# Patient Record
Sex: FEMALE | Race: WHITE | Employment: OTHER | URBAN - METROPOLITAN AREA
[De-identification: names, ages, dates, MRNs, and addresses within clinical notes are randomized per-mention and may not be internally consistent; named-entity substitution may affect disease eponyms.]

---

## 2018-05-13 ENCOUNTER — APPOINTMENT (OUTPATIENT)
Dept: GENERAL RADIOLOGY | Age: 73
End: 2018-05-13
Payer: MEDICARE

## 2018-05-13 ENCOUNTER — APPOINTMENT (OUTPATIENT)
Dept: CT IMAGING | Age: 73
End: 2018-05-13
Payer: MEDICARE

## 2018-05-13 ENCOUNTER — HOSPITAL ENCOUNTER (EMERGENCY)
Age: 73
Discharge: HOME | End: 2018-05-13
Attending: EMERGENCY MEDICINE
Payer: MEDICARE

## 2018-05-13 VITALS
SYSTOLIC BLOOD PRESSURE: 166 MMHG | DIASTOLIC BLOOD PRESSURE: 88 MMHG | HEART RATE: 88 BPM | RESPIRATION RATE: 20 BRPM | HEIGHT: 65 IN | BODY MASS INDEX: 32.82 KG/M2 | OXYGEN SATURATION: 95 % | TEMPERATURE: 98.2 F | WEIGHT: 197 LBS

## 2018-05-13 DIAGNOSIS — R06.09 OTHER FORM OF DYSPNEA: Primary | ICD-10-CM

## 2018-05-13 DIAGNOSIS — F41.1 ANXIETY STATE: ICD-10-CM

## 2018-05-13 LAB
ANION GAP SERPL CALCULATED.3IONS-SCNC: 10 MMOL/L (ref 7–16)
BASOPHILS ABSOLUTE: 0.03 E9/L (ref 0–0.2)
BASOPHILS RELATIVE PERCENT: 0.4 % (ref 0–2)
BUN BLDV-MCNC: 11 MG/DL (ref 8–23)
CALCIUM SERPL-MCNC: 10 MG/DL (ref 8.6–10.2)
CHLORIDE BLD-SCNC: 101 MMOL/L (ref 98–107)
CO2: 30 MMOL/L (ref 22–29)
CREAT SERPL-MCNC: 0.7 MG/DL (ref 0.5–1)
EOSINOPHILS ABSOLUTE: 0.29 E9/L (ref 0.05–0.5)
EOSINOPHILS RELATIVE PERCENT: 4.1 % (ref 0–6)
GFR AFRICAN AMERICAN: >60
GFR NON-AFRICAN AMERICAN: >60 ML/MIN/1.73
GLUCOSE BLD-MCNC: 142 MG/DL (ref 74–109)
HCT VFR BLD CALC: 36.9 % (ref 34–48)
HEMOGLOBIN: 11.5 G/DL (ref 11.5–15.5)
IMMATURE GRANULOCYTES #: 0.04 E9/L
IMMATURE GRANULOCYTES %: 0.6 % (ref 0–5)
LYMPHOCYTES ABSOLUTE: 1.58 E9/L (ref 1.5–4)
LYMPHOCYTES RELATIVE PERCENT: 22.3 % (ref 20–42)
MCH RBC QN AUTO: 25.4 PG (ref 26–35)
MCHC RBC AUTO-ENTMCNC: 31.2 % (ref 32–34.5)
MCV RBC AUTO: 81.6 FL (ref 80–99.9)
MONOCYTES ABSOLUTE: 0.71 E9/L (ref 0.1–0.95)
MONOCYTES RELATIVE PERCENT: 10 % (ref 2–12)
NEUTROPHILS ABSOLUTE: 4.42 E9/L (ref 1.8–7.3)
NEUTROPHILS RELATIVE PERCENT: 62.6 % (ref 43–80)
PDW BLD-RTO: 14.9 FL (ref 11.5–15)
PLATELET # BLD: 270 E9/L (ref 130–450)
PMV BLD AUTO: 9.5 FL (ref 7–12)
POTASSIUM REFLEX MAGNESIUM: 4.3 MMOL/L (ref 3.5–5)
PRO-BNP: 1072 PG/ML (ref 0–125)
RBC # BLD: 4.52 E12/L (ref 3.5–5.5)
SODIUM BLD-SCNC: 141 MMOL/L (ref 132–146)
TROPONIN: <0.01 NG/ML (ref 0–0.03)
WBC # BLD: 7.1 E9/L (ref 4.5–11.5)

## 2018-05-13 PROCEDURE — 6360000002 HC RX W HCPCS: Performed by: EMERGENCY MEDICINE

## 2018-05-13 PROCEDURE — 6360000004 HC RX CONTRAST MEDICATION: Performed by: RADIOLOGY

## 2018-05-13 PROCEDURE — 36415 COLL VENOUS BLD VENIPUNCTURE: CPT

## 2018-05-13 PROCEDURE — 96374 THER/PROPH/DIAG INJ IV PUSH: CPT

## 2018-05-13 PROCEDURE — 80048 BASIC METABOLIC PNL TOTAL CA: CPT

## 2018-05-13 PROCEDURE — 71275 CT ANGIOGRAPHY CHEST: CPT

## 2018-05-13 PROCEDURE — 85025 COMPLETE CBC W/AUTO DIFF WBC: CPT

## 2018-05-13 PROCEDURE — 96375 TX/PRO/DX INJ NEW DRUG ADDON: CPT

## 2018-05-13 PROCEDURE — 96376 TX/PRO/DX INJ SAME DRUG ADON: CPT

## 2018-05-13 PROCEDURE — 84484 ASSAY OF TROPONIN QUANT: CPT

## 2018-05-13 PROCEDURE — 83880 ASSAY OF NATRIURETIC PEPTIDE: CPT

## 2018-05-13 PROCEDURE — 99285 EMERGENCY DEPT VISIT HI MDM: CPT

## 2018-05-13 PROCEDURE — 71045 X-RAY EXAM CHEST 1 VIEW: CPT

## 2018-05-13 RX ORDER — FUROSEMIDE 10 MG/ML
40 INJECTION INTRAMUSCULAR; INTRAVENOUS ONCE
Status: COMPLETED | OUTPATIENT
Start: 2018-05-13 | End: 2018-05-13

## 2018-05-13 RX ORDER — LORAZEPAM 2 MG/ML
1 INJECTION INTRAMUSCULAR ONCE
Status: COMPLETED | OUTPATIENT
Start: 2018-05-13 | End: 2018-05-13

## 2018-05-13 RX ADMIN — LORAZEPAM 1 MG: 2 INJECTION, SOLUTION INTRAMUSCULAR; INTRAVENOUS at 07:58

## 2018-05-13 RX ADMIN — LORAZEPAM 1 MG: 2 INJECTION INTRAMUSCULAR; INTRAVENOUS at 12:14

## 2018-05-13 RX ADMIN — IOPAMIDOL 90 ML: 755 INJECTION, SOLUTION INTRAVENOUS at 09:25

## 2018-05-13 RX ADMIN — FUROSEMIDE 40 MG: 10 INJECTION, SOLUTION INTRAMUSCULAR; INTRAVENOUS at 12:16

## 2018-06-01 LAB
EKG ATRIAL RATE: 68 BPM
EKG P AXIS: 43 DEGREES
EKG P-R INTERVAL: 110 MS
EKG Q-T INTERVAL: 408 MS
EKG QRS DURATION: 88 MS
EKG QTC CALCULATION (BAZETT): 433 MS
EKG R AXIS: -35 DEGREES
EKG T AXIS: 29 DEGREES
EKG VENTRICULAR RATE: 68 BPM

## 2018-08-02 ENCOUNTER — HOSPITAL ENCOUNTER (EMERGENCY)
Age: 73
Discharge: HOME | End: 2018-08-02
Payer: MEDICARE

## 2018-08-02 VITALS
HEIGHT: 64 IN | TEMPERATURE: 98.4 F | DIASTOLIC BLOOD PRESSURE: 70 MMHG | SYSTOLIC BLOOD PRESSURE: 134 MMHG | OXYGEN SATURATION: 92 % | RESPIRATION RATE: 18 BRPM | HEART RATE: 82 BPM | WEIGHT: 220 LBS | BODY MASS INDEX: 37.56 KG/M2

## 2018-08-02 DIAGNOSIS — S91.311A LACERATION OF RIGHT FOOT, INITIAL ENCOUNTER: Primary | ICD-10-CM

## 2018-08-02 PROCEDURE — 90715 TDAP VACCINE 7 YRS/> IM: CPT | Performed by: PHYSICIAN ASSISTANT

## 2018-08-02 PROCEDURE — 6360000002 HC RX W HCPCS: Performed by: PHYSICIAN ASSISTANT

## 2018-08-02 PROCEDURE — 99282 EMERGENCY DEPT VISIT SF MDM: CPT

## 2018-08-02 PROCEDURE — 90471 IMMUNIZATION ADMIN: CPT | Performed by: PHYSICIAN ASSISTANT

## 2018-08-02 PROCEDURE — 12001 RPR S/N/AX/GEN/TRNK 2.5CM/<: CPT

## 2018-08-02 RX ORDER — DIAPER,BRIEF,INFANT-TODD,DISP
EACH MISCELLANEOUS ONCE
Status: DISCONTINUED | OUTPATIENT
Start: 2018-08-02 | End: 2018-08-02 | Stop reason: HOSPADM

## 2018-08-02 RX ORDER — CEPHALEXIN 500 MG/1
500 CAPSULE ORAL 2 TIMES DAILY
Qty: 14 CAPSULE | Refills: 0 | Status: SHIPPED | OUTPATIENT
Start: 2018-08-02 | End: 2018-08-09

## 2018-08-02 RX ADMIN — TETANUS TOXOID, REDUCED DIPHTHERIA TOXOID AND ACELLULAR PERTUSSIS VACCINE, ADSORBED 0.5 ML: 5; 2.5; 8; 8; 2.5 SUSPENSION INTRAMUSCULAR at 14:38

## 2018-08-02 NOTE — ED PROVIDER NOTES
injection 0.5 mL (0.5 mLs Intramuscular Given 8/2/18 2378)     Consult(s):   None    Procedure(s):     PROCEDURE NOTE  8/2/18       Time: 80    LACERATION REPAIR  Risks, benefits and alternatives (for applicable procedures below) described. Performed By: ROSALINE Costa. Laceration #: 1. Location: left foot  Length: 1.5 cm. The wound area was soaked in iodine and washed and draped in a sterile fashion. Local Anesthesia:  Lidocaine 1% without epinephrine. The wound was explored with the following results:  no foreign body or tendon injury seen. Debridement: None. Undermining: None. Wound Margins Revised: no.  Flaps Aligned: yes. The wound was closed with 4-0 Prolene using interrupted sutures. Dressing:  bacitracin and a sterile dressing was placed. Total number suture:  2. There were no additional lacerations requiring repair. MDM:   Patient in no acute distress. Vital signs stable. Patient's tetanus is updated while in the emergency department. 2 simple mattress sutures used to approximate wound edges between the 4th and 5th toes. Started on antibiotics. Patient will follow with PCP for reevaluation as well as suture removal in 10-14 days. Educated on the signs and symptoms to return to the ED. Discharged in stable condition. Counseling: The emergency provider has spoken with the patient and discussed todays results, in addition to providing specific details for the plan of care and counseling regarding the diagnosis and prognosis. Questions are answered at this time and they are agreeable with the plan. Assessment      1.  Laceration of right foot, initial encounter      Plan   Discharge to home  Patient condition is good    New Medications     Discharge Medication List as of 8/2/2018  2:29 PM      START taking these medications    Details   cephALEXin (KEFLEX) 500 MG capsule Take 1 capsule by mouth 2 times daily for 7 days, Disp-14 capsule, R-0Print Electronically signed by ROSALINE Quiñones   DD: 8/2/18  **This report was transcribed using voice recognition software. Every effort was made to ensure accuracy; however, inadvertent computerized transcription errors may be present.   END OF ED PROVIDER NOTE       Eddie Quiñones  08/02/18 6643

## 2018-08-18 ENCOUNTER — HOSPITAL ENCOUNTER (EMERGENCY)
Age: 73
Discharge: HOME | End: 2018-08-18
Attending: EMERGENCY MEDICINE
Payer: MEDICARE

## 2018-08-18 VITALS
HEART RATE: 76 BPM | OXYGEN SATURATION: 92 % | WEIGHT: 195 LBS | TEMPERATURE: 98.2 F | DIASTOLIC BLOOD PRESSURE: 62 MMHG | BODY MASS INDEX: 32.49 KG/M2 | RESPIRATION RATE: 18 BRPM | HEIGHT: 65 IN | SYSTOLIC BLOOD PRESSURE: 126 MMHG

## 2018-08-18 DIAGNOSIS — L03.119 CELLULITIS OF FOOT: Primary | ICD-10-CM

## 2018-08-18 LAB
CHP ED QC CHECK: YES
GLUCOSE BLD-MCNC: 162 MG/DL
METER GLUCOSE: 162 MG/DL (ref 70–110)

## 2018-08-18 PROCEDURE — 6370000000 HC RX 637 (ALT 250 FOR IP): Performed by: EMERGENCY MEDICINE

## 2018-08-18 PROCEDURE — 99283 EMERGENCY DEPT VISIT LOW MDM: CPT

## 2018-08-18 PROCEDURE — 82962 GLUCOSE BLOOD TEST: CPT

## 2018-08-18 RX ORDER — AMOXICILLIN AND CLAVULANATE POTASSIUM 875; 125 MG/1; MG/1
1 TABLET, FILM COATED ORAL ONCE
Status: COMPLETED | OUTPATIENT
Start: 2018-08-18 | End: 2018-08-18

## 2018-08-18 RX ORDER — AMOXICILLIN AND CLAVULANATE POTASSIUM 875; 125 MG/1; MG/1
1 TABLET, FILM COATED ORAL 2 TIMES DAILY
Qty: 20 TABLET | Refills: 0 | Status: SHIPPED | OUTPATIENT
Start: 2018-08-18 | End: 2018-08-28

## 2018-08-18 RX ADMIN — AMOXICILLIN AND CLAVULANATE POTASSIUM 1 TABLET: 875; 125 TABLET, FILM COATED ORAL at 21:29

## 2018-08-19 NOTE — ED NOTES
Wound cleaned with wound . Bacitracin ointment applied along with a dry, sterile dressing.        Luis Fernando Harper RN  08/18/18 7077

## 2018-08-19 NOTE — ED PROVIDER NOTES
(N/A, 4/15/15); Breast surgery; Nerve Block (Bilateral, 06/06/16); and Kidney removal.    Social History:  reports that she quit smoking about 19 years ago. She quit after 60.00 years of use. She has never used smokeless tobacco. She reports that she does not drink alcohol or use drugs. Family History: family history is not on file. The patients home medications have been reviewed. Allergies: Benadryl [diphenhydramine]; Other; Sulfa antibiotics; Ciprofloxacin; and Tape [adhesive tape]        ---------------------------------------------------PHYSICAL EXAM--------------------------------------  Constitutional:  Well developed, well nourished, no acute distress, non-toxic appearance , afebrile  Eyes: No scleral icterus, no conjunctival injection  HENT:  Atraumatic, external ears normal, nose normal, oropharynx moist. Neck- normal range of motion, no tenderness, supple   Respiratory:  No respiratory distress,   GI:  No abdominal distention  Musculoskeletal:  No edema, no deformities. Integument:  Right pinky toe open wound planter aspect at base swelling/redness midway up foot without lymphangitis good capillary refill, no feeling to the foot due to neuropathy, good movement with foot. There is no drainage from the wound currently. Neurologic:  Alert, answers all questions appropriately ,no focal motor deficits noted. Psychiatric:  Speech and behavior appropriate               ------------------------- NURSING NOTES AND VITALS REVIEWED ---------------------------   The nursing notes within the ED encounter and vital signs as below have been reviewed by myself. /62   Pulse 76   Temp 98.2 °F (36.8 °C) (Oral)   Resp 18   Ht 5' 5\" (1.651 m)   Wt 195 lb (88.5 kg)   SpO2 92%   BMI 32.45 kg/m²   Oxygen Saturation Interpretation: Low Normal    The patients available past medical records and past encounters were reviewed.         ------------------------------ ED COURSE/MEDICAL DECISION

## 2019-02-16 ENCOUNTER — APPOINTMENT (OUTPATIENT)
Dept: GENERAL RADIOLOGY | Age: 74
End: 2019-02-16
Payer: MEDICARE

## 2019-02-16 ENCOUNTER — APPOINTMENT (OUTPATIENT)
Dept: CT IMAGING | Age: 74
End: 2019-02-16
Payer: MEDICARE

## 2019-02-16 ENCOUNTER — HOSPITAL ENCOUNTER (OUTPATIENT)
Age: 74
Setting detail: OBSERVATION
Discharge: HOME | End: 2019-02-18
Attending: EMERGENCY MEDICINE | Admitting: INTERNAL MEDICINE
Payer: MEDICARE

## 2019-02-16 DIAGNOSIS — N17.9 ACUTE KIDNEY INJURY (HCC): Primary | ICD-10-CM

## 2019-02-16 DIAGNOSIS — E16.0 HYPOGLYCEMIA SECONDARY TO SULFONYLUREA, ACCIDENTAL OR UNINTENTIONAL, INITIAL ENCOUNTER: ICD-10-CM

## 2019-02-16 DIAGNOSIS — T38.3X1A HYPOGLYCEMIA SECONDARY TO SULFONYLUREA, ACCIDENTAL OR UNINTENTIONAL, INITIAL ENCOUNTER: ICD-10-CM

## 2019-02-16 LAB
ALBUMIN SERPL-MCNC: 3.1 G/DL (ref 3.5–5.2)
ALP BLD-CCNC: 73 U/L (ref 35–104)
ALT SERPL-CCNC: 24 U/L (ref 0–32)
ANION GAP SERPL CALCULATED.3IONS-SCNC: 12 MMOL/L (ref 7–16)
AST SERPL-CCNC: 36 U/L (ref 0–31)
BASOPHILS ABSOLUTE: 0.02 E9/L (ref 0–0.2)
BASOPHILS RELATIVE PERCENT: 0.4 % (ref 0–2)
BILIRUB SERPL-MCNC: 0.2 MG/DL (ref 0–1.2)
BUN BLDV-MCNC: 27 MG/DL (ref 8–23)
CALCIUM SERPL-MCNC: 8.3 MG/DL (ref 8.6–10.2)
CHLORIDE BLD-SCNC: 98 MMOL/L (ref 98–107)
CHP ED QC CHECK: YES
CHP ED QC CHECK: YES
CO2: 26 MMOL/L (ref 22–29)
CREAT SERPL-MCNC: 1.4 MG/DL (ref 0.5–1)
EOSINOPHILS ABSOLUTE: 0.07 E9/L (ref 0.05–0.5)
EOSINOPHILS RELATIVE PERCENT: 1.5 % (ref 0–6)
GFR AFRICAN AMERICAN: 45
GFR NON-AFRICAN AMERICAN: 37 ML/MIN/1.73
GLUCOSE BLD-MCNC: 198 MG/DL
GLUCOSE BLD-MCNC: 44 MG/DL
GLUCOSE BLD-MCNC: 89 MG/DL (ref 74–99)
HCT VFR BLD CALC: 38.6 % (ref 34–48)
HEMOGLOBIN: 11.5 G/DL (ref 11.5–15.5)
IMMATURE GRANULOCYTES #: 0 E9/L
IMMATURE GRANULOCYTES %: 0 % (ref 0–5)
LACTIC ACID: 1.6 MMOL/L (ref 0.5–2.2)
LYMPHOCYTES ABSOLUTE: 1.27 E9/L (ref 1.5–4)
LYMPHOCYTES RELATIVE PERCENT: 27 % (ref 20–42)
MCH RBC QN AUTO: 24.2 PG (ref 26–35)
MCHC RBC AUTO-ENTMCNC: 29.8 % (ref 32–34.5)
MCV RBC AUTO: 81.1 FL (ref 80–99.9)
METER GLUCOSE: 108 MG/DL (ref 74–99)
METER GLUCOSE: 198 MG/DL (ref 74–99)
METER GLUCOSE: 44 MG/DL (ref 74–99)
METER GLUCOSE: 51 MG/DL (ref 74–99)
METER GLUCOSE: 51 MG/DL (ref 74–99)
METER GLUCOSE: 64 MG/DL (ref 74–99)
MONOCYTES ABSOLUTE: 0.33 E9/L (ref 0.1–0.95)
MONOCYTES RELATIVE PERCENT: 7 % (ref 2–12)
NEUTROPHILS ABSOLUTE: 3.01 E9/L (ref 1.8–7.3)
NEUTROPHILS RELATIVE PERCENT: 64.1 % (ref 43–80)
PDW BLD-RTO: 18.1 FL (ref 11.5–15)
PLATELET # BLD: 265 E9/L (ref 130–450)
PMV BLD AUTO: 10.9 FL (ref 7–12)
POTASSIUM REFLEX MAGNESIUM: 4.4 MMOL/L (ref 3.5–5)
RBC # BLD: 4.76 E12/L (ref 3.5–5.5)
SEDIMENTATION RATE, ERYTHROCYTE: 8 MM/HR (ref 0–20)
SODIUM BLD-SCNC: 136 MMOL/L (ref 132–146)
TOTAL PROTEIN: 6.5 G/DL (ref 6.4–8.3)
TROPONIN: <0.01 NG/ML (ref 0–0.03)
WBC # BLD: 4.7 E9/L (ref 4.5–11.5)

## 2019-02-16 PROCEDURE — 82962 GLUCOSE BLOOD TEST: CPT

## 2019-02-16 PROCEDURE — 85651 RBC SED RATE NONAUTOMATED: CPT

## 2019-02-16 PROCEDURE — 84484 ASSAY OF TROPONIN QUANT: CPT

## 2019-02-16 PROCEDURE — 2580000003 HC RX 258: Performed by: INTERNAL MEDICINE

## 2019-02-16 PROCEDURE — 99285 EMERGENCY DEPT VISIT HI MDM: CPT

## 2019-02-16 PROCEDURE — 83605 ASSAY OF LACTIC ACID: CPT

## 2019-02-16 PROCEDURE — 2580000003 HC RX 258: Performed by: EMERGENCY MEDICINE

## 2019-02-16 PROCEDURE — 96361 HYDRATE IV INFUSION ADD-ON: CPT

## 2019-02-16 PROCEDURE — 70450 CT HEAD/BRAIN W/O DYE: CPT

## 2019-02-16 PROCEDURE — G0378 HOSPITAL OBSERVATION PER HR: HCPCS

## 2019-02-16 PROCEDURE — 71045 X-RAY EXAM CHEST 1 VIEW: CPT

## 2019-02-16 PROCEDURE — 85025 COMPLETE CBC W/AUTO DIFF WBC: CPT

## 2019-02-16 PROCEDURE — 96374 THER/PROPH/DIAG INJ IV PUSH: CPT

## 2019-02-16 PROCEDURE — 6370000000 HC RX 637 (ALT 250 FOR IP): Performed by: INTERNAL MEDICINE

## 2019-02-16 PROCEDURE — 36415 COLL VENOUS BLD VENIPUNCTURE: CPT

## 2019-02-16 PROCEDURE — 2580000003 HC RX 258

## 2019-02-16 PROCEDURE — 80053 COMPREHEN METABOLIC PANEL: CPT

## 2019-02-16 PROCEDURE — 96376 TX/PRO/DX INJ SAME DRUG ADON: CPT

## 2019-02-16 RX ORDER — LORAZEPAM 1 MG/1
1 TABLET ORAL 2 TIMES DAILY
Status: DISCONTINUED | OUTPATIENT
Start: 2019-02-16 | End: 2019-02-18 | Stop reason: HOSPADM

## 2019-02-16 RX ORDER — TRAZODONE HYDROCHLORIDE 50 MG/1
100 TABLET ORAL NIGHTLY
Status: DISCONTINUED | OUTPATIENT
Start: 2019-02-16 | End: 2019-02-18 | Stop reason: HOSPADM

## 2019-02-16 RX ORDER — FUROSEMIDE 40 MG/1
40 TABLET ORAL
Status: DISCONTINUED | OUTPATIENT
Start: 2019-02-18 | End: 2019-02-18 | Stop reason: HOSPADM

## 2019-02-16 RX ORDER — 0.9 % SODIUM CHLORIDE 0.9 %
500 INTRAVENOUS SOLUTION INTRAVENOUS ONCE
Status: COMPLETED | OUTPATIENT
Start: 2019-02-16 | End: 2019-02-16

## 2019-02-16 RX ORDER — POTASSIUM CHLORIDE 750 MG/1
10 TABLET, EXTENDED RELEASE ORAL DAILY
Status: DISCONTINUED | OUTPATIENT
Start: 2019-02-17 | End: 2019-02-18 | Stop reason: HOSPADM

## 2019-02-16 RX ORDER — GABAPENTIN 800 MG/1
800 TABLET ORAL 3 TIMES DAILY
COMMUNITY

## 2019-02-16 RX ORDER — IBUPROFEN 400 MG/1
800 TABLET ORAL 2 TIMES DAILY PRN
Status: DISCONTINUED | OUTPATIENT
Start: 2019-02-16 | End: 2019-02-18 | Stop reason: HOSPADM

## 2019-02-16 RX ORDER — GLIPIZIDE 5 MG/1
5 TABLET ORAL DAILY
Status: ON HOLD | COMMUNITY
End: 2019-02-18 | Stop reason: HOSPADM

## 2019-02-16 RX ORDER — LEVOTHYROXINE SODIUM 0.05 MG/1
50 TABLET ORAL DAILY
Status: DISCONTINUED | OUTPATIENT
Start: 2019-02-17 | End: 2019-02-18 | Stop reason: HOSPADM

## 2019-02-16 RX ORDER — DEXTROSE MONOHYDRATE 25 G/50ML
12.5 INJECTION, SOLUTION INTRAVENOUS ONCE
Status: COMPLETED | OUTPATIENT
Start: 2019-02-16 | End: 2019-02-16

## 2019-02-16 RX ORDER — HYDRALAZINE HYDROCHLORIDE 50 MG/1
100 TABLET, FILM COATED ORAL 3 TIMES DAILY
Status: DISCONTINUED | OUTPATIENT
Start: 2019-02-16 | End: 2019-02-18 | Stop reason: HOSPADM

## 2019-02-16 RX ORDER — DEXTROSE MONOHYDRATE 25 G/50ML
25 INJECTION, SOLUTION INTRAVENOUS ONCE
Status: COMPLETED | OUTPATIENT
Start: 2019-02-16 | End: 2019-02-16

## 2019-02-16 RX ORDER — GABAPENTIN 400 MG/1
800 CAPSULE ORAL 3 TIMES DAILY
Status: DISCONTINUED | OUTPATIENT
Start: 2019-02-16 | End: 2019-02-18 | Stop reason: HOSPADM

## 2019-02-16 RX ORDER — ERYTHROMYCIN 5 MG/G
OINTMENT OPHTHALMIC NIGHTLY
Status: DISCONTINUED | OUTPATIENT
Start: 2019-02-16 | End: 2019-02-18 | Stop reason: HOSPADM

## 2019-02-16 RX ORDER — DEXTROSE MONOHYDRATE 25 G/50ML
INJECTION, SOLUTION INTRAVENOUS
Status: COMPLETED
Start: 2019-02-16 | End: 2019-02-16

## 2019-02-16 RX ORDER — ERYTHROMYCIN 5 MG/G
OINTMENT OPHTHALMIC NIGHTLY
COMMUNITY
End: 2019-03-09

## 2019-02-16 RX ORDER — PRAMIPEXOLE DIHYDROCHLORIDE 1 MG/1
1 TABLET ORAL 2 TIMES DAILY
COMMUNITY

## 2019-02-16 RX ORDER — ALBUTEROL SULFATE 2.5 MG/3ML
2.5 SOLUTION RESPIRATORY (INHALATION) EVERY 6 HOURS PRN
Status: ON HOLD | COMMUNITY
End: 2021-01-20 | Stop reason: HOSPADM

## 2019-02-16 RX ORDER — IBUPROFEN 800 MG/1
800 TABLET ORAL 2 TIMES DAILY PRN
Status: ON HOLD | COMMUNITY
End: 2021-01-20 | Stop reason: HOSPADM

## 2019-02-16 RX ORDER — SODIUM CHLORIDE 0.9 % (FLUSH) 0.9 %
10 SYRINGE (ML) INJECTION 2 TIMES DAILY
Status: DISCONTINUED | OUTPATIENT
Start: 2019-02-16 | End: 2019-02-18 | Stop reason: HOSPADM

## 2019-02-16 RX ORDER — CLONIDINE HYDROCHLORIDE 0.2 MG/1
0.2 TABLET ORAL 3 TIMES DAILY
Status: DISCONTINUED | OUTPATIENT
Start: 2019-02-16 | End: 2019-02-18 | Stop reason: HOSPADM

## 2019-02-16 RX ORDER — TRAZODONE HYDROCHLORIDE 100 MG/1
100 TABLET ORAL NIGHTLY
COMMUNITY

## 2019-02-16 RX ORDER — PRAMIPEXOLE DIHYDROCHLORIDE 1 MG/1
1 TABLET ORAL 2 TIMES DAILY
Status: DISCONTINUED | OUTPATIENT
Start: 2019-02-16 | End: 2019-02-18 | Stop reason: HOSPADM

## 2019-02-16 RX ORDER — VENLAFAXINE HYDROCHLORIDE 150 MG/1
150 CAPSULE, EXTENDED RELEASE ORAL DAILY
Status: DISCONTINUED | OUTPATIENT
Start: 2019-02-17 | End: 2019-02-18 | Stop reason: HOSPADM

## 2019-02-16 RX ORDER — ALBUTEROL SULFATE 2.5 MG/3ML
2.5 SOLUTION RESPIRATORY (INHALATION) EVERY 6 HOURS PRN
Status: DISCONTINUED | OUTPATIENT
Start: 2019-02-16 | End: 2019-02-18 | Stop reason: HOSPADM

## 2019-02-16 RX ORDER — DEXTROSE MONOHYDRATE 100 MG/ML
INJECTION, SOLUTION INTRAVENOUS CONTINUOUS
Status: ACTIVE | OUTPATIENT
Start: 2019-02-16 | End: 2019-02-17

## 2019-02-16 RX ADMIN — TRAZODONE HYDROCHLORIDE 100 MG: 50 TABLET ORAL at 23:35

## 2019-02-16 RX ADMIN — DEXTROSE MONOHYDRATE 25 G: 25 INJECTION, SOLUTION INTRAVENOUS at 23:22

## 2019-02-16 RX ADMIN — PRAMIPEXOLE DIHYDROCHLORIDE 1 MG: 1 TABLET ORAL at 23:35

## 2019-02-16 RX ADMIN — DEXTROSE MONOHYDRATE 12.5 G: 500 INJECTION PARENTERAL at 17:44

## 2019-02-16 RX ADMIN — DEXTROSE MONOHYDRATE 12.5 G: 25 INJECTION, SOLUTION INTRAVENOUS at 17:46

## 2019-02-16 RX ADMIN — SODIUM CHLORIDE 500 ML: 9 INJECTION, SOLUTION INTRAVENOUS at 16:50

## 2019-02-16 RX ADMIN — DEXTROSE MONOHYDRATE: 100 INJECTION, SOLUTION INTRAVENOUS at 19:34

## 2019-02-16 RX ADMIN — GABAPENTIN 800 MG: 400 CAPSULE ORAL at 23:34

## 2019-02-16 RX ADMIN — DEXTROSE MONOHYDRATE 25 G: 25 INJECTION, SOLUTION INTRAVENOUS at 19:29

## 2019-02-16 RX ADMIN — ERYTHROMYCIN: 5 OINTMENT OPHTHALMIC at 23:36

## 2019-02-16 RX ADMIN — DEXTROSE MONOHYDRATE 12.5 G: 25 INJECTION, SOLUTION INTRAVENOUS at 17:44

## 2019-02-16 RX ADMIN — DEXTROSE MONOHYDRATE 25 G: 500 INJECTION PARENTERAL at 19:29

## 2019-02-16 ASSESSMENT — ENCOUNTER SYMPTOMS
VOMITING: 0
EYE DISCHARGE: 0
ABDOMINAL DISTENTION: 0
SHORTNESS OF BREATH: 0
WHEEZING: 0
NAUSEA: 0
DIARRHEA: 0
COUGH: 0
EYE REDNESS: 0
BACK PAIN: 0
SINUS PRESSURE: 0
SORE THROAT: 0
EYE PAIN: 0

## 2019-02-16 ASSESSMENT — PAIN SCALES - GENERAL
PAINLEVEL_OUTOF10: 0
PAINLEVEL_OUTOF10: 0

## 2019-02-17 LAB
ANION GAP SERPL CALCULATED.3IONS-SCNC: 12 MMOL/L (ref 7–16)
BACTERIA: ABNORMAL /HPF
BILIRUBIN URINE: NEGATIVE
BLOOD, URINE: ABNORMAL
BUN BLDV-MCNC: 15 MG/DL (ref 8–23)
CALCIUM SERPL-MCNC: 8.6 MG/DL (ref 8.6–10.2)
CHLORIDE BLD-SCNC: 102 MMOL/L (ref 98–107)
CLARITY: ABNORMAL
CO2: 26 MMOL/L (ref 22–29)
COLOR: YELLOW
CREAT SERPL-MCNC: 0.9 MG/DL (ref 0.5–1)
GFR AFRICAN AMERICAN: >60
GFR NON-AFRICAN AMERICAN: >60 ML/MIN/1.73
GLUCOSE BLD-MCNC: 74 MG/DL (ref 74–99)
GLUCOSE URINE: NEGATIVE MG/DL
KETONES, URINE: NEGATIVE MG/DL
LEUKOCYTE ESTERASE, URINE: ABNORMAL
METER GLUCOSE: 107 MG/DL (ref 74–99)
METER GLUCOSE: 109 MG/DL (ref 74–99)
METER GLUCOSE: 109 MG/DL (ref 74–99)
METER GLUCOSE: 110 MG/DL (ref 74–99)
METER GLUCOSE: 41 MG/DL (ref 74–99)
METER GLUCOSE: 45 MG/DL (ref 74–99)
METER GLUCOSE: 48 MG/DL (ref 74–99)
METER GLUCOSE: 49 MG/DL (ref 74–99)
METER GLUCOSE: 50 MG/DL (ref 74–99)
METER GLUCOSE: 54 MG/DL (ref 74–99)
METER GLUCOSE: 78 MG/DL (ref 74–99)
METER GLUCOSE: 93 MG/DL (ref 74–99)
NITRITE, URINE: NEGATIVE
PH UA: 6 (ref 5–9)
POTASSIUM SERPL-SCNC: 3.2 MMOL/L (ref 3.5–5)
PROTEIN UA: NEGATIVE MG/DL
RBC UA: ABNORMAL /HPF (ref 0–2)
SODIUM BLD-SCNC: 140 MMOL/L (ref 132–146)
SPECIFIC GRAVITY UA: <=1.005 (ref 1–1.03)
UROBILINOGEN, URINE: 0.2 E.U./DL
WBC UA: >20 /HPF (ref 0–5)

## 2019-02-17 PROCEDURE — 96372 THER/PROPH/DIAG INJ SC/IM: CPT

## 2019-02-17 PROCEDURE — 81001 URINALYSIS AUTO W/SCOPE: CPT

## 2019-02-17 PROCEDURE — G0378 HOSPITAL OBSERVATION PER HR: HCPCS

## 2019-02-17 PROCEDURE — 6370000000 HC RX 637 (ALT 250 FOR IP): Performed by: INTERNAL MEDICINE

## 2019-02-17 PROCEDURE — 6360000002 HC RX W HCPCS: Performed by: INTERNAL MEDICINE

## 2019-02-17 PROCEDURE — 2580000003 HC RX 258: Performed by: EMERGENCY MEDICINE

## 2019-02-17 PROCEDURE — 82962 GLUCOSE BLOOD TEST: CPT

## 2019-02-17 PROCEDURE — 96361 HYDRATE IV INFUSION ADD-ON: CPT

## 2019-02-17 PROCEDURE — 80048 BASIC METABOLIC PNL TOTAL CA: CPT

## 2019-02-17 PROCEDURE — 36415 COLL VENOUS BLD VENIPUNCTURE: CPT

## 2019-02-17 RX ORDER — DEXTROSE MONOHYDRATE 100 MG/ML
INJECTION, SOLUTION INTRAVENOUS CONTINUOUS
Status: ACTIVE | OUTPATIENT
Start: 2019-02-17 | End: 2019-02-17

## 2019-02-17 RX ADMIN — Medication 10 ML: at 20:30

## 2019-02-17 RX ADMIN — CLONIDINE HYDROCHLORIDE 0.2 MG: 0.2 TABLET ORAL at 08:14

## 2019-02-17 RX ADMIN — POTASSIUM CHLORIDE 10 MEQ: 750 TABLET, EXTENDED RELEASE ORAL at 08:14

## 2019-02-17 RX ADMIN — LORAZEPAM 1 MG: 1 TABLET ORAL at 20:30

## 2019-02-17 RX ADMIN — HYDRALAZINE HYDROCHLORIDE 100 MG: 50 TABLET, FILM COATED ORAL at 13:36

## 2019-02-17 RX ADMIN — ENOXAPARIN SODIUM 40 MG: 40 INJECTION SUBCUTANEOUS at 08:16

## 2019-02-17 RX ADMIN — CLONIDINE HYDROCHLORIDE 0.2 MG: 0.2 TABLET ORAL at 13:36

## 2019-02-17 RX ADMIN — VERAPAMIL HYDROCHLORIDE 180 MG: 180 TABLET, FILM COATED, EXTENDED RELEASE ORAL at 08:13

## 2019-02-17 RX ADMIN — HYDRALAZINE HYDROCHLORIDE 100 MG: 50 TABLET, FILM COATED ORAL at 08:13

## 2019-02-17 RX ADMIN — ERYTHROMYCIN: 5 OINTMENT OPHTHALMIC at 20:30

## 2019-02-17 RX ADMIN — PRAMIPEXOLE DIHYDROCHLORIDE 1 MG: 1 TABLET ORAL at 20:30

## 2019-02-17 RX ADMIN — VENLAFAXINE HYDROCHLORIDE 150 MG: 150 CAPSULE, EXTENDED RELEASE ORAL at 08:14

## 2019-02-17 RX ADMIN — LORAZEPAM 1 MG: 1 TABLET ORAL at 08:13

## 2019-02-17 RX ADMIN — GABAPENTIN 800 MG: 400 CAPSULE ORAL at 08:14

## 2019-02-17 RX ADMIN — GABAPENTIN 800 MG: 400 CAPSULE ORAL at 13:35

## 2019-02-17 RX ADMIN — GABAPENTIN 800 MG: 400 CAPSULE ORAL at 22:43

## 2019-02-17 RX ADMIN — LEVOTHYROXINE SODIUM 50 MCG: 50 TABLET ORAL at 06:28

## 2019-02-17 RX ADMIN — PRAMIPEXOLE DIHYDROCHLORIDE 1 MG: 1 TABLET ORAL at 08:14

## 2019-02-17 ASSESSMENT — PAIN SCALES - GENERAL
PAINLEVEL_OUTOF10: 0
PAINLEVEL_OUTOF10: 0

## 2019-02-18 VITALS
OXYGEN SATURATION: 92 % | DIASTOLIC BLOOD PRESSURE: 93 MMHG | RESPIRATION RATE: 16 BRPM | WEIGHT: 197 LBS | BODY MASS INDEX: 32.82 KG/M2 | HEART RATE: 94 BPM | SYSTOLIC BLOOD PRESSURE: 186 MMHG | HEIGHT: 65 IN | TEMPERATURE: 98.5 F

## 2019-02-18 LAB
ANION GAP SERPL CALCULATED.3IONS-SCNC: 10 MMOL/L (ref 7–16)
BUN BLDV-MCNC: 11 MG/DL (ref 8–23)
CALCIUM SERPL-MCNC: 8.7 MG/DL (ref 8.6–10.2)
CHLORIDE BLD-SCNC: 102 MMOL/L (ref 98–107)
CO2: 29 MMOL/L (ref 22–29)
CREAT SERPL-MCNC: 0.8 MG/DL (ref 0.5–1)
GFR AFRICAN AMERICAN: >60
GFR NON-AFRICAN AMERICAN: >60 ML/MIN/1.73
GLUCOSE BLD-MCNC: 108 MG/DL (ref 74–99)
METER GLUCOSE: 109 MG/DL (ref 74–99)
METER GLUCOSE: 120 MG/DL (ref 74–99)
METER GLUCOSE: 141 MG/DL (ref 74–99)
POTASSIUM SERPL-SCNC: 3.2 MMOL/L (ref 3.5–5)
SODIUM BLD-SCNC: 141 MMOL/L (ref 132–146)

## 2019-02-18 PROCEDURE — 96372 THER/PROPH/DIAG INJ SC/IM: CPT

## 2019-02-18 PROCEDURE — 6360000002 HC RX W HCPCS: Performed by: INTERNAL MEDICINE

## 2019-02-18 PROCEDURE — 82962 GLUCOSE BLOOD TEST: CPT

## 2019-02-18 PROCEDURE — 2580000003 HC RX 258: Performed by: EMERGENCY MEDICINE

## 2019-02-18 PROCEDURE — 6370000000 HC RX 637 (ALT 250 FOR IP): Performed by: INTERNAL MEDICINE

## 2019-02-18 PROCEDURE — G0378 HOSPITAL OBSERVATION PER HR: HCPCS

## 2019-02-18 PROCEDURE — 36415 COLL VENOUS BLD VENIPUNCTURE: CPT

## 2019-02-18 PROCEDURE — 80048 BASIC METABOLIC PNL TOTAL CA: CPT

## 2019-02-18 RX ORDER — POTASSIUM CHLORIDE 20 MEQ/1
40 TABLET, EXTENDED RELEASE ORAL ONCE
Status: COMPLETED | OUTPATIENT
Start: 2019-02-18 | End: 2019-02-18

## 2019-02-18 RX ADMIN — VENLAFAXINE HYDROCHLORIDE 150 MG: 150 CAPSULE, EXTENDED RELEASE ORAL at 09:34

## 2019-02-18 RX ADMIN — VERAPAMIL HYDROCHLORIDE 180 MG: 180 TABLET, FILM COATED, EXTENDED RELEASE ORAL at 09:34

## 2019-02-18 RX ADMIN — Medication 10 ML: at 09:32

## 2019-02-18 RX ADMIN — GABAPENTIN 800 MG: 400 CAPSULE ORAL at 09:34

## 2019-02-18 RX ADMIN — LEVOTHYROXINE SODIUM 50 MCG: 50 TABLET ORAL at 06:24

## 2019-02-18 RX ADMIN — LORAZEPAM 1 MG: 1 TABLET ORAL at 09:34

## 2019-02-18 RX ADMIN — CLONIDINE HYDROCHLORIDE 0.2 MG: 0.2 TABLET ORAL at 09:34

## 2019-02-18 RX ADMIN — HYDRALAZINE HYDROCHLORIDE 100 MG: 50 TABLET, FILM COATED ORAL at 09:33

## 2019-02-18 RX ADMIN — ENOXAPARIN SODIUM 40 MG: 40 INJECTION SUBCUTANEOUS at 09:33

## 2019-02-18 RX ADMIN — PRAMIPEXOLE DIHYDROCHLORIDE 1 MG: 1 TABLET ORAL at 09:35

## 2019-02-18 RX ADMIN — FUROSEMIDE 40 MG: 40 TABLET ORAL at 09:34

## 2019-02-18 RX ADMIN — POTASSIUM CHLORIDE 40 MEQ: 20 TABLET, EXTENDED RELEASE ORAL at 09:35

## 2019-02-18 ASSESSMENT — PAIN SCALES - GENERAL: PAINLEVEL_OUTOF10: 0

## 2019-02-21 LAB
EKG ATRIAL RATE: 62 BPM
EKG P AXIS: 47 DEGREES
EKG P-R INTERVAL: 156 MS
EKG Q-T INTERVAL: 486 MS
EKG QRS DURATION: 96 MS
EKG QTC CALCULATION (BAZETT): 493 MS
EKG R AXIS: -42 DEGREES
EKG T AXIS: -18 DEGREES
EKG VENTRICULAR RATE: 62 BPM

## 2019-03-09 ENCOUNTER — APPOINTMENT (OUTPATIENT)
Dept: GENERAL RADIOLOGY | Age: 74
DRG: 641 | End: 2019-03-09
Payer: MEDICARE

## 2019-03-09 ENCOUNTER — HOSPITAL ENCOUNTER (INPATIENT)
Age: 74
LOS: 2 days | Discharge: HOME | DRG: 641 | End: 2019-03-11
Attending: EMERGENCY MEDICINE | Admitting: INTERNAL MEDICINE
Payer: MEDICARE

## 2019-03-09 ENCOUNTER — APPOINTMENT (OUTPATIENT)
Dept: CT IMAGING | Age: 74
DRG: 641 | End: 2019-03-09
Payer: MEDICARE

## 2019-03-09 DIAGNOSIS — J96.02 ACUTE RESPIRATORY FAILURE WITH HYPERCAPNIA (HCC): ICD-10-CM

## 2019-03-09 DIAGNOSIS — E87.6 HYPOKALEMIA: ICD-10-CM

## 2019-03-09 DIAGNOSIS — G93.40 ACUTE ENCEPHALOPATHY: Primary | ICD-10-CM

## 2019-03-09 PROBLEM — J96.00 ACUTE RESPIRATORY FAILURE (HCC): Status: ACTIVE | Noted: 2019-03-09

## 2019-03-09 LAB
ACETAMINOPHEN LEVEL: <5 MCG/ML (ref 10–30)
ALBUMIN SERPL-MCNC: 3.3 G/DL (ref 3.5–5.2)
ALP BLD-CCNC: 68 U/L (ref 35–104)
ALT SERPL-CCNC: 11 U/L (ref 0–32)
AMPHETAMINE SCREEN, URINE: NOT DETECTED
ANION GAP SERPL CALCULATED.3IONS-SCNC: 11 MMOL/L (ref 7–16)
AST SERPL-CCNC: 10 U/L (ref 0–31)
B.E.: 1 MMOL/L (ref -3–3)
BACTERIA: ABNORMAL /HPF
BARBITURATE SCREEN URINE: NOT DETECTED
BASOPHILS ABSOLUTE: 0.02 E9/L (ref 0–0.2)
BASOPHILS RELATIVE PERCENT: 0.3 % (ref 0–2)
BENZODIAZEPINE SCREEN, URINE: NOT DETECTED
BILIRUB SERPL-MCNC: 0.2 MG/DL (ref 0–1.2)
BILIRUBIN URINE: NEGATIVE
BLOOD, URINE: NEGATIVE
BUN BLDV-MCNC: 14 MG/DL (ref 8–23)
CALCIUM SERPL-MCNC: 8.8 MG/DL (ref 8.6–10.2)
CANNABINOID SCREEN URINE: NOT DETECTED
CHLORIDE BLD-SCNC: 103 MMOL/L (ref 98–107)
CLARITY: NORMAL
CO2: 28 MMOL/L (ref 22–29)
COCAINE METABOLITE SCREEN URINE: NOT DETECTED
COHB: 0.5 % (ref 0–1.5)
COLOR: YELLOW
CREAT SERPL-MCNC: 0.9 MG/DL (ref 0.5–1)
CRITICAL: ABNORMAL
DATE ANALYZED: ABNORMAL
DATE OF COLLECTION: ABNORMAL
EOSINOPHILS ABSOLUTE: 0.34 E9/L (ref 0.05–0.5)
EOSINOPHILS RELATIVE PERCENT: 4.8 % (ref 0–6)
ETHANOL: <10 MG/DL (ref 0–0.08)
GFR AFRICAN AMERICAN: >60
GFR NON-AFRICAN AMERICAN: >60 ML/MIN/1.73
GLUCOSE BLD-MCNC: 108 MG/DL
GLUCOSE BLD-MCNC: 111 MG/DL (ref 74–99)
GLUCOSE URINE: NEGATIVE MG/DL
HCO3: 28.7 MMOL/L (ref 22–26)
HCT VFR BLD CALC: 39 % (ref 34–48)
HEMOGLOBIN: 11.6 G/DL (ref 11.5–15.5)
HHB: 2.3 % (ref 0–5)
IMMATURE GRANULOCYTES #: 0.02 E9/L
IMMATURE GRANULOCYTES %: 0.3 % (ref 0–5)
KETONES, URINE: NEGATIVE MG/DL
LAB: ABNORMAL
LACTIC ACID: 0.6 MMOL/L (ref 0.5–2.2)
LEUKOCYTE ESTERASE, URINE: NEGATIVE
LYMPHOCYTES ABSOLUTE: 2.88 E9/L (ref 1.5–4)
LYMPHOCYTES RELATIVE PERCENT: 40.6 % (ref 20–42)
Lab: ABNORMAL
MCH RBC QN AUTO: 24.6 PG (ref 26–35)
MCHC RBC AUTO-ENTMCNC: 29.7 % (ref 32–34.5)
MCV RBC AUTO: 82.6 FL (ref 80–99.9)
METER GLUCOSE: 108 MG/DL (ref 74–99)
METER GLUCOSE: 88 MG/DL (ref 74–99)
METHADONE SCREEN, URINE: NOT DETECTED
METHB: 0 % (ref 0–1.5)
MODE: ABNORMAL
MONOCYTES ABSOLUTE: 0.56 E9/L (ref 0.1–0.95)
MONOCYTES RELATIVE PERCENT: 7.9 % (ref 2–12)
NEUTROPHILS ABSOLUTE: 3.27 E9/L (ref 1.8–7.3)
NEUTROPHILS RELATIVE PERCENT: 46.1 % (ref 43–80)
NITRITE, URINE: NEGATIVE
O2 CONTENT: 16.2 ML/DL
O2 SATURATION: 97.7 % (ref 92–98.5)
O2HB: 97.2 % (ref 94–97)
OPERATOR ID: 101
OPIATE SCREEN URINE: NOT DETECTED
PATIENT TEMP: 37 C
PCO2: 61.3 MMHG (ref 35–45)
PDW BLD-RTO: 18.4 FL (ref 11.5–15)
PH BLOOD GAS: 7.29 (ref 7.35–7.45)
PH UA: 6 (ref 5–9)
PHENCYCLIDINE SCREEN URINE: NOT DETECTED
PLATELET # BLD: 257 E9/L (ref 130–450)
PMV BLD AUTO: 10 FL (ref 7–12)
PO2: 138 MMHG (ref 60–100)
POTASSIUM SERPL-SCNC: 2.9 MMOL/L (ref 3.5–5)
PRO-BNP: 782 PG/ML (ref 0–125)
PROPOXYPHENE SCREEN: NOT DETECTED
PROTEIN UA: NEGATIVE MG/DL
RBC # BLD: 4.72 E12/L (ref 3.5–5.5)
RBC UA: ABNORMAL /HPF (ref 0–2)
SALICYLATE, SERUM: <0.3 MG/DL (ref 0–30)
SODIUM BLD-SCNC: 142 MMOL/L (ref 132–146)
SOURCE, BLOOD GAS: ABNORMAL
SPECIFIC GRAVITY UA: 1.02 (ref 1–1.03)
THB: 11.7 G/DL (ref 11.5–16.5)
TIME ANALYZED: 1424
TOTAL PROTEIN: 6.3 G/DL (ref 6.4–8.3)
TRICYCLIC ANTIDEPRESSANTS SCREEN SERUM: NEGATIVE NG/ML
TROPONIN: <0.01 NG/ML (ref 0–0.03)
TSH SERPL DL<=0.05 MIU/L-ACNC: 2.02 UIU/ML (ref 0.27–4.2)
UROBILINOGEN, URINE: 0.2 E.U./DL
WBC # BLD: 7.1 E9/L (ref 4.5–11.5)
WBC UA: ABNORMAL /HPF (ref 0–5)

## 2019-03-09 PROCEDURE — 70450 CT HEAD/BRAIN W/O DYE: CPT

## 2019-03-09 PROCEDURE — 82805 BLOOD GASES W/O2 SATURATION: CPT

## 2019-03-09 PROCEDURE — 83880 ASSAY OF NATRIURETIC PEPTIDE: CPT

## 2019-03-09 PROCEDURE — 2060000000 HC ICU INTERMEDIATE R&B

## 2019-03-09 PROCEDURE — 2580000003 HC RX 258: Performed by: EMERGENCY MEDICINE

## 2019-03-09 PROCEDURE — 99285 EMERGENCY DEPT VISIT HI MDM: CPT

## 2019-03-09 PROCEDURE — 6370000000 HC RX 637 (ALT 250 FOR IP): Performed by: EMERGENCY MEDICINE

## 2019-03-09 PROCEDURE — 2580000003 HC RX 258: Performed by: INTERNAL MEDICINE

## 2019-03-09 PROCEDURE — 84443 ASSAY THYROID STIM HORMONE: CPT

## 2019-03-09 PROCEDURE — G0480 DRUG TEST DEF 1-7 CLASSES: HCPCS

## 2019-03-09 PROCEDURE — 82962 GLUCOSE BLOOD TEST: CPT

## 2019-03-09 PROCEDURE — 6360000002 HC RX W HCPCS: Performed by: INTERNAL MEDICINE

## 2019-03-09 PROCEDURE — 87040 BLOOD CULTURE FOR BACTERIA: CPT

## 2019-03-09 PROCEDURE — 80307 DRUG TEST PRSMV CHEM ANLYZR: CPT

## 2019-03-09 PROCEDURE — 6370000000 HC RX 637 (ALT 250 FOR IP): Performed by: INTERNAL MEDICINE

## 2019-03-09 PROCEDURE — 71045 X-RAY EXAM CHEST 1 VIEW: CPT

## 2019-03-09 PROCEDURE — 80053 COMPREHEN METABOLIC PANEL: CPT

## 2019-03-09 PROCEDURE — 81001 URINALYSIS AUTO W/SCOPE: CPT

## 2019-03-09 PROCEDURE — 85025 COMPLETE CBC W/AUTO DIFF WBC: CPT

## 2019-03-09 PROCEDURE — 84484 ASSAY OF TROPONIN QUANT: CPT

## 2019-03-09 PROCEDURE — 36415 COLL VENOUS BLD VENIPUNCTURE: CPT

## 2019-03-09 PROCEDURE — 83605 ASSAY OF LACTIC ACID: CPT

## 2019-03-09 RX ORDER — POTASSIUM CHLORIDE 750 MG/1
10 TABLET, EXTENDED RELEASE ORAL DAILY
Status: DISCONTINUED | OUTPATIENT
Start: 2019-03-10 | End: 2019-03-10

## 2019-03-09 RX ORDER — 0.9 % SODIUM CHLORIDE 0.9 %
500 INTRAVENOUS SOLUTION INTRAVENOUS ONCE
Status: COMPLETED | OUTPATIENT
Start: 2019-03-09 | End: 2019-03-09

## 2019-03-09 RX ORDER — LEVOTHYROXINE SODIUM 0.05 MG/1
50 TABLET ORAL DAILY
Status: DISCONTINUED | OUTPATIENT
Start: 2019-03-10 | End: 2019-03-11 | Stop reason: HOSPADM

## 2019-03-09 RX ORDER — IBUPROFEN 400 MG/1
800 TABLET ORAL 2 TIMES DAILY PRN
Status: DISCONTINUED | OUTPATIENT
Start: 2019-03-09 | End: 2019-03-11 | Stop reason: HOSPADM

## 2019-03-09 RX ORDER — VENLAFAXINE HYDROCHLORIDE 150 MG/1
150 CAPSULE, EXTENDED RELEASE ORAL DAILY
Status: DISCONTINUED | OUTPATIENT
Start: 2019-03-10 | End: 2019-03-11 | Stop reason: HOSPADM

## 2019-03-09 RX ORDER — PRAMIPEXOLE DIHYDROCHLORIDE 1 MG/1
1 TABLET ORAL 2 TIMES DAILY
Status: DISCONTINUED | OUTPATIENT
Start: 2019-03-09 | End: 2019-03-11 | Stop reason: HOSPADM

## 2019-03-09 RX ORDER — HYDRALAZINE HYDROCHLORIDE 50 MG/1
100 TABLET, FILM COATED ORAL 3 TIMES DAILY
Status: DISCONTINUED | OUTPATIENT
Start: 2019-03-09 | End: 2019-03-11 | Stop reason: HOSPADM

## 2019-03-09 RX ORDER — GABAPENTIN 400 MG/1
800 CAPSULE ORAL 3 TIMES DAILY
Status: DISCONTINUED | OUTPATIENT
Start: 2019-03-09 | End: 2019-03-11 | Stop reason: HOSPADM

## 2019-03-09 RX ORDER — ALBUTEROL SULFATE 2.5 MG/3ML
2.5 SOLUTION RESPIRATORY (INHALATION) EVERY 6 HOURS PRN
Status: DISCONTINUED | OUTPATIENT
Start: 2019-03-09 | End: 2019-03-11 | Stop reason: HOSPADM

## 2019-03-09 RX ORDER — LORAZEPAM 1 MG/1
1 TABLET ORAL EVERY 8 HOURS PRN
Status: DISCONTINUED | OUTPATIENT
Start: 2019-03-09 | End: 2019-03-11 | Stop reason: HOSPADM

## 2019-03-09 RX ORDER — FUROSEMIDE 40 MG/1
40 TABLET ORAL DAILY
Status: DISCONTINUED | OUTPATIENT
Start: 2019-03-09 | End: 2019-03-11 | Stop reason: HOSPADM

## 2019-03-09 RX ORDER — TRAZODONE HYDROCHLORIDE 50 MG/1
100 TABLET ORAL NIGHTLY
Status: DISCONTINUED | OUTPATIENT
Start: 2019-03-09 | End: 2019-03-11 | Stop reason: HOSPADM

## 2019-03-09 RX ORDER — CLONIDINE HYDROCHLORIDE 0.2 MG/1
0.2 TABLET ORAL 3 TIMES DAILY
Status: DISCONTINUED | OUTPATIENT
Start: 2019-03-09 | End: 2019-03-11 | Stop reason: HOSPADM

## 2019-03-09 RX ADMIN — SODIUM CHLORIDE 500 ML: 9 INJECTION, SOLUTION INTRAVENOUS at 13:43

## 2019-03-09 RX ADMIN — TRAZODONE HYDROCHLORIDE 100 MG: 50 TABLET ORAL at 20:39

## 2019-03-09 RX ADMIN — CEFTRIAXONE SODIUM 1 G: 1 INJECTION, POWDER, FOR SOLUTION INTRAMUSCULAR; INTRAVENOUS at 20:40

## 2019-03-09 RX ADMIN — VERAPAMIL HYDROCHLORIDE 180 MG: 180 TABLET, FILM COATED, EXTENDED RELEASE ORAL at 20:39

## 2019-03-09 RX ADMIN — GABAPENTIN 800 MG: 400 CAPSULE ORAL at 22:14

## 2019-03-09 RX ADMIN — POTASSIUM BICARBONATE 40 MEQ: 782 TABLET, EFFERVESCENT ORAL at 16:52

## 2019-03-09 RX ADMIN — FUROSEMIDE 40 MG: 40 TABLET ORAL at 20:39

## 2019-03-09 RX ADMIN — CLONIDINE HYDROCHLORIDE 0.2 MG: 0.2 TABLET ORAL at 20:39

## 2019-03-09 RX ADMIN — PRAMIPEXOLE DIHYDROCHLORIDE 1 MG: 1 TABLET ORAL at 20:38

## 2019-03-09 RX ADMIN — HYDRALAZINE HYDROCHLORIDE 100 MG: 25 TABLET, FILM COATED ORAL at 20:39

## 2019-03-09 RX ADMIN — LORAZEPAM 1 MG: 1 TABLET ORAL at 20:39

## 2019-03-09 ASSESSMENT — PAIN SCALES - GENERAL
PAINLEVEL_OUTOF10: 0
PAINLEVEL_OUTOF10: 0

## 2019-03-09 ASSESSMENT — PAIN SCALES - WONG BAKER: WONGBAKER_NUMERICALRESPONSE: 8

## 2019-03-09 ASSESSMENT — PAIN DESCRIPTION - DESCRIPTORS: DESCRIPTORS: ACHING

## 2019-03-09 ASSESSMENT — ENCOUNTER SYMPTOMS
COUGH: 0
ABDOMINAL PAIN: 0
SHORTNESS OF BREATH: 1
SORE THROAT: 0
BACK PAIN: 0

## 2019-03-09 ASSESSMENT — PAIN DESCRIPTION - ORIENTATION: ORIENTATION: LEFT

## 2019-03-09 ASSESSMENT — PAIN DESCRIPTION - PROGRESSION: CLINICAL_PROGRESSION: GRADUALLY WORSENING

## 2019-03-09 ASSESSMENT — PAIN DESCRIPTION - FREQUENCY: FREQUENCY: INTERMITTENT

## 2019-03-09 ASSESSMENT — PAIN DESCRIPTION - LOCATION: LOCATION: CHEST

## 2019-03-09 ASSESSMENT — PAIN DESCRIPTION - PAIN TYPE: TYPE: ACUTE PAIN

## 2019-03-10 LAB — METER GLUCOSE: 136 MG/DL (ref 74–99)

## 2019-03-10 PROCEDURE — 6360000002 HC RX W HCPCS: Performed by: INTERNAL MEDICINE

## 2019-03-10 PROCEDURE — 82962 GLUCOSE BLOOD TEST: CPT

## 2019-03-10 PROCEDURE — 2700000000 HC OXYGEN THERAPY PER DAY

## 2019-03-10 PROCEDURE — 2580000003 HC RX 258: Performed by: INTERNAL MEDICINE

## 2019-03-10 PROCEDURE — 6370000000 HC RX 637 (ALT 250 FOR IP): Performed by: INTERNAL MEDICINE

## 2019-03-10 PROCEDURE — 2060000000 HC ICU INTERMEDIATE R&B

## 2019-03-10 RX ORDER — HYDRALAZINE HYDROCHLORIDE 20 MG/ML
10 INJECTION INTRAMUSCULAR; INTRAVENOUS EVERY 4 HOURS PRN
Status: DISCONTINUED | OUTPATIENT
Start: 2019-03-10 | End: 2019-03-11 | Stop reason: HOSPADM

## 2019-03-10 RX ORDER — METOPROLOL TARTRATE 5 MG/5ML
2.5 INJECTION INTRAVENOUS EVERY 6 HOURS PRN
Status: DISCONTINUED | OUTPATIENT
Start: 2019-03-10 | End: 2019-03-11 | Stop reason: HOSPADM

## 2019-03-10 RX ORDER — POTASSIUM CHLORIDE 20 MEQ/1
40 TABLET, EXTENDED RELEASE ORAL DAILY
Status: DISCONTINUED | OUTPATIENT
Start: 2019-03-10 | End: 2019-03-11 | Stop reason: HOSPADM

## 2019-03-10 RX ADMIN — LEVOTHYROXINE SODIUM 50 MCG: 50 TABLET ORAL at 06:16

## 2019-03-10 RX ADMIN — CEFTRIAXONE SODIUM 1 G: 1 INJECTION, POWDER, FOR SOLUTION INTRAMUSCULAR; INTRAVENOUS at 20:12

## 2019-03-10 RX ADMIN — HYDRALAZINE HYDROCHLORIDE 10 MG: 20 INJECTION INTRAMUSCULAR; INTRAVENOUS at 23:29

## 2019-03-10 RX ADMIN — FUROSEMIDE 40 MG: 40 TABLET ORAL at 08:48

## 2019-03-10 RX ADMIN — IBUPROFEN 800 MG: 400 TABLET, FILM COATED ORAL at 23:29

## 2019-03-10 RX ADMIN — VERAPAMIL HYDROCHLORIDE 180 MG: 180 TABLET, FILM COATED, EXTENDED RELEASE ORAL at 08:48

## 2019-03-10 RX ADMIN — CLONIDINE HYDROCHLORIDE 0.2 MG: 0.2 TABLET ORAL at 08:48

## 2019-03-10 RX ADMIN — PRAMIPEXOLE DIHYDROCHLORIDE 1 MG: 1 TABLET ORAL at 08:48

## 2019-03-10 RX ADMIN — VENLAFAXINE HYDROCHLORIDE 150 MG: 150 CAPSULE, EXTENDED RELEASE ORAL at 08:48

## 2019-03-10 RX ADMIN — GABAPENTIN 800 MG: 400 CAPSULE ORAL at 08:48

## 2019-03-10 RX ADMIN — HYDRALAZINE HYDROCHLORIDE 100 MG: 25 TABLET, FILM COATED ORAL at 11:06

## 2019-03-10 RX ADMIN — POTASSIUM CHLORIDE 40 MEQ: 20 TABLET, EXTENDED RELEASE ORAL at 08:48

## 2019-03-10 ASSESSMENT — PAIN DESCRIPTION - ONSET: ONSET: GRADUAL

## 2019-03-10 ASSESSMENT — PAIN SCALES - GENERAL
PAINLEVEL_OUTOF10: 0
PAINLEVEL_OUTOF10: 7
PAINLEVEL_OUTOF10: 5

## 2019-03-10 ASSESSMENT — PAIN DESCRIPTION - PROGRESSION: CLINICAL_PROGRESSION: GRADUALLY WORSENING

## 2019-03-10 ASSESSMENT — PAIN - FUNCTIONAL ASSESSMENT: PAIN_FUNCTIONAL_ASSESSMENT: ACTIVITIES ARE NOT PREVENTED

## 2019-03-10 ASSESSMENT — PAIN DESCRIPTION - LOCATION: LOCATION: HEAD

## 2019-03-10 ASSESSMENT — PAIN DESCRIPTION - PAIN TYPE: TYPE: ACUTE PAIN

## 2019-03-10 ASSESSMENT — PAIN SCALES - WONG BAKER: WONGBAKER_NUMERICALRESPONSE: 8

## 2019-03-10 ASSESSMENT — PAIN DESCRIPTION - DESCRIPTORS: DESCRIPTORS: ACHING;DISCOMFORT

## 2019-03-10 ASSESSMENT — PAIN DESCRIPTION - FREQUENCY: FREQUENCY: INTERMITTENT

## 2019-03-11 VITALS
RESPIRATION RATE: 18 BRPM | WEIGHT: 197 LBS | OXYGEN SATURATION: 93 % | HEART RATE: 85 BPM | SYSTOLIC BLOOD PRESSURE: 165 MMHG | HEIGHT: 65 IN | TEMPERATURE: 98.9 F | BODY MASS INDEX: 32.82 KG/M2 | DIASTOLIC BLOOD PRESSURE: 77 MMHG

## 2019-03-11 LAB
ANION GAP SERPL CALCULATED.3IONS-SCNC: 10 MMOL/L (ref 7–16)
BUN BLDV-MCNC: 11 MG/DL (ref 8–23)
CALCIUM SERPL-MCNC: 9 MG/DL (ref 8.6–10.2)
CHLORIDE BLD-SCNC: 102 MMOL/L (ref 98–107)
CO2: 30 MMOL/L (ref 22–29)
CREAT SERPL-MCNC: 0.7 MG/DL (ref 0.5–1)
GFR AFRICAN AMERICAN: >60
GFR NON-AFRICAN AMERICAN: >60 ML/MIN/1.73
GLUCOSE BLD-MCNC: 187 MG/DL (ref 74–99)
METER GLUCOSE: 142 MG/DL (ref 74–99)
POTASSIUM SERPL-SCNC: 3.4 MMOL/L (ref 3.5–5)
SODIUM BLD-SCNC: 142 MMOL/L (ref 132–146)

## 2019-03-11 PROCEDURE — 6370000000 HC RX 637 (ALT 250 FOR IP): Performed by: INTERNAL MEDICINE

## 2019-03-11 PROCEDURE — 92610 EVALUATE SWALLOWING FUNCTION: CPT

## 2019-03-11 PROCEDURE — 36415 COLL VENOUS BLD VENIPUNCTURE: CPT

## 2019-03-11 PROCEDURE — 6360000002 HC RX W HCPCS: Performed by: INTERNAL MEDICINE

## 2019-03-11 PROCEDURE — 80048 BASIC METABOLIC PNL TOTAL CA: CPT

## 2019-03-11 PROCEDURE — 82962 GLUCOSE BLOOD TEST: CPT

## 2019-03-11 RX ADMIN — IBUPROFEN 800 MG: 400 TABLET, FILM COATED ORAL at 06:14

## 2019-03-11 RX ADMIN — VERAPAMIL HYDROCHLORIDE 180 MG: 180 TABLET, FILM COATED, EXTENDED RELEASE ORAL at 13:26

## 2019-03-11 RX ADMIN — CLONIDINE HYDROCHLORIDE 0.2 MG: 0.2 TABLET ORAL at 13:25

## 2019-03-11 RX ADMIN — PRAMIPEXOLE DIHYDROCHLORIDE 1 MG: 1 TABLET ORAL at 06:11

## 2019-03-11 RX ADMIN — HYDRALAZINE HYDROCHLORIDE 100 MG: 25 TABLET, FILM COATED ORAL at 13:25

## 2019-03-11 RX ADMIN — GABAPENTIN 800 MG: 400 CAPSULE ORAL at 13:25

## 2019-03-11 RX ADMIN — GABAPENTIN 800 MG: 400 CAPSULE ORAL at 06:12

## 2019-03-11 RX ADMIN — LEVOTHYROXINE SODIUM 50 MCG: 50 TABLET ORAL at 06:12

## 2019-03-11 RX ADMIN — LORAZEPAM 1 MG: 1 TABLET ORAL at 02:17

## 2019-03-11 RX ADMIN — HYDRALAZINE HYDROCHLORIDE 10 MG: 20 INJECTION INTRAMUSCULAR; INTRAVENOUS at 05:34

## 2019-03-11 ASSESSMENT — PAIN SCALES - GENERAL
PAINLEVEL_OUTOF10: 9
PAINLEVEL_OUTOF10: 0
PAINLEVEL_OUTOF10: 0

## 2019-03-11 ASSESSMENT — PAIN DESCRIPTION - ONSET: ONSET: AWAKENED FROM SLEEP

## 2019-03-11 ASSESSMENT — PAIN DESCRIPTION - DESCRIPTORS: DESCRIPTORS: ACHING;DISCOMFORT

## 2019-03-11 ASSESSMENT — PAIN DESCRIPTION - PAIN TYPE: TYPE: ACUTE PAIN

## 2019-03-11 ASSESSMENT — PAIN DESCRIPTION - LOCATION: LOCATION: HEAD

## 2019-03-11 ASSESSMENT — PAIN DESCRIPTION - PROGRESSION: CLINICAL_PROGRESSION: GRADUALLY WORSENING

## 2019-03-11 ASSESSMENT — PAIN DESCRIPTION - FREQUENCY: FREQUENCY: INTERMITTENT

## 2019-03-11 ASSESSMENT — PAIN - FUNCTIONAL ASSESSMENT: PAIN_FUNCTIONAL_ASSESSMENT: PREVENTS OR INTERFERES SOME ACTIVE ACTIVITIES AND ADLS

## 2019-03-14 LAB
BLOOD CULTURE, ROUTINE: NORMAL
CULTURE, BLOOD 2: NORMAL

## 2019-03-15 LAB
EKG ATRIAL RATE: 78 BPM
EKG P AXIS: 47 DEGREES
EKG P-R INTERVAL: 172 MS
EKG Q-T INTERVAL: 422 MS
EKG QRS DURATION: 104 MS
EKG QTC CALCULATION (BAZETT): 481 MS
EKG R AXIS: -48 DEGREES
EKG T AXIS: 61 DEGREES
EKG VENTRICULAR RATE: 78 BPM

## 2019-05-09 ENCOUNTER — HOSPITAL ENCOUNTER (EMERGENCY)
Age: 74
Discharge: HOME | End: 2019-05-09
Payer: MEDICARE

## 2019-05-09 VITALS
DIASTOLIC BLOOD PRESSURE: 82 MMHG | RESPIRATION RATE: 16 BRPM | SYSTOLIC BLOOD PRESSURE: 140 MMHG | BODY MASS INDEX: 31.65 KG/M2 | HEIGHT: 65 IN | WEIGHT: 190 LBS | HEART RATE: 76 BPM | OXYGEN SATURATION: 98 % | TEMPERATURE: 98.2 F

## 2019-05-09 DIAGNOSIS — M79.605 PAIN IN BOTH LOWER EXTREMITIES: ICD-10-CM

## 2019-05-09 DIAGNOSIS — M79.89 LEG SWELLING: Primary | ICD-10-CM

## 2019-05-09 DIAGNOSIS — M79.604 PAIN IN BOTH LOWER EXTREMITIES: ICD-10-CM

## 2019-05-09 PROCEDURE — 6370000000 HC RX 637 (ALT 250 FOR IP): Performed by: NURSE PRACTITIONER

## 2019-05-09 PROCEDURE — 99283 EMERGENCY DEPT VISIT LOW MDM: CPT

## 2019-05-09 RX ORDER — OXYCODONE HYDROCHLORIDE AND ACETAMINOPHEN 5; 325 MG/1; MG/1
1 TABLET ORAL EVERY 6 HOURS PRN
Qty: 10 TABLET | Refills: 0 | Status: SHIPPED | OUTPATIENT
Start: 2019-05-09 | End: 2019-05-12

## 2019-05-09 RX ORDER — OXYCODONE HYDROCHLORIDE AND ACETAMINOPHEN 5; 325 MG/1; MG/1
1 TABLET ORAL ONCE
Status: COMPLETED | OUTPATIENT
Start: 2019-05-09 | End: 2019-05-09

## 2019-05-09 RX ORDER — PREDNISONE 10 MG/1
40 TABLET ORAL DAILY
Qty: 20 TABLET | Refills: 0 | Status: SHIPPED | OUTPATIENT
Start: 2019-05-09 | End: 2019-05-14

## 2019-05-09 RX ORDER — PREDNISONE 20 MG/1
60 TABLET ORAL ONCE
Status: COMPLETED | OUTPATIENT
Start: 2019-05-09 | End: 2019-05-09

## 2019-05-09 RX ADMIN — PREDNISONE 60 MG: 20 TABLET ORAL at 21:29

## 2019-05-09 RX ADMIN — OXYCODONE HYDROCHLORIDE AND ACETAMINOPHEN 1 TABLET: 5; 325 TABLET ORAL at 21:29

## 2019-05-09 ASSESSMENT — PAIN DESCRIPTION - LOCATION
LOCATION: LEG
LOCATION: LEG

## 2019-05-09 ASSESSMENT — PAIN DESCRIPTION - ORIENTATION
ORIENTATION: RIGHT;LEFT
ORIENTATION: RIGHT;LEFT

## 2019-05-09 ASSESSMENT — PAIN DESCRIPTION - FREQUENCY
FREQUENCY: CONTINUOUS
FREQUENCY: CONTINUOUS

## 2019-05-09 ASSESSMENT — PAIN SCALES - GENERAL
PAINLEVEL_OUTOF10: 7
PAINLEVEL_OUTOF10: 10
PAINLEVEL_OUTOF10: 10

## 2019-05-09 ASSESSMENT — PAIN DESCRIPTION - DESCRIPTORS
DESCRIPTORS: BURNING
DESCRIPTORS: ACHING

## 2019-05-09 ASSESSMENT — PAIN DESCRIPTION - PAIN TYPE
TYPE: ACUTE PAIN
TYPE: ACUTE PAIN

## 2019-05-09 ASSESSMENT — PAIN DESCRIPTION - PROGRESSION
CLINICAL_PROGRESSION: GRADUALLY WORSENING
CLINICAL_PROGRESSION: GRADUALLY IMPROVING

## 2019-05-10 NOTE — ED PROVIDER NOTES
[facility]         Department of Emergency Medicine   ED  Provider Note  Admit Date/RoomTime: 5/9/2019  8:21 PM  ED Room: 11/11  Chief Complaint   Leg Pain (Bilateral leg pain X 3 days, states her  put capsaicin cream on both her legs and she thinks he put too much on , c/o burning )    History of Present Illness   Source of history provided by:  patient and spouse/SO. History/Exam Limitations: none. Yohana Guzman is a 72 y.o. old female who has a past medical history of:   Past Medical History:   Diagnosis Date    Anemia     S/P chemotherapy.  Anxiety     Arthritis     With DJD of the lumbar spine with L4/L5 and L5/S1 radiculopathy with bilateral lower extremity pain, left more than right.  Asthma     Bipolar 1 disorder ([address]     Cancer ([address]     lung/ kidney    Cancer of breast, female ([address] 1/2006    S/P bilaeral mastectomy with left breast lymph node resection and chemotherapy.  CHF (congestive heart failure) (HCC)     Chronic back pain     Depression     Depression with anxiety     Diabetes mellitus ([address]     Resolved after losing 130 lbs. after gastric bypass surgery.  Dyslipidemia     Fibromyalgia     History of blood transfusion     anemia    Hypertension     Hypothyroidism     Neuropathy     Pericardial effusion 4/2/2010    Subxiphoid pericardial window with drainage of pericardial effusion and pericardial biopsy:  Fluid and biopsy negative for metastases.  Pleural effusion 5/2/2010    Noted on chest x-ray.  TIA (transient ischemic attack)     Tobacco abuse     Patient smoked 3 ppd x 26 years. Quit in 1995.     Type II or unspecified type diabetes mellitus without mention of complication, not stated as uncontrolled     presents to the emergency department by private vehicle, for Bilateral leg pain which occured 2 day(s) prior to arrival.  Cause of complaint: has fibromyalgia and her  used capsicin cream which exacerbated the pain while at home.  Her weight bearing status is normal.  Previous injury: no. The patients tetanus status is up to date. Since onset the symptoms have been moderate in degree. Her pain is aggraveated by nothing and relieved by nothing. ROS    Pertinent positives and negatives are stated within HPI, all other systems reviewed and are negative. Past Surgical History:   Procedure Laterality Date    BREAST SURGERY      CATARACT REMOVAL WITH IMPLANT Bilateral     CHOLECYSTECTOMY      GASTRIC BYPASS SURGERY  9/2004    HERNIA REPAIR      HYSTERECTOMY      KIDNEY REMOVAL Left 2/2006    Cancer.  KIDNEY REMOVAL      left    KNEE SURGERY Right     arthroscopy    LIVER BIOPSY  2006    Fatty tumor.  LUNG REMOVAL, PARTIAL      For lung CA, thought to be metastatic from breast cancer. left upper lobe     MASTECTOMY Bilateral     NERVE BLOCK Bilateral 06/06/16    transforaminal nerve block, lumbar #1    OTHER SURGICAL HISTORY  03/16/15    stage 1 percutaneous trial lumbar spinal cord stimulator    OTHER SURGICAL HISTORY N/A 4/15/15    surgical implantation of medtronic spinal cord stimulator lumbar    THORACENTESIS Left 1/2010 and 3/2010. Reportedly transudative.  TRANSTHORACIC ECHOCARDIOGRAM  4/1/2010    Small-to-moderate size pericardial effusion with early signs of tamponade with normal left ventricular size, wall thickness, wall motion and systolic function with stage I diastolic dysfunction with normal right ventricular size and function with moderately thickened pericardium and with no significant valvular abnormalities noted. Social History:  reports that she quit smoking about 20 years ago. She quit after 60.00 years of use. She has never used smokeless tobacco. She reports that she drinks alcohol. She reports that she does not use drugs. Family History: family history is not on file. Allergies: Benadryl [diphenhydramine];  Other; Sulfa antibiotics; Ciprofloxacin; and Tape [employer] tape]    Physical Exam           ED Triage Vitals [05/09/19 1959]   BP Temp Temp Source Pulse Resp SpO2 Height Weight   (!) 200/110 98.2 °F (36.8 °C) Oral 94 18 97 % 5' 5\" (1.651 m) 190 lb (86.2 kg)      Oxygen Saturation Interpretation: Normal.    · Constitutional:  Alert, development consistent with age. · HEENT:  NC/NT. Airway patent. · Neck:  Normal ROM. Supple. · Extremity(s):  Bilateral: legs. Tenderness:  moderate. Swelling: None. Calf:  No evidence of DVT seen on physical exam. Negative Fazal's sign. No cords or calf tenderness. No significant calf/ankle edema. .              Deformity: No.                 ROM: full range with pain. Skin:  tenderness and no erythema, rash or wounds noted. Neurovascular: Motor deficit: none. Sensory deficit: hypersensitivity. Pulse deficit: none. Capillary refill: normal.  · Gait:  normal.  · Lymphatics: No lymphangitis or adenopathy noted. · Neurological:  Oriented x3. Motor functions intact. Lab / Imaging Results   (All laboratory and radiology results have been personally reviewed by myself)  Labs:  No results found for this visit on 05/09/19. Imaging: All Radiology results interpreted by Radiologist unless otherwise noted. No orders to display     ED Course / Medical Decision Making     Medications   oxyCODONE-acetaminophen (PERCOCET) 5-325 MG per tablet 1 tablet (1 tablet Oral Given 5/9/19 2129)   predniSONE (DELTASONE) tablet 60 mg (60 mg Oral Given 5/9/19 2129)        Consults:   None    Procedure(s):   none    MDM:      Imaging were not obtained based on low  suspicion for bony injury as per history/physical findings. Patient treated for the pain and instructed to wash the legs to get rid of the capsicin cream and to use ice for cooling sensation.  Plan is subsequently for symptom control, limited use and  immobilization with appropriate outpatient follow-up. Counseling: The emergency provider has spoken with the patient and spouse/SO and discussed todays results, in addition to providing specific details for the plan of care and counseling regarding the diagnosis and prognosis. Questions are answered at this time and they are agreeable with the plan. Assessment      1. Leg swelling    2. Pain in both lower extremities      Plan   Discharge to home  Patient condition is stable    New Medications     Discharge Medication List as of 5/9/2019 10:11 PM      START taking these medications    Details   oxyCODONE-acetaminophen (PERCOCET) 5-325 MG per tablet Take 1 tablet by mouth every 6 hours as needed for Pain for up to 3 days. , Disp-10 tablet, R-0Print      predniSONE (DELTASONE) 10 MG tablet Take 4 tablets by mouth daily for 5 days, Disp-20 tablet, R-0Print           Electronically signed by JOSELINE Pierre CNP   DD: 5/9/19  **This report was transcribed using voice recognition software. Every effort was made to ensure accuracy; however, inadvertent computerized transcription errors may be present.   END OF ED PROVIDER NOTE      JOSELINE Pierre CNP  05/11/19 0056

## 2019-06-05 ENCOUNTER — TELEPHONE (OUTPATIENT)
Dept: PHARMACY | Facility: CLINIC | Age: 74
End: 2019-06-05

## 2019-06-05 NOTE — LETTER
Using a Statin for Your Patient with Diabetes    Date: 19    Dear Shin Jones MD,  Fax: [phone]    Concerning patient: Ramya Restrepo    :  1945    Consider adding statin therapy to your patients regimen for the following reason: patients with DM should be prescribed statin therapy unless LDL < 70 or a contraindication exists. The 2013 American College of Cardiology/American Heart Association guidelines recommend that the benefits of using a statin outweigh the risk for diabetes patients age 36 to 76. The American Diabetes Association Standards of Medical Care also suggest a statin for most diabetes patients age 36 to 76. These recommendations are based on primary and secondary prevention benefits of statins on cardiovascular events and mortality in this patient population. A moderate-intensity statin (e.g., atorvastatin 20 mg) is recommended for patients in this age group without additional cardiovascular risk factors. Patients with cardiovascular disease or additional risk factors should receive a high-intensity statin (e.g., atorvastatin 40 mg or 80 mg). If you agree, send a prescription to your patients pharmacy. Please follow up with the patient for any medication changes as appropriate. Please also feel free to contact me at the number below with any questions or concerns, or if you would like me to further discuss with your patient. Thank you for your help and consideration in caring for this patient.   Respectfully,      Kandis Rene, PharmD  [facility] [address]  Phone: [phone], option 7  Fax: [phone]

## 2019-06-05 NOTE — TELEPHONE ENCOUNTER
CLINICAL PHARMACY: STATIN REVIEW    SUBJECTIVE:   Identified as DM care gap for [employer]: statin therapy. OBJECTIVE:  Allergies   Allergen Reactions    Benadryl [Diphenhydramine] Hives     Tongue Swells    Other      benzodine  Tongue Swells    Sulfa Antibiotics Hives     Tongue Swells    Ciprofloxacin Hives    Tape [Adhesive Tape] Itching       Medications per current medication list:  Medication Sig    gabapentin (NEURONTIN) 800 MG tablet Take 800 mg by mouth 3 times daily. Elenore Brazen traZODone (DESYREL) 100 MG tablet Take 100 mg by mouth nightly    pramipexole (MIRAPEX) 1 MG tablet Take 1 mg by mouth 2 times daily    ibuprofen (ADVIL;MOTRIN) 800 MG tablet Take 800 mg by mouth 2 times daily as needed for Pain    albuterol (PROVENTIL) (2.5 MG/3ML) 0.083% nebulizer solution Take 2.5 mg by nebulization every 6 hours as needed for Wheezing    furosemide (LASIX) 40 MG tablet Take 40 mg by mouth daily     verapamil (VERELAN) 180 MG CR capsule Take 180 mg by mouth 2 times daily.  levothyroxine (SYNTHROID) 50 MCG tablet Take 50 mcg by mouth Daily.  cloNIDine (CATAPRES) 0.2 MG tablet Take 0.2 mg by mouth 3 times daily     hydrALAZINE (APRESOLINE) 100 MG tablet Take 100 mg by mouth 3 times daily.  potassium chloride (MICRO-K) 10 MEQ CR capsule Take 10 mEq by mouth daily.  venlafaxine (EFFEXOR-XR) 150 MG XR capsule Take 150 mg by mouth daily.  LORazepam (ATIVAN) 1 MG tablet Take 1 mg by mouth every 8 hours as needed.       Labs:  No results found for: CHOL  No results found for: TRIG  No results found for: HDL  No results found for: LDLCALC, LDLCHOLESTEROL  No results found for: LABVLDL, VLDL  No results found for: [facility]  Lab Results   Component Value Date    ALT 11 03/09/2019        The ASCVD Risk score (Radha Hampton, et al., 2013) failed to calculate for the following reasons:    Cannot find a previous HDL lab    Cannot find a previous total cholesterol lab      ASSESSMENT:  Hyperlipidemia Goal: Patient is not currently prescribed any-intensity statin therapy. 2019 ADA Guidelines Age:   [address] [facility]  >/= 36years old:   o No history of ASCVD or 10-year ASCVD risk < 20% - moderate-intensity statin is recommended.   o History of ASCVD or 10-year ASCVD risk > 20% - high-intensity statin is recommended. PLAN:  Unsure if patient is a candidate for statin therapy. No labs or office visit notes available to review. Will prepare and send fax to PCP to consider therapy.       Thank you,    Kiran Smith, PharmD, [address] [address]  Direct: [phone]  Department, toll free [phone], option 7          For Pharmacy Admin Tracking Only    PHSO: Yes  Total # of Interventions Recommended: 1  - New Order #: 1 New Medication Order Reason(s): Needs Additional Medication Therapy  - Maintenance Safety Lab Monitoring #: 1  Recommended intervention potential cost savings: 0  Total Interventions Accepted: 0  Time Spent (min): 15

## 2019-07-12 ENCOUNTER — APPOINTMENT (OUTPATIENT)
Dept: GENERAL RADIOLOGY | Age: 74
DRG: 311 | End: 2019-07-12
Payer: MEDICARE

## 2019-07-12 ENCOUNTER — HOSPITAL ENCOUNTER (EMERGENCY)
Age: 74
Discharge: HOME | DRG: 311 | End: 2019-07-12
Payer: MEDICARE

## 2019-07-12 VITALS
RESPIRATION RATE: 16 BRPM | SYSTOLIC BLOOD PRESSURE: 130 MMHG | HEART RATE: 80 BPM | OXYGEN SATURATION: 95 % | WEIGHT: 190 LBS | TEMPERATURE: 98 F | BODY MASS INDEX: 31.65 KG/M2 | HEIGHT: 65 IN | DIASTOLIC BLOOD PRESSURE: 76 MMHG

## 2019-07-12 DIAGNOSIS — S40.022A CONTUSION OF MULTIPLE SITES OF LEFT SHOULDER AND UPPER ARM, INITIAL ENCOUNTER: ICD-10-CM

## 2019-07-12 DIAGNOSIS — S80.02XA CONTUSION OF LEFT KNEE, INITIAL ENCOUNTER: ICD-10-CM

## 2019-07-12 DIAGNOSIS — S60.222A CONTUSION OF LEFT HAND, INITIAL ENCOUNTER: ICD-10-CM

## 2019-07-12 DIAGNOSIS — S40.012A CONTUSION OF MULTIPLE SITES OF LEFT SHOULDER AND UPPER ARM, INITIAL ENCOUNTER: ICD-10-CM

## 2019-07-12 DIAGNOSIS — S80.01XA CONTUSION OF RIGHT KNEE, INITIAL ENCOUNTER: ICD-10-CM

## 2019-07-12 DIAGNOSIS — N39.0 LOWER URINARY TRACT INFECTION, ACUTE: Primary | ICD-10-CM

## 2019-07-12 LAB
BACTERIA: ABNORMAL /HPF
BILIRUBIN URINE: NEGATIVE
BLOOD, URINE: NEGATIVE
CLARITY: ABNORMAL
COLOR: YELLOW
GLUCOSE URINE: NEGATIVE MG/DL
KETONES, URINE: NEGATIVE MG/DL
LEUKOCYTE ESTERASE, URINE: ABNORMAL
NITRITE, URINE: POSITIVE
PH UA: 6 (ref 5–9)
PROTEIN UA: NEGATIVE MG/DL
RBC UA: ABNORMAL /HPF (ref 0–2)
SPECIFIC GRAVITY UA: 1.01 (ref 1–1.03)
UROBILINOGEN, URINE: 0.2 E.U./DL
WBC UA: ABNORMAL /HPF (ref 0–5)

## 2019-07-12 PROCEDURE — 73564 X-RAY EXAM KNEE 4 OR MORE: CPT

## 2019-07-12 PROCEDURE — 87186 SC STD MICRODIL/AGAR DIL: CPT

## 2019-07-12 PROCEDURE — 87088 URINE BACTERIA CULTURE: CPT

## 2019-07-12 PROCEDURE — 6370000000 HC RX 637 (ALT 250 FOR IP): Performed by: PHYSICIAN ASSISTANT

## 2019-07-12 PROCEDURE — 73130 X-RAY EXAM OF HAND: CPT

## 2019-07-12 PROCEDURE — 99283 EMERGENCY DEPT VISIT LOW MDM: CPT

## 2019-07-12 PROCEDURE — 81001 URINALYSIS AUTO W/SCOPE: CPT

## 2019-07-12 PROCEDURE — 73030 X-RAY EXAM OF SHOULDER: CPT

## 2019-07-12 RX ORDER — HYDROCODONE BITARTRATE AND ACETAMINOPHEN 5; 325 MG/1; MG/1
2 TABLET ORAL ONCE
Status: COMPLETED | OUTPATIENT
Start: 2019-07-12 | End: 2019-07-12

## 2019-07-12 RX ORDER — HYDROCODONE BITARTRATE AND ACETAMINOPHEN 5; 325 MG/1; MG/1
1 TABLET ORAL EVERY 4 HOURS PRN
Qty: 18 TABLET | Refills: 0 | Status: ON HOLD | OUTPATIENT
Start: 2019-07-12 | End: 2019-07-15 | Stop reason: HOSPADM

## 2019-07-12 RX ORDER — NITROFURANTOIN 25; 75 MG/1; MG/1
100 CAPSULE ORAL 2 TIMES DAILY
Qty: 14 CAPSULE | Refills: 0 | Status: SHIPPED | OUTPATIENT
Start: 2019-07-12 | End: 2019-07-19

## 2019-07-12 RX ADMIN — HYDROCODONE BITARTRATE AND ACETAMINOPHEN 2 TABLET: 5; 325 TABLET ORAL at 13:28

## 2019-07-12 ASSESSMENT — ENCOUNTER SYMPTOMS
ALLERGIC/IMMUNOLOGIC NEGATIVE: 1
NAUSEA: 0
RESPIRATORY NEGATIVE: 1
BOWEL INCONTINENCE: 0
VOMITING: 0
EYES NEGATIVE: 1
VISUAL CHANGE: 0
ABDOMINAL PAIN: 0

## 2019-07-12 ASSESSMENT — PAIN DESCRIPTION - ORIENTATION: ORIENTATION: LEFT;RIGHT

## 2019-07-12 ASSESSMENT — PAIN DESCRIPTION - DESCRIPTORS: DESCRIPTORS: SORE

## 2019-07-12 ASSESSMENT — PAIN DESCRIPTION - PROGRESSION: CLINICAL_PROGRESSION: GRADUALLY IMPROVING

## 2019-07-12 ASSESSMENT — PAIN SCALES - GENERAL
PAINLEVEL_OUTOF10: 8
PAINLEVEL_OUTOF10: 2
PAINLEVEL_OUTOF10: 8

## 2019-07-12 ASSESSMENT — PAIN DESCRIPTION - LOCATION
LOCATION: LEG
LOCATION: LEG;KNEE;SHOULDER

## 2019-07-12 ASSESSMENT — PAIN DESCRIPTION - PAIN TYPE: TYPE: ACUTE PAIN

## 2019-07-12 ASSESSMENT — PAIN DESCRIPTION - FREQUENCY: FREQUENCY: CONTINUOUS

## 2019-07-14 ENCOUNTER — HOSPITAL ENCOUNTER (INPATIENT)
Age: 74
LOS: 1 days | Discharge: HOME | DRG: 311 | End: 2019-07-15
Attending: FAMILY MEDICINE | Admitting: INTERNAL MEDICINE
Payer: MEDICARE

## 2019-07-14 ENCOUNTER — HOSPITAL ENCOUNTER (OUTPATIENT)
Age: 74
Discharge: HOME | End: 2019-07-14
Payer: MEDICARE

## 2019-07-14 ENCOUNTER — APPOINTMENT (OUTPATIENT)
Dept: GENERAL RADIOLOGY | Age: 74
DRG: 311 | End: 2019-07-14
Payer: MEDICARE

## 2019-07-14 DIAGNOSIS — I20.0 UNSTABLE ANGINA PECTORIS (HCC): Primary | ICD-10-CM

## 2019-07-14 LAB
ANION GAP SERPL CALCULATED.3IONS-SCNC: 13 MMOL/L (ref 7–16)
BUN BLDV-MCNC: 8 MG/DL (ref 8–23)
CALCIUM SERPL-MCNC: 8.6 MG/DL (ref 8.6–10.2)
CHLORIDE BLD-SCNC: 97 MMOL/L (ref 98–107)
CO2: 29 MMOL/L (ref 22–29)
CREAT SERPL-MCNC: 0.7 MG/DL (ref 0.5–1)
GFR AFRICAN AMERICAN: >60
GFR NON-AFRICAN AMERICAN: >60 ML/MIN/1.73
GLUCOSE BLD-MCNC: 201 MG/DL (ref 74–99)
HCT VFR BLD CALC: 37.5 % (ref 34–48)
HEMOGLOBIN: 11.1 G/DL (ref 11.5–15.5)
MAGNESIUM: 1.3 MG/DL (ref 1.6–2.6)
MCH RBC QN AUTO: 23.5 PG (ref 26–35)
MCHC RBC AUTO-ENTMCNC: 29.6 % (ref 32–34.5)
MCV RBC AUTO: 79.4 FL (ref 80–99.9)
ORGANISM: ABNORMAL
PDW BLD-RTO: 19.3 FL (ref 11.5–15)
PLATELET # BLD: 234 E9/L (ref 130–450)
PMV BLD AUTO: 9.6 FL (ref 7–12)
POTASSIUM SERPL-SCNC: 3.3 MMOL/L (ref 3.5–5)
PRO-BNP: 761 PG/ML (ref 0–125)
RBC # BLD: 4.72 E12/L (ref 3.5–5.5)
SODIUM BLD-SCNC: 139 MMOL/L (ref 132–146)
TROPONIN: <0.01 NG/ML (ref 0–0.03)
URINE CULTURE, ROUTINE: ABNORMAL
URINE CULTURE, ROUTINE: ABNORMAL
WBC # BLD: 6.4 E9/L (ref 4.5–11.5)

## 2019-07-14 PROCEDURE — 85027 COMPLETE CBC AUTOMATED: CPT

## 2019-07-14 PROCEDURE — 36415 COLL VENOUS BLD VENIPUNCTURE: CPT

## 2019-07-14 PROCEDURE — 84484 ASSAY OF TROPONIN QUANT: CPT

## 2019-07-14 PROCEDURE — A0426 ALS 1: HCPCS

## 2019-07-14 PROCEDURE — A0425 GROUND MILEAGE: HCPCS

## 2019-07-14 PROCEDURE — 99291 CRITICAL CARE FIRST HOUR: CPT

## 2019-07-14 PROCEDURE — 83735 ASSAY OF MAGNESIUM: CPT

## 2019-07-14 PROCEDURE — 2140000000 HC CCU INTERMEDIATE R&B

## 2019-07-14 PROCEDURE — 71045 X-RAY EXAM CHEST 1 VIEW: CPT

## 2019-07-14 PROCEDURE — G0378 HOSPITAL OBSERVATION PER HR: HCPCS

## 2019-07-14 PROCEDURE — 6360000002 HC RX W HCPCS: Performed by: FAMILY MEDICINE

## 2019-07-14 PROCEDURE — 80048 BASIC METABOLIC PNL TOTAL CA: CPT

## 2019-07-14 PROCEDURE — 6370000000 HC RX 637 (ALT 250 FOR IP): Performed by: FAMILY MEDICINE

## 2019-07-14 PROCEDURE — 83880 ASSAY OF NATRIURETIC PEPTIDE: CPT

## 2019-07-14 PROCEDURE — 96365 THER/PROPH/DIAG IV INF INIT: CPT

## 2019-07-14 PROCEDURE — 93005 ELECTROCARDIOGRAM TRACING: CPT | Performed by: FAMILY MEDICINE

## 2019-07-14 RX ORDER — ASPIRIN 81 MG/1
162 TABLET, CHEWABLE ORAL DAILY
Status: ON HOLD | COMMUNITY
End: 2019-07-15 | Stop reason: HOSPADM

## 2019-07-14 RX ORDER — MAGNESIUM SULFATE HEPTAHYDRATE 500 MG/ML
1 INJECTION, SOLUTION INTRAMUSCULAR; INTRAVENOUS ONCE
Status: DISCONTINUED | OUTPATIENT
Start: 2019-07-14 | End: 2019-07-14 | Stop reason: SDUPTHER

## 2019-07-14 RX ORDER — ASPIRIN 81 MG/1
324 TABLET, CHEWABLE ORAL ONCE
Status: COMPLETED | OUTPATIENT
Start: 2019-07-14 | End: 2019-07-14

## 2019-07-14 RX ORDER — POTASSIUM CHLORIDE 7.45 MG/ML
10 INJECTION INTRAVENOUS ONCE
Status: DISCONTINUED | OUTPATIENT
Start: 2019-07-14 | End: 2019-07-15 | Stop reason: HOSPADM

## 2019-07-14 RX ORDER — MAGNESIUM SULFATE 1 G/100ML
1 INJECTION INTRAVENOUS ONCE
Status: COMPLETED | OUTPATIENT
Start: 2019-07-14 | End: 2019-07-14

## 2019-07-14 RX ORDER — POTASSIUM CHLORIDE 20 MEQ/1
40 TABLET, EXTENDED RELEASE ORAL ONCE
Status: COMPLETED | OUTPATIENT
Start: 2019-07-14 | End: 2019-07-14

## 2019-07-14 RX ORDER — NITROGLYCERIN 0.4 MG/1
0.4 TABLET SUBLINGUAL EVERY 5 MIN PRN
Status: COMPLETED | OUTPATIENT
Start: 2019-07-14 | End: 2019-07-14

## 2019-07-14 RX ADMIN — NITROGLYCERIN 0.4 MG: 0.4 TABLET, ORALLY DISINTEGRATING SUBLINGUAL at 10:48

## 2019-07-14 RX ADMIN — NITROGLYCERIN 0.4 MG: 0.4 TABLET, ORALLY DISINTEGRATING SUBLINGUAL at 10:26

## 2019-07-14 RX ADMIN — MAGNESIUM SULFATE HEPTAHYDRATE 1 G: 1 INJECTION, SOLUTION INTRAVENOUS at 11:27

## 2019-07-14 RX ADMIN — ASPIRIN 81 MG 162 MG: 81 TABLET ORAL at 10:25

## 2019-07-14 RX ADMIN — POTASSIUM CHLORIDE 40 MEQ: 20 TABLET, EXTENDED RELEASE ORAL at 13:29

## 2019-07-14 RX ADMIN — NITROGLYCERIN 0.4 MG: 0.4 TABLET, ORALLY DISINTEGRATING SUBLINGUAL at 10:42

## 2019-07-14 ASSESSMENT — PAIN DESCRIPTION - PROGRESSION
CLINICAL_PROGRESSION: GRADUALLY IMPROVING
CLINICAL_PROGRESSION: GRADUALLY IMPROVING

## 2019-07-14 ASSESSMENT — PAIN DESCRIPTION - LOCATION
LOCATION: CHEST
LOCATION: BACK
LOCATION: CHEST
LOCATION: CHEST

## 2019-07-14 ASSESSMENT — PAIN DESCRIPTION - ORIENTATION
ORIENTATION: LEFT
ORIENTATION: LOWER
ORIENTATION: LEFT
ORIENTATION: LEFT

## 2019-07-14 ASSESSMENT — PAIN DESCRIPTION - ONSET: ONSET: ON-GOING

## 2019-07-14 ASSESSMENT — PAIN DESCRIPTION - DESCRIPTORS
DESCRIPTORS: PRESSURE

## 2019-07-14 ASSESSMENT — PAIN DESCRIPTION - FREQUENCY
FREQUENCY: CONTINUOUS
FREQUENCY: CONTINUOUS

## 2019-07-14 ASSESSMENT — PAIN DESCRIPTION - PAIN TYPE
TYPE: ACUTE PAIN
TYPE: ACUTE PAIN
TYPE: CHRONIC PAIN
TYPE: ACUTE PAIN

## 2019-07-14 ASSESSMENT — PAIN SCALES - GENERAL
PAINLEVEL_OUTOF10: 0
PAINLEVEL_OUTOF10: 2
PAINLEVEL_OUTOF10: 0
PAINLEVEL_OUTOF10: 4
PAINLEVEL_OUTOF10: 6

## 2019-07-14 NOTE — ED NOTES
SBAR report given to [employer] RN for room assignment 6307-A at 1251 pm. Report given to Vicente Bunch RN with Mobile Intensive at 1304 pm. Bedside report given to bao johnson RN.      [facility]  07/14/19 9011

## 2019-07-14 NOTE — ED PROVIDER NOTES
symptoms: to the present time. Symptom(s) at onset was 6 /10. Her symptoms are associated with dyspnea, nausea and diphoresis. The symptoms are worsened by nothing and relieved by nothing. There has been No orthopnea, No palpitations and No syncope associated with complaint. Her cardiac risk factors are advanced age (older than 54 for men, 72 for women), diabetes mellitus, dyslipidemia, obesity (BMI >= 30 kg/m2) and sedentary lifestyle. Care prior to arrival consisted of asprin  , with no relief. .  ROS    Pertinent positives and negatives are stated within HPI, all other systems reviewed and are negative. Past Surgical History:   Procedure Laterality Date    BREAST SURGERY      CATARACT REMOVAL WITH IMPLANT Bilateral     CHOLECYSTECTOMY      GASTRIC BYPASS SURGERY  9/2004    HERNIA REPAIR      HYSTERECTOMY      KIDNEY REMOVAL Left 2/2006    Cancer.  KIDNEY REMOVAL      left    KNEE SURGERY Right     arthroscopy    LIVER BIOPSY  2006    Fatty tumor.  LUNG REMOVAL, PARTIAL      For lung CA, thought to be metastatic from breast cancer. left upper lobe     MASTECTOMY Bilateral     NERVE BLOCK Bilateral 06/06/16    transforaminal nerve block, lumbar #1    OTHER SURGICAL HISTORY  03/16/15    stage 1 percutaneous trial lumbar spinal cord stimulator    OTHER SURGICAL HISTORY N/A 4/15/15    surgical implantation of medtronic spinal cord stimulator lumbar    THORACENTESIS Left 1/2010 and 3/2010. Reportedly transudative.  TRANSTHORACIC ECHOCARDIOGRAM  4/1/2010    Small-to-moderate size pericardial effusion with early signs of tamponade with normal left ventricular size, wall thickness, wall motion and systolic function with stage I diastolic dysfunction with normal right ventricular size and function with moderately thickened pericardium and with no significant valvular abnormalities noted. Social History:  reports that she quit smoking about 20 years ago.  She quit after 60.00 years of use. She has never used smokeless tobacco. She reports that she drinks about 1.0 standard drinks of alcohol per week. She reports that she has current or past drug history. Drug: Marijuana. Family History: family history is not on file. Allergies: Benadryl [diphenhydramine]; Other; Sulfa antibiotics; Ciprofloxacin; and Tape [adhesive tape]    Physical Exam           ED Triage Vitals   BP Temp Temp Source Pulse Resp SpO2 Height Weight   07/14/19 1010 07/14/19 1010 07/14/19 1010 07/14/19 1010 07/14/19 1010 07/14/19 1010 07/14/19 0957 07/14/19 0957   (!) 156/62 98.2 °F (36.8 °C) Oral 78 16 95 % 5' 5\" (1.651 m) 190 lb (86.2 kg)      Oxygen Saturation Interpretation: Normal.    General Appearance/Constitutional:  Alert, development consistent with age, NAD. HEENT:  NC/NT. PERRLA. Airway patent. Neck:  Normal ROM. Supple. Respiratory:  Clear to auscultation and breath sounds equal.  CV:  Regular rate and rhythm, normal heart sounds, without pathological murmurs, ectopy, gallops, or rubs. Chest:  Symmetrical without visible rash or tenderness. GI:  Abdomen Soft, nontender, good bowel sounds. No firm or pulsatile mass. Back:  No costovertebral tenderness. Extremities: No tenderness or edema noted. Lymphatics: no lymphadenopathy noted  Integument:  Normal turgor. Warm, dry, without visible rash, unless noted elsewhere. Neurological:  Oriented. Motor functions intact.    Psychiatric:  Affect normal.    Lab / Imaging Results   (All laboratory and radiology results have been personally reviewed by myself)  Labs:  Results for orders placed or performed during the hospital encounter of 79/62/05   Basic metabolic panel   Result Value Ref Range    Sodium 139 132 - 146 mmol/L    Potassium 3.3 (L) 3.5 - 5.0 mmol/L    Chloride 97 (L) 98 - 107 mmol/L    CO2 29 22 - 29 mmol/L    Anion Gap 13 7 - 16 mmol/L    Glucose 201 (H) 74 - 99 mg/dL    BUN 8 8 - 23 mg/dL    CREATININE 0.7 0.5 - 1.0 mg/dL    GFR Non-

## 2019-07-14 NOTE — ED NOTES
Dr Ava Chávez at bedside. Noted order for inpatient internal medicine consult.      Two [place], RN  07/14/19 9028

## 2019-07-14 NOTE — ED NOTES
The pt is resting comfortably upright in high valdovinos's position. Noted rise and fall of chest. Pt awakens easily. She reports CP has resolved but that her SOB remains. NSR monitor rate 70, SpO2 93% RR 18.      Two [facility]  07/14/19 0438

## 2019-07-14 NOTE — ED NOTES
Pt reports decreased CP with 3 doses of Nitro. Vital signs stable - HR fluctuating 56-90 on the monitor. Pt reports continued SOB at rest. Respirations are even and unlabored. Will continue to monitor. Pt's spouse is at the bedside.      Two [place], RN  07/14/19 8606

## 2019-07-15 ENCOUNTER — APPOINTMENT (OUTPATIENT)
Dept: CT IMAGING | Age: 74
DRG: 311 | End: 2019-07-15
Payer: MEDICARE

## 2019-07-15 VITALS
SYSTOLIC BLOOD PRESSURE: 182 MMHG | BODY MASS INDEX: 31.65 KG/M2 | HEART RATE: 77 BPM | HEIGHT: 65 IN | TEMPERATURE: 98.2 F | WEIGHT: 190 LBS | DIASTOLIC BLOOD PRESSURE: 95 MMHG | OXYGEN SATURATION: 92 % | RESPIRATION RATE: 16 BRPM

## 2019-07-15 LAB
ANION GAP SERPL CALCULATED.3IONS-SCNC: 11 MMOL/L (ref 7–16)
BUN BLDV-MCNC: 7 MG/DL (ref 8–23)
CALCIUM SERPL-MCNC: 9.5 MG/DL (ref 8.6–10.2)
CHLORIDE BLD-SCNC: 97 MMOL/L (ref 98–107)
CO2: 32 MMOL/L (ref 22–29)
CREAT SERPL-MCNC: 0.7 MG/DL (ref 0.5–1)
EKG ATRIAL RATE: 78 BPM
EKG P AXIS: 52 DEGREES
EKG P-R INTERVAL: 146 MS
EKG Q-T INTERVAL: 418 MS
EKG QRS DURATION: 106 MS
EKG QTC CALCULATION (BAZETT): 476 MS
EKG R AXIS: -44 DEGREES
EKG T AXIS: 34 DEGREES
EKG VENTRICULAR RATE: 78 BPM
GFR AFRICAN AMERICAN: >60
GFR NON-AFRICAN AMERICAN: >60 ML/MIN/1.73
GLUCOSE BLD-MCNC: 145 MG/DL (ref 74–99)
POTASSIUM SERPL-SCNC: 3.3 MMOL/L (ref 3.5–5)
SODIUM BLD-SCNC: 140 MMOL/L (ref 132–146)
TROPONIN: <0.01 NG/ML (ref 0–0.03)
TROPONIN: <0.01 NG/ML (ref 0–0.03)

## 2019-07-15 PROCEDURE — G0378 HOSPITAL OBSERVATION PER HR: HCPCS

## 2019-07-15 PROCEDURE — 93010 ELECTROCARDIOGRAM REPORT: CPT | Performed by: INTERNAL MEDICINE

## 2019-07-15 PROCEDURE — 99223 1ST HOSP IP/OBS HIGH 75: CPT | Performed by: INTERNAL MEDICINE

## 2019-07-15 PROCEDURE — 6360000004 HC RX CONTRAST MEDICATION: Performed by: RADIOLOGY

## 2019-07-15 PROCEDURE — 84484 ASSAY OF TROPONIN QUANT: CPT

## 2019-07-15 PROCEDURE — 71275 CT ANGIOGRAPHY CHEST: CPT

## 2019-07-15 PROCEDURE — 36415 COLL VENOUS BLD VENIPUNCTURE: CPT

## 2019-07-15 PROCEDURE — 80048 BASIC METABOLIC PNL TOTAL CA: CPT

## 2019-07-15 PROCEDURE — 6370000000 HC RX 637 (ALT 250 FOR IP): Performed by: FAMILY MEDICINE

## 2019-07-15 RX ORDER — ASPIRIN 81 MG/1
162 TABLET, CHEWABLE ORAL DAILY
Status: DISCONTINUED | OUTPATIENT
Start: 2019-07-15 | End: 2019-07-15 | Stop reason: HOSPADM

## 2019-07-15 RX ORDER — POTASSIUM CHLORIDE 750 MG/1
10 TABLET, EXTENDED RELEASE ORAL DAILY
Status: DISCONTINUED | OUTPATIENT
Start: 2019-07-15 | End: 2019-07-15 | Stop reason: HOSPADM

## 2019-07-15 RX ORDER — PRAMIPEXOLE DIHYDROCHLORIDE 1 MG/1
1 TABLET ORAL 2 TIMES DAILY
Status: DISCONTINUED | OUTPATIENT
Start: 2019-07-15 | End: 2019-07-15 | Stop reason: HOSPADM

## 2019-07-15 RX ORDER — LORAZEPAM 1 MG/1
1 TABLET ORAL EVERY 8 HOURS PRN
Status: DISCONTINUED | OUTPATIENT
Start: 2019-07-15 | End: 2019-07-15 | Stop reason: HOSPADM

## 2019-07-15 RX ORDER — NITROFURANTOIN 25; 75 MG/1; MG/1
100 CAPSULE ORAL 2 TIMES DAILY
Status: DISCONTINUED | OUTPATIENT
Start: 2019-07-15 | End: 2019-07-15 | Stop reason: HOSPADM

## 2019-07-15 RX ORDER — CLONIDINE HYDROCHLORIDE 0.2 MG/1
0.2 TABLET ORAL 3 TIMES DAILY
Status: DISCONTINUED | OUTPATIENT
Start: 2019-07-15 | End: 2019-07-15 | Stop reason: HOSPADM

## 2019-07-15 RX ORDER — TRAZODONE HYDROCHLORIDE 50 MG/1
100 TABLET ORAL NIGHTLY
Status: DISCONTINUED | OUTPATIENT
Start: 2019-07-15 | End: 2019-07-15 | Stop reason: HOSPADM

## 2019-07-15 RX ORDER — HYDRALAZINE HYDROCHLORIDE 25 MG/1
100 TABLET, FILM COATED ORAL 3 TIMES DAILY
Status: DISCONTINUED | OUTPATIENT
Start: 2019-07-15 | End: 2019-07-15 | Stop reason: HOSPADM

## 2019-07-15 RX ORDER — HYDROCODONE BITARTRATE AND ACETAMINOPHEN 5; 325 MG/1; MG/1
1 TABLET ORAL EVERY 4 HOURS PRN
Status: DISCONTINUED | OUTPATIENT
Start: 2019-07-15 | End: 2019-07-15 | Stop reason: HOSPADM

## 2019-07-15 RX ORDER — SODIUM CHLORIDE 0.9 % (FLUSH) 0.9 %
10 SYRINGE (ML) INJECTION
Status: DISCONTINUED | OUTPATIENT
Start: 2019-07-15 | End: 2019-07-15 | Stop reason: HOSPADM

## 2019-07-15 RX ORDER — VENLAFAXINE HYDROCHLORIDE 150 MG/1
150 CAPSULE, EXTENDED RELEASE ORAL DAILY
Status: DISCONTINUED | OUTPATIENT
Start: 2019-07-15 | End: 2019-07-15 | Stop reason: HOSPADM

## 2019-07-15 RX ORDER — ALBUTEROL SULFATE 2.5 MG/3ML
2.5 SOLUTION RESPIRATORY (INHALATION) EVERY 6 HOURS PRN
Status: DISCONTINUED | OUTPATIENT
Start: 2019-07-15 | End: 2019-07-15 | Stop reason: HOSPADM

## 2019-07-15 RX ORDER — GABAPENTIN 400 MG/1
800 CAPSULE ORAL 3 TIMES DAILY
Status: DISCONTINUED | OUTPATIENT
Start: 2019-07-15 | End: 2019-07-15 | Stop reason: HOSPADM

## 2019-07-15 RX ORDER — LEVOTHYROXINE SODIUM 0.05 MG/1
50 TABLET ORAL DAILY
Status: DISCONTINUED | OUTPATIENT
Start: 2019-07-15 | End: 2019-07-15 | Stop reason: HOSPADM

## 2019-07-15 RX ORDER — FUROSEMIDE 40 MG/1
40 TABLET ORAL DAILY
Status: DISCONTINUED | OUTPATIENT
Start: 2019-07-15 | End: 2019-07-15 | Stop reason: HOSPADM

## 2019-07-15 RX ADMIN — IOPAMIDOL 90 ML: 755 INJECTION, SOLUTION INTRAVENOUS at 11:35

## 2019-07-15 RX ADMIN — GABAPENTIN 800 MG: 400 CAPSULE ORAL at 08:31

## 2019-07-15 RX ADMIN — HYDRALAZINE HYDROCHLORIDE 100 MG: 25 TABLET, FILM COATED ORAL at 04:30

## 2019-07-15 RX ADMIN — PRAMIPEXOLE DIHYDROCHLORIDE 1 MG: 1 TABLET ORAL at 08:32

## 2019-07-15 RX ADMIN — CLONIDINE HYDROCHLORIDE 0.2 MG: 0.2 TABLET ORAL at 04:30

## 2019-07-15 RX ADMIN — VENLAFAXINE HYDROCHLORIDE 150 MG: 150 CAPSULE, EXTENDED RELEASE ORAL at 08:32

## 2019-07-15 RX ADMIN — LEVOTHYROXINE SODIUM 50 MCG: 50 TABLET ORAL at 06:17

## 2019-07-15 RX ADMIN — NITROFURANTOIN MONOHYDRATE/MACROCRYSTALLINE 100 MG: 25; 75 CAPSULE ORAL at 08:34

## 2019-07-15 RX ADMIN — CLONIDINE HYDROCHLORIDE 0.2 MG: 0.2 TABLET ORAL at 08:31

## 2019-07-15 RX ADMIN — ASPIRIN 81 MG 162 MG: 81 TABLET ORAL at 08:32

## 2019-07-15 RX ADMIN — HYDROCODONE BITARTRATE AND ACETAMINOPHEN 1 TABLET: 5; 325 TABLET ORAL at 08:40

## 2019-07-15 RX ADMIN — LORAZEPAM 1 MG: 1 TABLET ORAL at 04:30

## 2019-07-15 RX ADMIN — VERAPAMIL HYDROCHLORIDE 180 MG: 180 TABLET, FILM COATED, EXTENDED RELEASE ORAL at 08:32

## 2019-07-15 RX ADMIN — HYDRALAZINE HYDROCHLORIDE 100 MG: 25 TABLET, FILM COATED ORAL at 17:47

## 2019-07-15 ASSESSMENT — PAIN SCALES - GENERAL
PAINLEVEL_OUTOF10: 0
PAINLEVEL_OUTOF10: 0
PAINLEVEL_OUTOF10: 7

## 2019-07-15 NOTE — PROGRESS NOTES
Had to repage Dr Chaya Crawford via answering service as no orders have been placed as of yet reported high BP of 200/98

## 2019-07-15 NOTE — PROGRESS NOTES
Again another attempt to get admission orders from Dr Kristy Dominguez previous nurse had attempted left message on his answering service awaiting orders

## 2019-07-16 NOTE — H&P
[address]                  [address] [facility],  [facility]                              HISTORY AND PHYSICAL    PATIENT NAME: Anthony Beal                    :        1945  MED REC NO:   [id number]                            ROOM:       1196  ACCOUNT NO:   [de-identified]                           ADMIT DATE: 2019  PROVIDER:     Mariela Olvera MD    CHIEF COMPLAINT:  Chest heaviness/pain. HISTORY OF PRESENT ILLNESS:  The patient is a 69years of age lady who  claimed that she was at rest when she started having retrosternal chest  pressure/pain, which was associated with shortness of breath and  sweating. The patient claimed that since that feeling persisted for  more than 20 minutes, she was taken to the emergency room. When seen in  the emergency room, the EKG was normal; however, sublingual nitro did  relieve the pain. Troponin level was normal.  EKG was normal at that time. PAST MEDICAL HISTORY:  Cholecystectomy, left bilateral mastectomy, total  hysterectomy, left nephrectomy secondary to cancer, pericardial  effusion, gastric bypass in , abdominal liposuction, abdominal wall  hernia surgery, thrombocytopenia post chemotherapy, migraine headache,  fibromyalgia, anxiety and depression, noninsulin-dependent diabetes  mellitus, chronic low back pain with neurostimulator implant in . MEDICATIONS:  Medical marijuana, albuterol, clonidine, Lasix, Neurontin,  glyburide, ibuprofen, Synthroid, Ativan, meclizine, omeprazole, Mirapex,  Ultram, trazodone, Effexor XR, verapamil. ALLERGIES:  Allergic to BENADRYL, SULFA, CIPRO. FAMILY HISTORY:  Not relevant. SOCIAL HISTORY:  The patient is fully functional.  Lives at home with  her . Does not smoke or drink. She is on medical marijuana for  fibromyalgia. PHYSICAL EXAMINATION:  VITAL SIGNS:  Blood pressure 156/62, temperature 98.2, pulse 78.   GENERAL:  This is a 69years of age lady, in no distress, comfortable at  rest.  HEENT:  Normal.  NECK:  Supple. No carotid bruits. CARDIOVASCULAR SYSTEM:  Regular rate and rhythm. No murmur or bruit  appreciated. RESPIRATORY SYSTEM:  Clear. No wheeze or rhonchi appreciated. CHEST WALL:  No reproducible tenderness. BREASTS:  Bilateral mastectomy present. ABDOMEN:  Soft and nontender. Bowel sounds present. No mass. EXTREMITIES:  No edema. Good peripheral pulses. SKIN:  Intact. NEUROLOGICAL:  Intact. LABORATORY DATA:  Lab values show troponin less than 0.01. Hemoglobin  11.1 with white cell count 6.4. Basic metabolic profile normal except  for potassium 3.3. IMPRESSION:  Chest pain. By history, chronic anxiety, essential  hypertension, noninsulin-dependent diabetes mellitus, hypothyroid, COPD. PLAN:  We will continue all medications with Lovenox for DVT  prophylaxis. We will ask cardiologist to see the patient in  consult/stress test.    The patient is full code.         Dory Crook MD    D: 07/15/2019 11:12:37       T: 07/15/2019 14:08:33     [email or web address]  Job#: [id number]     Doc#: [id number]    CC:

## 2019-10-04 ENCOUNTER — TELEPHONE (OUTPATIENT)
Dept: ADMINISTRATIVE | Age: 74
End: 2019-10-04

## 2021-01-17 ENCOUNTER — HOSPITAL ENCOUNTER (INPATIENT)
Age: 76
LOS: 3 days | Discharge: HOME | DRG: 558 | End: 2021-01-20
Attending: EMERGENCY MEDICINE | Admitting: INTERNAL MEDICINE
Payer: MEDICARE

## 2021-01-17 ENCOUNTER — APPOINTMENT (OUTPATIENT)
Dept: CT IMAGING | Age: 76
DRG: 558 | End: 2021-01-17
Payer: MEDICARE

## 2021-01-17 ENCOUNTER — APPOINTMENT (OUTPATIENT)
Dept: GENERAL RADIOLOGY | Age: 76
DRG: 558 | End: 2021-01-17
Payer: MEDICARE

## 2021-01-17 DIAGNOSIS — M62.82 NON-TRAUMATIC RHABDOMYOLYSIS: Primary | ICD-10-CM

## 2021-01-17 DIAGNOSIS — R53.1 GENERALIZED WEAKNESS: ICD-10-CM

## 2021-01-17 DIAGNOSIS — E87.6 HYPOKALEMIA: ICD-10-CM

## 2021-01-17 DIAGNOSIS — E83.42 HYPOMAGNESEMIA: ICD-10-CM

## 2021-01-17 LAB
ALBUMIN SERPL-MCNC: 3.6 G/DL (ref 3.5–5.2)
ALP BLD-CCNC: 99 U/L (ref 35–104)
ALT SERPL-CCNC: 17 U/L (ref 0–32)
ANION GAP SERPL CALCULATED.3IONS-SCNC: 15 MMOL/L (ref 7–16)
AST SERPL-CCNC: 25 U/L (ref 0–31)
BACTERIA: ABNORMAL /HPF
BETA-HYDROXYBUTYRATE: 0.21 MMOL/L (ref 0.02–0.27)
BILIRUB SERPL-MCNC: 0.4 MG/DL (ref 0–1.2)
BILIRUBIN URINE: NEGATIVE
BLOOD, URINE: ABNORMAL
BUN BLDV-MCNC: 9 MG/DL (ref 8–23)
CALCIUM SERPL-MCNC: 9.1 MG/DL (ref 8.6–10.2)
CHLORIDE BLD-SCNC: 97 MMOL/L (ref 98–107)
CLARITY: ABNORMAL
CO2: 31 MMOL/L (ref 22–29)
COLOR: YELLOW
CREAT SERPL-MCNC: 0.7 MG/DL (ref 0.5–1)
EKG ATRIAL RATE: 95 BPM
EKG P AXIS: 59 DEGREES
EKG P-R INTERVAL: 154 MS
EKG Q-T INTERVAL: 432 MS
EKG QRS DURATION: 102 MS
EKG QTC CALCULATION (BAZETT): 542 MS
EKG R AXIS: -51 DEGREES
EKG T AXIS: 86 DEGREES
EKG VENTRICULAR RATE: 95 BPM
GFR AFRICAN AMERICAN: >60
GFR NON-AFRICAN AMERICAN: >60 ML/MIN/1.73
GLUCOSE BLD-MCNC: 176 MG/DL (ref 74–99)
GLUCOSE URINE: NEGATIVE MG/DL
HCT VFR BLD CALC: 48.5 % (ref 34–48)
HEMOGLOBIN: 16.6 G/DL (ref 11.5–15.5)
KETONES, URINE: NEGATIVE MG/DL
LACTIC ACID, SEPSIS: 2 MMOL/L (ref 0.5–1.9)
LACTIC ACID, SEPSIS: 2.7 MMOL/L (ref 0.5–1.9)
LEUKOCYTE ESTERASE, URINE: ABNORMAL
MAGNESIUM: 1.1 MG/DL (ref 1.6–2.6)
MCH RBC QN AUTO: 29.1 PG (ref 26–35)
MCHC RBC AUTO-ENTMCNC: 34.2 % (ref 32–34.5)
MCV RBC AUTO: 85.1 FL (ref 80–99.9)
NITRITE, URINE: NEGATIVE
PDW BLD-RTO: 14.7 FL (ref 11.5–15)
PH UA: 5.5 (ref 5–9)
PH VENOUS: 7.38 (ref 7.35–7.45)
PLATELET # BLD: 215 E9/L (ref 130–450)
PMV BLD AUTO: 10.7 FL (ref 7–12)
POTASSIUM SERPL-SCNC: 1.8 MMOL/L (ref 3.5–5)
POTASSIUM SERPL-SCNC: 1.9 MMOL/L (ref 3.5–5)
POTASSIUM SERPL-SCNC: 2.2 MMOL/L (ref 3.5–5)
PRO-BNP: 1021 PG/ML (ref 0–125)
PROTEIN UA: NEGATIVE MG/DL
RBC # BLD: 5.7 E12/L (ref 3.5–5.5)
RBC UA: ABNORMAL /HPF (ref 0–2)
SODIUM BLD-SCNC: 143 MMOL/L (ref 132–146)
SPECIFIC GRAVITY UA: 1.01 (ref 1–1.03)
TOTAL CK: 894 U/L (ref 20–180)
TOTAL PROTEIN: 6.6 G/DL (ref 6.4–8.3)
TROPONIN: <0.01 NG/ML (ref 0–0.03)
UROBILINOGEN, URINE: 0.2 E.U./DL
WBC # BLD: 9.7 E9/L (ref 4.5–11.5)
WBC UA: ABNORMAL /HPF (ref 0–5)

## 2021-01-17 PROCEDURE — 87040 BLOOD CULTURE FOR BACTERIA: CPT

## 2021-01-17 PROCEDURE — 6360000002 HC RX W HCPCS: Performed by: EMERGENCY MEDICINE

## 2021-01-17 PROCEDURE — 82010 KETONE BODYS QUAN: CPT

## 2021-01-17 PROCEDURE — 85027 COMPLETE CBC AUTOMATED: CPT

## 2021-01-17 PROCEDURE — 87088 URINE BACTERIA CULTURE: CPT

## 2021-01-17 PROCEDURE — 99285 EMERGENCY DEPT VISIT HI MDM: CPT

## 2021-01-17 PROCEDURE — 84132 ASSAY OF SERUM POTASSIUM: CPT

## 2021-01-17 PROCEDURE — 83605 ASSAY OF LACTIC ACID: CPT

## 2021-01-17 PROCEDURE — 96365 THER/PROPH/DIAG IV INF INIT: CPT

## 2021-01-17 PROCEDURE — 80053 COMPREHEN METABOLIC PANEL: CPT

## 2021-01-17 PROCEDURE — 70450 CT HEAD/BRAIN W/O DYE: CPT

## 2021-01-17 PROCEDURE — 83880 ASSAY OF NATRIURETIC PEPTIDE: CPT

## 2021-01-17 PROCEDURE — 6370000000 HC RX 637 (ALT 250 FOR IP): Performed by: EMERGENCY MEDICINE

## 2021-01-17 PROCEDURE — 82550 ASSAY OF CK (CPK): CPT

## 2021-01-17 PROCEDURE — 93005 ELECTROCARDIOGRAM TRACING: CPT | Performed by: EMERGENCY MEDICINE

## 2021-01-17 PROCEDURE — 6360000002 HC RX W HCPCS

## 2021-01-17 PROCEDURE — 2580000003 HC RX 258: Performed by: EMERGENCY MEDICINE

## 2021-01-17 PROCEDURE — 81001 URINALYSIS AUTO W/SCOPE: CPT

## 2021-01-17 PROCEDURE — 83735 ASSAY OF MAGNESIUM: CPT

## 2021-01-17 PROCEDURE — 71045 X-RAY EXAM CHEST 1 VIEW: CPT

## 2021-01-17 PROCEDURE — 87186 SC STD MICRODIL/AGAR DIL: CPT

## 2021-01-17 PROCEDURE — 2140000000 HC CCU INTERMEDIATE R&B

## 2021-01-17 PROCEDURE — 84484 ASSAY OF TROPONIN QUANT: CPT

## 2021-01-17 PROCEDURE — 96368 THER/DIAG CONCURRENT INF: CPT

## 2021-01-17 PROCEDURE — 36415 COLL VENOUS BLD VENIPUNCTURE: CPT

## 2021-01-17 PROCEDURE — 82800 BLOOD PH: CPT

## 2021-01-17 RX ORDER — POTASSIUM CHLORIDE 7.45 MG/ML
10 INJECTION INTRAVENOUS ONCE
Status: COMPLETED | OUTPATIENT
Start: 2021-01-17 | End: 2021-01-17

## 2021-01-17 RX ORDER — POTASSIUM CHLORIDE 7.45 MG/ML
INJECTION INTRAVENOUS
Status: COMPLETED
Start: 2021-01-17 | End: 2021-01-17

## 2021-01-17 RX ORDER — MAGNESIUM SULFATE IN WATER 40 MG/ML
2 INJECTION, SOLUTION INTRAVENOUS ONCE
Status: COMPLETED | OUTPATIENT
Start: 2021-01-17 | End: 2021-01-17

## 2021-01-17 RX ORDER — CLONIDINE HYDROCHLORIDE 0.1 MG/1
0.2 TABLET ORAL ONCE
Status: COMPLETED | OUTPATIENT
Start: 2021-01-17 | End: 2021-01-17

## 2021-01-17 RX ORDER — POTASSIUM CHLORIDE 7.45 MG/ML
10 INJECTION INTRAVENOUS ONCE
Status: COMPLETED | OUTPATIENT
Start: 2021-01-18 | End: 2021-01-17

## 2021-01-17 RX ORDER — POTASSIUM CHLORIDE 7.45 MG/ML
10 INJECTION INTRAVENOUS ONCE
Status: COMPLETED | OUTPATIENT
Start: 2021-01-17 | End: 2021-01-18

## 2021-01-17 RX ORDER — POTASSIUM CHLORIDE 7.45 MG/ML
INJECTION INTRAVENOUS
Status: DISPENSED
Start: 2021-01-17 | End: 2021-01-18

## 2021-01-17 RX ORDER — POTASSIUM CHLORIDE 7.45 MG/ML
10 INJECTION INTRAVENOUS ONCE
Status: COMPLETED | OUTPATIENT
Start: 2021-01-18 | End: 2021-01-18

## 2021-01-17 RX ORDER — HYDRALAZINE HYDROCHLORIDE 25 MG/1
100 TABLET, FILM COATED ORAL ONCE
Status: COMPLETED | OUTPATIENT
Start: 2021-01-17 | End: 2021-01-17

## 2021-01-17 RX ORDER — POTASSIUM CHLORIDE 7.45 MG/ML
10 INJECTION INTRAVENOUS ONCE
Status: DISCONTINUED | OUTPATIENT
Start: 2021-01-17 | End: 2021-01-17

## 2021-01-17 RX ORDER — 0.9 % SODIUM CHLORIDE 0.9 %
1000 INTRAVENOUS SOLUTION INTRAVENOUS ONCE
Status: COMPLETED | OUTPATIENT
Start: 2021-01-17 | End: 2021-01-17

## 2021-01-17 RX ORDER — POTASSIUM CHLORIDE 20 MEQ/1
40 TABLET, EXTENDED RELEASE ORAL ONCE
Status: COMPLETED | OUTPATIENT
Start: 2021-01-17 | End: 2021-01-17

## 2021-01-17 RX ORDER — PRAMIPEXOLE DIHYDROCHLORIDE 1 MG/1
1 TABLET ORAL 3 TIMES DAILY
Status: DISCONTINUED | OUTPATIENT
Start: 2021-01-17 | End: 2021-01-20 | Stop reason: HOSPADM

## 2021-01-17 RX ADMIN — POTASSIUM CHLORIDE 10 MEQ: 7.45 INJECTION INTRAVENOUS at 20:45

## 2021-01-17 RX ADMIN — PRAMIPEXOLE DIHYDROCHLORIDE 1 MG: 1 TABLET ORAL at 15:57

## 2021-01-17 RX ADMIN — SODIUM CHLORIDE 1000 ML: 9 INJECTION, SOLUTION INTRAVENOUS at 12:25

## 2021-01-17 RX ADMIN — MAGNESIUM SULFATE HEPTAHYDRATE 2 G: 40 INJECTION, SOLUTION INTRAVENOUS at 13:29

## 2021-01-17 RX ADMIN — VERAPAMIL HYDROCHLORIDE 180 MG: 180 TABLET, FILM COATED, EXTENDED RELEASE ORAL at 20:22

## 2021-01-17 RX ADMIN — HYDRALAZINE HYDROCHLORIDE 100 MG: 25 TABLET, FILM COATED ORAL at 19:23

## 2021-01-17 RX ADMIN — POTASSIUM CHLORIDE 10 MEQ: 10 INJECTION, SOLUTION INTRAVENOUS at 22:57

## 2021-01-17 RX ADMIN — POTASSIUM BICARBONATE 40 MEQ: 782 TABLET, EFFERVESCENT ORAL at 13:32

## 2021-01-17 RX ADMIN — POTASSIUM CHLORIDE 10 MEQ: 10 INJECTION, SOLUTION INTRAVENOUS at 22:52

## 2021-01-17 RX ADMIN — POTASSIUM CHLORIDE 40 MEQ: 1500 TABLET, EXTENDED RELEASE ORAL at 22:52

## 2021-01-17 RX ADMIN — POTASSIUM CHLORIDE 10 MEQ: 10 INJECTION, SOLUTION INTRAVENOUS at 13:29

## 2021-01-17 RX ADMIN — PRAMIPEXOLE DIHYDROCHLORIDE 1 MG: 1 TABLET ORAL at 20:57

## 2021-01-17 RX ADMIN — POTASSIUM CHLORIDE 10 MEQ: 10 INJECTION, SOLUTION INTRAVENOUS at 19:38

## 2021-01-17 RX ADMIN — POTASSIUM CHLORIDE 10 MEQ: 10 INJECTION, SOLUTION INTRAVENOUS at 20:45

## 2021-01-17 RX ADMIN — CLONIDINE HYDROCHLORIDE 0.2 MG: 0.1 TABLET ORAL at 19:23

## 2021-01-17 ASSESSMENT — ENCOUNTER SYMPTOMS
CHEST TIGHTNESS: 0
SHORTNESS OF BREATH: 0
ABDOMINAL PAIN: 0

## 2021-01-17 ASSESSMENT — PAIN DESCRIPTION - LOCATION: LOCATION: GENERALIZED

## 2021-01-17 ASSESSMENT — PAIN SCALES - GENERAL: PAINLEVEL_OUTOF10: 6

## 2021-01-17 ASSESSMENT — PAIN DESCRIPTION - DESCRIPTORS: DESCRIPTORS: ACHING

## 2021-01-17 ASSESSMENT — PAIN DESCRIPTION - PAIN TYPE: TYPE: ACUTE PAIN

## 2021-01-17 NOTE — ED PROVIDER NOTES
with early signs of tamponade with normal left ventricular size, wall thickness, wall motion and systolic function with stage I diastolic dysfunction with normal right ventricular size and function with moderately thickened pericardium and with no significant valvular abnormalities noted. Review of Systems   Respiratory: Negative for chest tightness and shortness of breath. Gastrointestinal: Negative for abdominal pain. Genitourinary: Negative for dysuria. Skin: Negative for wound. Neurological: Positive for weakness. Negative for dizziness, speech difficulty and light-headedness. All other systems reviewed and are negative. Physical Exam  Constitutional:       General: She is not in acute distress. Appearance: She is well-developed. HENT:      Head: Normocephalic and atraumatic. Eyes:      Conjunctiva/sclera: Conjunctivae normal.      Pupils: Pupils are equal, round, and reactive to light. Neck:      Musculoskeletal: Normal range of motion. Thyroid: No thyromegaly. Cardiovascular:      Rate and Rhythm: Normal rate and regular rhythm. Pulmonary:      Effort: Pulmonary effort is normal. No respiratory distress. Breath sounds: Normal breath sounds. Abdominal:      General: There is no distension. Palpations: Abdomen is soft. Tenderness: There is no abdominal tenderness. There is no guarding or rebound. Musculoskeletal: Normal range of motion. General: No tenderness. Skin:     General: Skin is warm and dry. Findings: No erythema. Comments: Several healed surgical incisions her abdomen, bilateral mastectomies   Neurological:      Mental Status: She is alert and oriented to person, place, and time. Cranial Nerves: No cranial nerve deficit. Coordination: Coordination normal.      Comments: Generalized weakness, no focal neurologic deficits.           Procedures     MDM     ED Course as of Jan 22 0022   Mon Jan 18, 2021   0717 Patient received IV potassium as well as p.o. potassium. Repeat potassium level was elevated at 7.6. Prior to getting treatment an EKG was performed which showed no acute changes. I was informed that the blood draw was drawn off the same line the IV potassium was coming in therefore before any treatment given the patient underwent a POC here in the emergency department which showed a potassium of 2.2. We will repeat potassium and chemistry at this time. [CB]   8487 She has repeat potassium 2.3. Patient will continue to get IV potassium. [CB]      ED Course User Index  [CB] Micheal Flores DO      ED Course as of Jan 22 0022   Mon Jan 18, 2021   0171 Patient received IV potassium as well as p.o. potassium. Repeat potassium level was elevated at 7.6. Prior to getting treatment an EKG was performed which showed no acute changes. I was informed that the blood draw was drawn off the same line the IV potassium was coming in therefore before any treatment given the patient underwent a POC here in the emergency department which showed a potassium of 2.2. We will repeat potassium and chemistry at this time. [CB]   1530 She has repeat potassium 2.3. Patient will continue to get IV potassium.     [CB]      ED Course User Index  [CB] Micheal Flroes DO       --------------------------------------------- PAST HISTORY ---------------------------------------------  Past Medical History:  has a past medical history of Anemia, Anxiety, Arthritis, Asthma, Bipolar 1 disorder ([facility] [facility] Cancer of breast, female ([facility] CHF (congestive heart failure) ([facility] Chronic back pain, Depression, Depression with anxiety, Diabetes mellitus ([facility] Dyslipidemia, Fibromyalgia, History of blood transfusion, Hypertension, Hypothyroidism, Neuropathy, Pericardial effusion, Pleural effusion, TIA (transient ischemic attack), Tobacco abuse, and Type II or unspecified type diabetes mellitus without mention of complication, not stated as uncontrolled. Past Surgical History:  has a past surgical history that includes Lung removal, partial; Mastectomy (Bilateral); Kidney removal (Left, 2/2006); Hysterectomy; Gastric bypass surgery (9/2004); hernia repair; knee surgery (Right); transthoracic echocardiogram (4/1/2010); Cataract removal with implant (Bilateral); liver biopsy (2006); thoracentesis (Left, 1/2010 and 3/2010. ); Cholecystectomy; other surgical history (03/16/15); other surgical history (N/A, 4/15/15); Breast surgery; Nerve Block (Bilateral, 06/06/16); and Kidney removal.    Social History:  reports that she quit smoking about 22 years ago. She quit after 60.00 years of use. She has never used smokeless tobacco. She reports current alcohol use of about 1.0 standard drinks of alcohol per week. She reports current drug use. Drug: Marijuana. Family History: family history is not on file. The patients home medications have been reviewed.     Allergies: Benadryl [diphenhydramine], Other, Sulfa antibiotics, Ciprofloxacin, and Tape [adhesive tape]    -------------------------------------------------- RESULTS -------------------------------------------------    LABS:  Results for orders placed or performed during the hospital encounter of 01/17/21   Culture, Blood 1    Specimen: Blood   Result Value Ref Range    Blood Culture, Routine 24 Hours no growth    Culture, Blood 2    Specimen: Blood   Result Value Ref Range    Culture, Blood 2 24 Hours no growth    Culture, Urine    Specimen: Urine, clean catch   Result Value Ref Range    Organism Escherichia coli (A)     Urine Culture, Routine >100,000 CFU/ml        Susceptibility    Escherichia coli - BACTERIAL SUSCEPTIBILITY PANEL BY HERBER     ampicillin <=^2 Sensitive mcg/mL     ceFAZolin <=^4 Sensitive mcg/mL     cefepime <=^0.12 Sensitive mcg/mL     cefTRIAXone <=^0.25 Sensitive mcg/mL     Confirmatory Extended Spectrum Beta-Lactamase Neg  mcg/mL     ertapenem <=^0.12 Sensitive mcg/mL     gentamicin <=^1 Sensitive mcg/mL     levofloxacin <=^0.12 Sensitive mcg/mL     nitrofurantoin <=^16 Sensitive mcg/mL     piperacillin-tazobactam <=^4 Sensitive mcg/mL     trimethoprim-sulfamethoxazole <=^20 Sensitive mcg/mL   CBC   Result Value Ref Range    WBC 9.7 4.5 - 11.5 E9/L    RBC 5.70 (H) 3.50 - 5.50 E12/L    Hemoglobin 16.6 (H) 11.5 - 15.5 g/dL    Hematocrit 48.5 (H) 34.0 - 48.0 %    MCV 85.1 80.0 - 99.9 fL    MCH 29.1 26.0 - 35.0 pg    MCHC 34.2 32.0 - 34.5 %    RDW 14.7 11.5 - 15.0 fL    Platelets 670 924 - 542 E9/L    MPV 10.7 7.0 - 12.0 fL   Comprehensive metabolic panel   Result Value Ref Range    Sodium 143 132 - 146 mmol/L    Potassium 1.9 (LL) 3.5 - 5.0 mmol/L    Chloride 97 (L) 98 - 107 mmol/L    CO2 31 (H) 22 - 29 mmol/L    Anion Gap 15 7 - 16 mmol/L    Glucose 176 (H) 74 - 99 mg/dL    BUN 9 8 - 23 mg/dL    CREATININE 0.7 0.5 - 1.0 mg/dL    GFR Non-African American >60 >=60 mL/min/1.73    GFR African American >60     Calcium 9.1 8.6 - 10.2 mg/dL    Total Protein 6.6 6.4 - 8.3 g/dL    Alb 3.6 3.5 - 5.2 g/dL    Total Bilirubin 0.4 0.0 - 1.2 mg/dL    Alkaline Phosphatase 99 35 - 104 U/L    ALT 17 0 - 32 U/L    AST 25 0 - 31 U/L   Troponin   Result Value Ref Range    Troponin <0.01 0.00 - 0.03 ng/mL   CK   Result Value Ref Range    Total  (H) 20 - 180 U/L   Brain Natriuretic Peptide   Result Value Ref Range    Pro-BNP 1,021 (H) 0 - 125 pg/mL   Beta-Hydroxybutyrate   Result Value Ref Range    Beta-Hydroxybutyrate 0.21 0.02 - 0.27 mmol/L   pH, venous   Result Value Ref Range    pH, Ayo 7.38 7.35 - 7.45   Lactate, Sepsis   Result Value Ref Range    Lactic Acid, Sepsis 2.7 (H) 0.5 - 1.9 mmol/L   Lactate, Sepsis   Result Value Ref Range    Lactic Acid, Sepsis 2.0 (H) 0.5 - 1.9 mmol/L   Urinalysis   Result Value Ref Range    Color, UA Yellow Straw/Yellow    Clarity, UA SL CLOUDY Clear    Glucose, Ur Negative Negative mg/dL    Bilirubin Urine Negative Negative    Ketones, Urine Negative Negative mg/dL    Specific Gravity, UA 1.010 1.005 - 1.030    Blood, Urine TRACE-INTACT Negative    pH, UA 5.5 5.0 - 9.0    Protein, UA Negative Negative mg/dL    Urobilinogen, Urine 0.2 <2.0 E.U./dL    Nitrite, Urine Negative Negative    Leukocyte Esterase, Urine TRACE (A) Negative   Magnesium   Result Value Ref Range    Magnesium 1.1 (LL) 1.6 - 2.6 mg/dL   Microscopic Urinalysis   Result Value Ref Range    WBC, UA 5-10 (A) 0 - 5 /HPF    RBC, UA 0-1 0 - 2 /HPF    Bacteria, UA MANY (A) None Seen /HPF   Basic Metabolic Panel   Result Value Ref Range    Sodium 145 132 - 146 mmol/L    Potassium 2.5 (LL) 3.5 - 5.0 mmol/L    Chloride 102 98 - 107 mmol/L    CO2 33 (H) 22 - 29 mmol/L    Anion Gap 10 7 - 16 mmol/L    Glucose 148 (H) 74 - 99 mg/dL    BUN 5 (L) 8 - 23 mg/dL    CREATININE 0.6 0.5 - 1.0 mg/dL    GFR Non-African American >60 >=60 mL/min/1.73    GFR African American >60     Calcium 8.6 8.6 - 10.2 mg/dL   CK ISOENZYMES   Result Value Ref Range    CK- 96 - 100 %    % CKMB 0 0 - 4 %    CK-BB 0 0 - 0 %    Total CK 1,610 (H) 20 - 180 U/L    CK-Macro Type I 0 0 - 0 %    CK-Macro Type II 0 0 - 0 %   Potassium   Result Value Ref Range    Potassium 1.8 (LL) 3.5 - 5.0 mmol/L   Potassium   Result Value Ref Range    Potassium 2.2 (LL) 3.5 - 5.0 mmol/L   Potassium   Result Value Ref Range    Potassium 7.6 (HH) 3.5 - 5.0 mmol/L   Potassium   Result Value Ref Range    Potassium 2.3 (LL) 3.5 - 5.0 mmol/L   Basic Metabolic Panel   Result Value Ref Range    Sodium 144 132 - 146 mmol/L    Potassium 3.2 (L) 3.5 - 5.0 mmol/L    Chloride 102 98 - 107 mmol/L    CO2 32 (H) 22 - 29 mmol/L    Anion Gap 10 7 - 16 mmol/L    Glucose 147 (H) 74 - 99 mg/dL    BUN 11 8 - 23 mg/dL    CREATININE 1.0 0.5 - 1.0 mg/dL    GFR Non-African American 54 >=60 mL/min/1.73    GFR African American >60     Calcium 8.5 (L) 8.6 - 10.2 mg/dL   Basic metabolic panel   Result Value Ref Range    Sodium 144 132 - 146 mmol/L    Potassium 3.8 3.5 - 5.0 mmol/L    Chloride 109 (H) 98 - 107 mmol/L    CO2 30 (H) 22 - 29 mmol/L    Anion Gap 5 (L) 7 - 16 mmol/L    Glucose 169 (H) 74 - 99 mg/dL    BUN 16 8 - 23 mg/dL    CREATININE 0.8 0.5 - 1.0 mg/dL    GFR Non-African American >60 >=60 mL/min/1.73    GFR African American >60     Calcium 8.4 (L) 8.6 - 10.2 mg/dL   Lactic acid, plasma   Result Value Ref Range    Lactic Acid 0.9 0.5 - 2.2 mmol/L   CK   Result Value Ref Range    Total  (H) 20 - 180 U/L   POCT Venous   Result Value Ref Range    POC Sodium 145 132 - 146 mmol/L    POC Potassium 2.2 (LL) 3.5 - 5.0 mmol/L    POC Chloride 101 100 - 108 mmol/L    POC Glucose 155 (H) 74 - 99 mg/dl    POC Creatinine 0.5 0.5 - 1.0 mg/dL    GFR Non-African American >60 >=60 mL/min/1.73    GFR  >60     Performed on SEE BELOW    POCT Glucose   Result Value Ref Range    Meter Glucose 149 (H) 74 - 99 mg/dL   EKG 12 Lead   Result Value Ref Range    Ventricular Rate 95 BPM    Atrial Rate 95 BPM    P-R Interval 154 ms    QRS Duration 102 ms    Q-T Interval 432 ms    QTc Calculation (Bazett) 542 ms    P Axis 59 degrees    R Axis -51 degrees    T Axis 86 degrees   EKG 12 Lead   Result Value Ref Range    Ventricular Rate 79 BPM    Atrial Rate 79 BPM    P-R Interval 156 ms    QRS Duration 106 ms    Q-T Interval 346 ms    QTc Calculation (Bazett) 396 ms    P Axis 78 degrees    R Axis -53 degrees    T Axis 81 degrees       RADIOLOGY:  CT HEAD WO CONTRAST   Final Result   No acute intracranial abnormality. XR CHEST PORTABLE   Final Result   Stable chest.            ------------------------- NURSING NOTES AND VITALS REVIEWED ---------------------------  Date / Time Roomed:  1/17/2021 12:06 PM  ED Bed Assignment:  [id number]    The nursing notes within the ED encounter and vital signs as below have been reviewed. No data found.     Oxygen Saturation Interpretation: Normal    ------------------------------------------ PROGRESS NOTES ------------------------------------------  Re-evaluation(s):  Patients symptoms show no change  Repeat physical examination is improved - she appears more comfortable    CRITICAL CARE:   31 MINUTES. Please note that the withdrawal or failure to initiate urgent interventions for this patient would likely result in a life threatening deterioration or permanent disability. Accordingly this patient received the above mentioned time, excluding separately billable procedures. Counseling:  I have spoken with the patient and discussed todays results, in addition to providing specific details for the plan of care and counseling regarding the diagnosis and prognosis. Their questions are answered at this time and they are agreeable with the plan of admission.    --------------------------------- ADDITIONAL PROVIDER NOTES ---------------------------------  Consultations:  Spoke with Dr. Allen Montero.  Discussed case. They will admit the patient. This patient's ED course included: a personal history and physicial examination, re-evaluation prior to disposition, multiple bedside re-evaluations and IV medications    This patient has remained hemodynamically stable during their ED course. Diagnosis:  1. Non-traumatic rhabdomyolysis    2. Hypomagnesemia    3. Generalized weakness    4. Hypokalemia        Disposition:  Patient's disposition: Admit to telemetry  Patient's condition is stable.                 Jorgito Sevilla, DO  01/17/21 Ab Sales, DO  01/22/21 Tyler Aldana

## 2021-01-17 NOTE — ED NOTES
Bed: 01  Expected date:   Expected time:   Means of arrival:   Comments:  Anson Solano RN  01/17/21 9969

## 2021-01-17 NOTE — PROGRESS NOTES
Progress Note    h and p dictated    SUBJECTIVE:  MRS LOZOYA LIVES AT HOME WITH HER . SHE HAS CHRONIC LEGS WEAKNESS  ANDUSUALLY WALKS WITH A CANE  APPARENTLY SHE FELL TWICE AND HOME AND WAS ABLE TO GET UP. ON THE THIRD FALL SHE STAYED ON THE FLOOR OVERNITE AND DID NOT LOOSE COSCIOUSNOUS. EMS WAS CALLED  IN ER SHE WAS AWKE AND ALERT  HER MAG WAS 1.1/POTASSIUM 1.9/L ACI 2.7 AND   K AND MAG WERE SUPPLEMENTED IN ER AND WAS ADM    OBJECTIVE:      Medications    Infusion Medications   Scheduled Medications    pramipexole  1 mg Oral TID       Physical  Temperature:  Current - Temp: 97.3 °F (36.3 °C); Max - Temp  Av.3 °F (36.3 °C)  Min: 97.3 °F (36.3 °C)  Max: 97.3 °F (36.3 °C)    Respiratory Rate : Resp  Av.5  Min: 18  Max: 25    Pulse Range: Pulse  Av.2  Min: 83  Max: 99    Blood Presuure Range:  Systolic (00RKC), KMY:020 , Min:149 , XG   ; Diastolic (94XCQ), OJH:836, Min:88, Max:135      Pulse ox Range: SpO2  Av.5 %  Min: 94 %  Max: 97 %    24hr I & O:  No intake or output data in the 24 hours ending 21    Constitutional:  awake, alert, cooperative, no apparent distress, and appears stated age  Eyes:  pupils equal, round and reactive to light  ENT:  normocepalic, without obvious abnormality  NECK:  supple, symmetrical, trachea midline  HEMATOLOGIC/LYMPHATICS:  no cervical lymphadenopathy  LUNGS:  clear, no wheeze, no rhonchi  CARDIOVASCULAR:  Normal apical impulse, regular rate and rhythm, normal S1 and S2, no S3 or S4, and no murmur noted  ABDOMEN:  normal bowel sounds, non-distended, non-tender, no masses palpated  MUSCULOSKELETAL:  There is no redness, warmth, or swelling of the joints. Full range of motion noted. Motor strength is 5 out of 5 all extremities bilaterally.   Tone is normal.  SKIN:  normal skin color, texture, turgor    Data      CBC:   Lab Results   Component Value Date    WBC 9.7 2021    RBC 5.70 2021    HGB 16.6 2021    HCT 48.5 01/17/2021    MCV 85.1 01/17/2021    MCH 29.1 01/17/2021    MCHC 34.2 01/17/2021    RDW 14.7 01/17/2021     01/17/2021    MPV 10.7 01/17/2021     BMP:    Lab Results   Component Value Date     01/17/2021    K 1.9 01/17/2021    K 4.4 02/16/2019    CL 97 01/17/2021    CO2 31 01/17/2021    BUN 9 01/17/2021    CREATININE 0.7 01/17/2021    CALCIUM 9.1 01/17/2021    GFRAA >60 01/17/2021    LABGLOM >60 01/17/2021    GLUCOSE 176 01/17/2021    GLUCOSE 134 12/13/2010     PT/INR:  No results found for: PTINR  Last 3 Troponin:  No components found for: TROPININI      ASSESSMENT:      Active Problems:    Hypokalemia  Resolved Problems:    * No resolved hospital problems.  *  L.ACIDOSIS  NON TRAUMATIC RHABDDO   L.ACIDOSIS  AC ON CH GEN WEAKNESS  BY HX NIDDM/CH ANX/FIBROMYALGIA/HYPOTHY            PLAN:  ADMIT TO MONITORED BED  CONT HOME MEDS  SUPPLEMENT K AND MAG  DVT PROPHY LOVENOX  IV FLUID WITH BICARB  WATCH T CK/B SUGAR  FULL CODE        Electronically signed by Tiara Kitchen MD on 1/17/2021 at 6:25 PM

## 2021-01-18 LAB
ANION GAP SERPL CALCULATED.3IONS-SCNC: 10 MMOL/L (ref 7–16)
BUN BLDV-MCNC: 5 MG/DL (ref 8–23)
CALCIUM SERPL-MCNC: 8.6 MG/DL (ref 8.6–10.2)
CHLORIDE BLD-SCNC: 102 MMOL/L (ref 98–107)
CO2: 33 MMOL/L (ref 22–29)
CREAT SERPL-MCNC: 0.6 MG/DL (ref 0.5–1)
EKG ATRIAL RATE: 79 BPM
EKG P AXIS: 78 DEGREES
EKG P-R INTERVAL: 156 MS
EKG Q-T INTERVAL: 346 MS
EKG QRS DURATION: 106 MS
EKG QTC CALCULATION (BAZETT): 396 MS
EKG R AXIS: -53 DEGREES
EKG T AXIS: 81 DEGREES
EKG VENTRICULAR RATE: 79 BPM
GFR AFRICAN AMERICAN: >60
GFR AFRICAN AMERICAN: >60
GFR NON-AFRICAN AMERICAN: >60 ML/MIN/1.73
GFR NON-AFRICAN AMERICAN: >60 ML/MIN/1.73
GLUCOSE BLD-MCNC: 148 MG/DL (ref 74–99)
GLUCOSE BLD-MCNC: 155 MG/DL (ref 74–99)
PERFORMED ON: ABNORMAL
POC CHLORIDE: 101 MMOL/L (ref 100–108)
POC CREATININE: 0.5 MG/DL (ref 0.5–1)
POC POTASSIUM: 2.2 MMOL/L (ref 3.5–5)
POC SODIUM: 145 MMOL/L (ref 132–146)
POTASSIUM SERPL-SCNC: 2.3 MMOL/L (ref 3.5–5)
POTASSIUM SERPL-SCNC: 2.5 MMOL/L (ref 3.5–5)
POTASSIUM SERPL-SCNC: 7.6 MMOL/L (ref 3.5–5)
SODIUM BLD-SCNC: 145 MMOL/L (ref 132–146)

## 2021-01-18 PROCEDURE — 93005 ELECTROCARDIOGRAM TRACING: CPT | Performed by: EMERGENCY MEDICINE

## 2021-01-18 PROCEDURE — 84295 ASSAY OF SERUM SODIUM: CPT

## 2021-01-18 PROCEDURE — 82565 ASSAY OF CREATININE: CPT

## 2021-01-18 PROCEDURE — 82550 ASSAY OF CK (CPK): CPT

## 2021-01-18 PROCEDURE — 2140000000 HC CCU INTERMEDIATE R&B

## 2021-01-18 PROCEDURE — 80048 BASIC METABOLIC PNL TOTAL CA: CPT

## 2021-01-18 PROCEDURE — 84132 ASSAY OF SERUM POTASSIUM: CPT

## 2021-01-18 PROCEDURE — 82552 ASSAY OF CPK IN BLOOD: CPT

## 2021-01-18 PROCEDURE — 93010 ELECTROCARDIOGRAM REPORT: CPT | Performed by: INTERNAL MEDICINE

## 2021-01-18 PROCEDURE — 82435 ASSAY OF BLOOD CHLORIDE: CPT

## 2021-01-18 PROCEDURE — 6370000000 HC RX 637 (ALT 250 FOR IP): Performed by: INTERNAL MEDICINE

## 2021-01-18 PROCEDURE — 2580000003 HC RX 258: Performed by: INTERNAL MEDICINE

## 2021-01-18 PROCEDURE — 6360000002 HC RX W HCPCS: Performed by: EMERGENCY MEDICINE

## 2021-01-18 PROCEDURE — 82947 ASSAY GLUCOSE BLOOD QUANT: CPT

## 2021-01-18 PROCEDURE — 6370000000 HC RX 637 (ALT 250 FOR IP): Performed by: EMERGENCY MEDICINE

## 2021-01-18 PROCEDURE — 36415 COLL VENOUS BLD VENIPUNCTURE: CPT

## 2021-01-18 RX ORDER — FUROSEMIDE 10 MG/ML
40 INJECTION INTRAMUSCULAR; INTRAVENOUS ONCE
Status: DISCONTINUED | OUTPATIENT
Start: 2021-01-18 | End: 2021-01-18

## 2021-01-18 RX ORDER — LORAZEPAM 1 MG/1
1 TABLET ORAL EVERY 8 HOURS PRN
Status: DISCONTINUED | OUTPATIENT
Start: 2021-01-18 | End: 2021-01-20 | Stop reason: HOSPADM

## 2021-01-18 RX ORDER — DEXTROSE MONOHYDRATE 25 G/50ML
25 INJECTION, SOLUTION INTRAVENOUS ONCE
Status: DISCONTINUED | OUTPATIENT
Start: 2021-01-18 | End: 2021-01-18

## 2021-01-18 RX ORDER — CALCIUM GLUCONATE 94 MG/ML
1 INJECTION, SOLUTION INTRAVENOUS ONCE
Status: DISCONTINUED | OUTPATIENT
Start: 2021-01-18 | End: 2021-01-18

## 2021-01-18 RX ORDER — DEXTROSE MONOHYDRATE 25 G/50ML
12.5 INJECTION, SOLUTION INTRAVENOUS PRN
Status: DISCONTINUED | OUTPATIENT
Start: 2021-01-18 | End: 2021-01-18

## 2021-01-18 RX ORDER — DEXTROSE MONOHYDRATE 50 MG/ML
100 INJECTION, SOLUTION INTRAVENOUS PRN
Status: DISCONTINUED | OUTPATIENT
Start: 2021-01-18 | End: 2021-01-18

## 2021-01-18 RX ORDER — LEVOTHYROXINE SODIUM 0.05 MG/1
50 TABLET ORAL DAILY
Status: DISCONTINUED | OUTPATIENT
Start: 2021-01-18 | End: 2021-01-20 | Stop reason: HOSPADM

## 2021-01-18 RX ORDER — CLONIDINE HYDROCHLORIDE 0.2 MG/1
0.2 TABLET ORAL 3 TIMES DAILY
Status: DISCONTINUED | OUTPATIENT
Start: 2021-01-18 | End: 2021-01-20 | Stop reason: HOSPADM

## 2021-01-18 RX ORDER — VENLAFAXINE HYDROCHLORIDE 150 MG/1
150 CAPSULE, EXTENDED RELEASE ORAL DAILY
Status: DISCONTINUED | OUTPATIENT
Start: 2021-01-18 | End: 2021-01-20 | Stop reason: HOSPADM

## 2021-01-18 RX ORDER — HYDRALAZINE HYDROCHLORIDE 50 MG/1
100 TABLET, FILM COATED ORAL 3 TIMES DAILY
Status: DISCONTINUED | OUTPATIENT
Start: 2021-01-18 | End: 2021-01-20 | Stop reason: HOSPADM

## 2021-01-18 RX ORDER — NICOTINE POLACRILEX 4 MG
15 LOZENGE BUCCAL PRN
Status: DISCONTINUED | OUTPATIENT
Start: 2021-01-18 | End: 2021-01-18

## 2021-01-18 RX ORDER — TRAZODONE HYDROCHLORIDE 50 MG/1
100 TABLET ORAL NIGHTLY
Status: DISCONTINUED | OUTPATIENT
Start: 2021-01-18 | End: 2021-01-20 | Stop reason: HOSPADM

## 2021-01-18 RX ORDER — POTASSIUM CHLORIDE 7.45 MG/ML
10 INJECTION INTRAVENOUS ONCE
Status: COMPLETED | OUTPATIENT
Start: 2021-01-18 | End: 2021-01-18

## 2021-01-18 RX ORDER — POTASSIUM CHLORIDE 20 MEQ/1
40 TABLET, EXTENDED RELEASE ORAL 2 TIMES DAILY WITH MEALS
Status: DISCONTINUED | OUTPATIENT
Start: 2021-01-18 | End: 2021-01-20 | Stop reason: HOSPADM

## 2021-01-18 RX ORDER — PRAMIPEXOLE DIHYDROCHLORIDE 1 MG/1
1 TABLET ORAL 2 TIMES DAILY
Status: DISCONTINUED | OUTPATIENT
Start: 2021-01-18 | End: 2021-01-18 | Stop reason: SDUPTHER

## 2021-01-18 RX ORDER — GABAPENTIN 400 MG/1
800 CAPSULE ORAL 3 TIMES DAILY
Status: DISCONTINUED | OUTPATIENT
Start: 2021-01-18 | End: 2021-01-20 | Stop reason: HOSPADM

## 2021-01-18 RX ORDER — SODIUM CHLORIDE 0.9 % (FLUSH) 0.9 %
10 SYRINGE (ML) INJECTION 2 TIMES DAILY
Status: DISCONTINUED | OUTPATIENT
Start: 2021-01-18 | End: 2021-01-20 | Stop reason: HOSPADM

## 2021-01-18 RX ADMIN — LEVOTHYROXINE SODIUM 50 MCG: 0.05 TABLET ORAL at 09:13

## 2021-01-18 RX ADMIN — TRAZODONE HYDROCHLORIDE 100 MG: 50 TABLET ORAL at 21:19

## 2021-01-18 RX ADMIN — HYDRALAZINE HYDROCHLORIDE 100 MG: 50 TABLET, FILM COATED ORAL at 09:13

## 2021-01-18 RX ADMIN — CLONIDINE HYDROCHLORIDE 0.2 MG: 0.2 TABLET ORAL at 21:19

## 2021-01-18 RX ADMIN — HYDRALAZINE HYDROCHLORIDE 100 MG: 50 TABLET, FILM COATED ORAL at 21:19

## 2021-01-18 RX ADMIN — SODIUM CHLORIDE, PRESERVATIVE FREE 10 ML: 5 INJECTION INTRAVENOUS at 21:19

## 2021-01-18 RX ADMIN — SODIUM CHLORIDE, PRESERVATIVE FREE 10 ML: 5 INJECTION INTRAVENOUS at 09:17

## 2021-01-18 RX ADMIN — PRAMIPEXOLE DIHYDROCHLORIDE 1 MG: 1 TABLET ORAL at 10:14

## 2021-01-18 RX ADMIN — GABAPENTIN 800 MG: 400 CAPSULE ORAL at 13:12

## 2021-01-18 RX ADMIN — POTASSIUM CHLORIDE 40 MEQ: 1500 TABLET, EXTENDED RELEASE ORAL at 09:13

## 2021-01-18 RX ADMIN — POTASSIUM CHLORIDE 10 MEQ: 10 INJECTION, SOLUTION INTRAVENOUS at 01:20

## 2021-01-18 RX ADMIN — GABAPENTIN 800 MG: 400 CAPSULE ORAL at 09:13

## 2021-01-18 RX ADMIN — CLONIDINE HYDROCHLORIDE 0.2 MG: 0.2 TABLET ORAL at 13:12

## 2021-01-18 RX ADMIN — HYDRALAZINE HYDROCHLORIDE 100 MG: 50 TABLET, FILM COATED ORAL at 13:12

## 2021-01-18 RX ADMIN — POTASSIUM CHLORIDE 10 MEQ: 10 INJECTION, SOLUTION INTRAVENOUS at 05:24

## 2021-01-18 RX ADMIN — POTASSIUM CHLORIDE 40 MEQ: 1500 TABLET, EXTENDED RELEASE ORAL at 16:22

## 2021-01-18 RX ADMIN — PRAMIPEXOLE DIHYDROCHLORIDE 1 MG: 1 TABLET ORAL at 21:19

## 2021-01-18 RX ADMIN — VERAPAMIL HYDROCHLORIDE 180 MG: 180 TABLET, FILM COATED, EXTENDED RELEASE ORAL at 09:13

## 2021-01-18 RX ADMIN — CLONIDINE HYDROCHLORIDE 0.2 MG: 0.2 TABLET ORAL at 09:13

## 2021-01-18 RX ADMIN — POTASSIUM CHLORIDE 10 MEQ: 10 INJECTION, SOLUTION INTRAVENOUS at 07:12

## 2021-01-18 RX ADMIN — PRAMIPEXOLE DIHYDROCHLORIDE 1 MG: 1 TABLET ORAL at 13:12

## 2021-01-18 RX ADMIN — POTASSIUM CHLORIDE 10 MEQ: 10 INJECTION, SOLUTION INTRAVENOUS at 00:02

## 2021-01-18 RX ADMIN — VENLAFAXINE HYDROCHLORIDE 150 MG: 150 CAPSULE, EXTENDED RELEASE ORAL at 09:13

## 2021-01-18 RX ADMIN — VERAPAMIL HYDROCHLORIDE 180 MG: 180 TABLET, FILM COATED, EXTENDED RELEASE ORAL at 21:19

## 2021-01-18 RX ADMIN — GABAPENTIN 800 MG: 400 CAPSULE ORAL at 21:19

## 2021-01-18 ASSESSMENT — PAIN SCALES - GENERAL
PAINLEVEL_OUTOF10: 0

## 2021-01-18 NOTE — PROGRESS NOTES
Admit Date: 1/17/2021    Subjective:     Awake NAD    Objective:     Patient Vitals for the past 8 hrs:   BP Temp Temp src Pulse Resp SpO2 Height Weight   01/18/21 0634 (!) 188/90 97.4 °F (36.3 °C) Oral 85 20 94 % 5' 5\" (1.651 m) 192 lb 3.2 oz (87.2 kg)   01/18/21 0600 (!) 187/91 98.3 °F (36.8 °C) -- 81 20 96 % -- --   01/18/21 0500 (!) 188/109 -- -- 78 20 95 % -- --   01/18/21 0400 (!) 191/98 -- -- 75 26 94 % -- --   01/18/21 0345 -- -- -- 73 19 95 % -- --   01/18/21 0330 -- -- -- 79 27 93 % -- --   01/18/21 0315 -- -- -- 71 24 95 % -- --   01/18/21 0300 (!) 185/126 -- -- 75 23 95 % -- --   01/18/21 0245 (!) 173/133 -- -- 95 24 -- -- --   01/18/21 0200 (!) 150/60 -- -- 74 22 93 % -- --   01/18/21 0100 (!) 178/82 -- -- 77 25 93 % -- --   01/18/21 0000 (!) 147/74 -- -- 72 23 93 % -- --     No intake/output data recorded. No intake/output data recorded. HEENT: Normal  NECK: Thyroid normal. No carotid bruit. No lymphphadenopathy. CVS: RRR  RS: Clear. No wheeze. No rhonchi. Good airflow bilaterally. ABD: Soft. Non tender. No mass. Normal BS. EXT: No edema. Non tender. Pulses present.    NEURO: moving all extremities       Scheduled Meds:   pramipexole  1 mg Oral TID     Continuous Infusions:    CBC with Differential:    Lab Results   Component Value Date    WBC 9.7 01/17/2021    RBC 5.70 01/17/2021    HGB 16.6 01/17/2021    HCT 48.5 01/17/2021     01/17/2021    MCV 85.1 01/17/2021    MCH 29.1 01/17/2021    MCHC 34.2 01/17/2021    RDW 14.7 01/17/2021    SEGSPCT 80 01/01/2014    LYMPHOPCT 40.6 03/09/2019    MONOPCT 7.9 03/09/2019    BASOPCT 0.3 03/09/2019    MONOSABS 0.56 03/09/2019    LYMPHSABS 2.88 03/09/2019    EOSABS 0.34 03/09/2019    BASOSABS 0.02 03/09/2019     CMP:    Lab Results   Component Value Date     01/18/2021    K 2.5 01/18/2021    K 4.4 02/16/2019     01/18/2021    CO2 33 01/18/2021    BUN 5 01/18/2021    CREATININE 0.6 01/18/2021    GFRAA >60 01/18/2021    LABGLOM >60 01/18/2021

## 2021-01-18 NOTE — H&P
ALLERGIES:  BENADRYL, SULFA, CIPRO, and TAPE. MEDICATIONS:  See list.    REVIEW OF SYSTEMS:  No other positive findings except for generalized  weakness. PHYSICAL EXAMINATION:  VITAL SIGNS:  ____ exam was initially 167/100, pulse 99, temperature  97.3, saturation 95%. HEENT:  Normal.  NECK:  Supple. No carotid bruit. CARDIOVASCULAR:  Regular rate and rhythm. RESPIRATORY SYSTEM:  Clear. ABDOMEN:  Soft and nontender. Bowel sounds present. EXTREMITIES:  No edema. Good peripheral pulses. SKIN:  Intact. NEUROLOGICAL:  Chronic weakness in the extremities, more so in the lower  legs. LABORATORY VALUES:  Showed a white cell count of 9.7, hemoglobin 16.6. Sodium 143, potassium 1.9, BUN 9, creatinine 0.7, with a total CK of 894  with a ProBNP of 1021. Lactic acid 2.7. IMAGING DATA:  EKG:  Normal sinus rhythm. No acute changes. CT of the  head and chest x-ray are normal.    IMPRESSION:  1. Nontraumatic rhabdomyolysis. 2.  Hypokalemia. 3.  Hypomagnesemia. 4.  Generalized weakness. 5.  Lactic acidosis. PLAN:  We will admit her to Telemetry. Continue supplementation of  potassium and magnesium. Continue home meds. Lovenox for DVT  prophylaxis. Watch blood sugar morning and evening. We will request  OT/PT for her chronic leg weakness. The patient is stable at this point  and is a full code.         Vero Wood MD    D: 01/17/2021 18:25:08       T: 01/17/2021 22:00:02     [email or web address]  Job#: [id number]     Doc#: [id number]

## 2021-01-18 NOTE — PLAN OF CARE
Problem: Skin Integrity:  Goal: Will show no infection signs and symptoms  Description: Will show no infection signs and symptoms  1/18/2021 0936 by Cal James RN  Outcome: Met This Shift  1/18/2021 0701 by Florian Ross RN  Outcome: Met This Shift  Goal: Absence of new skin breakdown  Description: Absence of new skin breakdown  1/18/2021 0936 by Cal James RN  Outcome: Met This Shift  1/18/2021 0701 by Florian Ross RN  Outcome: Met This Shift     Problem: Falls - Risk of:  Goal: Will remain free from falls  Description: Will remain free from falls  1/18/2021 0936 by Cal James RN  Outcome: Met This Shift  1/18/2021 0701 by Florian Ross RN  Outcome: Met This Shift  Goal: Absence of physical injury  Description: Absence of physical injury  1/18/2021 0701 by Florian Ross RN  Outcome: Met This Shift

## 2021-01-18 NOTE — ED NOTES
Pts IV leaking, unable to save IV site. New IV placed, K+ was held during IV insertion.       Charisma Parks RN  01/18/21 0815

## 2021-01-18 NOTE — ED NOTES
Night time blood pressure ordered for increase blood pressure. Pt states,\"I did not take my blood pressure medicine today. \"     Serge Hills, VANI  01/17/21 [address], RN  01/17/21 6633

## 2021-01-18 NOTE — PATIENT CARE CONFERENCE
P Quality Flow/Interdisciplinary Rounds Progress Note        Quality Flow Rounds held on January 18, 2021    Disciplines Attending:  Bedside Nurse,  and Nursing Unit Leadership    Marla Mendez was admitted on 1/17/2021 12:06 PM    Anticipated Discharge Date:  Expected Discharge Date: 01/21/21    Disposition:    Norris Score:  Norris Scale Score: 21    Readmission Risk              Risk of Unplanned Readmission:        9           Discussed patient goal for the day, patient clinical progression, and barriers to discharge.   The following Goal(s) of the Day/Commitment(s) have been identified:  supplement low potassium      Ting Carney  January 18, 2021

## 2021-01-19 LAB
ANION GAP SERPL CALCULATED.3IONS-SCNC: 10 MMOL/L (ref 7–16)
BUN BLDV-MCNC: 11 MG/DL (ref 8–23)
CALCIUM SERPL-MCNC: 8.5 MG/DL (ref 8.6–10.2)
CHLORIDE BLD-SCNC: 102 MMOL/L (ref 98–107)
CO2: 32 MMOL/L (ref 22–29)
CREAT SERPL-MCNC: 1 MG/DL (ref 0.5–1)
GFR AFRICAN AMERICAN: >60
GFR NON-AFRICAN AMERICAN: 54 ML/MIN/1.73
GLUCOSE BLD-MCNC: 147 MG/DL (ref 74–99)
METER GLUCOSE: 149 MG/DL (ref 74–99)
ORGANISM: ABNORMAL
POTASSIUM SERPL-SCNC: 3.2 MMOL/L (ref 3.5–5)
SODIUM BLD-SCNC: 144 MMOL/L (ref 132–146)
URINE CULTURE, ROUTINE: ABNORMAL

## 2021-01-19 PROCEDURE — 6360000002 HC RX W HCPCS: Performed by: INTERNAL MEDICINE

## 2021-01-19 PROCEDURE — 6370000000 HC RX 637 (ALT 250 FOR IP): Performed by: INTERNAL MEDICINE

## 2021-01-19 PROCEDURE — 2140000000 HC CCU INTERMEDIATE R&B

## 2021-01-19 PROCEDURE — 36415 COLL VENOUS BLD VENIPUNCTURE: CPT

## 2021-01-19 PROCEDURE — 6370000000 HC RX 637 (ALT 250 FOR IP): Performed by: EMERGENCY MEDICINE

## 2021-01-19 PROCEDURE — 80048 BASIC METABOLIC PNL TOTAL CA: CPT

## 2021-01-19 PROCEDURE — 82962 GLUCOSE BLOOD TEST: CPT

## 2021-01-19 PROCEDURE — 2580000003 HC RX 258: Performed by: INTERNAL MEDICINE

## 2021-01-19 RX ORDER — POTASSIUM CHLORIDE AND SODIUM CHLORIDE 900; 300 MG/100ML; MG/100ML
INJECTION, SOLUTION INTRAVENOUS CONTINUOUS
Status: DISCONTINUED | OUTPATIENT
Start: 2021-01-19 | End: 2021-01-20 | Stop reason: HOSPADM

## 2021-01-19 RX ADMIN — CLONIDINE HYDROCHLORIDE 0.2 MG: 0.2 TABLET ORAL at 14:09

## 2021-01-19 RX ADMIN — HYDRALAZINE HYDROCHLORIDE 100 MG: 50 TABLET, FILM COATED ORAL at 14:09

## 2021-01-19 RX ADMIN — HYDRALAZINE HYDROCHLORIDE 100 MG: 50 TABLET, FILM COATED ORAL at 21:16

## 2021-01-19 RX ADMIN — SODIUM CHLORIDE AND POTASSIUM CHLORIDE: 9; 2.98 INJECTION, SOLUTION INTRAVENOUS at 14:09

## 2021-01-19 RX ADMIN — PRAMIPEXOLE DIHYDROCHLORIDE 1 MG: 1 TABLET ORAL at 09:21

## 2021-01-19 RX ADMIN — GABAPENTIN 800 MG: 400 CAPSULE ORAL at 21:16

## 2021-01-19 RX ADMIN — PRAMIPEXOLE DIHYDROCHLORIDE 1 MG: 1 TABLET ORAL at 14:09

## 2021-01-19 RX ADMIN — VERAPAMIL HYDROCHLORIDE 180 MG: 180 TABLET, FILM COATED, EXTENDED RELEASE ORAL at 09:20

## 2021-01-19 RX ADMIN — CLONIDINE HYDROCHLORIDE 0.2 MG: 0.2 TABLET ORAL at 21:16

## 2021-01-19 RX ADMIN — CLONIDINE HYDROCHLORIDE 0.2 MG: 0.2 TABLET ORAL at 09:20

## 2021-01-19 RX ADMIN — GABAPENTIN 800 MG: 400 CAPSULE ORAL at 14:09

## 2021-01-19 RX ADMIN — GABAPENTIN 800 MG: 400 CAPSULE ORAL at 09:20

## 2021-01-19 RX ADMIN — SODIUM CHLORIDE, PRESERVATIVE FREE 10 ML: 5 INJECTION INTRAVENOUS at 09:21

## 2021-01-19 RX ADMIN — VENLAFAXINE HYDROCHLORIDE 150 MG: 150 CAPSULE, EXTENDED RELEASE ORAL at 09:20

## 2021-01-19 RX ADMIN — TRAZODONE HYDROCHLORIDE 100 MG: 50 TABLET ORAL at 21:16

## 2021-01-19 RX ADMIN — VERAPAMIL HYDROCHLORIDE 180 MG: 180 TABLET, FILM COATED, EXTENDED RELEASE ORAL at 21:16

## 2021-01-19 RX ADMIN — POTASSIUM CHLORIDE 40 MEQ: 1500 TABLET, EXTENDED RELEASE ORAL at 09:20

## 2021-01-19 RX ADMIN — LEVOTHYROXINE SODIUM 50 MCG: 0.05 TABLET ORAL at 06:14

## 2021-01-19 RX ADMIN — PRAMIPEXOLE DIHYDROCHLORIDE 1 MG: 1 TABLET ORAL at 21:16

## 2021-01-19 RX ADMIN — POTASSIUM CHLORIDE 40 MEQ: 1500 TABLET, EXTENDED RELEASE ORAL at 16:42

## 2021-01-19 RX ADMIN — HYDRALAZINE HYDROCHLORIDE 100 MG: 50 TABLET, FILM COATED ORAL at 09:20

## 2021-01-19 ASSESSMENT — PAIN SCALES - GENERAL
PAINLEVEL_OUTOF10: 0

## 2021-01-19 NOTE — CARE COORDINATION
SOCIAL WORK/CASEMANAGEMENT TRANSITION OF CARE [id number] Vikki Anthony, [address], [place], [phone]): met with pt and plan remains home with no needs. Offered [facility] and pt declined. Pt said her insurance changed from [employer] and will have her  send her a picture of the new card front and back and she also has medicaid. She will let me know when spouse sends picture over and I will forward it to admissions. Becky Souza  . 1/19/2021

## 2021-01-19 NOTE — PROGRESS NOTES
Admit Date: 1/17/2021    Subjective:     Feels better less weakness     Objective:     Patient Vitals for the past 8 hrs:   BP Temp Temp src Pulse Resp SpO2   01/19/21 0805 130/75 97.2 °F (36.2 °C) Temporal 58 22 92 %   01/19/21 0440 115/65 97.8 °F (36.6 °C) Temporal 63 18 92 %   01/19/21 0029 111/64 98.7 °F (37.1 °C) Temporal 62 18 94 %     I/O last 3 completed shifts: In: 180 [P.O.:180]  Out: 500 [Urine:500]  No intake/output data recorded. HEENT: Normal  NECK: Thyroid normal. No carotid bruit. No lymphphadenopathy. CVS: RRR  RS: Clear. No wheeze. No rhonchi. Good airflow bilaterally. ABD: Soft. Non tender. No mass. Normal BS. EXT: No edema. Non tender. Pulses present.   NEURO: Intact      Scheduled Meds:   cloNIDine  0.2 mg Oral TID    gabapentin  800 mg Oral TID    hydrALAZINE  100 mg Oral TID    levothyroxine  50 mcg Oral Daily    traZODone  100 mg Oral Nightly    venlafaxine  150 mg Oral Daily    verapamil  180 mg Oral BID    potassium chloride  40 mEq Oral BID WC    sodium chloride flush  10 mL Intravenous BID    pramipexole  1 mg Oral TID     Continuous Infusions:    CBC with Differential:    Lab Results   Component Value Date    WBC 9.7 01/17/2021    RBC 5.70 01/17/2021    HGB 16.6 01/17/2021    HCT 48.5 01/17/2021     01/17/2021    MCV 85.1 01/17/2021    MCH 29.1 01/17/2021    MCHC 34.2 01/17/2021    RDW 14.7 01/17/2021    SEGSPCT 80 01/01/2014    LYMPHOPCT 40.6 03/09/2019    MONOPCT 7.9 03/09/2019    BASOPCT 0.3 03/09/2019    MONOSABS 0.56 03/09/2019    LYMPHSABS 2.88 03/09/2019    EOSABS 0.34 03/09/2019    BASOSABS 0.02 03/09/2019     CMP:    Lab Results   Component Value Date     01/19/2021    K 3.2 01/19/2021    K 4.4 02/16/2019     01/19/2021    CO2 32 01/19/2021    BUN 11 01/19/2021    CREATININE 1.0 01/19/2021    GFRAA >60 01/19/2021    LABGLOM 54 01/19/2021    PROT 6.6 01/17/2021    LABALBU 3.6 01/17/2021    LABALBU 2.5 11/01/2010    CALCIUM 8.5 01/19/2021 BILITOT 0.4 01/17/2021    ALKPHOS 99 01/17/2021    AST 25 01/17/2021    ALT 17 01/17/2021     PT/INR:    Lab Results   Component Value Date    PROTIME 10.4 04/11/2015    PROTIME 10.4 12/13/2010    INR 1.0 04/11/2015       Assessment:     Active Problems:    Hypokalemia improving     Rhabdomyolysis     Lactic acidosis     HTN      Plan:   Continue hydration ,K+ replacement ,home next 24 hr if ok

## 2021-01-20 VITALS
BODY MASS INDEX: 32.02 KG/M2 | RESPIRATION RATE: 16 BRPM | WEIGHT: 192.2 LBS | TEMPERATURE: 97.8 F | DIASTOLIC BLOOD PRESSURE: 64 MMHG | HEART RATE: 64 BPM | OXYGEN SATURATION: 94 % | HEIGHT: 65 IN | SYSTOLIC BLOOD PRESSURE: 122 MMHG

## 2021-01-20 LAB
% CKMB: 0 % (ref 0–4)
ANION GAP SERPL CALCULATED.3IONS-SCNC: 5 MMOL/L (ref 7–16)
BUN BLDV-MCNC: 16 MG/DL (ref 8–23)
CALCIUM SERPL-MCNC: 8.4 MG/DL (ref 8.6–10.2)
CHLORIDE BLD-SCNC: 109 MMOL/L (ref 98–107)
CK-BB: 0 % (ref 0–0)
CK-MACRO TYPE I: 0 % (ref 0–0)
CK-MACRO TYPE II: 0 % (ref 0–0)
CK-MM: 100 % (ref 96–100)
CO2: 30 MMOL/L (ref 22–29)
CREAT SERPL-MCNC: 0.8 MG/DL (ref 0.5–1)
GFR AFRICAN AMERICAN: >60
GFR NON-AFRICAN AMERICAN: >60 ML/MIN/1.73
GLUCOSE BLD-MCNC: 169 MG/DL (ref 74–99)
LACTIC ACID: 0.9 MMOL/L (ref 0.5–2.2)
POTASSIUM SERPL-SCNC: 3.8 MMOL/L (ref 3.5–5)
SODIUM BLD-SCNC: 144 MMOL/L (ref 132–146)
TOTAL CK: 1610 U/L (ref 20–180)
TOTAL CK: 733 U/L (ref 20–180)

## 2021-01-20 PROCEDURE — 36415 COLL VENOUS BLD VENIPUNCTURE: CPT

## 2021-01-20 PROCEDURE — 83605 ASSAY OF LACTIC ACID: CPT

## 2021-01-20 PROCEDURE — 6370000000 HC RX 637 (ALT 250 FOR IP): Performed by: EMERGENCY MEDICINE

## 2021-01-20 PROCEDURE — 82550 ASSAY OF CK (CPK): CPT

## 2021-01-20 PROCEDURE — 6370000000 HC RX 637 (ALT 250 FOR IP): Performed by: INTERNAL MEDICINE

## 2021-01-20 PROCEDURE — 80048 BASIC METABOLIC PNL TOTAL CA: CPT

## 2021-01-20 PROCEDURE — 6360000002 HC RX W HCPCS: Performed by: INTERNAL MEDICINE

## 2021-01-20 RX ORDER — POTASSIUM CHLORIDE 20 MEQ/1
40 TABLET, EXTENDED RELEASE ORAL DAILY
Qty: 60 TABLET | Refills: 3 | Status: SHIPPED | OUTPATIENT
Start: 2021-01-20

## 2021-01-20 RX ORDER — LEVOFLOXACIN 500 MG/1
500 TABLET, FILM COATED ORAL DAILY
Qty: 7 TABLET | Refills: 0 | Status: SHIPPED | OUTPATIENT
Start: 2021-01-20 | End: 2021-01-27

## 2021-01-20 RX ADMIN — HYDRALAZINE HYDROCHLORIDE 100 MG: 50 TABLET, FILM COATED ORAL at 08:40

## 2021-01-20 RX ADMIN — POTASSIUM CHLORIDE 40 MEQ: 1500 TABLET, EXTENDED RELEASE ORAL at 08:40

## 2021-01-20 RX ADMIN — VERAPAMIL HYDROCHLORIDE 180 MG: 180 TABLET, FILM COATED, EXTENDED RELEASE ORAL at 08:40

## 2021-01-20 RX ADMIN — SODIUM CHLORIDE AND POTASSIUM CHLORIDE: 9; 2.98 INJECTION, SOLUTION INTRAVENOUS at 05:07

## 2021-01-20 RX ADMIN — VENLAFAXINE HYDROCHLORIDE 150 MG: 150 CAPSULE, EXTENDED RELEASE ORAL at 08:40

## 2021-01-20 RX ADMIN — CLONIDINE HYDROCHLORIDE 0.2 MG: 0.2 TABLET ORAL at 08:40

## 2021-01-20 RX ADMIN — LEVOTHYROXINE SODIUM 50 MCG: 0.05 TABLET ORAL at 06:23

## 2021-01-20 RX ADMIN — GABAPENTIN 800 MG: 400 CAPSULE ORAL at 08:41

## 2021-01-20 RX ADMIN — PRAMIPEXOLE DIHYDROCHLORIDE 1 MG: 1 TABLET ORAL at 08:40

## 2021-01-20 ASSESSMENT — PAIN SCALES - GENERAL: PAINLEVEL_OUTOF10: 0

## 2021-01-20 NOTE — PROGRESS NOTES
Admit Date: 1/17/2021    Subjective:     Feels good no complaint     Objective:     Patient Vitals for the past 8 hrs:   BP Temp Temp src Pulse Resp SpO2   01/20/21 0403 124/62 97.3 °F (36.3 °C) Temporal 61 14 95 %     I/O last 3 completed shifts: In: 1365.7 [P.O.:660; I.V.:705.7]  Out: 1000 [Urine:1000]  No intake/output data recorded. HEENT: Normal  NECK: Thyroid normal. No carotid bruit. No lymphphadenopathy. CVS: RRR  RS: Clear. No wheeze. No rhonchi. Good airflow bilaterally. ABD: Soft. Non tender. No mass. Normal BS. EXT: No edema. Non tender. Pulses present.    NEURO: no focal deficit       Scheduled Meds:   cloNIDine  0.2 mg Oral TID    gabapentin  800 mg Oral TID    hydrALAZINE  100 mg Oral TID    levothyroxine  50 mcg Oral Daily    traZODone  100 mg Oral Nightly    venlafaxine  150 mg Oral Daily    verapamil  180 mg Oral BID    potassium chloride  40 mEq Oral BID WC    sodium chloride flush  10 mL Intravenous BID    pramipexole  1 mg Oral TID     Continuous Infusions:   0.9% NaCl with KCl 40 mEq 75 mL/hr at 01/20/21 0507       CBC with Differential:    Lab Results   Component Value Date    WBC 9.7 01/17/2021    RBC 5.70 01/17/2021    HGB 16.6 01/17/2021    HCT 48.5 01/17/2021     01/17/2021    MCV 85.1 01/17/2021    MCH 29.1 01/17/2021    MCHC 34.2 01/17/2021    RDW 14.7 01/17/2021    SEGSPCT 80 01/01/2014    LYMPHOPCT 40.6 03/09/2019    MONOPCT 7.9 03/09/2019    BASOPCT 0.3 03/09/2019    MONOSABS 0.56 03/09/2019    LYMPHSABS 2.88 03/09/2019    EOSABS 0.34 03/09/2019    BASOSABS 0.02 03/09/2019     CMP:    Lab Results   Component Value Date     01/20/2021    K 3.8 01/20/2021    K 4.4 02/16/2019     01/20/2021    CO2 30 01/20/2021    BUN 16 01/20/2021    CREATININE 0.8 01/20/2021    GFRAA >60 01/20/2021    LABGLOM >60 01/20/2021    PROT 6.6 01/17/2021    LABALBU 3.6 01/17/2021    LABALBU 2.5 11/01/2010    CALCIUM 8.4 01/20/2021    BILITOT 0.4 01/17/2021    ALKPHOS 99 01/17/2021    AST 25 01/17/2021    ALT 17 01/17/2021     PT/INR:    Lab Results   Component Value Date    PROTIME 10.4 04/11/2015    PROTIME 10.4 12/13/2010    INR 1.0 04/11/2015     Urine E coli     Assessment:     Active Problems:    Hypokalemia resolved     Lactic acidosis resolved     Rhabdomyolysis     UTI    Plan:   Stable will discharge

## 2021-01-20 NOTE — CARE COORDINATION
SOCIAL WORK/CASEMANAGEMENT TRANSITION OF CARE [id number] [place], [address], [place], [phone]): home today. No needs per pt. Provided pt with [place] billing number to call with new insurance information.  Lev Burnette  1/20/2021

## 2021-01-20 NOTE — PATIENT CARE CONFERENCE
P Quality Flow/Interdisciplinary Rounds Progress Note        Quality Flow Rounds held on January 20, 2021    Disciplines Attending:  Bedside Nurse, ,  and Nursing Unit Leadership    Oscar El was admitted on 1/17/2021 12:06 PM    Anticipated Discharge Date:  Expected Discharge Date: 01/21/21    Disposition:    Norris Score:  Norris Scale Score: 21    Readmission Risk              Risk of Unplanned Readmission:        11           Discussed patient goal for the day, patient clinical progression, and barriers to discharge.   The following Goal(s) of the Day/Commitment(s) have been identified: [address]  January 20, 2021

## 2021-01-21 NOTE — DISCHARGE SUMMARY
01/17/2021Value: 14.7        Ref range: 11.5 - 15.0 fL     Status: FinalPlatelets                                     Date: 01/17/2021Value: 215         Ref range: 130 - 450 E9/L     Status: FinalMPV                                           Date: 01/17/2021Value: 10.7        Ref range: 7.0 - 12.0 fL      Status: FinalSodium                                        Date: 01/17/2021Value: 143         Ref range: 132 - 146 mmol/L   Status: FinalPotassium                                     Date: 01/17/2021Value: 1.9*        Ref range: 3.5 - 5.0 mmol/L   Status: FinalChloride                                      Date: 01/17/2021Value: 97*         Ref range: 98 - 107 mmol/L    Status: FinalCO2                                           Date: 01/17/2021Value: 31*         Ref range: 22 - 29 mmol/L     Status: FinalAnion Gap                                     Date: 01/17/2021Value: 15          Ref range: 7 - 16 mmol/L      Status: FinalGlucose                                       Date: 01/17/2021Value: 176*        Ref range: 74 - 99 mg/dL      Status: FinalBUN                                           Date: 01/17/2021Value: 9           Ref range: 8 - 23 mg/dL       Status: FinalCREATININE                                    Date: 01/17/2021Value: 0.7         Ref range: 0.5 - 1.0 mg/dL    Status: FinalGFR Non-                      Date: 01/17/2021Value: >60         Ref range: >=60 mL/min/1.73   Status: Final              Comment: Chronic Kidney Disease: less than 60 ml/min/1.73 sq.m. Kidney Failure: less than 15 ml/min/1.73 sq. m. Results valid for patients 18 years and older. GFR                           Date: 01/17/2021Value: >60           Status: FinalCalcium                                       Date: 01/17/2021Value: 9.1         Ref range: 8.6 - 10.2 mg/dL   Status: FinalTotal Protein                                 Date: 01/17/2021Value: 6.6         Ref range: 6.4 - 8.3 g/dL Status: FinalAlb                                           Date: 01/17/2021Value: 3.6         Ref range: 3.5 - 5.2 g/dL     Status: FinalTotal Bilirubin                               Date: 01/17/2021Value: 0.4         Ref range: 0.0 - 1.2 mg/dL    Status: FinalAlkaline Phosphatase                          Date: 01/17/2021Value: 99          Ref range: 35 - 104 U/L       Status: FinalALT                                           Date: 01/17/2021Value: 17          Ref range: 0 - 32 U/L         Status: FinalAST                                           Date: 01/17/2021Value: 25          Ref range: 0 - 31 U/L         Status: Final              Comment: Specimen is slightly Hemolyzed. Result may be artificially increased. Troponin                                      Date: 01/17/2021Value: <0.01       Ref range: 0.00 - 0.03 ng/mL  Status: Final              Comment: TROPONIN T BLOOD LEVELS:       0.03 ng/mL     Upper Reference Limit0. 04 - 0.09 ng/mL     Possible myocardial injury    >= 0.10 ng/mL     Myocardial injuryTotal CK                                      Date: 01/17/2021Value: 894*        Ref range: 20 - 180 U/L       Status: FinalPro-BNP                                       Date: 01/17/2021Value: 1,021*      Ref range: 0 - 125 pg/mL      Status: FinalVentricular Rate                              Date: 01/17/2021Value: 95          Ref range: BPM                Status: FinalAtrial Rate                                   Date: 01/17/2021Value: 95          Ref range: BPM                Status: FinalP-R Interval                                  Date: 01/17/2021Value: 154         Ref range: ms                 Status: FinalQRS Duration                                  Date: 01/17/2021Value: 102         Ref range: ms                 Status: FinalQ-T Interval                                  Date: 01/17/2021Value: 432         Ref range: ms                 Status: FinalQTc Calculation (Bazett)                      Date: Date: 01/17/2021Value: Negative    Ref range: Negative mg/dL     Status: FinalSpecific Gravity, UA                          Date: 01/17/2021Value: 1.010       Ref range: 1.005 - 1.030      Status: FinalBlood, Urine                                  Date: 01/17/2021Value: TRACE-INTACT                   Ref range: Negative           Status: FinalpH, UA                                        Date: 01/17/2021Value: 5.5         Ref range: 5.0 - 9.0          Status: FinalProtein, UA                                   Date: 01/17/2021Value: Negative    Ref range: Negative mg/dL     Status: FinalUrobilinogen, Urine                           Date: 01/17/2021Value: 0.2         Ref range: <2.0 E.U./dL       Status: FinalNitrite, Urine                                Date: 01/17/2021Value: Negative    Ref range: Negative           Status: FinalLeukocyte Esterase, Urine                     Date: 01/17/2021Value: TRACE*      Ref range: Negative           Status: FinalMagnesium                                     Date: 01/17/2021Value: 1.1*        Ref range: 1.6 - 2.6 mg/dL    Status: [address] [facility], UA                                       Date: 01/17/2021Value: 5-10*       Ref range: 0 - 5 /HPF         Status: FinalRBC, UA                                       Date: 01/17/2021Value: 0-1         Ref range: 0 - 2 /HPF         Status: FinalBacteria, UA                                  Date: 01/17/2021Value: MANY*       Ref range: None Seen /HPF     Status: FinalSodium                                        Date: 01/18/2021Value: 145         Ref range: 132 - 146 mmol/L   Status: FinalPotassium                                     Date: 01/18/2021Value: 2.5*        Ref range: 3.5 - 5.0 mmol/L   Status: FinalChloride                                      Date: 01/18/2021Value: 102         Ref range: 98 - 107 mmol/L    Status: FinalCO2                                           Date: 01/18/2021Value: 33*         Ref range: 22 - 29 mmol/L     Status: FinalAnion Gap                                     Date: 01/18/2021Value: 10          Ref range: 7 - 16 mmol/L      Status: FinalGlucose                                       Date: 01/18/2021Value: 148*        Ref range: 74 - 99 mg/dL      Status: FinalBUN                                           Date: 01/18/2021Value: 5*          Ref range: 8 - 23 mg/dL       Status: FinalCREATININE                                    Date: 01/18/2021Value: 0.6         Ref range: 0.5 - 1.0 mg/dL    Status: FinalGFR Non-                      Date: 01/18/2021Value: >60         Ref range: >=60 mL/min/1.73   Status: Final              Comment: Chronic Kidney Disease: less than 60 ml/min/1.73 sq.m. Kidney Failure: less than 15 ml/min/1.73 sq. m. Results valid for patients 18 years and older. GFR                           Date: 01/18/2021Value: >60           Status: FinalCalcium                                       Date: 01/18/2021Value: 8.6         Ref range: 8.6 - 10.2 mg/dL   Status: FinalCK-MM                                         Date: 01/18/2021Value: 100         Ref range: 96 - 100 %         Status: Final% CKMB                                        Date: 01/18/2021Value: 0           Ref range: 0 - 4 %            Status: FinalCK-BB                                         Date: 01/18/2021Value: 0           Ref range: 0 - 0 %            Status: FinalTotal CK                                      Date: 01/18/2021Value: 1,610*      Ref range: 20 - 180 U/L       Status: Final              Comment: REFERENCE INTERVAL: CK TotalAccess complete set of age- and/or gender-specific reference intervalsforthis test in the [facility] [employer] [facility] Test Directory ([email or web address]). CK-Macro Type I                               Date: 01/18/2021Value: 0           Ref range: 0 - 0 %            Status: FinalCK-Macro Type II                              Date: 01/18/2021Value: 0           Ref range: 0 - 0 %            Status: Final              Comment: Performed by [facility] [id number], [id number] [facility] [phone]. Mitchell Alvarez MD - Lab. DirectorPotassium                                     Date: 01/17/2021Value: 1.8*        Ref range: 3.5 - 5.0 mmol/L   Status: FinalPotassium                                     Date: 01/17/2021Value: 2.2*        Ref range: 3.5 - 5.0 mmol/L   Status: FinalPotassium                                     Date: 01/18/2021Value: 7.6*        Ref range: 3.5 - 5.0 mmol/L   Status: FinalVentricular Rate                              Date: 01/18/2021Value: 79          Ref range: BPM                Status: FinalAtrial Rate                                   Date: 01/18/2021Value: 79          Ref range: BPM                Status: FinalP-R Interval                                  Date: 01/18/2021Value: 156         Ref range: ms                 Status: FinalQRS Duration                                  Date: 01/18/2021Value: 106         Ref range: ms                 Status: FinalQ-T Interval                                  Date: 01/18/2021Value: 346         Ref range: ms                 Status: FinalQTc Calculation (Bazett)                      Date: 01/18/2021Value: 396         Ref range: ms                 Status: FinalP Axis                                        Date: 01/18/2021Value: 78          Ref range: degrees            Status: FinalR Axis                                        Date: 01/18/2021Value: -53         Ref range: degrees            Status: FinalT Axis                                        Date: 01/18/2021Value: 81          Ref range: degrees            Status: FinalPOC Sodium                                    Date: 01/18/2021Value: 145         Ref range: 132 - 146 mmol/L   Status: Final              Comment: ALL POC CRITICAL VALUES MUST BE COMMUNICATED BY TESTINGPERSONNEL PER DEPARTMENT POLICY. POC Potassium                                 Date: 01/18/2021Value: 2.2*        Ref range: 3.5 - 5.0 mmol/L   Status: FinalPOC Chloride                                  Date: 01/18/2021Value: 101         Ref range: 100 - 108 mmol/L   Status: FinalPOC Glucose                                   Date: 01/18/2021Value: 155*        Ref range: 74 - 99 mg/dl      Status: FinalPOC Creatinine                                Date: 01/18/2021Value: 0.5         Ref range: 0.5 - 1.0 mg/dL    Status: FinalGFR Non-                      Date: 01/18/2021Value: >60         Ref range: >=60 mL/min/1.73   Status: Final              Comment: Chronic Kidney Disease: less than 60 ml/min/1.73 sq.m. Kidney Failure: less than 15 ml/min/1.73 sq. m. Results valid for patients 18 years and older. GFR                           Date: 01/18/2021Value: >60           Status: FinalPerformed on                                  Date: 01/18/2021Value: SEE BELOW     Status: Final              Comment: Performed on POCCritical:Potassium                                     Date: 01/18/2021Value: 2.3*        Ref range: 3.5 - 5.0 mmol/L   Status: FinalSodium                                        Date: 01/19/2021Value: 144         Ref range: 132 - 146 mmol/L   Status: FinalPotassium                                     Date: 01/19/2021Value: 3.2*        Ref range: 3.5 - 5.0 mmol/L   Status: FinalChloride                                      Date: 01/19/2021Value: 102         Ref range: 98 - 107 mmol/L    Status: FinalCO2                                           Date: 01/19/2021Value: 32*         Ref range: 22 - 29 mmol/L     Status: FinalAnion Gap                                     Date: 01/19/2021Value: 10          Ref range: 7 - 16 mmol/L      Status: FinalGlucose                                       Date: 01/19/2021Value: 147*        Ref range: 74 - 99 mg/dL      Status: FinalBUN                                           Date: 01/19/2021Value: 11          Ref range: 8 - 23 mg/dL       Status: FinalCREATININE                                    Date: 01/19/2021Value: 1.0         Ref range: 0.5 - 1.0 mg/dL    Status: FinalGFR Non-                      Date: 01/19/2021Value: 54          Ref range: >=60 mL/min/1.73   Status: Final              Comment: Chronic Kidney Disease: less than 60 ml/min/1.73 sq.m. Kidney Failure: less than 15 ml/min/1.73 sq. m. Results valid for patients 18 years and older. GFR                           Date: 01/19/2021Value: >60           Status: FinalCalcium                                       Date: 01/19/2021Value: 8.5*        Ref range: 8.6 - 10.2 mg/dL   Status: FinalSodium                                        Date: 01/20/2021Value: 144         Ref range: 132 - 146 mmol/L   Status: FinalPotassium                                     Date: 01/20/2021Value: 3.8         Ref range: 3.5 - 5.0 mmol/L   Status: FinalChloride                                      Date: 01/20/2021Value: 109*        Ref range: 98 - 107 mmol/L    Status: FinalCO2                                           Date: 01/20/2021Value: 30*         Ref range: 22 - 29 mmol/L     Status: FinalAnion Gap                                     Date: 01/20/2021Value: 5*          Ref range: 7 - 16 mmol/L      Status: FinalGlucose                                       Date: 01/20/2021Value: 169*        Ref range: 74 - 99 mg/dL      Status: FinalBUN                                           Date: 01/20/2021Value: 16          Ref range: 8 - 23 mg/dL       Status: FinalCREATININE                                    Date: 01/20/2021Value: 0.8         Ref range: 0.5 - 1.0 mg/dL    Status: FinalGFR Non-                      Date: 01/20/2021Value: >60         Ref range: >=60 mL/min/1.73   Status: Final              Comment: Chronic Kidney Disease: less than 60 ml/min/1.73 sq.m. Kidney Failure: less than 15 ml/min/1.73 sq. m. Results valid for patients 18 years and older. GFR                           Date: 01/20/2021Value: >60           Status: FinalCalcium                                       Date: 01/20/2021Value: 8.4*        Ref range: 8.6 - 10.2 mg/dL   Status: FinalLactic Acid                                   Date: 01/20/2021Value: 0.9         Ref range: 0.5 - 2.2 mmol/L   Status: FinalTotal CK                                      Date: 01/20/2021Value: 733*        Ref range: 20 - 180 U/L       Status: FinalMeter Glucose                                 Date: 01/19/2021Value: 149*        Ref range: 74 - 99 mg/dL      Status: Final------------    Radiology last 7 days:  Ct Head Wo ContrastResult Date: 1/17/2021No acute intracranial abnormality. Xr Chest PortableResult Date: 1/17/2021Stable chest.     Pending Labs   Order Current Status  Culture, Blood 1 Preliminary result  Culture, Blood 2 Preliminary result      Discharge Medications    Discharge Medication List as of 1/20/2021  9:39 AMSTART taking these medicationspotassium chloride (KLOR-CON M) 20 MEQ extended release tabletTake 2 tablets by mouth daily, Disp-60 tablet, R-3NormallevoFLOXacin (LEVAQUIN) 500 MG tabletTake 1 tablet by mouth daily for 7 days, Disp-7 tablet, R-0Normal    Discharge Medication List as of 1/20/2021  9:39 AM    Discharge Medication List as of 1/20/2021  9:39 AMCONTINUE these medications which have NOT CHANGEDgabapentin (NEURONTIN) 800 MG tabletTake 800 mg by mouth 3 times daily. Vonzella Goodpasture Historical MedtraZODone (DESYREL) 100 MG tabletTake 100 mg by mouth nightlyHistorical Medpramipexole (MIRAPEX) 1 MG tabletTake 1 mg by mouth 2 times dailyHistorical Medfurosemide (LASIX) 40 MG tabletTake 40 mg by mouth daily Historical Medverapamil (VERELAN) 180 MG CR capsuleTake 180 mg by mouth 2 times daily. Historical Medlevothyroxine (SYNTHROID) 50 MCG tabletTake 50 mcg by mouth Daily. Historical MedcloNIDine (CATAPRES) 0.2 MG tabletTake 0.2 mg by mouth 3 times daily Historical MedhydrALAZINE (APRESOLINE) 100 MG tabletTake 100 mg by mouth 3 times daily. Historical Medvenlafaxine (EFFEXOR-XR) 150 MG XR capsuleTake 150 mg by mouth daily. Historical MedLORazepam (ATIVAN) 1 MG tabletTake 1 mg by mouth every 8 hours as needed. Historical Med    Discharge Medication List as of 1/20/2021  9:39 AMSTOP taking these medicationsibuprofen (ADVIL;MOTRIN) 800 MG tabletComments:Reason for Stopping:albuterol (PROVENTIL) (2.5 MG/3ML) 0.083% nebulizer solutionComments:Reason for Stopping:potassium chloride (MICRO-K) 10 MEQ CR capsuleComments:Reason for Stopping:    Time Spent on Discharge:3E] minutes were spent in patient examination, evaluation, counseling as well as medication reconciliation, prescriptions for required medications, discharge plan, and follow up.     Electronically signed by Cheri Kothari MD on 1/21/21 at 11:22 AM EST

## 2021-01-22 LAB
BLOOD CULTURE, ROUTINE: NORMAL
CULTURE, BLOOD 2: NORMAL

## 2021-02-18 ENCOUNTER — HOSPITAL ENCOUNTER (OUTPATIENT)
Age: 76
Discharge: HOME | End: 2021-02-18
Payer: MEDICARE

## 2021-02-18 DIAGNOSIS — M96.1 POSTLAMINECTOMY SYNDROME, CERVICAL REGION: ICD-10-CM

## 2021-02-18 LAB
ANION GAP SERPL CALCULATED.3IONS-SCNC: 8 MMOL/L (ref 7–16)
APTT: 32 SEC (ref 24.5–35.1)
BACTERIA: ABNORMAL /HPF
BILIRUBIN URINE: NEGATIVE
BLOOD, URINE: NEGATIVE
BUN BLDV-MCNC: 20 MG/DL (ref 8–23)
CALCIUM SERPL-MCNC: 9.3 MG/DL (ref 8.6–10.2)
CHLORIDE BLD-SCNC: 104 MMOL/L (ref 98–107)
CLARITY: CLEAR
CO2: 28 MMOL/L (ref 22–29)
COLOR: YELLOW
CREAT SERPL-MCNC: 1.1 MG/DL (ref 0.5–1)
GFR AFRICAN AMERICAN: 59
GFR NON-AFRICAN AMERICAN: 49 ML/MIN/1.73
GLUCOSE BLD-MCNC: 106 MG/DL (ref 74–99)
GLUCOSE URINE: NEGATIVE MG/DL
HCT VFR BLD CALC: 48.2 % (ref 34–48)
HEMOGLOBIN: 15.4 G/DL (ref 11.5–15.5)
INR BLD: 1
KETONES, URINE: NEGATIVE MG/DL
LEUKOCYTE ESTERASE, URINE: ABNORMAL
MCH RBC QN AUTO: 29.6 PG (ref 26–35)
MCHC RBC AUTO-ENTMCNC: 32 % (ref 32–34.5)
MCV RBC AUTO: 92.5 FL (ref 80–99.9)
NITRITE, URINE: NEGATIVE
PDW BLD-RTO: 15.3 FL (ref 11.5–15)
PH UA: 5 (ref 5–9)
PLATELET # BLD: 268 E9/L (ref 130–450)
PMV BLD AUTO: 9.7 FL (ref 7–12)
POTASSIUM SERPL-SCNC: 5 MMOL/L (ref 3.5–5)
PROTEIN UA: NEGATIVE MG/DL
PROTHROMBIN TIME: 11.4 SEC (ref 9.3–12.4)
RBC # BLD: 5.21 E12/L (ref 3.5–5.5)
RBC UA: ABNORMAL /HPF (ref 0–2)
SODIUM BLD-SCNC: 140 MMOL/L (ref 132–146)
SPECIFIC GRAVITY UA: 1.01 (ref 1–1.03)
UROBILINOGEN, URINE: 0.2 E.U./DL
WBC # BLD: 9.3 E9/L (ref 4.5–11.5)
WBC UA: ABNORMAL /HPF (ref 0–5)

## 2021-02-18 PROCEDURE — 36415 COLL VENOUS BLD VENIPUNCTURE: CPT

## 2021-02-18 PROCEDURE — 85027 COMPLETE CBC AUTOMATED: CPT

## 2021-02-18 PROCEDURE — 81001 URINALYSIS AUTO W/SCOPE: CPT

## 2021-02-18 PROCEDURE — 85610 PROTHROMBIN TIME: CPT

## 2021-02-18 PROCEDURE — 85730 THROMBOPLASTIN TIME PARTIAL: CPT

## 2021-02-18 PROCEDURE — 80048 BASIC METABOLIC PNL TOTAL CA: CPT

## 2021-02-18 PROCEDURE — 87088 URINE BACTERIA CULTURE: CPT

## 2021-02-19 ENCOUNTER — HOSPITAL ENCOUNTER (OUTPATIENT)
Age: 76
Discharge: HOME | End: 2021-02-21
Payer: MEDICARE

## 2021-02-19 DIAGNOSIS — M96.1 POSTLAMINECTOMY SYNDROME, CERVICAL REGION: ICD-10-CM

## 2021-02-19 PROCEDURE — U0003 INFECTIOUS AGENT DETECTION BY NUCLEIC ACID (DNA OR RNA); SEVERE ACUTE RESPIRATORY SYNDROME CORONAVIRUS 2 (SARS-COV-2) (CORONAVIRUS DISEASE [COVID-19]), AMPLIFIED PROBE TECHNIQUE, MAKING USE OF HIGH THROUGHPUT TECHNOLOGIES AS DESCRIBED BY CMS-2020-01-R: HCPCS

## 2021-02-20 LAB
SARS-COV-2: NOT DETECTED
SOURCE: NORMAL
URINE CULTURE, ROUTINE: NORMAL

## 2021-02-23 ENCOUNTER — ANESTHESIA EVENT (OUTPATIENT)
Dept: OPERATING ROOM | Age: 76
End: 2021-02-23
Payer: MEDICARE

## 2021-02-23 ASSESSMENT — ENCOUNTER SYMPTOMS: SHORTNESS OF BREATH: 1

## 2021-02-23 ASSESSMENT — LIFESTYLE VARIABLES: SMOKING_STATUS: 0

## 2021-02-23 NOTE — ANESTHESIA PRE PROCEDURE
Department of Anesthesiology  Preprocedure Note       Name:  Sherman Whaley   Age:  70 y.o.  :  1945                                          MRN:  [id number]         Date:  2021      Surgeon: Irene Stone):  Tova Davies DO    Procedure: Procedure(s):  SURGICAL REMOVAL OF LUMBAR MEDTRONIC SPINAL CORD STIMULATOR (STIMULATOR BATTERY LEFT HIP AREA) (CPT 54449)    Medications prior to admission:   Prior to Admission medications    Medication Sig Start Date End Date Taking? Authorizing Provider   potassium chloride (KLOR-CON M) 20 MEQ extended release tablet Take 2 tablets by mouth daily 21   Marisela Payne MD   gabapentin (NEURONTIN) 800 MG tablet Take 800 mg by mouth 3 times daily. Grisel Edward Historical Provider, MD   traZODone (DESYREL) 100 MG tablet Take 100 mg by mouth nightly    Historical Provider, MD   pramipexole (MIRAPEX) 1 MG tablet Take 1 mg by mouth 2 times daily    Historical Provider, MD   furosemide (LASIX) 40 MG tablet Take 40 mg by mouth daily     Historical Provider, MD   verapamil (VERELAN) 180 MG CR capsule Take 180 mg by mouth 2 times daily. Historical Provider, MD   levothyroxine (SYNTHROID) 50 MCG tablet Take 50 mcg by mouth Daily. Historical Provider, MD   cloNIDine (CATAPRES) 0.2 MG tablet Take 0.2 mg by mouth 3 times daily     Historical Provider, MD   hydrALAZINE (APRESOLINE) 100 MG tablet Take 100 mg by mouth 3 times daily. Historical Provider, MD   venlafaxine (EFFEXOR-XR) 150 MG XR capsule Take 150 mg by mouth daily. Historical Provider, MD   LORazepam (ATIVAN) 1 MG tablet Take 1 mg by mouth every 8 hours as needed. Historical Provider, MD       Current medications:    No current facility-administered medications for this encounter.       Current Outpatient Medications   Medication Sig Dispense Refill    potassium chloride (KLOR-CON M) 20 MEQ extended release tablet Take 2 tablets by mouth daily 60 tablet 3    gabapentin (NEURONTIN) 800 MG tablet Take 800 mg by mouth 3 times daily. Franck Octavio traZODone (DESYREL) 100 MG tablet Take 100 mg by mouth nightly      pramipexole (MIRAPEX) 1 MG tablet Take 1 mg by mouth 2 times daily      furosemide (LASIX) 40 MG tablet Take 40 mg by mouth daily       verapamil (VERELAN) 180 MG CR capsule Take 180 mg by mouth 2 times daily.  levothyroxine (SYNTHROID) 50 MCG tablet Take 50 mcg by mouth Daily.  cloNIDine (CATAPRES) 0.2 MG tablet Take 0.2 mg by mouth 3 times daily       hydrALAZINE (APRESOLINE) 100 MG tablet Take 100 mg by mouth 3 times daily.  venlafaxine (EFFEXOR-XR) 150 MG XR capsule Take 150 mg by mouth daily.  LORazepam (ATIVAN) 1 MG tablet Take 1 mg by mouth every 8 hours as needed. Allergies:     Allergies   Allergen Reactions    Benadryl [Diphenhydramine] Hives     Tongue Swells    Other      benzodine  Tongue Swells    Sulfa Antibiotics Hives     Tongue Swells    Ciprofloxacin Hives    Tape Mallory Camps Tape] Itching       Problem List:    Patient Active Problem List   Diagnosis Code    Chronic pain G89.29    Neural foraminal stenosis of lumbar spine M99.73    Fibromyalgia M79.7    Lumbago M54.5    Peripheral neuropathy G62.9    Chest pain R07.9    SOB (shortness of breath) on exertion R06.02    HTN (hypertension) I10    DM (diabetes mellitus) ([facility]) E11.9    Dyslipidemia E78.5    Obesity E66.9    History of breast cancer Z85.3    H/O mastectomy Z90.10    Lung cancer, upper lobe ([facility]) C34.10    S/P lobectomy of lung Z90.2    History of kidney cancer Z85.528    H/O unilateral nephrectomy Z90.5    COPD (chronic obstructive pulmonary disease) ([facility]) J44.9    DDD (degenerative disc disease), lumbosacral M51.37    Chronic low back pain M54.5, G89.29    Anxiety F41.9    Depression F32.9    Fatty liver K76.0    Thoracic ascending aortic aneurysm ([facility]) I71.2    Neuropathic pain syndrome (non-herpetic) M79.2    Protruded lumbar disc M51.26    DDD (degenerative disc disease), lumbar M51.36    Lumbar radiculopathy M54.16    Acute kidney injury ([address] N17.9    Acute respiratory failure with hypercapnia (HCC) J96.02    Acute respiratory failure (HCC) J96.00    Hypokalemia E87.6       Past Medical History:        Diagnosis Date    Anemia     S/P chemotherapy.  Anxiety     Arthritis     With DJD of the lumbar spine with L4/L5 and L5/S1 radiculopathy with bilateral lower extremity pain, left more than right.  Asthma     Bipolar 1 disorder ([address]     Cancer ([facility]     lung/ kidney    Cancer of breast, female ([facility] 1/2006    S/P bilaeral mastectomy with left breast lymph node resection and chemotherapy.  CHF [facility]congestive heart failure) ([facility])     Chronic back pain     Depression     Depression with anxiety     Diabetes mellitus ([facility]     Resolved after losing 130 lbs. after gastric bypass surgery.  Dyslipidemia     Fibromyalgia     History of blood transfusion     anemia    Hypertension     Hypothyroidism     Neuropathy     Pericardial effusion 4/2/2010    Subxiphoid pericardial window with drainage of pericardial effusion and pericardial biopsy:  Fluid and biopsy negative for metastases.  Pleural effusion 5/2/2010    Noted on chest x-ray.  TIA (transient ischemic attack)     Tobacco abuse     Patient smoked 3 ppd x 26 years. Quit in 1995.  Type II or unspecified type diabetes mellitus without mention of complication, not stated as uncontrolled        Past Surgical History:        Procedure Laterality Date    BREAST SURGERY      CATARACT REMOVAL WITH IMPLANT Bilateral     CHOLECYSTECTOMY      GASTRIC BYPASS SURGERY  9/2004    HERNIA REPAIR      HYSTERECTOMY      KIDNEY REMOVAL Left 2/2006    Cancer.  KIDNEY REMOVAL      left    KNEE SURGERY Right     arthroscopy    LIVER BIOPSY  2006    Fatty tumor.  LUNG REMOVAL, PARTIAL      For lung CA, thought to be metastatic from breast cancer.  left upper lobe     MASTECTOMY Bilateral     NERVE BLOCK 140 02/18/2021    K 5.0 02/18/2021    K 4.4 02/16/2019     02/18/2021    CO2 28 02/18/2021    BUN 20 02/18/2021    CREATININE 1.1 02/18/2021    GFRAA 59 02/18/2021    LABGLOM 49 02/18/2021    GLUCOSE 106 02/18/2021    GLUCOSE 134 12/13/2010    PROT 6.6 01/17/2021    CALCIUM 9.3 02/18/2021    BILITOT 0.4 01/17/2021    ALKPHOS 99 01/17/2021    AST 25 01/17/2021    ALT 17 01/17/2021       POC Tests: No results for input(s): POCGLU, POCNA, POCK, POCCL, POCBUN, POCHEMO, POCHCT in the last 72 hours. Coags:   Lab Results   Component Value Date    PROTIME 11.4 02/18/2021    PROTIME 10.4 12/13/2010    INR 1.0 02/18/2021    APTT 32.0 02/18/2021       HCG (If Applicable): No results found for: PREGTESTUR, PREGSERUM, HCG, HCGQUANT     ABGs: No results found for: PHART, PO2ART, GTM2CQW, OCV2RER, BEART, L5LYEKEB     Type & Screen (If Applicable):  No results found for: LABABO, LABRH    Drug/Infectious Status (If Applicable):  No results found for: HIV, HEPCAB    COVID-19 Screening (If Applicable):   Lab Results   Component Value Date    COVID19 Not Detected 02/19/2021         Anesthesia Evaluation  Patient summary reviewed   history of anesthetic complications (PONV with general anesthesia): PONV.   Airway: Mallampati: II        Dental:    (+) upper dentures and lower dentures      Pulmonary: breath sounds clear to auscultation  (+) COPD:  shortness of breath:  asthma:     (-) not a current smoker                          ROS comment: Lung cancer, upper lobe (HCC)  S/P lobectomy of lung       Cardiovascular:  Exercise tolerance: poor (<4 METS),   (+) hypertension:, CHF:, TANG:, hyperlipidemia        Rhythm: regular  Rate: normal           Beta Blocker:  Not on Beta Blocker         Neuro/Psych:   (+) neuromuscular disease (lumbar radiculopathy):, TIA, psychiatric history:depression/anxiety             GI/Hepatic/Renal:   (+) liver disease (fatty liver):,      (-) no morbid obesity      ROS comment: History of kidney cancer  H/O unilateral nephrectomy    S/p gastric bypass. Endo/Other:    (+) Diabetes ( not a problem now with weight loss)Type II DM, , hypothyroidism: arthritis:., malignancy/cancer (hx. lung cancer, hx. kidney cancer). ROS comment: S/P bilaeral mastectomy with left breast lymph node resection and chemotherapy. Abdominal:           Vascular:   + PVD, aortic or cerebral ( Thoracic ascending aortic aneurysm ([address]), . Anesthesia Plan      MAC     ASA 3     (Medical clearance on chart-reviewed)  Induction: intravenous. PAT Chart Review:  Chart reviewed per routine by Cesar Scott MD.  Above represents information available via shared medical record including previous anesthesia history, drug and allergy history. Confirmation of above and final plan per Day of Surgery (DOS) anesthesiologist.        Cesar Scott MD   2/23/2021        --------DOS anesthesiologist addendum----------  Patient seen and evaluated. Plan for MAC anesthetic discussed with patient. All patient's questions were answered to her satisfaction. Patient consents to and agrees to [facility] anesthetic.   [address] [facility] Jacob MORENO  2/24/21

## 2021-02-24 ENCOUNTER — HOSPITAL ENCOUNTER (OUTPATIENT)
Age: 76
Setting detail: OUTPATIENT SURGERY
Discharge: HOME | End: 2021-02-24
Attending: ANESTHESIOLOGY | Admitting: ANESTHESIOLOGY
Payer: MEDICARE

## 2021-02-24 ENCOUNTER — HOSPITAL ENCOUNTER (OUTPATIENT)
Dept: OPERATING ROOM | Age: 76
Setting detail: OUTPATIENT SURGERY
Discharge: HOME | End: 2021-02-24
Attending: ANESTHESIOLOGY
Payer: MEDICARE

## 2021-02-24 ENCOUNTER — ANESTHESIA (OUTPATIENT)
Dept: OPERATING ROOM | Age: 76
End: 2021-02-24
Payer: MEDICARE

## 2021-02-24 VITALS
SYSTOLIC BLOOD PRESSURE: 103 MMHG | RESPIRATION RATE: 23 BRPM | OXYGEN SATURATION: 95 % | DIASTOLIC BLOOD PRESSURE: 58 MMHG

## 2021-02-24 VITALS
WEIGHT: 195 LBS | OXYGEN SATURATION: 96 % | SYSTOLIC BLOOD PRESSURE: 138 MMHG | RESPIRATION RATE: 16 BRPM | HEIGHT: 65 IN | BODY MASS INDEX: 32.49 KG/M2 | HEART RATE: 84 BPM | DIASTOLIC BLOOD PRESSURE: 71 MMHG | TEMPERATURE: 97 F

## 2021-02-24 DIAGNOSIS — M96.1 POSTLAMINECTOMY SYNDROME, CERVICAL REGION: Primary | ICD-10-CM

## 2021-02-24 DIAGNOSIS — G89.18 ACUTE POST-OPERATIVE PAIN: ICD-10-CM

## 2021-02-24 DIAGNOSIS — T85.192D MALFUNCTION OF SPINAL CORD STIMULATOR, SUBSEQUENT ENCOUNTER: ICD-10-CM

## 2021-02-24 PROBLEM — Z96.89 STATUS POST INSERTION OF SPINAL CORD STIMULATOR: Chronic | Status: ACTIVE | Noted: 2021-02-24

## 2021-02-24 PROCEDURE — 6360000002 HC RX W HCPCS: Performed by: ANESTHESIOLOGY

## 2021-02-24 PROCEDURE — 6360000002 HC RX W HCPCS: Performed by: NURSE ANESTHETIST, CERTIFIED REGISTERED

## 2021-02-24 PROCEDURE — 3700000001 HC ADD 15 MINUTES (ANESTHESIA): Performed by: ANESTHESIOLOGY

## 2021-02-24 PROCEDURE — 2500000003 HC RX 250 WO HCPCS: Performed by: NURSE ANESTHETIST, CERTIFIED REGISTERED

## 2021-02-24 PROCEDURE — 7100000011 HC PHASE II RECOVERY - ADDTL 15 MIN: Performed by: ANESTHESIOLOGY

## 2021-02-24 PROCEDURE — 3209999900 FLUORO FOR SURGICAL PROCEDURES

## 2021-02-24 PROCEDURE — 3600000015 HC SURGERY LEVEL 5 ADDTL 15MIN: Performed by: ANESTHESIOLOGY

## 2021-02-24 PROCEDURE — 3700000000 HC ANESTHESIA ATTENDED CARE: Performed by: ANESTHESIOLOGY

## 2021-02-24 PROCEDURE — 7100000010 HC PHASE II RECOVERY - FIRST 15 MIN: Performed by: ANESTHESIOLOGY

## 2021-02-24 PROCEDURE — 2580000003 HC RX 258: Performed by: ANESTHESIOLOGY

## 2021-02-24 PROCEDURE — 2580000003 HC RX 258: Performed by: NURSE ANESTHETIST, CERTIFIED REGISTERED

## 2021-02-24 PROCEDURE — 2500000003 HC RX 250 WO HCPCS: Performed by: ANESTHESIOLOGY

## 2021-02-24 PROCEDURE — 2709999900 HC NON-CHARGEABLE SUPPLY: Performed by: ANESTHESIOLOGY

## 2021-02-24 PROCEDURE — 3600000005 HC SURGERY LEVEL 5 BASE: Performed by: ANESTHESIOLOGY

## 2021-02-24 RX ORDER — PROMETHAZINE HYDROCHLORIDE 25 MG/ML
6.25 INJECTION, SOLUTION INTRAMUSCULAR; INTRAVENOUS
Status: DISCONTINUED | OUTPATIENT
Start: 2021-02-24 | End: 2021-02-24 | Stop reason: HOSPADM

## 2021-02-24 RX ORDER — SODIUM CHLORIDE, SODIUM LACTATE, POTASSIUM CHLORIDE, CALCIUM CHLORIDE 600; 310; 30; 20 MG/100ML; MG/100ML; MG/100ML; MG/100ML
INJECTION, SOLUTION INTRAVENOUS CONTINUOUS
Status: DISCONTINUED | OUTPATIENT
Start: 2021-02-24 | End: 2021-02-24 | Stop reason: HOSPADM

## 2021-02-24 RX ORDER — FENTANYL CITRATE 50 UG/ML
INJECTION, SOLUTION INTRAMUSCULAR; INTRAVENOUS PRN
Status: DISCONTINUED | OUTPATIENT
Start: 2021-02-24 | End: 2021-02-24 | Stop reason: SDUPTHER

## 2021-02-24 RX ORDER — PROPOFOL 10 MG/ML
INJECTION, EMULSION INTRAVENOUS CONTINUOUS PRN
Status: DISCONTINUED | OUTPATIENT
Start: 2021-02-24 | End: 2021-02-24 | Stop reason: SDUPTHER

## 2021-02-24 RX ORDER — MEPERIDINE HYDROCHLORIDE 25 MG/ML
12.5 INJECTION INTRAMUSCULAR; INTRAVENOUS; SUBCUTANEOUS EVERY 5 MIN PRN
Status: DISCONTINUED | OUTPATIENT
Start: 2021-02-24 | End: 2021-02-24 | Stop reason: HOSPADM

## 2021-02-24 RX ORDER — LIDOCAINE HYDROCHLORIDE 10 MG/ML
INJECTION, SOLUTION EPIDURAL; INFILTRATION; INTRACAUDAL; PERINEURAL PRN
Status: DISCONTINUED | OUTPATIENT
Start: 2021-02-24 | End: 2021-02-24 | Stop reason: ALTCHOICE

## 2021-02-24 RX ORDER — LABETALOL HYDROCHLORIDE 5 MG/ML
5 INJECTION, SOLUTION INTRAVENOUS EVERY 10 MIN PRN
Status: DISCONTINUED | OUTPATIENT
Start: 2021-02-24 | End: 2021-02-24 | Stop reason: HOSPADM

## 2021-02-24 RX ORDER — OXYCODONE HYDROCHLORIDE AND ACETAMINOPHEN 5; 325 MG/1; MG/1
1 TABLET ORAL EVERY 4 HOURS PRN
Qty: 18 TABLET | Refills: 0 | Status: SHIPPED | OUTPATIENT
Start: 2021-02-24 | End: 2021-02-27

## 2021-02-24 RX ORDER — OXYCODONE HYDROCHLORIDE AND ACETAMINOPHEN 5; 325 MG/1; MG/1
1 TABLET ORAL
Status: DISCONTINUED | OUTPATIENT
Start: 2021-02-24 | End: 2021-02-24 | Stop reason: HOSPADM

## 2021-02-24 RX ORDER — CEPHALEXIN 500 MG/1
500 CAPSULE ORAL 3 TIMES DAILY
Qty: 30 CAPSULE | Refills: 0 | Status: SHIPPED | OUTPATIENT
Start: 2021-02-24 | End: 2021-03-06

## 2021-02-24 RX ORDER — PHENYLEPHRINE HYDROCHLORIDE 10 MG/ML
INJECTION INTRAVENOUS PRN
Status: DISCONTINUED | OUTPATIENT
Start: 2021-02-24 | End: 2021-02-24 | Stop reason: SDUPTHER

## 2021-02-24 RX ORDER — LIDOCAINE HYDROCHLORIDE 20 MG/ML
INJECTION, SOLUTION EPIDURAL; INFILTRATION; INTRACAUDAL; PERINEURAL PRN
Status: DISCONTINUED | OUTPATIENT
Start: 2021-02-24 | End: 2021-02-24 | Stop reason: SDUPTHER

## 2021-02-24 RX ORDER — MIDAZOLAM HYDROCHLORIDE 1 MG/ML
INJECTION INTRAMUSCULAR; INTRAVENOUS PRN
Status: DISCONTINUED | OUTPATIENT
Start: 2021-02-24 | End: 2021-02-24 | Stop reason: SDUPTHER

## 2021-02-24 RX ORDER — HYDRALAZINE HYDROCHLORIDE 20 MG/ML
5 INJECTION INTRAMUSCULAR; INTRAVENOUS EVERY 10 MIN PRN
Status: DISCONTINUED | OUTPATIENT
Start: 2021-02-24 | End: 2021-02-24 | Stop reason: HOSPADM

## 2021-02-24 RX ORDER — SODIUM CHLORIDE 9 MG/ML
INJECTION INTRAVENOUS PRN
Status: DISCONTINUED | OUTPATIENT
Start: 2021-02-24 | End: 2021-02-24 | Stop reason: SDUPTHER

## 2021-02-24 RX ADMIN — SODIUM CHLORIDE 10 ML: 9 INJECTION, SOLUTION INTRAMUSCULAR; INTRAVENOUS; SUBCUTANEOUS at 08:19

## 2021-02-24 RX ADMIN — PROPOFOL 75 MCG/KG/MIN: 10 INJECTION, EMULSION INTRAVENOUS at 08:03

## 2021-02-24 RX ADMIN — FENTANYL CITRATE 50 MCG: 50 INJECTION, SOLUTION INTRAMUSCULAR; INTRAVENOUS at 08:08

## 2021-02-24 RX ADMIN — VANCOMYCIN HYDROCHLORIDE 1500 MG: 1 INJECTION, POWDER, LYOPHILIZED, FOR SOLUTION INTRAVENOUS at 07:58

## 2021-02-24 RX ADMIN — FENTANYL CITRATE 50 MCG: 50 INJECTION, SOLUTION INTRAMUSCULAR; INTRAVENOUS at 08:03

## 2021-02-24 RX ADMIN — PHENYLEPHRINE HYDROCHLORIDE 100 MCG: 10 INJECTION INTRAVENOUS at 08:19

## 2021-02-24 RX ADMIN — FENTANYL CITRATE 25 MCG: 50 INJECTION, SOLUTION INTRAMUSCULAR; INTRAVENOUS at 08:21

## 2021-02-24 RX ADMIN — SODIUM CHLORIDE, POTASSIUM CHLORIDE, SODIUM LACTATE AND CALCIUM CHLORIDE: 600; 310; 30; 20 INJECTION, SOLUTION INTRAVENOUS at 07:15

## 2021-02-24 RX ADMIN — LIDOCAINE HYDROCHLORIDE 50 MG: 20 INJECTION, SOLUTION EPIDURAL; INFILTRATION; INTRACAUDAL; PERINEURAL at 08:03

## 2021-02-24 RX ADMIN — FENTANYL CITRATE 25 MCG: 50 INJECTION, SOLUTION INTRAMUSCULAR; INTRAVENOUS at 08:28

## 2021-02-24 RX ADMIN — FENTANYL CITRATE 50 MCG: 50 INJECTION, SOLUTION INTRAMUSCULAR; INTRAVENOUS at 08:48

## 2021-02-24 RX ADMIN — MIDAZOLAM 2 MG: 1 INJECTION INTRAMUSCULAR; INTRAVENOUS at 07:59

## 2021-02-24 RX ADMIN — PHENYLEPHRINE HYDROCHLORIDE 100 MCG: 10 INJECTION INTRAVENOUS at 08:32

## 2021-02-24 RX ADMIN — PHENYLEPHRINE HYDROCHLORIDE 100 MCG: 10 INJECTION INTRAVENOUS at 08:57

## 2021-02-24 ASSESSMENT — PAIN SCALES - GENERAL
PAINLEVEL_OUTOF10: 0
PAINLEVEL_OUTOF10: 0

## 2021-02-24 ASSESSMENT — PULMONARY FUNCTION TESTS
PIF_VALUE: 0
PIF_VALUE: 0

## 2021-02-24 ASSESSMENT — PAIN - FUNCTIONAL ASSESSMENT: PAIN_FUNCTIONAL_ASSESSMENT: PREVENTS OR INTERFERES SOME ACTIVE ACTIVITIES AND ADLS

## 2021-02-24 ASSESSMENT — PAIN DESCRIPTION - DESCRIPTORS: DESCRIPTORS: ACHING;BURNING

## 2021-02-24 NOTE — H&P
Update History & Physical    The patient's History and Physical of 02/10/2021 was reviewed with the patient and there were no significant changes. I examined the patient and there were no significant changes from the previous History and Physical.    Plan: The risk, benefits, expected outcome, and alternative to the recommended procedure have been discussed with the patient. Patient understands and wants to proceed with the procedure.       Electronically signed by Susanna Arias DO on 2/24/21 at 7:00 AM EST

## 2021-02-24 NOTE — OP NOTE
[address]                                OPERATIVE REPORT    PATIENT NAME: Niko Cannon                  :        1945  MED REC NO:   [id number]                            ROOM:  ACCOUNT NO:   [de-identified]                           ADMIT DATE: 2021  PROVIDER:     Darion Saucedo DO    DATE OF PROCEDURE:  2021    LOCATION:  [address] [place] [address], [address] [place] [address] Pain Clinic. SURGEON:  Darion Saucedo DO    PREPROCEDURE DIAGNOSES:  Post lumbar laminectomy syndrome, M96.1,  chronic pain syndrome, G89.4, status post lumbar spinal cord stimulator  with dead battery. POSTOPERATIVE DIAGNOSES:  Post lumbar laminectomy syndrome, M96.1,  chronic pain syndrome, G89.4, status post lumbar spinal cord stimulator  with dead battery. PROCEDURES PERFORMED:  1. Surgical removal of spinal cord stimulator generator. 2.  Surgical removal of spinal cord stimulator electrode #1.  3.  Surgical removal of spinal cord stimulator electrode #2.  4.  Analyze and reprogram spinal cord stimulator in surgery. 5.  Direct x-ray guidance of the spine. COMPLICATIONS:  None. ASSISTANTS:  None. BLOOD LOSS:  Less than 20 mL blood loss. ANESTHESIA:  IV anesthesia per Department of Anesthesia, local per Dr. Jasmyne Barreto. INDICATIONS:  The patient is a 68-year-old white female who presents to  the [address] [place] [address] Operating Room #4 via [address] [place] [address]  for the surgical removal of her spinal cord stimulator. The patient has had this in for many years ago and gotten excellent pain  relief with it over the years. Unfortunately, recently she stopped  finding it functional and that she was unable to recharge the battery on  a regular basis.   We did attempt to re-educate her and trying it her on  this process to no avail and then recently the battery stalled and  and it is now non-rechargeable. The patient was given the option of having the battery replaced and/or  revising the stimulator device but she refused and stated that at this  point she did not feel she wants it anymore but just preferred to have  it removed. We did discuss with the patient today as we did in the office the  details of the risks, potential complications and the alternatives to  care and she did request to go forward with the above surgical  procedure. The patient was given IV antibiotics preoperatively. Please see the  medical record. The patient was seen by Department of Anesthesia and IV anesthetic is  required. Please see the anesthesia record. The patient was brought to operating room #4 at the [facility], placed in the prone position in a neutral fashion and monitored  per the Department of Anesthesia. The lumbosacral spine was prepped and draped including full surgical  sterile technique utilized throughout. X-rays were taken of the device including the anchor site and the  battery site which is in the left paraspinal area above the left hip,  they were intact. We then started with localizing over the old anchor site with 10 mL of  1% Xylocaine with a 25-gauge 1.5-inch disposable needle. We then localized over the battery site in the same manner. We then started with her vertical incision over the anchor site just  long enough to be able to move the anchor approximately 2 cm and then  used careful blunt dissection down to the anchor site where it would be  easily to identify the anchors and the anchoring device from [employer]  and then removed all foreign bodies intact one lead at a time, we  removed the first lead and then the second lead and removed all foreign  bodies we could identify within the incision. They were removed intact  in both leads.     Upon completion of the above, we then trimmed the anchors at this location, so we then trimmed the electrodes at this location, so that  the remaining portion of the connecting wire could be removed through  the battery site. The wound was checked for strict hemostasis, irrigated with antibiotic  solution and a 4 x 4 soaked antibiotic solution was placed in the wound. We then looked at the battery site and made our usual incision wide  enough just to remove the battery and used careful blunt dissection down  to the battery site, identified the anchor stitches and removed these  intact along with the battery and the remaining portion of the  electrodes intact without difficulty. Strict hemostasis was assured. The wound was irrigated with antibiotic  solution. We then used a 1-0 Vicryl interrupted stitches to close the old  potential pocket of the battery along with to approximate the skin up to  the skin edges and double layer closure technique up to the skin edges  using a total of seven 1-0 Vicryl interrupted stitches. We then used the continuous 3-0 Polysorb plastic closure technique along  the skin edges followed by Steri-Strips, Aquacel dressing and ABD  pressure bandage. We then turned our attention to the back incision. The 4 x 4 was  removed. The wound was irrigated with antibiotic solution, checked for  strict hemostasis and then closed using six 1-0 Vicryl interrupted  stitches up to skin edges followed by continuous 3-0 Polysorb plastic  closure of the skin edges followed by Steri-Strips, Aquacel dressing,  ABD pressure bandage. The patient was then taken to the recovery room. She found to be in  stable condition with good results without complication. Neurologically  unchanged postprocedure from preprocedure with good results and no  complications. The patient will be followed up in our [address] [place] [address] in one week  or sooner need be.   Please see written going home instructions, a copy of which was given to the patient, copy of which is on the chart. The patient will be discharged from our facility pending her ability to  meet full discharge criteria. I will sign off in the usual manner.         Alan Guzman DO    D: 02/24/2021 8:58:27       T: 02/24/2021 9:04:56     [email or web address]  Job#: [id number]     Doc#: [id number]    CC:

## 2021-02-24 NOTE — ANESTHESIA POSTPROCEDURE EVALUATION
Department of Anesthesiology  Postprocedure Note    Patient: Liz Marcum  MRN: [id number]  YOB: 1945  Date of evaluation: 2/24/2021  Time:  11:47 AM     Procedure Summary     Date: 02/24/21 Room / Location: [address] [place] [address] 04 / [facility]    Anesthesia Start: 1453 Anesthesia Stop: 6059    Procedure: SURGICAL REMOVAL OF LUMBAR MED[employer]ONIC SPINAL CORD STIMULATOR (STIMULATOR BATTERY LEFT HIP AREA) (CPT 67898) (N/A ) Diagnosis: (POSTLAMINECTOMY SYNDROME)    Surgeons: Quan Luu DO Responsible Provider: Garth Chavez MD    Anesthesia Type: MAC ASA Status: 3          Anesthesia Type: MAC    Terrie Phase I: Terrie Score: 10    Terrie Phase II: Terrie Score: 10    Last vitals: Reviewed and per EMR flowsheets.        Anesthesia Post Evaluation    Patient location during evaluation: PACU  Patient participation: complete - patient participated  Level of consciousness: awake and alert  Airway patency: patent  Nausea & Vomiting: no nausea and no vomiting  Complications: no  Cardiovascular status: hemodynamically stable  Respiratory status: acceptable  Hydration status: euvolemic

## 2021-02-24 NOTE — BRIEF OP NOTE
Brief Postoperative Note      Patient: Nereida George  YOB: 1945  MRN: [id number]    Date of Procedure: 2/24/2021    Pre-Op Diagnosis: POSTLAMINECTOMY SYNDROME, CHRONIC PAIN SYNDROME, STATUS POST LUMBAR SPINAL CORD STIMULATOR WITH DEAD BATTERY    Post-Op Diagnosis: SAME       Procedure(s); Surgical removal of lumbar [employer] spinal cord stimulator     Surgeon(s):  Susanna Arias DO    Assistant:  First Assistant: Twan Dodson RN    Anesthesia: Monitor Anesthesia Care    Estimated Blood Loss (mL): less than 50     Complications: None    Specimens:   None    Implants:  None      Drains: None    Findings: See operative dictation    Electronically signed by Susanna Arias DO on 2/24/2021 at 8:56 AM

## 2021-05-21 ENCOUNTER — FOLLOWUP TELEPHONE ENCOUNTER (OUTPATIENT)
Dept: AUDIOLOGY | Age: 76
End: 2021-05-21

## 2021-05-21 NOTE — PROGRESS NOTES
Patient called while driving to [facility] , could not find the building. Looked at schedule, she did not have an appointment in [address] [place] [address] Rd. She wants to go to [facility]. I got all her information and will fax to Arianna to schedule at [place]. However there is no script so calling Dr office for script to be sent. Electronically signed by Adri Reynoso on 5/21/2021 at 2:35 PM    Contacted Dr. [facility] office and they had order.  Asked to fax directly to [facility] at [phone]. /SS  Electronically signed by Adri Reynoso on 5/21/2021 at 2:42 PM

## 2021-06-14 ENCOUNTER — PROCEDURE VISIT (OUTPATIENT)
Dept: AUDIOLOGY | Age: 76
End: 2021-06-14
Payer: MEDICARE

## 2021-06-14 DIAGNOSIS — H90.3 SENSORINEURAL HEARING LOSS (SNHL) OF BOTH EARS: Primary | ICD-10-CM

## 2021-06-14 DIAGNOSIS — H93.13 TINNITUS OF BOTH EARS: ICD-10-CM

## 2021-06-14 PROCEDURE — 92567 TYMPANOMETRY: CPT | Performed by: AUDIOLOGIST

## 2021-06-14 PROCEDURE — 92557 COMPREHENSIVE HEARING TEST: CPT | Performed by: AUDIOLOGIST

## 2021-06-14 NOTE — PROGRESS NOTES
This patient was referred for audiometric/tympanometric testing by Yumiko Ohara MD due to bilateral hearing loss and tinnitus. Patient reported she has noticed both for several years, but the tinnitus has worsened. She also reported she has vertigo, which she has discussed with her PCP. Audiometry revealed a moderate sloping to moderately severe sensorineural hearing loss, bilaterally. Reliability was good. Speech reception thresholds were in good agreement with the pure tone averages, bilaterally. Speech discrimination scores were excellent, bilaterally. Tympanometry revealed normal middle ear peak pressure and compliance, bilaterally. The results were reviewed with the patient. Patient was interested in and is a candidate for hearing aids. Patient reported she has [place] and Medicare, with no supplements, but Medicaid records show she has [facility] Dual. Will follow up with patient to clarify what insurance coverage she has and where she is able to get hearing aids. Recommendations for follow up will be made pending physician consult.        Adri Acevedo CCC-A  [address] [facility] [id number]  Electronically signed by Adri Acevedo on 6/14/2021 at 4:51 PM

## 2021-06-17 ENCOUNTER — TELEPHONE (OUTPATIENT)
Dept: AUDIOLOGY | Age: 76
End: 2021-06-17

## 2021-06-17 NOTE — TELEPHONE ENCOUNTER
Spoke with patient regarding her insurance. She will call her  and clarify which insurance she has and will follow up after.     Electronically signed by Adri Keating on 6/17/2021 at 2:23 PM

## 2021-06-18 ENCOUNTER — TELEPHONE (OUTPATIENT)
Dept: AUDIOLOGY | Age: 76
End: 2021-06-18

## 2021-06-21 ENCOUNTER — TELEPHONE (OUTPATIENT)
Dept: AUDIOLOGY | Age: 76
End: 2021-06-21

## 2021-09-16 ENCOUNTER — TELEPHONE (OUTPATIENT)
Dept: AUDIOLOGY | Age: 76
End: 2021-09-16

## 2021-09-16 NOTE — TELEPHONE ENCOUNTER
Received voicemail from patient asking for copy of hearing test. Called patient back to give her information for medical records. No answer, no voicemail at both phone numbers.     Electronically signed by Adri Christine on 9/16/2021 at 12:00 PM

## 2021-09-21 ENCOUNTER — TELEPHONE (OUTPATIENT)
Dept: AUDIOLOGY | Age: 76
End: 2021-09-21

## 2021-09-21 NOTE — TELEPHONE ENCOUNTER
Patient called back and left another voicemail for us to call her regarding her her results. Called home phone number and left a message with S/O with [facility] main number to call to ask for med rec for test results.      Electronically signed by Adri Burgess on 9/21/2021 at 11:08 AM

## 2021-09-22 ENCOUNTER — FOLLOWUP TELEPHONE ENCOUNTER (OUTPATIENT)
Dept: AUDIOLOGY | Age: 76
End: 2021-09-22

## 2021-09-22 NOTE — TELEPHONE ENCOUNTER
Patient called [facility]. Transferred to [facility] to obtain her hearing test results.    Electronically signed by Adri Alvarez on 9/22/2021 at 2:18 PM

## 2022-07-08 ENCOUNTER — APPOINTMENT (OUTPATIENT)
Dept: CT IMAGING | Age: 77
End: 2022-07-08
Payer: MEDICAID

## 2022-07-08 ENCOUNTER — APPOINTMENT (OUTPATIENT)
Dept: GENERAL RADIOLOGY | Age: 77
End: 2022-07-08
Payer: MEDICAID

## 2022-07-08 ENCOUNTER — HOSPITAL ENCOUNTER (EMERGENCY)
Age: 77
Discharge: HOME | End: 2022-07-08
Attending: STUDENT IN AN ORGANIZED HEALTH CARE EDUCATION/TRAINING PROGRAM
Payer: MEDICAID

## 2022-07-08 VITALS
OXYGEN SATURATION: 94 % | SYSTOLIC BLOOD PRESSURE: 120 MMHG | RESPIRATION RATE: 16 BRPM | DIASTOLIC BLOOD PRESSURE: 85 MMHG | TEMPERATURE: 98 F | HEART RATE: 70 BPM

## 2022-07-08 DIAGNOSIS — N30.00 ACUTE CYSTITIS WITHOUT HEMATURIA: Primary | ICD-10-CM

## 2022-07-08 LAB
ALBUMIN SERPL-MCNC: 3.3 G/DL (ref 3.5–5.2)
ALP BLD-CCNC: 83 U/L (ref 35–104)
ALT SERPL-CCNC: 17 U/L (ref 0–32)
ANION GAP SERPL CALCULATED.3IONS-SCNC: 8 MMOL/L (ref 7–16)
AST SERPL-CCNC: 15 U/L (ref 0–31)
BACTERIA: ABNORMAL /HPF
BASOPHILS ABSOLUTE: 0.03 E9/L (ref 0–0.2)
BASOPHILS RELATIVE PERCENT: 0.4 % (ref 0–2)
BILIRUB SERPL-MCNC: 0.3 MG/DL (ref 0–1.2)
BILIRUBIN URINE: NEGATIVE
BLOOD, URINE: NEGATIVE
BUN BLDV-MCNC: 18 MG/DL (ref 6–23)
CALCIUM SERPL-MCNC: 8.2 MG/DL (ref 8.6–10.2)
CHLORIDE BLD-SCNC: 106 MMOL/L (ref 98–107)
CHP ED QC CHECK: NORMAL
CLARITY: CLEAR
CO2: 27 MMOL/L (ref 22–29)
COLOR: YELLOW
CREAT SERPL-MCNC: 1 MG/DL (ref 0.5–1)
EKG ATRIAL RATE: 72 BPM
EKG P AXIS: 50 DEGREES
EKG P-R INTERVAL: 172 MS
EKG Q-T INTERVAL: 420 MS
EKG QRS DURATION: 104 MS
EKG QTC CALCULATION (BAZETT): 459 MS
EKG R AXIS: -44 DEGREES
EKG T AXIS: 165 DEGREES
EKG VENTRICULAR RATE: 72 BPM
EOSINOPHILS ABSOLUTE: 0.2 E9/L (ref 0.05–0.5)
EOSINOPHILS RELATIVE PERCENT: 2.7 % (ref 0–6)
GFR AFRICAN AMERICAN: >60
GFR NON-AFRICAN AMERICAN: 54 ML/MIN/1.73
GLUCOSE BLD-MCNC: 161 MG/DL
GLUCOSE BLD-MCNC: 161 MG/DL (ref 74–99)
GLUCOSE URINE: NEGATIVE MG/DL
HCT VFR BLD CALC: 42.9 % (ref 34–48)
HEMOGLOBIN: 13.5 G/DL (ref 11.5–15.5)
IMMATURE GRANULOCYTES #: 0.03 E9/L
IMMATURE GRANULOCYTES %: 0.4 % (ref 0–5)
KETONES, URINE: NEGATIVE MG/DL
LEUKOCYTE ESTERASE, URINE: ABNORMAL
LYMPHOCYTES ABSOLUTE: 2.02 E9/L (ref 1.5–4)
LYMPHOCYTES RELATIVE PERCENT: 27.2 % (ref 20–42)
MCH RBC QN AUTO: 29.2 PG (ref 26–35)
MCHC RBC AUTO-ENTMCNC: 31.5 % (ref 32–34.5)
MCV RBC AUTO: 92.7 FL (ref 80–99.9)
METER GLUCOSE: 161 MG/DL (ref 74–99)
MONOCYTES ABSOLUTE: 0.54 E9/L (ref 0.1–0.95)
MONOCYTES RELATIVE PERCENT: 7.3 % (ref 2–12)
NEUTROPHILS ABSOLUTE: 4.62 E9/L (ref 1.8–7.3)
NEUTROPHILS RELATIVE PERCENT: 62 % (ref 43–80)
NITRITE, URINE: NEGATIVE
PDW BLD-RTO: 17.2 FL (ref 11.5–15)
PH UA: 5.5 (ref 5–9)
PLATELET # BLD: 197 E9/L (ref 130–450)
PMV BLD AUTO: 9.7 FL (ref 7–12)
POTASSIUM REFLEX MAGNESIUM: 4.3 MMOL/L (ref 3.5–5)
PROTEIN UA: NEGATIVE MG/DL
RBC # BLD: 4.63 E12/L (ref 3.5–5.5)
RBC UA: ABNORMAL /HPF (ref 0–2)
SODIUM BLD-SCNC: 141 MMOL/L (ref 132–146)
SPECIFIC GRAVITY UA: 1.01 (ref 1–1.03)
TOTAL PROTEIN: 6.1 G/DL (ref 6.4–8.3)
TROPONIN, HIGH SENSITIVITY: 16 NG/L (ref 0–9)
TROPONIN, HIGH SENSITIVITY: 18 NG/L (ref 0–9)
UROBILINOGEN, URINE: 0.2 E.U./DL
WBC # BLD: 7.4 E9/L (ref 4.5–11.5)
WBC UA: ABNORMAL /HPF (ref 0–5)

## 2022-07-08 PROCEDURE — 70450 CT HEAD/BRAIN W/O DYE: CPT

## 2022-07-08 PROCEDURE — 82962 GLUCOSE BLOOD TEST: CPT

## 2022-07-08 PROCEDURE — 36415 COLL VENOUS BLD VENIPUNCTURE: CPT

## 2022-07-08 PROCEDURE — 93005 ELECTROCARDIOGRAM TRACING: CPT | Performed by: STUDENT IN AN ORGANIZED HEALTH CARE EDUCATION/TRAINING PROGRAM

## 2022-07-08 PROCEDURE — 99285 EMERGENCY DEPT VISIT HI MDM: CPT

## 2022-07-08 PROCEDURE — 80053 COMPREHEN METABOLIC PANEL: CPT

## 2022-07-08 PROCEDURE — 84484 ASSAY OF TROPONIN QUANT: CPT

## 2022-07-08 PROCEDURE — 81001 URINALYSIS AUTO W/SCOPE: CPT

## 2022-07-08 PROCEDURE — 87088 URINE BACTERIA CULTURE: CPT

## 2022-07-08 PROCEDURE — 85025 COMPLETE CBC W/AUTO DIFF WBC: CPT

## 2022-07-08 PROCEDURE — 6370000000 HC RX 637 (ALT 250 FOR IP): Performed by: STUDENT IN AN ORGANIZED HEALTH CARE EDUCATION/TRAINING PROGRAM

## 2022-07-08 PROCEDURE — 87077 CULTURE AEROBIC IDENTIFY: CPT

## 2022-07-08 PROCEDURE — 87186 SC STD MICRODIL/AGAR DIL: CPT

## 2022-07-08 PROCEDURE — 71045 X-RAY EXAM CHEST 1 VIEW: CPT

## 2022-07-08 RX ORDER — CEFDINIR 300 MG/1
300 CAPSULE ORAL ONCE
Status: COMPLETED | OUTPATIENT
Start: 2022-07-08 | End: 2022-07-08

## 2022-07-08 RX ORDER — CEFDINIR 300 MG/1
300 CAPSULE ORAL 2 TIMES DAILY
Qty: 14 CAPSULE | Refills: 0 | Status: SHIPPED | OUTPATIENT
Start: 2022-07-08 | End: 2022-07-15

## 2022-07-08 RX ADMIN — CEFDINIR 300 MG: 300 CAPSULE ORAL at 16:01

## 2022-07-08 ASSESSMENT — PAIN - FUNCTIONAL ASSESSMENT: PAIN_FUNCTIONAL_ASSESSMENT: NONE - DENIES PAIN

## 2022-07-08 NOTE — ED PROVIDER NOTES
Department of Emergency Medicine   ED  Provider Note  Admit Date/RoomTime: 7/8/2022 11:51 AM  ED Room: 14A14A14          History of Present Illness:  7/8/22, Time: 12:10 PM EDT  Chief Complaint   Patient presents with    Altered Mental Status     EMS was called to [facility] for high blood sugar. per EMS pt is confused alert to person and place only. pt reports feeling like she is in a \" dream\"        Jermaine Sneed is a 78 y.o. female presenting to the ED for altered mental status, beginning just prior to arrival.  The complaint has been persistent, moderate in severity, and worsened by nothing. The patient is a 81-year-old female who presents the emergency department via EMS from the [facility] for altered mental status. Patient symptoms are sudden onset, persistent, moderate in severity, nothing makes it better or worse. She states that she thought that her blood sugar was high and that is why she developed the confusion. She fell like she was in a dream.  She states that she is a prediabetic and this has happened before when her blood sugar was elevated. She denies any headache, fall, trauma, lightheadedness, dizziness, syncope, chest pain, shortness of breath, abdominal pain, nausea, vomiting, diarrhea, dysuria, hematuria, fever, chills, numbness, tingling, unilateral weakness, or other acute symptoms or concerns. She states that the confusion has improved since she has been here. Blood sugar for EMS was within normal limits. She states that she is feeling a little bit better now.     Review of Systems:   A complete review of systems was performed and pertinent positives and negatives are stated within HPI, all other systems reviewed and are negative.        --------------------------------------------- PAST HISTORY ---------------------------------------------  Past Medical History:  has a past medical history of Anemia, Anxiety, Arthritis, Asthma, [facility] Cancer ([facility] Cancer of breast, female [facility]), CHF (congestive heart failure) ([facility] Chronic back pain, Depression, Depression with anxiety, [facility] Dyslipidemia, Fibromyalgia, History of blood transfusion, Hypertension, Hypothyroidism, Neuropathy, Pericardial effusion, Pleural effusion, TIA (transient ischemic attack), Tobacco abuse, and Type II or unspecified type diabetes mellitus without mention of complication, not stated as uncontrolled. Past Surgical History:  has a past surgical history that includes Lung removal, partial; Mastectomy (Bilateral); Kidney removal (Left, 2/2006); Hysterectomy; Gastric bypass surgery (9/2004); hernia repair; knee surgery (Right); transthoracic echocardiogram (4/1/2010); Cataract removal with implant (Bilateral); liver biopsy (2006); thoracentesis (Left, 1/2010 and 3/2010. ); Cholecystectomy; other surgical history (03/16/15); other surgical history (N/A, 4/15/15); Breast surgery; Nerve Block (Bilateral, 06/06/16); Kidney removal; other surgical history (N/A, 02/24/2021); and Pain management procedure (N/A, 2/24/2021). Social History:  reports that she quit smoking about 23 years ago. She quit after 60.00 years of use. She has never used smokeless tobacco. She reports current alcohol use of about 1.0 standard drink of alcohol per week. She reports current drug use. Drug: Marijuana Meehan Kugel). Family History: family history is not on file. . Unless otherwise noted, family history is non contributory    The patients home medications have been reviewed.     Allergies: Benadryl [diphenhydramine], Other, Sulfa antibiotics, Ciprofloxacin, and Tape [adhesive tape]    I have reviewed the past medical history, past surgical history, social history, and family history    ---------------------------------------------------PHYSICAL EXAM--------------------------------------    Constitutional/General: Alert and oriented x3  Head: Normocephalic and atraumatic  Eyes:  EOMI, sclera non icteric  ENT: Oropharynx clear, handling secretions, no trismus, no asymmetry of the posterior oropharynx or uvular edema  Neck: Supple, full ROM, no stridor, no meningeal signs  Respiratory: Lungs clear to auscultation bilaterally, no wheezes, rales, or rhonchi. Not in respiratory distress  Cardiovascular:  Regular rate. Regular rhythm. No murmurs, no gallops, no rubs. 2+ distal pulses. Equal extremity pulses. Gastrointestinal:  Abdomen Soft, Non tender, Non distended. No rebound, guarding, or rigidity. No pulsatile masses. Musculoskeletal: Moves all extremities x 4. Warm and well perfused, no clubbing, no cyanosis, no edema. Capillary refill <3 seconds  Skin: skin warm and dry. No rashes. Neurologic: GCS 15, no focal deficits, symmetric strength 5/5 in the upper and lower extremities bilaterally  Psychiatric: Normal Affect    -------------------------------------------------- RESULTS -------------------------------------------------  I have personally reviewed all laboratory and imaging results for this patient. Results are listed below.      LABS: (Lab results interpreted by me)  Results for orders placed or performed during the hospital encounter of 07/08/22   CBC with Auto Differential   Result Value Ref Range    WBC 7.4 4.5 - 11.5 E9/L    RBC 4.63 3.50 - 5.50 E12/L    Hemoglobin 13.5 11.5 - 15.5 g/dL    Hematocrit 42.9 34.0 - 48.0 %    MCV 92.7 80.0 - 99.9 fL    MCH 29.2 26.0 - 35.0 pg    MCHC 31.5 (L) 32.0 - 34.5 %    RDW 17.2 (H) 11.5 - 15.0 fL    Platelets 485 070 - 129 E9/L    MPV 9.7 7.0 - 12.0 fL    Neutrophils % 62.0 43.0 - 80.0 %    Immature Granulocytes % 0.4 0.0 - 5.0 %    Lymphocytes % 27.2 20.0 - 42.0 %    Monocytes % 7.3 2.0 - 12.0 %    Eosinophils % 2.7 0.0 - 6.0 %    Basophils % 0.4 0.0 - 2.0 %    Neutrophils Absolute 4.62 1.80 - 7.30 E9/L    Immature Granulocytes # 0.03 E9/L    Lymphocytes Absolute 2.02 1.50 - 4.00 E9/L    Monocytes Absolute 0.54 0.10 - 0.95 E9/L    Eosinophils Absolute 0. 20 0.05 - 0.50 E9/L    Basophils Absolute 0.03 0.00 - 0.20 E9/L   Comprehensive Metabolic Panel w/ Reflex to MG   Result Value Ref Range    Sodium 141 132 - 146 mmol/L    Potassium reflex Magnesium 4.3 3.5 - 5.0 mmol/L    Chloride 106 98 - 107 mmol/L    CO2 27 22 - 29 mmol/L    Anion Gap 8 7 - 16 mmol/L    Glucose 161 (H) 74 - 99 mg/dL    BUN 18 6 - 23 mg/dL    CREATININE 1.0 0.5 - 1.0 mg/dL    GFR Non-African American 54 >=60 mL/min/1.73    GFR African American >60     Calcium 8.2 (L) 8.6 - 10.2 mg/dL    Total Protein 6.1 (L) 6.4 - 8.3 g/dL    Albumin 3.3 (L) 3.5 - 5.2 g/dL    Total Bilirubin 0.3 0.0 - 1.2 mg/dL    Alkaline Phosphatase 83 35 - 104 U/L    ALT 17 0 - 32 U/L    AST 15 0 - 31 U/L   Urinalysis with Microscopic   Result Value Ref Range    Color, UA Yellow Straw/Yellow    Clarity, UA Clear Clear    Glucose, Ur Negative Negative mg/dL    Bilirubin Urine Negative Negative    Ketones, Urine Negative Negative mg/dL    Specific Gravity, UA 1.010 1.005 - 1.030    Blood, Urine Negative Negative    pH, UA 5.5 5.0 - 9.0    Protein, UA Negative Negative mg/dL    Urobilinogen, Urine 0.2 <2.0 E.U./dL    Nitrite, Urine Negative Negative    Leukocyte Esterase, Urine LARGE (A) Negative    WBC, UA 10-20 (A) 0 - 5 /HPF    RBC, UA NONE 0 - 2 /HPF    Bacteria, UA MODERATE (A) None Seen /HPF   Troponin   Result Value Ref Range    Troponin, High Sensitivity 16 (H) 0 - 9 ng/L   Troponin   Result Value Ref Range    Troponin, High Sensitivity 18 (H) 0 - 9 ng/L   POCT glucose   Result Value Ref Range    Glucose 161 mg/dL    QC OK? ok    POCT Glucose   Result Value Ref Range    Meter Glucose 161 (H) 74 - 99 mg/dL   EKG 12 Lead   Result Value Ref Range    Ventricular Rate 72 BPM    Atrial Rate 72 BPM    P-R Interval 172 ms    QRS Duration 104 ms    Q-T Interval 420 ms    QTc Calculation (Bazett) 459 ms    P Axis 50 degrees    R Axis -44 degrees    T Axis 165 degrees   ,       RADIOLOGY:  Interpreted by Radiologist unless otherwise specified  XR CHEST PORTABLE   Final Result   Stable postoperative changes on the left with associated pleural thickening. No new abnormal findings. CT HEAD WO CONTRAST   Final Result   No acute intracranial abnormality. RECOMMENDATIONS:   Unavailable               EKG Interpretation  Interpreted by emergency department physician, Dr. Joceline Starr    EKG: This EKG is signed and interpreted by me. Rate: 72  Rhythm: Sinus  Interpretation: Normal sinus rhythm, left axis deviation, and some nonspecific ST changes in the inferior and high lateral leads along with the anterior leads which appears somewhat similar compared to prior. QTC is 459  Comparison: stable as compared to patient's most recent EKG       ------------------------- NURSING NOTES AND VITALS REVIEWED ---------------------------   The nursing notes within the ED encounter and vital signs as below have been reviewed by myself  /85   Pulse 70   Temp 98 °F (36.7 °C)   Resp 16   SpO2 94%     Oxygen Saturation Interpretation: Normal    The patients available past medical records and past encounters were reviewed. ------------------------------ ED COURSE/MEDICAL DECISION MAKING----------------------  Medications   cefdinir (OMNICEF) capsule 300 mg (300 mg Oral Given 7/8/22 1601)           The cardiac monitor revealed NSR with a heart rate in the 70s as interpreted by me. The cardiac monitor was ordered secondary to the patient's confusion and to monitor the patient for dysrhythmia. CPT [id number]       I, Dr. Joceline Starr, am the primary provider of record    Medical Decision Making:   The patient is a 79-year-old female presents the emergency department complaining of altered mental status. Patient is alert and oriented x3 there are no focal neurological deficits. NIH is 0. She is found to have a urinary tract infection. She is not septic. She does not have any abdominal pain. There is no blood in her urine.   Renal function is within normal limits. Labs are otherwise reassuring. CT head was negative for acute abnormalities. Blood sugar was 160 and is probably unrelated to the incident that occurred prior to arrival.  Discussed with her that her symptoms are followed from the urinary tract infection. She was treated with a dose of antibiotics. She states that she has had UTIs in the past.  Did review prior cultures which were sensitive to cephalosporins. She was treated with a dose of antibiotics here in the emergency department. She states that she wanted her  to come and pick her up and she wanted to try outpatient management at this time. Patient was discharged home in stable condition with strict return precautions. Oxygen Saturation Interpretation: 94 % on room air. Re-Evaluations:  ED Course as of 07/09/22 0603   Fri Jul 08, 2022   1536 Patient is alert and oriented x3. She is in no distress. She does not have any acute complaints at this time. Discussed that she has urinary tract infection. She states that she has had these in the past.  She states that she would rather go home with outpatient antibiotics. She will be given her first dose of antibiotics here and discharged home with a prescription for her urinary tract infection. She does not have any abdominal pain. No blood in her urine. Patient was discharged home in stable condition with shared decision making. [KG]      ED Course User Index  [KG] Tyrone Yang DO           This patient's ED course included: a personal history and physicial examination, re-evaluation prior to disposition, multiple bedside re-evaluations, IV medications, cardiac monitoring, continuous pulse oximetry and complex medical decision making and emergency management    This patient has remained hemodynamically stable during their ED course. Counseling:    The emergency provider has spoken with the patient and discussed todays results, in addition to providing specific details for the plan of care and counseling regarding the diagnosis and prognosis. Questions are answered at this time and they are agreeable with the plan.       --------------------------------- IMPRESSION AND DISPOSITION ---------------------------------    IMPRESSION  1. Acute cystitis without hematuria        DISPOSITION  Disposition: Discharge to home  Patient condition is stable        NOTE: This report was transcribed using voice recognition software.  Every effort was made to ensure accuracy; however, inadvertent computerized transcription errors may be present       Kris Conde DO  07/09/22 [id number]

## 2022-07-10 LAB
ORGANISM: ABNORMAL
URINE CULTURE, ROUTINE: ABNORMAL
URINE CULTURE, ROUTINE: ABNORMAL

## 2022-09-09 NOTE — PLAN OF CARE
No falls or injury this shift. Patient will be free of falls or injuries during stay on IMC/ CIU  Call light in reach instructed to call with needs call light No falls or injury this shift.  answered promptly dim light in room at night to aid in vision when OOB  Non skid foot wear on when OOB  Falls band on Home

## 2022-11-14 ENCOUNTER — HOSPITAL ENCOUNTER (EMERGENCY)
Age: 77
Discharge: HOME | End: 2022-11-14
Attending: EMERGENCY MEDICINE
Payer: MEDICAID

## 2022-11-14 ENCOUNTER — APPOINTMENT (OUTPATIENT)
Dept: GENERAL RADIOLOGY | Age: 77
End: 2022-11-14
Payer: MEDICAID

## 2022-11-14 ENCOUNTER — APPOINTMENT (OUTPATIENT)
Dept: CT IMAGING | Age: 77
End: 2022-11-14
Payer: MEDICAID

## 2022-11-14 VITALS
WEIGHT: 210 LBS | OXYGEN SATURATION: 95 % | HEIGHT: 65 IN | TEMPERATURE: 98 F | BODY MASS INDEX: 34.99 KG/M2 | DIASTOLIC BLOOD PRESSURE: 80 MMHG | SYSTOLIC BLOOD PRESSURE: 160 MMHG | RESPIRATION RATE: 16 BRPM | HEART RATE: 72 BPM

## 2022-11-14 DIAGNOSIS — R07.9 CHEST PAIN, UNSPECIFIED TYPE: Primary | ICD-10-CM

## 2022-11-14 DIAGNOSIS — J21.9 ACUTE BRONCHIOLITIS DUE TO UNSPECIFIED ORGANISM: ICD-10-CM

## 2022-11-14 LAB
ALBUMIN SERPL-MCNC: 3.5 G/DL (ref 3.5–5.2)
ALP BLD-CCNC: 94 U/L (ref 35–104)
ALT SERPL-CCNC: 11 U/L (ref 0–32)
ANION GAP SERPL CALCULATED.3IONS-SCNC: 10 MMOL/L (ref 7–16)
APTT: 27.7 SEC (ref 24.5–35.1)
AST SERPL-CCNC: 8 U/L (ref 0–31)
BASOPHILS ABSOLUTE: 0.03 E9/L (ref 0–0.2)
BASOPHILS RELATIVE PERCENT: 0.5 % (ref 0–2)
BILIRUB SERPL-MCNC: 0.3 MG/DL (ref 0–1.2)
BUN BLDV-MCNC: 17 MG/DL (ref 6–23)
CALCIUM SERPL-MCNC: 10.1 MG/DL (ref 8.6–10.2)
CHLORIDE BLD-SCNC: 94 MMOL/L (ref 98–107)
CO2: 31 MMOL/L (ref 22–29)
CREAT SERPL-MCNC: 0.8 MG/DL (ref 0.5–1)
D DIMER: 541 NG/ML DDU
EKG ATRIAL RATE: 89 BPM
EKG P AXIS: 53 DEGREES
EKG P-R INTERVAL: 140 MS
EKG Q-T INTERVAL: 372 MS
EKG QRS DURATION: 100 MS
EKG QTC CALCULATION (BAZETT): 452 MS
EKG R AXIS: -53 DEGREES
EKG T AXIS: 63 DEGREES
EKG VENTRICULAR RATE: 89 BPM
EOSINOPHILS ABSOLUTE: 0.19 E9/L (ref 0.05–0.5)
EOSINOPHILS RELATIVE PERCENT: 3 % (ref 0–6)
GFR SERPL CREATININE-BSD FRML MDRD: >60 ML/MIN/1.73
GLUCOSE BLD-MCNC: 221 MG/DL (ref 74–99)
HCT VFR BLD CALC: 47.7 % (ref 34–48)
HEMOGLOBIN: 15.4 G/DL (ref 11.5–15.5)
IMMATURE GRANULOCYTES #: 0.04 E9/L
IMMATURE GRANULOCYTES %: 0.6 % (ref 0–5)
INR BLD: 1.1
LYMPHOCYTES ABSOLUTE: 1.23 E9/L (ref 1.5–4)
LYMPHOCYTES RELATIVE PERCENT: 19.2 % (ref 20–42)
MAGNESIUM: 1.5 MG/DL (ref 1.6–2.6)
MCH RBC QN AUTO: 29.1 PG (ref 26–35)
MCHC RBC AUTO-ENTMCNC: 32.3 % (ref 32–34.5)
MCV RBC AUTO: 90.2 FL (ref 80–99.9)
MONOCYTES ABSOLUTE: 0.52 E9/L (ref 0.1–0.95)
MONOCYTES RELATIVE PERCENT: 8.1 % (ref 2–12)
NEUTROPHILS ABSOLUTE: 4.4 E9/L (ref 1.8–7.3)
NEUTROPHILS RELATIVE PERCENT: 68.6 % (ref 43–80)
PDW BLD-RTO: 14.3 FL (ref 11.5–15)
PLATELET # BLD: 112 E9/L (ref 130–450)
PMV BLD AUTO: 11.6 FL (ref 7–12)
POTASSIUM SERPL-SCNC: 4 MMOL/L (ref 3.5–5)
PRO-BNP: 722 PG/ML (ref 0–450)
PROTHROMBIN TIME: 12.6 SEC (ref 9.3–12.4)
RBC # BLD: 5.29 E12/L (ref 3.5–5.5)
SODIUM BLD-SCNC: 135 MMOL/L (ref 132–146)
TOTAL PROTEIN: 7.4 G/DL (ref 6.4–8.3)
TROPONIN, HIGH SENSITIVITY: 13 NG/L (ref 0–9)
TROPONIN, HIGH SENSITIVITY: 18 NG/L (ref 0–9)
TROPONIN, HIGH SENSITIVITY: 21 NG/L (ref 0–9)
WBC # BLD: 6.4 E9/L (ref 4.5–11.5)

## 2022-11-14 PROCEDURE — 80053 COMPREHEN METABOLIC PANEL: CPT

## 2022-11-14 PROCEDURE — 85610 PROTHROMBIN TIME: CPT

## 2022-11-14 PROCEDURE — 85025 COMPLETE CBC W/AUTO DIFF WBC: CPT

## 2022-11-14 PROCEDURE — 93005 ELECTROCARDIOGRAM TRACING: CPT | Performed by: EMERGENCY MEDICINE

## 2022-11-14 PROCEDURE — 85378 FIBRIN DEGRADE SEMIQUANT: CPT

## 2022-11-14 PROCEDURE — 36415 COLL VENOUS BLD VENIPUNCTURE: CPT

## 2022-11-14 PROCEDURE — 71045 X-RAY EXAM CHEST 1 VIEW: CPT

## 2022-11-14 PROCEDURE — 84484 ASSAY OF TROPONIN QUANT: CPT

## 2022-11-14 PROCEDURE — 85730 THROMBOPLASTIN TIME PARTIAL: CPT

## 2022-11-14 PROCEDURE — 83735 ASSAY OF MAGNESIUM: CPT

## 2022-11-14 PROCEDURE — 83880 ASSAY OF NATRIURETIC PEPTIDE: CPT

## 2022-11-14 PROCEDURE — 71275 CT ANGIOGRAPHY CHEST: CPT

## 2022-11-14 PROCEDURE — 6370000000 HC RX 637 (ALT 250 FOR IP): Performed by: EMERGENCY MEDICINE

## 2022-11-14 PROCEDURE — 6360000004 HC RX CONTRAST MEDICATION: Performed by: RADIOLOGY

## 2022-11-14 PROCEDURE — 99285 EMERGENCY DEPT VISIT HI MDM: CPT

## 2022-11-14 RX ORDER — NITROGLYCERIN 0.4 MG/1
0.4 TABLET SUBLINGUAL EVERY 5 MIN PRN
Status: DISCONTINUED | OUTPATIENT
Start: 2022-11-14 | End: 2022-11-14 | Stop reason: HOSPADM

## 2022-11-14 RX ORDER — ASPIRIN 81 MG/1
324 TABLET, CHEWABLE ORAL ONCE
Status: COMPLETED | OUTPATIENT
Start: 2022-11-14 | End: 2022-11-14

## 2022-11-14 RX ADMIN — ASPIRIN 81 MG CHEWABLE TABLET 324 MG: 81 TABLET CHEWABLE at 11:07

## 2022-11-14 RX ADMIN — IOPAMIDOL 75 ML: 755 INJECTION, SOLUTION INTRAVENOUS at 16:47

## 2022-11-14 ASSESSMENT — PAIN DESCRIPTION - LOCATION: LOCATION: CHEST

## 2022-11-14 ASSESSMENT — PAIN - FUNCTIONAL ASSESSMENT
PAIN_FUNCTIONAL_ASSESSMENT: NONE - DENIES PAIN
PAIN_FUNCTIONAL_ASSESSMENT: 0-10
PAIN_FUNCTIONAL_ASSESSMENT: NONE - DENIES PAIN

## 2022-11-14 ASSESSMENT — PAIN SCALES - GENERAL
PAINLEVEL_OUTOF10: 0
PAINLEVEL_OUTOF10: 10
PAINLEVEL_OUTOF10: 0
PAINLEVEL_OUTOF10: 0

## 2022-11-14 ASSESSMENT — PAIN DESCRIPTION - FREQUENCY: FREQUENCY: CONTINUOUS

## 2022-11-14 ASSESSMENT — PAIN DESCRIPTION - PAIN TYPE: TYPE: ACUTE PAIN

## 2022-11-14 ASSESSMENT — PAIN DESCRIPTION - ORIENTATION: ORIENTATION: MID

## 2022-11-14 ASSESSMENT — PAIN DESCRIPTION - DESCRIPTORS: DESCRIPTORS: PRESSURE

## 2022-11-14 ASSESSMENT — PAIN DESCRIPTION - ONSET: ONSET: SUDDEN

## 2022-11-14 NOTE — ED NOTES
Received report from Alfonso Medley RN and assumed pt care.      Two [place], RN  11/14/22 7908

## 2022-11-14 NOTE — ED PROVIDER NOTES
HPI:  11/14/22, Time: 10:31 AM PAT Guajardo is a 78 y.o. female presenting to the ED for substernal chest pains radiating to her upper back and left arm with a \"cold\" feeling in her left arm, beginning around 7am this morning. Associated with shortness of breath and diaphoresis. She states these episodes last several seconds before self resolving but are occurring very frequently since 7 AM and are 10 out of 10 in severity. She states it feels like she is having a heart attack. She reports she has a history of a \"pericardial window\". She advises she had a stress test and an echo at [facility] 2 weeks ago which was normal.  The complaint has been persistent, moderate in severity, and worsened by nothing. Patient denies fever/chills, sore throat, cough, congestion, edema, headache, visual disturbances, focal paresthesias, focal weakness, abdominal pain, nausea, vomiting, diarrhea, constipation, dysuria, hematuria, trauma, neck or back pain, rash or other complaints. ROS:   A complete review of systems was performed and all pertinent positives and negatives are stated within HPI, all other systems reviewed and are negative.      --------------------------------------------- PAST HISTORY ---------------------------------------------  Past Medical History:  has a past medical history of Anemia, Anxiety, Arthritis, Asthma, Bipolar 1 disorder ([address] Cancer ([facility] [address] Cancer of breast, female ([facility] [address] CHF (congestive heart failure) ([facility] [address] Chronic back pain, Depression, Depression with anxiety, Diabetes mellitus ([facility] [address] Dyslipidemia, Fibromyalgia, History of blood transfusion, Hypertension, Hypothyroidism, Neuropathy, Pericardial effusion, Pleural effusion, TIA (transient ischemic attack), Tobacco abuse, and Type II or unspecified type diabetes mellitus without mention of complication, not stated as uncontrolled.     Past Surgical History:  has a past surgical history that includes Lung removal, partial; Mastectomy (Bilateral); Kidney removal (Left, 2/2006); Hysterectomy; Gastric bypass surgery (9/2004); hernia repair; knee surgery (Right); transthoracic echocardiogram (4/1/2010); Cataract removal with implant (Bilateral); liver biopsy (2006); thoracentesis (Left, 1/2010 and 3/2010. ); Cholecystectomy; other surgical history (03/16/15); other surgical history (N/A, 4/15/15); Breast surgery; Nerve Block (Bilateral, 06/06/16); Kidney removal; other surgical history (N/A, 02/24/2021); and Pain management procedure (N/A, 2/24/2021). Social History:  reports that she quit smoking about 23 years ago. She has never used smokeless tobacco. She reports current alcohol use of about 1.0 standard drink per week. She reports current drug use. Drug: Marijuana Shon Nicasio). Family History: family history is not on file. The patients home medications have been reviewed. Allergies: Benadryl [diphenhydramine], Other, Sulfa antibiotics, Ciprofloxacin, and Tape [adhesive tape]        ----------------------------------------PHYSICAL EXAM--------------------------------------  Constitutional:  Well developed, well nourished, no acute distress, non-toxic appearance   Eyes:  PERRL, conjunctiva normal, EOMI  HENT:  Atraumatic, external ears normal, nose normal, oropharynx moist. Neck- normal range of motion, no nuchal rigidity   Respiratory:  No respiratory distress, normal breath sounds, no rales, no wheezing   Cardiovascular:  Normal rate, normal rhythm, no murmurs, no gallops, no rubs. Radial and DP pulses 2+ bilaterally. Compartments are soft. She is warm and well perfused. Cap refill less than 3 seconds. GI:  Soft, nondistended, normal bowel sounds, nontender, no organomegaly, no mass, no rebound, no guarding   :  No costovertebral angle tenderness   Musculoskeletal:  No edema, no tenderness, no deformities. Back- no tenderness  Integument:  Well hydrated, no rash. Adequate perfusion.    Lymphatic:  No cervical lymphadenopathy noted   Neurologic:  Alert & oriented x 3, CN 2-12 normal,  no focal deficits noted. Normal gait. Psychiatric:  Speech and behavior appropriate       -------------------------------------------------- RESULTS -------------------------------------------------  I have personally reviewed all laboratory and imaging results for this patient. Results are listed below.      LABS:  Results for orders placed or performed during the hospital encounter of 11/14/22   CBC with Auto Differential   Result Value Ref Range    WBC 6.4 4.5 - 11.5 E9/L    RBC 5.29 3.50 - 5.50 E12/L    Hemoglobin 15.4 11.5 - 15.5 g/dL    Hematocrit 47.7 34.0 - 48.0 %    MCV 90.2 80.0 - 99.9 fL    MCH 29.1 26.0 - 35.0 pg    MCHC 32.3 32.0 - 34.5 %    RDW 14.3 11.5 - 15.0 fL    Platelets 716 (L) 091 - 450 E9/L    MPV 11.6 7.0 - 12.0 fL    Neutrophils % 68.6 43.0 - 80.0 %    Immature Granulocytes % 0.6 0.0 - 5.0 %    Lymphocytes % 19.2 (L) 20.0 - 42.0 %    Monocytes % 8.1 2.0 - 12.0 %    Eosinophils % 3.0 0.0 - 6.0 %    Basophils % 0.5 0.0 - 2.0 %    Neutrophils Absolute 4.40 1.80 - 7.30 E9/L    Immature Granulocytes # 0.04 E9/L    Lymphocytes Absolute 1.23 (L) 1.50 - 4.00 E9/L    Monocytes Absolute 0.52 0.10 - 0.95 E9/L    Eosinophils Absolute 0.19 0.05 - 0.50 E9/L    Basophils Absolute 0.03 0.00 - 0.20 E9/L   Comprehensive Metabolic Panel   Result Value Ref Range    Sodium 135 132 - 146 mmol/L    Potassium 4.0 3.5 - 5.0 mmol/L    Chloride 94 (L) 98 - 107 mmol/L    CO2 31 (H) 22 - 29 mmol/L    Anion Gap 10 7 - 16 mmol/L    Glucose 221 (H) 74 - 99 mg/dL    BUN 17 6 - 23 mg/dL    Creatinine 0.8 0.5 - 1.0 mg/dL    Est, Glom Filt Rate >60 >=60 mL/min/1.73    Calcium 10.1 8.6 - 10.2 mg/dL    Total Protein 7.4 6.4 - 8.3 g/dL    Albumin 3.5 3.5 - 5.2 g/dL    Total Bilirubin 0.3 0.0 - 1.2 mg/dL    Alkaline Phosphatase 94 35 - 104 U/L    ALT 11 0 - 32 U/L    AST 8 0 - 31 U/L   Magnesium   Result Value Ref Range    Magnesium 1.5 (L) 1.6 - 2.6 mg/dL   Troponin   Result Value Ref Range    Troponin, High Sensitivity 21 (H) 0 - 9 ng/L   APTT   Result Value Ref Range    aPTT 27.7 24.5 - 35.1 sec   Protime-INR   Result Value Ref Range    Protime 12.6 (H) 9.3 - 12.4 sec    INR 1.1    Brain Natriuretic Peptide   Result Value Ref Range    Pro- (H) 0 - 450 pg/mL   D-Dimer, Quantitative   Result Value Ref Range    D-Dimer, Quant 541 ng/mL DDU   Troponin   Result Value Ref Range    Troponin, High Sensitivity 18 (H) 0 - 9 ng/L   Troponin   Result Value Ref Range    Troponin, High Sensitivity 13 (H) 0 - 9 ng/L   EKG 12 Lead   Result Value Ref Range    Ventricular Rate 89 BPM    Atrial Rate 89 BPM    P-R Interval 140 ms    QRS Duration 100 ms    Q-T Interval 372 ms    QTc Calculation (Bazett) 452 ms    P Axis 53 degrees    R Axis -53 degrees    T Axis 63 degrees       RADIOLOGY:  Interpreted by Radiologist.  CTA PULMONARY W CONTRAST   Final Result   No evidence of pulmonary embolism. Dilated main pulmonary artery suggestive   of pulmonary arterial hypertension. Right middle lobe and lateral right lower/upper lobe peripheral tree-in-bud   nodularity with patchy subpleural consolidation is noted suggestive of   infectious bronchiolitis. XR CHEST PORTABLE   Final Result   1. Patchy opacities in the right mid to lower lung may represent atelectasis   or infection. 2. Postoperative changes in the left lung with blunting of the left   costophrenic angle that may represent pleural scarring or small left pleural   effusion.                EKG #1 Interpretation  Time: 09:51 AM EDT  Rhythm: normal sinus   Rate: 89  Axis: left  Conduction: right bundle branch block (incomplete)  ST Segments: no acute change  T Waves: no acute change  Clinical Impression: non-specific EKG and left ventricular hypertrophy  Comparison to prior EKG: changes compared to previous EKG      ------------------------- NURSING NOTES AND VITALS REVIEWED ---------------------------  The nursing notes within the ED encounter and vital signs as below have been reviewed by myself. BP (!) 171/92   Pulse 70   Temp 98 °F (36.7 °C) (Oral)   Resp 16   Ht 5' 5\" (1.651 m)   Wt 210 lb (95.3 kg)   SpO2 94%   BMI 34.95 kg/m²   Oxygen Saturation Interpretation: Normal      The patients available past medical records and past encounters were reviewed. ------------------------------ ED COURSE/MEDICAL DECISION MAKING----------------------  Medications   nitroGLYCERIN (NITROSTAT) SL tablet 0.4 mg (0.4 mg SubLINGual Not Given 22 1119)   aspirin chewable tablet 324 mg (324 mg Oral Given 22 1107)   iopamidol (ISOVUE-370) 76 % injection 75 mL (75 mLs IntraVENous Given 22 1647)           Procedures:   none      Medical Decision Making:      HEART Score For Major Cardiac Events  (Max Score 10 Points)  HISTORY       [x]   Slightly Suspicious  0       []   Moderately Suspicious  +1       []   Highly Suspicious  +2    EK point: No ST deviation but LBBB, LVH repolarization changes (ex:digoxin);  2 points: ST deviation not due to LBBB, LVH or digoxin         [x]   Normal  0       []   Nonspecific Repolarization Disturbance  +1       []   Significant ST Depression  +2    AGE       []   <45  0       []   45-64  +1       [x]    >65  +2    RISK FACTORS:  1. HTN    2. Hypercholesterolemia    3. DM     4. Cigarette smoking (current or cessation < 3 mos)    5. Positive family history  (parent or sibling with CVD before age 72).    6. Obesity (BMI >30kg/m2)         []   No Risk factors Known  0       [x]   1-2 Risk Factors  +1       []   >3 Risk Factors or History of Atherosclerotic Disease  +2    INITIAL TROPONIN       [x]   < Normal Limit   0       []   1-3 x Normal Limit   +1       []   >3 x Normal Limit   +2     -----------------------------------------------------------------------------------------------------------------  SCORE TOTAL:  3 POINTS     Low Score:         (0-3 Points), risk of MACE of 0.9-1.7% (discuss d/c home with f/u)  Mod Score:         (4-6 Points), risk of MACE of 12-16.6% (discuss admission for further testing)  High Score:        (7-10 Points), risk of MACE of 50-65% (Admit ALL as they are candidates for early invasive measures)    Bronchiolitis noted on scan, patient states she has been coughing for a while. This is likely viral in nature and there is no evidence of consolidation. Troponins flat and no evidence of pulmonary embolism. She denies chest pain while in ER and did not require any nitroglycerin. Her heart score is low and just had a stress test that was negative and an echo that was -2 weeks ago in her cardiologist office. She is stable for continued outpatient management at this time. She is agreement that she would like to go home. Patient was explicitly instructed on specific signs and symptoms on which to return to the emergency room for. Patient was instructed to return to the ER for any new or worsening symptoms. Additional discharge instructions were given verbally. All questions were answered. Patient is comfortable and agreeable with discharge plan. Patient in no acute distress and non-toxic in appearance. This patient's ED course included: re-evaluation prior to disposition, cardiac monitoring, continuous pulse oximetry, and a personal history and physicial eaxmination    This patient has remained hemodynamically stable, improved, and been closely monitored during their ED course. Re-Evaluations:  Time: 5:57 PM EST   Re-evaluation. Patients symptoms are improving  Repeat physical examination is not changed      Consultations:   none    Critical Care: none    Lottie PARKER, DO, am the Primary Provider of Record    Counseling:   The emergency provider has spoken with the patient and discussed todays results, in addition to providing specific details for the plan of care and counseling regarding the diagnosis and prognosis. Questions are answered at this time and they are agreeable with the plan.    --------------------------- IMPRESSION AND DISPOSITION ---------------------------------    IMPRESSION  1. Chest pain, unspecified type    2.  Acute bronchiolitis due to unspecified organism        DISPOSITION  Disposition: Discharge to home  Patient condition is stable             Margaret Cummings DO  11/14/22 [id number]

## 2022-11-14 NOTE — ED NOTES
Patient reports that she has restless leg syndrome and is asking for something to take since she does not have her medications here with her. Dr Alanis notified.      [place], RN  11/14/22 [id number]

## 2024-01-03 ENCOUNTER — APPOINTMENT (OUTPATIENT)
Dept: GENERAL RADIOLOGY | Age: 79
End: 2024-01-03
Payer: MEDICARE

## 2024-01-03 ENCOUNTER — HOSPITAL ENCOUNTER (INPATIENT)
Age: 79
LOS: 3 days | Discharge: HOME | End: 2024-01-06
Attending: EMERGENCY MEDICINE | Admitting: INTERNAL MEDICINE
Payer: MEDICARE
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
## 2024-01-03 DIAGNOSIS — S72.001A CLOSED DISPLACED FRACTURE OF RIGHT FEMORAL NECK (HCC): Primary | ICD-10-CM

## 2024-01-03 PROBLEM — S72.064A: Status: ACTIVE | Noted: 2024-01-03

## 2024-01-03 LAB
ALBUMIN SERPL-MCNC: 3.5 G/DL (ref 3.5–5.2)
ALP SERPL-CCNC: 102 U/L (ref 35–104)
ALT SERPL-CCNC: 18 U/L (ref 0–32)
ANION GAP SERPL CALCULATED.3IONS-SCNC: 9 MMOL/L (ref 7–16)
AST SERPL-CCNC: 16 U/L (ref 0–31)
BASOPHILS # BLD: 0.04 K/UL (ref 0–0.2)
BASOPHILS NFR BLD: 0 % (ref 0–2)
BILIRUB SERPL-MCNC: 0.3 MG/DL (ref 0–1.2)
BUN SERPL-MCNC: 17 MG/DL (ref 6–23)
CALCIUM SERPL-MCNC: 9.1 MG/DL (ref 8.6–10.2)
CHLORIDE SERPL-SCNC: 105 MMOL/L (ref 98–107)
CO2 SERPL-SCNC: 27 MMOL/L (ref 22–29)
CREAT SERPL-MCNC: 0.8 MG/DL (ref 0.5–1)
EOSINOPHIL # BLD: 0.25 K/UL (ref 0.05–0.5)
EOSINOPHILS RELATIVE PERCENT: 2 % (ref 0–6)
ERYTHROCYTE [DISTWIDTH] IN BLOOD BY AUTOMATED COUNT: 15 % (ref 11.5–15)
GFR SERPL CREATININE-BSD FRML MDRD: >60 ML/MIN/1.73M2
GLUCOSE BLD-MCNC: 101 MG/DL (ref 74–99)
GLUCOSE BLD-MCNC: 88 MG/DL (ref 74–99)
GLUCOSE SERPL-MCNC: 89 MG/DL (ref 74–99)
HBA1C MFR BLD: 6.3 % (ref 4–5.6)
HCT VFR BLD AUTO: 46.7 % (ref 34–48)
HGB BLD-MCNC: 14.7 G/DL (ref 11.5–15.5)
IMM GRANULOCYTES # BLD AUTO: 0.06 K/UL (ref 0–0.58)
IMM GRANULOCYTES NFR BLD: 1 % (ref 0–5)
INR PPP: 1.2
LYMPHOCYTES NFR BLD: 2.64 K/UL (ref 1.5–4)
LYMPHOCYTES RELATIVE PERCENT: 23 % (ref 20–42)
MCH RBC QN AUTO: 28.2 PG (ref 26–35)
MCHC RBC AUTO-ENTMCNC: 31.5 G/DL (ref 32–34.5)
MCV RBC AUTO: 89.6 FL (ref 80–99.9)
MONOCYTES NFR BLD: 0.68 K/UL (ref 0.1–0.95)
MONOCYTES NFR BLD: 6 % (ref 2–12)
NEUTROPHILS NFR BLD: 68 % (ref 43–80)
NEUTS SEG NFR BLD: 7.89 K/UL (ref 1.8–7.3)
PARTIAL THROMBOPLASTIN TIME: 29.1 SEC (ref 24.5–35.1)
PLATELET # BLD AUTO: 169 K/UL (ref 130–450)
PMV BLD AUTO: 11 FL (ref 7–12)
POTASSIUM SERPL-SCNC: 3.9 MMOL/L (ref 3.5–5)
PROT SERPL-MCNC: 6.4 G/DL (ref 6.4–8.3)
PROTHROMBIN TIME: 12.7 SEC (ref 9.3–12.4)
RBC # BLD AUTO: 5.21 M/UL (ref 3.5–5.5)
SODIUM SERPL-SCNC: 141 MMOL/L (ref 132–146)
WBC OTHER # BLD: 11.6 K/UL (ref 4.5–11.5)

## 2024-01-03 PROCEDURE — 71045 X-RAY EXAM CHEST 1 VIEW: CPT

## 2024-01-03 PROCEDURE — 6370000000 HC RX 637 (ALT 250 FOR IP)

## 2024-01-03 PROCEDURE — 73600 X-RAY EXAM OF ANKLE: CPT

## 2024-01-03 PROCEDURE — 99222 1ST HOSP IP/OBS MODERATE 55: CPT | Performed by: ORTHOPAEDIC SURGERY

## 2024-01-03 PROCEDURE — 73590 X-RAY EXAM OF LOWER LEG: CPT

## 2024-01-03 PROCEDURE — 2580000003 HC RX 258: Performed by: NURSE PRACTITIONER

## 2024-01-03 PROCEDURE — 6360000002 HC RX W HCPCS

## 2024-01-03 PROCEDURE — 73552 X-RAY EXAM OF FEMUR 2/>: CPT

## 2024-01-03 PROCEDURE — 6370000000 HC RX 637 (ALT 250 FOR IP): Performed by: NURSE PRACTITIONER

## 2024-01-03 PROCEDURE — 83036 HEMOGLOBIN GLYCOSYLATED A1C: CPT

## 2024-01-03 PROCEDURE — 2060000000 HC ICU INTERMEDIATE R&B

## 2024-01-03 PROCEDURE — 80053 COMPREHEN METABOLIC PANEL: CPT

## 2024-01-03 PROCEDURE — 99222 1ST HOSP IP/OBS MODERATE 55: CPT | Performed by: NURSE PRACTITIONER

## 2024-01-03 PROCEDURE — 6360000002 HC RX W HCPCS: Performed by: EMERGENCY MEDICINE

## 2024-01-03 PROCEDURE — 85730 THROMBOPLASTIN TIME PARTIAL: CPT

## 2024-01-03 PROCEDURE — 85025 COMPLETE CBC W/AUTO DIFF WBC: CPT

## 2024-01-03 PROCEDURE — 82962 GLUCOSE BLOOD TEST: CPT

## 2024-01-03 PROCEDURE — 99285 EMERGENCY DEPT VISIT HI MDM: CPT

## 2024-01-03 PROCEDURE — 96374 THER/PROPH/DIAG INJ IV PUSH: CPT

## 2024-01-03 PROCEDURE — 85610 PROTHROMBIN TIME: CPT

## 2024-01-03 PROCEDURE — 73502 X-RAY EXAM HIP UNI 2-3 VIEWS: CPT

## 2024-01-03 RX ORDER — INSULIN LISPRO 100 [IU]/ML
0-4 INJECTION, SOLUTION INTRAVENOUS; SUBCUTANEOUS NIGHTLY
Status: DISCONTINUED | OUTPATIENT
Start: 2024-01-03 | End: 2024-01-06 | Stop reason: HOSPADM

## 2024-01-03 RX ORDER — SODIUM CHLORIDE 9 MG/ML
INJECTION, SOLUTION INTRAVENOUS PRN
Status: DISCONTINUED | OUTPATIENT
Start: 2024-01-03 | End: 2024-01-05 | Stop reason: SDUPTHER

## 2024-01-03 RX ORDER — IPRATROPIUM BROMIDE AND ALBUTEROL SULFATE 2.5; .5 MG/3ML; MG/3ML
1 SOLUTION RESPIRATORY (INHALATION) EVERY 4 HOURS PRN
Status: DISCONTINUED | OUTPATIENT
Start: 2024-01-03 | End: 2024-01-06 | Stop reason: HOSPADM

## 2024-01-03 RX ORDER — MORPHINE SULFATE 2 MG/ML
2 INJECTION, SOLUTION INTRAMUSCULAR; INTRAVENOUS EVERY 4 HOURS PRN
Status: DISCONTINUED | OUTPATIENT
Start: 2024-01-03 | End: 2024-01-05

## 2024-01-03 RX ORDER — MORPHINE SULFATE 4 MG/ML
4 INJECTION, SOLUTION INTRAMUSCULAR; INTRAVENOUS ONCE
Status: COMPLETED | OUTPATIENT
Start: 2024-01-03 | End: 2024-01-03

## 2024-01-03 RX ORDER — SODIUM CHLORIDE 0.9 % (FLUSH) 0.9 %
5-40 SYRINGE (ML) INJECTION EVERY 12 HOURS SCHEDULED
Status: DISCONTINUED | OUTPATIENT
Start: 2024-01-03 | End: 2024-01-06 | Stop reason: HOSPADM

## 2024-01-03 RX ORDER — HYDRALAZINE HYDROCHLORIDE 50 MG/1
100 TABLET, FILM COATED ORAL 3 TIMES DAILY
Status: DISCONTINUED | OUTPATIENT
Start: 2024-01-03 | End: 2024-01-06 | Stop reason: HOSPADM

## 2024-01-03 RX ORDER — INSULIN HUMAN 100 [IU]/ML
15 INJECTION, SUSPENSION SUBCUTANEOUS 2 TIMES DAILY
COMMUNITY

## 2024-01-03 RX ORDER — ONDANSETRON 2 MG/ML
4 INJECTION INTRAMUSCULAR; INTRAVENOUS EVERY 6 HOURS PRN
Status: DISCONTINUED | OUTPATIENT
Start: 2024-01-03 | End: 2024-01-05

## 2024-01-03 RX ORDER — BUMETANIDE 2 MG/1
2 TABLET ORAL 2 TIMES DAILY
Status: ON HOLD | COMMUNITY
End: 2024-01-06 | Stop reason: HOSPADM

## 2024-01-03 RX ORDER — DEXTROSE MONOHYDRATE 100 MG/ML
INJECTION, SOLUTION INTRAVENOUS CONTINUOUS PRN
Status: DISCONTINUED | OUTPATIENT
Start: 2024-01-03 | End: 2024-01-06 | Stop reason: HOSPADM

## 2024-01-03 RX ORDER — POTASSIUM CHLORIDE 7.45 MG/ML
10 INJECTION INTRAVENOUS PRN
Status: DISCONTINUED | OUTPATIENT
Start: 2024-01-03 | End: 2024-01-06 | Stop reason: HOSPADM

## 2024-01-03 RX ORDER — VENLAFAXINE HYDROCHLORIDE 150 MG/1
150 CAPSULE, EXTENDED RELEASE ORAL DAILY
Status: DISCONTINUED | OUTPATIENT
Start: 2024-01-03 | End: 2024-01-06 | Stop reason: HOSPADM

## 2024-01-03 RX ORDER — ACETAMINOPHEN 325 MG/1
650 TABLET ORAL EVERY 6 HOURS PRN
Status: DISCONTINUED | OUTPATIENT
Start: 2024-01-03 | End: 2024-01-03

## 2024-01-03 RX ORDER — ONDANSETRON 4 MG/1
4 TABLET, ORALLY DISINTEGRATING ORAL EVERY 8 HOURS PRN
Status: DISCONTINUED | OUTPATIENT
Start: 2024-01-03 | End: 2024-01-05

## 2024-01-03 RX ORDER — MORPHINE SULFATE 4 MG/ML
4 INJECTION, SOLUTION INTRAMUSCULAR; INTRAVENOUS ONCE
Status: DISCONTINUED | OUTPATIENT
Start: 2024-01-03 | End: 2024-01-03

## 2024-01-03 RX ORDER — OXYCODONE HYDROCHLORIDE 5 MG/1
5 TABLET ORAL EVERY 4 HOURS PRN
Status: DISCONTINUED | OUTPATIENT
Start: 2024-01-03 | End: 2024-01-03

## 2024-01-03 RX ORDER — MORPHINE SULFATE 2 MG/ML
2 INJECTION, SOLUTION INTRAMUSCULAR; INTRAVENOUS EVERY 4 HOURS PRN
Status: DISCONTINUED | OUTPATIENT
Start: 2024-01-03 | End: 2024-01-03

## 2024-01-03 RX ORDER — OXYCODONE HYDROCHLORIDE AND ACETAMINOPHEN 5; 325 MG/1; MG/1
1 TABLET ORAL EVERY 4 HOURS PRN
Status: DISCONTINUED | OUTPATIENT
Start: 2024-01-03 | End: 2024-01-05

## 2024-01-03 RX ORDER — POTASSIUM CHLORIDE 750 MG/1
10 TABLET, EXTENDED RELEASE ORAL DAILY
COMMUNITY

## 2024-01-03 RX ORDER — GABAPENTIN 400 MG/1
800 CAPSULE ORAL 3 TIMES DAILY
Status: DISCONTINUED | OUTPATIENT
Start: 2024-01-03 | End: 2024-01-06 | Stop reason: HOSPADM

## 2024-01-03 RX ORDER — POLYETHYLENE GLYCOL 3350 17 G/17G
17 POWDER, FOR SOLUTION ORAL DAILY PRN
Status: DISCONTINUED | OUTPATIENT
Start: 2024-01-03 | End: 2024-01-05

## 2024-01-03 RX ORDER — BUPROPION HYDROCHLORIDE 100 MG/1
100 TABLET, EXTENDED RELEASE ORAL DAILY
COMMUNITY

## 2024-01-03 RX ORDER — SODIUM CHLORIDE 0.9 % (FLUSH) 0.9 %
5-40 SYRINGE (ML) INJECTION PRN
Status: DISCONTINUED | OUTPATIENT
Start: 2024-01-03 | End: 2024-01-06 | Stop reason: HOSPADM

## 2024-01-03 RX ORDER — ACETAMINOPHEN 325 MG/1
650 TABLET ORAL EVERY 6 HOURS SCHEDULED
Status: DISCONTINUED | OUTPATIENT
Start: 2024-01-03 | End: 2024-01-05

## 2024-01-03 RX ORDER — LOSARTAN POTASSIUM 50 MG/1
50 TABLET ORAL DAILY
COMMUNITY

## 2024-01-03 RX ORDER — POTASSIUM CHLORIDE 20 MEQ/1
40 TABLET, EXTENDED RELEASE ORAL PRN
Status: DISCONTINUED | OUTPATIENT
Start: 2024-01-03 | End: 2024-01-06 | Stop reason: HOSPADM

## 2024-01-03 RX ORDER — MAGNESIUM SULFATE IN WATER 40 MG/ML
2000 INJECTION, SOLUTION INTRAVENOUS PRN
Status: DISCONTINUED | OUTPATIENT
Start: 2024-01-03 | End: 2024-01-06 | Stop reason: HOSPADM

## 2024-01-03 RX ORDER — LEVOTHYROXINE SODIUM 0.03 MG/1
50 TABLET ORAL DAILY
Status: DISCONTINUED | OUTPATIENT
Start: 2024-01-04 | End: 2024-01-06 | Stop reason: HOSPADM

## 2024-01-03 RX ORDER — ACETAMINOPHEN 650 MG/1
650 SUPPOSITORY RECTAL EVERY 6 HOURS SCHEDULED
Status: DISCONTINUED | OUTPATIENT
Start: 2024-01-03 | End: 2024-01-05

## 2024-01-03 RX ORDER — FUROSEMIDE 20 MG/1
40 TABLET ORAL DAILY
Status: DISCONTINUED | OUTPATIENT
Start: 2024-01-03 | End: 2024-01-03 | Stop reason: ALTCHOICE

## 2024-01-03 RX ORDER — LORAZEPAM 1 MG/1
1 TABLET ORAL EVERY 8 HOURS PRN
Status: DISCONTINUED | OUTPATIENT
Start: 2024-01-03 | End: 2024-01-06 | Stop reason: HOSPADM

## 2024-01-03 RX ORDER — ACETAMINOPHEN 650 MG/1
650 SUPPOSITORY RECTAL EVERY 6 HOURS PRN
Status: DISCONTINUED | OUTPATIENT
Start: 2024-01-03 | End: 2024-01-03

## 2024-01-03 RX ORDER — SUCRALFATE 1 G/1
1 TABLET ORAL DAILY
COMMUNITY

## 2024-01-03 RX ORDER — INSULIN LISPRO 100 [IU]/ML
0-4 INJECTION, SOLUTION INTRAVENOUS; SUBCUTANEOUS
Status: DISCONTINUED | OUTPATIENT
Start: 2024-01-03 | End: 2024-01-06 | Stop reason: HOSPADM

## 2024-01-03 RX ORDER — OMEPRAZOLE 20 MG/1
20 CAPSULE, DELAYED RELEASE ORAL DAILY
COMMUNITY

## 2024-01-03 RX ORDER — PRAMIPEXOLE DIHYDROCHLORIDE 1 MG/1
1 TABLET ORAL 2 TIMES DAILY
Status: DISCONTINUED | OUTPATIENT
Start: 2024-01-03 | End: 2024-01-06 | Stop reason: HOSPADM

## 2024-01-03 RX ADMIN — ACETAMINOPHEN 650 MG: 325 TABLET ORAL at 22:27

## 2024-01-03 RX ADMIN — MORPHINE SULFATE 2 MG: 2 INJECTION, SOLUTION INTRAMUSCULAR; INTRAVENOUS at 22:44

## 2024-01-03 RX ADMIN — OXYCODONE AND ACETAMINOPHEN 1 TABLET: 5; 325 TABLET ORAL at 17:01

## 2024-01-03 RX ADMIN — SODIUM CHLORIDE, PRESERVATIVE FREE 10 ML: 5 INJECTION INTRAVENOUS at 22:44

## 2024-01-03 RX ADMIN — MORPHINE SULFATE 4 MG: 4 INJECTION, SOLUTION INTRAMUSCULAR; INTRAVENOUS at 15:34

## 2024-01-03 RX ADMIN — GABAPENTIN 800 MG: 400 CAPSULE ORAL at 22:27

## 2024-01-03 ASSESSMENT — PAIN DESCRIPTION - LOCATION
LOCATION: HIP
LOCATION: HIP

## 2024-01-03 ASSESSMENT — PAIN SCALES - GENERAL
PAINLEVEL_OUTOF10: 10
PAINLEVEL_OUTOF10: 8
PAINLEVEL_OUTOF10: 8

## 2024-01-03 ASSESSMENT — PAIN DESCRIPTION - ORIENTATION
ORIENTATION: RIGHT
ORIENTATION: RIGHT

## 2024-01-03 NOTE — ED PROVIDER NOTES
cord stimulator malfunction    Social determinants: family at bedside, social support available    Acute or chronic illness limiting or affecting care: Right femoral neck fracture, inability to ambulate, risk of fall    ------------------------- PAST HISTORY -------------------------    I personally reviewed the following history:    Past Medical History:  has a past medical history of Anemia, Anxiety, Arthritis, Asthma, Bipolar 1 disorder (HCC), Cancer (HCC), Cancer of breast, female (HCC), CHF (congestive heart failure) (HCC), Chronic back pain, Depression, Depression with anxiety, Diabetes mellitus (HCC), Dyslipidemia, Fibromyalgia, History of blood transfusion, Hypertension, Hypothyroidism, Neuropathy, Pericardial effusion, Pleural effusion, TIA (transient ischemic attack), Tobacco abuse, and Type II or unspecified type diabetes mellitus without mention of complication, not stated as uncontrolled.    Past Surgical History:  has a past surgical history that includes Lung removal, partial; Mastectomy (Bilateral); Kidney removal (Left, 2/2006); Hysterectomy; Gastric bypass surgery (9/2004); hernia repair; knee surgery (Right); transthoracic echocardiogram (4/1/2010); Cataract removal with implant (Bilateral); liver biopsy (2006); thoracentesis (Left, 1/2010 and 3/2010. ); Cholecystectomy; other surgical history (03/16/15); other surgical history (N/A, 4/15/15); Breast surgery; Nerve Block (Bilateral, 06/06/16); Kidney removal; other surgical history (N/A, 02/24/2021); and Pain management procedure (N/A, 2/24/2021).    Social History:  reports that she quit smoking about 25 years ago. She started smoking about 85 years ago. She has never used smokeless tobacco. She reports current alcohol use of about 1.0 standard drink of alcohol per week. She reports current drug use. Drug: Marijuana (Weed).    Family History: family history is not on file.     The patient’s home medications have been reviewed.    Allergies:  fracture of the right femoral neck.               MDM and ED course  History is obtained from patient and EMS for patient's acute onset of moderate right femoral neck fracture    Differential diagnoses: Right femoral neck fracture, hip dislocation, femur fracture, Achilles tendon rupture, ankle fracture     Mechanical fall at home  Right femoral neck fracture  Morphine 4 mg IV for pain  Orthopedic consulted, recommend surgery for internal fixation  Discussed with admitting team, will admit    Clinical Impression  1. Closed displaced fracture of right femoral neck (HCC)    2. Fall, initial encounter         Disposition  Patient's disposition: Admit to med/surg floor    Ángel Corley MD  Resident PGY-2

## 2024-01-03 NOTE — CONSULTS
(HCC) 1/2006    S/P bilaeral mastectomy with left breast lymph node resection and chemotherapy.    CHF (congestive heart failure) (HCC)     Chronic back pain     Depression     Depression with anxiety     Diabetes mellitus (HCC)     Resolved after losing 130 lbs. after gastric bypass surgery.    Dyslipidemia     Fibromyalgia     History of blood transfusion     anemia    Hypertension     Hypothyroidism     Neuropathy     Pericardial effusion 4/2/2010    Subxiphoid pericardial window with drainage of pericardial effusion and pericardial biopsy:  Fluid and biopsy negative for metastases.     Pleural effusion 5/2/2010    Noted on chest x-ray.    TIA (transient ischemic attack)     Tobacco abuse     Patient smoked 3 ppd x 26 years.  Quit in 1995.    Type II or unspecified type diabetes mellitus without mention of complication, not stated as uncontrolled      Past Surgical History:   Procedure Laterality Date    BREAST SURGERY      CATARACT REMOVAL WITH IMPLANT Bilateral     CHOLECYSTECTOMY      GASTRIC BYPASS SURGERY  9/2004    HERNIA REPAIR      HYSTERECTOMY (CERVIX STATUS UNKNOWN)      KIDNEY REMOVAL Left 2/2006    Cancer.    KIDNEY REMOVAL      left    KNEE SURGERY Right     arthroscopy    LIVER BIOPSY  2006    Fatty tumor.    LUNG REMOVAL, PARTIAL      For lung CA, thought to be metastatic from breast cancer. left upper lobe     MASTECTOMY Bilateral     NERVE BLOCK Bilateral 06/06/16    transforaminal nerve block, lumbar #1    OTHER SURGICAL HISTORY  03/16/15    stage 1 percutaneous trial lumbar spinal cord stimulator    OTHER SURGICAL HISTORY N/A 4/15/15    surgical implantation of medtronic spinal cord stimulator lumbar    OTHER SURGICAL HISTORY N/A 02/24/2021    surgical removal lumbar SCS    PAIN MANAGEMENT PROCEDURE N/A 2/24/2021    SURGICAL REMOVAL OF LUMBAR MEDTRONIC SPINAL CORD STIMULATOR (STIMULATOR BATTERY LEFT HIP AREA) (CPT 91651) performed by Lizzy Herbert DO at [facility] OR    THORACENTESIS  normal, sclera normal, neck supple, no nasal discharge.   Extremities:   peripheral pulses normal, no edema, redness or tenderness in the calves   Skin: normal coloration, no rashes or open wounds, no suspicious skin lesions noted  Psych: Affect euthymic   Musculoskeletal:   Extremity:  Right hip   Skin intact over hip   Compartments soft and compressible   Leg externally rotated   Calves soft and NT   2/4 DP and PT pulses   Sensation intact   Wiggles toes   Fires L4 L5 and S1   Pain with log roll and heel strike      Plan: Right hip hemiarthroplasty for displaced femoral neck fracture        ELECTRONICALLY signed by:    Nemesio Handy MD  1/4/24   This is been dictated utilizing voice recognition software.  All efforts have been made to make the note accurate although inadvertent errors may be present.

## 2024-01-03 NOTE — H&P
[facility] History and Physical      CHIEF COMPLAINT: Right hip pain     History of Present Illness: This is a 78-year-old female with a past medical history of fibromyalgia, HTN, BPD, neuropathy, DM, HLD, tobacco abuse, anemia, anxiety, asthma, and depression who presents to the ED today with complaints of right hip pain.  Patient reports she fell at home approximately an hour before arrival to the ED. patient states [facility] when her left leg gave out she states she fell down on her right side/hip and states she tried to get up into a chair with her left leg when she put weight on her right leg she collapsed again.  Patient reports pain to her right hip that goes down the entirety of her leg.  Denies numbness or paresthesia right leg cool to the touch with palpable pulses.  Patient reports she is supposed to ambulate with a walker or a cane but was not using any assistive devices at that time.  Patient states she is prone to falls at home because her weak and especially when not using her assistive devices.    ED course is as follows: X-ray of the right tib/fib, ankle, femur, and pelvis pending-fracture noted of the right femoral head-will await for official read.  Lab work pending.    Informant(s) for H&P: Patient    REVIEW OF SYSTEMS:  A comprehensive review of systems was negative except for: what is in the HPI      PMH:  Past Medical History:   Diagnosis Date    Anemia     S/P chemotherapy.    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    Asthma     Bipolar 1 disorder (HCC)     Cancer ([facility])     lung/ kidney    Cancer of breast, female ([facility]) 1/2006    S/P bilaeral mastectomy with left breast lymph node resection and chemotherapy.    CHF (congestive heart failure) ([facility])     Chronic back pain     Depression     Depression with anxiety     Diabetes mellitus ([facility])      thickness, wall motion and systolic function with stage I diastolic dysfunction with normal right ventricular size and function with moderately thickened pericardium and with no significant valvular abnormalities noted.        Medications Prior to Admission:    Prior to Admission medications    Medication Sig Start Date End Date Taking? Authorizing Provider   potassium chloride (KLOR-CON M) 20 MEQ extended release tablet Take 2 tablets by mouth daily 1/20/21   Luisa Nugent MD   gabapentin (NEURONTIN) 800 MG tablet Take 800 mg by mouth 3 times daily..    Malou Brunson MD   pramipexole (MIRAPEX) 1 MG tablet Take 1 mg by mouth 2 times daily    Malou Brunson MD   furosemide (LASIX) 40 MG tablet Take 40 mg by mouth daily     Malou Brunson MD   verapamil (VERELAN) 180 MG extended release capsule Take 180 mg by mouth 2 times daily.    Malou Brunson MD   levothyroxine (SYNTHROID) 50 MCG tablet Take 50 mcg by mouth Daily.    Malou Brunson MD   hydrALAZINE (APRESOLINE) 100 MG tablet Take 100 mg by mouth 3 times daily.    Malou Brunson MD   venlafaxine (EFFEXOR-XR) 150 MG XR capsule Take 150 mg by mouth daily.    Malou Brunson MD   LORazepam (ATIVAN) 1 MG tablet Take 1 mg by mouth every 8 hours as needed.     Malou Brunson MD       Allergies:    Benadryl [diphenhydramine], Other, Sulfa antibiotics, Ciprofloxacin, and Tape [adhesive tape]    Social History:    reports that she quit smoking about 25 years ago. She started smoking about 85 years ago. She has never used smokeless tobacco. She reports current alcohol use of about 1.0 standard drink of alcohol per week. She reports current drug use. Drug: Marijuana (Weed).    Family History:   family history is not on file.       PHYSICAL EXAM:  Vitals:  /79   Pulse 86   Temp 97.8 °F (36.6 °C)   Resp 18   SpO2 91%     General Appearance: alert and oriented to person, place and time and in no acute

## 2024-01-04 ENCOUNTER — APPOINTMENT (OUTPATIENT)
Dept: GENERAL RADIOLOGY | Age: 79
End: 2024-01-04
Payer: MEDICARE

## 2024-01-04 ENCOUNTER — ANESTHESIA EVENT (OUTPATIENT)
Dept: OPERATING ROOM | Age: 79
End: 2024-01-04
Payer: MEDICARE

## 2024-01-04 ENCOUNTER — ANESTHESIA (OUTPATIENT)
Dept: OPERATING ROOM | Age: 79
End: 2024-01-04
Payer: MEDICARE

## 2024-01-04 PROBLEM — S72.001A CLOSED DISPLACED FRACTURE OF RIGHT FEMORAL NECK (HCC): Status: ACTIVE | Noted: 2024-01-04

## 2024-01-04 LAB
ANION GAP SERPL CALCULATED.3IONS-SCNC: 6 MMOL/L (ref 7–16)
BASOPHILS # BLD: 0.04 K/UL (ref 0–0.2)
BASOPHILS NFR BLD: 1 % (ref 0–2)
BUN SERPL-MCNC: 15 MG/DL (ref 6–23)
CALCIUM SERPL-MCNC: 8.7 MG/DL (ref 8.6–10.2)
CHLORIDE SERPL-SCNC: 106 MMOL/L (ref 98–107)
CO2 SERPL-SCNC: 28 MMOL/L (ref 22–29)
CREAT SERPL-MCNC: 0.8 MG/DL (ref 0.5–1)
EOSINOPHIL # BLD: 0.32 K/UL (ref 0.05–0.5)
EOSINOPHILS RELATIVE PERCENT: 4 % (ref 0–6)
ERYTHROCYTE [DISTWIDTH] IN BLOOD BY AUTOMATED COUNT: 15.3 % (ref 11.5–15)
GFR SERPL CREATININE-BSD FRML MDRD: >60 ML/MIN/1.73M2
GLUCOSE BLD-MCNC: 107 MG/DL (ref 74–99)
GLUCOSE BLD-MCNC: 142 MG/DL (ref 74–99)
GLUCOSE BLD-MCNC: 96 MG/DL (ref 74–99)
GLUCOSE SERPL-MCNC: 95 MG/DL (ref 74–99)
HCT VFR BLD AUTO: 44.3 % (ref 34–48)
HGB BLD-MCNC: 14.5 G/DL (ref 11.5–15.5)
IMM GRANULOCYTES # BLD AUTO: 0.04 K/UL (ref 0–0.58)
IMM GRANULOCYTES NFR BLD: 1 % (ref 0–5)
LYMPHOCYTES NFR BLD: 2.58 K/UL (ref 1.5–4)
LYMPHOCYTES RELATIVE PERCENT: 29 % (ref 20–42)
MCH RBC QN AUTO: 28.7 PG (ref 26–35)
MCHC RBC AUTO-ENTMCNC: 32.7 G/DL (ref 32–34.5)
MCV RBC AUTO: 87.7 FL (ref 80–99.9)
MONOCYTES NFR BLD: 0.83 K/UL (ref 0.1–0.95)
MONOCYTES NFR BLD: 9 % (ref 2–12)
NEUTROPHILS NFR BLD: 57 % (ref 43–80)
NEUTS SEG NFR BLD: 5.03 K/UL (ref 1.8–7.3)
PLATELET # BLD AUTO: 210 K/UL (ref 130–450)
PMV BLD AUTO: 10.4 FL (ref 7–12)
POTASSIUM SERPL-SCNC: 4.3 MMOL/L (ref 3.5–5)
RBC # BLD AUTO: 5.05 M/UL (ref 3.5–5.5)
SODIUM SERPL-SCNC: 140 MMOL/L (ref 132–146)
WBC OTHER # BLD: 8.8 K/UL (ref 4.5–11.5)

## 2024-01-04 PROCEDURE — 6370000000 HC RX 637 (ALT 250 FOR IP): Performed by: NURSE PRACTITIONER

## 2024-01-04 PROCEDURE — 2500000003 HC RX 250 WO HCPCS: Performed by: ORTHOPAEDIC SURGERY

## 2024-01-04 PROCEDURE — 6360000002 HC RX W HCPCS

## 2024-01-04 PROCEDURE — 85025 COMPLETE CBC W/AUTO DIFF WBC: CPT

## 2024-01-04 PROCEDURE — 2580000003 HC RX 258: Performed by: ORTHOPAEDIC SURGERY

## 2024-01-04 PROCEDURE — 3600000017 HC SURGERY HYBRID ADDL 15MIN: Performed by: ORTHOPAEDIC SURGERY

## 2024-01-04 PROCEDURE — 6370000000 HC RX 637 (ALT 250 FOR IP): Performed by: ORTHOPAEDIC SURGERY

## 2024-01-04 PROCEDURE — 80048 BASIC METABOLIC PNL TOTAL CA: CPT

## 2024-01-04 PROCEDURE — 7100000010 HC PHASE II RECOVERY - FIRST 15 MIN: Performed by: ORTHOPAEDIC SURGERY

## 2024-01-04 PROCEDURE — 3600000007 HC SURGERY HYBRID BASE: Performed by: ORTHOPAEDIC SURGERY

## 2024-01-04 PROCEDURE — 2580000003 HC RX 258

## 2024-01-04 PROCEDURE — 3700000000 HC ANESTHESIA ATTENDED CARE: Performed by: ORTHOPAEDIC SURGERY

## 2024-01-04 PROCEDURE — 6370000000 HC RX 637 (ALT 250 FOR IP)

## 2024-01-04 PROCEDURE — 2060000000 HC ICU INTERMEDIATE R&B

## 2024-01-04 PROCEDURE — C1776 JOINT DEVICE (IMPLANTABLE): HCPCS | Performed by: ORTHOPAEDIC SURGERY

## 2024-01-04 PROCEDURE — A4217 STERILE WATER/SALINE, 500 ML: HCPCS | Performed by: ORTHOPAEDIC SURGERY

## 2024-01-04 PROCEDURE — 2709999900 HC NON-CHARGEABLE SUPPLY: Performed by: ORTHOPAEDIC SURGERY

## 2024-01-04 PROCEDURE — 82962 GLUCOSE BLOOD TEST: CPT

## 2024-01-04 PROCEDURE — 7100000001 HC PACU RECOVERY - ADDTL 15 MIN: Performed by: ORTHOPAEDIC SURGERY

## 2024-01-04 PROCEDURE — 3700000001 HC ADD 15 MINUTES (ANESTHESIA): Performed by: ORTHOPAEDIC SURGERY

## 2024-01-04 PROCEDURE — 2580000003 HC RX 258: Performed by: NURSE PRACTITIONER

## 2024-01-04 PROCEDURE — 27236 TREAT THIGH FRACTURE: CPT | Performed by: ORTHOPAEDIC SURGERY

## 2024-01-04 PROCEDURE — 2500000003 HC RX 250 WO HCPCS

## 2024-01-04 PROCEDURE — 6360000002 HC RX W HCPCS: Performed by: ORTHOPAEDIC SURGERY

## 2024-01-04 PROCEDURE — 7100000000 HC PACU RECOVERY - FIRST 15 MIN: Performed by: ORTHOPAEDIC SURGERY

## 2024-01-04 PROCEDURE — 0SRR0JZ REPLACEMENT OF RIGHT HIP JOINT, FEMORAL SURFACE WITH SYNTHETIC SUBSTITUTE, OPEN APPROACH: ICD-10-PCS | Performed by: ORTHOPAEDIC SURGERY

## 2024-01-04 PROCEDURE — 99233 SBSQ HOSP IP/OBS HIGH 50: CPT | Performed by: INTERNAL MEDICINE

## 2024-01-04 PROCEDURE — 7100000011 HC PHASE II RECOVERY - ADDTL 15 MIN: Performed by: ORTHOPAEDIC SURGERY

## 2024-01-04 PROCEDURE — 73502 X-RAY EXAM HIP UNI 2-3 VIEWS: CPT

## 2024-01-04 DEVICE — BIPOLAR COMPONENT
Type: IMPLANTABLE DEVICE | Site: HIP | Status: FUNCTIONAL
Brand: UHR

## 2024-01-04 DEVICE — 127 DEGREE NECK ANGLE HIP STEM
Type: IMPLANTABLE DEVICE | Site: HIP | Status: FUNCTIONAL
Brand: ACCOLADE

## 2024-01-04 DEVICE — LFIT V40 FEMORAL HEAD
Type: IMPLANTABLE DEVICE | Site: HIP | Status: FUNCTIONAL
Brand: V40 HEAD

## 2024-01-04 RX ORDER — FENTANYL CITRATE 50 UG/ML
INJECTION, SOLUTION INTRAMUSCULAR; INTRAVENOUS PRN
Status: DISCONTINUED | OUTPATIENT
Start: 2024-01-04 | End: 2024-01-04 | Stop reason: SDUPTHER

## 2024-01-04 RX ORDER — MIDAZOLAM HYDROCHLORIDE 1 MG/ML
INJECTION INTRAMUSCULAR; INTRAVENOUS PRN
Status: DISCONTINUED | OUTPATIENT
Start: 2024-01-04 | End: 2024-01-04 | Stop reason: SDUPTHER

## 2024-01-04 RX ORDER — OXYCODONE HYDROCHLORIDE 5 MG/1
5 TABLET ORAL EVERY 6 HOURS PRN
Qty: 28 TABLET | Refills: 0 | Status: SHIPPED | OUTPATIENT
Start: 2024-01-04 | End: 2024-01-11

## 2024-01-04 RX ORDER — ROCURONIUM BROMIDE 10 MG/ML
INJECTION, SOLUTION INTRAVENOUS PRN
Status: DISCONTINUED | OUTPATIENT
Start: 2024-01-04 | End: 2024-01-04 | Stop reason: SDUPTHER

## 2024-01-04 RX ORDER — SODIUM CHLORIDE 0.9 % (FLUSH) 0.9 %
5-40 SYRINGE (ML) INJECTION EVERY 12 HOURS SCHEDULED
Status: DISCONTINUED | OUTPATIENT
Start: 2024-01-04 | End: 2024-01-04 | Stop reason: HOSPADM

## 2024-01-04 RX ORDER — SODIUM CHLORIDE 0.9 % (FLUSH) 0.9 %
5-40 SYRINGE (ML) INJECTION PRN
Status: DISCONTINUED | OUTPATIENT
Start: 2024-01-04 | End: 2024-01-04 | Stop reason: HOSPADM

## 2024-01-04 RX ORDER — ONDANSETRON 2 MG/ML
INJECTION INTRAMUSCULAR; INTRAVENOUS PRN
Status: DISCONTINUED | OUTPATIENT
Start: 2024-01-04 | End: 2024-01-04 | Stop reason: SDUPTHER

## 2024-01-04 RX ORDER — LABETALOL HYDROCHLORIDE 5 MG/ML
INJECTION, SOLUTION INTRAVENOUS PRN
Status: DISCONTINUED | OUTPATIENT
Start: 2024-01-04 | End: 2024-01-04 | Stop reason: SDUPTHER

## 2024-01-04 RX ORDER — SODIUM CHLORIDE 9 MG/ML
INJECTION, SOLUTION INTRAVENOUS PRN
Status: DISCONTINUED | OUTPATIENT
Start: 2024-01-04 | End: 2024-01-04 | Stop reason: HOSPADM

## 2024-01-04 RX ORDER — SUCCINYLCHOLINE/SOD CL,ISO/PF 200MG/10ML
SYRINGE (ML) INTRAVENOUS PRN
Status: DISCONTINUED | OUTPATIENT
Start: 2024-01-04 | End: 2024-01-04 | Stop reason: SDUPTHER

## 2024-01-04 RX ORDER — HYDROMORPHONE HYDROCHLORIDE 1 MG/ML
0.5 INJECTION, SOLUTION INTRAMUSCULAR; INTRAVENOUS; SUBCUTANEOUS EVERY 5 MIN PRN
Status: DISCONTINUED | OUTPATIENT
Start: 2024-01-04 | End: 2024-01-04 | Stop reason: HOSPADM

## 2024-01-04 RX ORDER — ASPIRIN 325 MG
325 TABLET, DELAYED RELEASE (ENTERIC COATED) ORAL 2 TIMES DAILY
Qty: 56 TABLET | Refills: 0 | Status: SHIPPED | OUTPATIENT
Start: 2024-01-04 | End: 2024-02-01

## 2024-01-04 RX ORDER — LIDOCAINE HYDROCHLORIDE 20 MG/ML
INJECTION, SOLUTION INTRAVENOUS PRN
Status: DISCONTINUED | OUTPATIENT
Start: 2024-01-04 | End: 2024-01-04 | Stop reason: SDUPTHER

## 2024-01-04 RX ORDER — PHENYLEPHRINE HCL IN 0.9% NACL 1 MG/10 ML
SYRINGE (ML) INTRAVENOUS PRN
Status: DISCONTINUED | OUTPATIENT
Start: 2024-01-04 | End: 2024-01-04 | Stop reason: SDUPTHER

## 2024-01-04 RX ORDER — MORPHINE SULFATE 2 MG/ML
1 INJECTION, SOLUTION INTRAMUSCULAR; INTRAVENOUS EVERY 5 MIN PRN
Status: DISCONTINUED | OUTPATIENT
Start: 2024-01-04 | End: 2024-01-04 | Stop reason: HOSPADM

## 2024-01-04 RX ORDER — DEXAMETHASONE SODIUM PHOSPHATE 10 MG/ML
INJECTION INTRAMUSCULAR; INTRAVENOUS PRN
Status: DISCONTINUED | OUTPATIENT
Start: 2024-01-04 | End: 2024-01-04 | Stop reason: SDUPTHER

## 2024-01-04 RX ORDER — PROPOFOL 10 MG/ML
INJECTION, EMULSION INTRAVENOUS PRN
Status: DISCONTINUED | OUTPATIENT
Start: 2024-01-04 | End: 2024-01-04 | Stop reason: SDUPTHER

## 2024-01-04 RX ORDER — TOBRAMYCIN 1.2 G/30ML
INJECTION, POWDER, LYOPHILIZED, FOR SOLUTION INTRAVENOUS PRN
Status: DISCONTINUED | OUTPATIENT
Start: 2024-01-04 | End: 2024-01-04 | Stop reason: ALTCHOICE

## 2024-01-04 RX ORDER — SODIUM CHLORIDE, SODIUM LACTATE, POTASSIUM CHLORIDE, CALCIUM CHLORIDE 600; 310; 30; 20 MG/100ML; MG/100ML; MG/100ML; MG/100ML
INJECTION, SOLUTION INTRAVENOUS CONTINUOUS PRN
Status: DISCONTINUED | OUTPATIENT
Start: 2024-01-04 | End: 2024-01-04 | Stop reason: SDUPTHER

## 2024-01-04 RX ORDER — ONDANSETRON 2 MG/ML
4 INJECTION INTRAMUSCULAR; INTRAVENOUS
Status: DISCONTINUED | OUTPATIENT
Start: 2024-01-04 | End: 2024-01-04 | Stop reason: HOSPADM

## 2024-01-04 RX ADMIN — GABAPENTIN 800 MG: 400 CAPSULE ORAL at 21:02

## 2024-01-04 RX ADMIN — LIDOCAINE HYDROCHLORIDE 40 MG: 20 INJECTION, SOLUTION INTRAVENOUS at 14:51

## 2024-01-04 RX ADMIN — FENTANYL CITRATE 50 MCG: 0.05 INJECTION, SOLUTION INTRAMUSCULAR; INTRAVENOUS at 15:19

## 2024-01-04 RX ADMIN — TRANEXAMIC ACID 1000 MG: 100 INJECTION, SOLUTION INTRAVENOUS at 18:28

## 2024-01-04 RX ADMIN — ROCURONIUM BROMIDE 10 MG: 10 INJECTION, SOLUTION INTRAVENOUS at 15:26

## 2024-01-04 RX ADMIN — TRANEXAMIC ACID 1000 MG: 1 INJECTION, SOLUTION INTRAVENOUS at 15:06

## 2024-01-04 RX ADMIN — FENTANYL CITRATE 50 MCG: 0.05 INJECTION, SOLUTION INTRAMUSCULAR; INTRAVENOUS at 15:25

## 2024-01-04 RX ADMIN — CEFAZOLIN 2000 MG: 2 INJECTION, POWDER, FOR SOLUTION INTRAMUSCULAR; INTRAVENOUS at 15:00

## 2024-01-04 RX ADMIN — PROPOFOL 170 MG: 10 INJECTION, EMULSION INTRAVENOUS at 14:51

## 2024-01-04 RX ADMIN — OXYCODONE AND ACETAMINOPHEN 1 TABLET: 5; 325 TABLET ORAL at 02:44

## 2024-01-04 RX ADMIN — LABETALOL HYDROCHLORIDE 5 MG: 5 INJECTION INTRAVENOUS at 16:41

## 2024-01-04 RX ADMIN — PRAMIPEXOLE DIHYDROCHLORIDE 1 MG: 1 TABLET ORAL at 22:51

## 2024-01-04 RX ADMIN — SUGAMMADEX 200 MG: 100 INJECTION, SOLUTION INTRAVENOUS at 16:29

## 2024-01-04 RX ADMIN — MORPHINE SULFATE 2 MG: 2 INJECTION, SOLUTION INTRAMUSCULAR; INTRAVENOUS at 10:06

## 2024-01-04 RX ADMIN — SODIUM CHLORIDE, POTASSIUM CHLORIDE, SODIUM LACTATE AND CALCIUM CHLORIDE: 600; 310; 30; 20 INJECTION, SOLUTION INTRAVENOUS at 14:44

## 2024-01-04 RX ADMIN — DEXAMETHASONE SODIUM PHOSPHATE 10 MG: 10 INJECTION INTRAMUSCULAR; INTRAVENOUS at 15:05

## 2024-01-04 RX ADMIN — ROCURONIUM BROMIDE 40 MG: 10 INJECTION, SOLUTION INTRAVENOUS at 15:00

## 2024-01-04 RX ADMIN — FENTANYL CITRATE 50 MCG: 0.05 INJECTION, SOLUTION INTRAMUSCULAR; INTRAVENOUS at 14:51

## 2024-01-04 RX ADMIN — MIDAZOLAM 1 MG: 1 INJECTION INTRAMUSCULAR; INTRAVENOUS at 14:43

## 2024-01-04 RX ADMIN — OXYCODONE AND ACETAMINOPHEN 1 TABLET: 5; 325 TABLET ORAL at 12:55

## 2024-01-04 RX ADMIN — Medication 120 MG: at 14:51

## 2024-01-04 RX ADMIN — MORPHINE SULFATE 2 MG: 2 INJECTION, SOLUTION INTRAMUSCULAR; INTRAVENOUS at 05:29

## 2024-01-04 RX ADMIN — VERAPAMIL HYDROCHLORIDE 180 MG: 180 TABLET, FILM COATED, EXTENDED RELEASE ORAL at 22:50

## 2024-01-04 RX ADMIN — Medication 100 MCG: at 15:37

## 2024-01-04 RX ADMIN — SODIUM CHLORIDE, PRESERVATIVE FREE 10 ML: 5 INJECTION INTRAVENOUS at 21:03

## 2024-01-04 RX ADMIN — ONDANSETRON 4 MG: 2 INJECTION INTRAMUSCULAR; INTRAVENOUS at 16:24

## 2024-01-04 RX ADMIN — ACETAMINOPHEN 650 MG: 325 TABLET ORAL at 05:29

## 2024-01-04 RX ADMIN — ACETAMINOPHEN 650 MG: 325 TABLET ORAL at 22:50

## 2024-01-04 ASSESSMENT — PAIN SCALES - GENERAL
PAINLEVEL_OUTOF10: 8
PAINLEVEL_OUTOF10: 7
PAINLEVEL_OUTOF10: 8

## 2024-01-04 ASSESSMENT — PAIN DESCRIPTION - LOCATION: LOCATION: LEG

## 2024-01-04 ASSESSMENT — PAIN DESCRIPTION - DESCRIPTORS: DESCRIPTORS: ACHING

## 2024-01-04 ASSESSMENT — PAIN DESCRIPTION - ORIENTATION: ORIENTATION: RIGHT

## 2024-01-04 NOTE — ANESTHESIA PRE PROCEDURE
Department of Anesthesiology  Preprocedure Note       Name:  Rebeka Renee   Age:  78 y.o.  :  1945                                          MRN:  [id number]         Date:  2024      Surgeon: Surgeon(s):  Nemesio Handy MD    Procedure: Procedure(s):  RIGHT HIP HEMIARTHROPLASTY    Medications prior to admission:   Prior to Admission medications    Medication Sig Start Date End Date Taking? Authorizing Provider   potassium chloride (KLOR-CON M) 10 MEQ extended release tablet Take 1 tablet by mouth daily   Yes ProviderMalou MD   Insulin NPH Isophane & Regular (HUMULIN 70/30 KWIKPEN) (70-30) 100 UNIT per ML injection pen Inject 15 Units into the skin 2 times daily   Yes Malou Brunson MD   buPROPion (WELLBUTRIN SR) 100 MG extended release tablet Take 1 tablet by mouth daily   Yes Malou Brunson MD   Semaglutide, 2 MG/DOSE, 8 MG/3ML SOPN Inject 8 mg into the skin once a week Given Monday   Yes Malou Brunson MD   bumetanide (BUMEX) 2 MG tablet Take 1 tablet by mouth 2 times daily   Yes ProviderMalou MD   omeprazole (PRILOSEC) 20 MG delayed release capsule Take 1 capsule by mouth daily   Yes ProviderMalou MD   sucralfate (CARAFATE) 1 GM tablet Take 1 tablet by mouth daily   Yes ProviderMalou MD   losartan (COZAAR) 50 MG tablet Take 1 tablet by mouth daily   Yes ProviderMalou MD   gabapentin (NEURONTIN) 800 MG tablet Take 1 tablet by mouth 3 times daily.    Malou Brunson MD   pramipexole (MIRAPEX) 0.75 MG tablet Take 1 tablet by mouth 2 times daily    Malou Brunson MD   verapamil (VERELAN) 180 MG extended release capsule Take 1 capsule by mouth 2 times daily    Malou Brunson MD   levothyroxine (SYNTHROID) 50 MCG tablet Take 1 tablet by mouth Daily    Malou Brunson MD   venlafaxine (EFFEXOR-XR) 150 MG XR capsule Take 1 capsule by mouth daily    ProviderMalou MD       Current medications:    Current

## 2024-01-04 NOTE — DISCHARGE INSTRUCTIONS
[facility] Department of Orthopedic Surgery  [address] [facility] [facility] [address]    Dr. Wood George, DO         MD Dr. Wood Barrientos MD Frank Ansevin, PA-C Sara Zatchok, PA-C Tyler Tsangaris PA-C      Orthopaedics Discharge Instructions   Weight bearing Status - Weight bearing as tolerated - on right lower Extremity  Pain medication Per Prescriptions  Contact Office for Medication Refill- [phone]  Office can refill pain med every 7 days  If patient discharging to facility then pain control will be continued per facility physician  Ice to operative/injured site for 15-30 minutes of each hour for next 5 days    Recommend that you continue to ice the area 2-3 times per day after this   Elevate operative/injured limb on 2 pillows at home  Goal is to have limb above the heart if able  Continue DVT Prophylaxis (blood clot prevention) as Prescribed: aspirin   Wound care - Please keep dressing clean and dry    Follow Up in Office in 2 weeks. Your first post op appointment is often with one of our PAs.     Call the office at [phone] or directions or with any questions.  Watch for these significant complications.  Call physician if they or any other problems occur:  Fever over 101°, redness, swelling or warmth at the operative site  Unrelieved nausea    Foul smelling or cloudy drainage at the operative site   Unrelieved pain    Blood soaked dressing. (Some oozing may be normal)     Numb, pale, blue, cold or tingling extremity    No future appointments.      It is the Department of Orthopaedic Trauma's standard of practice that providers will de-escalate(wean) all patients from narcotic(opioid) medications during the post-operative period.   We provide multimodal pain control but opioid medications are tapered in all of our patients.  If patient requires referral to pain management for prolonged taper off of opioid pain medication we will facilitate this process.

## 2024-01-04 NOTE — ED NOTES
Patient placed in appropriate hospital attire.  Denture cup at bedside.  Surgery permit signed by patient & placed on paper chart,

## 2024-01-04 NOTE — OP NOTE
Operative Note      Patient: Rebeka Renee  YOB: 1945  MRN: [id number]    Date of Procedure: 1/4/2024    Pre-Op Diagnosis Codes:     * Closed displaced fracture of right femoral neck (HCC) [S72.001A]    Post-Op Diagnosis: Same       Procedure(s):  RIGHT HIP HEMIARTHROPLASTY for femoral neck fracture    Surgeon(s):  Nemesio Handy MD    Assistant:   Resident: Nikolas Mcintosh DO; Laurent Mccarthy DO; Eugenio Tapia DO    Anesthesia: General    Estimated Blood Loss (mL): less than 100     Complications: None    Specimens:   * No specimens in log *    Implants:  Implant Name Type Inv. Item Serial No.  Lot No. LRB No. Used Action   HEAD FEM [id number] +4MM OFFSET HIP CO CHROM V40 TAPR LO FRIC - [id number]  HEAD FEM [id number] +4MM OFFSET HIP CO CHROM V40 TAPR LO FRIC  [employer] [facility] [id number] Right 1 Implanted   HEAD FEM OD46MM ID26MM HIP CO CHROM POLYETH BPLR CEMENTLESS - [id number]  HEAD FEM OD46MM ID26MM HIP CO CHROM POLYETH BPLR CEMENTLESS  [employer] [id number] Right 1 Implanted   STEM FEM SZ 7 L114MM NK L37MM 50MM OFFSET 127DEG HIP TI - [id number]  STEM FEM SZ 7 L114MM NK L37MM 50MM OFFSET 127DEG HIP TI  [employer] [id number] Right 1 Implanted         Drains: * No LDAs found *    Findings: [place]anna Accolade 2 size #7 stem, 127 degree angle, +4 mm neck with a 46 mm bipolar head        Detailed Description of Procedure:     Brief Hospital Course:  Patient was admitted through the ER c/o Right hip pain. Xrays revealed a Right femoral neck fx. After examination of the patient, review of the radiologic studies, and appropriate pre-operative risk assessment, I recommended Right hip arthroplasty, which the patient was agreeable towards.    Operative Course:  Outside the operating suite, the patient was seen and identified. The operative site was marked and identified as appropriate by the patient, staff, surgeon, and Anesthesia. The patient was taken

## 2024-01-04 NOTE — PROGRESS NOTES
[facility] Hospitalist Progress Note      Synopsis: Patient with a history of hypertension, BPD, diabetes, hyperlipidemia, tobacco abuse, depression admitted on 1/3/2024 after falling at home and sustaining a right hip fracture; plan is to go to surgery with orthopedics today    Subjective    Clinically improving. Feeling better.    Stable overnight. No other overnight issues reported.     No CP, SOB, palpitations, blurred vision, HA, lightheadedness, LOC or focal neurological deficits    Exam:  BP (!) 192/90 Comment: notified anesthesia  Pulse 88   Temp 98.2 °F (36.8 °C)   Resp 13   SpO2 99%   General Appearance: alert and oriented to person, place and time and in no acute distress  Skin: warm and dry  Head: normocephalic and atraumatic  Eyes: pupils equal, round, and reactive to light, extraocular eye movements intact, conjunctivae normal  Pulmonary/Chest: clear to auscultation bilaterally- no wheezes, rales or rhonchi, normal air movement, no respiratory distress  Cardiovascular: normal rate, normal S1 and S2 and no carotid bruits  Abdomen: soft, non-tender, non-distended, normal bowel sounds  Extremities: no cyanosis, no clubbing and no edema  Neurologic:  speech normal    Medications:  Reviewed    Infusion Medications    sodium chloride      dextrose       Scheduled Medications    sodium chloride flush  5-40 mL IntraVENous 2 times per day    levothyroxine  50 mcg Oral Daily    hydrALAZINE  100 mg Oral TID    gabapentin  800 mg Oral TID    pramipexole  1 mg Oral BID    venlafaxine  150 mg Oral Daily    verapamil  180 mg Oral BID    insulin lispro  0-4 Units SubCUTAneous TID WC    insulin lispro  0-4 Units SubCUTAneous Nightly    acetaminophen  650 mg Oral 4 times per day    Or    acetaminophen  650 mg Rectal 4 times per day    ceFAZolin  2,000 mg IntraVENous See Admin Instructions     PRN Meds: sodium chloride flush, sodium chloride, potassium chloride **OR** potassium alternative oral replacement **OR**

## 2024-01-05 LAB
GLUCOSE BLD-MCNC: 116 MG/DL (ref 74–99)
GLUCOSE BLD-MCNC: 121 MG/DL (ref 74–99)
GLUCOSE BLD-MCNC: 126 MG/DL (ref 74–99)
GLUCOSE BLD-MCNC: 130 MG/DL (ref 74–99)

## 2024-01-05 PROCEDURE — 6370000000 HC RX 637 (ALT 250 FOR IP): Performed by: ORTHOPAEDIC SURGERY

## 2024-01-05 PROCEDURE — 2060000000 HC ICU INTERMEDIATE R&B

## 2024-01-05 PROCEDURE — 6370000000 HC RX 637 (ALT 250 FOR IP)

## 2024-01-05 PROCEDURE — 6360000002 HC RX W HCPCS: Performed by: ORTHOPAEDIC SURGERY

## 2024-01-05 PROCEDURE — 2700000000 HC OXYGEN THERAPY PER DAY

## 2024-01-05 PROCEDURE — 82962 GLUCOSE BLOOD TEST: CPT

## 2024-01-05 PROCEDURE — 99233 SBSQ HOSP IP/OBS HIGH 50: CPT | Performed by: INTERNAL MEDICINE

## 2024-01-05 PROCEDURE — 97166 OT EVAL MOD COMPLEX 45 MIN: CPT

## 2024-01-05 PROCEDURE — 2580000003 HC RX 258: Performed by: ORTHOPAEDIC SURGERY

## 2024-01-05 PROCEDURE — 97530 THERAPEUTIC ACTIVITIES: CPT

## 2024-01-05 PROCEDURE — 2580000003 HC RX 258

## 2024-01-05 PROCEDURE — 97535 SELF CARE MNGMENT TRAINING: CPT

## 2024-01-05 PROCEDURE — 97161 PT EVAL LOW COMPLEX 20 MIN: CPT

## 2024-01-05 RX ORDER — ASPIRIN 325 MG
325 TABLET, DELAYED RELEASE (ENTERIC COATED) ORAL 2 TIMES DAILY
Status: DISCONTINUED | OUTPATIENT
Start: 2024-01-05 | End: 2024-01-06 | Stop reason: HOSPADM

## 2024-01-05 RX ORDER — ACETAMINOPHEN 325 MG/1
650 TABLET ORAL EVERY 6 HOURS PRN
Status: DISCONTINUED | OUTPATIENT
Start: 2024-01-05 | End: 2024-01-06 | Stop reason: HOSPADM

## 2024-01-05 RX ORDER — SODIUM CHLORIDE 0.9 % (FLUSH) 0.9 %
5-40 SYRINGE (ML) INJECTION EVERY 12 HOURS SCHEDULED
Status: DISCONTINUED | OUTPATIENT
Start: 2024-01-05 | End: 2024-01-06 | Stop reason: HOSPADM

## 2024-01-05 RX ORDER — ONDANSETRON 2 MG/ML
4 INJECTION INTRAMUSCULAR; INTRAVENOUS EVERY 6 HOURS PRN
Status: DISCONTINUED | OUTPATIENT
Start: 2024-01-05 | End: 2024-01-06 | Stop reason: HOSPADM

## 2024-01-05 RX ORDER — SODIUM CHLORIDE 0.9 % (FLUSH) 0.9 %
5-40 SYRINGE (ML) INJECTION PRN
Status: DISCONTINUED | OUTPATIENT
Start: 2024-01-05 | End: 2024-01-06 | Stop reason: HOSPADM

## 2024-01-05 RX ORDER — MORPHINE SULFATE 2 MG/ML
2 INJECTION, SOLUTION INTRAMUSCULAR; INTRAVENOUS EVERY 4 HOURS PRN
Status: DISCONTINUED | OUTPATIENT
Start: 2024-01-05 | End: 2024-01-06 | Stop reason: HOSPADM

## 2024-01-05 RX ORDER — SODIUM CHLORIDE 9 MG/ML
INJECTION, SOLUTION INTRAVENOUS PRN
Status: DISCONTINUED | OUTPATIENT
Start: 2024-01-05 | End: 2024-01-06 | Stop reason: HOSPADM

## 2024-01-05 RX ORDER — ONDANSETRON 4 MG/1
4 TABLET, ORALLY DISINTEGRATING ORAL EVERY 8 HOURS PRN
Status: DISCONTINUED | OUTPATIENT
Start: 2024-01-05 | End: 2024-01-06 | Stop reason: HOSPADM

## 2024-01-05 RX ORDER — OXYCODONE HYDROCHLORIDE 5 MG/1
5 TABLET ORAL EVERY 6 HOURS PRN
Status: DISCONTINUED | OUTPATIENT
Start: 2024-01-05 | End: 2024-01-06 | Stop reason: HOSPADM

## 2024-01-05 RX ORDER — POLYETHYLENE GLYCOL 3350 17 G/17G
17 POWDER, FOR SOLUTION ORAL DAILY PRN
Status: DISCONTINUED | OUTPATIENT
Start: 2024-01-05 | End: 2024-01-06 | Stop reason: HOSPADM

## 2024-01-05 RX ORDER — SODIUM CHLORIDE 9 MG/ML
INJECTION, SOLUTION INTRAVENOUS CONTINUOUS
Status: DISCONTINUED | OUTPATIENT
Start: 2024-01-05 | End: 2024-01-05

## 2024-01-05 RX ORDER — MORPHINE SULFATE 4 MG/ML
4 INJECTION, SOLUTION INTRAMUSCULAR; INTRAVENOUS EVERY 4 HOURS PRN
Status: DISCONTINUED | OUTPATIENT
Start: 2024-01-05 | End: 2024-01-06 | Stop reason: HOSPADM

## 2024-01-05 RX ADMIN — PRAMIPEXOLE DIHYDROCHLORIDE 1 MG: 1 TABLET ORAL at 21:01

## 2024-01-05 RX ADMIN — ASPIRIN 325 MG: 325 TABLET, COATED ORAL at 21:01

## 2024-01-05 RX ADMIN — HYDRALAZINE HYDROCHLORIDE 100 MG: 50 TABLET ORAL at 09:20

## 2024-01-05 RX ADMIN — SODIUM CHLORIDE, PRESERVATIVE FREE 10 ML: 5 INJECTION INTRAVENOUS at 09:22

## 2024-01-05 RX ADMIN — GABAPENTIN 800 MG: 400 CAPSULE ORAL at 09:19

## 2024-01-05 RX ADMIN — CEFAZOLIN 2000 MG: 2 INJECTION, POWDER, FOR SOLUTION INTRAMUSCULAR; INTRAVENOUS at 03:44

## 2024-01-05 RX ADMIN — PRAMIPEXOLE DIHYDROCHLORIDE 1 MG: 1 TABLET ORAL at 09:19

## 2024-01-05 RX ADMIN — CEFAZOLIN 2000 MG: 2 INJECTION, POWDER, FOR SOLUTION INTRAMUSCULAR; INTRAVENOUS at 13:06

## 2024-01-05 RX ADMIN — GABAPENTIN 800 MG: 400 CAPSULE ORAL at 14:46

## 2024-01-05 RX ADMIN — VERAPAMIL HYDROCHLORIDE 180 MG: 180 TABLET, FILM COATED, EXTENDED RELEASE ORAL at 09:19

## 2024-01-05 RX ADMIN — GABAPENTIN 800 MG: 400 CAPSULE ORAL at 20:58

## 2024-01-05 RX ADMIN — VERAPAMIL HYDROCHLORIDE 180 MG: 180 TABLET, FILM COATED, EXTENDED RELEASE ORAL at 21:00

## 2024-01-05 RX ADMIN — VENLAFAXINE HYDROCHLORIDE 150 MG: 150 CAPSULE, EXTENDED RELEASE ORAL at 09:20

## 2024-01-05 RX ADMIN — ASPIRIN 325 MG: 325 TABLET, COATED ORAL at 09:20

## 2024-01-05 RX ADMIN — LEVOTHYROXINE SODIUM 50 MCG: 0.03 TABLET ORAL at 06:17

## 2024-01-05 NOTE — PROGRESS NOTES
OCCUPATIONAL THERAPY INITIAL EVALUATION    [facility] [address] [place], [place]       Date:2024                                                  Patient Name: Rebeka Renee  MRN: [id number]  : 1945  Room: [address] [place] [address]    Evaluating OT: Osmar Khan OTR/L [id number]    Referring Provider: Nikolas Mcintosh DO      Specific Provider Orders/Date: OT evaluation and treatment 24 0330    Diagnosis:  Fall, initial encounter [W19.XXXA]  Closed displaced fracture of right femoral neck (HCC) [S72.001A]  Nondisplaced articular fracture of head of right femur, init (HCC) [S72.064A]      Pertinent Medical History:  has a past medical history of Anemia, Anxiety, Arthritis, Asthma, Bipolar 1 disorder (HCC), Cancer (HCC), Cancer of breast, female (HCC), CHF (congestive heart failure) (HCC), Chronic back pain, Depression, Depression with anxiety, Diabetes mellitus (HCC), Dyslipidemia, Fibromyalgia, History of blood transfusion, Hypertension, Hypothyroidism, Neuropathy, Pericardial effusion, Pleural effusion, TIA (transient ischemic attack), Tobacco abuse, and Type II or unspecified type diabetes mellitus without mention of complication, not stated as uncontrolled.   Pt admitted to the hospital s/p fall. Pt underwent R hip hemiarthroplasty for femoral neck fracture on .     Orders received, chart reviewed, eval complete.     Precautions:  Fall Risk, WBAT RLE  PT perfect served ortho and clarified no additional hip precautions.     Assessment of current deficits   [x] Functional mobility   [x]ADLs  [x] Strength               []Cognition   [x] Functional transfers   [x] IADLs         [x] Safety Awareness   [x]Endurance   [] Fine Coordination              [x] Balance      [] Vision/perception   []Sensation    []Gross Motor Coordination  [] ROM  [] Delirium                   [] Motor Control     OT PLAN OF CARE   OT POC based on physician orders,      Treatment: OT treatment provided this date includes:  Pt edu on role of OT in the acute care setting. Pt verbalized understanding.    ADL-  Instruction/training on safety and adapted techniques for completion of ADL as noted above. OT edu on use of reacher for increased independence during dressing tasks. Pt nicole'vijay good teach back with extended time and reports she has a reacher at home.    Mobility-  Instruction/training on safety and improved independence with bed mobility, functional transfers, and functional mobility. Therapist facilitated and provided cues for body alignment and hand/feet placement.   On 4L O2 via nc throughout session; unable to get reading d/t artifical nails- RN notified. Pt denies SOB with activity. /60 bedside chair at the end of session     Rehab Potential:  Good for established goals     Patient / Family Goal: to go home      Patient and/or family were instructed on functional diagnosis, prognosis/goals and OT plan of care. Pt/family demonstrated understanding.      Eval Complexity:      Description  Performance deficits  Clinical decision making  Co-morbidities affecting occupational performance  Modification or assistance to complete eval    Low Complexity   1 to 3 []  Low []  None []  None []   Moderate Complexity   3 to 5 [x]  Mod [x]  Maybe []  Min to Mod [x]   High Complexity   5 or more []  High []  Yes [x]  Max []     The above evaluation is classified as moderate complexity based off the noted performance deficits, personal factors, co-morbidities, assistance required, and other factors as noted in the clinical evaluation and functional testing.     Time In: 1115  Time Out: 1200  Total Treatment Time: 25 minutes    Min Units   OT Eval Low 69113       OT Eval Medium 97166  x 1   OT Eval High 14649       OT Re-Eval 44625       Therapeutic Ex 45683       Therapeutic Activities 82872      ADL/Self Care 36331  25 2   Orthotic Management 64674       Neuro Re-Ed 53599

## 2024-01-05 NOTE — PROGRESS NOTES
[facility] Hospitalist Progress Note      Synopsis: Patient with a history of hypertension, BPD, diabetes, hyperlipidemia, tobacco abuse, depression admitted on 1/3/2024 after falling at home and sustaining a right hip fracture; underwent right hip hemiarthroplasty for femoral neck fracture with orthopedics on 1/4/2024    Subjective  Did well with therapy today, minimal pain  Does have oxygen requirement and intermittent hypoxia  Exam:  /70   Pulse 75   Temp 97.7 °F (36.5 °C) (Temporal)   Resp 20   SpO2 96%   General Appearance: alert and oriented to person, place and time and in no acute distress  Skin: warm and dry  Head: normocephalic and atraumatic  Eyes: pupils equal, round, and reactive to light, extraocular eye movements intact, conjunctivae normal  Pulmonary/Chest: clear to auscultation bilaterally- no wheezes, rales or rhonchi, normal air movement, no respiratory distress  Cardiovascular: normal rate, normal S1 and S2 and no carotid bruits  Abdomen: soft, non-tender, non-distended, normal bowel sounds  Extremities: no cyanosis, no clubbing and no edema, surgical dressing clean dry and intact  Neurologic:  speech normal    Medications:  Reviewed    Infusion Medications    sodium chloride      dextrose       Scheduled Medications    sodium chloride flush  5-40 mL IntraVENous 2 times per day    ceFAZolin (ANCEF) IVPB  2,000 mg IntraVENous Q8H    aspirin  325 mg Oral BID    sodium chloride flush  5-40 mL IntraVENous 2 times per day    levothyroxine  50 mcg Oral Daily    hydrALAZINE  100 mg Oral TID    gabapentin  800 mg Oral TID    pramipexole  1 mg Oral BID    venlafaxine  150 mg Oral Daily    verapamil  180 mg Oral BID    insulin lispro  0-4 Units SubCUTAneous TID     insulin lispro  0-4 Units SubCUTAneous Nightly     PRN Meds: sodium chloride flush, sodium chloride, morphine **OR** morphine, acetaminophen, oxyCODONE, polyethylene glycol, magnesium hydroxide, ondansetron **OR** ondansetron, sodium

## 2024-01-05 NOTE — ANESTHESIA POSTPROCEDURE EVALUATION
Department of Anesthesiology  Postprocedure Note    Patient: Rebeka Renee  MRN: [id number]  YOB: 1945  Date of evaluation: 1/4/2024    Procedure Summary       Date: 01/04/24 Room / Location: [address]    Anesthesia Start: 1440 Anesthesia Stop: 1644    Procedure: RIGHT HIP HEMIARTHROPLASTY (Right: Hip) Diagnosis:       Closed displaced fracture of right femoral neck (HCC)      (Closed displaced fracture of right femoral neck (HCC) [S72.001A])    Surgeons: Nemesio Handy MD Responsible Provider: Aarti Dexter MD    Anesthesia Type: general ASA Status: 3 - Emergent            Anesthesia Type: No value filed.    Terrie Phase I: Terrie Score: 10    Terrie Phase II: Terrie Score: 10    Anesthesia Post Evaluation    Patient location during evaluation: PACU  Patient participation: complete - patient participated  Level of consciousness: awake and alert  Airway patency: patent  Nausea & Vomiting: no nausea and no vomiting  Cardiovascular status: blood pressure returned to baseline and hemodynamically stable  Respiratory status: acceptable and spontaneous ventilation  Hydration status: euvolemic  Multimodal analgesia pain management approach  Pain management: adequate    No notable events documented.

## 2024-01-05 NOTE — FLOWSHEET NOTE
Inpatient Wound Care (Initial consult) 8515A    Admit Date: 1/3/2024  1:43 PM    Reason for consult:  Left breast    Significant history:  Per H&P    CHIEF COMPLAINT: Right hip pain     Findings:     01/05/24 1415   Skin Integumentary    Skin Integrity   (dry flaky)   Location bilateral feet   Wound 01/04/24 Breast Left   Date First Assessed/Time First Assessed: 01/04/24 2000   Present on Original Admission: Yes  Primary Wound Type: Other (comment)  Location: Breast  Wound Location Orientation: Left   Wound Image    Dressing Status Reinforced dressing   Wound Cleansed Cleansed with saline   Dressing/Treatment Foam   Wound Length (cm) 0.2 cm   Wound Width (cm) 0.2 cm   Wound Depth (cm) 0.1 cm   Wound Surface Area (cm^2) 0.04 cm^2   Change in Wound Size % (l*w) 0   Wound Volume (cm^3) 0.004 cm^3   Wound Healing % -900   Wound Assessment Pink/red   Drainage Amount None (dry)   Odor None   Shelby-wound Assessment Intact   Wound Thickness Description not for Pressure Injury Partial thickness   Incision 01/04/24 Hip Right   Date First Assessed/Time First Assessed: 01/04/24 1528   Present on Original Admission: No  Location: Hip  Incision Location Orientation: Right   Dressing Status Intact       **Informed Consent**    The patient has given verbal consent to have photos taken of wound and inserted into their chart as part of their permanent medical record for purposes of documentation, treatment management and/or medical review.   All Images taken on 1/5/24 of patient name: Rebeka Renee were transmitted and stored on secured Epic  Site located within Media Folder Tab by a registered Epic-[employer] Mobile Application Device.        Impression:  Left breast:partial thickness    Plan: Foam  TAPS  Heel protectors  Patient nurse to make patients primary aware of left breast wound.  Patient will need continued preventative care.      Mery Puentes RN 1/5/2024 2:19 PM

## 2024-01-05 NOTE — CARE COORDINATION
Chart reviewed and case reviewed in IDR.  Met with the patient at the bedside to discuss transition of care planning.  Patient to work with therapy today.  Patient is currently on 4L O2 per NC and does not wear O2 at home.  Discussed possible need for home O2 and patient is agreeable.  Will need ambulatory POX and home O2 orders.  Patient with CHF and lung CA  Patient would like [facility] for home oxygen after review of list of providers.  Patient would also prefer to transition to home with HHC.  Discussed HHC providers and need for nursing, not just therapy with new oxygen.  Patient expressed understanding.  Patient has no preference for HC provider as long as they are in network.  Call placed to Fanny, liaison with [facility] and referral made for HC at transition of care.  She will review and follow.  Home care orders recieved.  Call placed to Dione, liaison with [facility] and referral made for possible Home O2.  She will follow.  LORIN list for [facility] also provided to the patient to review if needed.  Await further input from PT and OT.  Will continue to follow.     Tamica Benitez RN.  P:  [phone]      Spoke with Therapy and patient will need a FWW for transition of care planning.  Orders received.  Call placed to Dione with [facility] and updated need for FWW.      Tamica Benitez RN.    The Plan for Transition of Care is related to the following treatment goals: Home care co. to improve functional mobility and DME co. To provide new home O2.      The Patient and/or patient representative was provided with a choice of provider and agrees with the discharge plan. [x] Yes [] No    Freedom of choice list was provided with basic dialogue that supports the patient's individualized plan of care/goals, treatment preferences and shares the quality data associated with the providers. [x] Yes [] No

## 2024-01-05 NOTE — PLAN OF CARE
Problem: Safety - Adult  Goal: Free from fall injury  Outcome: Progressing     Problem: Discharge Planning  Goal: Discharge to home or other facility with appropriate resources  Outcome: Progressing     Problem: Cognitive:  Goal: Knowledge of wound care  Description: Knowledge of wound care  1/5/2024 0131 by Marie Hunt RN  Outcome: Progressing  Goal: Understands risk factors for wounds  Description: Understands risk factors for wounds  1/5/2024 0131 by Marie Hunt RN  Outcome: Progressing     Problem: Wound:  Goal: Will show signs of wound healing; wound closure and no evidence of infection  Description: Will show signs of wound healing; wound closure and no evidence of infection  1/5/2024 0131 by Marie Hunt RN  Outcome: Progressing     Problem: Pressure Ulcer:  Goal: Signs of wound healing will improve  Description: Signs of wound healing will improve  1/5/2024 0131 by Marie Hunt RN  Outcome: Progressing  Goal: Absence of new pressure ulcer  Description: Absence of new pressure ulcer  1/5/2024 0131 by Marie Hunt RN  Outcome: Progressing

## 2024-01-05 NOTE — PLAN OF CARE
Problem: Cognitive:  Goal: Knowledge of wound care  Description: Knowledge of wound care  Outcome: Progressing  Goal: Understands risk factors for wounds  Description: Understands risk factors for wounds  Outcome: Progressing     Problem: Wound:  Goal: Will show signs of wound healing; wound closure and no evidence of infection  Description: Will show signs of wound healing; wound closure and no evidence of infection  Outcome: Progressing     Problem: Pressure Ulcer:  Goal: Signs of wound healing will improve  Description: Signs of wound healing will improve  Outcome: Progressing  Goal: Absence of new pressure ulcer  Description: Absence of new pressure ulcer  Outcome: Progressing

## 2024-01-05 NOTE — PROGRESS NOTES
Physical Therapy  Physical Therapy Initial Assessment       Name: Rebeka Renee  : 1945  MRN: [id number]      Date of Service: 2024    Evaluating PT:  Amparo Mauriciovika PT, DPT [id number]    Room #:  8515/8515-A  Diagnosis:  Fall, initial encounter [W19.XXXA]  Closed displaced fracture of right femoral neck ([facility]) [S72.001A]  Nondisplaced articular fracture of head of right femur, init ([facility]) [S72.064A]  PMHx/PSHx:   has a past medical history of Anemia, Anxiety, Arthritis, Asthma, Bipolar 1 disorder ([facility]), Cancer ([facility]), Cancer of breast, female ([facility]), CHF (congestive heart failure) ([facility]), Chronic back pain, Depression, Depression with anxiety, Diabetes mellitus ([facility]), Dyslipidemia, Fibromyalgia, History of blood transfusion, Hypertension, Hypothyroidism, Neuropathy, Pericardial effusion, Pleural effusion, TIA (transient ischemic attack), Tobacco abuse, and Type II or unspecified type diabetes mellitus without mention of complication, not stated as uncontrolled.    Procedure/Surgery:  RIGHT HIP HEMIARTHROPLASTY for femoral neck fracture (24)  Precautions: Falls,  R HHA, WBAT R LE, O2, L limb alert      SUBJECTIVE:    Pt lives with  in a cape code style home with 2 stairs to enter and 2 rail; pt also has front entrance with threshold step inside with no rail.  Bed is on 1st floor and bath is on 1st floor.  Pt ambulated with rollator in the community and SPC in the home PTA. Pt reports multiple falls over the last year. Pt does not wear O2 at home but states she does have a hx of breathing problems.    Equipment Owned: SPC, rollator   Equipment Needs:  WW    OBJECTIVE:   Initial Evaluation  Date: 24 Treatment Short Term/ Long Term   Goals   AM-PAC 6 Clicks      Was pt agreeable to Eval/treatment? Yes      Does pt have pain? Minimal pain R LE, not rated      Bed Mobility  Rolling: Jessica  Supine to sit: Jessica  Sit to supine: Jessica  Scooting: Jessica  Rolling: Independent  Supine to sit:  heavy UE use with ambulation. Pt denies need for rehab at [place], states he  is able to assist. Pt was left in bedside chair with all needs met at conclusion of session. RN aware. Patient would benefit from continued skilled PT to maximize functional mobility independence.       Treatment:  Patient practiced and was instructed in the following treatment:    Bed Mobility: Verbal cues for proper positioning and sequencing to perform bed mobility to facilitate maximum independence.   Transfers: Verbal cues for proper positioning and sequencing to perform transfers safely with maximum independence.   Gait Training: Verbal cues for proper positioning and sequencing using assistive device to maximize functional mobility independence.   Vitals and symptoms monitored during session.     Pt's/ family goals   1. Get better    Prognosis is good for reaching above PT goals.    Patient and or family understand(s) diagnosis, prognosis, and plan of care.  Yes     PHYSICAL THERAPY PLAN OF CARE:    PT POC is established based on physician order and patient diagnosis     Referring provider/PT Order:    01/05/24 0330  PT eval and treat  Start:  01/05/24 0330,   End:  01/05/24 0330,   ONE TIME,   Standing Count:  1 Occurrences,   R         Nikolas Mcintosh N, DO     Diagnosis:  Fall, initial encounter [W19.XXXA]  Closed displaced fracture of right femoral neck ([facility]) [S72.001A]  Nondisplaced articular fracture of head of right femur, init ([facility]) [S72.064A]  Specific instructions for next treatment:  progress functional mobility     Current Treatment Recommendations:     [x] Strengthening to improve independence with functional mobility   [] ROM to improve independence with functional mobility   [x] Balance Training to improve static/dynamic balance and to reduce fall risk  [x] Endurance Training to improve activity tolerance during functional mobility   [x] Transfer Training to improve safety and independence with all functional

## 2024-01-05 NOTE — PROGRESS NOTES
4 Eyes Skin Assessment     NAME:  Rebeka Renee  YOB: 1945  MEDICAL RECORD NUMBER:  [id number]    The patient is being assessed for  Admission    I agree that at least one RN has performed a thorough Head to Toe Skin Assessment on the patient. ALL assessment sites listed below have been assessed.      Areas assessed by both nurses:    Head, Face, Ears, Shoulders, Back, Chest, Arms, Elbows, Hands, Sacrum. Buttock, Coccyx, Ischium, and Legs. Feet and Heels        Does the Patient have a Wound? Yes wound(s) were present on assessment. LDA wound assessment was Initiated and completed by RN       Norris Prevention initiated by RN: Yes  Wound Care Orders initiated by RN: No    Pressure Injury (Stage 3,4, Unstageable, DTI, NWPT, and Complex wounds) if present, place Wound referral order by RN under : No    New Ostomies, if present place, Ostomy referral order under : No     Nurse 1 eSignature: Electronically signed by Marie Hunt RN on 1/5/24 at 4:20 AM EST    **SHARE this note so that the co-signing nurse can place an eSignature**    Nurse 2 eSignature: {Esignature:[id number]}

## 2024-01-05 NOTE — CARE COORDINATION
Social Work Discharge Planning;  SW met with patient explained role and discussed transition of care. Patient lives with spouse in a [facility] home, resides on the first floor  there are two steps to enter. Patient used a rollator and cane prior to admission.  Patient has a hx with [facility] prefers again if HHC is needed.  Patient is a diabetic and has all supplies at home. Patient's spouse will transport home. SW will continue to follow and assist.  Electronically signed by ZA Morales on 1/4/2024 at 8:35 PM

## 2024-01-05 NOTE — PROGRESS NOTES
Department of Orthopedic Surgery  Resident Progress Note      SUBJECTIVE  Patient seen and examined. Pain very well controlled. No new complaints. Denies acute fever, chills, nausea, vomiting , chest pain and shortness of breath.      OBJECTIVE    Physical    VITALS:  /62   Pulse 80   Temp 97.1 °F (36.2 °C) (Tympanic)   Resp 19   SpO2 96%     MUSCULOSKELETAL:   right lower extremity:  Dressing C/D/I  Compartments soft and compressible, calf non-tender  Palpable dorsalis pedis and posterior tibialis pulse, brisk cap refill to toes, foot warm and perfused  Sensation intact to light touch in sural/deep peroneal/superficial peroneal/saphenous/posterior tibial nerve distributions to foot/ankle.  Demonstrates active ankle plantar/dorsiflexion/great toe extension    Data    CBC:   Lab Results   Component Value Date/Time    WBC 8.8 01/04/2024 05:42 AM    RBC 5.05 01/04/2024 05:42 AM    HGB 14.5 01/04/2024 05:42 AM    HCT 44.3 01/04/2024 05:42 AM    MCV 87.7 01/04/2024 05:42 AM    MCH 28.7 01/04/2024 05:42 AM    MCHC 32.7 01/04/2024 05:42 AM    RDW 15.3 01/04/2024 05:42 AM     01/04/2024 05:42 AM    MPV 10.4 01/04/2024 05:42 AM     PT/INR:    Lab Results   Component Value Date/Time    PROTIME 12.7 01/03/2024 04:32 PM    PROTIME 10.4 12/13/2010 09:00 AM    INR 1.2 01/03/2024 04:32 PM       Labs  No results for input(s): \"BC\", \"BLOODCULT2\" in the last 72 hours.  No results for input(s): \"CXSURG\" in the last 72 hours.        ASSESSMENT  POD 1 s/p R HHA    PLAN    WBAT RLE  24 hour abx coverage  Deep venous thrombosis prophylaxis - ASA 325mg, early mobilization, and pneumatic compression device  PT/OT  Pain Control: IV and PO  Monitor H&H  D/C Plan: Patient can be discharged when medically stable and have made the appropriate physical therapy gains. Patient will follow up with Dr. Nemesio Handy MD in office in 2 weeks for repeat Xrs and evaluation.

## 2024-01-05 NOTE — PROGRESS NOTES
Upon admission Pt told this nurse her wishes were to not be a full code and have nothing done for resuscitation. This RN relayed to Pt that the doctor would have to change the order, reached out to Genesis Mcneil NP. Her response:   \"This is a non-urgent matter and should be relayed to the attending provider after 7 am so they can discuss with her. I am here solely to cover pages regarding urgent overnight issues for the [facility] group patients.\"

## 2024-01-06 VITALS
HEIGHT: 65 IN | RESPIRATION RATE: 16 BRPM | HEART RATE: 78 BPM | TEMPERATURE: 98 F | DIASTOLIC BLOOD PRESSURE: 61 MMHG | SYSTOLIC BLOOD PRESSURE: 103 MMHG | BODY MASS INDEX: 34.95 KG/M2 | OXYGEN SATURATION: 98 %

## 2024-01-06 LAB
ANION GAP SERPL CALCULATED.3IONS-SCNC: 6 MMOL/L (ref 7–16)
BASOPHILS # BLD: 0.04 K/UL (ref 0–0.2)
BASOPHILS NFR BLD: 1 % (ref 0–2)
BUN SERPL-MCNC: 23 MG/DL (ref 6–23)
CALCIUM SERPL-MCNC: 9 MG/DL (ref 8.6–10.2)
CHLORIDE SERPL-SCNC: 104 MMOL/L (ref 98–107)
CO2 SERPL-SCNC: 28 MMOL/L (ref 22–29)
CREAT SERPL-MCNC: 0.9 MG/DL (ref 0.5–1)
EOSINOPHIL # BLD: 0.22 K/UL (ref 0.05–0.5)
EOSINOPHILS RELATIVE PERCENT: 3 % (ref 0–6)
ERYTHROCYTE [DISTWIDTH] IN BLOOD BY AUTOMATED COUNT: 15.8 % (ref 11.5–15)
GFR SERPL CREATININE-BSD FRML MDRD: >60 ML/MIN/1.73M2
GLUCOSE BLD-MCNC: 77 MG/DL (ref 74–99)
GLUCOSE BLD-MCNC: 90 MG/DL (ref 74–99)
GLUCOSE SERPL-MCNC: 93 MG/DL (ref 74–99)
HCT VFR BLD AUTO: 41.3 % (ref 34–48)
HGB BLD-MCNC: 12.5 G/DL (ref 11.5–15.5)
IMM GRANULOCYTES # BLD AUTO: 0.05 K/UL (ref 0–0.58)
IMM GRANULOCYTES NFR BLD: 1 % (ref 0–5)
LYMPHOCYTES NFR BLD: 2.02 K/UL (ref 1.5–4)
LYMPHOCYTES RELATIVE PERCENT: 23 % (ref 20–42)
MCH RBC QN AUTO: 28.9 PG (ref 26–35)
MCHC RBC AUTO-ENTMCNC: 30.3 G/DL (ref 32–34.5)
MCV RBC AUTO: 95.4 FL (ref 80–99.9)
MONOCYTES NFR BLD: 0.72 K/UL (ref 0.1–0.95)
MONOCYTES NFR BLD: 8 % (ref 2–12)
NEUTROPHILS NFR BLD: 66 % (ref 43–80)
NEUTS SEG NFR BLD: 5.81 K/UL (ref 1.8–7.3)
PLATELET # BLD AUTO: 206 K/UL (ref 130–450)
PMV BLD AUTO: 10.7 FL (ref 7–12)
POTASSIUM SERPL-SCNC: 4.3 MMOL/L (ref 3.5–5)
RBC # BLD AUTO: 4.33 M/UL (ref 3.5–5.5)
SODIUM SERPL-SCNC: 138 MMOL/L (ref 132–146)
WBC OTHER # BLD: 8.9 K/UL (ref 4.5–11.5)

## 2024-01-06 PROCEDURE — 36415 COLL VENOUS BLD VENIPUNCTURE: CPT

## 2024-01-06 PROCEDURE — 2700000000 HC OXYGEN THERAPY PER DAY

## 2024-01-06 PROCEDURE — 2580000003 HC RX 258: Performed by: ORTHOPAEDIC SURGERY

## 2024-01-06 PROCEDURE — 85025 COMPLETE CBC W/AUTO DIFF WBC: CPT

## 2024-01-06 PROCEDURE — 6370000000 HC RX 637 (ALT 250 FOR IP)

## 2024-01-06 PROCEDURE — 80048 BASIC METABOLIC PNL TOTAL CA: CPT

## 2024-01-06 PROCEDURE — 6370000000 HC RX 637 (ALT 250 FOR IP): Performed by: ORTHOPAEDIC SURGERY

## 2024-01-06 PROCEDURE — 82962 GLUCOSE BLOOD TEST: CPT

## 2024-01-06 RX ADMIN — VENLAFAXINE HYDROCHLORIDE 150 MG: 150 CAPSULE, EXTENDED RELEASE ORAL at 10:24

## 2024-01-06 RX ADMIN — GABAPENTIN 800 MG: 400 CAPSULE ORAL at 10:25

## 2024-01-06 RX ADMIN — LEVOTHYROXINE SODIUM 50 MCG: 0.03 TABLET ORAL at 06:12

## 2024-01-06 RX ADMIN — ASPIRIN 325 MG: 325 TABLET, COATED ORAL at 10:25

## 2024-01-06 RX ADMIN — SODIUM CHLORIDE, PRESERVATIVE FREE 10 ML: 5 INJECTION INTRAVENOUS at 10:30

## 2024-01-06 RX ADMIN — PRAMIPEXOLE DIHYDROCHLORIDE 1 MG: 1 TABLET ORAL at 10:24

## 2024-01-06 RX ADMIN — VERAPAMIL HYDROCHLORIDE 180 MG: 180 TABLET, FILM COATED, EXTENDED RELEASE ORAL at 10:25

## 2024-01-06 RX ADMIN — OXYCODONE 5 MG: 5 TABLET ORAL at 10:28

## 2024-01-06 RX ADMIN — GABAPENTIN 800 MG: 400 CAPSULE ORAL at 14:13

## 2024-01-06 ASSESSMENT — PAIN DESCRIPTION - ORIENTATION: ORIENTATION: RIGHT

## 2024-01-06 ASSESSMENT — PAIN SCALES - GENERAL
PAINLEVEL_OUTOF10: 8
PAINLEVEL_OUTOF10: 0
PAINLEVEL_OUTOF10: 0

## 2024-01-06 ASSESSMENT — PAIN DESCRIPTION - LOCATION: LOCATION: LEG

## 2024-01-06 ASSESSMENT — PAIN DESCRIPTION - DESCRIPTORS: DESCRIPTORS: ACHING;SHARP

## 2024-01-06 NOTE — PROGRESS NOTES
Pulse ox on Room Air Sitting 90%   Pulse ox on room Air Ambulating 85%   Pulse on  2 Liters Sitting recovery 94%   Pulse on 2 Liters Ambulating Recovery 93%

## 2024-01-06 NOTE — DISCHARGE INSTR - COC
Continuity of Care Form    Patient Name: Rebeka Renee   :  1945  MRN:  [id number]    Admit date:  1/3/2024  Discharge date:  ***    Code Status Order: Full Code   Advance Directives:     Admitting Physician:  Sarah Wharton MD  PCP: Navneet Latif MD    Discharging Nurse: ***  Discharging Hospital Unit/Room#: 8515/8515-A  Discharging Unit Phone Number: ***    Emergency Contact:   Extended Emergency Contact Information  Primary Emergency Contact: Nate Renee  Address: [address] [place] [address]  Home Phone: [phone]  Mobile Phone: [phone]  Relation: Spouse  Secondary Emergency Contact: Ariel Vyas   [facility]  Home Phone: [phone]  Mobile Phone: [phone]  Relation: Brother/Sister    Past Surgical History:  Past Surgical History:   Procedure Laterality Date    BREAST SURGERY      CATARACT REMOVAL WITH IMPLANT Bilateral     CHOLECYSTECTOMY      GASTRIC BYPASS SURGERY  2004    HERNIA REPAIR      HIP SURGERY Right 2024    RIGHT HIP HEMIARTHROPLASTY performed by Nemesio Handy MD at [facility] [place]    HYSTERECTOMY (CERVIX STATUS UNKNOWN)      KIDNEY REMOVAL Left 2006    Cancer.    KIDNEY REMOVAL      left    KNEE SURGERY Right     arthroscopy    LIVER BIOPSY      Fatty tumor.    LUNG REMOVAL, PARTIAL      For lung CA, thought to be metastatic from breast cancer. left upper lobe     MASTECTOMY Bilateral     NERVE BLOCK Bilateral 16    transforaminal nerve block, lumbar #1    OTHER SURGICAL HISTORY  03/16/15    stage 1 percutaneous trial lumbar spinal cord stimulator    OTHER SURGICAL HISTORY N/A 4/15/15    surgical implantation of medtronic spinal cord stimulator lumbar    OTHER SURGICAL HISTORY N/A 2021    surgical removal lumbar SCS    PAIN MANAGEMENT PROCEDURE N/A 2021    SURGICAL REMOVAL OF LUMBAR MEDTRONIC SPINAL CORD STIMULATOR (STIMULATOR BATTERY LEFT HIP AREA) (CPT 25309) performed by Lizzy BAIN

## 2024-01-06 NOTE — PROGRESS NOTES
CLINICAL PHARMACY NOTE: MEDS TO BEDS    Total # of Prescriptions Filled: 2   The following medications were delivered to the patient:  Aspirin 325mg  Oxycodone 5mg    Additional Documentation:  Delivered to Erum LUDWIG to keep in med room until discharge 1-6-24

## 2024-01-06 NOTE — PLAN OF CARE
Problem: Safety - Adult  Goal: Free from fall injury  Outcome: Completed     Problem: Discharge Planning  Goal: Discharge to home or other facility with appropriate resources  Outcome: Completed     Problem: Chronic Conditions and Co-morbidities  Goal: Patient's chronic conditions and co-morbidity symptoms are monitored and maintained or improved  Outcome: Completed     Problem: Cognitive:  Goal: Knowledge of wound care  Description: Knowledge of wound care  Outcome: Completed  Goal: Understands risk factors for wounds  Description: Understands risk factors for wounds  Outcome: Completed     Problem: Wound:  Goal: Will show signs of wound healing; wound closure and no evidence of infection  Description: Will show signs of wound healing; wound closure and no evidence of infection  Outcome: Completed     Problem: Pressure Ulcer:  Goal: Signs of wound healing will improve  Description: Signs of wound healing will improve  Outcome: Completed  Goal: Absence of new pressure ulcer  Description: Absence of new pressure ulcer  Outcome: Completed  Goal: Will show no infection signs and symptoms  Description: Will show no infection signs and symptoms  Outcome: Completed     Problem: Arterial:  Goal: Optimize blood flow for wound healing  Description: Optimize blood flow for wound healing  Outcome: Completed     Problem: Venous:  Goal: Signs of wound healing will improve  Description: Signs of wound healing will improve  Outcome: Completed     Problem: Smoking cessation:  Goal: Ability to formulate a plan to maintain a tobacco-free life will be supported  Description: Ability to formulate a plan to maintain a tobacco-free life will be supported  Outcome: Completed     Problem: Compression therapy:  Goal: Will be free from complications associated with compression therapy  Description: Will be free from complications associated with compression therapy  Outcome: Completed     Problem: Weight control:  Goal: Ability to maintain  an optimal weight for height and age will be supported  Description: Ability to maintain an optimal weight for height and age will be supported  Outcome: Completed     Problem: Falls - Risk of:  Goal: Will remain free from falls  Description: Will remain free from falls  Outcome: Completed     Problem: Blood Glucose:  Goal: Ability to maintain appropriate glucose levels will improve  Description: Ability to maintain appropriate glucose levels will improve  Outcome: Completed     Problem: Skin/Tissue Integrity  Goal: Absence of new skin breakdown  Description: 1.  Monitor for areas of redness and/or skin breakdown  2.  Assess vascular access sites hourly  3.  Every 4-6 hours minimum:  Change oxygen saturation probe site  4.  Every 4-6 hours:  If on nasal continuous positive airway pressure, respiratory therapy assess nares and determine need for appliance change or resting period.  Outcome: Completed     Problem: ABCDS Injury Assessment  Goal: Absence of physical injury  Outcome: Completed

## 2024-01-06 NOTE — CARE COORDINATION
1/6/24.  Discharge order noted.  [facility] to contact pt re: walker.  Will be delivered to pt room or pt home per pt preference.    Moon BENSON RN-BC  [phone] Addendum: Pt requiring home, [facility] from [facility] notified and will set up.

## 2024-01-16 ENCOUNTER — TELEPHONE (OUTPATIENT)
Dept: ORTHOPEDIC SURGERY | Age: 79
End: 2024-01-16

## 2024-01-16 DIAGNOSIS — S72.001A CLOSED DISPLACED FRACTURE OF RIGHT FEMORAL NECK (HCC): Primary | ICD-10-CM

## 2024-01-16 NOTE — TELEPHONE ENCOUNTER
VANI Salazar called to inform the office that patient had a fall from chair at home yesterday. Patient had to use her Life Alert call to have someone come to home and assist her off the floor. Patient is post op Right Hip Desean DOS: 01/04/2024. VANI Salazar stated that she saw patient today and patient has no increased redness, drainage, or ambulation issues at this time. Patient has no complaints of pain. Routed to providers for review of possible XR orders if needed to ensure no hardware displacement took place from fall.     Future Appointments   Date Time Provider Department Center   1/24/2024  1:45 PM Nemesio Handy MD  [facility]th[facility]

## 2024-01-17 ENCOUNTER — HOSPITAL ENCOUNTER (OUTPATIENT)
Dept: GENERAL RADIOLOGY | Age: 79
Discharge: HOME | End: 2024-01-19
Payer: MEDICARE

## 2024-01-17 ENCOUNTER — HOSPITAL ENCOUNTER (OUTPATIENT)
Age: 79
Discharge: HOME | End: 2024-01-19
Payer: MEDICARE

## 2024-01-17 DIAGNOSIS — S72.001A CLOSED DISPLACED FRACTURE OF RIGHT FEMORAL NECK (HCC): ICD-10-CM

## 2024-01-17 PROCEDURE — 73552 X-RAY EXAM OF FEMUR 2/>: CPT

## 2024-01-23 DIAGNOSIS — S72.001A CLOSED DISPLACED FRACTURE OF RIGHT FEMORAL NECK (HCC): Primary | ICD-10-CM

## 2024-01-24 ENCOUNTER — OFFICE VISIT (OUTPATIENT)
Dept: ORTHOPEDIC SURGERY | Age: 79
End: 2024-01-24
Payer: MEDICARE

## 2024-01-24 ENCOUNTER — HOSPITAL ENCOUNTER (OUTPATIENT)
Dept: GENERAL RADIOLOGY | Age: 79
Discharge: HOME | End: 2024-01-26
Payer: MEDICARE

## 2024-01-24 DIAGNOSIS — S72.001A CLOSED DISPLACED FRACTURE OF RIGHT FEMORAL NECK (HCC): Primary | ICD-10-CM

## 2024-01-24 DIAGNOSIS — Z96.641 HISTORY OF HEMIARTHROPLASTY OF RIGHT HIP: ICD-10-CM

## 2024-01-24 DIAGNOSIS — S72.001A CLOSED DISPLACED FRACTURE OF RIGHT FEMORAL NECK (HCC): ICD-10-CM

## 2024-01-24 PROCEDURE — 73502 X-RAY EXAM HIP UNI 2-3 VIEWS: CPT

## 2024-01-24 PROCEDURE — 99024 POSTOP FOLLOW-UP VISIT: CPT | Performed by: PHYSICIAN ASSISTANT

## 2024-01-24 PROCEDURE — 73552 X-RAY EXAM OF FEMUR 2/>: CPT

## 2024-01-24 PROCEDURE — 99213 OFFICE O/P EST LOW 20 MIN: CPT | Performed by: PHYSICIAN ASSISTANT

## 2024-01-24 RX ORDER — LORAZEPAM 0.5 MG/1
0.5 TABLET ORAL EVERY 12 HOURS PRN
COMMUNITY

## 2024-01-24 NOTE — PATIENT INSTRUCTIONS
Weightbearing as tolerated right lower extremity.    If Steri-Strips do not fall off right hip incision in 7 days on their own, okay to remove.    Continue aspirin for DVT prophylaxis for a total of 28 days postop.    Follow-up in 4 weeks for reevaluation and x-rays.    Call if any questions or concerns.

## 2024-01-24 NOTE — PROGRESS NOTES
Chief Complaint   Patient presents with    Post-Op Check      2 week post op check. R hip maggy DOS 1/4/24. Ambulating with rollator. Denies pain. Staples intact. Well approximated. No drainage        OP:SURGEON: Dr. Nemesio Handy MD  DATE OF PROCEDURE: 1-4-24  PROCEDURE: RIGHT HIP HEMIARTHROPLASTY for femoral neck fracture     POD: 3 weeks    Subjective:  Rebeka Renee is following up from the above surgery. She is WBAT on right lower extremity. She ambulates with assistive device, walker.  Pain to extremity is mild and is not taking prescribed pain medication. They denies numbness or tingling to the right lower extremity. Denies calf pain, chest pain, or shortness of breath.  Patient continues DVT prophylaxis,  mg BID. Patient is participating in therapy, home health care .  Patient is doing well after surgery.  Denies any pain or problems in the right hip/groin area.  Feels that her right leg is getting stronger.  Denies any incisional issues.     Review of Systems -  All pertinent positives/negative in HPI     Objective:    General: Alert and oriented X 3, normocephalic atraumatic, external ears and eye normal, sclera clear, no acute distress, respirations easy and unlabored with no audible wheezes, skin warm and dry, speech and dress appropriate for noted age, affect euthymic.    Extremity:  Right Lower Extremity  Skin clean dry and intact, without signs of infection   Incision well-healed  no edema noted  Compartments supple throughout thigh and leg  Calf supple and not tender  negative Homans  Demonstrates active motion with HF, knee flexion/extension and ankle DF/PF  States sensation intact to touch in sural, deep peroneal, superficial peroneal, saphenous, posterior tibial  nerve distributions to foot/ankle.  Palpable dorsalis pedis and posterior tibialis pulses, cap refill brisk in toes, foot warm/perfused.    There were no vitals taken for this visit.    XR:   Pelvis, right hip and right femur

## 2024-02-11 ENCOUNTER — APPOINTMENT (OUTPATIENT)
Dept: GENERAL RADIOLOGY | Age: 79
DRG: 480 | End: 2024-02-11
Payer: MEDICARE

## 2024-02-11 ENCOUNTER — APPOINTMENT (OUTPATIENT)
Dept: CT IMAGING | Age: 79
DRG: 480 | End: 2024-02-11
Payer: MEDICARE

## 2024-02-11 ENCOUNTER — HOSPITAL ENCOUNTER (INPATIENT)
Age: 79
LOS: 3 days | Discharge: HOME HEALTH | DRG: 480 | End: 2024-02-14
Attending: EMERGENCY MEDICINE | Admitting: STUDENT IN AN ORGANIZED HEALTH CARE EDUCATION/TRAINING PROGRAM
Payer: MEDICARE

## 2024-02-11 ENCOUNTER — APPOINTMENT (OUTPATIENT)
Dept: ULTRASOUND IMAGING | Age: 79
DRG: 480 | End: 2024-02-11
Payer: MEDICARE

## 2024-02-11 DIAGNOSIS — E87.6 HYPOKALEMIA: ICD-10-CM

## 2024-02-11 DIAGNOSIS — Z98.890 S/P CLOSED REDUCTION OF DISLOCATED TOTAL HIP PROSTHESIS: ICD-10-CM

## 2024-02-11 DIAGNOSIS — R09.89 SUSPECTED DVT (DEEP VEIN THROMBOSIS): ICD-10-CM

## 2024-02-11 DIAGNOSIS — E83.42 HYPOMAGNESEMIA: ICD-10-CM

## 2024-02-11 DIAGNOSIS — R47.01 APHASIA: Primary | ICD-10-CM

## 2024-02-11 DIAGNOSIS — I63.9 ISCHEMIC STROKE (HCC): ICD-10-CM

## 2024-02-11 DIAGNOSIS — G89.18 ACUTE POST-OPERATIVE PAIN: ICD-10-CM

## 2024-02-11 DIAGNOSIS — N39.0 URINARY TRACT INFECTION WITHOUT HEMATURIA, SITE UNSPECIFIED: ICD-10-CM

## 2024-02-11 LAB
ALBUMIN SERPL-MCNC: 4.1 G/DL (ref 3.5–5.2)
ALP SERPL-CCNC: 143 U/L (ref 35–104)
ALT SERPL-CCNC: 16 U/L (ref 0–32)
ANION GAP SERPL CALCULATED.3IONS-SCNC: 14 MMOL/L (ref 7–16)
ANION GAP SERPL CALCULATED.3IONS-SCNC: 16 MMOL/L (ref 7–16)
AST SERPL-CCNC: 29 U/L (ref 0–31)
BACTERIA URNS QL MICRO: ABNORMAL
BASOPHILS # BLD: 0.01 K/UL (ref 0–0.2)
BASOPHILS NFR BLD: 0 % (ref 0–2)
BILIRUB SERPL-MCNC: 0.7 MG/DL (ref 0–1.2)
BILIRUB UR QL STRIP: NEGATIVE
BUN SERPL-MCNC: 10 MG/DL (ref 6–23)
BUN SERPL-MCNC: 8 MG/DL (ref 6–23)
CALCIUM SERPL-MCNC: 8.4 MG/DL (ref 8.6–10.2)
CALCIUM SERPL-MCNC: 9.8 MG/DL (ref 8.6–10.2)
CHLORIDE SERPL-SCNC: 95 MMOL/L (ref 98–107)
CHLORIDE SERPL-SCNC: 98 MMOL/L (ref 98–107)
CK SERPL-CCNC: 694 U/L (ref 20–180)
CLARITY UR: ABNORMAL
CO2 SERPL-SCNC: 23 MMOL/L (ref 22–29)
CO2 SERPL-SCNC: 30 MMOL/L (ref 22–29)
COLOR UR: YELLOW
CREAT SERPL-MCNC: 0.6 MG/DL (ref 0.5–1)
CREAT SERPL-MCNC: 0.8 MG/DL (ref 0.5–1)
EOSINOPHIL # BLD: 0 K/UL (ref 0.05–0.5)
EOSINOPHILS RELATIVE PERCENT: 0 % (ref 0–6)
EPI CELLS #/AREA URNS HPF: ABNORMAL /HPF
ERYTHROCYTE [DISTWIDTH] IN BLOOD BY AUTOMATED COUNT: 15.6 % (ref 11.5–15)
GFR SERPL CREATININE-BSD FRML MDRD: >60 ML/MIN/1.73M2
GFR SERPL CREATININE-BSD FRML MDRD: >60 ML/MIN/1.73M2
GLUCOSE BLD-MCNC: 164 MG/DL (ref 74–99)
GLUCOSE BLD-MCNC: 201 MG/DL (ref 74–99)
GLUCOSE SERPL-MCNC: 185 MG/DL (ref 74–99)
GLUCOSE SERPL-MCNC: 221 MG/DL (ref 74–99)
GLUCOSE UR STRIP-MCNC: NEGATIVE MG/DL
HCT VFR BLD AUTO: 50.9 % (ref 34–48)
HGB BLD-MCNC: 16.7 G/DL (ref 11.5–15.5)
HGB UR QL STRIP.AUTO: ABNORMAL
IMM GRANULOCYTES # BLD AUTO: 0.03 K/UL (ref 0–0.58)
IMM GRANULOCYTES NFR BLD: 0 % (ref 0–5)
INR PPP: 1.1
KETONES UR STRIP-MCNC: 15 MG/DL
LEUKOCYTE ESTERASE UR QL STRIP: ABNORMAL
LYMPHOCYTES NFR BLD: 1.18 K/UL (ref 1.5–4)
LYMPHOCYTES RELATIVE PERCENT: 10 % (ref 20–42)
MAGNESIUM SERPL-MCNC: 1.5 MG/DL (ref 1.6–2.6)
MCH RBC QN AUTO: 28.7 PG (ref 26–35)
MCHC RBC AUTO-ENTMCNC: 32.8 G/DL (ref 32–34.5)
MCV RBC AUTO: 87.6 FL (ref 80–99.9)
MONOCYTES NFR BLD: 0.66 K/UL (ref 0.1–0.95)
MONOCYTES NFR BLD: 6 % (ref 2–12)
NEUTROPHILS NFR BLD: 84 % (ref 43–80)
NEUTS SEG NFR BLD: 9.69 K/UL (ref 1.8–7.3)
NITRITE UR QL STRIP: NEGATIVE
PH UR STRIP: 6.5 [PH] (ref 5–9)
PHOSPHATE SERPL-MCNC: 3.1 MG/DL (ref 2.5–4.5)
PLATELET # BLD AUTO: 209 K/UL (ref 130–450)
PMV BLD AUTO: 11.6 FL (ref 7–12)
POTASSIUM SERPL-SCNC: 3 MMOL/L (ref 3.5–5)
POTASSIUM SERPL-SCNC: 3.2 MMOL/L (ref 3.5–5)
PROT SERPL-MCNC: 8.4 G/DL (ref 6.4–8.3)
PROT UR STRIP-MCNC: 30 MG/DL
PROTHROMBIN TIME: 12.5 SEC (ref 9.3–12.4)
RBC # BLD AUTO: 5.81 M/UL (ref 3.5–5.5)
RBC #/AREA URNS HPF: ABNORMAL /HPF
SODIUM SERPL-SCNC: 137 MMOL/L (ref 132–146)
SODIUM SERPL-SCNC: 139 MMOL/L (ref 132–146)
SP GR UR STRIP: 1.02 (ref 1–1.03)
TROPONIN I SERPL HS-MCNC: 25 NG/L (ref 0–9)
TROPONIN I SERPL HS-MCNC: 25 NG/L (ref 0–9)
UROBILINOGEN UR STRIP-ACNC: 0.2 EU/DL (ref 0–1)
WBC #/AREA URNS HPF: ABNORMAL /HPF
WBC OTHER # BLD: 11.6 K/UL (ref 4.5–11.5)

## 2024-02-11 PROCEDURE — 70496 CT ANGIOGRAPHY HEAD: CPT

## 2024-02-11 PROCEDURE — 0042T CT BRAIN PERFUSION: CPT

## 2024-02-11 PROCEDURE — 73502 X-RAY EXAM HIP UNI 2-3 VIEWS: CPT

## 2024-02-11 PROCEDURE — 93970 EXTREMITY STUDY: CPT

## 2024-02-11 PROCEDURE — 99448 NTRPROF PH1/NTRNET/EHR 21-30: CPT | Performed by: STUDENT IN AN ORGANIZED HEALTH CARE EDUCATION/TRAINING PROGRAM

## 2024-02-11 PROCEDURE — 83735 ASSAY OF MAGNESIUM: CPT

## 2024-02-11 PROCEDURE — 6360000002 HC RX W HCPCS

## 2024-02-11 PROCEDURE — 6360000002 HC RX W HCPCS: Performed by: STUDENT IN AN ORGANIZED HEALTH CARE EDUCATION/TRAINING PROGRAM

## 2024-02-11 PROCEDURE — 87077 CULTURE AEROBIC IDENTIFY: CPT

## 2024-02-11 PROCEDURE — 70498 CT ANGIOGRAPHY NECK: CPT

## 2024-02-11 PROCEDURE — 82962 GLUCOSE BLOOD TEST: CPT

## 2024-02-11 PROCEDURE — 6360000002 HC RX W HCPCS: Performed by: EMERGENCY MEDICINE

## 2024-02-11 PROCEDURE — 84100 ASSAY OF PHOSPHORUS: CPT

## 2024-02-11 PROCEDURE — 96374 THER/PROPH/DIAG INJ IV PUSH: CPT

## 2024-02-11 PROCEDURE — 2060000000 HC ICU INTERMEDIATE R&B

## 2024-02-11 PROCEDURE — 6370000000 HC RX 637 (ALT 250 FOR IP): Performed by: STUDENT IN AN ORGANIZED HEALTH CARE EDUCATION/TRAINING PROGRAM

## 2024-02-11 PROCEDURE — 85025 COMPLETE CBC W/AUTO DIFF WBC: CPT

## 2024-02-11 PROCEDURE — 85610 PROTHROMBIN TIME: CPT

## 2024-02-11 PROCEDURE — 70450 CT HEAD/BRAIN W/O DYE: CPT

## 2024-02-11 PROCEDURE — 2580000003 HC RX 258

## 2024-02-11 PROCEDURE — 2580000003 HC RX 258: Performed by: STUDENT IN AN ORGANIZED HEALTH CARE EDUCATION/TRAINING PROGRAM

## 2024-02-11 PROCEDURE — 84484 ASSAY OF TROPONIN QUANT: CPT

## 2024-02-11 PROCEDURE — 80048 BASIC METABOLIC PNL TOTAL CA: CPT

## 2024-02-11 PROCEDURE — 71045 X-RAY EXAM CHEST 1 VIEW: CPT

## 2024-02-11 PROCEDURE — 80053 COMPREHEN METABOLIC PANEL: CPT

## 2024-02-11 PROCEDURE — 96375 TX/PRO/DX INJ NEW DRUG ADDON: CPT

## 2024-02-11 PROCEDURE — 6360000004 HC RX CONTRAST MEDICATION: Performed by: RADIOLOGY

## 2024-02-11 PROCEDURE — 94640 AIRWAY INHALATION TREATMENT: CPT

## 2024-02-11 PROCEDURE — 99152 MOD SED SAME PHYS/QHP 5/>YRS: CPT

## 2024-02-11 PROCEDURE — 99223 1ST HOSP IP/OBS HIGH 75: CPT | Performed by: STUDENT IN AN ORGANIZED HEALTH CARE EDUCATION/TRAINING PROGRAM

## 2024-02-11 PROCEDURE — 93005 ELECTROCARDIOGRAM TRACING: CPT

## 2024-02-11 PROCEDURE — 87086 URINE CULTURE/COLONY COUNT: CPT

## 2024-02-11 PROCEDURE — 73501 X-RAY EXAM HIP UNI 1 VIEW: CPT

## 2024-02-11 PROCEDURE — 81001 URINALYSIS AUTO W/SCOPE: CPT

## 2024-02-11 PROCEDURE — 82550 ASSAY OF CK (CPK): CPT

## 2024-02-11 PROCEDURE — 87088 URINE BACTERIA CULTURE: CPT

## 2024-02-11 PROCEDURE — 99285 EMERGENCY DEPT VISIT HI MDM: CPT

## 2024-02-11 PROCEDURE — 36415 COLL VENOUS BLD VENIPUNCTURE: CPT

## 2024-02-11 RX ORDER — MAGNESIUM SULFATE IN WATER 40 MG/ML
2000 INJECTION, SOLUTION INTRAVENOUS ONCE
Status: COMPLETED | OUTPATIENT
Start: 2024-02-11 | End: 2024-02-11

## 2024-02-11 RX ORDER — MAGNESIUM SULFATE IN WATER 40 MG/ML
2000 INJECTION, SOLUTION INTRAVENOUS PRN
Status: DISCONTINUED | OUTPATIENT
Start: 2024-02-11 | End: 2024-02-14 | Stop reason: HOSPADM

## 2024-02-11 RX ORDER — LEVOTHYROXINE SODIUM 0.03 MG/1
50 TABLET ORAL DAILY
Status: DISCONTINUED | OUTPATIENT
Start: 2024-02-12 | End: 2024-02-14 | Stop reason: HOSPADM

## 2024-02-11 RX ORDER — ARFORMOTEROL TARTRATE 15 UG/2ML
15 SOLUTION RESPIRATORY (INHALATION)
Status: DISCONTINUED | OUTPATIENT
Start: 2024-02-11 | End: 2024-02-14 | Stop reason: HOSPADM

## 2024-02-11 RX ORDER — ASPIRIN 325 MG
325 TABLET, DELAYED RELEASE (ENTERIC COATED) ORAL ONCE
Status: DISCONTINUED | OUTPATIENT
Start: 2024-02-11 | End: 2024-02-11

## 2024-02-11 RX ORDER — ASPIRIN 300 MG/1
300 SUPPOSITORY RECTAL ONCE
Status: COMPLETED | OUTPATIENT
Start: 2024-02-11 | End: 2024-02-11

## 2024-02-11 RX ORDER — DEXTROSE MONOHYDRATE 100 MG/ML
INJECTION, SOLUTION INTRAVENOUS CONTINUOUS PRN
Status: DISCONTINUED | OUTPATIENT
Start: 2024-02-11 | End: 2024-02-14 | Stop reason: HOSPADM

## 2024-02-11 RX ORDER — GLUCAGON 1 MG/ML
1 KIT INJECTION PRN
Status: DISCONTINUED | OUTPATIENT
Start: 2024-02-11 | End: 2024-02-14 | Stop reason: HOSPADM

## 2024-02-11 RX ORDER — POTASSIUM CHLORIDE 20 MEQ/1
40 TABLET, EXTENDED RELEASE ORAL PRN
Status: DISCONTINUED | OUTPATIENT
Start: 2024-02-11 | End: 2024-02-14 | Stop reason: HOSPADM

## 2024-02-11 RX ORDER — BUDESONIDE 0.25 MG/2ML
250 INHALANT ORAL 2 TIMES DAILY
Status: DISCONTINUED | OUTPATIENT
Start: 2024-02-11 | End: 2024-02-14 | Stop reason: HOSPADM

## 2024-02-11 RX ORDER — SODIUM CHLORIDE 9 MG/ML
INJECTION, SOLUTION INTRAVENOUS PRN
Status: DISCONTINUED | OUTPATIENT
Start: 2024-02-11 | End: 2024-02-14 | Stop reason: HOSPADM

## 2024-02-11 RX ORDER — LABETALOL HYDROCHLORIDE 5 MG/ML
10 INJECTION, SOLUTION INTRAVENOUS EVERY 10 MIN PRN
Status: DISCONTINUED | OUTPATIENT
Start: 2024-02-11 | End: 2024-02-14 | Stop reason: HOSPADM

## 2024-02-11 RX ORDER — ENOXAPARIN SODIUM 100 MG/ML
40 INJECTION SUBCUTANEOUS DAILY
Status: DISCONTINUED | OUTPATIENT
Start: 2024-02-11 | End: 2024-02-14 | Stop reason: HOSPADM

## 2024-02-11 RX ORDER — ONDANSETRON 2 MG/ML
4 INJECTION INTRAMUSCULAR; INTRAVENOUS EVERY 6 HOURS PRN
Status: DISCONTINUED | OUTPATIENT
Start: 2024-02-11 | End: 2024-02-14 | Stop reason: HOSPADM

## 2024-02-11 RX ORDER — SODIUM CHLORIDE 450 MG/100ML
INJECTION, SOLUTION INTRAVENOUS CONTINUOUS
Status: DISCONTINUED | OUTPATIENT
Start: 2024-02-11 | End: 2024-02-13

## 2024-02-11 RX ORDER — PANTOPRAZOLE SODIUM 40 MG/1
40 TABLET, DELAYED RELEASE ORAL
Status: DISCONTINUED | OUTPATIENT
Start: 2024-02-12 | End: 2024-02-14 | Stop reason: HOSPADM

## 2024-02-11 RX ORDER — FENTANYL CITRATE 50 UG/ML
50 INJECTION, SOLUTION INTRAMUSCULAR; INTRAVENOUS ONCE
Status: COMPLETED | OUTPATIENT
Start: 2024-02-11 | End: 2024-02-11

## 2024-02-11 RX ORDER — POTASSIUM CHLORIDE 7.45 MG/ML
10 INJECTION INTRAVENOUS
Status: DISPENSED | OUTPATIENT
Start: 2024-02-11 | End: 2024-02-11

## 2024-02-11 RX ORDER — PRAMIPEXOLE DIHYDROCHLORIDE 0.25 MG/1
0.75 TABLET ORAL 2 TIMES DAILY
Status: DISCONTINUED | OUTPATIENT
Start: 2024-02-11 | End: 2024-02-14 | Stop reason: HOSPADM

## 2024-02-11 RX ORDER — POLYETHYLENE GLYCOL 3350 17 G/17G
17 POWDER, FOR SOLUTION ORAL DAILY PRN
Status: DISCONTINUED | OUTPATIENT
Start: 2024-02-11 | End: 2024-02-14 | Stop reason: HOSPADM

## 2024-02-11 RX ORDER — SODIUM CHLORIDE 0.9 % (FLUSH) 0.9 %
5-40 SYRINGE (ML) INJECTION PRN
Status: DISCONTINUED | OUTPATIENT
Start: 2024-02-11 | End: 2024-02-14 | Stop reason: HOSPADM

## 2024-02-11 RX ORDER — SODIUM CHLORIDE 0.9 % (FLUSH) 0.9 %
5-40 SYRINGE (ML) INJECTION EVERY 12 HOURS SCHEDULED
Status: DISCONTINUED | OUTPATIENT
Start: 2024-02-11 | End: 2024-02-14 | Stop reason: HOSPADM

## 2024-02-11 RX ORDER — PROPOFOL 10 MG/ML
80 INJECTION, EMULSION INTRAVENOUS ONCE
Status: COMPLETED | OUTPATIENT
Start: 2024-02-11 | End: 2024-02-11

## 2024-02-11 RX ORDER — ACETAMINOPHEN 650 MG/1
650 SUPPOSITORY RECTAL EVERY 6 HOURS PRN
Status: DISCONTINUED | OUTPATIENT
Start: 2024-02-11 | End: 2024-02-14 | Stop reason: HOSPADM

## 2024-02-11 RX ORDER — OXYCODONE HYDROCHLORIDE AND ACETAMINOPHEN 5; 325 MG/1; MG/1
1 TABLET ORAL EVERY 8 HOURS PRN
Status: DISCONTINUED | OUTPATIENT
Start: 2024-02-11 | End: 2024-02-11

## 2024-02-11 RX ORDER — VENLAFAXINE HYDROCHLORIDE 150 MG/1
150 CAPSULE, EXTENDED RELEASE ORAL DAILY
Status: DISCONTINUED | OUTPATIENT
Start: 2024-02-12 | End: 2024-02-14 | Stop reason: HOSPADM

## 2024-02-11 RX ORDER — LOSARTAN POTASSIUM 50 MG/1
50 TABLET ORAL DAILY
Status: DISCONTINUED | OUTPATIENT
Start: 2024-02-12 | End: 2024-02-14 | Stop reason: HOSPADM

## 2024-02-11 RX ORDER — MAGNESIUM SULFATE IN WATER 40 MG/ML
2000 INJECTION, SOLUTION INTRAVENOUS ONCE
Status: COMPLETED | OUTPATIENT
Start: 2024-02-11 | End: 2024-02-12

## 2024-02-11 RX ORDER — ROSUVASTATIN CALCIUM 20 MG/1
40 TABLET, COATED ORAL NIGHTLY
Status: DISCONTINUED | OUTPATIENT
Start: 2024-02-11 | End: 2024-02-14 | Stop reason: HOSPADM

## 2024-02-11 RX ORDER — ONDANSETRON 4 MG/1
4 TABLET, ORALLY DISINTEGRATING ORAL EVERY 8 HOURS PRN
Status: DISCONTINUED | OUTPATIENT
Start: 2024-02-11 | End: 2024-02-14 | Stop reason: HOSPADM

## 2024-02-11 RX ORDER — ACETAMINOPHEN 325 MG/1
650 TABLET ORAL EVERY 6 HOURS PRN
Status: DISCONTINUED | OUTPATIENT
Start: 2024-02-11 | End: 2024-02-14 | Stop reason: HOSPADM

## 2024-02-11 RX ORDER — POTASSIUM CHLORIDE 7.45 MG/ML
10 INJECTION INTRAVENOUS PRN
Status: DISCONTINUED | OUTPATIENT
Start: 2024-02-11 | End: 2024-02-14 | Stop reason: HOSPADM

## 2024-02-11 RX ORDER — SUCRALFATE 1 G/1
1 TABLET ORAL DAILY
Status: DISCONTINUED | OUTPATIENT
Start: 2024-02-12 | End: 2024-02-14 | Stop reason: HOSPADM

## 2024-02-11 RX ORDER — 0.9 % SODIUM CHLORIDE 0.9 %
1000 INTRAVENOUS SOLUTION INTRAVENOUS ONCE
Status: COMPLETED | OUTPATIENT
Start: 2024-02-11 | End: 2024-02-11

## 2024-02-11 RX ADMIN — HYDROMORPHONE HYDROCHLORIDE 0.5 MG: 1 INJECTION, SOLUTION INTRAMUSCULAR; INTRAVENOUS; SUBCUTANEOUS at 23:44

## 2024-02-11 RX ADMIN — IOPAMIDOL 50 ML: 755 INJECTION, SOLUTION INTRAVENOUS at 10:28

## 2024-02-11 RX ADMIN — POTASSIUM CHLORIDE 10 MEQ: 7.46 INJECTION, SOLUTION INTRAVENOUS at 23:53

## 2024-02-11 RX ADMIN — WATER 1000 MG: 1 INJECTION INTRAMUSCULAR; INTRAVENOUS; SUBCUTANEOUS at 16:49

## 2024-02-11 RX ADMIN — FENTANYL CITRATE 50 MCG: 50 INJECTION INTRAMUSCULAR; INTRAVENOUS at 11:13

## 2024-02-11 RX ADMIN — POTASSIUM CHLORIDE 10 MEQ: 7.46 INJECTION, SOLUTION INTRAVENOUS at 19:56

## 2024-02-11 RX ADMIN — PROPOFOL 80 MG: 10 INJECTION, EMULSION INTRAVENOUS at 12:16

## 2024-02-11 RX ADMIN — POTASSIUM CHLORIDE 10 MEQ: 7.46 INJECTION, SOLUTION INTRAVENOUS at 17:21

## 2024-02-11 RX ADMIN — POTASSIUM CHLORIDE 10 MEQ: 7.46 INJECTION, SOLUTION INTRAVENOUS at 11:16

## 2024-02-11 RX ADMIN — ARFORMOTEROL TARTRATE 15 MCG: 15 SOLUTION RESPIRATORY (INHALATION) at 20:04

## 2024-02-11 RX ADMIN — SODIUM CHLORIDE: 4.5 INJECTION, SOLUTION INTRAVENOUS at 16:49

## 2024-02-11 RX ADMIN — SODIUM CHLORIDE 1000 ML: 9 INJECTION, SOLUTION INTRAVENOUS at 11:15

## 2024-02-11 RX ADMIN — MAGNESIUM SULFATE HEPTAHYDRATE 2000 MG: 40 INJECTION, SOLUTION INTRAVENOUS at 11:18

## 2024-02-11 RX ADMIN — POTASSIUM CHLORIDE 10 MEQ: 7.46 INJECTION, SOLUTION INTRAVENOUS at 12:30

## 2024-02-11 RX ADMIN — POTASSIUM CHLORIDE 10 MEQ: 7.46 INJECTION, SOLUTION INTRAVENOUS at 15:04

## 2024-02-11 RX ADMIN — POTASSIUM CHLORIDE 10 MEQ: 7.46 INJECTION, SOLUTION INTRAVENOUS at 13:29

## 2024-02-11 RX ADMIN — SODIUM CHLORIDE, PRESERVATIVE FREE 10 ML: 5 INJECTION INTRAVENOUS at 19:57

## 2024-02-11 RX ADMIN — ASPIRIN 300 MG: 300 SUPPOSITORY RECTAL at 18:20

## 2024-02-11 RX ADMIN — IOPAMIDOL 70 ML: 755 INJECTION, SOLUTION INTRAVENOUS at 09:56

## 2024-02-11 RX ADMIN — BUDESONIDE 250 MCG: 0.25 SUSPENSION RESPIRATORY (INHALATION) at 20:04

## 2024-02-11 ASSESSMENT — LIFESTYLE VARIABLES
HOW MANY STANDARD DRINKS CONTAINING ALCOHOL DO YOU HAVE ON A TYPICAL DAY: PATIENT UNABLE TO ANSWER
HOW OFTEN DO YOU HAVE A DRINK CONTAINING ALCOHOL: PATIENT UNABLE TO ANSWER

## 2024-02-11 ASSESSMENT — PAIN DESCRIPTION - ORIENTATION
ORIENTATION: RIGHT
ORIENTATION: RIGHT

## 2024-02-11 ASSESSMENT — PAIN DESCRIPTION - LOCATION
LOCATION: HIP
LOCATION: HIP

## 2024-02-11 ASSESSMENT — PAIN SCALES - GENERAL: PAINLEVEL_OUTOF10: 8

## 2024-02-11 NOTE — PROGRESS NOTES
4 Eyes Skin Assessment     NAME:  Rebeka Renee  YOB: 1945  MEDICAL RECORD NUMBER:  [id number]    The patient is being assessed for  Admission    I agree that at least one RN has performed a thorough Head to Toe Skin Assessment on the patient. ALL assessment sites listed below have been assessed.      Areas assessed by both nurses:    Head, Face, Ears, Shoulders, Back, Chest, Arms, Elbows, Hands, Sacrum. Buttock, Coccyx, Ischium, Legs. Feet and Heels, and Under Medical Devices         Does the Patient have a Wound? No noted wound(s)       Norris Prevention initiated by RN: yES  Wound Care Orders initiated by RN: No    Pressure Injury (Stage 3,4, Unstageable, DTI, NWPT, and Complex wounds) if present, place Wound referral order by RN under : No    New Ostomies, if present place, Ostomy referral order under : No     Nurse 1 eSignature: Electronically signed by Patt Miles RN on 2/11/24 at 2:39 PM EST    **SHARE this note so that the co-signing nurse can place an eSignature**    Nurse 2 eSignature: Electronically signed by Haylie Jarquin RN on 2/11/24 at 3:30 PM EST

## 2024-02-11 NOTE — CONSULTS
I was contacted by the emergency department @1100 on 2/11/2024 to review/discuss about this ZACK alert case.  The last known well time was reported as last night.  The patient recently underwent right hip surgery around a month ago.  She is not on any blood thinners at home.  She presented with confusion and difficulty with speech.  I was informed of the relevant medical history and the presenting complaints and I myself did pertinent medical chart review.  I have personally reviewed the neuroimaging wherein [employer] software was utilized to assist with triage.  No face-to-face interaction with the patient was done.    Personal review of neuroimaging shows no acute intracranial abnormality on head CT.  There is no evidence of any diffusion-perfusion mismatch on perfusion imaging.  Vascular imaging of the head and neck is overall unremarkable with no evidence of any significant intracranial and/or extracranial atherosclerotic disease.    Later I was also informed by the emergency department team that probably the prosthetic right hip joint seems to be dislocated.    Assessment/Plan:    Confusion, difficulty with speech and this 78-year-old woman.  Suspect metabolic/toxic encephalopathy.  She possibly could have had a small stroke but certainly not a candidate for IV TNK or any mechanical intervention.    No endovascular workup is needed at this time.  She should get MRI brain without contrast.  Additionally aspirin 325 mg load 1 time and then 81 mg daily after consultation with orthopedic service.  Please consult general neurology service for further management of this patient.  Please call me with any additional questions/concerns.    Total time spent reviewing the pertinent clinical presentation, review of neuroimaging, time spent in medical decision making and discussion of case with the physicians involved in the acute care was 28 mins.      Dante Bo M.D.  Vascular Neurology & Neuroendovascular  Surgery

## 2024-02-11 NOTE — ED PROVIDER NOTES
[facility] 8SE MED SURG/PEDS  EMERGENCY DEPARTMENT ENCOUNTER        Pt Name: Rebeka Renee  MRN: [id number]  Birthdate 1945  Date of evaluation: 2/11/2024  Provider: Nasra Doran MD  PCP: Navneet Latif MD  Note Started: 9:40 AM EST 2/11/24    CHIEF COMPLAINT       Chief Complaint   Patient presents with    Altered Mental Status     Found down this AM next to couch LKW 2100 last PM- - January hip surg- leg rotated currently       HISTORY OF PRESENT ILLNESS: 1 or more Elements   History From: patient    Limitations to history : None    Rebeka Renee is a 78 y.o. female who presents from home for altered mental status.  Patient was found by her  this morning on the floor.  He is unsure how long she had been there.  Last known well was last night before bed.  Per EMS her baseline is usually talkative.  She is having much difficulty speaking at this time.     Nursing Notes were all reviewed and agreed with or any disagreements were addressed in the HPI.        REVIEW OF EXTERNAL NOTE :       Patient was last seen and admitted on 1/3/2024 for closed displaced fracture of right femoral neck and fall    She had right hip hemiarthroplasty on 1/4/2024 by Dr. Handy     REVIEW OF SYSTEMS :           Positives and Pertinent negatives as per HPI.     SURGICAL HISTORY     Past Surgical History:   Procedure Laterality Date    BREAST SURGERY      CATARACT REMOVAL WITH IMPLANT Bilateral     CHOLECYSTECTOMY      GASTRIC BYPASS SURGERY  9/2004    HERNIA REPAIR      HIP SURGERY Right 1/4/2024    RIGHT HIP HEMIARTHROPLASTY performed by Nemesio Handy MD at [facility] OR    HYSTERECTOMY (CERVIX STATUS UNKNOWN)      KIDNEY REMOVAL Left 2/2006    Cancer.    KIDNEY REMOVAL      left    KNEE SURGERY Right     arthroscopy    LIVER BIOPSY  2006    Fatty tumor.    LUNG REMOVAL, PARTIAL      For lung CA, thought to be metastatic from breast cancer. left upper lobe     MASTECTOMY Bilateral     NERVE

## 2024-02-11 NOTE — ED NOTES
Patient's mother, Karen, contacted requesting refill of the following medication to be filled at the [facility]:    dexmethylphenidate (FOCALIN XR) 10 MG 24 hr capsule    Take 1 capsule by mouth daily     Karen also stated that she would be dropping off a document for Dr. Eaton to sign stating that the school may administer the medication.  Karen may be reached at [phone]   The following labs were labeled with appropriate pt sticker and tubed to lab:     [] Blue     [] Lavender   [] on ice  [x] Green/yellow  [] Green/black [] on ice  [] Grey  [] on ice  [] Yellow  [] Red  [] Type/ Screen  [] ABG  [] VBG    [] COVID-19 swab    [] Rapid  [] PCR  [] Flu swab  [] Peds Viral Panel     [x] Urine Sample  [] Fecal Sample  [] Pelvic Cultures  [] Blood Cultures  [] X 2  [] STREP Cultures

## 2024-02-11 NOTE — CONSULTS
Department of Orthopedic Surgery  Resident Consult Note          Reason for Consult: Right Hip Pain    HISTORY OF PRESENT ILLNESS:       Patient is a 78 y.o. female who presents with hip pain after presumed fall.  Patient is companied by  at bedside who provides history.   states that he found his wife on the ground slightly unresponsive this morning.  He states that she may have had a stroke, and currently unable to talk. Patient is status post right hemiarthroplasty performed by Dr. Handy on 1/4/2024 for a sustained hip fracture.  Patient ambulates with wheeled walker post surgery.  Patient has a past medical history of TIA, type II DM, HTN, DL, hypothyroidism, neuropathy, bipolar 1 disorder, depression, breast cancer s/p bilateral mastectomy.    Past Medical History:        Diagnosis Date    Anemia     S/P chemotherapy.    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    Asthma     Bipolar 1 disorder (HCC)     Cancer (HCC)     lung/ kidney    Cancer of breast, female (HCC) 1/2006    S/P bilaeral mastectomy with left breast lymph node resection and chemotherapy.    CHF (congestive heart failure) (HCC)     Chronic back pain     Depression     Depression with anxiety     Diabetes mellitus (HCC)     Resolved after losing 130 lbs. after gastric bypass surgery.    Dyslipidemia     Fibromyalgia     History of blood transfusion     anemia    Hypertension     Hypothyroidism     Neuropathy     Pericardial effusion 4/2/2010    Subxiphoid pericardial window with drainage of pericardial effusion and pericardial biopsy:  Fluid and biopsy negative for metastases.     Pleural effusion 5/2/2010    Noted on chest x-ray.    TIA (transient ischemic attack)     Tobacco abuse     Patient smoked 3 ppd x 26 years.  Quit in 1995.    Type II or unspecified type diabetes mellitus without mention of complication, not

## 2024-02-11 NOTE — PROGRESS NOTES
Patient's jewelry removed and placed in an envelope. Envelope given to patient. Patient left CT department with jewelry. Patient had 4 gold hoop earrings.

## 2024-02-11 NOTE — PROGRESS NOTES
Messaged ortho MELITON regarding pain medication and if patient is able to have aspirin.    Okay for aspirin, hold at midnight

## 2024-02-12 ENCOUNTER — APPOINTMENT (OUTPATIENT)
Age: 79
DRG: 480 | End: 2024-02-12
Attending: STUDENT IN AN ORGANIZED HEALTH CARE EDUCATION/TRAINING PROGRAM
Payer: MEDICARE

## 2024-02-12 ENCOUNTER — ANESTHESIA EVENT (OUTPATIENT)
Dept: OPERATING ROOM | Age: 79
DRG: 480 | End: 2024-02-12
Payer: MEDICARE

## 2024-02-12 ENCOUNTER — APPOINTMENT (OUTPATIENT)
Dept: GENERAL RADIOLOGY | Age: 79
DRG: 480 | End: 2024-02-12
Payer: MEDICARE

## 2024-02-12 ENCOUNTER — ANESTHESIA (OUTPATIENT)
Dept: OPERATING ROOM | Age: 79
DRG: 480 | End: 2024-02-12
Payer: MEDICARE

## 2024-02-12 LAB
ANION GAP SERPL CALCULATED.3IONS-SCNC: 14 MMOL/L (ref 7–16)
BUN SERPL-MCNC: 16 MG/DL (ref 6–23)
CALCIUM SERPL-MCNC: 8.6 MG/DL (ref 8.6–10.2)
CHLORIDE SERPL-SCNC: 100 MMOL/L (ref 98–107)
CHOLEST SERPL-MCNC: 121 MG/DL
CK SERPL-CCNC: 553 U/L (ref 20–180)
CO2 SERPL-SCNC: 25 MMOL/L (ref 22–29)
CREAT SERPL-MCNC: 1.2 MG/DL (ref 0.5–1)
ECHO AO ASC DIAM: 3.7 CM
ECHO AO ASCENDING AORTA INDEX: 2.02 CM/M2
ECHO AV AREA PEAK VELOCITY: 3.3 CM2
ECHO AV AREA VTI: 3 CM2
ECHO AV AREA/BSA PEAK VELOCITY: 1.8 CM2/M2
ECHO AV AREA/BSA VTI: 1.6 CM2/M2
ECHO AV CUSP MM: 1.6 CM
ECHO AV MEAN GRADIENT: 3 MMHG
ECHO AV MEAN VELOCITY: 0.8 M/S
ECHO AV PEAK GRADIENT: 4 MMHG
ECHO AV PEAK VELOCITY: 1 M/S
ECHO AV VELOCITY RATIO: 1
ECHO AV VTI: 20.3 CM
ECHO BSA: 1.91 M2
ECHO LA DIAMETER INDEX: 1.91 CM/M2
ECHO LA DIAMETER: 3.5 CM
ECHO LV EDV A2C: 11 ML
ECHO LV EDV A4C: 32 ML
ECHO LV EDV BP: 22 ML (ref 56–104)
ECHO LV EDV INDEX A4C: 17 ML/M2
ECHO LV EDV INDEX BP: 12 ML/M2
ECHO LV EDV NDEX A2C: 6 ML/M2
ECHO LV EJECTION FRACTION A2C: 50 %
ECHO LV EJECTION FRACTION A4C: 52 %
ECHO LV EJECTION FRACTION BIPLANE: 54 % (ref 55–100)
ECHO LV ESV A2C: 6 ML
ECHO LV ESV A4C: 15 ML
ECHO LV ESV BP: 10 ML (ref 19–49)
ECHO LV ESV INDEX A2C: 3 ML/M2
ECHO LV ESV INDEX A4C: 8 ML/M2
ECHO LV ESV INDEX BP: 5 ML/M2
ECHO LV FRACTIONAL SHORTENING: 34 % (ref 28–44)
ECHO LV INTERNAL DIMENSION DIASTOLE INDEX: 1.91 CM/M2
ECHO LV INTERNAL DIMENSION DIASTOLIC: 3.5 CM (ref 3.9–5.3)
ECHO LV INTERNAL DIMENSION SYSTOLIC INDEX: 1.26 CM/M2
ECHO LV INTERNAL DIMENSION SYSTOLIC: 2.3 CM
ECHO LV ISOVOLUMETRIC RELAXATION TIME (IVRT): 85.4 MS
ECHO LV IVSD: 1.2 CM (ref 0.6–0.9)
ECHO LV IVSS: 1.3 CM
ECHO LV MASS 2D: 163.2 G (ref 67–162)
ECHO LV MASS INDEX 2D: 89.2 G/M2 (ref 43–95)
ECHO LV POSTERIOR WALL DIASTOLIC: 1.5 CM (ref 0.6–0.9)
ECHO LV POSTERIOR WALL SYSTOLIC: 1.2 CM
ECHO LV RELATIVE WALL THICKNESS RATIO: 0.86
ECHO LVOT AREA: 3.1 CM2
ECHO LVOT AV VTI INDEX: 0.94
ECHO LVOT DIAM: 2 CM
ECHO LVOT MEAN GRADIENT: 2 MMHG
ECHO LVOT PEAK GRADIENT: 4 MMHG
ECHO LVOT PEAK VELOCITY: 1 M/S
ECHO LVOT STROKE VOLUME INDEX: 32.8 ML/M2
ECHO LVOT SV: 60 ML
ECHO LVOT VTI: 19.1 CM
ECHO MV A VELOCITY: 0.79 M/S
ECHO MV AREA PHT: 2.2 CM2
ECHO MV AREA VTI: 3 CM2
ECHO MV E DECELERATION TIME (DT): 242.7 MS
ECHO MV E VELOCITY: 0.49 M/S
ECHO MV E/A RATIO: 0.62
ECHO MV LVOT VTI INDEX: 1.03
ECHO MV MAX VELOCITY: 0.6 M/S
ECHO MV MEAN GRADIENT: 1 MMHG
ECHO MV MEAN VELOCITY: 0.4 M/S
ECHO MV PEAK GRADIENT: 2 MMHG
ECHO MV PRESSURE HALF TIME (PHT): 100.4 MS
ECHO MV VTI: 19.7 CM
EKG ATRIAL RATE: 96 BPM
EKG P AXIS: 92 DEGREES
EKG P-R INTERVAL: 202 MS
EKG Q-T INTERVAL: 322 MS
EKG QRS DURATION: 102 MS
EKG QTC CALCULATION (BAZETT): 406 MS
EKG R AXIS: -41 DEGREES
EKG T AXIS: 57 DEGREES
EKG VENTRICULAR RATE: 96 BPM
ERYTHROCYTE [DISTWIDTH] IN BLOOD BY AUTOMATED COUNT: 15.9 % (ref 11.5–15)
GFR SERPL CREATININE-BSD FRML MDRD: 46 ML/MIN/1.73M2
GLUCOSE BLD-MCNC: 166 MG/DL (ref 74–99)
GLUCOSE BLD-MCNC: 190 MG/DL (ref 74–99)
GLUCOSE BLD-MCNC: 197 MG/DL (ref 74–99)
GLUCOSE SERPL-MCNC: 165 MG/DL (ref 74–99)
HBA1C MFR BLD: 5.5 % (ref 4–5.6)
HCT VFR BLD AUTO: 39.5 % (ref 34–48)
HDLC SERPL-MCNC: 39 MG/DL
HGB BLD-MCNC: 12.5 G/DL (ref 11.5–15.5)
LDLC SERPL CALC-MCNC: 59 MG/DL
MAGNESIUM SERPL-MCNC: 3 MG/DL (ref 1.6–2.6)
MCH RBC QN AUTO: 28.3 PG (ref 26–35)
MCHC RBC AUTO-ENTMCNC: 31.6 G/DL (ref 32–34.5)
MCV RBC AUTO: 89.6 FL (ref 80–99.9)
PLATELET # BLD AUTO: 236 K/UL (ref 130–450)
PMV BLD AUTO: 11.7 FL (ref 7–12)
POTASSIUM SERPL-SCNC: 3.8 MMOL/L (ref 3.5–5)
RBC # BLD AUTO: 4.41 M/UL (ref 3.5–5.5)
SODIUM SERPL-SCNC: 139 MMOL/L (ref 132–146)
TRIGL SERPL-MCNC: 115 MG/DL
TSH SERPL DL<=0.05 MIU/L-ACNC: 4.23 UIU/ML (ref 0.27–4.2)
VLDLC SERPL CALC-MCNC: 23 MG/DL
WBC OTHER # BLD: 19 K/UL (ref 4.5–11.5)

## 2024-02-12 PROCEDURE — 2580000003 HC RX 258: Performed by: STUDENT IN AN ORGANIZED HEALTH CARE EDUCATION/TRAINING PROGRAM

## 2024-02-12 PROCEDURE — 6360000002 HC RX W HCPCS: Performed by: ORTHOPAEDIC SURGERY

## 2024-02-12 PROCEDURE — 73502 X-RAY EXAM HIP UNI 2-3 VIEWS: CPT

## 2024-02-12 PROCEDURE — 6360000004 HC RX CONTRAST MEDICATION: Performed by: INTERNAL MEDICINE

## 2024-02-12 PROCEDURE — 82962 GLUCOSE BLOOD TEST: CPT

## 2024-02-12 PROCEDURE — 87075 CULTR BACTERIA EXCEPT BLOOD: CPT

## 2024-02-12 PROCEDURE — 6370000000 HC RX 637 (ALT 250 FOR IP): Performed by: ORTHOPAEDIC SURGERY

## 2024-02-12 PROCEDURE — 3600000015 HC SURGERY LEVEL 5 ADDTL 15MIN: Performed by: ORTHOPAEDIC SURGERY

## 2024-02-12 PROCEDURE — 99222 1ST HOSP IP/OBS MODERATE 55: CPT | Performed by: ORTHOPAEDIC SURGERY

## 2024-02-12 PROCEDURE — 3700000001 HC ADD 15 MINUTES (ANESTHESIA): Performed by: ORTHOPAEDIC SURGERY

## 2024-02-12 PROCEDURE — 2709999900 HC NON-CHARGEABLE SUPPLY: Performed by: ORTHOPAEDIC SURGERY

## 2024-02-12 PROCEDURE — 80061 LIPID PANEL: CPT

## 2024-02-12 PROCEDURE — 0QS604Z REPOSITION RIGHT UPPER FEMUR WITH INTERNAL FIXATION DEVICE, OPEN APPROACH: ICD-10-PCS | Performed by: ORTHOPAEDIC SURGERY

## 2024-02-12 PROCEDURE — 51798 US URINE CAPACITY MEASURE: CPT

## 2024-02-12 PROCEDURE — 94640 AIRWAY INHALATION TREATMENT: CPT

## 2024-02-12 PROCEDURE — 27248 TREAT THIGH FRACTURE: CPT | Performed by: ORTHOPAEDIC SURGERY

## 2024-02-12 PROCEDURE — 82550 ASSAY OF CK (CPK): CPT

## 2024-02-12 PROCEDURE — 6360000002 HC RX W HCPCS

## 2024-02-12 PROCEDURE — 84443 ASSAY THYROID STIM HORMONE: CPT

## 2024-02-12 PROCEDURE — 87116 MYCOBACTERIA CULTURE: CPT

## 2024-02-12 PROCEDURE — 6360000002 HC RX W HCPCS: Performed by: STUDENT IN AN ORGANIZED HEALTH CARE EDUCATION/TRAINING PROGRAM

## 2024-02-12 PROCEDURE — 93306 TTE W/DOPPLER COMPLETE: CPT | Performed by: INTERNAL MEDICINE

## 2024-02-12 PROCEDURE — 7100000001 HC PACU RECOVERY - ADDTL 15 MIN: Performed by: ORTHOPAEDIC SURGERY

## 2024-02-12 PROCEDURE — 87102 FUNGUS ISOLATION CULTURE: CPT

## 2024-02-12 PROCEDURE — 83735 ASSAY OF MAGNESIUM: CPT

## 2024-02-12 PROCEDURE — 99232 SBSQ HOSP IP/OBS MODERATE 35: CPT | Performed by: INTERNAL MEDICINE

## 2024-02-12 PROCEDURE — 2500000003 HC RX 250 WO HCPCS

## 2024-02-12 PROCEDURE — 87176 TISSUE HOMOGENIZATION CULTR: CPT

## 2024-02-12 PROCEDURE — 2060000000 HC ICU INTERMEDIATE R&B

## 2024-02-12 PROCEDURE — 87070 CULTURE OTHR SPECIMN AEROBIC: CPT

## 2024-02-12 PROCEDURE — 3600000005 HC SURGERY LEVEL 5 BASE: Performed by: ORTHOPAEDIC SURGERY

## 2024-02-12 PROCEDURE — 7100000000 HC PACU RECOVERY - FIRST 15 MIN: Performed by: ORTHOPAEDIC SURGERY

## 2024-02-12 PROCEDURE — 2580000003 HC RX 258

## 2024-02-12 PROCEDURE — 0SC90ZZ EXTIRPATION OF MATTER FROM RIGHT HIP JOINT, OPEN APPROACH: ICD-10-PCS | Performed by: ORTHOPAEDIC SURGERY

## 2024-02-12 PROCEDURE — 3700000000 HC ANESTHESIA ATTENDED CARE: Performed by: ORTHOPAEDIC SURGERY

## 2024-02-12 PROCEDURE — C8929 TTE W OR WO FOL WCON,DOPPLER: HCPCS

## 2024-02-12 PROCEDURE — 27253 TREAT HIP DISLOCATION: CPT | Performed by: ORTHOPAEDIC SURGERY

## 2024-02-12 PROCEDURE — 85027 COMPLETE CBC AUTOMATED: CPT

## 2024-02-12 PROCEDURE — 26990 DRAINAGE OF PELVIS LESION: CPT | Performed by: ORTHOPAEDIC SURGERY

## 2024-02-12 PROCEDURE — 36415 COLL VENOUS BLD VENIPUNCTURE: CPT

## 2024-02-12 PROCEDURE — 87206 SMEAR FLUORESCENT/ACID STAI: CPT

## 2024-02-12 PROCEDURE — 0QS704Z REPOSITION LEFT UPPER FEMUR WITH INTERNAL FIXATION DEVICE, OPEN APPROACH: ICD-10-PCS | Performed by: ORTHOPAEDIC SURGERY

## 2024-02-12 PROCEDURE — 83036 HEMOGLOBIN GLYCOSYLATED A1C: CPT

## 2024-02-12 PROCEDURE — 80048 BASIC METABOLIC PNL TOTAL CA: CPT

## 2024-02-12 PROCEDURE — 87205 SMEAR GRAM STAIN: CPT

## 2024-02-12 RX ORDER — HYDROMORPHONE HYDROCHLORIDE 1 MG/ML
0.5 INJECTION, SOLUTION INTRAMUSCULAR; INTRAVENOUS; SUBCUTANEOUS EVERY 5 MIN PRN
Status: DISCONTINUED | OUTPATIENT
Start: 2024-02-12 | End: 2024-02-12 | Stop reason: HOSPADM

## 2024-02-12 RX ORDER — OXYCODONE HYDROCHLORIDE AND ACETAMINOPHEN 5; 325 MG/1; MG/1
1 TABLET ORAL EVERY 6 HOURS PRN
Qty: 28 TABLET | Refills: 0 | Status: ON HOLD | OUTPATIENT
Start: 2024-02-12 | End: 2024-02-20

## 2024-02-12 RX ORDER — FENTANYL CITRATE 50 UG/ML
INJECTION, SOLUTION INTRAMUSCULAR; INTRAVENOUS PRN
Status: DISCONTINUED | OUTPATIENT
Start: 2024-02-12 | End: 2024-02-12 | Stop reason: SDUPTHER

## 2024-02-12 RX ORDER — DEXAMETHASONE SODIUM PHOSPHATE 4 MG/ML
INJECTION, SOLUTION INTRA-ARTICULAR; INTRALESIONAL; INTRAMUSCULAR; INTRAVENOUS; SOFT TISSUE PRN
Status: DISCONTINUED | OUTPATIENT
Start: 2024-02-12 | End: 2024-02-12 | Stop reason: SDUPTHER

## 2024-02-12 RX ORDER — TOBRAMYCIN 1.2 G/30ML
INJECTION, POWDER, LYOPHILIZED, FOR SOLUTION INTRAVENOUS PRN
Status: DISCONTINUED | OUTPATIENT
Start: 2024-02-12 | End: 2024-02-12 | Stop reason: ALTCHOICE

## 2024-02-12 RX ORDER — SODIUM CHLORIDE 0.9 % (FLUSH) 0.9 %
5-40 SYRINGE (ML) INJECTION PRN
Status: DISCONTINUED | OUTPATIENT
Start: 2024-02-12 | End: 2024-02-12 | Stop reason: HOSPADM

## 2024-02-12 RX ORDER — CEFAZOLIN SODIUM 1 G/3ML
INJECTION, POWDER, FOR SOLUTION INTRAMUSCULAR; INTRAVENOUS PRN
Status: DISCONTINUED | OUTPATIENT
Start: 2024-02-12 | End: 2024-02-12 | Stop reason: SDUPTHER

## 2024-02-12 RX ORDER — PROPOFOL 10 MG/ML
INJECTION, EMULSION INTRAVENOUS PRN
Status: DISCONTINUED | OUTPATIENT
Start: 2024-02-12 | End: 2024-02-12 | Stop reason: SDUPTHER

## 2024-02-12 RX ORDER — INSULIN LISPRO 100 [IU]/ML
0-4 INJECTION, SOLUTION INTRAVENOUS; SUBCUTANEOUS EVERY 6 HOURS
Status: DISCONTINUED | OUTPATIENT
Start: 2024-02-12 | End: 2024-02-13

## 2024-02-12 RX ORDER — MEPERIDINE HYDROCHLORIDE 25 MG/ML
12.5 INJECTION INTRAMUSCULAR; INTRAVENOUS; SUBCUTANEOUS EVERY 5 MIN PRN
Status: DISCONTINUED | OUTPATIENT
Start: 2024-02-12 | End: 2024-02-12 | Stop reason: HOSPADM

## 2024-02-12 RX ORDER — SODIUM CHLORIDE 9 MG/ML
INJECTION, SOLUTION INTRAVENOUS PRN
Status: DISCONTINUED | OUTPATIENT
Start: 2024-02-12 | End: 2024-02-12 | Stop reason: HOSPADM

## 2024-02-12 RX ORDER — ROCURONIUM BROMIDE 10 MG/ML
INJECTION, SOLUTION INTRAVENOUS PRN
Status: DISCONTINUED | OUTPATIENT
Start: 2024-02-12 | End: 2024-02-12 | Stop reason: SDUPTHER

## 2024-02-12 RX ORDER — PHENYLEPHRINE HCL IN 0.9% NACL 1 MG/10 ML
SYRINGE (ML) INTRAVENOUS PRN
Status: DISCONTINUED | OUTPATIENT
Start: 2024-02-12 | End: 2024-02-12 | Stop reason: SDUPTHER

## 2024-02-12 RX ORDER — SODIUM CHLORIDE 0.9 % (FLUSH) 0.9 %
5-40 SYRINGE (ML) INJECTION EVERY 12 HOURS SCHEDULED
Status: DISCONTINUED | OUTPATIENT
Start: 2024-02-12 | End: 2024-02-12 | Stop reason: HOSPADM

## 2024-02-12 RX ORDER — LIDOCAINE HYDROCHLORIDE 20 MG/ML
INJECTION, SOLUTION INFILTRATION; PERINEURAL PRN
Status: DISCONTINUED | OUTPATIENT
Start: 2024-02-12 | End: 2024-02-12 | Stop reason: SDUPTHER

## 2024-02-12 RX ADMIN — ROCURONIUM BROMIDE 20 MG: 10 INJECTION, SOLUTION INTRAVENOUS at 12:15

## 2024-02-12 RX ADMIN — ROCURONIUM BROMIDE 30 MG: 10 INJECTION, SOLUTION INTRAVENOUS at 11:59

## 2024-02-12 RX ADMIN — ROCURONIUM BROMIDE 20 MG: 10 INJECTION, SOLUTION INTRAVENOUS at 12:59

## 2024-02-12 RX ADMIN — SODIUM CHLORIDE: 9 INJECTION, SOLUTION INTRAVENOUS at 11:49

## 2024-02-12 RX ADMIN — HYDROMORPHONE HYDROCHLORIDE 0.5 MG: 1 INJECTION, SOLUTION INTRAMUSCULAR; INTRAVENOUS; SUBCUTANEOUS at 05:20

## 2024-02-12 RX ADMIN — MAGNESIUM SULFATE HEPTAHYDRATE 2000 MG: 40 INJECTION, SOLUTION INTRAVENOUS at 02:13

## 2024-02-12 RX ADMIN — Medication 100 MCG: at 12:44

## 2024-02-12 RX ADMIN — Medication 100 MCG: at 12:46

## 2024-02-12 RX ADMIN — ARFORMOTEROL TARTRATE 15 MCG: 15 SOLUTION RESPIRATORY (INHALATION) at 20:42

## 2024-02-12 RX ADMIN — BUDESONIDE 250 MCG: 0.25 SUSPENSION RESPIRATORY (INHALATION) at 08:32

## 2024-02-12 RX ADMIN — Medication 100 MCG: at 12:07

## 2024-02-12 RX ADMIN — SODIUM CHLORIDE: 4.5 INJECTION, SOLUTION INTRAVENOUS at 08:56

## 2024-02-12 RX ADMIN — PROPOFOL 100 MG: 10 INJECTION, EMULSION INTRAVENOUS at 11:59

## 2024-02-12 RX ADMIN — CEFAZOLIN 2 G: 1 INJECTION, POWDER, FOR SOLUTION INTRAMUSCULAR; INTRAVENOUS at 12:39

## 2024-02-12 RX ADMIN — Medication 100 MCG: at 12:30

## 2024-02-12 RX ADMIN — Medication 100 MCG: at 12:13

## 2024-02-12 RX ADMIN — FENTANYL CITRATE 50 MCG: 50 INJECTION, SOLUTION INTRAMUSCULAR; INTRAVENOUS at 11:59

## 2024-02-12 RX ADMIN — LIDOCAINE HYDROCHLORIDE 80 MG: 20 INJECTION, SOLUTION INFILTRATION; PERINEURAL at 11:59

## 2024-02-12 RX ADMIN — PERFLUTREN 1.5 ML: 6.52 INJECTION, SUSPENSION INTRAVENOUS at 10:36

## 2024-02-12 RX ADMIN — ONDANSETRON HYDROCHLORIDE 4 MG: 2 SOLUTION INTRAMUSCULAR; INTRAVENOUS at 12:14

## 2024-02-12 RX ADMIN — FENTANYL CITRATE 50 MCG: 50 INJECTION, SOLUTION INTRAMUSCULAR; INTRAVENOUS at 12:16

## 2024-02-12 RX ADMIN — ARFORMOTEROL TARTRATE 15 MCG: 15 SOLUTION RESPIRATORY (INHALATION) at 08:33

## 2024-02-12 RX ADMIN — Medication 100 MCG: at 12:32

## 2024-02-12 RX ADMIN — SUGAMMADEX 400 MG: 100 INJECTION, SOLUTION INTRAVENOUS at 13:29

## 2024-02-12 RX ADMIN — DEXAMETHASONE SODIUM PHOSPHATE 10 MG: 4 INJECTION, SOLUTION INTRAMUSCULAR; INTRAVENOUS at 12:14

## 2024-02-12 RX ADMIN — BUDESONIDE 250 MCG: 0.25 SUSPENSION RESPIRATORY (INHALATION) at 20:43

## 2024-02-12 RX ADMIN — POTASSIUM CHLORIDE 10 MEQ: 7.46 INJECTION, SOLUTION INTRAVENOUS at 01:10

## 2024-02-12 RX ADMIN — Medication 100 MCG: at 12:04

## 2024-02-12 RX ADMIN — Medication 100 MCG: at 12:57

## 2024-02-12 RX ADMIN — WATER 1000 MG: 1 INJECTION INTRAMUSCULAR; INTRAVENOUS; SUBCUTANEOUS at 16:49

## 2024-02-12 RX ADMIN — LABETALOL HYDROCHLORIDE 10 MG: 5 INJECTION INTRAVENOUS at 08:32

## 2024-02-12 ASSESSMENT — PAIN SCALES - GENERAL
PAINLEVEL_OUTOF10: 0
PAINLEVEL_OUTOF10: 0

## 2024-02-12 NOTE — PLAN OF CARE
Problem: Pain  Goal: Verbalizes/displays adequate comfort level or baseline comfort level  2/12/2024 0258 by Kelly Maynard RN  Outcome: Progressing     Problem: Safety - Adult  Goal: Free from fall injury  2/12/2024 0258 by Kelly Maynard RN  Outcome: Progressing     Problem: Skin/Tissue Integrity  Goal: Absence of new skin breakdown  Description: 1.  Monitor for areas of redness and/or skin breakdown  2.  Assess vascular access sites hourly  3.  Every 4-6 hours minimum:  Change oxygen saturation probe site  4.  Every 4-6 hours:  If on nasal continuous positive airway pressure, respiratory therapy assess nares and determine need for appliance change or resting period.  2/12/2024 0258 by Kelly Maynard RN  Outcome: Progressing     Problem: ABCDS Injury Assessment  Goal: Absence of physical injury  Outcome: Progressing

## 2024-02-12 NOTE — PLAN OF CARE
Problem: Chronic Conditions and Co-morbidities  Goal: Patient's chronic conditions and co-morbidity symptoms are monitored and maintained or improved  Outcome: Progressing     Problem: Discharge Planning  Goal: Discharge to home or other facility with appropriate resources  Outcome: Progressing     Problem: Pain  Goal: Verbalizes/displays adequate comfort level or baseline comfort level  2/12/2024 0936 by Carli Jimenez RN  Outcome: Progressing  2/12/2024 0258 by Kelly Maynard RN  Outcome: Progressing     Problem: Safety - Adult  Goal: Free from fall injury  2/12/2024 0936 by Carli Jimenez RN  Outcome: Progressing  2/12/2024 0258 by Kelly Maynard RN  Outcome: Progressing     Problem: Skin/Tissue Integrity  Goal: Absence of new skin breakdown  Description: 1.  Monitor for areas of redness and/or skin breakdown  2.  Assess vascular access sites hourly  3.  Every 4-6 hours minimum:  Change oxygen saturation probe site  4.  Every 4-6 hours:  If on nasal continuous positive airway pressure, respiratory therapy assess nares and determine need for appliance change or resting period.  2/12/2024 0936 by Carli Jimenez RN  Outcome: Progressing  2/12/2024 0258 by Kelly Maynard RN  Outcome: Progressing     Problem: ABCDS Injury Assessment  Goal: Absence of physical injury  2/12/2024 0936 by Carli Jimenez RN  Outcome: Progressing  2/12/2024 0258 by Kelly Maynard RN  Outcome: Progressing

## 2024-02-12 NOTE — PROGRESS NOTES
Physical Therapy    PT evaluation orders received and chart review completed. Pt to OR with ortho this date.     Will follow and re-attempt as appropriate. Thank you.    Jeanine Crowell PT, DPT  [id number]

## 2024-02-12 NOTE — ANESTHESIA POSTPROCEDURE EVALUATION
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    Terrie Phase I: Terrie Score: 8    Terrie Phase II:      Anesthesia Post Evaluation    Patient location during evaluation: PACU  Patient participation: complete - patient participated  Level of consciousness: awake  Pain score: 3  Airway patency: patent  Nausea & Vomiting: no nausea and no vomiting  Cardiovascular status: blood pressure returned to baseline  Respiratory status: acceptable  Hydration status: euvolemic      No notable events documented.

## 2024-02-12 NOTE — PROGRESS NOTES
Speech Language Pathology  NAME:  Rebeka Renee  :  1945  DATE: 2024  ROOM:  OR POOL /NONE    Pt unavailable this AM for Speech/ Language/ Cognitive Evaluation and Clinical Swallow Evaluation services due to:    Patient NPO for procedure not able to address dysphagia goals due to this. -- going to OR this AM.    Will re-attempt as appropriate. Thank you.

## 2024-02-12 NOTE — PROGRESS NOTES
Notified MD of patient's BP of 162/88. Patient is currently NPO for failed swallow test.   Advised to give PRN labetalol that is ordered

## 2024-02-12 NOTE — PROGRESS NOTES
OT SESSION ATTEMPT     Date:2024  Patient Name: Rebeka Renee  MRN: [id number]  : 1945  Room: [address] [place] [address]     Occupational therapy orders received/chart review completed and OT session attempted this date:   Pt with Superior dislocation of the proximal R femur in relation to the acetabulum. Plan for close reduction of right hip in operating room today.       Will reattempt OT at a later time/date.    Osmar Khan OTR/L [id number]

## 2024-02-12 NOTE — PROGRESS NOTES
[facility] Hospitalist Progress Note    Admitting Date and Time: 2/11/2024  9:35 AM  Admit Dx: Aphasia [R47.01]  Suspected DVT (deep vein thrombosis) [R09.89]  Ischemic stroke (HCC) [I63.9]    Synopsis: Patient is a 78-year-old female with past medical history of hypertension, BPV, diabetes, hyperlipidemia, tobacco abuse, anemia, asthma and depression, recent right hip arthroplasty who came in for altered mental status.  Patient was found down at home with difficulty speaking CT head and CT brain perfusion was within normal CTA head and neck with contrast with no convincing flow-limiting stenosis throughout.  Patient was admitted for further workup.  Orthopedics was also consulted she underwent bedside right hip reduction under sedation with fentanyl and propofol but was unable to be reduced.  She will undergo closed reduction of right hip in the OR 2/12    Subjective:  Patient is being followed for Aphasia [R47.01]  Suspected DVT (deep vein thrombosis) [R09.89]  Ischemic stroke (HCC) [I63.9]   Pt feels sleepy, confused.  Has some notable dysarthria, complains of pain in her hip.    ROS: denies fever, chills, cp, sob, n/v, HA unless stated above.      sodium chloride flush  5-40 mL IntraVENous 2 times per day    [Held by provider] enoxaparin  40 mg SubCUTAneous Daily    rosuvastatin  40 mg Oral Nightly    cefTRIAXone (ROCEPHIN) IV  1,000 mg IntraVENous Q24H    levothyroxine  50 mcg Oral Daily    losartan  50 mg Oral Daily    pantoprazole  40 mg Oral QAM AC    pramipexole  0.75 mg Oral BID    sucralfate  1 g Oral Daily    venlafaxine  150 mg Oral Daily    verapamil  180 mg Oral BID    arformoterol tartrate  15 mcg Nebulization BID RT    budesonide  250 mcg Nebulization BID     sodium chloride flush, 5-40 mL, PRN  sodium chloride, , PRN  potassium chloride, 40 mEq, PRN   Or  potassium alternative oral replacement, 40 mEq, PRN   Or  potassium chloride, 10 mEq, PRN  magnesium sulfate, 2,000 mg,  speech therapy  Uncontrolled hypertension -blood pressure on presentation to 210/116, continue on losartan and verapamil once able to tolerate oral medications.  As needed IV labetalol in place  Type 2 diabetes -hemoglobin A1c 2/12/20/2024 5.5%, patient is on insulin NPH 15 units twice daily, semaglutide.  Patient is currently n.p.o. will place on sliding scale for now 4x daily with POCT glucose 4x daily, hypoglycemia protocol  Multiple electrolyte abnormalities, hypokalemia, hypomagnesemia -continue to follow BMP mag and replace as needed  Rhabdomyolysis mild - CK6 94, repeat continue IV fluid  UTI growing E. coli in urine cultures continue Rocephin  Leukocytosis 2/2 to above, reactive   Posterolateral dislocation of the right hip status post arthroplasty status post reduction in ER -orthopedic surgery following will undergo reduction in the OR today 2/12, continue on aspirin 325 mg twice daily for DVT prophylaxis if okay with neurology   Hyperlipidemia -continue statin  Chronically elevated troponin -25> 25  Hypothyroidism -continue Synthroid  Asthma/COPD -continue on breathing treatment  Concern for possible DVT in light of recent surgery and decreased mobility -ultrasound of the lower extremities pending      NOTE: This report was transcribed using voice recognition software. Every effort was made to ensure accuracy; however, inadvertent computerized transcription errors may be present.  Electronically signed by Claudia Barrientos MD on 2/12/2024 at 11:23 AM

## 2024-02-12 NOTE — CONSULTS
criteria. Automated exposure control, iterative reconstruction, and/or weight based adjustment of the mA/kV was utilized to reduce the radiation dose to as low as reasonably achievable. This scan was analyzed using Viz.ai contact LVO. Identification of suspected findings is not for diagnostic use beyond notification. Viz LVO is limited to analysis of imaging data and should not be used in-lieu of full patient evaluation or relied upon to make or confirm diagnosis. COMPARISON: None. HISTORY: ORDERING SYSTEM PROVIDED HISTORY: aphasia TECHNOLOGIST PROVIDED HISTORY: Reason for exam:->aphasia Has a \"code stroke\" or \"stroke alert\" been called?->Yes What reading provider will be dictating this exam?->CRC; ORDERING SYSTEM PROVIDED HISTORY: aphasia TECHNOLOGIST PROVIDED HISTORY: Reason for exam:->aphasia Has a \"code stroke\" or \"stroke alert\" been called?-> What reading provider will be dictating this exam?->CRC Initial evaluation. FINDINGS: CTA NECK: AORTIC ARCH/ARCH VESSELS: No dissection or arterial injury.  No significant stenosis of the brachiocephalic or subclavian arteries. CAROTID ARTERIES: No dissection, arterial injury, or hemodynamically significant stenosis by NASCET criteria. VERTEBRAL ARTERIES: No dissection, arterial injury, or significant stenosis. SOFT TISSUES: No acute abnormality seen within the visualized portion the lungs or mediastinum. BONES: The osseous structures are osteopenic.  Mild chronic compression deformities are seen of the T2 and T3 vertebral bodies.  No convincing acute osseous abnormality seen. CTA HEAD: ANTERIOR CIRCULATION: No significant stenosis of the intracranial internal carotid, anterior cerebral, or middle cerebral arteries. No aneurysm. POSTERIOR CIRCULATION: No significant stenosis of the vertebral, basilar, or posterior cerebral arteries. No aneurysm. BRAIN: No mass effect or midline shift.     1. No convincing flow limiting stenosis of the cervical carotid/vertebral arteries.

## 2024-02-12 NOTE — DISCHARGE INSTRUCTIONS
[facility] Department of Orthopedic Surgery  [address] [facility] [facility] [address]    Dr. Wood George, DO         MD Dr. Wood Barrientos MD Frank Ansevin, PA-C Sara Zatchok PA-C Tyler Tsangaris PA-C      Orthopaedics Discharge Instructions   WBAT on right lower extremity  Pain medication Per Prescriptions  Contact Office for Medication Refill- [phone]  Office can refill pain med every 7 days  If patient discharging to facility then pain control will be continued per facility physician  Ice to operative/injured site for 15-30 minutes of each hour for next 5 days    Recommend that you continue to ice the area 2-3 times per day after this   Elevate operative/injured limb on 2 pillows at home  Goal is to have limb above the heart if able  Wound care -dressings remain clean dry intact.  On postoperative day 7 can be removed and surgical site can be clean with soapy water.       Follow Up in Office in 2 weeks. Your first post op appointment is often with one of our PAs.     Call the office at [phone] or directions or with any questions.  Watch for these significant complications.  Call physician if they or any other problems occur:  Fever over 101°, redness, swelling or warmth at the operative site  Unrelieved nausea    Foul smelling or cloudy drainage at the operative site   Unrelieved pain    Blood soaked dressing. (Some oozing may be normal)     Numb, pale, blue, cold or tingling extremity

## 2024-02-12 NOTE — OP NOTE
Operative Note      Patient: Rebeka Renee  YOB: 1945  MRN: [id number]    Date of Procedure: 2/12/2024    Pre-Op Diagnosis Codes:  Left periprosthetic hip hemiarthroplasty dislocation  Left femur greater trochanter fracture  Left hip hematoma    Post-Op Diagnosis: Same       Procedure(s):  Left hip prosthetic dislocation open reduction  Left femur greater trochanter fracture open reduction with internal fixation with suture fixation  Left hip hematoma excisional irrigation and debridement    Surgeon(s):  Wood George DO    Assistant:   Resident: Anshu Goetz DO; Laurent Mccarthy DO    Anesthesia: General    Estimated Blood Loss (mL): 200     Complications: None    Specimens:   ID Type Source Tests Collected by Time Destination   1 : RIGHT HIP HEMATOMA 1 Tissue Tissue CULTURE, ANAEROBIC, CULTURE, FUNGUS, GRAM STAIN, CULTURE, SURGICAL, CULTURE WITH SMEAR, ACID FAST BACILLIUS Wood George DO 2/12/2024 1256    2 : RIGHT HIP HEMATOMA 2 Tissue Tissue CULTURE, ANAEROBIC, CULTURE, FUNGUS, GRAM STAIN, CULTURE, SURGICAL, CULTURE WITH SMEAR, ACID FAST BACILLIUS Wood George DO 2/12/2024 1258        Implants:  * No implants in log *      Drains:   Urinary Catheter 02/12/24 Smith (Active)   $ Urethral catheter insertion Inserted for procedure 02/12/24 0615   Catheter Indications Perioperative use for selected surgical procedures 02/12/24 0800   Site Assessment No urethral drainage 02/12/24 0800   Urine Color Yellow 02/12/24 0800   Urine Appearance Clear 02/12/24 0800   Urine Odor Malodorous 02/12/24 0615   Collection Container Standard 02/12/24 0800   Securement Method Securing device (Describe) 02/12/24 0800   Catheter Best Practices  Drainage tube clipped to bed;Catheter secured to thigh;Tamper seal intact;Bag below bladder;Bag not on floor;Lack of dependent loop in tubing;Drainage bag less than half full 02/12/24 0800   Status Draining;Patent 02/12/24 0800   Output (mL)  trochanteric insertion. Copious irrigation was performed once more. The tensor fascia nitish was then closed with an Ethibond. Copious irrigation was performed once more. #0 and 2-0 Monocryl were used to close the subcutaneous layer. Staples were used for the skin. A sterile Aquacel dressing was placed over the wound. The patient was awakened from anesthesia, transferred to a hospital bed, and taken to the PACU in stable condition.     Disposition: The patient was taken to PACU in stable condition. Once stable, he will be transferred to the floor. Orders have been provided to begin physical therapy, weight bear as tolerated Left lower extremity. Patient received a dose of Ancef preoperatively. We will continue this for 24 hours postoperatively for infection prophylaxis. The patient will also be started on DVT prophylaxis.  She will maintain her knee immobilizer to prevent another dislocation.  We have consulted  and case management for discharge planning and consulted the PCP for medical management.  It should be noted that Dr. George was present for the entirety of the procedure.    Electronically signed by Anshu Goetz DO on 2/12/2024     Orthopaedic Attending    I was present and scrubbed throughout the procedure.    Electronically signed by   Wood George DO  2/12/2024     NOTE: This report was transcribed using voice recognition software. Every effort was made to ensure accuracy; however, inadvertent computerized transcription errors may be present

## 2024-02-12 NOTE — ANESTHESIA PRE PROCEDURE
PRN Milanes Marino, Maria, MD   10 mg at 02/12/24 0832   • cefTRIAXone (ROCEPHIN) 1,000 mg in sterile water 10 mL IV syringe  1,000 mg IntraVENous Q24H Milanes Marino, Maria, MD   1,000 mg at 02/11/24 1649   • levothyroxine (SYNTHROID) tablet 50 mcg  50 mcg Oral Daily Milanes Marino, Maria, MD       • losartan (COZAAR) tablet 50 mg  50 mg Oral Daily Milanes Marino, Maria, MD       • pantoprazole (PROTONIX) tablet 40 mg  40 mg Oral QAM AC Milanes Marino, Maria, MD       • pramipexole (MIRAPEX) tablet 0.75 mg  0.75 mg Oral BID Milanes Marino, Maria, MD       • sucralfate (CARAFATE) tablet 1 g  1 g Oral Daily Milanes Marino, Maria, MD       • venlafaxine (EFFEXOR XR) extended release capsule 150 mg  150 mg Oral Daily Milanes Marino, Maria, MD       • verapamil (CALAN SR) extended release tablet 180 mg  180 mg Oral BID Milanes Marino, Maria, MD       • 0.45 % sodium chloride infusion   IntraVENous Continuous Milanes Marino, Maria, MD 75 mL/hr at 02/12/24 1024 NoRateChange at 02/12/24 1024   • glucose chewable tablet 16 g  4 tablet Oral PRN Milanes Marino, Maria, MD       • dextrose bolus 10% 125 mL  125 mL IntraVENous PRN Milanes Marino, Maria, MD        Or   • dextrose bolus 10% 250 mL  250 mL IntraVENous PRN Milanes Marino, Maria, MD       • glucagon injection 1 mg  1 mg SubCUTAneous PRN Milanes Marino, Maria, MD       • dextrose 10 % infusion   IntraVENous Continuous PRN Milanes Marino, Maria, MD       • arformoterol tartrate (BROVANA) nebulizer solution 15 mcg  15 mcg Nebulization BID RT Milanes Marino, Maria, MD   15 mcg at 02/12/24 0833   • budesonide (PULMICORT) nebulizer suspension 250 mcg  250 mcg Nebulization BID Milanes Marino, Maria, MD   250 mcg at 02/12/24 0832   • HYDROmorphone (DILAUDID) injection 0.5 mg  0.5 mg IntraVENous Q4H PRN Sahil Vanessa, JOSELINE - CNP   0.5 mg at 02/12/24 0520       Allergies:    Allergies   Allergen Reactions   • Benadryl [Diphenhydramine] Hives     Tongue Swells   • Other

## 2024-02-12 NOTE — CARE COORDINATION
SW/CM transitional care progress note: unable to see pt to complete the assessment today. Pt has been off the floor in OR. Will attempt later or in the a.m. ZA Kerr  2/12/2024

## 2024-02-13 ENCOUNTER — APPOINTMENT (OUTPATIENT)
Dept: MRI IMAGING | Age: 79
DRG: 480 | End: 2024-02-13
Payer: MEDICARE

## 2024-02-13 LAB
ANION GAP SERPL CALCULATED.3IONS-SCNC: 8 MMOL/L (ref 7–16)
BASOPHILS # BLD: 0.02 K/UL (ref 0–0.2)
BASOPHILS NFR BLD: 0 % (ref 0–2)
BUN SERPL-MCNC: 18 MG/DL (ref 6–23)
CALCIUM SERPL-MCNC: 8.2 MG/DL (ref 8.6–10.2)
CHLORIDE SERPL-SCNC: 103 MMOL/L (ref 98–107)
CO2 SERPL-SCNC: 27 MMOL/L (ref 22–29)
CREAT SERPL-MCNC: 0.8 MG/DL (ref 0.5–1)
EOSINOPHIL # BLD: 0.03 K/UL (ref 0.05–0.5)
EOSINOPHILS RELATIVE PERCENT: 0 % (ref 0–6)
ERYTHROCYTE [DISTWIDTH] IN BLOOD BY AUTOMATED COUNT: 15.9 % (ref 11.5–15)
GFR SERPL CREATININE-BSD FRML MDRD: >60 ML/MIN/1.73M2
GLUCOSE BLD-MCNC: 106 MG/DL (ref 74–99)
GLUCOSE BLD-MCNC: 123 MG/DL (ref 74–99)
GLUCOSE BLD-MCNC: 133 MG/DL (ref 74–99)
GLUCOSE BLD-MCNC: 205 MG/DL (ref 74–99)
GLUCOSE SERPL-MCNC: 110 MG/DL (ref 74–99)
HCT VFR BLD AUTO: 26.4 % (ref 34–48)
HCT VFR BLD AUTO: 27.6 % (ref 34–48)
HGB BLD-MCNC: 8.2 G/DL (ref 11.5–15.5)
HGB BLD-MCNC: 8.7 G/DL (ref 11.5–15.5)
IMM GRANULOCYTES # BLD AUTO: 0.05 K/UL (ref 0–0.58)
IMM GRANULOCYTES NFR BLD: 0 % (ref 0–5)
LACTATE BLDV-SCNC: 1.1 MMOL/L (ref 0.5–1.9)
LYMPHOCYTES NFR BLD: 2.35 K/UL (ref 1.5–4)
LYMPHOCYTES RELATIVE PERCENT: 17 % (ref 20–42)
MCH RBC QN AUTO: 28.4 PG (ref 26–35)
MCHC RBC AUTO-ENTMCNC: 31.5 G/DL (ref 32–34.5)
MCV RBC AUTO: 90.2 FL (ref 80–99.9)
MONOCYTES NFR BLD: 1.1 K/UL (ref 0.1–0.95)
MONOCYTES NFR BLD: 8 % (ref 2–12)
NEUTROPHILS NFR BLD: 75 % (ref 43–80)
NEUTS SEG NFR BLD: 10.37 K/UL (ref 1.8–7.3)
PLATELET # BLD AUTO: 207 K/UL (ref 130–450)
PMV BLD AUTO: 10.8 FL (ref 7–12)
POTASSIUM SERPL-SCNC: 3.6 MMOL/L (ref 3.5–5)
RBC # BLD AUTO: 3.06 M/UL (ref 3.5–5.5)
SODIUM SERPL-SCNC: 138 MMOL/L (ref 132–146)
WBC OTHER # BLD: 13.9 K/UL (ref 4.5–11.5)

## 2024-02-13 PROCEDURE — 6360000002 HC RX W HCPCS: Performed by: INTERNAL MEDICINE

## 2024-02-13 PROCEDURE — 92523 SPEECH SOUND LANG COMPREHEN: CPT | Performed by: SPEECH-LANGUAGE PATHOLOGIST

## 2024-02-13 PROCEDURE — 83605 ASSAY OF LACTIC ACID: CPT

## 2024-02-13 PROCEDURE — 6370000000 HC RX 637 (ALT 250 FOR IP): Performed by: INTERNAL MEDICINE

## 2024-02-13 PROCEDURE — 6360000002 HC RX W HCPCS

## 2024-02-13 PROCEDURE — 97535 SELF CARE MNGMENT TRAINING: CPT

## 2024-02-13 PROCEDURE — 6370000000 HC RX 637 (ALT 250 FOR IP)

## 2024-02-13 PROCEDURE — 80048 BASIC METABOLIC PNL TOTAL CA: CPT

## 2024-02-13 PROCEDURE — 92526 ORAL FUNCTION THERAPY: CPT | Performed by: SPEECH-LANGUAGE PATHOLOGIST

## 2024-02-13 PROCEDURE — 85025 COMPLETE CBC W/AUTO DIFF WBC: CPT

## 2024-02-13 PROCEDURE — 97530 THERAPEUTIC ACTIVITIES: CPT

## 2024-02-13 PROCEDURE — 82962 GLUCOSE BLOOD TEST: CPT

## 2024-02-13 PROCEDURE — 97161 PT EVAL LOW COMPLEX 20 MIN: CPT

## 2024-02-13 PROCEDURE — 85018 HEMOGLOBIN: CPT

## 2024-02-13 PROCEDURE — 85014 HEMATOCRIT: CPT

## 2024-02-13 PROCEDURE — 99232 SBSQ HOSP IP/OBS MODERATE 35: CPT | Performed by: NURSE PRACTITIONER

## 2024-02-13 PROCEDURE — 2580000003 HC RX 258

## 2024-02-13 PROCEDURE — 92610 EVALUATE SWALLOWING FUNCTION: CPT | Performed by: SPEECH-LANGUAGE PATHOLOGIST

## 2024-02-13 PROCEDURE — 99232 SBSQ HOSP IP/OBS MODERATE 35: CPT | Performed by: INTERNAL MEDICINE

## 2024-02-13 PROCEDURE — 97129 THER IVNTJ 1ST 15 MIN: CPT | Performed by: SPEECH-LANGUAGE PATHOLOGIST

## 2024-02-13 PROCEDURE — 97165 OT EVAL LOW COMPLEX 30 MIN: CPT

## 2024-02-13 PROCEDURE — 2060000000 HC ICU INTERMEDIATE R&B

## 2024-02-13 PROCEDURE — 94640 AIRWAY INHALATION TREATMENT: CPT

## 2024-02-13 PROCEDURE — 70551 MRI BRAIN STEM W/O DYE: CPT

## 2024-02-13 PROCEDURE — 87040 BLOOD CULTURE FOR BACTERIA: CPT

## 2024-02-13 PROCEDURE — 36415 COLL VENOUS BLD VENIPUNCTURE: CPT

## 2024-02-13 RX ORDER — INSULIN LISPRO 100 [IU]/ML
0-4 INJECTION, SOLUTION INTRAVENOUS; SUBCUTANEOUS NIGHTLY
Status: DISCONTINUED | OUTPATIENT
Start: 2024-02-13 | End: 2024-02-14 | Stop reason: HOSPADM

## 2024-02-13 RX ORDER — LORAZEPAM 2 MG/ML
1 INJECTION INTRAMUSCULAR
Status: DISCONTINUED | OUTPATIENT
Start: 2024-02-14 | End: 2024-02-13

## 2024-02-13 RX ORDER — LORAZEPAM 2 MG/ML
1 INJECTION INTRAMUSCULAR
Status: COMPLETED | OUTPATIENT
Start: 2024-02-13 | End: 2024-02-13

## 2024-02-13 RX ORDER — INSULIN LISPRO 100 [IU]/ML
0-4 INJECTION, SOLUTION INTRAVENOUS; SUBCUTANEOUS
Status: DISCONTINUED | OUTPATIENT
Start: 2024-02-13 | End: 2024-02-14 | Stop reason: HOSPADM

## 2024-02-13 RX ADMIN — SODIUM CHLORIDE, PRESERVATIVE FREE 10 ML: 5 INJECTION INTRAVENOUS at 22:23

## 2024-02-13 RX ADMIN — INSULIN LISPRO 1 UNITS: 100 INJECTION, SOLUTION INTRAVENOUS; SUBCUTANEOUS at 12:34

## 2024-02-13 RX ADMIN — VERAPAMIL HYDROCHLORIDE 180 MG: 180 TABLET, FILM COATED, EXTENDED RELEASE ORAL at 22:23

## 2024-02-13 RX ADMIN — WATER 1000 MG: 1 INJECTION INTRAMUSCULAR; INTRAVENOUS; SUBCUTANEOUS at 12:20

## 2024-02-13 RX ADMIN — ARFORMOTEROL TARTRATE 15 MCG: 15 SOLUTION RESPIRATORY (INHALATION) at 09:31

## 2024-02-13 RX ADMIN — BUDESONIDE 250 MCG: 0.25 SUSPENSION RESPIRATORY (INHALATION) at 09:31

## 2024-02-13 RX ADMIN — SODIUM CHLORIDE: 4.5 INJECTION, SOLUTION INTRAVENOUS at 03:23

## 2024-02-13 RX ADMIN — ARFORMOTEROL TARTRATE 15 MCG: 15 SOLUTION RESPIRATORY (INHALATION) at 20:51

## 2024-02-13 RX ADMIN — VERAPAMIL HYDROCHLORIDE 180 MG: 180 TABLET, FILM COATED, EXTENDED RELEASE ORAL at 12:22

## 2024-02-13 RX ADMIN — HYDROMORPHONE HYDROCHLORIDE 0.5 MG: 1 INJECTION, SOLUTION INTRAMUSCULAR; INTRAVENOUS; SUBCUTANEOUS at 03:31

## 2024-02-13 RX ADMIN — VENLAFAXINE HYDROCHLORIDE 150 MG: 150 CAPSULE, EXTENDED RELEASE ORAL at 12:22

## 2024-02-13 RX ADMIN — SUCRALFATE 1 G: 1 TABLET ORAL at 12:23

## 2024-02-13 RX ADMIN — SODIUM CHLORIDE, PRESERVATIVE FREE 10 ML: 5 INJECTION INTRAVENOUS at 11:13

## 2024-02-13 RX ADMIN — ROSUVASTATIN CALCIUM 40 MG: 20 TABLET, COATED ORAL at 22:23

## 2024-02-13 RX ADMIN — ENOXAPARIN SODIUM 40 MG: 100 INJECTION SUBCUTANEOUS at 11:14

## 2024-02-13 RX ADMIN — BUDESONIDE 250 MCG: 0.25 SUSPENSION RESPIRATORY (INHALATION) at 20:51

## 2024-02-13 RX ADMIN — PRAMIPEXOLE DIHYDROCHLORIDE 0.75 MG: 0.25 TABLET ORAL at 12:23

## 2024-02-13 RX ADMIN — HYDROMORPHONE HYDROCHLORIDE 0.5 MG: 1 INJECTION, SOLUTION INTRAMUSCULAR; INTRAVENOUS; SUBCUTANEOUS at 23:53

## 2024-02-13 RX ADMIN — PRAMIPEXOLE DIHYDROCHLORIDE 0.75 MG: 0.25 TABLET ORAL at 22:22

## 2024-02-13 RX ADMIN — LOSARTAN POTASSIUM 50 MG: 50 TABLET, FILM COATED ORAL at 12:22

## 2024-02-13 RX ADMIN — LORAZEPAM 1 MG: 2 INJECTION INTRAMUSCULAR; INTRAVENOUS at 21:06

## 2024-02-13 ASSESSMENT — PAIN DESCRIPTION - ONSET: ONSET: GRADUAL

## 2024-02-13 ASSESSMENT — PAIN DESCRIPTION - LOCATION
LOCATION: HIP
LOCATION: HIP

## 2024-02-13 ASSESSMENT — PAIN DESCRIPTION - FREQUENCY: FREQUENCY: INTERMITTENT

## 2024-02-13 ASSESSMENT — PAIN DESCRIPTION - DESCRIPTORS: DESCRIPTORS: DISCOMFORT;ACHING;TENDER

## 2024-02-13 ASSESSMENT — PAIN SCALES - GENERAL
PAINLEVEL_OUTOF10: 8
PAINLEVEL_OUTOF10: 10

## 2024-02-13 ASSESSMENT — PAIN DESCRIPTION - ORIENTATION
ORIENTATION: RIGHT
ORIENTATION: RIGHT

## 2024-02-13 ASSESSMENT — PAIN - FUNCTIONAL ASSESSMENT: PAIN_FUNCTIONAL_ASSESSMENT: PREVENTS OR INTERFERES WITH MANY ACTIVE NOT PASSIVE ACTIVITIES

## 2024-02-13 NOTE — PROGRESS NOTES
SPEECH/LANGUAGE PATHOLOGY  CLINICAL ASSESSMENT OF SWALLOWING FUNCTION   and PLAN OF CARE    PATIENT NAME:  Rebeka Renee  (female)     MRN:  [id number]    :  1945  (78 y.o.)  STATUS:  Inpatient: Room 8501/8501-B    TODAY'S DATE:  24 0845    SLP swallowing-dysphagia evaluation and treatment  Start:  24,   End:  24,   ONE TIME,   Standing Count:  1 Occurrences,   R       Last continued at transfer on   3:44 PM  Eugenio Tapia DO  REASON FOR REFERRAL: dysphagia   EVALUATING THERAPIST: ALONDRA Irizarry                 RESULTS:    DYSPHAGIA DIAGNOSIS:   Clinical indicators of minimal-mild oral phase dysphagia       DIET RECOMMENDATIONS:  Soft and bite size consistency solids (IDDSI level 6) with  thin liquids (IDDSI level 0), MEDICATION ADMINISTRATION, and Per patient choice/nursing judgement     FEEDING RECOMMENDATIONS:     Assistance level:  Supervision is needed during all oral intake, Encourage self-feeding      Compensatory strategies recommended: Thorough oral care to prevent colonization of oral bacteria , Upright in bed/ chair as tolerated, Fully alert for all PO, and Small bites/sips. NO HYPEREXTENDING  NECK (throwing head back)      Discussed recommendations with nursing and/or faxed report to referring provider: Yes    SPEECH THERAPY  PLAN OF CARE   The dysphagia POC is established based on physician order, dysphagia diagnosis and results of clinical assessment     Meal time assessment for 1-2 sessions to provide diet modification and compensatory strategy implementation to safely advance diet as functional ability improves    Conditions Requiring Skilled Therapeutic Intervention for dysphagia:    Patient is performing below functional baseline d/t  current acute condition, respiratory compromise, multiple medications, and/or increased dependency upon caregivers.  impaired mastication   Coughing during PO intake  x1    Specific dysphagia      Method of Intake:   cup, straw, spoon  Self fed, Fed by clinician      Position:   Sitting upright in a chair, Sitting in bed with head elevated above 75 degrees    CLINICAL ASSESSMENT:  Oral Stage:       Decreased mastication due to:  disorganized chewing pattern and large bites taken       Pharyngeal Stage:    Immediate wet cough was noted after presentation of thin liquid x1 occurrence only that was proceeded by Pt hyperextending neck   No other signs of aspiration were noted during this evaluation however, silent aspiration cannot be ruled out at bedside.  If silent aspiration is suspected, a Videofluoroscopic Study of Swallowing (MBS) is recommended and requires a physician order.    Cognition:   Confusion noted and Aphasic    Oral Peripheral Examination   Right labiobuccal weakness    Current Respiratory Status    room air     Parameters of Speech Production  Respiration:  Adequate for speech production  Quality:   Within functional limits  Intensity: Within functional limits    Volitional Swallow: Present     Volitional Cough:  Present     Pain: No pain reported.    EDUCATION:   The Speech Language Pathologist (SLP) completed education regarding results of evaluation and that intervention is warranted at this time.  Learner: Patient  Education: Reviewed results and recommendations of this evaluation, Reviewed diet and strategies, Reviewed signs, symptoms and risks of aspiration, Reviewed recommendations for follow-up, and Education Related to Potential Risks and Complications Due to Impairment/Illness/Injury  Evaluation of Education:  Verbalizes understanding    This plan may be re-evaluated and revised as warranted.      Evaluation Time includes thorough review of current medical information, gathering information on past medical history/social history and prior level of function, completion of standardized testing/informal observation of tasks, assessment of data and education on plan of care and

## 2024-02-13 NOTE — PLAN OF CARE
Problem: Chronic Conditions and Co-morbidities  Goal: Patient's chronic conditions and co-morbidity symptoms are monitored and maintained or improved  2/13/2024 0438 by Della Ireland RN  Outcome: Progressing  2/12/2024 2133 by Lucia Nugent RN  Outcome: Progressing     Problem: Discharge Planning  Goal: Discharge to home or other facility with appropriate resources  2/13/2024 0438 by Della Ireland RN  Outcome: Progressing  2/12/2024 2133 by Lucia Nugent RN  Outcome: Progressing     Problem: Pain  Goal: Verbalizes/displays adequate comfort level or baseline comfort level  2/13/2024 0438 by Della Ireland RN  Outcome: Progressing  2/12/2024 2133 by Lucia Nugent RN  Outcome: Progressing     Problem: Safety - Adult  Goal: Free from fall injury  2/13/2024 0438 by Della Ireland RN  Outcome: Progressing  2/12/2024 2133 by Lucia Nugent RN  Outcome: Progressing

## 2024-02-13 NOTE — PROGRESS NOTES
Rebeka Renee is a 78 y.o. right handed female     Neurology following for AMS    PMH of TIA, HTN, DM type II, CHF, HLD, osteoarthritis with spinal arthropathy, neuropathy, pleural effusion, bipolar 1 disorder, lung/kidney cancer, breast cancer, and fibromyalgia     Admitted to [facility] on 2/11/2024 with presentation of altered mental status.  Patient was found down on floor by her  on morning of presentation.   was unsure how long she had been lying on the floor.  She is normally talkative and interactive but had difficulty speaking at initial evaluation.  She was last admitted to the hospital 1/3/2024 with closed displaced fracture of the right femoral neck after a fall.  Patient had undergone right hemiarthroplasty at the time.  She was brought to the emergency room for evaluation where NIHSS was 13.     CT head did not show any acute intracranial abnormalities.    CTP: was negative for any perfusion diffusion mismatch.  CTA head/neck was negative for LVO or hemodynamically significant stenosis.  Telestroke consult with Dr. Gerardo notable for no indication for TNK or endovascular therapy.  Patient with suspected metabolic etiology for symptoms and was recommended for admission to general neurology consult for further evaluation and management.    Patient sitting up in chair at bedside,  at bedside.  Advised pending MRI brain to rule out stroke.  Addressed questions and concerns.      Objective:     BP (!) 116/54   Pulse (!) 106   Temp 98 °F (36.7 °C) (Temporal)   Resp 18   Ht 1.524 m (5')   Wt 86.2 kg (190 lb)   SpO2 98%   BMI 37.11 kg/m²     General appearance: alert, appears stated age, cooperative and in distress  Head: normocephalic, without obvious abnormality, atraumatic  Eyes: conjunctivae/corneas clear  Neck: supple, symmetrical, trachea midline   Lungs: respirations non labored   Extremities:  RLE up on chair with brace   Skin: color, texture, turgor

## 2024-02-13 NOTE — CONSULTS
Patient 78-year-old female presenting with altered mental status after being found on the floor. According to the patient she apparently fell and appears to have fractured her right trochanter. She stated she was unable to get up and was until her  arrived and she was able to get up. She was significantly altered initial evaluation by MS and remains somewhat altered at hospitalization. However, on my examination she was able to answer questions and follow commands fairly consistently. She knew where she was as well as date and time along with the reason for hospitalization. She was extremely somnolent for unclear reasons. However, once awakened sufficiently she did respond appropriately. There were no clear focal findings on exam otherwise. Cranial nerves were intact with preserved motor function. Unable to assess the right legs approximately due to fixation device on the right leg. Right ankle strength was normal. Sensation appears to be preserved throughout with no abnormal movements or tremors. Cerebellar function was grossly normal. Did not assess reflexes on the right leg with normal reflexes bilateral upper extremity and right patella. Absent reflex and bilateral Achilles.    Impression:  1. Altered mental status with somnolence on examination.  2. Right hip fracture from fall  3. History of TIA  4. Stroke risk factors: HTN, DM, HLD, and age.    Recommendations:  1. Continue current therapy  2. MRI brain to rule out acute CVA given stroke risk factors.  3. PT for weight proximal leg fracture  4. Avoid sedative medications  5. Further pending MRI results.

## 2024-02-13 NOTE — PROGRESS NOTES
OCCUPATIONAL THERAPY INITIAL EVALUATION      [facility] [address] [place], [place]       Date:2024                                                  Patient Name: Rebeka Renee  MRN: [id number]  : 1945  Room: [address] [place] [address]    Evaluating OT: Cherylhilda Baker, OTR/L #[id number]    Referring Provider::    Eugenio Tapia DO         Specific Provider Orders/Date: OT evaluation and treatment 24    Diagnosis:  Aphasia [R47.01]  Suspected DVT (deep vein thrombosis) [R09.89]  Ischemic stroke (HCC) [I63.9]      Pertinent Medical History:  has a past medical history of Anemia, Anxiety, Arthritis, Ascending aortic aneurysm (HCC), Asthma, Bipolar 1 disorder (HCC), Cancer (HCC), Cancer of breast, female (HCC), CHF (congestive heart failure) (HCC), Chronic back pain, Depression, Depression with anxiety, Diabetes mellitus (HCC), Dyslipidemia, Fibromyalgia, History of blood transfusion, Hypertension, Hypothyroidism, Neuropathy, Pericardial effusion, Pleural effusion, TIA (transient ischemic attack), Tobacco abuse, and Type II or unspecified type diabetes mellitus without mention of complication, not stated as uncontrolled.     Procedure/Surgery:  Left hip prosthetic dislocation open reduction  Left femur greater trochanter fracture open reduction with internal fixation with suture fixation  Left hip hematoma excisional irrigation and debridement     Precautions:  Fall Risk, WBAT RLE with Knee immobilizer, alarm, cognition    Assessment of current deficits   [x] Functional mobility   [x]ADLs  [x] Strength               [x]Cognition   [x] Functional transfers   [] IADLs         [x] Safety Awareness   [x]Endurance   [] Fine Coordination              [x] Balance      [] Vision/perception   []Sensation    []Gross Motor Coordination  [] ROM  [] Delirium                   [] Motor Control     OT PLAN OF CARE   OT POC based on physician orders, patient diagnosis

## 2024-02-13 NOTE — CARE COORDINATION
Pt lives with spouse and they are both retired. The couple  has no children. Pt is not a . Pt lives in a [facility] home with 1st floor bed and bath and walk in shower.  There are not steps to enter from outside but then there are 3 up in to the kitchen. Pt had hip surgery pta and was active with [facility], left vm for rep. Pt was needing some form of assistance at home with bathing and dressing, spouse cooks and provides her medications. Pt is diabetic pta on insulin with a free style button. Pt has a fww, cane, nebulizer, shower chair, 3:1 commode and home o2 at 2l from [email or web address] [facility]. POD 1 with orthopedics a right periprosthetic hip dislocation open reduction. Cx's are pending. Pt is on iv fluids and rocephin with de la fuente catheter. PT and OT to eval. Speech to see.and pt is npo.  Mri of the brain is pending.will discuss discharge plan with pt and spouse after PT and OT recommendations. ZA Kerr  2/13/2024  PT and OT rec: stone. I met with pt and spouse and they are leaning towards it and will talk it over. The therapist said both pt and spouse seem confused. I provided a stone list and will check back with pt in the a.m. ZA Kerr  2/13/2024    Per leodan at [facility] pt is not longer active. ZA Kerr..2/13/2024    Case Management Assessment  Initial Evaluation    Date/Time of Evaluation: 2/13/2024 10:25 AM  Assessment Completed by: ZA Kerr    If patient is discharged prior to next notation, then this note serves as note for discharge by case management.    Patient Name: Rebeka Renee                   YOB: 1945  Diagnosis: Aphasia [R47.01]  Suspected DVT (deep vein thrombosis) [R09.89]  Ischemic stroke (HCC) [I63.9]                   Date / Time: 2/11/2024  9:35 AM    Patient Admission Status: Inpatient   Readmission Risk (Low < 19, Mod (19-27), High > 27): Readmission Risk Score: 18.7    Current PCP: Navneet Latif MD  PCP verified by CM? Yes    Chart

## 2024-02-13 NOTE — PROGRESS NOTES
Department of Orthopedic Surgery  Resident Progress Note    Patient seen and examined at bedside.  Patient resting comfortably upon entering the room.  Patient easily awakened.  Patient slightly confused upon awakening but easily oriented back to normal x 3.  Patient states pain to the right lower extremity is well under control.  Patient denies any new numbness tingling treadmill right lower extremity.  Patient had multiple questions regarding treatment timeline all questions answered to her satisfaction.  Patient has no new complaints today..   VITALS:  BP (!) 144/72   Pulse 94   Temp 98.5 °F (36.9 °C) (Temporal)   Resp 16   Ht 1.524 m (5')   Wt 86.2 kg (190 lb)   SpO2 99%   BMI 37.11 kg/m²     General: Awake alert oriented x 3    MUSCULOSKELETAL:   right lower extremity:  Dressing C/D/I  Compartments soft and compressible  +PF/DF/EHL  +2/4 DP & PT pulses, Brisk Cap refill, Toes warm and perfused  Distal sensation grossly intact to Peroneals, Sural, Saphenous, and tibial nrs    CBC:   Lab Results   Component Value Date/Time    WBC 19.0 02/12/2024 04:20 AM    HGB 12.5 02/12/2024 04:20 AM    HCT 39.5 02/12/2024 04:20 AM     02/12/2024 04:20 AM     PT/INR:    Lab Results   Component Value Date/Time    PROTIME 12.5 02/11/2024 09:44 AM    PROTIME 10.4 12/13/2010 09:00 AM    INR 1.1 02/11/2024 09:44 AM       ASSESSMENT  S/P right periprosthetic hip dislocation open reduction on 2/12    PLAN    Plan discussed with patient all questions answered to their satisfaction  Patient is to maintain knee immobilizer weightbearing as tolerated to her right lower extremity.  DVT prophylaxis per admitting service  Pain control per admitting service  Continue 24 hours postop antibiotics  Monitor H&H 12.5 on 2/12  PT/OT as able  Medical management appreciated  Plan: Pending  D/W attending  Electronically signed by Laurent Mccarthy DO on 2/13/2024 at 6:11 AM     Orthopaedic Attending    I have seen and evaluated the patient

## 2024-02-13 NOTE — PROGRESS NOTES
Physical Therapy  Physical Therapy Initial Assessment     Name: Rebeka Renee  : 1945  MRN: [id number]      Date of Service: 2024    Evaluating PT:  Jeanine Crowell PT, DPT [id number]    Room #:  8501/8501-B  Diagnosis:  Aphasia [R47.01]  Suspected DVT (deep vein thrombosis) [R09.89]  Ischemic stroke (HCC) [I63.9]  PMHx/PSHx:    Past Medical History:   Diagnosis Date    Anemia     S/P chemotherapy.    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    Ascending aortic aneurysm (HCC)     seen on CTA chest w/contrast on     Asthma     Bipolar 1 disorder (HCC)     Cancer (HCC)     lung/ kidney    Cancer of breast, female (HCC) 2006    S/P bilaeral mastectomy with left breast lymph node resection and chemotherapy.    CHF (congestive heart failure) (HCC)     Chronic back pain     Depression     Depression with anxiety     Diabetes mellitus (HCC)     Resolved after losing 130 lbs. after gastric bypass surgery.    Dyslipidemia     Fibromyalgia     History of blood transfusion     anemia    Hypertension     Hypothyroidism     Neuropathy     Pericardial effusion 2010    Subxiphoid pericardial window with drainage of pericardial effusion and pericardial biopsy:  Fluid and biopsy negative for metastases.     Pleural effusion 2010    Noted on chest x-ray.    TIA (transient ischemic attack)     Tobacco abuse     Patient smoked 3 ppd x 26 years.  Quit in .    Type II or unspecified type diabetes mellitus without mention of complication, not stated as uncontrolled       Procedure/Surgery:  Left hip prosthetic dislocation open reduction  Left femur greater trochanter fracture open reduction with internal fixation with suture fixation  Left hip hematoma excisional irrigation and debridement     Precautions:  Falls, WBAT RLE in KI, cognition  Equipment Needs:  TBD    SUBJECTIVE:    Pt lives  improve activity tolerance during functional mobility   [x] Transfer Training to improve safety and independence with all functional transfers   [x] Gait Training to improve gait mechanics, endurance and assess need for appropriate assistive device  [x] Stair Training in preparation for safe discharge home and/or into the community   [] Positioning to prevent skin breakdown and contractures  [x] Safety and Education Training   [x] Patient/Caregiver Education   [] HEP  [x] Other     PT long term treatment goals are located in above grid    Frequency of treatments: 2-5x/week x 1-2 weeks.    Time in  1025  Time out  1100    Total Treatment Time  23 minutes     Evaluation Time includes thorough review of current medical information, gathering information on past medical history/social history and prior level of function, completion of standardized testing/informal observation of tasks, assessment of data and education on plan of care and goals.    CPT codes:  [x] Low Complexity PT evaluation 18886  [] Moderate Complexity PT evaluation 97923  [] High Complexity PT evaluation 92867  [] PT Re-evaluation 21169  [] Gait training 21326 - minutes  [] Manual therapy 92081 - minutes  [x] Therapeutic activities 73246 23 minutes  [] Therapeutic exercises 37760 - minutes  [] Neuromuscular reeducation 55010 - minutes     Jeanine Crowell PT, DPT  [id number]

## 2024-02-13 NOTE — ACP (ADVANCE CARE PLANNING)
Advance Care Planning   Healthcare Decision Maker:    Primary Decision Maker: Nate Renee - [facility] - [phone]    Click here to complete Healthcare Decision Makers including selection of the Healthcare Decision Maker Relationship (ie \"Primary\").          no advance directives. ZA Kerr  2/13/2024

## 2024-02-13 NOTE — FLOWSHEET NOTE
Inpatient Wound Care(initial evaluation) 8501b    Admit Date: 2/11/2024  9:35 AM    Reason for consult:  right medial foot    Wound history:  bucks traction right foot resulting in stage 2 pressure injury    Findings:     02/13/24 1000   Wound 02/13/24 Foot Right;Medial   Date First Assessed/Time First Assessed: 02/13/24 1000   Present on Original Admission: No  Location: Foot  Wound Location Orientation: Right;Medial   Wound Image    Wound Etiology Pressure Stage 2   Dressing/Treatment ABD;Dry dressing  (nurse to apply)   Wound Length (cm) 3.6 cm   Wound Width (cm) 3.6 cm   Wound Depth (cm)   (unable to determine- fluid filled blister)   Wound Surface Area (cm^2) 12.96 cm^2   Wound Assessment   (fluid filled blister)   Drainage Amount None (dry)   Shelby-wound Assessment Dry/flaky       **Informed Consent**    The patient has given verbal consent to have photos taken of wound and inserted into their chart as part of their permanent medical record for purposes of documentation, treatment management and/or medical review.   All Images taken on 2/13/24 of patient name: Rebeka Renee were transmitted and stored on secured Epic  Site located within Media Folder Tab by a registered Epic-[employer] Mobile Application Device.      Impression:  stage 2 from traction set up    Plan:  Pad to protect and cushion  Will need to monitor and change plan of care if blister opens    Selam Medrano RN 2/13/2024 10:31 AM

## 2024-02-13 NOTE — PROGRESS NOTES
[facility] Hospitalist Progress Note    Admitting Date and Time: 2/11/2024  9:35 AM  Admit Dx: Aphasia [R47.01]  Suspected DVT (deep vein thrombosis) [R09.89]  Ischemic stroke (HCC) [I63.9]    Synopsis: Patient is a 78-year-old female with past medical history of hypertension, BPV, diabetes, hyperlipidemia, tobacco abuse, anemia, asthma and depression, recent right hip arthroplasty who came in for altered mental status.  Patient was found down at home with difficulty speaking CT head and CT brain perfusion was within normal CTA head and neck with contrast with no convincing flow-limiting stenosis throughout.  Patient was admitted for further workup.  Orthopedics was also consulted she underwent bedside right hip reduction under sedation with fentanyl and propofol but was unable to be reduced.  She will undergo closed reduction of right hip in the OR 2/12    Subjective:  Patient is being followed for Aphasia [R47.01]  Suspected DVT (deep vein thrombosis) [R09.89]  Ischemic stroke (HCC) [I63.9]     Patient is more awake this morning, says she is unsure what happened to her leg. She is feeling anxious regarding her MRI.     ROS: denies fever, chills, cp, sob, n/v, HA unless stated above.      insulin lispro  0-4 Units SubCUTAneous Q6H    sodium chloride flush  5-40 mL IntraVENous 2 times per day    enoxaparin  40 mg SubCUTAneous Daily    rosuvastatin  40 mg Oral Nightly    cefTRIAXone (ROCEPHIN) IV  1,000 mg IntraVENous Q24H    levothyroxine  50 mcg Oral Daily    losartan  50 mg Oral Daily    pantoprazole  40 mg Oral QAM AC    pramipexole  0.75 mg Oral BID    sucralfate  1 g Oral Daily    venlafaxine  150 mg Oral Daily    verapamil  180 mg Oral BID    arformoterol tartrate  15 mcg Nebulization BID RT    budesonide  250 mcg Nebulization BID     sodium chloride flush, 5-40 mL, PRN  sodium chloride, , PRN  potassium chloride, 40 mEq, PRN   Or  potassium alternative oral replacement, 40 mEq, PRN   Or  potassium

## 2024-02-13 NOTE — PROGRESS NOTES
SPEECH/LANGUAGE PATHOLOGY  SPEECH/LANGUAGE/COGNITIVE EVALUATION   and PLAN OF CARE      PATIENT NAME:  Rebeka Renee  (female)     MRN:  [id number]    :  1945  (78 y.o.)  STATUS:  Inpatient: Room 8501/8501-B    TODAY'S DATE:  24 1130    Speech Language Pathology (SLP) eval and treat  Start:  24 1130,   End:  24 1130,   ONE TIME,   Standing Count:  1 Occurrences,   R       Last continued at transfer on   3:44 PM  Eugenio Tapia DO  REASON FOR REFERRAL:  aphasia  EVALUATING THERAPIST: ALONDRA Irizarry    ADMITTING DIAGNOSIS: Aphasia [R47.01]  Suspected DVT (deep vein thrombosis) [R09.89]  Ischemic stroke (HCC) [I63.9]    VISIT DIAGNOSIS:   Visit Diagnoses         Codes    Suspected DVT (deep vein thrombosis)     R09.89    Hypomagnesemia     E83.42    Urinary tract infection without hematuria, site unspecified     N39.0    S/P closed reduction of dislocated total hip prosthesis     Z98.890    Acute post-operative pain     G89.18               SPEECH THERAPY  PLAN OF CARE   The speech therapy  POC is established based on physician order, speech pathology diagnosis and results of clinical assessment     SPEECH PATHOLOGY DIAGNOSIS:    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, Conversational Anomia     Speech Pathology intervention is recommended up to 6 times per week for LOS or when goals are met with emphasis on the following:   Anticipate Patient will benefit from ongoing intensive speech therapy at discharge due to degree of deficits     Conditions Requiring Skilled Therapeutic Intervention for speech, language and/or cognition    Anomia  Cognitive linguistic impairment    Specific Speech Therapy Interventions to Include:   Expressive language training   Therapeutic tasks for Cognition    Specific instructions for next treatment:     To initiate POC    SHORT/LONG TERM GOALS  Pt will improve orientation to spatial and temporal surroundings with use of external

## 2024-02-13 NOTE — PLAN OF CARE
Problem: Chronic Conditions and Co-morbidities  Goal: Patient's chronic conditions and co-morbidity symptoms are monitored and maintained or improved  2/12/2024 2133 by Lucia Nugent RN  Outcome: Progressing  2/12/2024 0936 by Carli Jimenez RN  Outcome: Progressing     Problem: Discharge Planning  Goal: Discharge to home or other facility with appropriate resources  2/12/2024 2133 by Lucia Nugent RN  Outcome: Progressing  2/12/2024 0936 by Carli Jimenez RN  Outcome: Progressing     Problem: Pain  Goal: Verbalizes/displays adequate comfort level or baseline comfort level  2/12/2024 2133 by Lucia Nugent RN  Outcome: Progressing  2/12/2024 0936 by Carli Jimenez RN  Outcome: Progressing     Problem: Safety - Adult  Goal: Free from fall injury  2/12/2024 2133 by Lucia Nugent RN  Outcome: Progressing  2/12/2024 0936 by Carli Jimenez RN  Outcome: Progressing     Problem: Skin/Tissue Integrity  Goal: Absence of new skin breakdown  Description: 1.  Monitor for areas of redness and/or skin breakdown  2.  Assess vascular access sites hourly  3.  Every 4-6 hours minimum:  Change oxygen saturation probe site  4.  Every 4-6 hours:  If on nasal continuous positive airway pressure, respiratory therapy assess nares and determine need for appliance change or resting period.  2/12/2024 2133 by Lucia Nugent RN  Outcome: Progressing  2/12/2024 0936 by Carli Jimenez RN  Outcome: Progressing     Problem: ABCDS Injury Assessment  Goal: Absence of physical injury  2/12/2024 2133 by Lucia Nugent RN  Outcome: Progressing  2/12/2024 0936 by Carli Jimenez RN  Outcome: Progressing

## 2024-02-13 NOTE — PROGRESS NOTES
Perfect serve Dr. Alfonso Sultana for cbc order.    Per Dr. Alfonso Sultana,\" Will put in an h&h for tonight\".

## 2024-02-14 VITALS
HEART RATE: 76 BPM | HEIGHT: 60 IN | DIASTOLIC BLOOD PRESSURE: 47 MMHG | TEMPERATURE: 98.1 F | WEIGHT: 190 LBS | RESPIRATION RATE: 18 BRPM | SYSTOLIC BLOOD PRESSURE: 126 MMHG | OXYGEN SATURATION: 96 % | BODY MASS INDEX: 37.3 KG/M2

## 2024-02-14 PROBLEM — N39.0 URINARY TRACT INFECTION WITHOUT HEMATURIA: Status: ACTIVE | Noted: 2024-02-14

## 2024-02-14 LAB
ANION GAP SERPL CALCULATED.3IONS-SCNC: 11 MMOL/L (ref 7–16)
BASOPHILS # BLD: 0.02 K/UL (ref 0–0.2)
BASOPHILS NFR BLD: 0 % (ref 0–2)
BUN SERPL-MCNC: 14 MG/DL (ref 6–23)
CALCIUM SERPL-MCNC: 8.6 MG/DL (ref 8.6–10.2)
CHLORIDE SERPL-SCNC: 101 MMOL/L (ref 98–107)
CO2 SERPL-SCNC: 26 MMOL/L (ref 22–29)
CREAT SERPL-MCNC: 0.8 MG/DL (ref 0.5–1)
EOSINOPHIL # BLD: 0.21 K/UL (ref 0.05–0.5)
EOSINOPHILS RELATIVE PERCENT: 3 % (ref 0–6)
ERYTHROCYTE [DISTWIDTH] IN BLOOD BY AUTOMATED COUNT: 15.9 % (ref 11.5–15)
GFR SERPL CREATININE-BSD FRML MDRD: >60 ML/MIN/1.73M2
GLUCOSE BLD-MCNC: 100 MG/DL (ref 74–99)
GLUCOSE BLD-MCNC: 120 MG/DL (ref 74–99)
GLUCOSE BLD-MCNC: 150 MG/DL (ref 74–99)
GLUCOSE SERPL-MCNC: 100 MG/DL (ref 74–99)
HCT VFR BLD AUTO: 26.4 % (ref 34–48)
HGB BLD-MCNC: 8.1 G/DL (ref 11.5–15.5)
IMM GRANULOCYTES # BLD AUTO: 0.05 K/UL (ref 0–0.58)
IMM GRANULOCYTES NFR BLD: 1 % (ref 0–5)
LYMPHOCYTES NFR BLD: 1.69 K/UL (ref 1.5–4)
LYMPHOCYTES RELATIVE PERCENT: 21 % (ref 20–42)
MCH RBC QN AUTO: 27.8 PG (ref 26–35)
MCHC RBC AUTO-ENTMCNC: 30.7 G/DL (ref 32–34.5)
MCV RBC AUTO: 90.7 FL (ref 80–99.9)
MICROORGANISM SPEC CULT: ABNORMAL
MICROORGANISM SPEC CULT: ABNORMAL
MONOCYTES NFR BLD: 0.76 K/UL (ref 0.1–0.95)
MONOCYTES NFR BLD: 9 % (ref 2–12)
NEUTROPHILS NFR BLD: 67 % (ref 43–80)
NEUTS SEG NFR BLD: 5.43 K/UL (ref 1.8–7.3)
PLATELET # BLD AUTO: 212 K/UL (ref 130–450)
PMV BLD AUTO: 10 FL (ref 7–12)
POTASSIUM SERPL-SCNC: 3 MMOL/L (ref 3.5–5)
RBC # BLD AUTO: 2.91 M/UL (ref 3.5–5.5)
SODIUM SERPL-SCNC: 138 MMOL/L (ref 132–146)
SPECIMEN DESCRIPTION: ABNORMAL
WBC OTHER # BLD: 8.2 K/UL (ref 4.5–11.5)

## 2024-02-14 PROCEDURE — 97535 SELF CARE MNGMENT TRAINING: CPT

## 2024-02-14 PROCEDURE — 99231 SBSQ HOSP IP/OBS SF/LOW 25: CPT | Performed by: NURSE PRACTITIONER

## 2024-02-14 PROCEDURE — 6360000002 HC RX W HCPCS

## 2024-02-14 PROCEDURE — 99239 HOSP IP/OBS DSCHRG MGMT >30: CPT | Performed by: INTERNAL MEDICINE

## 2024-02-14 PROCEDURE — 6370000000 HC RX 637 (ALT 250 FOR IP): Performed by: INTERNAL MEDICINE

## 2024-02-14 PROCEDURE — 97530 THERAPEUTIC ACTIVITIES: CPT

## 2024-02-14 PROCEDURE — 82962 GLUCOSE BLOOD TEST: CPT

## 2024-02-14 PROCEDURE — 94640 AIRWAY INHALATION TREATMENT: CPT

## 2024-02-14 PROCEDURE — 6370000000 HC RX 637 (ALT 250 FOR IP)

## 2024-02-14 PROCEDURE — 85025 COMPLETE CBC W/AUTO DIFF WBC: CPT

## 2024-02-14 PROCEDURE — 36415 COLL VENOUS BLD VENIPUNCTURE: CPT

## 2024-02-14 PROCEDURE — 2580000003 HC RX 258

## 2024-02-14 PROCEDURE — 80048 BASIC METABOLIC PNL TOTAL CA: CPT

## 2024-02-14 RX ORDER — CEFDINIR 300 MG/1
300 CAPSULE ORAL EVERY 12 HOURS SCHEDULED
Status: DISCONTINUED | OUTPATIENT
Start: 2024-02-14 | End: 2024-02-14 | Stop reason: HOSPADM

## 2024-02-14 RX ORDER — CEFDINIR 300 MG/1
300 CAPSULE ORAL EVERY 12 HOURS SCHEDULED
Qty: 7 CAPSULE | Refills: 0 | Status: ON HOLD | DISCHARGE
Start: 2024-02-14 | End: 2024-02-20 | Stop reason: HOSPADM

## 2024-02-14 RX ORDER — ASPIRIN 325 MG
325 TABLET ORAL 2 TIMES DAILY
Status: DISCONTINUED | OUTPATIENT
Start: 2024-02-14 | End: 2024-02-14 | Stop reason: HOSPADM

## 2024-02-14 RX ORDER — ROSUVASTATIN CALCIUM 40 MG/1
40 TABLET, COATED ORAL NIGHTLY
Qty: 30 TABLET | Refills: 3 | DISCHARGE
Start: 2024-02-14

## 2024-02-14 RX ADMIN — SODIUM CHLORIDE, PRESERVATIVE FREE 10 ML: 5 INJECTION INTRAVENOUS at 09:00

## 2024-02-14 RX ADMIN — VERAPAMIL HYDROCHLORIDE 180 MG: 180 TABLET, FILM COATED, EXTENDED RELEASE ORAL at 09:01

## 2024-02-14 RX ADMIN — SUCRALFATE 1 G: 1 TABLET ORAL at 09:02

## 2024-02-14 RX ADMIN — VENLAFAXINE HYDROCHLORIDE 150 MG: 150 CAPSULE, EXTENDED RELEASE ORAL at 09:01

## 2024-02-14 RX ADMIN — ARFORMOTEROL TARTRATE 15 MCG: 15 SOLUTION RESPIRATORY (INHALATION) at 19:02

## 2024-02-14 RX ADMIN — BUDESONIDE 250 MCG: 0.25 SUSPENSION RESPIRATORY (INHALATION) at 19:02

## 2024-02-14 RX ADMIN — ARFORMOTEROL TARTRATE 15 MCG: 15 SOLUTION RESPIRATORY (INHALATION) at 08:45

## 2024-02-14 RX ADMIN — PRAMIPEXOLE DIHYDROCHLORIDE 0.75 MG: 0.25 TABLET ORAL at 09:01

## 2024-02-14 RX ADMIN — CEFDINIR 300 MG: 300 CAPSULE ORAL at 09:01

## 2024-02-14 RX ADMIN — BUDESONIDE 250 MCG: 0.25 SUSPENSION RESPIRATORY (INHALATION) at 08:46

## 2024-02-14 RX ADMIN — LEVOTHYROXINE SODIUM 50 MCG: 0.03 TABLET ORAL at 05:57

## 2024-02-14 RX ADMIN — ASPIRIN 325 MG: 325 TABLET ORAL at 13:20

## 2024-02-14 RX ADMIN — PANTOPRAZOLE SODIUM 40 MG: 40 TABLET, DELAYED RELEASE ORAL at 05:57

## 2024-02-14 RX ADMIN — LOSARTAN POTASSIUM 50 MG: 50 TABLET, FILM COATED ORAL at 09:02

## 2024-02-14 NOTE — PLAN OF CARE
Problem: Pain  Goal: Verbalizes/displays adequate comfort level or baseline comfort level  Outcome: Progressing     Problem: Safety - Adult  Goal: Free from fall injury  Outcome: Progressing     Problem: Skin/Tissue Integrity  Goal: Absence of new skin breakdown  Description: 1.  Monitor for areas of redness and/or skin breakdown  2.  Assess vascular access sites hourly  3.  Every 4-6 hours minimum:  Change oxygen saturation probe site  4.  Every 4-6 hours:  If on nasal continuous positive airway pressure, respiratory therapy assess nares and determine need for appliance change or resting period.  Outcome: Progressing      [facility] has sent referral for TPN per choice form to [phone].

## 2024-02-14 NOTE — DISCHARGE INSTR - COC
BATTERY LEFT HIP AREA) (CPT 57918) performed by Lizzy Herbert DO at [facility] OR    REVISION TOTAL HIP ARTHROPLASTY Right 2/12/2024    RIGHT HIP OPEN REDUCTION AND HEMATOMA EVACUATION performed by Wood George DO at [facility] [place] OR    THORACENTESIS Left 1/2010 and 3/2010.     Reportedly transudative.    TRANSTHORACIC ECHOCARDIOGRAM  4/1/2010    Small-to-moderate size pericardial effusion with early signs of tamponade with normal left ventricular size, wall thickness, wall motion and systolic function with stage I diastolic dysfunction with normal right ventricular size and function with moderately thickened pericardium and with no significant valvular abnormalities noted.        Immunization History:   Immunization History   Administered Date(s) Administered    TDaP, ADACEL (age 10y-64y), BOOSTRIX (age 10y+), IM, 0.5mL 08/02/2018       Active Problems:  Patient Active Problem List   Diagnosis Code    Chronic pain G89.29    Neural foraminal stenosis of lumbar spine M48.061    Fibromyalgia M79.7    Lumbago M54.50    Peripheral neuropathy G62.9    Chest pain R07.9    SOB (shortness of breath) on exertion R06.02    HTN (hypertension) I10    DM (diabetes mellitus) ([facility]) E11.9    Dyslipidemia E78.5    Obesity E66.9    History of breast cancer Z85.3    H/O mastectomy Z90.10    Lung cancer, upper lobe ([facility]) C34.10    S/P lobectomy of lung Z90.2    History of kidney cancer Z85.528    H/O unilateral nephrectomy Z90.5    COPD (chronic obstructive pulmonary disease) ([facility]) J44.9    DDD (degenerative disc disease), lumbosacral M51.37    Chronic low back pain M54.50, G89.29    Anxiety F41.9    Depression F32.A    Fatty liver K76.0    Ascending aortic aneurysm ([facility]) I71.21    Neuropathic pain syndrome (non-herpetic) M79.2    Protruded lumbar disc M51.26    DDD (degenerative disc disease), lumbar M51.36    Lumbar radiculopathy M54.16    Acute kidney injury ([facility]) N17.9    Acute respiratory failure with hypercapnia ([facility])  02/12/24 0800   Number of days: 40        Elimination:  Continence:   Bowel: No  Bladder: No  Urinary Catheter: None   Colostomy/Ileostomy/Ileal Conduit: No       Date of Last BM: ***    Intake/Output Summary (Last 24 hours) at 2/14/2024 0931  Last data filed at 2/13/2024 1439  Gross per 24 hour   Intake 240 ml   Output 400 ml   Net -160 ml     I/O last 3 completed shifts:  In: 2084.3 [P.O.:240; I.V.:1844.3]  Out: 1225 [Urine:1225]    Safety Concerns:     History of Falls (last 30 days)    Impairments/Disabilities:      Speech    Nutrition Therapy:  Current Nutrition Therapy:   - Oral Diet:  Carb Control 4 carbs/meal (1800kcals/day), soft and bite-sized    Routes of Feeding: Oral  Liquids: Thin Liquids  Daily Fluid Restriction: no  Last Modified Barium Swallow with Video (Video Swallowing Test): not done    Treatments at the Time of Hospital Discharge:   Respiratory Treatments: see MAR  Oxygen Therapy:  is not on home oxygen therapy.  Ventilator:    - No ventilator support    Rehab Therapies: Physical Therapy, Occupational Therapy, and Speech/Language Therapy  Weight Bearing Status/Restrictions: No weight bearing restrictions  Other Medical Equipment (for information only, NOT a DME order):  {EQUIPMENT:[id number]}  Other Treatments: right medial foot- clean with saline- 4 x 4, ABD's, and wrap with kerlex daily and prn; notify if blister opens  Right hip - Aquacel dressing    Patient's personal belongings (please select all that are sent with patient):  Dentures upper and lower    RN SIGNATURE:  Electronically signed by Malinda Damon RN on 2/14/24 at 5:07 PM EST    CASE MANAGEMENT/SOCIAL WORK SECTION    Inpatient Status Date: ***    Readmission Risk Assessment Score:  Readmission Risk              Risk of Unplanned Readmission:  28           Discharging to Facility/ Agency   Name:   Address:[address]   Phone:  Fax:    Dialysis Facility (if applicable)   Name:  Address:  Dialysis

## 2024-02-14 NOTE — PROGRESS NOTES
Department of Orthopedic Surgery  Resident Progress Note    Patient seen and examined at bedside.  Patient resting comfortably upon entering the room lying on her left side. Pain controlled at the moment. Denies acute fever, chills, nausea, vomiting , chest pain and shortness of breath.  No complaints at this time.       VITALS:  BP (!) 133/58   Pulse 72   Temp 97.8 °F (36.6 °C) (Temporal)   Resp 24   Ht 1.524 m (5')   Wt 86.2 kg (190 lb)   SpO2 93%   BMI 37.11 kg/m²     General: Awake alert oriented x 3    MUSCULOSKELETAL:   right lower extremity:  Aquacel completely saturated with seepage.   Compartments soft and compressible  +PF/DF/EHL  +2/4 DP & PT pulses, Brisk Cap refill, Toes warm and perfused  Distal sensation grossly intact to Peroneals, Sural, Saphenous, and tibial nrs    CBC:   Lab Results   Component Value Date/Time    WBC 13.9 02/13/2024 04:51 AM    HGB 8.2 02/13/2024 05:31 PM    HCT 26.4 02/13/2024 05:31 PM     02/13/2024 04:51 AM     PT/INR:    Lab Results   Component Value Date/Time    PROTIME 12.5 02/11/2024 09:44 AM    PROTIME 10.4 12/13/2010 09:00 AM    INR 1.1 02/11/2024 09:44 AM       ASSESSMENT  S/P right periprosthetic hip dislocation open reduction on 2/12    PLAN    Plan discussed with patient all questions answered to their satisfaction  Aquacel dressing removed and compressive dressing with Kerlix and ABDs and secured with paper tape  Patient is to maintain knee immobilizer weightbearing as tolerated to her right lower extremity.  DVT prophylaxis per admitting service  Pain control per admitting service  Continue 24 hours postop antibiotics  Monitor H&H 8.7 on 2/13  PT/OT: anterior hip precautions.  Minimize active hip abduction and extension due to recent greater trochanter fracture repair.  Medical management appreciated  Plan: Patient can be discharged when medically stable and have made the appropriate physical therapy gains. Patient will follow up with Dr. Wood George,

## 2024-02-14 NOTE — PROGRESS NOTES
Neurology NP notified of no antiplatelets ordered for patient. Kristen Molina RN, Stroke Navigator

## 2024-02-14 NOTE — PROGRESS NOTES
[facility] Neurology follow up      Rebeka Renee is a 78 y.o. right handed female     Neurology following for AMS    PMH of TIA, HTN, DM type II, CHF, HLD, osteoarthritis with spinal arthropathy, neuropathy, pleural effusion, bipolar 1 disorder, lung/kidney cancer, breast cancer, and fibromyalgia     Admitted to [facility] on 2/11/2024 with presentation of altered mental status.  Patient was found down on floor by her  on morning of presentation.   was unsure how long she had been lying on the floor.  She is normally talkative and interactive but had difficulty speaking at initial evaluation.  She was last admitted to the hospital 1/3/2024 with closed displaced fracture of the right femoral neck after a fall.  Patient had undergone right hemiarthroplasty at the time.  She was brought to the emergency room for evaluation where NIHSS was 13.     CT head did not show any acute intracranial abnormalities.      CTP: was negative for any perfusion diffusion mismatch.    CTA head/neck was negative for LVO or hemodynamically significant stenosis.    Telestroke consult with Dr. Gerardo notable for no indication for TNK or endovascular therapy.  Patient with suspected metabolic etiology for symptoms and was recommended for admission to general neurology consult for further evaluation and management.    Patient lying in bed.  Says her right hip hurts.  No family at bedside.  MRI brain was negative for acute stroke, pt was relieved to hear.  Addressed questions and concerns.      Objective:     BP (!) 141/59   Pulse 76   Temp 98.2 °F (36.8 °C) (Oral)   Resp 20   Ht 1.524 m (5')   Wt 86.2 kg (190 lb)   SpO2 95%   BMI 37.11 kg/m²     General appearance: alert, appears stated age, cooperative and in distress  Head: normocephalic, without obvious abnormality, atraumatic  Eyes: conjunctivae/corneas clear  Neck: supple, symmetrical, trachea midline   Lungs: respirations non labored

## 2024-02-14 NOTE — PROGRESS NOTES
Physical Therapy  Physical Therapy Treatment    Name: Rebeka Renee  : 1945  MRN: [id number]      Date of Service: 2024    Evaluating PT:  Jeanine Crowell PT, DPT [id number]    Room #:  8501/8501-B  Diagnosis:  Aphasia [R47.01]  Suspected DVT (deep vein thrombosis) [R09.89]  Ischemic stroke (HCC) [I63.9]  PMHx/PSHx:   has a past medical history of Anemia, Anxiety, Arthritis, Ascending aortic aneurysm (HCC), Asthma, Bipolar 1 disorder (HCC), Cancer (HCC), Cancer of breast, female (HCC), CHF (congestive heart failure) (HCC), Chronic back pain, Depression, Depression with anxiety, Diabetes mellitus (HCC), Dyslipidemia, Fibromyalgia, History of blood transfusion, Hypertension, Hypothyroidism, Neuropathy, Pericardial effusion, Pleural effusion, TIA (transient ischemic attack), Tobacco abuse, and Type II or unspecified type diabetes mellitus without mention of complication, not stated as uncontrolled.   has a past surgical history that includes Lung removal, partial; Mastectomy (Bilateral); Kidney removal (Left, 2006); Hysterectomy; Gastric bypass surgery (2004); hernia repair; knee surgery (Right); transthoracic echocardiogram (2010); Cataract removal with implant (Bilateral); liver biopsy (); thoracentesis (Left, 2010 and 3/2010. ); Cholecystectomy; other surgical history (03/16/15); other surgical history (N/A, 4/15/15); Breast surgery; Nerve Block (Bilateral, 16); Kidney removal; other surgical history (N/A, 2021); Pain management procedure (N/A, 2021); hip surgery (Right, 2024); and Revision total hip arthroplasty (Right, 2024).   Procedure/Surgery:  Right hip prosthetic dislocation open reduction  Right femur greater trochanter fracture open reduction with internal fixation with suture fixation  Right hip hematoma excisional irrigation and debridement     Precautions:  Falls, cognition, WBAT RLE in KI, R anterior hip precautions, minimize active R hip  Ambulated to bedside chair with decreased speed. Following seated rest break, completed sit <> stand transfer from chair. Refused to ambulate further distance. Activity limited by fatigue. Patient left sitting in chair with alarm activated, RLE elevated, call light in reach. Instructed not to get up on own and to use call light for assistance; expressed understanding. Nurse notified.    Treatment:  Patient practiced and was instructed in the following treatment:    Bed mobility - physical assistance provided as needed during activity  Static sitting - performed to promote activity tolerance and balance maintenance  Functional transfers - education and verbal cues to facilitate proper positioning and sequencing, particularly related to hand/foot placement; physical assistance provided as needed during activity  Ambulation - education and verbal cues to facilitate proper positioning and sequencing; physical assistance provided as needed during activity  Skilled monitoring of vitals  Skilled positioning - patient positioned optimally to promote comfort    PLAN:    Patient is making fair progress towards established goals.  Will continue with current POC.      Time in  1037  Time out  1102    Total Treatment Time  25 minutes     CPT codes:  [] Gait training 64340 0 minutes  [] Manual therapy 04369 0 minutes  [x] Therapeutic activities 50111 25 minutes  [] Therapeutic exercises 50358 0 minutes  [] Neuromuscular reeducation 35142 0 minutes    Porfirio Díaz, PT, DPT  [id number]

## 2024-02-14 NOTE — CARE COORDINATION
SOCIAL WORK/CASEMANAGEMENT TRANSITION OF CARE PLANNING( LEIGHANN NGUYỄN, [facility] [phone]): I called the spouse for stone choices and he said pt is to make her own decisions. I met with pt this a.m. and she reluctantly agreed to stone for a few weeks. She is interested sars in this order: [facility] a bed and has accepted pt , to start precert for discharge today per the pcp, raudel salter and yuridia [facility]. All discharge paper work with EXEMPT  started due to bipolar and anxiety dx. Will finish once discharge order is in epic.  ZA Kerr  2/14/2024  Precert obtained for [facility]  for 6:30 p.m. called spouse and told him. Sent perfect serve to pcp here. .ZA Kerr  .2/14/2024    EXEMPT COMPLETED. ZA Kerr  2/14/2024          The Plan for Transition of Care is related to the following treatment goals: stone   The Patient and/or patient representative  was provided with a choice of provider and agrees   with the discharge plan. [x] Yes [] No  Freedom of choice list was provided with basic dialogue that supports the patient's individualized plan of care/goals, treatment preferences and shares the quality data associated with the providers. [x] Yes [] No

## 2024-02-14 NOTE — PROGRESS NOTES
[facility] Hospitalist Progress Note    Admitting Date and Time: 2/11/2024  9:35 AM  Admit Dx: Aphasia [R47.01]  Suspected DVT (deep vein thrombosis) [R09.89]  Ischemic stroke (HCC) [I63.9]    Synopsis: Patient is a 78-year-old female with past medical history of hypertension, BPV, diabetes, hyperlipidemia, tobacco abuse, anemia, asthma and depression, recent right hip arthroplasty who came in for altered mental status.  Patient was found down at home with difficulty speaking CT head and CT brain perfusion was within normal CTA head and neck with contrast with no convincing flow-limiting stenosis throughout.  Patient was admitted for further workup.  Orthopedics was also consulted she underwent bedside right hip reduction under sedation with fentanyl and propofol but was unable to be reduced.  She will undergo closed reduction of right hip in the OR 2/12. She was treated for UTI with rocephin, grew E. Coli pansensitive and transitioned to Cefdinir.    Subjective:  Patient is being followed for Aphasia [R47.01]  Suspected DVT (deep vein thrombosis) [R09.89]  Ischemic stroke (HCC) [I63.9]     Patient is more awake this morning, relayed news of negative MRI. Discussed that she has UTI. She is reluctant to go to [facility] but will discuss with Case management.    ROS: denies fever, chills, cp, sob, n/v, HA unless stated above.      cefdinir  300 mg Oral 2 times per day    insulin lispro  0-4 Units SubCUTAneous TID WC    insulin lispro  0-4 Units SubCUTAneous Nightly    sodium chloride flush  5-40 mL IntraVENous 2 times per day    [Held by provider] enoxaparin  40 mg SubCUTAneous Daily    rosuvastatin  40 mg Oral Nightly    levothyroxine  50 mcg Oral Daily    losartan  50 mg Oral Daily    pantoprazole  40 mg Oral QAM AC    pramipexole  0.75 mg Oral BID    sucralfate  1 g Oral Daily    venlafaxine  150 mg Oral Daily    verapamil  180 mg Oral BID    arformoterol tartrate  15 mcg Nebulization BID RT    budesonide  250

## 2024-02-14 NOTE — PROGRESS NOTES
Occupational Therapy  OT BEDSIDE TREATMENT NOTE   [facility]  [address], [place]       Date:2024  Patient Name: Rebeka Renee  MRN: [id number]  : 1945  Room: [facility]8501-     Per OT Eval:    Evaluating OT: Latoya Baker, OTR/L #[id number]     Referring Provider::    Eugenio Tapia DO                               Specific Provider Orders/Date: OT evaluation and treatment 24     Diagnosis:  Aphasia [R47.01]  Suspected DVT (deep vein thrombosis) [R09.89]  Ischemic stroke (HCC) [I63.9]       Pertinent Medical History:  has a past medical history of Anemia, Anxiety, Arthritis, Ascending aortic aneurysm (HCC), Asthma, Bipolar 1 disorder (HCC), Cancer (HCC), Cancer of breast, female (HCC), CHF (congestive heart failure) (HCC), Chronic back pain, Depression, Depression with anxiety, Diabetes mellitus (HCC), Dyslipidemia, Fibromyalgia, History of blood transfusion, Hypertension, Hypothyroidism, Neuropathy, Pericardial effusion, Pleural effusion, TIA (transient ischemic attack), Tobacco abuse, and Type II or unspecified type diabetes mellitus without mention of complication, not stated as uncontrolled.      Procedure/Surgery:  Left hip prosthetic dislocation open reduction  Left femur greater trochanter fracture open reduction with internal fixation with suture fixation  Left hip hematoma excisional irrigation and debridement      Precautions:  Fall Risk, WBAT RLE with Knee immobilizer, alarm, cognition     Assessment of current deficits   [x] Functional mobility             [x]ADLs           [x] Strength                  [x]Cognition   [x] Functional transfers           [] IADLs         [x] Safety Awareness   [x]Endurance   [] Fine Coordination              [x] Balance      [] Vision/perception   []Sensation     []Gross Motor Coordination  [] ROM           [] Delirium                   [] Motor Control      OT PLAN OF CARE   OT POC  only  EOB > chair  Pt declined mobility this date even after max education Stand by Assist  with ww      Balance Sitting: Stand by Assist      Standing: Moderate Assist   Sitting: Min/CGA  Standing: Mod A with walker Sitting: Independent       Standing: Stand by Assist    Activity Tolerance Fair-  Fair-  Cues to keep eyes open and stay on task   Fatigued this date  good   Visual/  Perceptual Glasses: yes  Perception: NT             BUE  ROM/Strength/  Fine motor Coordination Hand dominance: R     RUE: ROM WFL     Strength: grossly 4-/5      Strength:  WFL     Coordination:   WFL     LUE: ROM  WFL     Strength: grossly 4-/5      Strength:  WFL     Coordination: WFL          Education:  Pt was educated through out treatment regarding proper technique & safety with bed mobility, functional transfers, maintaining precautions, importance of OOB activity & ADL compensatory strategies to ease tasks, improve safety & prevent falls to return home safely.  Fair- understanding due to limited cognition/confusion.     Comments: Upon arrival pt was in bed & agreeable for therapy, nsg approved. At end of session pt was seated in chair, stool in place, chair alarm set, all lines and tubes intact, call light within reach.  present and nsg informed pt was up in chair.     Pt has made Fair progress towards set goals.   Continue with current plan of care    Treatment Time In: 10:37            Treatment Time Out: 11:02           Treatment Charges: Mins Units   Ther Ex  04183     Manual Therapy 14243     Thera Activities 66411 15 1   ADL/Home Mgt 21267 10 1   Neuro Re-ed 57131     Group Therapy      Orthotic manage/training  52849     Non-Billable Time     Total Timed Treatment 25 2       Casi Soto, ELENA/ODELL [id number]

## 2024-02-14 NOTE — DISCHARGE SUMMARY
[facility] Hospitalist Physician Discharge Summary       Nemesio Handy MD  [address] [facility] [address]  [phone]    Follow up in 2 week(s)  Repeat XRs and evaluation, For suture removal    [place] [facility]  [address] [place] [address]  [phone]          Activity level: As tolerated     Dispo: LORIN    Condition on discharge: Stable     Patient ID:  Rebeka Renee  [id number]  78 y.o.  1945    Admit date: 2/11/2024    Discharge date and time:  2/14/2024  1:40 PM    Admission Diagnoses: Principal Problem:    Ischemic stroke (HCC)  Active Problems:    Ascending aortic aneurysm (HCC)    Aphasia    Urinary tract infection without hematuria  Resolved Problems:    * No resolved hospital problems. *      Discharge Diagnoses: Principal Problem:    Ischemic stroke (HCC)  Active Problems:    Ascending aortic aneurysm (HCC)    Aphasia    Urinary tract infection without hematuria  Resolved Problems:    * No resolved hospital problems. *      Consults:  IP CONSULT TO ORTHOPEDIC SURGERY  IP CONSULT TO INTERNAL MEDICINE  IP CONSULT TO CASE MANAGEMENT  IP CONSULT TO SOCIAL WORK  IP CONSULT TO DIETITIAN    Hospital Course:   Patient Rebeka Renee is a 78 y.o. presented with Aphasia [R47.01]  Suspected DVT (deep vein thrombosis) [R09.89]  Ischemic stroke (HCC) [I63.9]    Patient is a 78-year-old female with past medical history of hypertension, BPV, diabetes, hyperlipidemia, tobacco abuse, anemia, asthma and depression, recent right hip arthroplasty who came in for altered mental status.  Patient was found down at home with difficulty speaking CT head and CT brain perfusion was within normal CTA head and neck with contrast with no convincing flow-limiting stenosis throughout.Patient was admitted for further workup. Neurology was consulted. MRI was also negative for acute infarct. Orthopedics was also consulted she underwent bedside right hip reduction under sedation  Date: 2/12/2024  EXAMINATION: SPOT FLUOROSCOPIC IMAGES 2/12/2024 1:49 pm TECHNIQUE: Fluoroscopy was provided by the radiology department for procedure. Radiologist was not present during examination. FLUOROSCOPY DOSE AND TYPE: Radiation Exposure Index: Kerma mGy, 15.48 mGy.  Total fluoroscopic time: 63.3 seconds.  Total number of images: 6 COMPARISON: None HISTORY: ORDERING SYSTEM PROVIDED HISTORY: ORIF right dislocated hip TECHNOLOGIST PROVIDED HISTORY: Reason for exam:->ORIF right dislocated hip What reading provider will be dictating this exam?->CRC Intraprocedural imaging. FINDINGS: Spot intraoperative images are obtained demonstrating reduction right hip arthroplasty, ossific fragment is noted adjacent to the medial proximal femur.     Intraprocedural fluoroscopic spot images as above.  See separate procedure report for more information.     XR HIP 2-3 VW W PELVIS RIGHT    Result Date: 2/12/2024  EXAMINATION: ONE XRAY VIEW OF THE PELVIS AND TWO XRAY VIEWS RIGHT HIP 2/12/2024 2:40 pm COMPARISON: 02/11/2024 HISTORY: ORDERING SYSTEM PROVIDED HISTORY: post op in pacu TECHNOLOGIST PROVIDED HISTORY: Reason for exam:->post op in pacu What reading provider will be dictating this exam?->CRC FINDINGS: There has been reduction of the right hip dislocation.  A bipolar hemiarthroplasty is in good position.  There appears to be a small avulsion fracture arising from the greater trochanter.  No fracture of the proximal femoral shaft.  Right hemipelvis appears intact.     1. Reduction of right hip dislocation. 2. Small avulsion fracture arising from the greater trochanter. 3. Bipolar hemiarthroplasty in good position.     Echo (TTE) complete (PRN contrast/bubble/strain/3D)    Result Date: 2/12/2024    Left Ventricle: Normal left ventricular systolic function with a visually estimated EF of 55 - 60%. Left ventricle size is normal. Normal wall thickness. Normal wall motion. Indeterminate diastolic function.   Right Ventricle:

## 2024-02-15 ENCOUNTER — APPOINTMENT (OUTPATIENT)
Dept: CT IMAGING | Age: 79
DRG: 177 | End: 2024-02-15
Payer: MEDICARE

## 2024-02-15 ENCOUNTER — HOSPITAL ENCOUNTER (EMERGENCY)
Age: 79
Discharge: NURSING, REHAB OR LONG-TERM CARE | DRG: 177 | End: 2024-02-16
Attending: STUDENT IN AN ORGANIZED HEALTH CARE EDUCATION/TRAINING PROGRAM
Payer: MEDICARE

## 2024-02-15 DIAGNOSIS — M96.830 POSTOPERATIVE HEMORRHAGE OF MUSCULOSKELETAL STRUCTURE FOLLOWING MUSCULOSKELETAL PROCEDURE: Primary | ICD-10-CM

## 2024-02-15 LAB
ABO + RH BLD: NORMAL
ALBUMIN SERPL-MCNC: 2.6 G/DL (ref 3.5–5.2)
ALP SERPL-CCNC: 63 U/L (ref 35–104)
ALT SERPL-CCNC: 17 U/L (ref 0–32)
ANION GAP SERPL CALCULATED.3IONS-SCNC: 5 MMOL/L (ref 7–16)
ARM BAND NUMBER: NORMAL
AST SERPL-CCNC: 28 U/L (ref 0–31)
BASOPHILS # BLD: 0.02 K/UL (ref 0–0.2)
BASOPHILS NFR BLD: 0 % (ref 0–2)
BILIRUB SERPL-MCNC: 0.5 MG/DL (ref 0–1.2)
BLOOD BANK SAMPLE EXPIRATION: NORMAL
BLOOD GROUP ANTIBODIES SERPL: NEGATIVE
BUN SERPL-MCNC: 12 MG/DL (ref 6–23)
CALCIUM SERPL-MCNC: 8.7 MG/DL (ref 8.6–10.2)
CHLORIDE SERPL-SCNC: 104 MMOL/L (ref 98–107)
CO2 SERPL-SCNC: 28 MMOL/L (ref 22–29)
CREAT SERPL-MCNC: 1 MG/DL (ref 0.5–1)
EOSINOPHIL # BLD: 0.48 K/UL (ref 0.05–0.5)
EOSINOPHILS RELATIVE PERCENT: 7 % (ref 0–6)
ERYTHROCYTE [DISTWIDTH] IN BLOOD BY AUTOMATED COUNT: 15.9 % (ref 11.5–15)
GFR SERPL CREATININE-BSD FRML MDRD: 60 ML/MIN/1.73M2
GLUCOSE SERPL-MCNC: 94 MG/DL (ref 74–99)
HCT VFR BLD AUTO: 25.2 % (ref 34–48)
HGB BLD-MCNC: 7.6 G/DL (ref 11.5–15.5)
IMM GRANULOCYTES # BLD AUTO: 0.03 K/UL (ref 0–0.58)
IMM GRANULOCYTES NFR BLD: 0 % (ref 0–5)
LACTATE BLDV-SCNC: 1.4 MMOL/L (ref 0.5–2.2)
LIPASE SERPL-CCNC: 46 U/L (ref 13–60)
LYMPHOCYTES NFR BLD: 1.99 K/UL (ref 1.5–4)
LYMPHOCYTES RELATIVE PERCENT: 29 % (ref 20–42)
MCH RBC QN AUTO: 28.1 PG (ref 26–35)
MCHC RBC AUTO-ENTMCNC: 30.2 G/DL (ref 32–34.5)
MCV RBC AUTO: 93.3 FL (ref 80–99.9)
MONOCYTES NFR BLD: 0.61 K/UL (ref 0.1–0.95)
MONOCYTES NFR BLD: 9 % (ref 2–12)
NEUTROPHILS NFR BLD: 55 % (ref 43–80)
NEUTS SEG NFR BLD: 3.76 K/UL (ref 1.8–7.3)
PLATELET # BLD AUTO: 277 K/UL (ref 130–450)
PMV BLD AUTO: 10.1 FL (ref 7–12)
POTASSIUM SERPL-SCNC: 2.8 MMOL/L (ref 3.5–5)
PROT SERPL-MCNC: 5.2 G/DL (ref 6.4–8.3)
RBC # BLD AUTO: 2.7 M/UL (ref 3.5–5.5)
SODIUM SERPL-SCNC: 137 MMOL/L (ref 132–146)
WBC OTHER # BLD: 6.9 K/UL (ref 4.5–11.5)

## 2024-02-15 PROCEDURE — 6360000002 HC RX W HCPCS: Performed by: STUDENT IN AN ORGANIZED HEALTH CARE EDUCATION/TRAINING PROGRAM

## 2024-02-15 PROCEDURE — 86850 RBC ANTIBODY SCREEN: CPT

## 2024-02-15 PROCEDURE — 83605 ASSAY OF LACTIC ACID: CPT

## 2024-02-15 PROCEDURE — 85014 HEMATOCRIT: CPT

## 2024-02-15 PROCEDURE — 73700 CT LOWER EXTREMITY W/O DYE: CPT

## 2024-02-15 PROCEDURE — 83690 ASSAY OF LIPASE: CPT

## 2024-02-15 PROCEDURE — 6360000002 HC RX W HCPCS: Performed by: EMERGENCY MEDICINE

## 2024-02-15 PROCEDURE — 85025 COMPLETE CBC W/AUTO DIFF WBC: CPT

## 2024-02-15 PROCEDURE — 86901 BLOOD TYPING SEROLOGIC RH(D): CPT

## 2024-02-15 PROCEDURE — 80053 COMPREHEN METABOLIC PANEL: CPT

## 2024-02-15 PROCEDURE — 85018 HEMOGLOBIN: CPT

## 2024-02-15 PROCEDURE — 86900 BLOOD TYPING SEROLOGIC ABO: CPT

## 2024-02-15 RX ORDER — MORPHINE SULFATE 4 MG/ML
4 INJECTION, SOLUTION INTRAMUSCULAR; INTRAVENOUS ONCE
Status: COMPLETED | OUTPATIENT
Start: 2024-02-15 | End: 2024-02-15

## 2024-02-15 RX ORDER — MIDAZOLAM HYDROCHLORIDE 2 MG/2ML
1 INJECTION, SOLUTION INTRAMUSCULAR; INTRAVENOUS ONCE
Status: COMPLETED | OUTPATIENT
Start: 2024-02-15 | End: 2024-02-15

## 2024-02-15 RX ADMIN — MIDAZOLAM HYDROCHLORIDE 1 MG: 1 INJECTION, SOLUTION INTRAMUSCULAR; INTRAVENOUS at 20:48

## 2024-02-15 RX ADMIN — MORPHINE SULFATE 4 MG: 4 INJECTION, SOLUTION INTRAMUSCULAR; INTRAVENOUS at 23:54

## 2024-02-15 ASSESSMENT — PAIN SCALES - GENERAL: PAINLEVEL_OUTOF10: 10

## 2024-02-15 ASSESSMENT — PAIN DESCRIPTION - DESCRIPTORS: DESCRIPTORS: THROBBING

## 2024-02-15 ASSESSMENT — LIFESTYLE VARIABLES
HOW MANY STANDARD DRINKS CONTAINING ALCOHOL DO YOU HAVE ON A TYPICAL DAY: 1 OR 2
HOW OFTEN DO YOU HAVE A DRINK CONTAINING ALCOHOL: MONTHLY OR LESS

## 2024-02-15 ASSESSMENT — PAIN DESCRIPTION - LOCATION: LOCATION: LEG

## 2024-02-15 ASSESSMENT — PAIN - FUNCTIONAL ASSESSMENT: PAIN_FUNCTIONAL_ASSESSMENT: NONE - DENIES PAIN

## 2024-02-15 ASSESSMENT — PAIN DESCRIPTION - ORIENTATION: ORIENTATION: RIGHT

## 2024-02-15 NOTE — PROGRESS NOTES
Physician Progress Note      PATIENT:               JEVON LOZOYA  [facility] #:                  [id number]  :                       1945  ADMIT DATE:       2024 9:35 AM  DISCH DATE:        2024 8:12 PM  RESPONDING  PROVIDER #:        Claudia Barrientos MD        QUERY TEXT:    Type of Anemia: Please provide further specificity, if known.    Clinical indicators include: 4 units, rbc, hgb, hct, hemoglobin, acute anemia,   transfuse  Options provided:  -- Anemia due to acute blood loss  -- Anemia due to chronic blood loss  -- Anemia due to iron deficiency  -- Anemia due to postoperative blood loss  -- Anemia due to chronic disease  -- Other - I will add my own diagnosis  -- Disagree - Not applicable / Not valid  -- Disagree - Clinically Unable to determine / Unknown        PROVIDER RESPONSE TEXT:    The patient has anemia due to acute blood loss.      Electronically signed by:  Claudia Barrientos MD 2/15/2024 12:15 PM

## 2024-02-16 VITALS
HEIGHT: 65 IN | HEART RATE: 78 BPM | OXYGEN SATURATION: 91 % | RESPIRATION RATE: 18 BRPM | SYSTOLIC BLOOD PRESSURE: 118 MMHG | BODY MASS INDEX: 29.99 KG/M2 | WEIGHT: 180 LBS | TEMPERATURE: 97.8 F | DIASTOLIC BLOOD PRESSURE: 63 MMHG

## 2024-02-16 LAB
HCT VFR BLD AUTO: 26.2 % (ref 34–48)
HGB BLD-MCNC: 7.9 G/DL (ref 11.5–15.5)
MICROORGANISM SPEC CULT: NORMAL
MICROORGANISM/AGENT SPEC: NORMAL
SPECIMEN DESCRIPTION: NORMAL

## 2024-02-16 NOTE — ED PROVIDER NOTES
Department of Emergency Medicine   ED  Provider Note  Admit Date/RoomTime: 2/15/2024  6:35 PM  ED Room: 08/08              \[facility]\""     Rebeka Renee is a 78 y.o. female with a PMHx significant for {bbpmh:69212} who presents for evaluation of ***, beginning ***.  The complaint has been {Desc; intermittent/persistent/constant:80772}, {DESC; MILD/MOD/SEVERE:30507} in severity, and worsened by {Modify factor:46809}.   The patient states that she is coming from [facility] after she fell and broke her hip, about one week prior. Notes that they have been monitoring her blood work there and they told her that her blood count has been dropping.      Review of Systems     Physical Exam     Procedures    MDM             Differential diagnosis: ***  Review of ED/ Outpatient Records: ***  Historians that case was discussed with: ***  My EKG interpretation: See ED course  Imaging Non-plain film images such as CT, Ultrasound and MRI are read by the radiologist. Plain radiographic images are visualized and preliminarily interpreted by the ED provider:***  Discussed with other providers: ***  Tests Considered but not ordered: ***  Decision making tools/risks stratification / MDM / Independent Interpretation of labs: Rebeka Renee presents to the ED for ***.  History from ***, with *** limitations. Workup in the ED revealed  ***.   Social Determinants of health impacting treatment or disposition: ***  CODE status and Discussions: ***        Patient continues to be non-toxic on re-evaluation. Findings were discussed with the patient and reasons to immediately return to the ED were articulated to them. They will follow-up with their PCP,  *** and {Specialties; internal medicine subspecialties:96571}.   I am the Primary Clinician of Record.      Patient requires continued workup and management of their symptoms and will be admitted to the hospital for further evaluation and treatment.  I am the Primary Clinician of

## 2024-02-16 NOTE — ED PROVIDER NOTES
Patient was signed out to me awaiting CT imaging with likely orthopedic consult.  On 2/12 patient had a right hip open reduction and hematoma evacuation.  She continues to have bloody drainage from the incision, discharge hemoglobin was 8.1, 7.6 today.  CT imaging shows no hematoma, postsurgical changes.  Reassessment shows slight bloody drainage from the incision, wound was redressed.  Repeat hemoglobin after 5 and half hours shows hemoglobin 7.9.  This is within patient's range team at discharge.  With no ongoing bleeding and no suggestion of hematoma to the surgical site, patient is comfortable with discharge back to nursing facility with orthopedic follow-up as scheduled.  Instruction to return for any changes in condition or new symptoms.     Amor Neri DO  02/16/24 0014

## 2024-02-17 ENCOUNTER — APPOINTMENT (OUTPATIENT)
Dept: CT IMAGING | Age: 79
DRG: 177 | End: 2024-02-17
Payer: MEDICARE

## 2024-02-17 ENCOUNTER — HOSPITAL ENCOUNTER (INPATIENT)
Age: 79
LOS: 3 days | Discharge: HOME | DRG: 177 | End: 2024-02-20
Attending: EMERGENCY MEDICINE | Admitting: STUDENT IN AN ORGANIZED HEALTH CARE EDUCATION/TRAINING PROGRAM
Payer: MEDICARE

## 2024-02-17 ENCOUNTER — APPOINTMENT (OUTPATIENT)
Dept: GENERAL RADIOLOGY | Age: 79
DRG: 177 | End: 2024-02-17
Payer: MEDICARE

## 2024-02-17 DIAGNOSIS — U07.1 COVID-19: ICD-10-CM

## 2024-02-17 DIAGNOSIS — G89.18 ACUTE POST-OPERATIVE PAIN: ICD-10-CM

## 2024-02-17 DIAGNOSIS — J96.01 ACUTE HYPOXIC RESPIRATORY FAILURE (HCC): Primary | ICD-10-CM

## 2024-02-17 LAB
ABO + RH BLD: NORMAL
ALBUMIN SERPL-MCNC: 3.1 G/DL (ref 3.5–5.2)
ALP SERPL-CCNC: 76 U/L (ref 35–104)
ALT SERPL-CCNC: 17 U/L (ref 0–32)
ANION GAP SERPL CALCULATED.3IONS-SCNC: 7 MMOL/L (ref 7–16)
ARM BAND NUMBER: NORMAL
AST SERPL-CCNC: 17 U/L (ref 0–31)
BASOPHILS # BLD: 0.03 K/UL (ref 0–0.2)
BASOPHILS NFR BLD: 0 % (ref 0–2)
BILIRUB SERPL-MCNC: 0.5 MG/DL (ref 0–1.2)
BILIRUB UR QL STRIP: NEGATIVE
BLOOD BANK SAMPLE EXPIRATION: NORMAL
BLOOD GROUP ANTIBODIES SERPL: NEGATIVE
BNP SERPL-MCNC: 614 PG/ML (ref 0–450)
BUN SERPL-MCNC: 12 MG/DL (ref 6–23)
CALCIUM SERPL-MCNC: 9.3 MG/DL (ref 8.6–10.2)
CASTS #/AREA URNS LPF: ABNORMAL /LPF
CHLORIDE SERPL-SCNC: 105 MMOL/L (ref 98–107)
CLARITY UR: CLEAR
CO2 SERPL-SCNC: 28 MMOL/L (ref 22–29)
COLOR UR: YELLOW
CREAT SERPL-MCNC: 1.1 MG/DL (ref 0.5–1)
D DIMER: 605 NG/ML DDU (ref 0–232)
EOSINOPHIL # BLD: 0.62 K/UL (ref 0.05–0.5)
EOSINOPHILS RELATIVE PERCENT: 5 % (ref 0–6)
ERYTHROCYTE [DISTWIDTH] IN BLOOD BY AUTOMATED COUNT: 16 % (ref 11.5–15)
GFR SERPL CREATININE-BSD FRML MDRD: 52 ML/MIN/1.73M2
GLUCOSE BLD-MCNC: 163 MG/DL (ref 74–99)
GLUCOSE SERPL-MCNC: 67 MG/DL (ref 74–99)
GLUCOSE UR STRIP-MCNC: NEGATIVE MG/DL
HCT VFR BLD AUTO: 30 % (ref 34–48)
HGB BLD-MCNC: 8.8 G/DL (ref 11.5–15.5)
HGB UR QL STRIP.AUTO: NEGATIVE
IMM GRANULOCYTES # BLD AUTO: 0.07 K/UL (ref 0–0.58)
IMM GRANULOCYTES NFR BLD: 1 % (ref 0–5)
KETONES UR STRIP-MCNC: NEGATIVE MG/DL
LACTATE BLDV-SCNC: 1.3 MMOL/L (ref 0.5–1.9)
LEUKOCYTE ESTERASE UR QL STRIP: ABNORMAL
LIPASE SERPL-CCNC: 27 U/L (ref 13–60)
LYMPHOCYTES NFR BLD: 2.46 K/UL (ref 1.5–4)
LYMPHOCYTES RELATIVE PERCENT: 19 % (ref 20–42)
MCH RBC QN AUTO: 27.6 PG (ref 26–35)
MCHC RBC AUTO-ENTMCNC: 29.3 G/DL (ref 32–34.5)
MCV RBC AUTO: 94 FL (ref 80–99.9)
MICROORGANISM SPEC CULT: NO GROWTH
MICROORGANISM SPEC CULT: NORMAL
MICROORGANISM SPEC CULT: NORMAL
MICROORGANISM/AGENT SPEC: NORMAL
MONOCYTES NFR BLD: 0.84 K/UL (ref 0.1–0.95)
MONOCYTES NFR BLD: 7 % (ref 2–12)
NEUTROPHILS NFR BLD: 69 % (ref 43–80)
NEUTS SEG NFR BLD: 8.71 K/UL (ref 1.8–7.3)
NITRITE UR QL STRIP: NEGATIVE
PH UR STRIP: 6 [PH] (ref 5–9)
PLATELET # BLD AUTO: 306 K/UL (ref 130–450)
PMV BLD AUTO: 11 FL (ref 7–12)
POTASSIUM SERPL-SCNC: 3.1 MMOL/L (ref 3.5–5)
PROT SERPL-MCNC: 6.2 G/DL (ref 6.4–8.3)
PROT UR STRIP-MCNC: NEGATIVE MG/DL
RBC # BLD AUTO: 3.19 M/UL (ref 3.5–5.5)
RBC #/AREA URNS HPF: ABNORMAL /HPF
SARS-COV-2 RDRP RESP QL NAA+PROBE: DETECTED
SODIUM SERPL-SCNC: 140 MMOL/L (ref 132–146)
SP GR UR STRIP: 1.02 (ref 1–1.03)
SPECIMEN DESCRIPTION: ABNORMAL
SPECIMEN DESCRIPTION: NORMAL
TROPONIN I SERPL HS-MCNC: 24 NG/L (ref 0–9)
TROPONIN I SERPL HS-MCNC: 26 NG/L (ref 0–9)
UROBILINOGEN UR STRIP-ACNC: 0.2 EU/DL (ref 0–1)
WBC #/AREA URNS HPF: ABNORMAL /HPF
WBC OTHER # BLD: 12.7 K/UL (ref 4.5–11.5)

## 2024-02-17 PROCEDURE — 71275 CT ANGIOGRAPHY CHEST: CPT

## 2024-02-17 PROCEDURE — 87040 BLOOD CULTURE FOR BACTERIA: CPT

## 2024-02-17 PROCEDURE — 86850 RBC ANTIBODY SCREEN: CPT

## 2024-02-17 PROCEDURE — 80053 COMPREHEN METABOLIC PANEL: CPT

## 2024-02-17 PROCEDURE — 3E0333Z INTRODUCTION OF ANTI-INFLAMMATORY INTO PERIPHERAL VEIN, PERCUTANEOUS APPROACH: ICD-10-PCS | Performed by: STUDENT IN AN ORGANIZED HEALTH CARE EDUCATION/TRAINING PROGRAM

## 2024-02-17 PROCEDURE — 82962 GLUCOSE BLOOD TEST: CPT

## 2024-02-17 PROCEDURE — 86140 C-REACTIVE PROTEIN: CPT

## 2024-02-17 PROCEDURE — 2580000003 HC RX 258: Performed by: STUDENT IN AN ORGANIZED HEALTH CARE EDUCATION/TRAINING PROGRAM

## 2024-02-17 PROCEDURE — 85025 COMPLETE CBC W/AUTO DIFF WBC: CPT

## 2024-02-17 PROCEDURE — 6360000002 HC RX W HCPCS: Performed by: STUDENT IN AN ORGANIZED HEALTH CARE EDUCATION/TRAINING PROGRAM

## 2024-02-17 PROCEDURE — 99285 EMERGENCY DEPT VISIT HI MDM: CPT

## 2024-02-17 PROCEDURE — 83880 ASSAY OF NATRIURETIC PEPTIDE: CPT

## 2024-02-17 PROCEDURE — 85379 FIBRIN DEGRADATION QUANT: CPT

## 2024-02-17 PROCEDURE — 81001 URINALYSIS AUTO W/SCOPE: CPT

## 2024-02-17 PROCEDURE — 84145 PROCALCITONIN (PCT): CPT

## 2024-02-17 PROCEDURE — 71045 X-RAY EXAM CHEST 1 VIEW: CPT

## 2024-02-17 PROCEDURE — 86900 BLOOD TYPING SEROLOGIC ABO: CPT

## 2024-02-17 PROCEDURE — 93005 ELECTROCARDIOGRAM TRACING: CPT

## 2024-02-17 PROCEDURE — 1200000000 HC SEMI PRIVATE

## 2024-02-17 PROCEDURE — 6360000002 HC RX W HCPCS: Performed by: EMERGENCY MEDICINE

## 2024-02-17 PROCEDURE — 83605 ASSAY OF LACTIC ACID: CPT

## 2024-02-17 PROCEDURE — 99222 1ST HOSP IP/OBS MODERATE 55: CPT | Performed by: STUDENT IN AN ORGANIZED HEALTH CARE EDUCATION/TRAINING PROGRAM

## 2024-02-17 PROCEDURE — 96374 THER/PROPH/DIAG INJ IV PUSH: CPT

## 2024-02-17 PROCEDURE — 2580000003 HC RX 258

## 2024-02-17 PROCEDURE — 6370000000 HC RX 637 (ALT 250 FOR IP)

## 2024-02-17 PROCEDURE — 6360000004 HC RX CONTRAST MEDICATION: Performed by: RADIOLOGY

## 2024-02-17 PROCEDURE — 74177 CT ABD & PELVIS W/CONTRAST: CPT

## 2024-02-17 PROCEDURE — 6370000000 HC RX 637 (ALT 250 FOR IP): Performed by: STUDENT IN AN ORGANIZED HEALTH CARE EDUCATION/TRAINING PROGRAM

## 2024-02-17 PROCEDURE — 83690 ASSAY OF LIPASE: CPT

## 2024-02-17 PROCEDURE — 84484 ASSAY OF TROPONIN QUANT: CPT

## 2024-02-17 PROCEDURE — 87635 SARS-COV-2 COVID-19 AMP PRB: CPT

## 2024-02-17 PROCEDURE — 86901 BLOOD TYPING SEROLOGIC RH(D): CPT

## 2024-02-17 RX ORDER — SODIUM CHLORIDE 0.9 % (FLUSH) 0.9 %
5-40 SYRINGE (ML) INJECTION EVERY 12 HOURS SCHEDULED
Status: DISCONTINUED | OUTPATIENT
Start: 2024-02-17 | End: 2024-02-20 | Stop reason: HOSPADM

## 2024-02-17 RX ORDER — ONDANSETRON 4 MG/1
4 TABLET, ORALLY DISINTEGRATING ORAL EVERY 8 HOURS PRN
Status: DISCONTINUED | OUTPATIENT
Start: 2024-02-17 | End: 2024-02-20 | Stop reason: HOSPADM

## 2024-02-17 RX ORDER — 0.9 % SODIUM CHLORIDE 0.9 %
1000 INTRAVENOUS SOLUTION INTRAVENOUS ONCE
Status: COMPLETED | OUTPATIENT
Start: 2024-02-17 | End: 2024-02-17

## 2024-02-17 RX ORDER — DEXAMETHASONE SODIUM PHOSPHATE 10 MG/ML
6 INJECTION INTRAMUSCULAR; INTRAVENOUS ONCE
Status: COMPLETED | OUTPATIENT
Start: 2024-02-17 | End: 2024-02-17

## 2024-02-17 RX ORDER — ACETAMINOPHEN 325 MG/1
650 TABLET ORAL EVERY 6 HOURS PRN
Status: DISCONTINUED | OUTPATIENT
Start: 2024-02-17 | End: 2024-02-20 | Stop reason: HOSPADM

## 2024-02-17 RX ORDER — ONDANSETRON 2 MG/ML
4 INJECTION INTRAMUSCULAR; INTRAVENOUS EVERY 6 HOURS PRN
Status: DISCONTINUED | OUTPATIENT
Start: 2024-02-17 | End: 2024-02-20 | Stop reason: HOSPADM

## 2024-02-17 RX ORDER — SODIUM CHLORIDE 9 MG/ML
INJECTION, SOLUTION INTRAVENOUS PRN
Status: DISCONTINUED | OUTPATIENT
Start: 2024-02-17 | End: 2024-02-20 | Stop reason: HOSPADM

## 2024-02-17 RX ORDER — SODIUM CHLORIDE 9 MG/ML
INJECTION, SOLUTION INTRAVENOUS CONTINUOUS
Status: ACTIVE | OUTPATIENT
Start: 2024-02-17 | End: 2024-02-19

## 2024-02-17 RX ORDER — POTASSIUM CHLORIDE 20 MEQ/1
40 TABLET, EXTENDED RELEASE ORAL ONCE
Status: COMPLETED | OUTPATIENT
Start: 2024-02-17 | End: 2024-02-17

## 2024-02-17 RX ORDER — ACETAMINOPHEN 650 MG/1
650 SUPPOSITORY RECTAL EVERY 6 HOURS PRN
Status: DISCONTINUED | OUTPATIENT
Start: 2024-02-17 | End: 2024-02-20 | Stop reason: HOSPADM

## 2024-02-17 RX ORDER — SODIUM CHLORIDE 0.9 % (FLUSH) 0.9 %
5-40 SYRINGE (ML) INJECTION PRN
Status: DISCONTINUED | OUTPATIENT
Start: 2024-02-17 | End: 2024-02-20 | Stop reason: HOSPADM

## 2024-02-17 RX ORDER — ENOXAPARIN SODIUM 100 MG/ML
40 INJECTION SUBCUTANEOUS DAILY
Status: DISCONTINUED | OUTPATIENT
Start: 2024-02-17 | End: 2024-02-20 | Stop reason: HOSPADM

## 2024-02-17 RX ORDER — POLYETHYLENE GLYCOL 3350 17 G/17G
17 POWDER, FOR SOLUTION ORAL DAILY PRN
Status: DISCONTINUED | OUTPATIENT
Start: 2024-02-17 | End: 2024-02-20 | Stop reason: HOSPADM

## 2024-02-17 RX ORDER — GUAIFENESIN 400 MG/1
400 TABLET ORAL 3 TIMES DAILY
Status: DISCONTINUED | OUTPATIENT
Start: 2024-02-17 | End: 2024-02-20 | Stop reason: HOSPADM

## 2024-02-17 RX ADMIN — SODIUM CHLORIDE: 9 INJECTION, SOLUTION INTRAVENOUS at 20:40

## 2024-02-17 RX ADMIN — SODIUM CHLORIDE, PRESERVATIVE FREE 10 ML: 5 INJECTION INTRAVENOUS at 23:54

## 2024-02-17 RX ADMIN — GUAIFENESIN 400 MG: 400 TABLET ORAL at 23:51

## 2024-02-17 RX ADMIN — DEXAMETHASONE SODIUM PHOSPHATE 6 MG: 10 INJECTION INTRAMUSCULAR; INTRAVENOUS at 16:39

## 2024-02-17 RX ADMIN — IOPAMIDOL 75 ML: 755 INJECTION, SOLUTION INTRAVENOUS at 15:35

## 2024-02-17 RX ADMIN — ENOXAPARIN SODIUM 40 MG: 100 INJECTION SUBCUTANEOUS at 20:40

## 2024-02-17 RX ADMIN — SODIUM CHLORIDE 1000 ML: 9 INJECTION, SOLUTION INTRAVENOUS at 14:23

## 2024-02-17 RX ADMIN — POTASSIUM CHLORIDE 40 MEQ: 1500 TABLET, EXTENDED RELEASE ORAL at 16:39

## 2024-02-17 ASSESSMENT — LIFESTYLE VARIABLES
HOW OFTEN DO YOU HAVE A DRINK CONTAINING ALCOHOL: NEVER
HOW MANY STANDARD DRINKS CONTAINING ALCOHOL DO YOU HAVE ON A TYPICAL DAY: PATIENT DOES NOT DRINK

## 2024-02-17 NOTE — H&P
Supportive care.  Follow inflammatory markers.  Encourage pronating/I-S  Leukocytosis-WBC 12.7 Received 1L NSS bolus in ED course. Continue to follow  Hypokalemia- K+3.4. Received Klor-Con 40mEq in ED course. Replete follow/   Acute Respiratory Failure with Hypoxia- Does wear 2L NC intermittently. Currently requiring continuously. Continue to wean as tolerated. Maintain SPO2>92%.    HTN-losartan/verapamil  HLD-rosuvastatin  Hypothyroid-levothyroxine  Recent UTI-initially treated with Rocephin.  Transition to Omnicef completed.  Bipolar 1 disorder/anxiety/depression-Wellbutrin/Effexor-XR  Controlled DM with hyperglycemia-2/2024 A1c 5.5.  Glucose 67.  NPH 15 units 2 times daily at home.  Hypoglycemic protocol carb controlled diet follow adjust as needed      Code Status: Full  DVT prophylaxis: Lovenox    NOTE: This report was transcribed using voice recognition software. Every effort was made to ensure accuracy; however, inadvertent computerized transcription errors may be present.     Electronically signed by JOSELINE Pratt CNP on 2/17/2024 at 5:22 PM

## 2024-02-17 NOTE — ED PROVIDER NOTES
[facility] MED SURG/TELE  EMERGENCY DEPARTMENT ENCOUNTER        Pt Name: Rebeka Renee  MRN: [id number]  Birthdate 1945  Date of evaluation: 2/17/2024  Provider: Kyle Delacruz MD  PCP: Navneet Latif MD  Note Started: 1:37 PM EST 2/17/24    CHIEF COMPLAINT       Chief Complaint   Patient presents with    Shortness of Breath     SOB onset this morning, per pt only has one lung- one removed from cancer    Dizziness     Onset today, denies falls       HISTORY OF PRESENT ILLNESS: 1 or more Elements   History From: Patient    Limitations to history : None  Social Determinants : None    Rebeka Renee is a 78 y.o. female with a history of CVA, fatty liver, depression, neuroforaminal stenosis, diabetes mellitus, hypertension, lung cancer status post lobectomy, renal cancer s/p nephrectomy, COPD, hypothyroidism who presents from a nursing home with increased shortness of breath.  She was brought in by EMS and was hypoxic on arrival.  Also complains of lightheadedness which accompanied the.  Of shortness of breath and resolved after she got oxygen supplementation.  She does mention that she did use on and off oxygen while she was at home but currently sees an rehabilitation facility and did not have oxygen support at baseline.    Recent admission on 2/11 and discharged on 2/14 for concerns of bleeding from a right hip arthroplasty site.  She did have dislocation of the right hip and which was reduced by orthopedics in the OR on 2/12.  Additionally she had urinary tract infection and was treated with cefdinir.    Denies any fever, chills, nausea, vomiting, headache, vision changes, neck tenderness or stiffness, weakness, chest pain, palpitations, leg swelling/tenderness, abdominal pain, dysuria, hematuria, diarrhea, constipation, bloody stools.    Nursing Notes were all reviewed and agreed with or any disagreements were addressed in the HPI.    ROS:   Pertinent positives and negatives are stated within HPI, all

## 2024-02-18 LAB
25(OH)D3 SERPL-MCNC: 8.1 NG/ML (ref 30–100)
ALBUMIN SERPL-MCNC: 2.6 G/DL (ref 3.5–5.2)
ALP SERPL-CCNC: 69 U/L (ref 35–104)
ALT SERPL-CCNC: 15 U/L (ref 0–32)
ANION GAP SERPL CALCULATED.3IONS-SCNC: 8 MMOL/L (ref 7–16)
AST SERPL-CCNC: 15 U/L (ref 0–31)
BASOPHILS # BLD: 0.01 K/UL (ref 0–0.2)
BASOPHILS NFR BLD: 0 % (ref 0–2)
BILIRUB SERPL-MCNC: 0.3 MG/DL (ref 0–1.2)
BUN SERPL-MCNC: 11 MG/DL (ref 6–23)
CALCIUM SERPL-MCNC: 7.8 MG/DL (ref 8.6–10.2)
CHLORIDE SERPL-SCNC: 111 MMOL/L (ref 98–107)
CO2 SERPL-SCNC: 21 MMOL/L (ref 22–29)
CREAT SERPL-MCNC: 0.8 MG/DL (ref 0.5–1)
CRP SERPL HS-MCNC: 15 MG/L (ref 0–5)
EOSINOPHIL # BLD: 0.01 K/UL (ref 0.05–0.5)
EOSINOPHILS RELATIVE PERCENT: 0 % (ref 0–6)
ERYTHROCYTE [DISTWIDTH] IN BLOOD BY AUTOMATED COUNT: 15.8 % (ref 11.5–15)
GFR SERPL CREATININE-BSD FRML MDRD: >60 ML/MIN/1.73M2
GLUCOSE BLD-MCNC: 149 MG/DL (ref 74–99)
GLUCOSE BLD-MCNC: 169 MG/DL (ref 74–99)
GLUCOSE BLD-MCNC: 225 MG/DL (ref 74–99)
GLUCOSE BLD-MCNC: 270 MG/DL (ref 74–99)
GLUCOSE SERPL-MCNC: 131 MG/DL (ref 74–99)
HCT VFR BLD AUTO: 24.6 % (ref 34–48)
HGB BLD-MCNC: 7.3 G/DL (ref 11.5–15.5)
IMM GRANULOCYTES # BLD AUTO: 0.06 K/UL (ref 0–0.58)
IMM GRANULOCYTES NFR BLD: 1 % (ref 0–5)
LYMPHOCYTES NFR BLD: 0.98 K/UL (ref 1.5–4)
LYMPHOCYTES RELATIVE PERCENT: 13 % (ref 20–42)
MCH RBC QN AUTO: 28.2 PG (ref 26–35)
MCHC RBC AUTO-ENTMCNC: 29.7 G/DL (ref 32–34.5)
MCV RBC AUTO: 95 FL (ref 80–99.9)
MICROORGANISM SPEC CULT: NO GROWTH
MICROORGANISM SPEC CULT: NORMAL
MICROORGANISM SPEC CULT: NORMAL
MICROORGANISM/AGENT SPEC: NORMAL
MONOCYTES NFR BLD: 0.48 K/UL (ref 0.1–0.95)
MONOCYTES NFR BLD: 6 % (ref 2–12)
NEUTROPHILS NFR BLD: 80 % (ref 43–80)
NEUTS SEG NFR BLD: 6.21 K/UL (ref 1.8–7.3)
PLATELET CONFIRMATION: NORMAL
PLATELET, FLUORESCENCE: 227 K/UL (ref 130–450)
PMV BLD AUTO: 10.3 FL (ref 7–12)
POTASSIUM SERPL-SCNC: 3.7 MMOL/L (ref 3.5–5)
PROCALCITONIN SERPL-MCNC: 0.03 NG/ML (ref 0–0.08)
PROT SERPL-MCNC: 5.2 G/DL (ref 6.4–8.3)
RBC # BLD AUTO: 2.59 M/UL (ref 3.5–5.5)
SERVICE CMNT-IMP: NORMAL
SERVICE CMNT-IMP: NORMAL
SODIUM SERPL-SCNC: 140 MMOL/L (ref 132–146)
SPECIMEN DESCRIPTION: NORMAL
WBC OTHER # BLD: 7.8 K/UL (ref 4.5–11.5)

## 2024-02-18 PROCEDURE — 82962 GLUCOSE BLOOD TEST: CPT

## 2024-02-18 PROCEDURE — 80053 COMPREHEN METABOLIC PANEL: CPT

## 2024-02-18 PROCEDURE — 1200000000 HC SEMI PRIVATE

## 2024-02-18 PROCEDURE — 82306 VITAMIN D 25 HYDROXY: CPT

## 2024-02-18 PROCEDURE — 6370000000 HC RX 637 (ALT 250 FOR IP): Performed by: NURSE PRACTITIONER

## 2024-02-18 PROCEDURE — 6360000002 HC RX W HCPCS: Performed by: STUDENT IN AN ORGANIZED HEALTH CARE EDUCATION/TRAINING PROGRAM

## 2024-02-18 PROCEDURE — 85025 COMPLETE CBC W/AUTO DIFF WBC: CPT

## 2024-02-18 PROCEDURE — 99232 SBSQ HOSP IP/OBS MODERATE 35: CPT | Performed by: STUDENT IN AN ORGANIZED HEALTH CARE EDUCATION/TRAINING PROGRAM

## 2024-02-18 PROCEDURE — 6370000000 HC RX 637 (ALT 250 FOR IP): Performed by: STUDENT IN AN ORGANIZED HEALTH CARE EDUCATION/TRAINING PROGRAM

## 2024-02-18 PROCEDURE — 2580000003 HC RX 258: Performed by: STUDENT IN AN ORGANIZED HEALTH CARE EDUCATION/TRAINING PROGRAM

## 2024-02-18 RX ORDER — LOSARTAN POTASSIUM 50 MG/1
50 TABLET ORAL DAILY
Status: DISCONTINUED | OUTPATIENT
Start: 2024-02-18 | End: 2024-02-20 | Stop reason: HOSPADM

## 2024-02-18 RX ORDER — ROSUVASTATIN CALCIUM 20 MG/1
40 TABLET, COATED ORAL NIGHTLY
Status: DISCONTINUED | OUTPATIENT
Start: 2024-02-18 | End: 2024-02-20 | Stop reason: HOSPADM

## 2024-02-18 RX ORDER — VENLAFAXINE HYDROCHLORIDE 150 MG/1
150 CAPSULE, EXTENDED RELEASE ORAL DAILY
Status: DISCONTINUED | OUTPATIENT
Start: 2024-02-18 | End: 2024-02-20 | Stop reason: HOSPADM

## 2024-02-18 RX ORDER — OXYCODONE HYDROCHLORIDE AND ACETAMINOPHEN 5; 325 MG/1; MG/1
1 TABLET ORAL EVERY 6 HOURS PRN
Status: DISCONTINUED | OUTPATIENT
Start: 2024-02-18 | End: 2024-02-20 | Stop reason: HOSPADM

## 2024-02-18 RX ORDER — INSULIN LISPRO 100 [IU]/ML
0-4 INJECTION, SOLUTION INTRAVENOUS; SUBCUTANEOUS NIGHTLY
Status: DISCONTINUED | OUTPATIENT
Start: 2024-02-18 | End: 2024-02-20 | Stop reason: HOSPADM

## 2024-02-18 RX ORDER — BUPROPION HYDROCHLORIDE 100 MG/1
100 TABLET, EXTENDED RELEASE ORAL DAILY
Status: DISCONTINUED | OUTPATIENT
Start: 2024-02-18 | End: 2024-02-20 | Stop reason: HOSPADM

## 2024-02-18 RX ORDER — PANTOPRAZOLE SODIUM 40 MG/1
40 TABLET, DELAYED RELEASE ORAL
Status: DISCONTINUED | OUTPATIENT
Start: 2024-02-18 | End: 2024-02-20 | Stop reason: HOSPADM

## 2024-02-18 RX ORDER — INSULIN GLARGINE 100 [IU]/ML
16 INJECTION, SOLUTION SUBCUTANEOUS DAILY
Status: DISCONTINUED | OUTPATIENT
Start: 2024-02-18 | End: 2024-02-20 | Stop reason: HOSPADM

## 2024-02-18 RX ORDER — SUCRALFATE 1 G/1
1 TABLET ORAL DAILY
Status: DISCONTINUED | OUTPATIENT
Start: 2024-02-18 | End: 2024-02-20 | Stop reason: HOSPADM

## 2024-02-18 RX ORDER — INSULIN LISPRO 100 [IU]/ML
0-8 INJECTION, SOLUTION INTRAVENOUS; SUBCUTANEOUS
Status: DISCONTINUED | OUTPATIENT
Start: 2024-02-18 | End: 2024-02-20 | Stop reason: HOSPADM

## 2024-02-18 RX ORDER — INSULIN LISPRO 100 [IU]/ML
3 INJECTION, SOLUTION INTRAVENOUS; SUBCUTANEOUS
Status: DISCONTINUED | OUTPATIENT
Start: 2024-02-18 | End: 2024-02-20 | Stop reason: HOSPADM

## 2024-02-18 RX ORDER — LEVOTHYROXINE SODIUM 0.05 MG/1
50 TABLET ORAL DAILY
Status: DISCONTINUED | OUTPATIENT
Start: 2024-02-18 | End: 2024-02-20 | Stop reason: HOSPADM

## 2024-02-18 RX ORDER — PRAMIPEXOLE DIHYDROCHLORIDE 0.25 MG/1
0.75 TABLET ORAL 2 TIMES DAILY
Status: DISCONTINUED | OUTPATIENT
Start: 2024-02-18 | End: 2024-02-20 | Stop reason: HOSPADM

## 2024-02-18 RX ORDER — POTASSIUM CHLORIDE 750 MG/1
10 TABLET, EXTENDED RELEASE ORAL DAILY
Status: DISCONTINUED | OUTPATIENT
Start: 2024-02-18 | End: 2024-02-20 | Stop reason: HOSPADM

## 2024-02-18 RX ORDER — DEXTROSE MONOHYDRATE 100 MG/ML
INJECTION, SOLUTION INTRAVENOUS CONTINUOUS PRN
Status: DISCONTINUED | OUTPATIENT
Start: 2024-02-18 | End: 2024-02-20 | Stop reason: HOSPADM

## 2024-02-18 RX ORDER — CEFDINIR 300 MG/1
300 CAPSULE ORAL EVERY 12 HOURS SCHEDULED
Status: DISCONTINUED | OUTPATIENT
Start: 2024-02-18 | End: 2024-02-20 | Stop reason: HOSPADM

## 2024-02-18 RX ORDER — GABAPENTIN 400 MG/1
800 CAPSULE ORAL 3 TIMES DAILY
Status: DISCONTINUED | OUTPATIENT
Start: 2024-02-18 | End: 2024-02-20 | Stop reason: HOSPADM

## 2024-02-18 RX ADMIN — ROSUVASTATIN CALCIUM 40 MG: 20 TABLET, FILM COATED ORAL at 22:32

## 2024-02-18 RX ADMIN — OXYCODONE AND ACETAMINOPHEN 1 TABLET: 5; 325 TABLET ORAL at 06:04

## 2024-02-18 RX ADMIN — CEFDINIR 300 MG: 300 CAPSULE ORAL at 09:58

## 2024-02-18 RX ADMIN — OXYCODONE AND ACETAMINOPHEN 1 TABLET: 5; 325 TABLET ORAL at 22:32

## 2024-02-18 RX ADMIN — VENLAFAXINE HYDROCHLORIDE 150 MG: 150 CAPSULE, EXTENDED RELEASE ORAL at 09:59

## 2024-02-18 RX ADMIN — BUPROPION HYDROCHLORIDE 100 MG: 100 TABLET, EXTENDED RELEASE ORAL at 09:59

## 2024-02-18 RX ADMIN — ENOXAPARIN SODIUM 40 MG: 100 INJECTION SUBCUTANEOUS at 10:00

## 2024-02-18 RX ADMIN — INSULIN GLARGINE 16 UNITS: 100 INJECTION, SOLUTION SUBCUTANEOUS at 12:57

## 2024-02-18 RX ADMIN — LEVOTHYROXINE SODIUM 50 MCG: 0.05 TABLET ORAL at 06:05

## 2024-02-18 RX ADMIN — SUCRALFATE 1 G: 1 TABLET ORAL at 10:00

## 2024-02-18 RX ADMIN — PANTOPRAZOLE SODIUM 40 MG: 40 TABLET, DELAYED RELEASE ORAL at 06:05

## 2024-02-18 RX ADMIN — INSULIN LISPRO 2 UNITS: 100 INJECTION, SOLUTION INTRAVENOUS; SUBCUTANEOUS at 18:46

## 2024-02-18 RX ADMIN — GUAIFENESIN 400 MG: 400 TABLET ORAL at 15:51

## 2024-02-18 RX ADMIN — SODIUM CHLORIDE, PRESERVATIVE FREE 10 ML: 5 INJECTION INTRAVENOUS at 22:33

## 2024-02-18 RX ADMIN — INSULIN LISPRO 3 UNITS: 100 INJECTION, SOLUTION INTRAVENOUS; SUBCUTANEOUS at 18:47

## 2024-02-18 RX ADMIN — GABAPENTIN 800 MG: 400 CAPSULE ORAL at 09:58

## 2024-02-18 RX ADMIN — GUAIFENESIN 400 MG: 400 TABLET ORAL at 09:59

## 2024-02-18 RX ADMIN — PRAMIPEXOLE DIHYDROCHLORIDE 0.75 MG: 0.25 TABLET ORAL at 22:32

## 2024-02-18 RX ADMIN — GABAPENTIN 800 MG: 400 CAPSULE ORAL at 15:51

## 2024-02-18 RX ADMIN — CEFDINIR 300 MG: 300 CAPSULE ORAL at 22:32

## 2024-02-18 RX ADMIN — DEXAMETHASONE 6 MG: 4 TABLET ORAL at 09:59

## 2024-02-18 RX ADMIN — SODIUM CHLORIDE: 9 INJECTION, SOLUTION INTRAVENOUS at 12:06

## 2024-02-18 RX ADMIN — GUAIFENESIN 400 MG: 400 TABLET ORAL at 22:32

## 2024-02-18 RX ADMIN — GABAPENTIN 800 MG: 400 CAPSULE ORAL at 22:32

## 2024-02-18 RX ADMIN — POTASSIUM CHLORIDE 10 MEQ: 750 TABLET, EXTENDED RELEASE ORAL at 10:00

## 2024-02-18 RX ADMIN — PRAMIPEXOLE DIHYDROCHLORIDE 0.75 MG: 0.25 TABLET ORAL at 09:58

## 2024-02-18 ASSESSMENT — PAIN DESCRIPTION - ORIENTATION
ORIENTATION: RIGHT

## 2024-02-18 ASSESSMENT — PAIN DESCRIPTION - PAIN TYPE
TYPE: SURGICAL PAIN
TYPE: SURGICAL PAIN

## 2024-02-18 ASSESSMENT — PAIN DESCRIPTION - LOCATION
LOCATION: HIP

## 2024-02-18 ASSESSMENT — PAIN DESCRIPTION - FREQUENCY
FREQUENCY: INTERMITTENT
FREQUENCY: INTERMITTENT

## 2024-02-18 ASSESSMENT — PAIN DESCRIPTION - DESCRIPTORS
DESCRIPTORS: SORE;TENDER;DISCOMFORT
DESCRIPTORS: DISCOMFORT;SORE
DESCRIPTORS: DISCOMFORT

## 2024-02-18 ASSESSMENT — PAIN - FUNCTIONAL ASSESSMENT
PAIN_FUNCTIONAL_ASSESSMENT: PREVENTS OR INTERFERES WITH MANY ACTIVE NOT PASSIVE ACTIVITIES
PAIN_FUNCTIONAL_ASSESSMENT: PREVENTS OR INTERFERES WITH MANY ACTIVE NOT PASSIVE ACTIVITIES

## 2024-02-18 ASSESSMENT — PAIN SCALES - GENERAL
PAINLEVEL_OUTOF10: 7
PAINLEVEL_OUTOF10: 2
PAINLEVEL_OUTOF10: 7

## 2024-02-18 NOTE — ED NOTES
ED to Inpatient Handoff Report    Notified Patt that electronic handoff available and patient ready for transport to room 532.    Safety Risks: None identified    Patient in Restraints: no    Constant Observer or Patient : no    Telemetry Monitoring Ordered :Yes           Order to transfer to unit without monitor:YES    Last MEWS: 1 Time completed: 2046    Deterioration Index Score:   Predictive Model Details          21 (Normal)  Factor Value    Calculated 2/17/2024 20:45 67% Age 78 years old    Deterioration Index Model 15% WBC count abnormal (12.7 k/uL)     7% Pulse oximetry 100 %     7% Potassium abnormal (3.1 mmol/L)     3% Hematocrit abnormal (30.0 %)     1% Sodium 140 mmol/L     0% Pulse 78     0% Temperature 98 °F (36.7 °C)     0% Systolic 113     0% Respiratory rate 16        Vitals:    02/17/24 1420 02/17/24 1429 02/17/24 1640 02/17/24 2041   BP:  (!) 94/50 104/65 (!) 113/54   Pulse:   65 78   Resp: 16  16 16   Temp:   98.1 °F (36.7 °C) 98 °F (36.7 °C)   TempSrc:       SpO2: 100%  97% 100%         Opportunity for questions and clarification was provided.

## 2024-02-19 LAB
ALBUMIN SERPL-MCNC: 3.3 G/DL (ref 3.5–5.2)
ALP SERPL-CCNC: 78 U/L (ref 35–104)
ALT SERPL-CCNC: 17 U/L (ref 0–32)
ANION GAP SERPL CALCULATED.3IONS-SCNC: 9 MMOL/L (ref 7–16)
AST SERPL-CCNC: 14 U/L (ref 0–31)
BASOPHILS # BLD: 0.02 K/UL (ref 0–0.2)
BASOPHILS NFR BLD: 0 % (ref 0–2)
BILIRUB SERPL-MCNC: 0.3 MG/DL (ref 0–1.2)
BUN SERPL-MCNC: 16 MG/DL (ref 6–23)
CALCIUM SERPL-MCNC: 9 MG/DL (ref 8.6–10.2)
CHLORIDE SERPL-SCNC: 105 MMOL/L (ref 98–107)
CO2 SERPL-SCNC: 25 MMOL/L (ref 22–29)
CREAT SERPL-MCNC: 0.8 MG/DL (ref 0.5–1)
EKG ATRIAL RATE: 67 BPM
EKG P AXIS: 6 DEGREES
EKG P-R INTERVAL: 168 MS
EKG Q-T INTERVAL: 416 MS
EKG QRS DURATION: 102 MS
EKG QTC CALCULATION (BAZETT): 439 MS
EKG R AXIS: -32 DEGREES
EKG T AXIS: 78 DEGREES
EKG VENTRICULAR RATE: 67 BPM
EOSINOPHIL # BLD: 0.05 K/UL (ref 0.05–0.5)
EOSINOPHILS RELATIVE PERCENT: 0 % (ref 0–6)
ERYTHROCYTE [DISTWIDTH] IN BLOOD BY AUTOMATED COUNT: 16.3 % (ref 11.5–15)
GFR SERPL CREATININE-BSD FRML MDRD: >60 ML/MIN/1.73M2
GLUCOSE BLD-MCNC: 142 MG/DL (ref 74–99)
GLUCOSE BLD-MCNC: 143 MG/DL (ref 74–99)
GLUCOSE BLD-MCNC: 149 MG/DL (ref 74–99)
GLUCOSE BLD-MCNC: 203 MG/DL (ref 74–99)
GLUCOSE SERPL-MCNC: 110 MG/DL (ref 74–99)
HCT VFR BLD AUTO: 32.3 % (ref 34–48)
HGB BLD-MCNC: 9.3 G/DL (ref 11.5–15.5)
IMM GRANULOCYTES # BLD AUTO: 0.12 K/UL (ref 0–0.58)
IMM GRANULOCYTES NFR BLD: 1 % (ref 0–5)
LYMPHOCYTES NFR BLD: 1.72 K/UL (ref 1.5–4)
LYMPHOCYTES RELATIVE PERCENT: 15 % (ref 20–42)
MCH RBC QN AUTO: 28 PG (ref 26–35)
MCHC RBC AUTO-ENTMCNC: 28.8 G/DL (ref 32–34.5)
MCV RBC AUTO: 97.3 FL (ref 80–99.9)
MONOCYTES NFR BLD: 0.93 K/UL (ref 0.1–0.95)
MONOCYTES NFR BLD: 8 % (ref 2–12)
NEUTROPHILS NFR BLD: 75 % (ref 43–80)
NEUTS SEG NFR BLD: 8.34 K/UL (ref 1.8–7.3)
PLATELET # BLD AUTO: 344 K/UL (ref 130–450)
PMV BLD AUTO: 10.5 FL (ref 7–12)
POTASSIUM SERPL-SCNC: 3.6 MMOL/L (ref 3.5–5)
PROT SERPL-MCNC: 6.4 G/DL (ref 6.4–8.3)
RBC # BLD AUTO: 3.32 M/UL (ref 3.5–5.5)
RBC # BLD: ABNORMAL 10*6/UL
SODIUM SERPL-SCNC: 139 MMOL/L (ref 132–146)
WBC OTHER # BLD: 11.2 K/UL (ref 4.5–11.5)

## 2024-02-19 PROCEDURE — 85025 COMPLETE CBC W/AUTO DIFF WBC: CPT

## 2024-02-19 PROCEDURE — 6370000000 HC RX 637 (ALT 250 FOR IP)

## 2024-02-19 PROCEDURE — 97161 PT EVAL LOW COMPLEX 20 MIN: CPT

## 2024-02-19 PROCEDURE — 6370000000 HC RX 637 (ALT 250 FOR IP): Performed by: NURSE PRACTITIONER

## 2024-02-19 PROCEDURE — 36415 COLL VENOUS BLD VENIPUNCTURE: CPT

## 2024-02-19 PROCEDURE — 80053 COMPREHEN METABOLIC PANEL: CPT

## 2024-02-19 PROCEDURE — 6370000000 HC RX 637 (ALT 250 FOR IP): Performed by: STUDENT IN AN ORGANIZED HEALTH CARE EDUCATION/TRAINING PROGRAM

## 2024-02-19 PROCEDURE — 6360000002 HC RX W HCPCS: Performed by: STUDENT IN AN ORGANIZED HEALTH CARE EDUCATION/TRAINING PROGRAM

## 2024-02-19 PROCEDURE — 82962 GLUCOSE BLOOD TEST: CPT

## 2024-02-19 PROCEDURE — 2580000003 HC RX 258: Performed by: STUDENT IN AN ORGANIZED HEALTH CARE EDUCATION/TRAINING PROGRAM

## 2024-02-19 PROCEDURE — 97530 THERAPEUTIC ACTIVITIES: CPT

## 2024-02-19 PROCEDURE — 93010 ELECTROCARDIOGRAM REPORT: CPT | Performed by: INTERNAL MEDICINE

## 2024-02-19 PROCEDURE — 1200000000 HC SEMI PRIVATE

## 2024-02-19 PROCEDURE — 99232 SBSQ HOSP IP/OBS MODERATE 35: CPT | Performed by: STUDENT IN AN ORGANIZED HEALTH CARE EDUCATION/TRAINING PROGRAM

## 2024-02-19 RX ORDER — VITAMIN B COMPLEX
1000 TABLET ORAL DAILY
Status: DISCONTINUED | OUTPATIENT
Start: 2024-02-19 | End: 2024-02-20 | Stop reason: HOSPADM

## 2024-02-19 RX ORDER — SIMETHICONE 80 MG
80 TABLET,CHEWABLE ORAL EVERY 6 HOURS PRN
Status: DISCONTINUED | OUTPATIENT
Start: 2024-02-19 | End: 2024-02-20 | Stop reason: HOSPADM

## 2024-02-19 RX ADMIN — GUAIFENESIN 400 MG: 400 TABLET ORAL at 21:53

## 2024-02-19 RX ADMIN — SIMETHICONE 80 MG: 80 TABLET, CHEWABLE ORAL at 21:53

## 2024-02-19 RX ADMIN — INSULIN GLARGINE 16 UNITS: 100 INJECTION, SOLUTION SUBCUTANEOUS at 09:58

## 2024-02-19 RX ADMIN — VENLAFAXINE HYDROCHLORIDE 150 MG: 150 CAPSULE, EXTENDED RELEASE ORAL at 09:58

## 2024-02-19 RX ADMIN — GABAPENTIN 800 MG: 400 CAPSULE ORAL at 21:53

## 2024-02-19 RX ADMIN — PRAMIPEXOLE DIHYDROCHLORIDE 0.75 MG: 0.25 TABLET ORAL at 21:53

## 2024-02-19 RX ADMIN — PANTOPRAZOLE SODIUM 40 MG: 40 TABLET, DELAYED RELEASE ORAL at 05:21

## 2024-02-19 RX ADMIN — CEFDINIR 300 MG: 300 CAPSULE ORAL at 21:53

## 2024-02-19 RX ADMIN — SODIUM CHLORIDE: 9 INJECTION, SOLUTION INTRAVENOUS at 13:45

## 2024-02-19 RX ADMIN — Medication 1000 UNITS: at 09:58

## 2024-02-19 RX ADMIN — GABAPENTIN 800 MG: 400 CAPSULE ORAL at 14:59

## 2024-02-19 RX ADMIN — INSULIN LISPRO 3 UNITS: 100 INJECTION, SOLUTION INTRAVENOUS; SUBCUTANEOUS at 06:31

## 2024-02-19 RX ADMIN — SUCRALFATE 1 G: 1 TABLET ORAL at 09:58

## 2024-02-19 RX ADMIN — ROSUVASTATIN CALCIUM 40 MG: 20 TABLET, FILM COATED ORAL at 21:53

## 2024-02-19 RX ADMIN — GABAPENTIN 800 MG: 400 CAPSULE ORAL at 09:57

## 2024-02-19 RX ADMIN — GUAIFENESIN 400 MG: 400 TABLET ORAL at 14:59

## 2024-02-19 RX ADMIN — GUAIFENESIN 400 MG: 400 TABLET ORAL at 09:57

## 2024-02-19 RX ADMIN — CEFDINIR 300 MG: 300 CAPSULE ORAL at 09:57

## 2024-02-19 RX ADMIN — DEXAMETHASONE 6 MG: 4 TABLET ORAL at 09:57

## 2024-02-19 RX ADMIN — BUPROPION HYDROCHLORIDE 100 MG: 100 TABLET, EXTENDED RELEASE ORAL at 09:56

## 2024-02-19 RX ADMIN — ENOXAPARIN SODIUM 40 MG: 100 INJECTION SUBCUTANEOUS at 09:57

## 2024-02-19 RX ADMIN — SODIUM CHLORIDE, PRESERVATIVE FREE 10 ML: 5 INJECTION INTRAVENOUS at 21:53

## 2024-02-19 RX ADMIN — INSULIN LISPRO 3 UNITS: 100 INJECTION, SOLUTION INTRAVENOUS; SUBCUTANEOUS at 11:52

## 2024-02-19 RX ADMIN — PRAMIPEXOLE DIHYDROCHLORIDE 0.75 MG: 0.25 TABLET ORAL at 09:57

## 2024-02-19 RX ADMIN — OXYCODONE AND ACETAMINOPHEN 1 TABLET: 5; 325 TABLET ORAL at 09:59

## 2024-02-19 RX ADMIN — POLYETHYLENE GLYCOL 3350 17 G: 17 POWDER, FOR SOLUTION ORAL at 21:53

## 2024-02-19 RX ADMIN — INSULIN LISPRO 2 UNITS: 100 INJECTION, SOLUTION INTRAVENOUS; SUBCUTANEOUS at 06:32

## 2024-02-19 RX ADMIN — INSULIN LISPRO 3 UNITS: 100 INJECTION, SOLUTION INTRAVENOUS; SUBCUTANEOUS at 16:57

## 2024-02-19 RX ADMIN — POTASSIUM CHLORIDE 10 MEQ: 750 TABLET, EXTENDED RELEASE ORAL at 09:59

## 2024-02-19 RX ADMIN — LEVOTHYROXINE SODIUM 50 MCG: 0.05 TABLET ORAL at 05:21

## 2024-02-19 ASSESSMENT — PAIN DESCRIPTION - DESCRIPTORS
DESCRIPTORS: DISCOMFORT;SORE;TENDER
DESCRIPTORS: ACHING;THROBBING

## 2024-02-19 ASSESSMENT — PAIN DESCRIPTION - LOCATION
LOCATION: HIP
LOCATION: HIP

## 2024-02-19 ASSESSMENT — PAIN SCALES - GENERAL
PAINLEVEL_OUTOF10: 7
PAINLEVEL_OUTOF10: 7
PAINLEVEL_OUTOF10: 0

## 2024-02-19 ASSESSMENT — PAIN DESCRIPTION - ORIENTATION
ORIENTATION: RIGHT
ORIENTATION: RIGHT

## 2024-02-19 ASSESSMENT — PAIN DESCRIPTION - FREQUENCY: FREQUENCY: INTERMITTENT

## 2024-02-19 ASSESSMENT — PAIN DESCRIPTION - PAIN TYPE: TYPE: SURGICAL PAIN

## 2024-02-19 NOTE — ACP (ADVANCE CARE PLANNING)
Advance Care Planning     Advance Care Planning Activator (Inpatient)  Conversation Note      Date of ACP Conversation: 2/19/2024     Conversation Conducted with: patient    ACP Activator: Evelyn Vazquez RN        Health Care Decision Maker:     Current Designated Health Care Decision Maker:     Primary Decision Maker: Nate Renee - [facility] - [phone]        Care Preferences    Ventilation:  \"If you were in your present state of health and suddenly became very ill and were unable to breathe on your own, what would your preference be about the use of a ventilator (breathing machine) if it were available to you?\"      Would the patient desire the use of ventilator (breathing machine)?: yes    \"If your health worsens and it becomes clear that your chance of recovery is unlikely, what would your preference be about the use of a ventilator (breathing machine) if it were available to you?\"     Would the patient desire the use of ventilator (breathing machine)?: Yes      Resuscitation  \"CPR works best to restart the heart when there is a sudden event, like a heart attack, in someone who is otherwise healthy. Unfortunately, CPR does not typically restart the heart for people who have serious health conditions or who are very sick.\"    \"In the event your heart stopped as a result of an underlying serious health condition, would you want attempts to be made to restart your heart (answer \"yes\" for attempt to resuscitate) or would you prefer a natural death (answer \"no\" for do not attempt to resuscitate)?\" yes       [] Yes   [] No   Educated Patient / Decision Maker regarding differences between Advance Directives and portable DNR orders.    Length of ACP Conversation in minutes: 5     Conversation Outcomes:  ACP discussion completed    Follow-up plan:    [] Schedule follow-up conversation to continue planning  [x] Referred individual to Provider for additional questions/concerns   [] Advised patient/agent/surrogate to

## 2024-02-19 NOTE — CARE COORDINATION
Pt admitted for hypoxia/+ COVID. Recent s/p rt hip repair d/t fall. Newly discovered covid. From [facility]; cannot accept back.referral made to bib at [facility]; will review. Pt agreeable to this choice. Accepted; advised to start precert. ; transport forms on chart.[facility] 11/14.will follow. Chey Vazquez RN,CM.

## 2024-02-19 NOTE — ACP (ADVANCE CARE PLANNING)
Advance Care Planning   Healthcare Decision Maker:    Primary Decision Maker: Nate Rneee - [facility] - [phone]    Chey Vazquez RN,CM.

## 2024-02-20 VITALS
RESPIRATION RATE: 16 BRPM | SYSTOLIC BLOOD PRESSURE: 134 MMHG | DIASTOLIC BLOOD PRESSURE: 74 MMHG | HEART RATE: 82 BPM | HEIGHT: 65 IN | BODY MASS INDEX: 34.75 KG/M2 | TEMPERATURE: 97.9 F | OXYGEN SATURATION: 95 % | WEIGHT: 208.6 LBS

## 2024-02-20 LAB
ALBUMIN SERPL-MCNC: 3.1 G/DL (ref 3.5–5.2)
ALP SERPL-CCNC: 79 U/L (ref 35–104)
ALT SERPL-CCNC: 30 U/L (ref 0–32)
ANION GAP SERPL CALCULATED.3IONS-SCNC: 8 MMOL/L (ref 7–16)
AST SERPL-CCNC: 61 U/L (ref 0–31)
BILIRUB SERPL-MCNC: 0.4 MG/DL (ref 0–1.2)
BUN SERPL-MCNC: 17 MG/DL (ref 6–23)
CALCIUM SERPL-MCNC: 8.8 MG/DL (ref 8.6–10.2)
CHLORIDE SERPL-SCNC: 106 MMOL/L (ref 98–107)
CO2 SERPL-SCNC: 26 MMOL/L (ref 22–29)
CREAT SERPL-MCNC: 0.9 MG/DL (ref 0.5–1)
GFR SERPL CREATININE-BSD FRML MDRD: >60 ML/MIN/1.73M2
GLUCOSE BLD-MCNC: 103 MG/DL (ref 74–99)
GLUCOSE BLD-MCNC: 129 MG/DL (ref 74–99)
GLUCOSE BLD-MCNC: 157 MG/DL (ref 74–99)
GLUCOSE SERPL-MCNC: 90 MG/DL (ref 74–99)
POTASSIUM SERPL-SCNC: 4.1 MMOL/L (ref 3.5–5)
PROT SERPL-MCNC: 5.8 G/DL (ref 6.4–8.3)
SODIUM SERPL-SCNC: 140 MMOL/L (ref 132–146)

## 2024-02-20 PROCEDURE — 6360000002 HC RX W HCPCS: Performed by: STUDENT IN AN ORGANIZED HEALTH CARE EDUCATION/TRAINING PROGRAM

## 2024-02-20 PROCEDURE — 36415 COLL VENOUS BLD VENIPUNCTURE: CPT

## 2024-02-20 PROCEDURE — 80053 COMPREHEN METABOLIC PANEL: CPT

## 2024-02-20 PROCEDURE — 99239 HOSP IP/OBS DSCHRG MGMT >30: CPT | Performed by: STUDENT IN AN ORGANIZED HEALTH CARE EDUCATION/TRAINING PROGRAM

## 2024-02-20 PROCEDURE — 2580000003 HC RX 258: Performed by: STUDENT IN AN ORGANIZED HEALTH CARE EDUCATION/TRAINING PROGRAM

## 2024-02-20 PROCEDURE — 97165 OT EVAL LOW COMPLEX 30 MIN: CPT

## 2024-02-20 PROCEDURE — 97535 SELF CARE MNGMENT TRAINING: CPT

## 2024-02-20 PROCEDURE — 6370000000 HC RX 637 (ALT 250 FOR IP): Performed by: STUDENT IN AN ORGANIZED HEALTH CARE EDUCATION/TRAINING PROGRAM

## 2024-02-20 PROCEDURE — 6370000000 HC RX 637 (ALT 250 FOR IP): Performed by: NURSE PRACTITIONER

## 2024-02-20 PROCEDURE — 82962 GLUCOSE BLOOD TEST: CPT

## 2024-02-20 RX ORDER — OXYCODONE HYDROCHLORIDE AND ACETAMINOPHEN 5; 325 MG/1; MG/1
1 TABLET ORAL EVERY 6 HOURS PRN
Qty: 28 TABLET | Refills: 0 | Status: SHIPPED | DISCHARGE
Start: 2024-02-20 | End: 2024-02-27

## 2024-02-20 RX ORDER — VERAPAMIL HYDROCHLORIDE 120 MG/1
120 CAPSULE, EXTENDED RELEASE ORAL DAILY
Qty: 30 CAPSULE | Refills: 0 | DISCHARGE
Start: 2024-02-20

## 2024-02-20 RX ORDER — POLYETHYLENE GLYCOL 3350 17 G/17G
17 POWDER, FOR SOLUTION ORAL DAILY PRN
Qty: 7 EACH | Refills: 0 | DISCHARGE
Start: 2024-02-20 | End: 2024-02-27

## 2024-02-20 RX ORDER — CHOLECALCIFEROL (VITAMIN D3) 25 MCG
1000 TABLET ORAL DAILY
Qty: 60 TABLET | DISCHARGE
Start: 2024-02-21

## 2024-02-20 RX ADMIN — INSULIN GLARGINE 16 UNITS: 100 INJECTION, SOLUTION SUBCUTANEOUS at 08:47

## 2024-02-20 RX ADMIN — SUCRALFATE 1 G: 1 TABLET ORAL at 08:37

## 2024-02-20 RX ADMIN — GABAPENTIN 800 MG: 400 CAPSULE ORAL at 16:24

## 2024-02-20 RX ADMIN — ACETAMINOPHEN 650 MG: 325 TABLET ORAL at 08:40

## 2024-02-20 RX ADMIN — DEXAMETHASONE 6 MG: 4 TABLET ORAL at 08:38

## 2024-02-20 RX ADMIN — BUPROPION HYDROCHLORIDE 100 MG: 100 TABLET, EXTENDED RELEASE ORAL at 08:38

## 2024-02-20 RX ADMIN — PRAMIPEXOLE DIHYDROCHLORIDE 0.75 MG: 0.25 TABLET ORAL at 08:39

## 2024-02-20 RX ADMIN — GABAPENTIN 800 MG: 400 CAPSULE ORAL at 08:38

## 2024-02-20 RX ADMIN — SODIUM CHLORIDE, PRESERVATIVE FREE 10 ML: 5 INJECTION INTRAVENOUS at 08:39

## 2024-02-20 RX ADMIN — GUAIFENESIN 400 MG: 400 TABLET ORAL at 08:39

## 2024-02-20 RX ADMIN — Medication 1000 UNITS: at 08:39

## 2024-02-20 RX ADMIN — PANTOPRAZOLE SODIUM 40 MG: 40 TABLET, DELAYED RELEASE ORAL at 06:27

## 2024-02-20 RX ADMIN — ENOXAPARIN SODIUM 40 MG: 100 INJECTION SUBCUTANEOUS at 08:38

## 2024-02-20 RX ADMIN — LOSARTAN POTASSIUM 50 MG: 50 TABLET, FILM COATED ORAL at 11:00

## 2024-02-20 RX ADMIN — LEVOTHYROXINE SODIUM 50 MCG: 0.05 TABLET ORAL at 06:27

## 2024-02-20 RX ADMIN — INSULIN LISPRO 3 UNITS: 100 INJECTION, SOLUTION INTRAVENOUS; SUBCUTANEOUS at 11:29

## 2024-02-20 RX ADMIN — CEFDINIR 300 MG: 300 CAPSULE ORAL at 08:38

## 2024-02-20 RX ADMIN — GUAIFENESIN 400 MG: 400 TABLET ORAL at 16:24

## 2024-02-20 RX ADMIN — INSULIN LISPRO 3 UNITS: 100 INJECTION, SOLUTION INTRAVENOUS; SUBCUTANEOUS at 16:26

## 2024-02-20 RX ADMIN — VENLAFAXINE HYDROCHLORIDE 150 MG: 150 CAPSULE, EXTENDED RELEASE ORAL at 08:39

## 2024-02-20 RX ADMIN — POTASSIUM CHLORIDE 10 MEQ: 750 TABLET, EXTENDED RELEASE ORAL at 08:39

## 2024-02-20 ASSESSMENT — PAIN DESCRIPTION - ORIENTATION: ORIENTATION: UPPER

## 2024-02-20 ASSESSMENT — PAIN DESCRIPTION - DESCRIPTORS: DESCRIPTORS: DISCOMFORT;ACHING

## 2024-02-20 ASSESSMENT — PAIN SCALES - GENERAL: PAINLEVEL_OUTOF10: 4

## 2024-02-20 ASSESSMENT — PAIN DESCRIPTION - LOCATION: LOCATION: ABDOMEN

## 2024-02-20 NOTE — CARE COORDINATION
2/20/2024  Social Work Discharge Planning:Auth received for Pt to go to [facility]. set up ambulette transport for Pt to go at 4:30pm today. Nurse here, LORIN liaison and spouse (vm) were notified. Electronically signed by ZA Monreal on 2/20/2024 at 10:49 AM

## 2024-02-20 NOTE — PROGRESS NOTES
[facility] Hospitalist Progress Note    Admitting Date and Time: 2/17/2024  1:36 PM  Admit Dx: COVID-19 virus infection [U07.1]    Subjective:  Patient is being followed for COVID-19 virus infection [U07.1]   Pt was seen and examined today. Denies any new issues. States her breathing is better today, back to baseline    ROS: denies fever, chills, cp, sob, n/v, HA unless stated above.     Vitamin D  1,000 Units Oral Daily    buPROPion  100 mg Oral Daily    gabapentin  800 mg Oral TID    levothyroxine  50 mcg Oral Daily    [Held by provider] losartan  50 mg Oral Daily    pantoprazole  40 mg Oral QAM AC    potassium chloride  10 mEq Oral Daily    pramipexole  0.75 mg Oral BID    rosuvastatin  40 mg Oral Nightly    sucralfate  1 g Oral Daily    venlafaxine  150 mg Oral Daily    [Held by provider] verapamil  180 mg Oral BID    cefdinir  300 mg Oral 2 times per day    insulin glargine  16 Units SubCUTAneous Daily    insulin lispro  3 Units SubCUTAneous TID WC    insulin lispro  0-8 Units SubCUTAneous TID WC    insulin lispro  0-4 Units SubCUTAneous Nightly    sodium chloride flush  5-40 mL IntraVENous 2 times per day    enoxaparin  40 mg SubCUTAneous Daily    guaiFENesin  400 mg Oral TID    dexAMETHasone  6 mg Oral Daily     oxyCODONE-acetaminophen, 1 tablet, Q6H PRN  dextrose bolus, 125 mL, PRN   Or  dextrose bolus, 250 mL, PRN  glucagon (rDNA), 1 mg, PRN  dextrose, , Continuous PRN  Glucose, 15 g, PRN  sodium chloride flush, 5-40 mL, PRN  sodium chloride, , PRN  ondansetron, 4 mg, Q8H PRN   Or  ondansetron, 4 mg, Q6H PRN  polyethylene glycol, 17 g, Daily PRN  acetaminophen, 650 mg, Q6H PRN   Or  acetaminophen, 650 mg, Q6H PRN         Objective:    /61   Pulse 77   Temp 98.4 °F (36.9 °C) (Axillary)   Resp 18   Ht 1.651 m (5' 5\")   Wt 83.4 kg (183 lb 14.4 oz)   SpO2 99%   BMI 30.60 kg/m²     General Appearance: alert and oriented to person, place and time and in no acute distress  Skin: warm and 
4 Eyes Skin Assessment     NAME:  Rebeka Renee  YOB: 1945  MEDICAL RECORD NUMBER:  [id number]    The patient is being assessed for  Admission    I agree that at least one RN has performed a thorough Head to Toe Skin Assessment on the patient. ALL assessment sites listed below have been assessed.      Areas assessed by both nurses:    Head, Face, Ears, Shoulders, Back, Chest, Arms, Elbows, Hands, Sacrum. Buttock, Coccyx, Ischium, Legs. Feet and Heels, and Under Medical Devices         Does the Patient have a Wound? Yes wound(s) were present on assessment. LDA wound assessment was Initiated and completed by RN       Norris Prevention initiated by RN: Yes  Wound Care Orders initiated by RN: Yes    Pressure Injury (Stage 3,4, Unstageable, DTI, NWPT, and Complex wounds) if present, place Wound referral order by RN under : Yes    New Ostomies, if present place, Ostomy referral order under : No     Nurse 1 eSignature: Electronically signed by Patt Nance RN on 2/17/24 at 11:46 PM EST    **SHARE this note so that the co-signing nurse can place an eSignature**    Nurse 2 eSignature: Electronically signed by Jocelyne Swann RN on 2/18/24 at 12:35 AM EST   
Call placed PAS regarding updated transport time. Crew is approximately 11min away.   
Notified [place] APRN-CNP of patient complaints of gas pain, see new orders.   
Nurse to nurse called to facility. WILMA complete.   
OCCUPATIONAL THERAPY INITIAL EVALUATION    [facility]   [address]        Date:2024                                                  Patient Name: Rebeka Renee    MRN: [id number]    : 1945    Room: [address] [place] [address]     Evaluating OT: Kelly Parker OTR/L #[id number]    Referring Provider:  Javier Meyer MD     Specific Provider Orders/Date:  OT Eval and Treat , 2024     Diagnosis:   1. Acute hypoxic respiratory failure (HCC)    2. COVID-19        Pertinent Medical History: Anxiety, arthritis, Bipolar 1 disorder, lung and kidney CA, DM, fibromyalgia, R hip hemiarthroplasty 24, R hip open reduction and hematoma evacuation 24     Precautions:  Fall Risk, falls and alarm, droplet plus isolation, COVID+, weight bearing as tolerated with knee immobilizer R LE, R hip anterior precautions, minimize active R hip ABD and EXT     Assessment of current deficits    [x] Functional mobility  [x]ADLs  [x] Strength               []Cognition    [x] Functional transfers   [x] IADLs         [x] Safety Awareness   [x]Endurance    [] Fine Coordination              [x] Balance      [] Vision/perception   []Sensation     []Gross Motor Coordination  [] ROM  [] Delirium                   [] Motor Control     OT PLAN OF CARE   OT POC based on physician orders, patient diagnosis and results of clinical assessment    Frequency/Duration 2-5 days/wk for 2 weeks PRN     Specific OT Treatment Interventions to include:   * Instruction/training on adapted ADL techniques and AE recommendations to increase functional independence within precautions       * Training on energy conservation strategies, correct breathing pattern and techniques to improve independence/tolerance for self-care routine  * Functional transfer/mobility training/DME recommendations for increased independence, safety, and fall prevention  * Patient/Family education to 
Physical Therapy  Facility/Department: [address] MED SURG/TELE  Physical Therapy Initial Assessment    Name: Rebeka Renee  : 1945  MRN: [id number]  Date of Service: 2024    Attending Provider:  Javier Meyer MD    Evaluating PT:  Bebeto Hughes Jr., P.T.    [facility]  Diagnosis:  COVID-19 virus infection [U07.1]  Pertinent PMHx/PSHx:  24 R hip HHA after hip fx, 24 R GE dislocation open reduction and R greater trochanter ORIF, R hip hematoma excision with I and D.  Bipolar 1, anxiety, depression  Precautions:  droplet +, falls, WBAT  with knee immobilizer when up, R hip anterior precautions, and minimize active R hip ABD and EXT     SUBJECTIVE:    Pt lives with her  in a 1.5 story home with 2 stairs and 2 rail to enter.   Her bed and bath are on the first floor.  Pt ambulated with a ww PTA.  Pt came in from [facility].     OBJECTIVE:   Initial Evaluation  Date: 24 Treatment Short Term/ Long Term   Goals   Was pt agreeable to Eval/treatment? yes     Does pt have pain? No c/o pain at this time.      Bed Mobility  Rolling: SBA  Supine to sit: SBA  Sit to supine: NA  Scooting: SBA  Independent    Transfers Sit to stand: MIN A  Stand to sit: MIN A  Stand pivot: MIN A with ww  Independent    Ambulation   3 feet with ww MIN A  150 feet with ww supervision   Stair negotiation: ascended and descended NA, pt fatigued with amb and is in droplet + isolation  2 steps with 1-2 rails SBA   AM-PAC 6 Clicks        BLE ROM is WFL and R hip is WFL allowed by precautions.   LLE strength is grossly 4/5 to 4+/5 and RLE is grossly 2/5 to 4-/5.   Balance: sitting is supervision and standing with ww is MIN A  Endurance: fair-    ASSESSMENT:    Conditions Requiring Skilled Therapeutic Intervention:    [x]Decreased strength     []Decreased ROM  [x]Decreased functional mobility  [x]Decreased balance   [x]Decreased endurance   []Decreased posture  []Decreased sensation  []Decreased 
a small hemangioma.         CT ABDOMEN PELVIS W IV CONTRAST Additional Contrast? None   Final Result      1.  No evidence of acute pulmonary emboli.      2.  Small left pleural effusion which appears somewhat loculated with pleural   thickening at the posterior left lung base.  This is unchanged from the prior   study.      3.  Trace right pleural effusion which may be somewhat loculated.  There is   some chronic scarring at the right lung base but there may be now some   superimposed atelectasis.  Minimal pneumonitis is not completely excluded.      4.  Surgical absence of the left kidney and probably also the left adrenal   gland.      5.  A normal appearing appendix is noted.      6.  Very small umbilical ventral hernia.      7.  Some gaseous distension of portions of the colon but overall a   nonspecific bowel gas pattern.      8.  Vague ill-defined hypodensity in the subcapsular lateral right lobe of   the liver indeterminate significance but may represent a small hemangioma.         XR CHEST PORTABLE   Final Result   Limited study due to patient body habitus. No definite acute cardiopulmonary   process.             Assessment:    Principal Problem:    COVID-19 virus infection  Resolved Problems:    * No resolved hospital problems. *      Plan:  COVID-19 infection -SpO2 84% on arrival, required oxygen 4 L nasal cannula. Continue Decadron, plan for 10 days.  Follow inflammatory markers.  Supportive care  Acute hypoxic respiratory failure - SpO2 84% on arrival, required oxygen 4 L nasal cannula.  She does wear 2 L nasal cannula intermittently at home.  Wean oxygen as tolerated while maintaining SpO2>92%  Leukocytosis - likely 2/2 #1  Hypokalemia - improved, continue to replace K as needed  HTN - on losartan and verapamil at home, hold for now, will resume in the morning depending on patient's blood pressure  HLD - Continue Crestor  IDDM - Continue Lantus 16 units daily, lispro 3 units 3 times daily, MDSS, POCT BG

## 2024-02-20 NOTE — DISCHARGE INSTR - COC
Odor None 02/20/24 0900   Shelby-wound Assessment Dry/flaky 02/20/24 0900   Number of days: 7       Incision 01/04/24 Hip Right (Active)   Dressing Status New dressing applied 02/20/24 0900   Dressing Change Due 02/20/24 02/20/24 0900   Incision Cleansed Cleansed with saline 02/20/24 0900   Dressing/Treatment ABD pad;Tape change 02/20/24 0900   Incision Length (cm) 22 02/18/24 0145   Incision Width (cm) 1 cm 02/18/24 0145   Closure Staples 02/20/24 0900   Margins Approximated 02/20/24 0900   Incision Assessment Bleeding 02/20/24 0900   Drainage Amount Moderate (25-50%) 02/20/24 0900   Drainage Description Serosanguinous 02/20/24 0900   Odor None 02/20/24 0900   Shelby-incision Assessment Edematous;Intact 02/20/24 0900   Number of days: 46        Elimination:  Continence:   Bowel: Yes  Bladder: Yes  Urinary Catheter: None   Colostomy/Ileostomy/Ileal Conduit: No       Date of Last BM: 02/20/2024    Intake/Output Summary (Last 24 hours) at 2/20/2024 1229  Last data filed at 2/20/2024 0800  Gross per 24 hour   Intake 360 ml   Output 1400 ml   Net -1040 ml     I/O last 3 completed shifts:  In: 1806.6 [P.O.:540; I.V.:1266.6]  Out: 1900 [Urine:1900]    Safety Concerns:     At Risk for Falls    Impairments/Disabilities:      None    Nutrition Therapy:  Current Nutrition Therapy:   - Oral Diet:  General    Routes of Feeding: Oral  Liquids: Thin Liquids  Daily Fluid Restriction: no  Last Modified Barium Swallow with Video (Video Swallowing Test): not done    Treatments at the Time of Hospital Discharge:   Respiratory Treatments: N/A  Oxygen Therapy:  is not on home oxygen therapy.  Ventilator:    - No ventilator support    Rehab Therapies: Physical Therapy and Occupational Therapy  Weight Bearing Status/Restrictions: No weight bearing restrictions  Other Medical Equipment (for information only, NOT a DME order):  walker  Other Treatments:       Patient's personal belongings (please select all that are sent with

## 2024-02-20 NOTE — PLAN OF CARE
Problem: Discharge Planning  Goal: Discharge to home or other facility with appropriate resources  2/20/2024 1221 by Shavon Marmolejo RN  Outcome: Progressing  2/20/2024 0328 by Isabella Norwood RN  Outcome: Progressing     Problem: Skin/Tissue Integrity  Goal: Absence of new skin breakdown  Description: 1.  Monitor for areas of redness and/or skin breakdown  2.  Assess vascular access sites hourly  3.  Every 4-6 hours minimum:  Change oxygen saturation probe site  4.  Every 4-6 hours:  If on nasal continuous positive airway pressure, respiratory therapy assess nares and determine need for appliance change or resting period.  2/20/2024 1221 by Shavon Marmolejo RN  Outcome: Progressing  2/20/2024 0328 by Isabella Norwood RN  Outcome: Progressing     Problem: Safety - Adult  Goal: Free from fall injury  2/20/2024 1221 by Shavon Marmolejo RN  Outcome: Progressing  2/20/2024 0328 by Isabella Norwood RN  Outcome: Progressing     Problem: ABCDS Injury Assessment  Goal: Absence of physical injury  2/20/2024 1221 by Shavon Marmolejo RN  Outcome: Progressing  2/20/2024 0328 by Isabella Norwood RN  Outcome: Progressing     Problem: Pain  Goal: Verbalizes/displays adequate comfort level or baseline comfort level  2/20/2024 1221 by Shavon Marmolejo RN  Outcome: Progressing  2/20/2024 0328 by Isabella Norwood RN  Outcome: Progressing     Problem: Chronic Conditions and Co-morbidities  Goal: Patient's chronic conditions and co-morbidity symptoms are monitored and maintained or improved  2/20/2024 1221 by Shavon Marmolejo RN  Outcome: Progressing  2/20/2024 0328 by Isabella Norwood RN  Outcome: Progressing

## 2024-02-20 NOTE — DISCHARGE SUMMARY
these medications is not yet available    Ask your nurse or doctor about these medications  polyethylene glycol 17 g packet  verapamil 120 MG extended release capsule  Vitamin D3 25 MCG Tabs           Note that 35 minutes were spent in preparing discharge papers, discussing discharge with patient, medication review, etc.    Signed:  Electronically signed by Javier Meyer MD on 2/20/2024 at 1:13 PM

## 2024-02-20 NOTE — CARE COORDINATION
+ covid (new) s/p hip repair.was briefly at [facility]; hypoxia; + covid. Cannot take back. Accepted to [facility]. Thalia notified to fax [phone] to [place]. Transport forms on chart. Precert initiated 2/19. Chey Vazquez,RN,CM.

## 2024-02-21 LAB
MICROORGANISM SPEC CULT: NORMAL
MICROORGANISM SPEC CULT: NORMAL
MICROORGANISM/AGENT SPEC: NORMAL
MICROORGANISM/AGENT SPEC: NORMAL
SPECIMEN DESCRIPTION: NORMAL
SPECIMEN DESCRIPTION: NORMAL

## 2024-02-22 LAB
MICROORGANISM SPEC CULT: NORMAL
MICROORGANISM SPEC CULT: NORMAL
SERVICE CMNT-IMP: NORMAL
SERVICE CMNT-IMP: NORMAL
SPECIMEN DESCRIPTION: NORMAL
SPECIMEN DESCRIPTION: NORMAL

## 2024-02-23 DIAGNOSIS — Z96.641 HISTORY OF HEMIARTHROPLASTY OF RIGHT HIP: Primary | ICD-10-CM

## 2024-02-24 ASSESSMENT — ENCOUNTER SYMPTOMS
EYE PAIN: 0
COUGH: 0
NAUSEA: 0
BACK PAIN: 0
SHORTNESS OF BREATH: 0
EYE REDNESS: 0
WHEEZING: 0
SINUS PRESSURE: 0
EYE DISCHARGE: 0
VOMITING: 0
SORE THROAT: 0
DIARRHEA: 0
ABDOMINAL DISTENTION: 0

## 2024-03-05 ENCOUNTER — OFFICE VISIT (OUTPATIENT)
Dept: ORTHOPEDIC SURGERY | Age: 79
End: 2024-03-05

## 2024-03-05 VITALS — HEIGHT: 65 IN | WEIGHT: 208 LBS | BODY MASS INDEX: 34.66 KG/M2

## 2024-03-05 DIAGNOSIS — Z96.641 HISTORY OF HEMIARTHROPLASTY OF RIGHT HIP: Primary | ICD-10-CM

## 2024-03-05 PROCEDURE — 99024 POSTOP FOLLOW-UP VISIT: CPT | Performed by: ORTHOPAEDIC SURGERY

## 2024-03-05 RX ORDER — CEFADROXIL 500 MG/1
500 CAPSULE ORAL 2 TIMES DAILY
Qty: 28 CAPSULE | Refills: 0 | Status: SHIPPED | OUTPATIENT
Start: 2024-03-05 | End: 2024-03-19

## 2024-03-05 NOTE — PATIENT INSTRUCTIONS
Continue hip precautions, may remove immobilizer, antibiotics for 14 days for redness and mild drainage of right hip incision.  Please call if redness worsens, fevers or chills for evaluation    Follow-up in 2 weeks for x-rays and evaluation    Call with any questions or concerns

## 2024-03-05 NOTE — PROGRESS NOTES
Chief Complaint   Patient presents with    Post-Op Check      Post-op Rt hip maggy 1/4/2024    Procedure(s):  Left hip prosthetic dislocation open reduction  Left femur greater trochanter fracture open reduction with internal fixation with suture fixation  Left hip hematoma excisional irrigation and debridement  Date: 2/12/2024          SUBJECTIVE: Patient is 2 weeks status post open reduction of right hip hemiarthroplasty dislocation.  Patient comes in the office today has been compliant with her knee immobilizer.  States that she thinks she dislocated because she took Ativan.  She denies any further falls.  She is companied by her  in the office today.  She does have some erythema over her incision but states she is feeling well and getting around well at home.  Denies any further falls or injuries.        Past Medical History:   Diagnosis Date    Anemia     S/P chemotherapy.    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    Ascending aortic aneurysm (HCC)     seen on CTA chest w/contrast on 2019    Asthma     Bipolar 1 disorder (HCC)     Cancer (HCC)     lung/ kidney    Cancer of breast, female (HCC) 01/2006    S/P bilaeral mastectomy with left breast lymph node resection and chemotherapy.    CHF (congestive heart failure) (HCC)     Chronic back pain     Depression     Depression with anxiety     Diabetes mellitus (HCC)     Resolved after losing 130 lbs. after gastric bypass surgery.    Dyslipidemia     Fibromyalgia     History of blood transfusion     anemia    Hypertension     Hypothyroidism     Neuropathy     Pericardial effusion 04/02/2010    Subxiphoid pericardial window with drainage of pericardial effusion and pericardial biopsy:  Fluid and biopsy negative for metastases.     Pleural effusion 05/02/2010    Noted on chest x-ray.    TIA (transient ischemic attack)     Tobacco abuse     Patient

## 2024-03-05 NOTE — PROGRESS NOTES
Chief Complaint   Patient presents with    Post-Op Check      Post-op Rt hip maggy 2024    Procedure(s):  Left hip prosthetic dislocation open reduction  Left femur greater trochanter fracture open reduction with internal fixation with suture fixation  Left hip hematoma excisional irrigation and debridement  Date: 2024           OP:SURGEON: {Sagrario:32877}  DATE OF PROCEDURE: ***  PROCEDURE:***    POD: {TIME; 1 WEEK TO 3 MONTHS:2411597417}    Subjective:  Rebeka Renee is following up from the above surgery. {He/she (caps):88221} is {Weight Bearin} on {Anatomy; location extremity:19120} extremity ***. {He/she (caps):50912} ambulates {With/no:86377} assistive device, ***.  Pain to extremity is {description:803743} and {IS/IS NOT:} taking prescribed pain medication, {postoppainmed:67671}. They {denies/complains:28990} {Numbness:04331} to the {Anatomy; location extremity:82227} extremity. Denies calf pain, chest pain, or shortness of breath.  Patient {Continues/Finished:69733} DVT prophylaxis, {PostOAC:14258}. Patient {IS/IS NOT:} participating in therapy, {postop therapy\:73132}. ***     Review of Systems -  All pertinent positives/negative in HPI     Objective:    General: Alert and oriented X 3, normocephalic atraumatic, external ears and eye normal, sclera clear, no acute distress, respirations easy and unlabored with no audible wheezes, skin warm and dry, speech and dress appropriate for noted age, affect euthymic.    Extremity:  {RIGHT LEFT BILATERAL CAPS BEGIN:} Lower Extremity  Skin clean dry and intact, {WITH/WITHOUT:} signs of infection ***  Incision {Exam; incision:96919} ***  {MILD:59422} edema noted  Compartments supple throughout thigh and leg  Calf supple and {TENDER NOT TNEDER:62807}  {Desc; negative/positive:47595} Homans  Demonstrates active ***  ***  States sensation intact to touch in {LE Nerve:48910} nerve distributions to foot/ankle.  Palpable

## 2024-03-13 LAB
MICROORGANISM SPEC CULT: NORMAL
MICROORGANISM/AGENT SPEC: NORMAL
SPECIMEN DESCRIPTION: NORMAL

## 2024-03-19 ENCOUNTER — TELEPHONE (OUTPATIENT)
Dept: ORTHOPEDIC SURGERY | Age: 79
End: 2024-03-19

## 2024-03-19 ENCOUNTER — OFFICE VISIT (OUTPATIENT)
Dept: ORTHOPEDIC SURGERY | Age: 79
End: 2024-03-19

## 2024-03-19 ENCOUNTER — PREP FOR PROCEDURE (OUTPATIENT)
Dept: ORTHOPEDIC SURGERY | Age: 79
End: 2024-03-19

## 2024-03-19 DIAGNOSIS — Z96.641 HISTORY OF HEMIARTHROPLASTY OF RIGHT HIP: ICD-10-CM

## 2024-03-19 DIAGNOSIS — S72.064A: Primary | ICD-10-CM

## 2024-03-19 DIAGNOSIS — T14.8XXA DRAINAGE FROM WOUND: ICD-10-CM

## 2024-03-19 DIAGNOSIS — S72.001A CLOSED DISPLACED FRACTURE OF RIGHT FEMORAL NECK (HCC): ICD-10-CM

## 2024-03-19 DIAGNOSIS — Z91.89 OTHER SPECIFIED PERSONAL RISK FACTORS, NOT ELSEWHERE CLASSIFIED: ICD-10-CM

## 2024-03-19 PROBLEM — L24.A9 DRAINAGE FROM WOUND: Status: ACTIVE | Noted: 2024-03-19

## 2024-03-19 PROCEDURE — 99024 POSTOP FOLLOW-UP VISIT: CPT | Performed by: ORTHOPAEDIC SURGERY

## 2024-03-19 NOTE — TELEPHONE ENCOUNTER
Please place pre-op orders/labs.  Due to CMS Billing Guidelines, this surgery case will require Inpatient Stay if Femoral Component Exchange is done.

## 2024-03-19 NOTE — TELEPHONE ENCOUNTER
Prior Authorization Form:    DEMOGRAPHICS:                     Patient Name:  Rebeka Lozoya  Patient :  1945            Insurance:  Payor: [facility] MEDICARE / Plan: plista DUAL COMPLETE / Product Type: *No Product type* /   Insurance ID Number:    Payer/Plan Subscr  Sex Relation Sub. Ins. ID Effective Group Num   1. [employer] MEDICARE * REBEKA LOZOYA 1945 Female Self [id number]                                   [address]   2. [facility]* REBEKA LOZOYA 1945 Female Self [id number]                                   [address]       [] Prior authorization obtained  [] No Prior authorization required   [x] Prior authorization pending - Prior Authorization started today via [facility] Portal.   Pending Auth # [id number] effective 3-  Request was submitted as URGENT.  Clinicals uploaded to [facility] Portal today.       DIAGNOSIS & PROCEDURE:                   Procedure/Operation: Irrigation and Debridement of Right Hip Draining Wound, Possible Revision of Femoral Component (Plan on Intra-Operative Cultures).         CPT Code: 00284, 39540    Diagnosis:  Right Hip Hemiarthroplasty Draining Wound, History of Right Hip Hemiarthroplasty, Nondisplaced Articular Fracture of Head of Right Femur, Closed Displaced Fracture of Right Femoral Neck, Other Risk Factors Not Elsewhere Classified     ICD10 Code: S71.001A, Z96.641, S72.064A, S72.001A, Z91.89    Location:  [facility]     Surgeon:  Dr. Handy     SCHEDULING INFORMATION:                          Date: 3-    Time: 9:00 am               Anesthesia:  General with Possible Regional                                                        Status: Inpatient Stay is needed if Femoral Component Revision occurs. If patient has I&D of Right Hip, surgery will be Outpatient.     Special Comments:         Vendor: [employer]   [x]   Notified     Length of Surgery (with 30 min clean up time): 2.5 hours    Medical clearance: No

## 2024-03-19 NOTE — PROGRESS NOTES
MEDTRONIC SPINAL CORD STIMULATOR (STIMULATOR BATTERY LEFT HIP AREA) (CPT 10049) performed by Lizzy Herbert DO at [facility] OR    REVISION TOTAL HIP ARTHROPLASTY Right 2024    RIGHT HIP OPEN REDUCTION AND HEMATOMA EVACUATION performed by Wood George DO at [facility] [place]    THORACENTESIS Left 2010 and 3/2010.     Reportedly transudative.    TRANSTHORACIC ECHOCARDIOGRAM  2010    Small-to-moderate size pericardial effusion with early signs of tamponade with normal left ventricular size, wall thickness, wall motion and systolic function with stage I diastolic dysfunction with normal right ventricular size and function with moderately thickened pericardium and with no significant valvular abnormalities noted.        History reviewed. No pertinent family history.    Social History     Tobacco Use    Smoking status: Former     Current packs/day: 0.00     Types: Cigarettes     Quit date: 1999     Years since quittin.2    Smokeless tobacco: Never    Tobacco comments:     Quit in .   Vaping Use    Vaping Use: Never used   Substance Use Topics    Alcohol use: Yes     Alcohol/week: 1.0 standard drink of alcohol     Types: 1 Glasses of wine per week     Comment: occasional    Drug use: Yes     Types: Marijuana (Weed)     Comment: \"I am on medical marijuana\"       Allergies   Allergen Reactions    Benadryl [Diphenhydramine] Hives     Tongue Swells    Other      benzodine  Tongue Swells    Sulfa Antibiotics Hives     Tongue Swells    Ciprofloxacin Hives    Tape [Adhesive Tape] Itching         Review of Systems -   General ROS: negative for - chills, fatigue, fever or night sweats  Respiratory ROS: no cough, shortness of breath, or wheezing  Cardiovascular ROS: no chest pain or dyspnea on exertion  Gastrointestinal ROS: no abdominal pain, change in bowel habits, or black or bloody stools  Genitourinary: no hematuria, dysuria, or incontinence   Musculoskeletal ROS:see above  Neurological ROS: no

## 2024-03-19 NOTE — PATIENT INSTRUCTIONS
fracture. Iron helps your body make collagen to rebuild bone. It also plays a part in getting oxygen into your bones to help them heal.    Good sources: Red meat, dark-meat chicken or turkey, oily fish, eggs, dried fruits, leafy green veggies, whole-grain breads, and fortified cereals.    Potassium  Get enough of this mineral in your diet, and you won't lose as much calcium when you pee. There are lots of fresh fruits rich in potassium.    Good sources: Bananas, orange juice, potatoes, nuts, seeds, fish, meat, and milk.    What Not to Eat  It's a good idea to cut back on or skip these:    Alcohol: While you don't have to cut out alcoholic drinks, these beverages slow down bone healing. You won't build new bone as fast to fix the fracture. A bit too much alcohol can also make you unsteady on your feet, which can make you more likely to fall and risk an injury to the same bone.    Salt: Too much of this in your diet can make you lose more calcium in your urine. Salt can be in some foods or drinks that don't taste salty, so check labels and aim for about 1 teaspoon, or 6 grams, a day.    Coffee: Lots of caffeine -- more than four cups of strong coffee a day -- can slow down bone healing a little. It might make you pee more, and that could mean you lose more calcium through your urine. A moderate amount of coffee or tea should be fine.           Weight bearing is an important component to your healing fracture or injury. If you put weight on your extremity too soon, the fixation (plates/screws) could loosen and cause your fracture to shift or move. It is crucial you listen to your surgeon regarding weight bearing recommendations.    After surgery your weight bearing status is determined by your surgeon. For a significant portion of lower extremity and upper extremity fractures, this will be a non-weightbearing or touch-down weight bearing status. Usually this is continued for 6-10 weeks before you are allowed to start

## 2024-03-19 NOTE — H&P (VIEW-ONLY)
MEDTRONIC SPINAL CORD STIMULATOR (STIMULATOR BATTERY LEFT HIP AREA) (CPT 98339) performed by Lizzy Herbert DO at [facility] OR    REVISION TOTAL HIP ARTHROPLASTY Right 2024    RIGHT HIP OPEN REDUCTION AND HEMATOMA EVACUATION performed by Wood George DO at [facility] OR    THORACENTESIS Left 2010 and 3/2010.     Reportedly transudative.    TRANSTHORACIC ECHOCARDIOGRAM  2010    Small-to-moderate size pericardial effusion with early signs of tamponade with normal left ventricular size, wall thickness, wall motion and systolic function with stage I diastolic dysfunction with normal right ventricular size and function with moderately thickened pericardium and with no significant valvular abnormalities noted.        History reviewed. No pertinent family history.    Social History     Tobacco Use    Smoking status: Former     Current packs/day: 0.00     Types: Cigarettes     Quit date: 1999     Years since quittin.2    Smokeless tobacco: Never    Tobacco comments:     Quit in .   Vaping Use    Vaping Use: Never used   Substance Use Topics    Alcohol use: Yes     Alcohol/week: 1.0 standard drink of alcohol     Types: 1 Glasses of wine per week     Comment: occasional    Drug use: Yes     Types: Marijuana (Weed)     Comment: \"I am on medical marijuana\"       Allergies   Allergen Reactions    Benadryl [Diphenhydramine] Hives     Tongue Swells    Other      benzodine  Tongue Swells    Sulfa Antibiotics Hives     Tongue Swells    Ciprofloxacin Hives    Tape [Adhesive Tape] Itching         Review of Systems -   General ROS: negative for - chills, fatigue, fever or night sweats  Respiratory ROS: no cough, shortness of breath, or wheezing  Cardiovascular ROS: no chest pain or dyspnea on exertion  Gastrointestinal ROS: no abdominal pain, change in bowel habits, or black or bloody stools  Genitourinary: no hematuria, dysuria, or incontinence   Musculoskeletal ROS:see above  Neurological ROS: no

## 2024-03-20 RX ORDER — SODIUM CHLORIDE 9 MG/ML
INJECTION, SOLUTION INTRAVENOUS PRN
Status: CANCELLED | OUTPATIENT
Start: 2024-03-20

## 2024-03-20 RX ORDER — SODIUM CHLORIDE 0.9 % (FLUSH) 0.9 %
5-40 SYRINGE (ML) INJECTION PRN
Status: CANCELLED | OUTPATIENT
Start: 2024-03-20

## 2024-03-20 RX ORDER — SODIUM CHLORIDE 0.9 % (FLUSH) 0.9 %
5-40 SYRINGE (ML) INJECTION EVERY 12 HOURS SCHEDULED
Status: CANCELLED | OUTPATIENT
Start: 2024-03-20

## 2024-03-20 NOTE — TELEPHONE ENCOUNTER
Preop orders done   __m__ Safety:       (Environmental)   [place] to environment   Ensure ID band is correct and in place/ allergy band as needed   Assess for fall risk   Initiate fall precautions as applicable (fall band, side rails, etc.)   Call light within reach   Bed in low position/ wheels locked

## 2024-03-21 ENCOUNTER — ANESTHESIA EVENT (OUTPATIENT)
Dept: OPERATING ROOM | Age: 79
End: 2024-03-21
Payer: MEDICARE

## 2024-03-21 ENCOUNTER — HOSPITAL ENCOUNTER (INPATIENT)
Age: 79
LOS: 4 days | Discharge: HOME HEALTH | DRG: 467 | End: 2024-03-25
Attending: ORTHOPAEDIC SURGERY | Admitting: ORTHOPAEDIC SURGERY
Payer: MEDICARE

## 2024-03-21 ENCOUNTER — ANESTHESIA (OUTPATIENT)
Dept: OPERATING ROOM | Age: 79
End: 2024-03-21
Payer: MEDICARE

## 2024-03-21 ENCOUNTER — APPOINTMENT (OUTPATIENT)
Dept: GENERAL RADIOLOGY | Age: 79
DRG: 467 | End: 2024-03-21
Attending: ORTHOPAEDIC SURGERY
Payer: MEDICARE

## 2024-03-21 DIAGNOSIS — L24.A9 DRAINAGE FROM WOUND: ICD-10-CM

## 2024-03-21 DIAGNOSIS — Z91.89 OTHER SPECIFIED PERSONAL RISK FACTORS, NOT ELSEWHERE CLASSIFIED: ICD-10-CM

## 2024-03-21 DIAGNOSIS — Z01.812 PRE-OPERATIVE LABORATORY EXAMINATION: Primary | ICD-10-CM

## 2024-03-21 DIAGNOSIS — Z96.641 HISTORY OF HEMIARTHROPLASTY OF RIGHT HIP: ICD-10-CM

## 2024-03-21 DIAGNOSIS — S72.001A CLOSED FRACTURE OF NECK OF RIGHT FEMUR (HCC): ICD-10-CM

## 2024-03-21 DIAGNOSIS — S72.064A: ICD-10-CM

## 2024-03-21 PROBLEM — Z96.649 PROSTHETIC HIP INFECTION, INITIAL ENCOUNTER (HCC): Status: ACTIVE | Noted: 2024-03-21

## 2024-03-21 PROBLEM — T84.59XA PROSTHETIC HIP INFECTION, INITIAL ENCOUNTER (HCC): Status: ACTIVE | Noted: 2024-03-21

## 2024-03-21 LAB
ANION GAP SERPL CALCULATED.3IONS-SCNC: 6 MMOL/L (ref 7–16)
BUN SERPL-MCNC: 9 MG/DL (ref 6–23)
CALCIUM SERPL-MCNC: 8.8 MG/DL (ref 8.6–10.2)
CHLORIDE SERPL-SCNC: 102 MMOL/L (ref 98–107)
CO2 SERPL-SCNC: 34 MMOL/L (ref 22–29)
CREAT SERPL-MCNC: 0.6 MG/DL (ref 0.5–1)
CRP SERPL HS-MCNC: 4 MG/L (ref 0–5)
ERYTHROCYTE [DISTWIDTH] IN BLOOD BY AUTOMATED COUNT: 17.2 % (ref 11.5–15)
GFR SERPL CREATININE-BSD FRML MDRD: >60 ML/MIN/1.73M2
GLUCOSE BLD-MCNC: 149 MG/DL (ref 74–99)
GLUCOSE BLD-MCNC: 167 MG/DL (ref 74–99)
GLUCOSE BLD-MCNC: 69 MG/DL (ref 74–99)
GLUCOSE BLD-MCNC: 84 MG/DL (ref 74–99)
GLUCOSE SERPL-MCNC: 68 MG/DL (ref 74–99)
HCT VFR BLD AUTO: 39.4 % (ref 34–48)
HGB BLD-MCNC: 11.9 G/DL (ref 11.5–15.5)
MCH RBC QN AUTO: 27.1 PG (ref 26–35)
MCHC RBC AUTO-ENTMCNC: 30.2 G/DL (ref 32–34.5)
MCV RBC AUTO: 89.7 FL (ref 80–99.9)
PLATELET # BLD AUTO: 174 K/UL (ref 130–450)
PMV BLD AUTO: 11.5 FL (ref 7–12)
POTASSIUM SERPL-SCNC: 4.9 MMOL/L (ref 3.5–5)
RBC # BLD AUTO: 4.39 M/UL (ref 3.5–5.5)
SODIUM SERPL-SCNC: 142 MMOL/L (ref 132–146)
WBC OTHER # BLD: 6.5 K/UL (ref 4.5–11.5)

## 2024-03-21 PROCEDURE — 97535 SELF CARE MNGMENT TRAINING: CPT

## 2024-03-21 PROCEDURE — 86140 C-REACTIVE PROTEIN: CPT

## 2024-03-21 PROCEDURE — 0SRR0JZ REPLACEMENT OF RIGHT HIP JOINT, FEMORAL SURFACE WITH SYNTHETIC SUBSTITUTE, OPEN APPROACH: ICD-10-PCS | Performed by: ORTHOPAEDIC SURGERY

## 2024-03-21 PROCEDURE — 85652 RBC SED RATE AUTOMATED: CPT

## 2024-03-21 PROCEDURE — 3700000001 HC ADD 15 MINUTES (ANESTHESIA): Performed by: ORTHOPAEDIC SURGERY

## 2024-03-21 PROCEDURE — 97165 OT EVAL LOW COMPLEX 30 MIN: CPT

## 2024-03-21 PROCEDURE — 2709999900 HC NON-CHARGEABLE SUPPLY: Performed by: ORTHOPAEDIC SURGERY

## 2024-03-21 PROCEDURE — C1776 JOINT DEVICE (IMPLANTABLE): HCPCS | Performed by: ORTHOPAEDIC SURGERY

## 2024-03-21 PROCEDURE — 97161 PT EVAL LOW COMPLEX 20 MIN: CPT

## 2024-03-21 PROCEDURE — 87102 FUNGUS ISOLATION CULTURE: CPT

## 2024-03-21 PROCEDURE — 3600000015 HC SURGERY LEVEL 5 ADDTL 15MIN: Performed by: ORTHOPAEDIC SURGERY

## 2024-03-21 PROCEDURE — 7100000000 HC PACU RECOVERY - FIRST 15 MIN: Performed by: ORTHOPAEDIC SURGERY

## 2024-03-21 PROCEDURE — 2580000003 HC RX 258: Performed by: PHYSICIAN ASSISTANT

## 2024-03-21 PROCEDURE — 80048 BASIC METABOLIC PNL TOTAL CA: CPT

## 2024-03-21 PROCEDURE — 27138 REVISE HIP JOINT REPLACEMENT: CPT | Performed by: ORTHOPAEDIC SURGERY

## 2024-03-21 PROCEDURE — 99222 1ST HOSP IP/OBS MODERATE 55: CPT | Performed by: INTERNAL MEDICINE

## 2024-03-21 PROCEDURE — 87070 CULTURE OTHR SPECIMN AEROBIC: CPT

## 2024-03-21 PROCEDURE — 6360000002 HC RX W HCPCS: Performed by: INTERNAL MEDICINE

## 2024-03-21 PROCEDURE — 36415 COLL VENOUS BLD VENIPUNCTURE: CPT

## 2024-03-21 PROCEDURE — 87205 SMEAR GRAM STAIN: CPT

## 2024-03-21 PROCEDURE — 0SBB0ZZ EXCISION OF LEFT HIP JOINT, OPEN APPROACH: ICD-10-PCS | Performed by: ORTHOPAEDIC SURGERY

## 2024-03-21 PROCEDURE — 1200000000 HC SEMI PRIVATE

## 2024-03-21 PROCEDURE — 6370000000 HC RX 637 (ALT 250 FOR IP): Performed by: INTERNAL MEDICINE

## 2024-03-21 PROCEDURE — 85027 COMPLETE CBC AUTOMATED: CPT

## 2024-03-21 PROCEDURE — 6370000000 HC RX 637 (ALT 250 FOR IP)

## 2024-03-21 PROCEDURE — 2580000003 HC RX 258

## 2024-03-21 PROCEDURE — 87075 CULTR BACTERIA EXCEPT BLOOD: CPT

## 2024-03-21 PROCEDURE — 6360000002 HC RX W HCPCS: Performed by: ANESTHESIOLOGIST ASSISTANT

## 2024-03-21 PROCEDURE — 97530 THERAPEUTIC ACTIVITIES: CPT

## 2024-03-21 PROCEDURE — 82962 GLUCOSE BLOOD TEST: CPT

## 2024-03-21 PROCEDURE — 2580000003 HC RX 258: Performed by: INTERNAL MEDICINE

## 2024-03-21 PROCEDURE — 2500000003 HC RX 250 WO HCPCS: Performed by: ANESTHESIOLOGY

## 2024-03-21 PROCEDURE — 6360000002 HC RX W HCPCS: Performed by: ORTHOPAEDIC SURGERY

## 2024-03-21 PROCEDURE — 0SP90JZ REMOVAL OF SYNTHETIC SUBSTITUTE FROM RIGHT HIP JOINT, OPEN APPROACH: ICD-10-PCS | Performed by: ORTHOPAEDIC SURGERY

## 2024-03-21 PROCEDURE — 7100000001 HC PACU RECOVERY - ADDTL 15 MIN: Performed by: ORTHOPAEDIC SURGERY

## 2024-03-21 PROCEDURE — 73502 X-RAY EXAM HIP UNI 2-3 VIEWS: CPT

## 2024-03-21 PROCEDURE — 05HY33Z INSERTION OF INFUSION DEVICE INTO UPPER VEIN, PERCUTANEOUS APPROACH: ICD-10-PCS | Performed by: ORTHOPAEDIC SURGERY

## 2024-03-21 PROCEDURE — 3700000000 HC ANESTHESIA ATTENDED CARE: Performed by: ORTHOPAEDIC SURGERY

## 2024-03-21 PROCEDURE — 3600000005 HC SURGERY LEVEL 5 BASE: Performed by: ORTHOPAEDIC SURGERY

## 2024-03-21 PROCEDURE — 2500000003 HC RX 250 WO HCPCS: Performed by: ANESTHESIOLOGIST ASSISTANT

## 2024-03-21 DEVICE — BIPOLAR COMPONENT
Type: IMPLANTABLE DEVICE | Site: HIP | Status: FUNCTIONAL
Brand: UHR

## 2024-03-21 DEVICE — LFIT V40 FEMORAL HEAD
Type: IMPLANTABLE DEVICE | Site: HIP | Status: FUNCTIONAL
Brand: V40 HEAD

## 2024-03-21 RX ORDER — PRAMIPEXOLE DIHYDROCHLORIDE 0.25 MG/1
0.75 TABLET ORAL 2 TIMES DAILY
Status: DISCONTINUED | OUTPATIENT
Start: 2024-03-21 | End: 2024-03-25 | Stop reason: HOSPADM

## 2024-03-21 RX ORDER — ACETAMINOPHEN 325 MG/1
650 TABLET ORAL EVERY 6 HOURS
Status: DISCONTINUED | OUTPATIENT
Start: 2024-03-21 | End: 2024-03-25 | Stop reason: HOSPADM

## 2024-03-21 RX ORDER — PROPOFOL 10 MG/ML
INJECTION, EMULSION INTRAVENOUS PRN
Status: DISCONTINUED | OUTPATIENT
Start: 2024-03-21 | End: 2024-03-21 | Stop reason: SDUPTHER

## 2024-03-21 RX ORDER — LIDOCAINE HYDROCHLORIDE 20 MG/ML
INJECTION, SOLUTION INTRAVENOUS PRN
Status: DISCONTINUED | OUTPATIENT
Start: 2024-03-21 | End: 2024-03-21 | Stop reason: SDUPTHER

## 2024-03-21 RX ORDER — BUPROPION HYDROCHLORIDE 100 MG/1
100 TABLET, EXTENDED RELEASE ORAL DAILY
Status: DISCONTINUED | OUTPATIENT
Start: 2024-03-21 | End: 2024-03-25 | Stop reason: HOSPADM

## 2024-03-21 RX ORDER — HYDROMORPHONE HYDROCHLORIDE 1 MG/ML
0.5 INJECTION, SOLUTION INTRAMUSCULAR; INTRAVENOUS; SUBCUTANEOUS EVERY 5 MIN PRN
Status: DISCONTINUED | OUTPATIENT
Start: 2024-03-21 | End: 2024-03-21 | Stop reason: HOSPADM

## 2024-03-21 RX ORDER — INSULIN GLARGINE 100 [IU]/ML
10 INJECTION, SOLUTION SUBCUTANEOUS NIGHTLY
Status: DISCONTINUED | OUTPATIENT
Start: 2024-03-21 | End: 2024-03-25 | Stop reason: HOSPADM

## 2024-03-21 RX ORDER — SODIUM CHLORIDE 0.9 % (FLUSH) 0.9 %
5-40 SYRINGE (ML) INJECTION PRN
Status: DISCONTINUED | OUTPATIENT
Start: 2024-03-21 | End: 2024-03-21 | Stop reason: HOSPADM

## 2024-03-21 RX ORDER — OXYCODONE HYDROCHLORIDE 10 MG/1
10 TABLET ORAL EVERY 4 HOURS PRN
Status: DISCONTINUED | OUTPATIENT
Start: 2024-03-21 | End: 2024-03-25 | Stop reason: HOSPADM

## 2024-03-21 RX ORDER — NALOXONE HYDROCHLORIDE 0.4 MG/ML
INJECTION, SOLUTION INTRAMUSCULAR; INTRAVENOUS; SUBCUTANEOUS PRN
Status: DISCONTINUED | OUTPATIENT
Start: 2024-03-21 | End: 2024-03-21 | Stop reason: HOSPADM

## 2024-03-21 RX ORDER — SUCRALFATE 1 G/1
1 TABLET ORAL DAILY
Status: DISCONTINUED | OUTPATIENT
Start: 2024-03-21 | End: 2024-03-25 | Stop reason: HOSPADM

## 2024-03-21 RX ORDER — LOSARTAN POTASSIUM 50 MG/1
50 TABLET ORAL DAILY
Status: DISCONTINUED | OUTPATIENT
Start: 2024-03-21 | End: 2024-03-25 | Stop reason: HOSPADM

## 2024-03-21 RX ORDER — SODIUM CHLORIDE 9 MG/ML
INJECTION, SOLUTION INTRAVENOUS PRN
Status: DISCONTINUED | OUTPATIENT
Start: 2024-03-21 | End: 2024-03-22 | Stop reason: SDUPTHER

## 2024-03-21 RX ORDER — FENTANYL CITRATE 50 UG/ML
INJECTION, SOLUTION INTRAMUSCULAR; INTRAVENOUS PRN
Status: DISCONTINUED | OUTPATIENT
Start: 2024-03-21 | End: 2024-03-21 | Stop reason: SDUPTHER

## 2024-03-21 RX ORDER — INSULIN LISPRO 100 [IU]/ML
0-4 INJECTION, SOLUTION INTRAVENOUS; SUBCUTANEOUS NIGHTLY
Status: DISCONTINUED | OUTPATIENT
Start: 2024-03-21 | End: 2024-03-25 | Stop reason: HOSPADM

## 2024-03-21 RX ORDER — SODIUM CHLORIDE 9 MG/ML
INJECTION, SOLUTION INTRAVENOUS PRN
Status: DISCONTINUED | OUTPATIENT
Start: 2024-03-21 | End: 2024-03-21 | Stop reason: HOSPADM

## 2024-03-21 RX ORDER — SODIUM CHLORIDE 0.9 % (FLUSH) 0.9 %
5-40 SYRINGE (ML) INJECTION EVERY 12 HOURS SCHEDULED
Status: DISCONTINUED | OUTPATIENT
Start: 2024-03-21 | End: 2024-03-21 | Stop reason: HOSPADM

## 2024-03-21 RX ORDER — DEXTROSE MONOHYDRATE 100 MG/ML
INJECTION, SOLUTION INTRAVENOUS CONTINUOUS PRN
Status: DISCONTINUED | OUTPATIENT
Start: 2024-03-21 | End: 2024-03-25 | Stop reason: HOSPADM

## 2024-03-21 RX ORDER — PANTOPRAZOLE SODIUM 40 MG/1
40 TABLET, DELAYED RELEASE ORAL
Status: DISCONTINUED | OUTPATIENT
Start: 2024-03-22 | End: 2024-03-25 | Stop reason: HOSPADM

## 2024-03-21 RX ORDER — SODIUM CHLORIDE 0.9 % (FLUSH) 0.9 %
5-40 SYRINGE (ML) INJECTION EVERY 12 HOURS SCHEDULED
Status: DISCONTINUED | OUTPATIENT
Start: 2024-03-21 | End: 2024-03-25 | Stop reason: HOSPADM

## 2024-03-21 RX ORDER — OXYCODONE HYDROCHLORIDE AND ACETAMINOPHEN 5; 325 MG/1; MG/1
1 TABLET ORAL EVERY 6 HOURS PRN
Qty: 28 TABLET | Refills: 0 | Status: SHIPPED | OUTPATIENT
Start: 2024-03-21 | End: 2024-03-28

## 2024-03-21 RX ORDER — VANCOMYCIN HYDROCHLORIDE 1 G/20ML
INJECTION, POWDER, LYOPHILIZED, FOR SOLUTION INTRAVENOUS PRN
Status: DISCONTINUED | OUTPATIENT
Start: 2024-03-21 | End: 2024-03-21 | Stop reason: ALTCHOICE

## 2024-03-21 RX ORDER — MEPERIDINE HYDROCHLORIDE 25 MG/ML
12.5 INJECTION INTRAMUSCULAR; INTRAVENOUS; SUBCUTANEOUS EVERY 5 MIN PRN
Status: DISCONTINUED | OUTPATIENT
Start: 2024-03-21 | End: 2024-03-21 | Stop reason: HOSPADM

## 2024-03-21 RX ORDER — ASPIRIN 325 MG
325 TABLET ORAL 2 TIMES DAILY
Status: DISCONTINUED | OUTPATIENT
Start: 2024-03-21 | End: 2024-03-25 | Stop reason: HOSPADM

## 2024-03-21 RX ORDER — ASPIRIN 325 MG
325 TABLET, DELAYED RELEASE (ENTERIC COATED) ORAL 2 TIMES DAILY
Qty: 56 TABLET | Refills: 0 | Status: SHIPPED | OUTPATIENT
Start: 2024-03-21 | End: 2024-04-18

## 2024-03-21 RX ORDER — MORPHINE SULFATE 2 MG/ML
2 INJECTION, SOLUTION INTRAMUSCULAR; INTRAVENOUS
Status: DISCONTINUED | OUTPATIENT
Start: 2024-03-21 | End: 2024-03-25 | Stop reason: HOSPADM

## 2024-03-21 RX ORDER — DEXAMETHASONE SODIUM PHOSPHATE 10 MG/ML
INJECTION INTRAMUSCULAR; INTRAVENOUS PRN
Status: DISCONTINUED | OUTPATIENT
Start: 2024-03-21 | End: 2024-03-21 | Stop reason: SDUPTHER

## 2024-03-21 RX ORDER — MORPHINE SULFATE 4 MG/ML
4 INJECTION, SOLUTION INTRAMUSCULAR; INTRAVENOUS
Status: DISCONTINUED | OUTPATIENT
Start: 2024-03-21 | End: 2024-03-25 | Stop reason: HOSPADM

## 2024-03-21 RX ORDER — ROCURONIUM BROMIDE 10 MG/ML
INJECTION, SOLUTION INTRAVENOUS PRN
Status: DISCONTINUED | OUTPATIENT
Start: 2024-03-21 | End: 2024-03-21 | Stop reason: SDUPTHER

## 2024-03-21 RX ORDER — CEFAZOLIN SODIUM 1 G/3ML
INJECTION, POWDER, FOR SOLUTION INTRAMUSCULAR; INTRAVENOUS PRN
Status: DISCONTINUED | OUTPATIENT
Start: 2024-03-21 | End: 2024-03-21 | Stop reason: SDUPTHER

## 2024-03-21 RX ORDER — SODIUM CHLORIDE 0.9 % (FLUSH) 0.9 %
5-40 SYRINGE (ML) INJECTION PRN
Status: DISCONTINUED | OUTPATIENT
Start: 2024-03-21 | End: 2024-03-25 | Stop reason: HOSPADM

## 2024-03-21 RX ORDER — OXYCODONE HYDROCHLORIDE 5 MG/1
5 TABLET ORAL EVERY 4 HOURS PRN
Status: DISCONTINUED | OUTPATIENT
Start: 2024-03-21 | End: 2024-03-25 | Stop reason: HOSPADM

## 2024-03-21 RX ORDER — INSULIN LISPRO 100 [IU]/ML
0-4 INJECTION, SOLUTION INTRAVENOUS; SUBCUTANEOUS
Status: DISCONTINUED | OUTPATIENT
Start: 2024-03-21 | End: 2024-03-25 | Stop reason: HOSPADM

## 2024-03-21 RX ORDER — VITAMIN B COMPLEX
1000 TABLET ORAL DAILY
Status: DISCONTINUED | OUTPATIENT
Start: 2024-03-21 | End: 2024-03-25 | Stop reason: HOSPADM

## 2024-03-21 RX ORDER — ROSUVASTATIN CALCIUM 20 MG/1
40 TABLET, COATED ORAL NIGHTLY
Status: DISCONTINUED | OUTPATIENT
Start: 2024-03-21 | End: 2024-03-25 | Stop reason: HOSPADM

## 2024-03-21 RX ORDER — ONDANSETRON 2 MG/ML
4 INJECTION INTRAMUSCULAR; INTRAVENOUS EVERY 6 HOURS PRN
Status: DISCONTINUED | OUTPATIENT
Start: 2024-03-21 | End: 2024-03-25 | Stop reason: HOSPADM

## 2024-03-21 RX ORDER — PHENYLEPHRINE HCL IN 0.9% NACL 1 MG/10 ML
SYRINGE (ML) INTRAVENOUS PRN
Status: DISCONTINUED | OUTPATIENT
Start: 2024-03-21 | End: 2024-03-21 | Stop reason: SDUPTHER

## 2024-03-21 RX ORDER — TOBRAMYCIN 1.2 G/30ML
INJECTION, POWDER, LYOPHILIZED, FOR SOLUTION INTRAVENOUS PRN
Status: DISCONTINUED | OUTPATIENT
Start: 2024-03-21 | End: 2024-03-21 | Stop reason: ALTCHOICE

## 2024-03-21 RX ORDER — LEVOTHYROXINE SODIUM 0.05 MG/1
50 TABLET ORAL DAILY
Status: DISCONTINUED | OUTPATIENT
Start: 2024-03-22 | End: 2024-03-25 | Stop reason: HOSPADM

## 2024-03-21 RX ORDER — ONDANSETRON 4 MG/1
4 TABLET, ORALLY DISINTEGRATING ORAL EVERY 8 HOURS PRN
Status: DISCONTINUED | OUTPATIENT
Start: 2024-03-21 | End: 2024-03-25 | Stop reason: HOSPADM

## 2024-03-21 RX ORDER — GABAPENTIN 400 MG/1
800 CAPSULE ORAL 3 TIMES DAILY
Status: DISCONTINUED | OUTPATIENT
Start: 2024-03-21 | End: 2024-03-25 | Stop reason: HOSPADM

## 2024-03-21 RX ORDER — MIDAZOLAM HYDROCHLORIDE 1 MG/ML
INJECTION INTRAMUSCULAR; INTRAVENOUS PRN
Status: DISCONTINUED | OUTPATIENT
Start: 2024-03-21 | End: 2024-03-21 | Stop reason: SDUPTHER

## 2024-03-21 RX ORDER — GLUCAGON 1 MG/ML
1 KIT INJECTION PRN
Status: DISCONTINUED | OUTPATIENT
Start: 2024-03-21 | End: 2024-03-25 | Stop reason: HOSPADM

## 2024-03-21 RX ORDER — VENLAFAXINE HYDROCHLORIDE 150 MG/1
150 CAPSULE, EXTENDED RELEASE ORAL DAILY
Status: DISCONTINUED | OUTPATIENT
Start: 2024-03-21 | End: 2024-03-25 | Stop reason: HOSPADM

## 2024-03-21 RX ADMIN — WATER 2000 MG: 1 INJECTION INTRAMUSCULAR; INTRAVENOUS; SUBCUTANEOUS at 18:43

## 2024-03-21 RX ADMIN — Medication 100 MCG: at 08:32

## 2024-03-21 RX ADMIN — ACETAMINOPHEN 650 MG: 325 TABLET ORAL at 12:49

## 2024-03-21 RX ADMIN — VENLAFAXINE HYDROCHLORIDE 150 MG: 150 CAPSULE, EXTENDED RELEASE ORAL at 17:45

## 2024-03-21 RX ADMIN — Medication 100 MCG: at 07:45

## 2024-03-21 RX ADMIN — Medication 100 MCG: at 08:12

## 2024-03-21 RX ADMIN — Medication 100 MCG: at 08:49

## 2024-03-21 RX ADMIN — FENTANYL CITRATE 50 MCG: 50 INJECTION, SOLUTION INTRAMUSCULAR; INTRAVENOUS at 07:27

## 2024-03-21 RX ADMIN — ACETAMINOPHEN 650 MG: 325 TABLET ORAL at 17:45

## 2024-03-21 RX ADMIN — Medication 1000 UNITS: at 17:46

## 2024-03-21 RX ADMIN — Medication 100 MCG: at 08:07

## 2024-03-21 RX ADMIN — ROCURONIUM BROMIDE 40 MG: 10 INJECTION, SOLUTION INTRAVENOUS at 07:28

## 2024-03-21 RX ADMIN — CEFAZOLIN 2 G: 1 INJECTION, POWDER, FOR SOLUTION INTRAMUSCULAR; INTRAVENOUS at 07:32

## 2024-03-21 RX ADMIN — HYDROMORPHONE HYDROCHLORIDE 0.5 MG: 1 INJECTION, SOLUTION INTRAMUSCULAR; INTRAVENOUS; SUBCUTANEOUS at 09:39

## 2024-03-21 RX ADMIN — LOSARTAN POTASSIUM 50 MG: 50 TABLET, FILM COATED ORAL at 17:46

## 2024-03-21 RX ADMIN — BUPROPION HYDROCHLORIDE 100 MG: 100 TABLET, EXTENDED RELEASE ORAL at 17:46

## 2024-03-21 RX ADMIN — SUGAMMADEX 162 MG: 100 INJECTION, SOLUTION INTRAVENOUS at 08:39

## 2024-03-21 RX ADMIN — LIDOCAINE HYDROCHLORIDE 80 MG: 20 INJECTION, SOLUTION INTRAVENOUS at 07:27

## 2024-03-21 RX ADMIN — ASPIRIN 325 MG: 325 TABLET ORAL at 12:50

## 2024-03-21 RX ADMIN — SODIUM CHLORIDE 5 ML/HR: 9 INJECTION, SOLUTION INTRAVENOUS at 06:12

## 2024-03-21 RX ADMIN — VERAPAMIL HYDROCHLORIDE 120 MG: 120 TABLET, FILM COATED, EXTENDED RELEASE ORAL at 17:46

## 2024-03-21 RX ADMIN — Medication 16 G: at 06:22

## 2024-03-21 RX ADMIN — GABAPENTIN 800 MG: 400 CAPSULE ORAL at 17:46

## 2024-03-21 RX ADMIN — Medication 100 MCG: at 08:24

## 2024-03-21 RX ADMIN — ROSUVASTATIN 40 MG: 20 TABLET, FILM COATED ORAL at 21:24

## 2024-03-21 RX ADMIN — OXYCODONE HYDROCHLORIDE 10 MG: 10 TABLET ORAL at 12:49

## 2024-03-21 RX ADMIN — OXYCODONE HYDROCHLORIDE 10 MG: 10 TABLET ORAL at 21:24

## 2024-03-21 RX ADMIN — GABAPENTIN 800 MG: 400 CAPSULE ORAL at 21:23

## 2024-03-21 RX ADMIN — SUCRALFATE 1 G: 1 TABLET ORAL at 17:46

## 2024-03-21 RX ADMIN — FENTANYL CITRATE 50 MCG: 50 INJECTION, SOLUTION INTRAMUSCULAR; INTRAVENOUS at 08:28

## 2024-03-21 RX ADMIN — Medication 100 MCG: at 08:38

## 2024-03-21 RX ADMIN — PRAMIPEXOLE DIHYDROCHLORIDE 0.75 MG: 0.25 TABLET ORAL at 23:17

## 2024-03-21 RX ADMIN — Medication 200 MCG: at 07:35

## 2024-03-21 RX ADMIN — DEXAMETHASONE SODIUM PHOSPHATE 4 MG: 10 INJECTION INTRAMUSCULAR; INTRAVENOUS at 07:32

## 2024-03-21 RX ADMIN — ACETAMINOPHEN 650 MG: 325 TABLET ORAL at 23:17

## 2024-03-21 RX ADMIN — SODIUM CHLORIDE, PRESERVATIVE FREE 10 ML: 5 INJECTION INTRAVENOUS at 21:24

## 2024-03-21 RX ADMIN — Medication 100 MCG: at 07:52

## 2024-03-21 RX ADMIN — MIDAZOLAM 1 MG: 1 INJECTION INTRAMUSCULAR; INTRAVENOUS at 07:24

## 2024-03-21 RX ADMIN — SODIUM CHLORIDE, PRESERVATIVE FREE 10 ML: 5 INJECTION INTRAVENOUS at 18:43

## 2024-03-21 RX ADMIN — PROPOFOL 100 MG: 10 INJECTION, EMULSION INTRAVENOUS at 07:27

## 2024-03-21 ASSESSMENT — PAIN DESCRIPTION - ORIENTATION
ORIENTATION: RIGHT

## 2024-03-21 ASSESSMENT — PAIN DESCRIPTION - FREQUENCY: FREQUENCY: INTERMITTENT

## 2024-03-21 ASSESSMENT — PAIN DESCRIPTION - PAIN TYPE
TYPE: SURGICAL PAIN
TYPE: SURGICAL PAIN

## 2024-03-21 ASSESSMENT — PAIN SCALES - GENERAL
PAINLEVEL_OUTOF10: 8
PAINLEVEL_OUTOF10: 5
PAINLEVEL_OUTOF10: 0
PAINLEVEL_OUTOF10: 0
PAINLEVEL_OUTOF10: 8

## 2024-03-21 ASSESSMENT — ENCOUNTER SYMPTOMS: SHORTNESS OF BREATH: 1

## 2024-03-21 ASSESSMENT — PAIN DESCRIPTION - ONSET: ONSET: SUDDEN

## 2024-03-21 ASSESSMENT — PAIN DESCRIPTION - DESCRIPTORS
DESCRIPTORS: SHARP;SHOOTING
DESCRIPTORS: ACHING;DISCOMFORT;SORE

## 2024-03-21 ASSESSMENT — PAIN SCALES - WONG BAKER: WONGBAKER_NUMERICALRESPONSE: NO HURT

## 2024-03-21 ASSESSMENT — PAIN DESCRIPTION - LOCATION
LOCATION: HIP
LOCATION: HIP
LOCATION: INCISION

## 2024-03-21 ASSESSMENT — PAIN - FUNCTIONAL ASSESSMENT
PAIN_FUNCTIONAL_ASSESSMENT: 0-10
PAIN_FUNCTIONAL_ASSESSMENT: ADULT NONVERBAL PAIN SCALE (NPVS)
PAIN_FUNCTIONAL_ASSESSMENT: PREVENTS OR INTERFERES WITH MANY ACTIVE NOT PASSIVE ACTIVITIES

## 2024-03-21 ASSESSMENT — COPD QUESTIONNAIRES: CAT_SEVERITY: MODERATE

## 2024-03-21 NOTE — ANESTHESIA POSTPROCEDURE EVALUATION
Department of Anesthesiology  Postprocedure Note    Patient: Rebeka Renee  MRN: [id number]  YOB: 1945  Date of evaluation: 3/21/2024    Procedure Summary       Date: 03/21/24 Room / Location: [address]    Anesthesia Start: 0717 Anesthesia Stop:     Procedure: RIGHT HIP IRRIGATION AND DEBRIDEMENT OF RIGHT HIP DRAINING WOUND WITH POSSIBLE EXCHANGE OF FEMORAL COMPONENT (Right: Hip) Diagnosis:       Drainage from wound      Closed nondisplaced articular fracture of head of right femur, initial encounter ([facility])      Closed fracture of neck of right femur ([facility])      History of hemiarthroplasty of right hip      Other specified personal risk factors, not elsewhere classified      (Drainage from wound [L24.A9])      (Closed nondisplaced articular fracture of head of right femur, initial encounter ([facility]) [S72.064A])      (Closed fracture of neck of right femur ([facility]) [S72.001A])      (History of hemiarthroplasty of right hip [Z96.641])      (Other specified personal risk factors, not elsewhere classified [Z91.89])    Surgeons: Nemesio Handy MD Responsible Provider: Ant Loera MD    Anesthesia Type: general ASA Status: 4            Anesthesia Type: No value filed.    Terrie Phase I: Terrie Score: 8    Terrie Phase II:      Anesthesia Post Evaluation    Patient location during evaluation: PACU  Patient participation: complete - patient participated  Level of consciousness: awake  Pain score: 3  Airway patency: patent  Nausea & Vomiting: no nausea and no vomiting  Cardiovascular status: blood pressure returned to baseline  Respiratory status: acceptable  Hydration status: euvolemic      No notable events documented.

## 2024-03-21 NOTE — ANESTHESIA PRE PROCEDURE
Lan Carrasquillo discharge to home/self care.       comment: 2 L min O2 NC at home  SAMMY lobe removed h/o lung CA     Cardiovascular:  Exercise tolerance: no interval change  (+) hypertension:, CHF:, TANG:      ECG reviewed  Rhythm: regular  Rate: normal  Echocardiogram reviewed                  Neuro/Psych:   (+) CVA:, neuromuscular disease:, TIA, psychiatric history:depression/anxiety             GI/Hepatic/Renal:   (+) liver disease:, renal disease (h/o kidney CA, L removed): CRI         ROS comment: S/p bariatric.   Endo/Other:    (+) Diabetesusing insulin, hypothyroidism::., malignancy/cancer (h/o lung CA).                 Abdominal:       Abdomen: soft.      Vascular:           ROS comment: AAA. Other Findings:             Anesthesia Plan      general     ASA 4       Induction: intravenous.      Anesthetic plan and risks discussed with patient (.).    Use of blood products discussed with patient whom consented to blood products.    Plan discussed with attending.                Yelena Morrison   3/21/2024

## 2024-03-21 NOTE — INTERVAL H&P NOTE
Update History & Physical    The patient's History and Physical of March 19, 2024 was reviewed with the patient and I examined the patient. There was no change. The surgical site was confirmed by the patient and me.     Plan: The risks, benefits, expected outcome, and alternative to the recommended procedure have been discussed with the patient. Patient understands and wants to proceed with the procedure.     Electronically signed by Nemesio Handy MD on 3/21/2024 at 6:42 AM

## 2024-03-21 NOTE — CONSULTS
[facility] CONSULT NOTE    Reason for Consult: Left hip prosthetic joint infection  Requested by: Florencio Collazo DO      Chief complaint: Right hip wound drainage    History Obtained From: Patient and EMR    HISTORY OFPRESENT ILLNESS              The patient is a 78 y.o. female with history of ascending aortic aneurysm, DM, hypertension, hyperlipidemia, fibromyalgia, right femoral neck fracture sustained from a fall s/p right hip hemiarthroplasty on 01/04/2024 complicated by right hip dislocation and right femur greater trochanter fracture sustained from a fall s/p right hip prosthetic dislocation open reduction, right hip femur greater trochanter fracture open reduction with internal fixation, right hip hematoma excisional irrigation and debridement on 02/13 (tissue cultures showing no growth), further complicated by wound drainage since after discharge from rehab facility and has been on cefadroxil for 2 weeks per Orthopedic Surgery, admitted on 03/19 for irrigation and debridement.  On admission, she was afebrile and hemodynamically stable with no leukocytosis.  She underwent irrigation and debridement of right hip wound with revision of femoral head component of hemiarthroplasty on 03/21 during which dehiscence deep to the joint with chronic greater trochanteric fracture was noted.  Cefazolin was given perioperatively.  ID service was subsequently consulted for further recommendations.    Past Medical History  Past Medical History:   Diagnosis Date    Anemia     S/P chemotherapy.    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    Ascending aortic aneurysm (HCC)     seen on CTA chest w/contrast on 2019    Asthma     Bipolar 1 disorder (HCC)     Cancer (HCC)     lung/ kidney    Cancer of breast, female (HCC) 01/2006    S/P bilaeral mastectomy with left breast lymph node resection and chemotherapy.    CHF 
    Fibromyalgia     History of blood transfusion     anemia    Hypertension     Hypothyroidism     Neuropathy     Pericardial effusion 04/02/2010    Subxiphoid pericardial window with drainage of pericardial effusion and pericardial biopsy:  Fluid and biopsy negative for metastases.     Pleural effusion 05/02/2010    Noted on chest x-ray.    TIA (transient ischemic attack)     Tobacco abuse     Patient smoked 3 ppd x 26 years.  Quit in 1995.    Type II or unspecified type diabetes mellitus without mention of complication, not stated as uncontrolled        Surgical History:  Past Surgical History:   Procedure Laterality Date    BREAST SURGERY      CATARACT REMOVAL WITH IMPLANT Bilateral     CHOLECYSTECTOMY      GASTRIC BYPASS SURGERY  9/2004    HERNIA REPAIR      HIP SURGERY Right 1/4/2024    RIGHT HIP HEMIARTHROPLASTY performed by Nemesio Handy MD at [facility] OR    HYSTERECTOMY (CERVIX STATUS UNKNOWN)      KIDNEY REMOVAL Left 2/2006    Cancer.    KIDNEY REMOVAL      left    KNEE SURGERY Right     arthroscopy    LIVER BIOPSY  2006    Fatty tumor.    LUNG REMOVAL, PARTIAL      For lung CA, thought to be metastatic from breast cancer. left upper lobe     MASTECTOMY Bilateral     NERVE BLOCK Bilateral 06/06/16    transforaminal nerve block, lumbar #1    OTHER SURGICAL HISTORY  03/16/15    stage 1 percutaneous trial lumbar spinal cord stimulator    OTHER SURGICAL HISTORY N/A 4/15/15    surgical implantation of medtronic spinal cord stimulator lumbar    OTHER SURGICAL HISTORY N/A 02/24/2021    surgical removal lumbar SCS    PAIN MANAGEMENT PROCEDURE N/A 2/24/2021    SURGICAL REMOVAL OF LUMBAR MEDTRONIC SPINAL CORD STIMULATOR (STIMULATOR BATTERY LEFT HIP AREA) (CPT 22003) performed by Lizzy Herbert DO at [facility] OR    REVISION TOTAL HIP ARTHROPLASTY Right 2/12/2024    RIGHT HIP OPEN REDUCTION AND HEMATOMA EVACUATION performed by Wood George DO at [facility] OR    THORACENTESIS Left 1/2010 and 3/2010.

## 2024-03-21 NOTE — OP NOTE
Operative Note      Patient: Rebeka Renee  YOB: 1945  MRN: [id number]    Date of Procedure: 3/21/2024    Pre-Op Diagnosis Codes:     * Drainage from wound [L24.A9]     * Closed nondisplaced articular fracture of head of right femur, initial encounter ([facility]) [S72.064A]     * Closed fracture of neck of right femur ([facility]) [S72.001A]     * History of hemiarthroplasty of right hip [Z96.641]     * Other specified personal risk factors, not elsewhere classified [Z91.89]    Post-Op Diagnosis: Same       Procedure:  Irrigation debridement right hip wound with revision of femoral head component of hemiarthroplasty    Surgeon(s):  Nemesio Handy MD    Assistant:    Assistant: Natalie Mclain RN  Resident: Femi Tolliver DO    Anesthesia: General    Estimated Blood Loss (mL): less than 50     Complications: None    Specimens:   ID Type Source Tests Collected by Time Destination   1 : Right Hip Tissue #1 - Anaerobic, Aerobic, Fungal, Gram Stain Tissue Tissue CULTURE, ANAEROBIC, CULTURE, FUNGUS, GRAM STAIN, CULTURE, SURGICAL Nemesio Handy MD 3/21/2024 0707    2 : Right Hip Tissue / Fluid #2 - Anaerobic, Aerobic, Fungal, Gram Stain Tissue Tissue CULTURE, ANAEROBIC, CULTURE, FUNGUS, GRAM STAIN, CULTURE, SURGICAL Nemesio Handy MD 3/21/2024 0708    3 : Right Hip Deep Tissue #3 - Anaerobic, Aerobic, Fungal, Gram Stain Tissue Tissue CULTURE, ANAEROBIC, CULTURE, FUNGUS, GRAM STAIN, CULTURE, SURGICAL Nemesio Handy MD 3/21/2024 0709    4 : Right Hip Deep Tissue #4 - Anaerobic, Aerobic, Fungal, Gram Stain Tissue Tissue CULTURE, ANAEROBIC, CULTURE, FUNGUS, GRAM STAIN, CULTURE, SURGICAL Nemesio Handy MD 3/21/2024 0809        Implants:  Implant Name Type Inv. Item Serial No.  Lot No. LRB No. Used Action   HEAD FEM [id number] +4MM OFFSET HIP CO CHROM V40 TAPR [facility] - [id number]  HEAD FEM [id number] +4MM OFFSET HIP CO CHROM V40 TAPR [facility]

## 2024-03-22 LAB
ERYTHROCYTE [SEDIMENTATION RATE] IN BLOOD BY WESTERGREN METHOD: 9 MM/HR (ref 0–20)
GLUCOSE BLD-MCNC: 124 MG/DL (ref 74–99)
GLUCOSE BLD-MCNC: 68 MG/DL (ref 74–99)
GLUCOSE BLD-MCNC: 80 MG/DL (ref 74–99)
GLUCOSE BLD-MCNC: 86 MG/DL (ref 74–99)
GLUCOSE BLD-MCNC: 93 MG/DL (ref 74–99)
GLUCOSE BLD-MCNC: 97 MG/DL (ref 74–99)

## 2024-03-22 PROCEDURE — 82962 GLUCOSE BLOOD TEST: CPT

## 2024-03-22 PROCEDURE — 1200000000 HC SEMI PRIVATE

## 2024-03-22 PROCEDURE — 36569 INSJ PICC 5 YR+ W/O IMAGING: CPT

## 2024-03-22 PROCEDURE — 2580000003 HC RX 258

## 2024-03-22 PROCEDURE — 6370000000 HC RX 637 (ALT 250 FOR IP): Performed by: INTERNAL MEDICINE

## 2024-03-22 PROCEDURE — 97530 THERAPEUTIC ACTIVITIES: CPT

## 2024-03-22 PROCEDURE — C1751 CATH, INF, PER/CENT/MIDLINE: HCPCS

## 2024-03-22 PROCEDURE — 97535 SELF CARE MNGMENT TRAINING: CPT

## 2024-03-22 PROCEDURE — 6360000002 HC RX W HCPCS: Performed by: INTERNAL MEDICINE

## 2024-03-22 PROCEDURE — 2580000003 HC RX 258: Performed by: INTERNAL MEDICINE

## 2024-03-22 PROCEDURE — 76937 US GUIDE VASCULAR ACCESS: CPT

## 2024-03-22 PROCEDURE — 6370000000 HC RX 637 (ALT 250 FOR IP)

## 2024-03-22 PROCEDURE — 99232 SBSQ HOSP IP/OBS MODERATE 35: CPT | Performed by: INTERNAL MEDICINE

## 2024-03-22 PROCEDURE — 6360000002 HC RX W HCPCS

## 2024-03-22 RX ORDER — HEPARIN 100 UNIT/ML
3 SYRINGE INTRAVENOUS PRN
Status: DISCONTINUED | OUTPATIENT
Start: 2024-03-22 | End: 2024-03-25 | Stop reason: HOSPADM

## 2024-03-22 RX ORDER — LIDOCAINE HYDROCHLORIDE 10 MG/ML
5 INJECTION, SOLUTION EPIDURAL; INFILTRATION; INTRACAUDAL; PERINEURAL ONCE
Status: DISCONTINUED | OUTPATIENT
Start: 2024-03-22 | End: 2024-03-25 | Stop reason: HOSPADM

## 2024-03-22 RX ORDER — 0.9 % SODIUM CHLORIDE 0.9 %
500 INTRAVENOUS SOLUTION INTRAVENOUS ONCE
Status: COMPLETED | OUTPATIENT
Start: 2024-03-22 | End: 2024-03-22

## 2024-03-22 RX ORDER — SODIUM CHLORIDE 0.9 % (FLUSH) 0.9 %
5-40 SYRINGE (ML) INJECTION EVERY 12 HOURS SCHEDULED
Status: DISCONTINUED | OUTPATIENT
Start: 2024-03-22 | End: 2024-03-25 | Stop reason: HOSPADM

## 2024-03-22 RX ORDER — SODIUM CHLORIDE 0.9 % (FLUSH) 0.9 %
5-40 SYRINGE (ML) INJECTION PRN
Status: DISCONTINUED | OUTPATIENT
Start: 2024-03-22 | End: 2024-03-25 | Stop reason: HOSPADM

## 2024-03-22 RX ORDER — SODIUM CHLORIDE 9 MG/ML
INJECTION, SOLUTION INTRAVENOUS PRN
Status: DISCONTINUED | OUTPATIENT
Start: 2024-03-22 | End: 2024-03-25 | Stop reason: HOSPADM

## 2024-03-22 RX ORDER — HEPARIN 100 UNIT/ML
3 SYRINGE INTRAVENOUS EVERY 12 HOURS SCHEDULED
Status: DISCONTINUED | OUTPATIENT
Start: 2024-03-22 | End: 2024-03-25 | Stop reason: HOSPADM

## 2024-03-22 RX ADMIN — Medication 1000 UNITS: at 08:32

## 2024-03-22 RX ADMIN — HEPARIN 300 UNITS: 100 SYRINGE at 18:05

## 2024-03-22 RX ADMIN — SUCRALFATE 1 G: 1 TABLET ORAL at 08:32

## 2024-03-22 RX ADMIN — ONDANSETRON 4 MG: 2 INJECTION INTRAMUSCULAR; INTRAVENOUS at 18:04

## 2024-03-22 RX ADMIN — GABAPENTIN 800 MG: 400 CAPSULE ORAL at 20:42

## 2024-03-22 RX ADMIN — SODIUM CHLORIDE, PRESERVATIVE FREE 10 ML: 5 INJECTION INTRAVENOUS at 12:59

## 2024-03-22 RX ADMIN — HEPARIN 300 UNITS: 100 SYRINGE at 20:42

## 2024-03-22 RX ADMIN — VENLAFAXINE HYDROCHLORIDE 150 MG: 150 CAPSULE, EXTENDED RELEASE ORAL at 08:31

## 2024-03-22 RX ADMIN — HEPARIN 300 UNITS: 100 SYRINGE at 13:00

## 2024-03-22 RX ADMIN — ACETAMINOPHEN 650 MG: 325 TABLET ORAL at 13:00

## 2024-03-22 RX ADMIN — ACETAMINOPHEN 650 MG: 325 TABLET ORAL at 06:06

## 2024-03-22 RX ADMIN — ASPIRIN 325 MG: 325 TABLET ORAL at 08:31

## 2024-03-22 RX ADMIN — PANTOPRAZOLE SODIUM 40 MG: 40 TABLET, DELAYED RELEASE ORAL at 06:06

## 2024-03-22 RX ADMIN — ROSUVASTATIN 40 MG: 20 TABLET, FILM COATED ORAL at 20:41

## 2024-03-22 RX ADMIN — PRAMIPEXOLE DIHYDROCHLORIDE 0.75 MG: 0.25 TABLET ORAL at 08:32

## 2024-03-22 RX ADMIN — WATER 2000 MG: 1 INJECTION INTRAMUSCULAR; INTRAVENOUS; SUBCUTANEOUS at 09:44

## 2024-03-22 RX ADMIN — SODIUM CHLORIDE, PRESERVATIVE FREE 10 ML: 5 INJECTION INTRAVENOUS at 08:31

## 2024-03-22 RX ADMIN — WATER 2000 MG: 1 INJECTION INTRAMUSCULAR; INTRAVENOUS; SUBCUTANEOUS at 02:00

## 2024-03-22 RX ADMIN — PRAMIPEXOLE DIHYDROCHLORIDE 0.75 MG: 0.25 TABLET ORAL at 20:42

## 2024-03-22 RX ADMIN — GABAPENTIN 800 MG: 400 CAPSULE ORAL at 08:32

## 2024-03-22 RX ADMIN — SODIUM CHLORIDE, PRESERVATIVE FREE 10 ML: 5 INJECTION INTRAVENOUS at 20:43

## 2024-03-22 RX ADMIN — SODIUM CHLORIDE 500 ML: 9 INJECTION, SOLUTION INTRAVENOUS at 09:43

## 2024-03-22 RX ADMIN — GABAPENTIN 800 MG: 400 CAPSULE ORAL at 15:13

## 2024-03-22 RX ADMIN — ASPIRIN 325 MG: 325 TABLET ORAL at 20:42

## 2024-03-22 RX ADMIN — BUPROPION HYDROCHLORIDE 100 MG: 100 TABLET, EXTENDED RELEASE ORAL at 08:32

## 2024-03-22 RX ADMIN — OXYCODONE HYDROCHLORIDE 10 MG: 10 TABLET ORAL at 06:06

## 2024-03-22 RX ADMIN — SODIUM CHLORIDE, PRESERVATIVE FREE 30 ML: 5 INJECTION INTRAVENOUS at 18:04

## 2024-03-22 RX ADMIN — SODIUM CHLORIDE, PRESERVATIVE FREE 10 ML: 5 INJECTION INTRAVENOUS at 09:38

## 2024-03-22 RX ADMIN — WATER 2000 MG: 1 INJECTION INTRAMUSCULAR; INTRAVENOUS; SUBCUTANEOUS at 18:04

## 2024-03-22 ASSESSMENT — PAIN SCALES - WONG BAKER: WONGBAKER_NUMERICALRESPONSE: NO HURT

## 2024-03-22 ASSESSMENT — PAIN DESCRIPTION - ORIENTATION
ORIENTATION: RIGHT
ORIENTATION: RIGHT

## 2024-03-22 ASSESSMENT — PAIN DESCRIPTION - LOCATION
LOCATION: INCISION
LOCATION: INCISION

## 2024-03-22 ASSESSMENT — PAIN DESCRIPTION - ONSET: ONSET: ON-GOING

## 2024-03-22 ASSESSMENT — PAIN DESCRIPTION - PAIN TYPE: TYPE: SURGICAL PAIN

## 2024-03-22 ASSESSMENT — PAIN SCALES - GENERAL
PAINLEVEL_OUTOF10: 4
PAINLEVEL_OUTOF10: 0
PAINLEVEL_OUTOF10: 1

## 2024-03-22 ASSESSMENT — PAIN DESCRIPTION - DESCRIPTORS
DESCRIPTORS: ACHING
DESCRIPTORS: ACHING

## 2024-03-22 ASSESSMENT — PAIN - FUNCTIONAL ASSESSMENT: PAIN_FUNCTIONAL_ASSESSMENT: PREVENTS OR INTERFERES SOME ACTIVE ACTIVITIES AND ADLS

## 2024-03-22 ASSESSMENT — PAIN DESCRIPTION - FREQUENCY: FREQUENCY: INTERMITTENT

## 2024-03-22 NOTE — CARE COORDINATION
03/22/24 Transition of Care: Patient is admitted POD 1 of I/D of hip abscess with femoral head exchange. She is having a piccline placed. She will have long term antibiotic therapy. Spoke with patient at the bedside. She is alert and oriented. She states she resides in a ranch home with her . She has 2 steps to enter. She has a wheeled walker, rollator and raised toilet seat. She states she is not interested in a snf. She is asking to have a referral to [facility] for iv therapy at home. She understands there may be a cost but is not concerned. She states her  will transport and help with care. She has worked with therapies and has done well. Will await id for plan of care. Electronically signed by Amanda Wright RN CM on 3/22/2024 at 10:46 AM    Addendum: plan is for the disposable wound vac for discharge. Ortho resident to apply the hospital wound vac today and it will remain until discharge. Electronically signed by Amanda Wright RN CM on 3/22/2024 at 11:09 AM        Case Management Assessment  Initial Evaluation    Date/Time of Evaluation: 3/22/2024 10:46 AM  Assessment Completed by: Amanda Wright RN    If patient is discharged prior to next notation, then this note serves as note for discharge by case management.    Patient Name: Rebeka Renee                   YOB: 1945  Diagnosis: Drainage from wound [L24.A9]  Closed nondisplaced articular fracture of head of right femur, initial encounter ([facility]) [S72.064A]  Closed fracture of neck of right femur ([facility]) [S72.001A]  History of hemiarthroplasty of right hip [Z96.641]  Other specified personal risk factors, not elsewhere classified [Z91.89]  Prosthetic hip infection, initial encounter ([facility]) [T84.59XA, Z96.649]                   Date / Time: 3/21/2024  5:54 AM    Patient Admission Status: Inpatient   Readmission Risk (Low < 19, Mod (19-27), High > 27): Readmission Risk Score: 22    Current PCP: Navneet Latif

## 2024-03-22 NOTE — CARE COORDINATION
03/22/24 Update CM Note: [facility] unable to take due to staffing. Spoke with patient and she has no preference. Referral called to Marleen at [facility]. She will run the insurance and await homecare orders. Homecare orders are placed. Awaiting ID for final cultures to determine medication/time frame of need for antibiotics. She will have a piccline placed today. Electronically signed by Amanda Wright RN CM on 3/22/2024 at 11:16 AM      Case Management Assessment  Initial Evaluation    Date/Time of Evaluation: 3/22/2024 11:16 AM  Assessment Completed by: Amanda Wright RN    If patient is discharged prior to next notation, then this note serves as note for discharge by case management.    Patient Name: Rebeka Renee                   YOB: 1945  Diagnosis: Drainage from wound [L24.A9]  Closed nondisplaced articular fracture of head of right femur, initial encounter ([facility]) [S72.064A]  Closed fracture of neck of right femur ([facility]) [S72.001A]  History of hemiarthroplasty of right hip [Z96.641]  Other specified personal risk factors, not elsewhere classified [Z91.89]  Prosthetic hip infection, initial encounter ([facility]) [T84.59XA, Z96.649]                   Date / Time: 3/21/2024  5:54 AM    Patient Admission Status: Inpatient   Readmission Risk (Low < 19, Mod (19-27), High > 27): Readmission Risk Score: 22    Current PCP: Navneet Latif MD  PCP verified by ? Yes    Chart Reviewed: Yes      History Provided by: Patient  Patient Orientation: Alert and Oriented    Patient Cognition: Alert    Hospitalization in the last 30 days (Readmission):     If yes, Readmission Assessment in  Navigator will be completed.    Advance Directives:      Code Status: Full Code   Patient's Primary Decision Maker is: Legal Next of Kin    Primary Decision Maker: Nate Renee - Spouse - [phone]    Discharge Planning:    Patient lives with: Spouse/Significant Other Type of Home: House  Primary Care

## 2024-03-23 LAB
GLUCOSE BLD-MCNC: 106 MG/DL (ref 74–99)
GLUCOSE BLD-MCNC: 113 MG/DL (ref 74–99)
GLUCOSE BLD-MCNC: 163 MG/DL (ref 74–99)
GLUCOSE BLD-MCNC: 83 MG/DL (ref 74–99)
GLUCOSE BLD-MCNC: 93 MG/DL (ref 74–99)

## 2024-03-23 PROCEDURE — 97530 THERAPEUTIC ACTIVITIES: CPT

## 2024-03-23 PROCEDURE — 6370000000 HC RX 637 (ALT 250 FOR IP): Performed by: INTERNAL MEDICINE

## 2024-03-23 PROCEDURE — 2580000003 HC RX 258: Performed by: INTERNAL MEDICINE

## 2024-03-23 PROCEDURE — 2580000003 HC RX 258

## 2024-03-23 PROCEDURE — 6360000002 HC RX W HCPCS

## 2024-03-23 PROCEDURE — 2700000000 HC OXYGEN THERAPY PER DAY

## 2024-03-23 PROCEDURE — 6370000000 HC RX 637 (ALT 250 FOR IP)

## 2024-03-23 PROCEDURE — 82962 GLUCOSE BLOOD TEST: CPT

## 2024-03-23 PROCEDURE — 1200000000 HC SEMI PRIVATE

## 2024-03-23 PROCEDURE — 99232 SBSQ HOSP IP/OBS MODERATE 35: CPT | Performed by: INTERNAL MEDICINE

## 2024-03-23 PROCEDURE — 6360000002 HC RX W HCPCS: Performed by: INTERNAL MEDICINE

## 2024-03-23 RX ORDER — SODIUM CHLORIDE 9 MG/ML
INJECTION, SOLUTION INTRAVENOUS CONTINUOUS
Status: ACTIVE | OUTPATIENT
Start: 2024-03-23 | End: 2024-03-23

## 2024-03-23 RX ADMIN — GABAPENTIN 800 MG: 400 CAPSULE ORAL at 21:31

## 2024-03-23 RX ADMIN — VENLAFAXINE HYDROCHLORIDE 150 MG: 150 CAPSULE, EXTENDED RELEASE ORAL at 09:25

## 2024-03-23 RX ADMIN — Medication 1000 UNITS: at 09:26

## 2024-03-23 RX ADMIN — PANTOPRAZOLE SODIUM 40 MG: 40 TABLET, DELAYED RELEASE ORAL at 06:07

## 2024-03-23 RX ADMIN — SUCRALFATE 1 G: 1 TABLET ORAL at 09:25

## 2024-03-23 RX ADMIN — GABAPENTIN 800 MG: 400 CAPSULE ORAL at 14:39

## 2024-03-23 RX ADMIN — SODIUM CHLORIDE: 9 INJECTION, SOLUTION INTRAVENOUS at 11:00

## 2024-03-23 RX ADMIN — SODIUM CHLORIDE, PRESERVATIVE FREE 10 ML: 5 INJECTION INTRAVENOUS at 21:32

## 2024-03-23 RX ADMIN — ASPIRIN 325 MG: 325 TABLET ORAL at 21:31

## 2024-03-23 RX ADMIN — PRAMIPEXOLE DIHYDROCHLORIDE 0.75 MG: 0.25 TABLET ORAL at 21:39

## 2024-03-23 RX ADMIN — BUPROPION HYDROCHLORIDE 100 MG: 100 TABLET, EXTENDED RELEASE ORAL at 09:25

## 2024-03-23 RX ADMIN — HEPARIN 300 UNITS: 100 SYRINGE at 21:32

## 2024-03-23 RX ADMIN — SODIUM CHLORIDE, PRESERVATIVE FREE 10 ML: 5 INJECTION INTRAVENOUS at 17:39

## 2024-03-23 RX ADMIN — ASPIRIN 325 MG: 325 TABLET ORAL at 09:25

## 2024-03-23 RX ADMIN — ACETAMINOPHEN 650 MG: 325 TABLET ORAL at 21:31

## 2024-03-23 RX ADMIN — ONDANSETRON 4 MG: 2 INJECTION INTRAMUSCULAR; INTRAVENOUS at 01:00

## 2024-03-23 RX ADMIN — PRAMIPEXOLE DIHYDROCHLORIDE 0.75 MG: 0.25 TABLET ORAL at 09:25

## 2024-03-23 RX ADMIN — OXYCODONE HYDROCHLORIDE 10 MG: 10 TABLET ORAL at 18:15

## 2024-03-23 RX ADMIN — WATER 2000 MG: 1 INJECTION INTRAMUSCULAR; INTRAVENOUS; SUBCUTANEOUS at 02:41

## 2024-03-23 RX ADMIN — WATER 2000 MG: 1 INJECTION INTRAMUSCULAR; INTRAVENOUS; SUBCUTANEOUS at 17:39

## 2024-03-23 RX ADMIN — WATER 2000 MG: 1 INJECTION INTRAMUSCULAR; INTRAVENOUS; SUBCUTANEOUS at 10:49

## 2024-03-23 RX ADMIN — ACETAMINOPHEN 650 MG: 325 TABLET ORAL at 02:47

## 2024-03-23 RX ADMIN — ACETAMINOPHEN 650 MG: 325 TABLET ORAL at 09:24

## 2024-03-23 RX ADMIN — LEVOTHYROXINE SODIUM 50 MCG: 0.05 TABLET ORAL at 06:07

## 2024-03-23 RX ADMIN — ACETAMINOPHEN 650 MG: 325 TABLET ORAL at 14:39

## 2024-03-23 RX ADMIN — HEPARIN 300 UNITS: 100 SYRINGE at 09:27

## 2024-03-23 RX ADMIN — ROSUVASTATIN 40 MG: 20 TABLET, FILM COATED ORAL at 21:32

## 2024-03-23 RX ADMIN — SODIUM CHLORIDE, PRESERVATIVE FREE 10 ML: 5 INJECTION INTRAVENOUS at 09:27

## 2024-03-23 RX ADMIN — GABAPENTIN 800 MG: 400 CAPSULE ORAL at 09:25

## 2024-03-23 ASSESSMENT — PAIN SCALES - GENERAL
PAINLEVEL_OUTOF10: 5
PAINLEVEL_OUTOF10: 8
PAINLEVEL_OUTOF10: 3
PAINLEVEL_OUTOF10: 6
PAINLEVEL_OUTOF10: 5
PAINLEVEL_OUTOF10: 4
PAINLEVEL_OUTOF10: 4
PAINLEVEL_OUTOF10: 7
PAINLEVEL_OUTOF10: 2
PAINLEVEL_OUTOF10: 7

## 2024-03-23 ASSESSMENT — PAIN DESCRIPTION - LOCATION
LOCATION: HIP

## 2024-03-23 ASSESSMENT — PAIN - FUNCTIONAL ASSESSMENT
PAIN_FUNCTIONAL_ASSESSMENT: ACTIVITIES ARE NOT PREVENTED
PAIN_FUNCTIONAL_ASSESSMENT: ACTIVITIES ARE NOT PREVENTED

## 2024-03-23 ASSESSMENT — PAIN DESCRIPTION - ONSET
ONSET: ON-GOING
ONSET: ON-GOING

## 2024-03-23 ASSESSMENT — PAIN DESCRIPTION - FREQUENCY
FREQUENCY: INTERMITTENT

## 2024-03-23 ASSESSMENT — PAIN DESCRIPTION - DESCRIPTORS
DESCRIPTORS: THROBBING
DESCRIPTORS: ACHING;DISCOMFORT;SORE
DESCRIPTORS: THROBBING
DESCRIPTORS: THROBBING
DESCRIPTORS: ACHING
DESCRIPTORS: ACHING;DISCOMFORT;SORE
DESCRIPTORS: THROBBING

## 2024-03-23 ASSESSMENT — PAIN DESCRIPTION - ORIENTATION
ORIENTATION: RIGHT

## 2024-03-23 ASSESSMENT — PAIN DESCRIPTION - PAIN TYPE
TYPE: SURGICAL PAIN

## 2024-03-23 ASSESSMENT — PAIN SCALES - WONG BAKER: WONGBAKER_NUMERICALRESPONSE: HURTS A LITTLE BIT

## 2024-03-24 LAB
ALBUMIN SERPL-MCNC: 2.2 G/DL (ref 3.5–5.2)
ALP SERPL-CCNC: 109 U/L (ref 35–104)
ALT SERPL-CCNC: <5 U/L (ref 0–32)
ANION GAP SERPL CALCULATED.3IONS-SCNC: 8 MMOL/L (ref 7–16)
AST SERPL-CCNC: 22 U/L (ref 0–31)
BASOPHILS # BLD: 0.02 K/UL (ref 0–0.2)
BASOPHILS NFR BLD: 0 % (ref 0–2)
BILIRUB SERPL-MCNC: <0.2 MG/DL (ref 0–1.2)
BUN SERPL-MCNC: 12 MG/DL (ref 6–23)
CALCIUM SERPL-MCNC: 8.1 MG/DL (ref 8.6–10.2)
CHLORIDE SERPL-SCNC: 103 MMOL/L (ref 98–107)
CO2 SERPL-SCNC: 31 MMOL/L (ref 22–29)
CREAT SERPL-MCNC: 0.7 MG/DL (ref 0.5–1)
EOSINOPHIL # BLD: 0.26 K/UL (ref 0.05–0.5)
EOSINOPHILS RELATIVE PERCENT: 6 % (ref 0–6)
ERYTHROCYTE [DISTWIDTH] IN BLOOD BY AUTOMATED COUNT: 16.9 % (ref 11.5–15)
GFR SERPL CREATININE-BSD FRML MDRD: >60 ML/MIN/1.73M2
GLUCOSE BLD-MCNC: 100 MG/DL (ref 74–99)
GLUCOSE BLD-MCNC: 113 MG/DL (ref 74–99)
GLUCOSE BLD-MCNC: 137 MG/DL (ref 74–99)
GLUCOSE BLD-MCNC: 76 MG/DL (ref 74–99)
GLUCOSE BLD-MCNC: 83 MG/DL (ref 74–99)
GLUCOSE SERPL-MCNC: 72 MG/DL (ref 74–99)
HCT VFR BLD AUTO: 28.4 % (ref 34–48)
HGB BLD-MCNC: 8.1 G/DL (ref 11.5–15.5)
IMM GRANULOCYTES # BLD AUTO: <0.03 K/UL (ref 0–0.58)
IMM GRANULOCYTES NFR BLD: 0 % (ref 0–5)
LYMPHOCYTES NFR BLD: 1.68 K/UL (ref 1.5–4)
LYMPHOCYTES RELATIVE PERCENT: 37 % (ref 20–42)
MCH RBC QN AUTO: 26 PG (ref 26–35)
MCHC RBC AUTO-ENTMCNC: 28.5 G/DL (ref 32–34.5)
MCV RBC AUTO: 91.3 FL (ref 80–99.9)
MONOCYTES NFR BLD: 0.36 K/UL (ref 0.1–0.95)
MONOCYTES NFR BLD: 8 % (ref 2–12)
NEUTROPHILS NFR BLD: 49 % (ref 43–80)
NEUTS SEG NFR BLD: 2.22 K/UL (ref 1.8–7.3)
PLATELET # BLD AUTO: 131 K/UL (ref 130–450)
PMV BLD AUTO: 10.8 FL (ref 7–12)
POTASSIUM SERPL-SCNC: 3.7 MMOL/L (ref 3.5–5)
PROT SERPL-MCNC: 4.9 G/DL (ref 6.4–8.3)
RBC # BLD AUTO: 3.11 M/UL (ref 3.5–5.5)
RBC # BLD: ABNORMAL 10*6/UL
SODIUM SERPL-SCNC: 142 MMOL/L (ref 132–146)
WBC OTHER # BLD: 4.6 K/UL (ref 4.5–11.5)

## 2024-03-24 PROCEDURE — 6370000000 HC RX 637 (ALT 250 FOR IP)

## 2024-03-24 PROCEDURE — 85025 COMPLETE CBC W/AUTO DIFF WBC: CPT

## 2024-03-24 PROCEDURE — 6360000002 HC RX W HCPCS: Performed by: INTERNAL MEDICINE

## 2024-03-24 PROCEDURE — 82962 GLUCOSE BLOOD TEST: CPT

## 2024-03-24 PROCEDURE — 6370000000 HC RX 637 (ALT 250 FOR IP): Performed by: INTERNAL MEDICINE

## 2024-03-24 PROCEDURE — 2580000003 HC RX 258: Performed by: INTERNAL MEDICINE

## 2024-03-24 PROCEDURE — 80053 COMPREHEN METABOLIC PANEL: CPT

## 2024-03-24 PROCEDURE — 99232 SBSQ HOSP IP/OBS MODERATE 35: CPT | Performed by: INTERNAL MEDICINE

## 2024-03-24 PROCEDURE — 1200000000 HC SEMI PRIVATE

## 2024-03-24 PROCEDURE — 97530 THERAPEUTIC ACTIVITIES: CPT

## 2024-03-24 PROCEDURE — 2700000000 HC OXYGEN THERAPY PER DAY

## 2024-03-24 RX ADMIN — GABAPENTIN 800 MG: 400 CAPSULE ORAL at 14:24

## 2024-03-24 RX ADMIN — HEPARIN 300 UNITS: 100 SYRINGE at 08:25

## 2024-03-24 RX ADMIN — WATER 2000 MG: 1 INJECTION INTRAMUSCULAR; INTRAVENOUS; SUBCUTANEOUS at 18:26

## 2024-03-24 RX ADMIN — LEVOTHYROXINE SODIUM 50 MCG: 0.05 TABLET ORAL at 06:41

## 2024-03-24 RX ADMIN — ROSUVASTATIN 40 MG: 20 TABLET, FILM COATED ORAL at 21:05

## 2024-03-24 RX ADMIN — BUPROPION HYDROCHLORIDE 100 MG: 100 TABLET, EXTENDED RELEASE ORAL at 10:03

## 2024-03-24 RX ADMIN — VENLAFAXINE HYDROCHLORIDE 150 MG: 150 CAPSULE, EXTENDED RELEASE ORAL at 10:02

## 2024-03-24 RX ADMIN — ACETAMINOPHEN 650 MG: 325 TABLET ORAL at 21:05

## 2024-03-24 RX ADMIN — GABAPENTIN 800 MG: 400 CAPSULE ORAL at 21:05

## 2024-03-24 RX ADMIN — HEPARIN 300 UNITS: 100 SYRINGE at 21:06

## 2024-03-24 RX ADMIN — SODIUM CHLORIDE, PRESERVATIVE FREE 10 ML: 5 INJECTION INTRAVENOUS at 21:06

## 2024-03-24 RX ADMIN — WATER 2000 MG: 1 INJECTION INTRAMUSCULAR; INTRAVENOUS; SUBCUTANEOUS at 10:02

## 2024-03-24 RX ADMIN — ACETAMINOPHEN 650 MG: 325 TABLET ORAL at 08:24

## 2024-03-24 RX ADMIN — WATER 2000 MG: 1 INJECTION INTRAMUSCULAR; INTRAVENOUS; SUBCUTANEOUS at 02:42

## 2024-03-24 RX ADMIN — SUCRALFATE 1 G: 1 TABLET ORAL at 08:24

## 2024-03-24 RX ADMIN — ACETAMINOPHEN 650 MG: 325 TABLET ORAL at 02:43

## 2024-03-24 RX ADMIN — ACETAMINOPHEN 650 MG: 325 TABLET ORAL at 14:24

## 2024-03-24 RX ADMIN — SODIUM CHLORIDE, PRESERVATIVE FREE 10 ML: 5 INJECTION INTRAVENOUS at 08:26

## 2024-03-24 RX ADMIN — ASPIRIN 325 MG: 325 TABLET ORAL at 08:25

## 2024-03-24 RX ADMIN — PRAMIPEXOLE DIHYDROCHLORIDE 0.75 MG: 0.25 TABLET ORAL at 23:28

## 2024-03-24 RX ADMIN — OXYCODONE HYDROCHLORIDE 10 MG: 10 TABLET ORAL at 15:23

## 2024-03-24 RX ADMIN — PRAMIPEXOLE DIHYDROCHLORIDE 0.75 MG: 0.25 TABLET ORAL at 08:25

## 2024-03-24 RX ADMIN — GABAPENTIN 800 MG: 400 CAPSULE ORAL at 08:24

## 2024-03-24 RX ADMIN — ASPIRIN 325 MG: 325 TABLET ORAL at 21:05

## 2024-03-24 RX ADMIN — PANTOPRAZOLE SODIUM 40 MG: 40 TABLET, DELAYED RELEASE ORAL at 06:41

## 2024-03-24 RX ADMIN — Medication 1000 UNITS: at 08:25

## 2024-03-24 ASSESSMENT — PAIN DESCRIPTION - DIRECTION: RADIATING_TOWARDS: KNEE

## 2024-03-24 ASSESSMENT — PAIN SCALES - GENERAL
PAINLEVEL_OUTOF10: 5
PAINLEVEL_OUTOF10: 9
PAINLEVEL_OUTOF10: 7
PAINLEVEL_OUTOF10: 4
PAINLEVEL_OUTOF10: 9
PAINLEVEL_OUTOF10: 2
PAINLEVEL_OUTOF10: 3
PAINLEVEL_OUTOF10: 10
PAINLEVEL_OUTOF10: 0
PAINLEVEL_OUTOF10: 10
PAINLEVEL_OUTOF10: 0
PAINLEVEL_OUTOF10: 6
PAINLEVEL_OUTOF10: 7

## 2024-03-24 ASSESSMENT — PAIN SCALES - WONG BAKER
WONGBAKER_NUMERICALRESPONSE: HURTS A LITTLE BIT
WONGBAKER_NUMERICALRESPONSE: HURTS A LITTLE BIT
WONGBAKER_NUMERICALRESPONSE: HURTS WHOLE LOT
WONGBAKER_NUMERICALRESPONSE: HURTS A LITTLE BIT
WONGBAKER_NUMERICALRESPONSE: HURTS A LITTLE BIT

## 2024-03-24 ASSESSMENT — PAIN DESCRIPTION - ORIENTATION
ORIENTATION: RIGHT
ORIENTATION: RIGHT

## 2024-03-24 ASSESSMENT — PAIN DESCRIPTION - PAIN TYPE: TYPE: SURGICAL PAIN

## 2024-03-24 ASSESSMENT — PAIN DESCRIPTION - FREQUENCY: FREQUENCY: INTERMITTENT

## 2024-03-24 ASSESSMENT — PAIN DESCRIPTION - DESCRIPTORS
DESCRIPTORS: SHOOTING;DISCOMFORT;SORE
DESCRIPTORS: ACHING;DISCOMFORT;SORE

## 2024-03-24 ASSESSMENT — PAIN DESCRIPTION - LOCATION
LOCATION: LEG
LOCATION: HIP

## 2024-03-24 ASSESSMENT — PAIN - FUNCTIONAL ASSESSMENT: PAIN_FUNCTIONAL_ASSESSMENT: ACTIVITIES ARE NOT PREVENTED

## 2024-03-24 NOTE — CARE COORDINATION
CM Update: Discharge order noted. Pt to return home with [facility] for IV antibiotics. Spoke to Anai, med is covered at 100%. They do not have an opening today, on schedule for tomorrow evening for start of care. Pt will need to stay overnight and discharge stan after afternoon dose. Orders on chart. CM/SW to follow up in AM. Nurse updated and she will update the pt (TF)      BENITA OgN,RN  Case Management  [phone]

## 2024-03-25 VITALS
BODY MASS INDEX: 29.99 KG/M2 | WEIGHT: 180 LBS | SYSTOLIC BLOOD PRESSURE: 128 MMHG | DIASTOLIC BLOOD PRESSURE: 73 MMHG | HEIGHT: 65 IN | OXYGEN SATURATION: 92 % | HEART RATE: 77 BPM | TEMPERATURE: 97.8 F | RESPIRATION RATE: 16 BRPM

## 2024-03-25 LAB
GLUCOSE BLD-MCNC: 77 MG/DL (ref 74–99)
GLUCOSE BLD-MCNC: 82 MG/DL (ref 74–99)

## 2024-03-25 PROCEDURE — 97530 THERAPEUTIC ACTIVITIES: CPT

## 2024-03-25 PROCEDURE — 6370000000 HC RX 637 (ALT 250 FOR IP): Performed by: INTERNAL MEDICINE

## 2024-03-25 PROCEDURE — 2700000000 HC OXYGEN THERAPY PER DAY

## 2024-03-25 PROCEDURE — 6360000002 HC RX W HCPCS: Performed by: INTERNAL MEDICINE

## 2024-03-25 PROCEDURE — 6370000000 HC RX 637 (ALT 250 FOR IP)

## 2024-03-25 PROCEDURE — 2580000003 HC RX 258: Performed by: INTERNAL MEDICINE

## 2024-03-25 PROCEDURE — 82962 GLUCOSE BLOOD TEST: CPT

## 2024-03-25 RX ADMIN — LEVOTHYROXINE SODIUM 50 MCG: 0.05 TABLET ORAL at 06:05

## 2024-03-25 RX ADMIN — SUCRALFATE 1 G: 1 TABLET ORAL at 08:05

## 2024-03-25 RX ADMIN — GABAPENTIN 800 MG: 400 CAPSULE ORAL at 07:57

## 2024-03-25 RX ADMIN — BUPROPION HYDROCHLORIDE 100 MG: 100 TABLET, EXTENDED RELEASE ORAL at 08:00

## 2024-03-25 RX ADMIN — Medication 1000 UNITS: at 09:39

## 2024-03-25 RX ADMIN — SODIUM CHLORIDE, PRESERVATIVE FREE 10 ML: 5 INJECTION INTRAVENOUS at 08:00

## 2024-03-25 RX ADMIN — ACETAMINOPHEN 650 MG: 325 TABLET ORAL at 02:29

## 2024-03-25 RX ADMIN — WATER 2000 MG: 1 INJECTION INTRAMUSCULAR; INTRAVENOUS; SUBCUTANEOUS at 09:39

## 2024-03-25 RX ADMIN — WATER 2000 MG: 1 INJECTION INTRAMUSCULAR; INTRAVENOUS; SUBCUTANEOUS at 02:29

## 2024-03-25 RX ADMIN — HEPARIN 300 UNITS: 100 SYRINGE at 08:00

## 2024-03-25 RX ADMIN — VENLAFAXINE HYDROCHLORIDE 150 MG: 150 CAPSULE, EXTENDED RELEASE ORAL at 08:00

## 2024-03-25 RX ADMIN — OXYCODONE HYDROCHLORIDE 10 MG: 10 TABLET ORAL at 12:04

## 2024-03-25 RX ADMIN — ACETAMINOPHEN 650 MG: 325 TABLET ORAL at 07:58

## 2024-03-25 RX ADMIN — OXYCODONE HYDROCHLORIDE 10 MG: 10 TABLET ORAL at 08:05

## 2024-03-25 RX ADMIN — PRAMIPEXOLE DIHYDROCHLORIDE 0.75 MG: 0.25 TABLET ORAL at 07:59

## 2024-03-25 RX ADMIN — ASPIRIN 325 MG: 325 TABLET ORAL at 07:58

## 2024-03-25 RX ADMIN — PANTOPRAZOLE SODIUM 40 MG: 40 TABLET, DELAYED RELEASE ORAL at 06:05

## 2024-03-25 ASSESSMENT — PAIN DESCRIPTION - ORIENTATION
ORIENTATION: RIGHT
ORIENTATION: RIGHT

## 2024-03-25 ASSESSMENT — PAIN DESCRIPTION - LOCATION
LOCATION: ANKLE
LOCATION: HIP

## 2024-03-25 ASSESSMENT — PAIN SCALES - GENERAL
PAINLEVEL_OUTOF10: 7
PAINLEVEL_OUTOF10: 8

## 2024-03-25 NOTE — PLAN OF CARE
Problem: Discharge Planning  Goal: Discharge to home or other facility with appropriate resources  3/25/2024 1021 by Kelly Givens RN  Outcome: Completed     Problem: Chronic Conditions and Co-morbidities  Goal: Patient's chronic conditions and co-morbidity symptoms are monitored and maintained or improved  3/25/2024 1021 by Kelly Givens RN  Outcome: Completed     Problem: Safety - Adult  Goal: Free from fall injury  3/25/2024 1021 by Kelly Givens RN  Outcome: Completed     Problem: Pain  Goal: Verbalizes/displays adequate comfort level or baseline comfort level  3/25/2024 1021 by Kelly Givens RN  Outcome: Completed     Problem: ABCDS Injury Assessment  Goal: Absence of physical injury  3/25/2024 1021 by Kelly Givens RN  Outcome: Completed     Problem: Skin/Tissue Integrity  Goal: Absence of new skin breakdown  Description: 1.  Monitor for areas of redness and/or skin breakdown  2.  Assess vascular access sites hourly  3.  Every 4-6 hours minimum:  Change oxygen saturation probe site  4.  Every 4-6 hours:  If on nasal continuous positive airway pressure, respiratory therapy assess nares and determine need for appliance change or resting period.  3/25/2024 1021 by Kelly Givens, RN  Outcome: Completed     
  Problem: Discharge Planning  Goal: Discharge to home or other facility with appropriate resources  Outcome: Progressing     Problem: Chronic Conditions and Co-morbidities  Goal: Patient's chronic conditions and co-morbidity symptoms are monitored and maintained or improved  Outcome: Progressing     Problem: Safety - Adult  Goal: Free from fall injury  Outcome: Progressing     Problem: Pain  Goal: Verbalizes/displays adequate comfort level or baseline comfort level  Outcome: Progressing     Problem: ABCDS Injury Assessment  Goal: Absence of physical injury  Outcome: Progressing     
  Problem: Discharge Planning  Goal: Discharge to home or other facility with appropriate resources  Outcome: Progressing     Problem: Chronic Conditions and Co-morbidities  Goal: Patient's chronic conditions and co-morbidity symptoms are monitored and maintained or improved  Outcome: Progressing     Problem: Safety - Adult  Goal: Free from fall injury  Outcome: Progressing     Problem: Pain  Goal: Verbalizes/displays adequate comfort level or baseline comfort level  Outcome: Progressing     Problem: ABCDS Injury Assessment  Goal: Absence of physical injury  Outcome: Progressing     Problem: Skin/Tissue Integrity  Goal: Absence of new skin breakdown  Description: 1.  Monitor for areas of redness and/or skin breakdown  2.  Assess vascular access sites hourly  3.  Every 4-6 hours minimum:  Change oxygen saturation probe site  4.  Every 4-6 hours:  If on nasal continuous positive airway pressure, respiratory therapy assess nares and determine need for appliance change or resting period.  Outcome: Progressing     
Gilligan, Melissa, RN  Outcome: Progressing  3/24/2024 0858 by Wendy Hilario, RN  Outcome: Progressing     
Kelly SRINIVASAN RN  Outcome: Completed  3/24/2024 2242 by Gilligan, Melissa, RN  Outcome: Progressing

## 2024-03-25 NOTE — PROGRESS NOTES
[facility] Hospitalist Consult Progress Note    Admitting Date and Time: 3/21/2024  5:54 AM  Admit Dx: Drainage from wound [L24.A9]  Closed nondisplaced articular fracture of head of right femur, initial encounter ([facility]) [S72.064A]  Closed fracture of neck of right femur ([facility]) [S72.001A]  History of hemiarthroplasty of right hip [Z96.641]  Other specified personal risk factors, not elsewhere classified [Z91.89]  Prosthetic hip infection, initial encounter ([facility]) [T84.59XA, Z96.649]    Synopsis:    78 y.o. female with a history of T2DM insulin-dependent, HTN, neuropathy, hypothyroidism, neuropathy, arthritis, BPD, depression presents with 4 right hip hemiarthroplasty draining.  Had right hip hemiarthroplasty done on 130/2024 by orthopedic surgery.  She then had a dislocation which was reduced on 2/7/2024.  She was treated with antibiotics outpatient but then had recurrence of serous fluid drainage from wound and erythema.  She was then admitted and underwent irrigation debridement of right hip wound with revision of femoral head component hemiarthroplasty per orthopedic surgery on 3/21.  Internal medicine consulted for medical management.     Upon exam today patient denied any complaints.  States that she is on a lot of medications at home and has discussed this with her PCP in the past.  The plan is to have a follow-up as an outpatient after her surgery to discuss reducing her home medications.    Subjective:  Patient is being followed for Drainage from wound [L24.A9]  Closed nondisplaced articular fracture of head of right femur, initial encounter ([facility]) [S72.064A]  Closed fracture of neck of right femur ([facility]) [S72.001A]  History of hemiarthroplasty of right hip [Z96.641]  Other specified personal risk factors, not elsewhere classified [Z91.89]  Prosthetic hip infection, initial encounter ([facility]) [T84.59XA, Z96.649]     Patient seen and examined at bedside this morning.  Denies any complaints including 
     [facility]   PRE-ADMISSION TESTING GENERAL INSTRUCTIONS  PAT Phone Number: [phone]      GENERAL INSTRUCTIONS:    [x] Antibacterial Soap Shower Night before and/or AM of surgery.  [] CHG Wipes instruction sheet and wipes given.  [x] Do not wear makeup, lotions, powders, deodorant the morning of surgery.  [x] Nothing to eat or drink after midnight. This includes no gum, candy, mints or water.  [x] You may brush your teeth, gargle, but do not swallow water.   [x] No tobacco products, illegal drugs, or alcohol within 24 hours of your surgery.  [x] Jewelry or valuables should not be brought to the hospital. All body and/or tongue piercing's must be removed prior to arriving to hospital. No contact lens or hair pins.   [x] Arrange transportation with a responsible adult  to and from the hospital. Arrange for someone to be with you for the remainder of the day and for 24 hours after your procedure due to having had anesthesia.          -Who will be your  for transportation? Nate        -Who will be staying with you for 24 hrs after your procedure? Nate  [x] Bring insurance card and photo ID.  [] Bring copy of living will or healthcare power of  papers to be placed in your electronic record.  [] Urine Pregnancy test will be preformed the day of surgery. A specimen sample may be brought from home.  [] Transfusion (Green) Bracelet: Please bring with you to hospital, day of surgery.     PARKING INSTRUCTIONS:     [x] ARRIVAL DATE & TIME: 3/21 @ 0700  [x] Times are subject to change. We will contact you the business day before surgery if that were to occur.  [x] Enter into the [facility] Entrance. Two people may accompany you. Masks are not required.  [x] Parking Lot \"I\" is where you will park. It is located on the corner of [address] and [address]. The entrance is on [address].   Only one vehicle - per patient, is permitted in parking lot.   Upon 
 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 female with history of ascending aortic aneurysm, DM, hypertension, hyperlipidemia, fibromyalgia, right femoral neck fracture sustained from a fall s/p right hip hemiarthroplasty on 01/04/2024 complicated by right hip dislocation and right femur greater trochanter fracture sustained from a fall s/p right hip prosthetic dislocation open reduction, right hip femur greater trochanter fracture open reduction with internal fixation, right hip hematoma excisional irrigation and debridement on 02/13 (tissue cultures showing no growth), further complicated by wound drainage since after discharge from rehab facility and has been on cefadroxil for 2 weeks per Orthopedic Surgery, admitted on 03/19 for irrigation and debridement.  On admission, she was afebrile and hemodynamically stable with no leukocytosis. Inflammatory markers were not elevated with ESR of 9 mm/hr and CRP of 4 mg/L. She underwent irrigation and debridement of right hip wound with revision of femoral head component of hemiarthroplasty on 03/21 during which dehiscence deep to the joint with chronic greater trochanteric fracture was noted. Tissue Gram stain and culture showed few polymorphonuclear leukocytes, rare epithelial cells, no organisms, no growth. Cefazolin was given perioperatively.     Subjective:  3/23/2024  Up in chair- on 2 liters nasal canula.   Patient was seen and examined. No chills, no abdominal pain, no diarrhea, no rash, no itching. Afebrile.    Objective:  Vitals:    03/23/24 0722   BP: 124/82   Pulse: 87   Resp: 18   Temp: 98 °F (36.7 °C)   SpO2: 98%     Constitutional: Alert, not in distress. Up in chair.   Respiratory: Clear breath sounds, no crackles, no wheezes  Cardiovascular: Regular rate and rhythm, no murmurs  Gastrointestinal: Bowel sounds present, soft, nontender  Skin: Warm and dry, no 
 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 female with history of ascending aortic aneurysm, DM, hypertension, hyperlipidemia, fibromyalgia, right femoral neck fracture sustained from a fall s/p right hip hemiarthroplasty on 01/04/2024 complicated by right hip dislocation and right femur greater trochanter fracture sustained from a fall s/p right hip prosthetic dislocation open reduction, right hip femur greater trochanter fracture open reduction with internal fixation, right hip hematoma excisional irrigation and debridement on 02/13 (tissue cultures showing no growth), further complicated by wound drainage since after discharge from rehab facility and has been on cefadroxil for 2 weeks per Orthopedic Surgery, admitted on 03/19 for irrigation and debridement.  On admission, she was afebrile and hemodynamically stable with no leukocytosis. Inflammatory markers were not elevated with ESR of 9 mm/hr and CRP of 4 mg/L. She underwent irrigation and debridement of right hip wound with revision of femoral head component of hemiarthroplasty on 03/21 during which dehiscence deep to the joint with chronic greater trochanteric fracture was noted. Tissue Gram stain and culture showed few polymorphonuclear leukocytes, rare epithelial cells, no organisms, no growth. Cefazolin was given perioperatively.     Subjective:  3/24/2024  Up in chair- on 2 liters nasal canula.   Denies any issues with antibiotics   Patient was seen and examined. No chills, no abdominal pain, no diarrhea, no rash, no itching. Afebrile.    Objective:  Vitals:    03/24/24 0736   BP: 122/61   Pulse: 91   Resp: 18   Temp: 97.8 °F (36.6 °C)   SpO2: 96%     Constitutional: Alert, not in distress. Up in chair.   Respiratory: Clear breath sounds, no crackles, no wheezes  Cardiovascular: Regular rate and rhythm, no murmurs  Gastrointestinal: Bowel sounds present, 
4 Eyes Skin Assessment     NAME:  Rebeka Renee  YOB: 1945  MEDICAL RECORD NUMBER:  [id number]    The patient is being assessed for  Admission    I agree that at least one RN has performed a thorough Head to Toe Skin Assessment on the patient. ALL assessment sites listed below have been assessed.      Areas assessed by both nurses:    Head, Face, Ears, Shoulders, Back, Chest, Arms, Elbows, Hands, Sacrum. Buttock, Coccyx, Ischium, Legs. Feet and Heels, and Other right hip incision        Does the Patient have a Wound? No noted wound(s)       Norris Prevention initiated by RN: No  Wound Care Orders initiated by RN: No    Pressure Injury (Stage 3,4, Unstageable, DTI, NWPT, and Complex wounds) if present, place Wound referral order by RN under : No    New Ostomies, if present place, Ostomy referral order under : No     Nurse 1 eSignature: Electronically signed by Mattie Mckeon RN on 3/21/24 at 6:44 PM EDT    **SHARE this note so that the co-signing nurse can place an eSignature**    Nurse 2 eSignature: Electronically signed by Caty Ashraf RN on 3/21/24 at 6:57 PM EDT  
5200 called and report given to VANI Byrne. Pt in for transport.  
CLINICAL PHARMACY NOTE: MEDS TO BEDS    Total # of Prescriptions Filled: 2   The following medications were delivered to the patient:  Percocet 5-325 mg  Aspirin 325 mg    Additional Documentation:   Delivered meds to patient at bedside  
Called ID consult to [facility] Infectious Disease office. Spoke to Lauren and will call back with accepting physician.     1254: Dr Gilliam to follow.   
Chg double lumen picc Placement 3/22/2024    Product number: [id number]   Lot Number: [id number]      Ultrasound: yes   Right Basilic vein:                Upper Arm Circumference: (CM) 31cm    Size:(FR)/GUAGE 5.5fr/36cm    Exposed Length: (CM) 1cm    Internal Length: (CM) 35cm   Cut: (CM) 19cm   Vein Measurement: 0.58cm              Lidocaine Given: yes  Vanesa Sosa RN  3/22/2024  11:40 AM      qing                    
Department of Orthopedic Surgery  Resident Progress Note    POD 1.  S/p right hip I&D with femoral head exchange.  Patient is doing well overall and is ambulating with physical therapy.  She is using a walker for ambulation.  She does note some drainage at the right hip.  Her pain is well-controlled on current regimen.  Any and all questions regarding her surgery were answered today.  No new complaints.  Denies chest pain, shortness of breath, dizziness/lightheadedness.    VITALS:  BP (!) 107/56   Pulse 71   Temp 97.7 °F (36.5 °C) (Temporal)   Resp 18   Ht 1.651 m (5' 5\")   Wt 81.6 kg (180 lb)   SpO2 98%   BMI 29.95 kg/m²     General: Alert and in no acute distress    MUSCULOSKELETAL:   right lower extremity:  Dressing mildly saturated with seal broken and drainage extruding from the Aquacel  Compartments soft and compressible  +PF/DF/EHL  +2/4 DP & PT pulses, Brisk Cap refill, Toes warm and perfused  Distal sensation grossly intact to Peroneals, Sural, Saphenous, and tibial nrs    CBC:   Lab Results   Component Value Date/Time    WBC 6.5 03/21/2024 06:00 AM    HGB 11.9 03/21/2024 06:00 AM    HCT 39.4 03/21/2024 06:00 AM     03/21/2024 06:00 AM     PT/INR:    Lab Results   Component Value Date/Time    PROTIME 12.5 02/11/2024 09:44 AM    PROTIME 10.4 12/13/2010 09:00 AM    INR 1.1 02/11/2024 09:44 AM       ASSESSMENT  S/P right hip I&D with femoral head exchange 3/21/24    PLAN    Continue physical therapy and protocol: Weight-bear as tolerated right lower extremity: Anterolateral hip precautions  Will return later today to remove Aquacel dressing in place Prevena dressing  Postoperative antibiotic protocol  Deep venous thrombosis prophylaxis: Aspirin 325 mg twice daily okay to resume postop day 1, early mobilization, SCD  PT/OT as able  Pain Control: IV and PO, wean as able  Monitor H&H: Not yet resulted today  D/C Plan:  pending sw/cm and therapy recommendations.     
Department of Orthopedic Surgery  Resident Progress Note    POD 2.  S/p right hip I&D with femoral head exchange.  Patient seen and examined at bedside today.  Prevena dressing in place and intact with mild output.  She does note she worked physical therapy yesterday and performed well without significant difficulties.  PICC line in place.  She is eager to be discharged to home following infectious disease recommendations.  Her pain is well-controlled on current regimen.  Any and all questions regarding her surgery were answered today.  No new complaints.  Denies chest pain, shortness of breath, dizziness/lightheadedness.    VITALS:  /82   Pulse 87   Temp 98 °F (36.7 °C) (Temporal)   Resp 18   Ht 1.651 m (5' 5\")   Wt 81.6 kg (180 lb)   SpO2 98%   BMI 29.95 kg/m²     General: Alert and in no acute distress    MUSCULOSKELETAL:   right lower extremity:  Prevena dressing holding and in place with mild output  Compartments soft and compressible  +PF/DF/EHL  +2/4 DP & PT pulses, Brisk Cap refill, Toes warm and perfused  Distal sensation grossly intact to Peroneals, Sural, Saphenous, and tibial nrs    CBC:   Lab Results   Component Value Date/Time    WBC 6.5 03/21/2024 06:00 AM    HGB 11.9 03/21/2024 06:00 AM    HCT 39.4 03/21/2024 06:00 AM     03/21/2024 06:00 AM     PT/INR:    Lab Results   Component Value Date/Time    PROTIME 12.5 02/11/2024 09:44 AM    PROTIME 10.4 12/13/2010 09:00 AM    INR 1.1 02/11/2024 09:44 AM       ASSESSMENT  S/P right hip I&D with femoral head exchange 3/21/24    PLAN    Continue physical therapy and protocol: Weight-bear as tolerated right lower extremity: Anterolateral hip precautions  Appreciate infectious disease recommendations following culture results  Postoperative antibiotic protocol  Surgical cultures not yet resulted  Appreciate infectious disease recommendations on antibiotic regimen  Deep venous thrombosis prophylaxis: Aspirin 325 mg twice daily okay to resume 
Department of Orthopedic Surgery  Resident Progress Note    POD 4.  S/p right hip I&D with femoral head exchange.  Patient seen and examined at bedside today.  Prevena dressing in place with mild output.  Output is slowed since being changed yesterday.  She is doing well with physical therapy and has little to no hip pain at this point.  She does note some mild groin pain.   No new complaints.  Denies chest pain, shortness of breath, dizziness/lightheadedness.    VITALS:  BP (!) 155/77   Pulse 87   Temp 97.1 °F (36.2 °C) (Temporal)   Resp 16   Ht 1.651 m (5' 5\")   Wt 81.6 kg (180 lb)   SpO2 100%   BMI 29.95 kg/m²     General: Alert and in no acute distress    MUSCULOSKELETAL:   right lower extremity:  Prevena dressing holding and in place with mild output  Compartments soft and compressible  +PF/DF/EHL  +2/4 DP & PT pulses, Brisk Cap refill, Toes warm and perfused  Distal sensation grossly intact to Peroneals, Sural, Saphenous, and tibial nrs    CBC:   Lab Results   Component Value Date/Time    WBC 4.6 03/24/2024 05:15 AM    HGB 8.1 03/24/2024 05:15 AM    HCT 28.4 03/24/2024 05:15 AM     03/24/2024 05:15 AM     PT/INR:    Lab Results   Component Value Date/Time    PROTIME 12.5 02/11/2024 09:44 AM    PROTIME 10.4 12/13/2010 09:00 AM    INR 1.1 02/11/2024 09:44 AM       ASSESSMENT  S/P right hip I&D with femoral head exchange 3/21/24    PLAN    Continue physical therapy and protocol: Weight-bear as tolerated right lower extremity: Anterolateral hip precautions  Prevena dressing in place with mild output maintained in place.  Infectious disease recommends Ancef every 8 hours for 6 weeks.  Cultures have not resulted likely due to antibiotic use prior to surgical intervention and culture  Appreciate infectious disease recommendations on antibiotic regimen  Deep venous thrombosis prophylaxis: Aspirin 325 mg twice daily okay to resume postop day 1, early mobilization, SCD  PT/OT as able  Pain Control: IV and 
Dexacom showed blood sugar 89  
Dr Freire notified patient reports she had covid 2-3 weeks ago and is asymptomatic.  Ok to proceed with surgery.  
Dr. Handy called to bedside for an increase in breakthrough drainage at surgical site. This Rn to reinforce dressing with ABDs and tape. Floor RN notified of update.  
IV team notified via secure message of new order for PICC line for home antibiotics.  
Messaged Dr Villeda for medical management consult post-op.    Dr Villeda does not come to hospital anymore.     1315: Messaged Dr Zak Carcamo for consult.   
Ortho in to see patient at this time - made aware of low BP and dressing change/reinforced to right hip - no new orders  
Patient dexcom reading is 128.  
Patient experienced dizziness while ambulating with therapy.    Orthos done.    Sittin/63  Standin/49    Patient dizzy. Dr. Barrientos notified.      New order received for IVF bolus.  
Patient notified of time change for procedure. New arrival time is now 0615. Patient verbalized understanding.   
Patient states she had covid at the end of February. Denies shortness of breath, or breathing difficulties, but states she still has a cough. Left message for Aysha at Dr. Handy's office regarding above.  
Patient's wound vac alarming indicating collection cannister needing changed.  Call made to [employer].  Informed no cannister available to fit this Prevena Plus 125 wound vac.      Call placed to nursing supervisor.  Supervisor also unable to locate Prevena cannister.  Supervisor able to empty current cannister to allow wound vac to function.      Perfect serve sent to Wound Nurse to inform her of needed supplies.  
Per case management patient is staying until tomorrow due to home care not being able to come until tomorrow.   
Physical Therapy  Physical Therapy Initial Assessment       Name: Rebeka Renee  : 1945  MRN: [id number]      Date of Service: 3/21/2024    Evaluating PT:  Amparo Rooney PT, DPT [id number]    Room #:  5216/5216-A  Diagnosis:  Drainage from wound [L24.A9]  Closed nondisplaced articular fracture of head of right femur, initial encounter ([facility]) [S72.064A]  Closed fracture of neck of right femur ([facility]) [S72.001A]  History of hemiarthroplasty of right hip [Z96.641]  Other specified personal risk factors, not elsewhere classified [Z91.89]  Prosthetic hip infection, initial encounter ([facility]) [T84.59XA, Z96.649]  PMHx/PSHx:   has a past medical history of Anemia, Anxiety, Arthritis, Ascending aortic aneurysm ([facility]), Asthma, Bipolar 1 disorder ([facility]), Cancer ([facility]), Cancer of breast, female ([facility]), CHF (congestive heart failure) ([facility]), Chronic back pain, Depression, Depression with anxiety, Diabetes mellitus ([facility]), Dyslipidemia, Fibromyalgia, History of blood transfusion, Hypertension, Hypothyroidism, Neuropathy, Pericardial effusion, Pleural effusion, TIA (transient ischemic attack), Tobacco abuse, and Type II or unspecified type diabetes mellitus without mention of complication, not stated as uncontrolled.    Procedure/Surgery:  Irrigation debridement right hip wound with revision of femoral head component of hemiarthroplasty 3/21/24  Precautions: Falls,  global R hip precautions until further clarified, WBAT R LE, O2     SUBJECTIVE:    Pt lives with  in a 1.5 story home with 2 stairs to enter and 2 rail(s).  Bed is on 1st floor and bath is on 1st floor.  Pt ambulated with rollator vs WW PTA.    Equipment Owned: rollator, WW  Equipment Needs:  none     OBJECTIVE:   Initial Evaluation  Date: 3/21/24 Treatment  Date:  Short Term/ Long Term   Goals   AM-PAC 6 Clicks      Was pt agreeable to Eval/treatment? Yes      Does pt have pain? Denies pain      Bed Mobility  Rolling: NT  Supine to sit: Jessica  Sit to supine: 
Physical Therapy  Treatment Note      Name: Rebeka Renee  : 1945  MRN: [id number]      Date of Service: 3/23/2024    Evaluating PT:  Amparo Rooney PT, DPT [id number]    Room #:  5216/5216-A  Diagnosis:  Drainage from wound [L24.A9]  Closed nondisplaced articular fracture of head of right femur, initial encounter ([facility]) [S72.064A]  Closed fracture of neck of right femur ([facility]) [S72.001A]  History of hemiarthroplasty of right hip [Z96.641]  Other specified personal risk factors, not elsewhere classified [Z91.89]  Prosthetic hip infection, initial encounter ([facility]) [T84.59XA, Z96.649]  PMHx/PSHx:   has a past medical history of Anemia, Anxiety, Arthritis, Ascending aortic aneurysm ([facility]), Asthma, Bipolar 1 disorder ([facility]), Cancer ([facility]), Cancer of breast, female ([facility]), CHF (congestive heart failure) ([facility]), Chronic back pain, Depression, Depression with anxiety, Diabetes mellitus ([facility]), Dyslipidemia, Fibromyalgia, History of blood transfusion, Hypertension, Hypothyroidism, Neuropathy, Pericardial effusion, Pleural effusion, TIA (transient ischemic attack), Tobacco abuse, and Type II or unspecified type diabetes mellitus without mention of complication, not stated as uncontrolled.    Procedure/Surgery:  Irrigation debridement right hip wound with revision of femoral head component of hemiarthroplasty 3/21/24  Precautions: Falls,  , R anterolateral hip precautions per Dr. Collazo 3/22, WBAT R LE, O2, monitor BP , Wound vac    SUBJECTIVE:    Pt lives with  in a 1.5 story home with 2 stairs to enter and 2 rail(s).  Bed is on 1st floor and bath is on 1st floor.  Pt ambulated with rollator vs WW PTA.    Equipment Owned: rollator, WW  Equipment Needs:  none     OBJECTIVE:   Initial Evaluation  Date: 3/21/24 Treatment  Date: 3/23/24 Short Term/ Long Term   Goals   AM-PAC 6 Clicks     Was pt agreeable to Eval/treatment? Yes  Yes     Does pt have pain? Denies pain  No current complaints of pain     Bed Mobility  
Physical Therapy  Treatment Note      Name: Rebeka Renee  : 1945  MRN: [id number]      Date of Service: 3/25/2024    Evaluating PT:  Amparo Rooney PT, DPT [id number]    Room #:  5216/5216-A  Diagnosis:  Drainage from wound [L24.A9]  Closed nondisplaced articular fracture of head of right femur, initial encounter ([facility]) [S72.064A]  Closed fracture of neck of right femur ([facility]) [S72.001A]  History of hemiarthroplasty of right hip [Z96.641]  Other specified personal risk factors, not elsewhere classified [Z91.89]  Prosthetic hip infection, initial encounter ([facility]) [T84.59XA, Z96.649]  PMHx/PSHx:   has a past medical history of Anemia, Anxiety, Arthritis, Ascending aortic aneurysm ([facility]), Asthma, Bipolar 1 disorder ([facility]), Cancer ([facility]), Cancer of breast, female ([facility]), CHF (congestive heart failure) ([facility]), Chronic back pain, Depression, Depression with anxiety, Diabetes mellitus ([facility]), Dyslipidemia, Fibromyalgia, History of blood transfusion, Hypertension, Hypothyroidism, Neuropathy, Pericardial effusion, Pleural effusion, TIA (transient ischemic attack), Tobacco abuse, and Type II or unspecified type diabetes mellitus without mention of complication, not stated as uncontrolled.    Procedure/Surgery:  Irrigation debridement right hip wound with revision of femoral head component of hemiarthroplasty 3/21/24  Precautions: Falls,  , R anterolateral hip precautions per Dr. Collazo 3/22, WBAT R LE, O2, monitor BP , Wound vac    SUBJECTIVE:    Pt lives with  in a 1.5 story home with 2 stairs to enter and 2 rail(s).  Bed is on 1st floor and bath is on 1st floor.  Pt ambulated with rollator vs WW PTA.    Equipment Owned: rollator, WW  Equipment Needs:  none     OBJECTIVE:   Initial Evaluation  Date: 3/21/24 Treatment  Date: 3/25/24 Short Term/ Long Term   Goals   AM-PAC 6 Clicks     Was pt agreeable to Eval/treatment? Yes  Yes     Does pt have pain? Denies pain  No complaints     Bed Mobility  Rolling: 
Physical Therapy  Treatment Note      Name: Rebeka Renee  : 1945  MRN: [id number]      Date of Service: 3/24/2024    Evaluating PT:  Amparo Rooney PT, DPT [id number]    Room #:  5216/5216-A  Diagnosis:  Drainage from wound [L24.A9]  Closed nondisplaced articular fracture of head of right femur, initial encounter ([facility]) [S72.064A]  Closed fracture of neck of right femur ([facility]) [S72.001A]  History of hemiarthroplasty of right hip [Z96.641]  Other specified personal risk factors, not elsewhere classified [Z91.89]  Prosthetic hip infection, initial encounter ([facility]) [T84.59XA, Z96.649]  PMHx/PSHx:   has a past medical history of Anemia, Anxiety, Arthritis, Ascending aortic aneurysm ([facility]), Asthma, Bipolar 1 disorder ([facility]), Cancer ([facility]), Cancer of breast, female ([facility]), CHF (congestive heart failure) ([facility]), Chronic back pain, Depression, Depression with anxiety, Diabetes mellitus ([facility]), Dyslipidemia, Fibromyalgia, History of blood transfusion, Hypertension, Hypothyroidism, Neuropathy, Pericardial effusion, Pleural effusion, TIA (transient ischemic attack), Tobacco abuse, and Type II or unspecified type diabetes mellitus without mention of complication, not stated as uncontrolled.    Procedure/Surgery:  Irrigation debridement right hip wound with revision of femoral head component of hemiarthroplasty 3/21/24  Precautions: Falls,  , R anterolateral hip precautions per Dr. Collazo 3/22, WBAT R LE, O2, monitor BP , Wound vac    SUBJECTIVE:    Pt lives with  in a 1.5 story home with 2 stairs to enter and 2 rail(s).  Bed is on 1st floor and bath is on 1st floor.  Pt ambulated with rollator vs WW PTA.    Equipment Owned: rollator, WW  Equipment Needs:  none     OBJECTIVE:   Initial Evaluation  Date: 3/21/24 Treatment  Date: 3/24/24 Short Term/ Long Term   Goals   AM-PAC 6 Clicks     Was pt agreeable to Eval/treatment? Yes  Yes     Does pt have pain? Denies pain  No current complaints of pain     Bed Mobility  
Post-op xray completed at bedside.  
Took patient discharge scripts (Aspirin and Percocet) to [facility] to have filled.   
    Labs, imaging, and medical records/notes were personally reviewed.    Assessment:  Surgical site infection/right hip prosthetic joint infections, s/p irrigation and debridement of right hip wound with revision of femoral head component of hemiarthroplasty on 03/21  History of right hip hemiarthroplasty in 01/04/2024 complicated by right hip dislocation and right femur greater trochanter fracture sustained from a fall s/p right hip prosthetic dislocation open reduction, right hip femur greater trochanter fracture open reduction with internal fixation, right hip hematoma excisional irrigation and debridement on 02/13/2024    Recommendations:  Continue cefazolin 2 g every 8 hours for 6 weeks from 03/21-05/02. Unable to transition to specific oral antibiotic suppression thereafter if cultures showing no bacterial growth at all (most likely associated with antibiotic intake prior to appropriate cultures obtained).  Follow up tissue cultures.  Insert PICC.  Continue supportive care.  Continue local wound care.     Thank you for involving me in the care of Rebeka Renee. ID will continue to follow. Please do not hesitate to call for any questions or concerns.    Electronically signed by Polly Gilliam MD on 3/22/2024 at 9:52 AM    
PO, wean as able  Monitor H&H: Not yet resulted today  D/C Plan:  pending sw/cm and therapy recommendations.  Okay to discharge today once appropriate discharge planning is in place    
up.  Found to be orthostatic positive    ROS: denies fever, chills, cp, sob, n/v, HA unless stated above.      sodium chloride  500 mL IntraVENous Once    lidocaine PF  5 mL IntraDERmal Once    sodium chloride flush  5-40 mL IntraVENous 2 times per day    heparin flush  3 mL IntraVENous 2 times per day    sodium chloride flush  5-40 mL IntraVENous 2 times per day    acetaminophen  650 mg Oral Q6H    aspirin  325 mg Oral BID    buPROPion  100 mg Oral Daily    Vitamin D  1,000 Units Oral Daily    gabapentin  800 mg Oral TID    levothyroxine  50 mcg Oral Daily    [Held by provider] losartan  50 mg Oral Daily    pantoprazole  40 mg Oral QAM AC    pramipexole  0.75 mg Oral BID    rosuvastatin  40 mg Oral Nightly    sucralfate  1 g Oral Daily    venlafaxine  150 mg Oral Daily    [Held by provider] verapamil  120 mg Oral Daily    [Held by provider] insulin glargine  10 Units SubCUTAneous Nightly    insulin lispro  0-4 Units SubCUTAneous TID WC    insulin lispro  0-4 Units SubCUTAneous Nightly    ceFAZolin (ANCEF) IVPB  2,000 mg IntraVENous Q8H     sodium chloride flush, 5-40 mL, PRN  sodium chloride, , PRN  heparin flush, 3 mL, PRN  sodium chloride flush, 5-40 mL, PRN  ondansetron, 4 mg, Q8H PRN   Or  ondansetron, 4 mg, Q6H PRN  oxyCODONE, 5 mg, Q4H PRN   Or  oxyCODONE, 10 mg, Q4H PRN  morphine, 2 mg, Q2H PRN   Or  morphine, 4 mg, Q2H PRN  glucose, 4 tablet, PRN  dextrose bolus, 125 mL, PRN   Or  dextrose bolus, 250 mL, PRN  glucagon (rDNA), 1 mg, PRN  dextrose, , Continuous PRN         Objective:    BP (!) 97/50   Pulse 72   Temp 97.8 °F (36.6 °C) (Temporal)   Resp 18   Ht 1.651 m (5' 5\")   Wt 81.6 kg (180 lb)   SpO2 99%   BMI 29.95 kg/m²     General Appearance: alert and oriented to person, place and time and in no acute distress  Skin: warm and dry  Head: normocephalic and atraumatic  Eyes: pupils equal, round, and reactive to light, extraocular eye movements intact, conjunctivae normal  Neck: neck supple and non 
and independence with ADLs  Therapeutic activities to facilitate gross/fine motor skills for increased independence with ADLs  Positioning to improve skin integrity, interaction with environment and functional independence     Recommended Adaptive Equipment:  TBD, A.E. issued     Home Living: Pt lives with  in a 1 1/2 story with 2 steps to enter with B HR.  B&B on main level.  Bathroom setup: walk in shower, toilet with riser   Equipment owned: rollator, ww, shower chair     Prior Level of Function: Ind. with ADLs , Ind. with IADLs; ambulated rollator  Driving: active but not as of late  Occupation: retired     Pain Level: none at rest; minimal with ax.  Cognition: A&O: 4/4; Follows 2 step directions              Memory:  G              Sequencing:  G              Problem solving:  G              Judgement/safety:  G                Functional Assessment:  AM-PAC Daily Activity Raw Score: 16/24    Initial Eval Status  Date: 3-21-24 Treatment Status  Date: STGs = LTGs  Time frame: 10-14 days   Feeding Set up   Mod I/ Ind   Grooming SBA   Modified [place]    UB Dressing SBA to change gown EOB   Modified [place]    LB Dressing Mod A B socks, global hip prec. Reviewed. A.E. issued & instruction provided  Modified [place]    Bathing Mod A sim. task  A.E. issued & instruction provided  Modified [place]    Toileting NT   Modified [place]    Bed Mobility  Supine to sit: Min A  Sit to supine: Min A   Supine to sit: Modified [place]   Sit to supine: Modified [place]    Functional Transfers Min A sit <> stand, SPT with ww   Modified [place]    Functional Mobility Min A with ww few steps with ww from EOB > bedside chair   Modified [place]    Balance Sitting:     Static:  Sup    Dynamic:SBA  Standing: Min A       Activity Tolerance F   G   Visual/  Perceptual Glasses: yes          Vitals spO2 on 2L via NC remained > 93%   WFL          AROM (PROM) Strength Additional Info:    CAMRYN 
auscultation bilaterally- no wheezes, rales or rhonchi, normal air movement, no respiratory distress  Cardiovascular: normal rate, normal S1 and S2 and no carotid bruits  Abdomen: soft, non-tender, non-distended, normal bowel sounds, no masses or organomegaly  Extremities: no cyanosis, no clubbing and no edema  Neurologic: no cranial nerve deficit and speech normal        Recent Labs     03/24/24  0515      K 3.7      CO2 31*   BUN 12   CREATININE 0.7   GLUCOSE 72*   CALCIUM 8.1*       Recent Labs     03/24/24  0515   WBC 4.6   RBC 3.11*   HGB 8.1*   HCT 28.4*   MCV 91.3   MCH 26.0   MCHC 28.5*   RDW 16.9*      MPV 10.8         Assessment:    S/p irrigation and debridement right hip wound and revision of right femoral head hemiarthroplasty  Orthostatic hypotension, resolved  T2DM, on insulin at home  HTN  BPD  Depression/anxiety  Arthritis  Neuropathy      Plan:  Primary care per orthopedic surgery  ID consulted by primary team: Recommending Ancef  Continue low-dose sliding scale coverage while inpatient  HbA1c last month was 5.5.  Will likely not need to resume insulin on discharge.  Continue holding home BP meds while admitted as BP stable off home medications.  Orthostatic vitals negative today.  Home insulin, losartan, verapamil discontinued on med rec.  Resume other home meds including gabapentin, Wellbutrin, Synthroid, Protonix, pramipexole, Crestor, Effexor, vitamin D  IM to sign off, please call if needed      DC planning: Per primary    NOTE: This report was transcribed using voice recognition software. Every effort was made to ensure accuracy; however, inadvertent computerized transcription errors may be present.  Electronically signed by Zak Carcamo MD on 3/24/2024 at 11:20 AM  
dizziness with initial bout of ambulation, which further limited session. Pt with symptomatic hypotension with multiple BP checks, significant drop in BP noted sitting VS standing. RN notified. Unable to trial stairs this date d/t symptoms. Pt educated on appropriate sequencing with stairs. Pt skillfully positioned in supine at end of session with all needs in place and lines managed. Patient would benefit from continued skilled PT to maximize functional mobility independence.       Treatment:  Patient practiced and was instructed in the following treatment:    Bed Mobility: Verbal cues for proper positioning and sequencing to perform bed mobility to facilitate maximum independence.   Transfers: Verbal cues for proper positioning and sequencing to perform transfers safely with maximum independence.   Gait Training: Verbal cues for proper positioning and sequencing using assistive device to maximize functional mobility independence.   Skillful positioning in bed to protect skin and joint integrity. Abductor pillow in place.   Pt education for anterolateral hip precautions.   Vitals and symptoms monitored during session. Noted above.     PLAN:    Patient is making good progress towards established goals.  Will continue with current POC.      Time in  0839  Time out  0905    Total Treatment Time  26 minutes     CPT codes:  [] Gait training 18819 0 minutes  [] Manual therapy 45914 0 minutes  [x] Therapeutic activities 30400 26 minutes  [] Therapeutic exercises 12524 0 minutes  [] Neuromuscular reeducation 02474 0 minutes    Amparo Rooney PT, DPT  [id number]  
  Functional Transfers Min A sit <> stand, SPT with ww  CGA/SBA  Sit to Stand  Stand to Sit    Cueing for hand placement Modified [place]    Functional Mobility Min A with ww few steps with ww from EOB > bedside chair  SBA  Pt ambulated short household distance in room EOB<>Bathroom with w.w. Modified [place]    Balance Sitting:     Static:  Sup    Dynamic:SBA  Standing: Min A Sitting EOB:  Supervision    Standing:  SBA/CGA      Activity Tolerance F  Fair G   Visual/  Perceptual Glasses: yes          Vitals spO2 on 2L via NC remained > 93%   WFL           AROM (PROM) Strength Additional Info:    RUE  WFL 4/5 good  and wfl FMC/dexterity noted during ADL tasks      LUE WFL 4/5 good  and wfl FMC/dexterity noted during ADL tasks         Hearing: WFL   Sensation:  No c/o numbness or tingling B UE  Tone: WFL   Edema: none noted BUE    Education:  Pt was educated on role of OT, goals to be reached, importance of OOB activity, precautions to follow, safety and hand placement with transfers, safety/balance and walker management with functional mobility and use of AE to assist with LB dressing tasks    Comments: Upon arrival pt seated upright in bed, agreeable to therapy, speaking with nursing okaying pt to be seen this session. Rest breaks provided throughout session as needed, monitoring of /54 AZ 79 seated upright in chair at end of session, nursing present and aware. At end of session, pt seated upright in chair, all lines and tubes intact, call light within reach.     Pt has made fair progress towards set goals.   Continue with current plan of care      Treatment Time In: 8:15am           Treatment Time Out: 8:40am                Treatment Charges: Mins Units   Ther Ex  72755     Manual Therapy 64499     Thera Activities 81421 10 1   ADL/Home Mgt 31735 15 1   Neuro Re-ed 57650     Group Therapy      Orthotic manage/training  19464     Non-Billable Time     Total Timed Treatment 25 2        Sarah

## 2024-03-25 NOTE — CARE COORDINATION
03/25/24 Update CM Note: Plan is for discharge to home today once 10:30am iv antibiotic is completed. Spoke with Marleen at [facility] and she states patient is on list to be seen tonight for 6:30pm dose. Will follow for any further needs.Electronically signed by Amanda Wright RN CM on 3/25/2024 at 9:45 AM    Addendum: Received consent for treatment from Marleen at [facility] to have patient sign. All paperwork explained to patient and she signed. Forms sent back to Marleen via [facility] fax. Electronically signed by Amanda Wright RN CM on 3/25/2024 at 10:25 AM

## 2024-03-26 LAB
MICROORGANISM SPEC CULT: NO GROWTH
MICROORGANISM SPEC CULT: NORMAL
MICROORGANISM/AGENT SPEC: NORMAL
SERVICE CMNT-IMP: NORMAL
SPECIMEN DESCRIPTION: NORMAL

## 2024-03-29 NOTE — PROGRESS NOTES
Physician Progress Note      PATIENT:               JEVON LOZOYA  CSN #:                  [id number]  :                       1945  ADMIT DATE:       2024 9:35 AM  DISCH DATE:        2024 8:12 PM  RESPONDING  PROVIDER #:        Claudia Barrientos MD          QUERY TEXT:    Pt. admitted for Posterolateral dislocation of right hip. Per Op note dated   24 documentation of Left hip dislocation open reduction. To accurately   reflect procedure performed please specify laterality.    The medical record reflects the following:  Risk Factors: Right hip dislocation.  Clinical Indicators:  24: Right hip/pelvis X-ray: posterolateral dislocation right hip   arthroplasty  24: Right hip X-ray: Superior dislocation proximal femur in relation to   the acetabulum. Osteopenia  24: Post reduction right pelvis/hip X-ray: Reduction right hip   dislocation.  Small avulsion fracture arising from the greater trochanter.   Bipolar hemiarthroplasty in good position.  24: Operative Note: Procedure(s): Left hip prosthetic dislocation open   reduction.  Left femur greater trochanter fracture open reduction with   internal fixation with suture fixation.  Left hip hematoma excisional   irrigation and debridement.  Specimens: Right hip hematoma  Treatment: Orthopedic surgery consult; Operative procedure    Thank You,  Lor JOYN, R.N.  Clinical Documentation Integrity  [phone]  Options provided:  -- Addendum to 24 procedure note: Right hip prosthetic dislocation open   reduction; Right femurs trochanter fracture open reduction with internal   fixation with suture fixation; right hip hematoma excisional irrigation and   debridement  -- Other - I will add my own diagnosis  -- Disagree - Not applicable / Not valid  -- Disagree - Clinically unable to determine / Unknown  -- Refer to Clinical Documentation Reviewer    PROVIDER RESPONSE TEXT:    Addendum to 24 procedure note:

## 2024-03-31 LAB
MICROORGANISM SPEC CULT: NORMAL
MICROORGANISM/AGENT SPEC: NORMAL
SERVICE CMNT-IMP: NORMAL
SPECIMEN DESCRIPTION: NORMAL

## 2024-04-01 ENCOUNTER — HOSPITAL ENCOUNTER (INPATIENT)
Age: 79
LOS: 3 days | Discharge: OTHER INSTITUTION | DRG: 560 | End: 2024-04-05
Attending: STUDENT IN AN ORGANIZED HEALTH CARE EDUCATION/TRAINING PROGRAM | Admitting: STUDENT IN AN ORGANIZED HEALTH CARE EDUCATION/TRAINING PROGRAM
Payer: MEDICARE

## 2024-04-01 ENCOUNTER — APPOINTMENT (OUTPATIENT)
Dept: GENERAL RADIOLOGY | Age: 79
DRG: 560 | End: 2024-04-01
Payer: MEDICARE

## 2024-04-01 ENCOUNTER — APPOINTMENT (OUTPATIENT)
Dept: ULTRASOUND IMAGING | Age: 79
DRG: 560 | End: 2024-04-01
Payer: MEDICARE

## 2024-04-01 DIAGNOSIS — S73.004A DISLOCATION OF RIGHT HIP, INITIAL ENCOUNTER (HCC): Primary | ICD-10-CM

## 2024-04-01 DIAGNOSIS — E87.6 HYPOKALEMIA: ICD-10-CM

## 2024-04-01 DIAGNOSIS — Z98.890 S/P CLOSED REDUCTION OF DISLOCATED TOTAL HIP PROSTHESIS: ICD-10-CM

## 2024-04-01 LAB
ALBUMIN SERPL-MCNC: 2.4 G/DL (ref 3.5–5.2)
ALP SERPL-CCNC: 149 U/L (ref 35–104)
ALT SERPL-CCNC: <5 U/L (ref 0–32)
ANION GAP SERPL CALCULATED.3IONS-SCNC: 6 MMOL/L (ref 7–16)
ANION GAP SERPL CALCULATED.3IONS-SCNC: 7 MMOL/L (ref 7–16)
AST SERPL-CCNC: 29 U/L (ref 0–31)
BASOPHILS # BLD: 0.03 K/UL (ref 0–0.2)
BASOPHILS NFR BLD: 0 % (ref 0–2)
BILIRUB SERPL-MCNC: 0.2 MG/DL (ref 0–1.2)
BUN SERPL-MCNC: 10 MG/DL (ref 6–23)
BUN SERPL-MCNC: 11 MG/DL (ref 6–23)
CALCIUM SERPL-MCNC: 8 MG/DL (ref 8.6–10.2)
CALCIUM SERPL-MCNC: 8.2 MG/DL (ref 8.6–10.2)
CHLORIDE SERPL-SCNC: 90 MMOL/L (ref 98–107)
CHLORIDE SERPL-SCNC: 91 MMOL/L (ref 98–107)
CO2 SERPL-SCNC: 38 MMOL/L (ref 22–29)
CO2 SERPL-SCNC: 40 MMOL/L (ref 22–29)
CREAT SERPL-MCNC: 0.7 MG/DL (ref 0.5–1)
CREAT SERPL-MCNC: 0.8 MG/DL (ref 0.5–1)
EOSINOPHIL # BLD: 0.18 K/UL (ref 0.05–0.5)
EOSINOPHILS RELATIVE PERCENT: 2 % (ref 0–6)
ERYTHROCYTE [DISTWIDTH] IN BLOOD BY AUTOMATED COUNT: 16.6 % (ref 11.5–15)
GFR SERPL CREATININE-BSD FRML MDRD: 77 ML/MIN/1.73M2
GFR SERPL CREATININE-BSD FRML MDRD: 90 ML/MIN/1.73M2
GLUCOSE SERPL-MCNC: 150 MG/DL (ref 74–99)
GLUCOSE SERPL-MCNC: 75 MG/DL (ref 74–99)
HCT VFR BLD AUTO: 32.7 % (ref 34–48)
HGB BLD-MCNC: 10.1 G/DL (ref 11.5–15.5)
IMM GRANULOCYTES # BLD AUTO: 0.07 K/UL (ref 0–0.58)
IMM GRANULOCYTES NFR BLD: 1 % (ref 0–5)
INR PPP: 1.6
LACTATE BLDV-SCNC: 3.2 MMOL/L (ref 0.5–2.2)
LYMPHOCYTES NFR BLD: 2.02 K/UL (ref 1.5–4)
LYMPHOCYTES RELATIVE PERCENT: 18 % (ref 20–42)
MAGNESIUM SERPL-MCNC: 1.6 MG/DL (ref 1.6–2.6)
MCH RBC QN AUTO: 26.4 PG (ref 26–35)
MCHC RBC AUTO-ENTMCNC: 30.9 G/DL (ref 32–34.5)
MCV RBC AUTO: 85.4 FL (ref 80–99.9)
MONOCYTES NFR BLD: 0.99 K/UL (ref 0.1–0.95)
MONOCYTES NFR BLD: 9 % (ref 2–12)
NEUTROPHILS NFR BLD: 71 % (ref 43–80)
NEUTS SEG NFR BLD: 7.88 K/UL (ref 1.8–7.3)
PARTIAL THROMBOPLASTIN TIME: 30.1 SEC (ref 24.5–35.1)
PLATELET # BLD AUTO: 268 K/UL (ref 130–450)
PMV BLD AUTO: 10.4 FL (ref 7–12)
POTASSIUM SERPL-SCNC: 1.9 MMOL/L (ref 3.5–5)
POTASSIUM SERPL-SCNC: 2 MMOL/L (ref 3.5–5)
PROT SERPL-MCNC: 5.4 G/DL (ref 6.4–8.3)
PROTHROMBIN TIME: 17.2 SEC (ref 9.3–12.4)
RBC # BLD AUTO: 3.83 M/UL (ref 3.5–5.5)
SODIUM SERPL-SCNC: 135 MMOL/L (ref 132–146)
SODIUM SERPL-SCNC: 137 MMOL/L (ref 132–146)
WBC OTHER # BLD: 11.2 K/UL (ref 4.5–11.5)

## 2024-04-01 PROCEDURE — 80048 BASIC METABOLIC PNL TOTAL CA: CPT

## 2024-04-01 PROCEDURE — 99222 1ST HOSP IP/OBS MODERATE 55: CPT | Performed by: INTERNAL MEDICINE

## 2024-04-01 PROCEDURE — 80053 COMPREHEN METABOLIC PANEL: CPT

## 2024-04-01 PROCEDURE — 6360000002 HC RX W HCPCS

## 2024-04-01 PROCEDURE — 83735 ASSAY OF MAGNESIUM: CPT

## 2024-04-01 PROCEDURE — 99285 EMERGENCY DEPT VISIT HI MDM: CPT

## 2024-04-01 PROCEDURE — 85025 COMPLETE CBC W/AUTO DIFF WBC: CPT

## 2024-04-01 PROCEDURE — 73502 X-RAY EXAM HIP UNI 2-3 VIEWS: CPT

## 2024-04-01 PROCEDURE — 83605 ASSAY OF LACTIC ACID: CPT

## 2024-04-01 PROCEDURE — 2580000003 HC RX 258

## 2024-04-01 PROCEDURE — 85610 PROTHROMBIN TIME: CPT

## 2024-04-01 PROCEDURE — 96375 TX/PRO/DX INJ NEW DRUG ADDON: CPT

## 2024-04-01 PROCEDURE — 96366 THER/PROPH/DIAG IV INF ADDON: CPT

## 2024-04-01 PROCEDURE — G0378 HOSPITAL OBSERVATION PER HR: HCPCS

## 2024-04-01 PROCEDURE — 96374 THER/PROPH/DIAG INJ IV PUSH: CPT

## 2024-04-01 PROCEDURE — 85730 THROMBOPLASTIN TIME PARTIAL: CPT

## 2024-04-01 PROCEDURE — 6360000002 HC RX W HCPCS: Performed by: STUDENT IN AN ORGANIZED HEALTH CARE EDUCATION/TRAINING PROGRAM

## 2024-04-01 PROCEDURE — 93971 EXTREMITY STUDY: CPT

## 2024-04-01 PROCEDURE — 96368 THER/DIAG CONCURRENT INF: CPT

## 2024-04-01 PROCEDURE — 6370000000 HC RX 637 (ALT 250 FOR IP)

## 2024-04-01 PROCEDURE — 96365 THER/PROPH/DIAG IV INF INIT: CPT

## 2024-04-01 PROCEDURE — 96361 HYDRATE IV INFUSION ADD-ON: CPT

## 2024-04-01 RX ORDER — ROSUVASTATIN CALCIUM 20 MG/1
40 TABLET, COATED ORAL NIGHTLY
Status: DISCONTINUED | OUTPATIENT
Start: 2024-04-02 | End: 2024-04-05 | Stop reason: HOSPADM

## 2024-04-01 RX ORDER — 0.9 % SODIUM CHLORIDE 0.9 %
500 INTRAVENOUS SOLUTION INTRAVENOUS ONCE
Status: COMPLETED | OUTPATIENT
Start: 2024-04-01 | End: 2024-04-01

## 2024-04-01 RX ORDER — POLYETHYLENE GLYCOL 3350 17 G/17G
17 POWDER, FOR SOLUTION ORAL DAILY PRN
Status: DISCONTINUED | OUTPATIENT
Start: 2024-04-01 | End: 2024-04-05 | Stop reason: HOSPADM

## 2024-04-01 RX ORDER — POTASSIUM CHLORIDE 20 MEQ/1
40 TABLET, EXTENDED RELEASE ORAL PRN
Status: DISCONTINUED | OUTPATIENT
Start: 2024-04-02 | End: 2024-04-05 | Stop reason: HOSPADM

## 2024-04-01 RX ORDER — VENLAFAXINE HYDROCHLORIDE 150 MG/1
150 CAPSULE, EXTENDED RELEASE ORAL DAILY
Status: DISCONTINUED | OUTPATIENT
Start: 2024-04-02 | End: 2024-04-05 | Stop reason: HOSPADM

## 2024-04-01 RX ORDER — POTASSIUM CHLORIDE 7.45 MG/ML
10 INJECTION INTRAVENOUS
Status: COMPLETED | OUTPATIENT
Start: 2024-04-01 | End: 2024-04-02

## 2024-04-01 RX ORDER — SUCRALFATE 1 G/1
1 TABLET ORAL DAILY
Status: DISCONTINUED | OUTPATIENT
Start: 2024-04-02 | End: 2024-04-05 | Stop reason: HOSPADM

## 2024-04-01 RX ORDER — GABAPENTIN 400 MG/1
800 CAPSULE ORAL 3 TIMES DAILY
Status: DISCONTINUED | OUTPATIENT
Start: 2024-04-02 | End: 2024-04-05 | Stop reason: HOSPADM

## 2024-04-01 RX ORDER — PRAMIPEXOLE DIHYDROCHLORIDE 0.25 MG/1
0.75 TABLET ORAL 2 TIMES DAILY
Status: DISCONTINUED | OUTPATIENT
Start: 2024-04-02 | End: 2024-04-05 | Stop reason: HOSPADM

## 2024-04-01 RX ORDER — POTASSIUM CHLORIDE 750 MG/1
10 TABLET, EXTENDED RELEASE ORAL DAILY
Status: DISCONTINUED | OUTPATIENT
Start: 2024-04-02 | End: 2024-04-01

## 2024-04-01 RX ORDER — ALBUTEROL SULFATE 2.5 MG/3ML
2.5 SOLUTION RESPIRATORY (INHALATION) EVERY 4 HOURS PRN
Status: DISCONTINUED | OUTPATIENT
Start: 2024-04-01 | End: 2024-04-05 | Stop reason: HOSPADM

## 2024-04-01 RX ORDER — POTASSIUM CHLORIDE 7.45 MG/ML
10 INJECTION INTRAVENOUS
Status: ACTIVE | OUTPATIENT
Start: 2024-04-01 | End: 2024-04-01

## 2024-04-01 RX ORDER — MORPHINE SULFATE 4 MG/ML
4 INJECTION, SOLUTION INTRAMUSCULAR; INTRAVENOUS
Status: COMPLETED | OUTPATIENT
Start: 2024-04-01 | End: 2024-04-01

## 2024-04-01 RX ORDER — POTASSIUM CHLORIDE 7.45 MG/ML
10 INJECTION INTRAVENOUS PRN
Status: DISCONTINUED | OUTPATIENT
Start: 2024-04-02 | End: 2024-04-05 | Stop reason: HOSPADM

## 2024-04-01 RX ORDER — ENOXAPARIN SODIUM 100 MG/ML
1 INJECTION SUBCUTANEOUS 2 TIMES DAILY
Status: DISCONTINUED | OUTPATIENT
Start: 2024-04-02 | End: 2024-04-02

## 2024-04-01 RX ORDER — POTASSIUM CHLORIDE 20 MEQ/1
40 TABLET, EXTENDED RELEASE ORAL 2 TIMES DAILY
Status: DISCONTINUED | OUTPATIENT
Start: 2024-04-02 | End: 2024-04-02

## 2024-04-01 RX ORDER — ALBUTEROL SULFATE 90 UG/1
2 AEROSOL, METERED RESPIRATORY (INHALATION) EVERY 4 HOURS PRN
Status: DISCONTINUED | OUTPATIENT
Start: 2024-04-01 | End: 2024-04-01

## 2024-04-01 RX ORDER — POTASSIUM CHLORIDE 20 MEQ/1
40 TABLET, EXTENDED RELEASE ORAL ONCE
Status: COMPLETED | OUTPATIENT
Start: 2024-04-01 | End: 2024-04-01

## 2024-04-01 RX ORDER — CALCIUM CARBONATE 500 MG/1
500 TABLET, CHEWABLE ORAL 3 TIMES DAILY PRN
Status: DISCONTINUED | OUTPATIENT
Start: 2024-04-01 | End: 2024-04-05 | Stop reason: HOSPADM

## 2024-04-01 RX ORDER — ASPIRIN 325 MG
325 TABLET, DELAYED RELEASE (ENTERIC COATED) ORAL 2 TIMES DAILY
Status: DISCONTINUED | OUTPATIENT
Start: 2024-04-02 | End: 2024-04-01

## 2024-04-01 RX ORDER — ACETAMINOPHEN 325 MG/1
650 TABLET ORAL EVERY 6 HOURS PRN
Status: DISCONTINUED | OUTPATIENT
Start: 2024-04-01 | End: 2024-04-05 | Stop reason: HOSPADM

## 2024-04-01 RX ORDER — PANTOPRAZOLE SODIUM 40 MG/1
40 TABLET, DELAYED RELEASE ORAL
Status: DISCONTINUED | OUTPATIENT
Start: 2024-04-02 | End: 2024-04-05 | Stop reason: HOSPADM

## 2024-04-01 RX ORDER — BENZONATATE 100 MG/1
100 CAPSULE ORAL 3 TIMES DAILY PRN
Status: DISCONTINUED | OUTPATIENT
Start: 2024-04-01 | End: 2024-04-05 | Stop reason: HOSPADM

## 2024-04-01 RX ORDER — MAGNESIUM SULFATE IN WATER 40 MG/ML
2000 INJECTION, SOLUTION INTRAVENOUS PRN
Status: DISCONTINUED | OUTPATIENT
Start: 2024-04-01 | End: 2024-04-05 | Stop reason: HOSPADM

## 2024-04-01 RX ORDER — ACETAMINOPHEN 650 MG/1
650 SUPPOSITORY RECTAL EVERY 6 HOURS PRN
Status: DISCONTINUED | OUTPATIENT
Start: 2024-04-01 | End: 2024-04-05 | Stop reason: HOSPADM

## 2024-04-01 RX ORDER — ONDANSETRON 2 MG/ML
4 INJECTION INTRAMUSCULAR; INTRAVENOUS EVERY 6 HOURS PRN
Status: DISCONTINUED | OUTPATIENT
Start: 2024-04-01 | End: 2024-04-05 | Stop reason: HOSPADM

## 2024-04-01 RX ORDER — LANOLIN ALCOHOL/MO/W.PET/CERES
3 CREAM (GRAM) TOPICAL NIGHTLY PRN
Status: DISCONTINUED | OUTPATIENT
Start: 2024-04-02 | End: 2024-04-05 | Stop reason: HOSPADM

## 2024-04-01 RX ORDER — MAGNESIUM SULFATE IN WATER 40 MG/ML
2000 INJECTION, SOLUTION INTRAVENOUS ONCE
Status: COMPLETED | OUTPATIENT
Start: 2024-04-01 | End: 2024-04-02

## 2024-04-01 RX ORDER — LEVOTHYROXINE SODIUM 0.05 MG/1
50 TABLET ORAL DAILY
Status: DISCONTINUED | OUTPATIENT
Start: 2024-04-02 | End: 2024-04-05 | Stop reason: HOSPADM

## 2024-04-01 RX ORDER — SODIUM CHLORIDE 0.9 % (FLUSH) 0.9 %
5-40 SYRINGE (ML) INJECTION PRN
Status: DISCONTINUED | OUTPATIENT
Start: 2024-04-01 | End: 2024-04-05 | Stop reason: HOSPADM

## 2024-04-01 RX ORDER — BUPROPION HYDROCHLORIDE 100 MG/1
100 TABLET, EXTENDED RELEASE ORAL DAILY
Status: DISCONTINUED | OUTPATIENT
Start: 2024-04-02 | End: 2024-04-05 | Stop reason: HOSPADM

## 2024-04-01 RX ORDER — SODIUM CHLORIDE 0.9 % (FLUSH) 0.9 %
5-40 SYRINGE (ML) INJECTION EVERY 12 HOURS SCHEDULED
Status: DISCONTINUED | OUTPATIENT
Start: 2024-04-01 | End: 2024-04-02 | Stop reason: SDUPTHER

## 2024-04-01 RX ORDER — POTASSIUM CHLORIDE 7.45 MG/ML
10 INJECTION INTRAVENOUS PRN
Status: DISCONTINUED | OUTPATIENT
Start: 2024-04-01 | End: 2024-04-01

## 2024-04-01 RX ORDER — HYDRALAZINE HYDROCHLORIDE 20 MG/ML
10 INJECTION INTRAMUSCULAR; INTRAVENOUS EVERY 6 HOURS PRN
Status: DISCONTINUED | OUTPATIENT
Start: 2024-04-01 | End: 2024-04-05 | Stop reason: HOSPADM

## 2024-04-01 RX ORDER — VITAMIN B COMPLEX
1000 TABLET ORAL DAILY
Status: DISCONTINUED | OUTPATIENT
Start: 2024-04-02 | End: 2024-04-05 | Stop reason: HOSPADM

## 2024-04-01 RX ORDER — SODIUM CHLORIDE 9 MG/ML
INJECTION, SOLUTION INTRAVENOUS PRN
Status: DISCONTINUED | OUTPATIENT
Start: 2024-04-01 | End: 2024-04-05 | Stop reason: HOSPADM

## 2024-04-01 RX ORDER — ONDANSETRON 4 MG/1
4 TABLET, ORALLY DISINTEGRATING ORAL EVERY 8 HOURS PRN
Status: DISCONTINUED | OUTPATIENT
Start: 2024-04-01 | End: 2024-04-05 | Stop reason: HOSPADM

## 2024-04-01 RX ORDER — POTASSIUM CHLORIDE 20 MEQ/1
40 TABLET, EXTENDED RELEASE ORAL PRN
Status: DISCONTINUED | OUTPATIENT
Start: 2024-04-01 | End: 2024-04-01

## 2024-04-01 RX ADMIN — POTASSIUM CHLORIDE 40 MEQ: 1500 TABLET, EXTENDED RELEASE ORAL at 21:51

## 2024-04-01 RX ADMIN — SODIUM CHLORIDE 500 ML: 9 INJECTION, SOLUTION INTRAVENOUS at 21:01

## 2024-04-01 RX ADMIN — POTASSIUM CHLORIDE 10 MEQ: 7.46 INJECTION, SOLUTION INTRAVENOUS at 21:50

## 2024-04-01 RX ADMIN — MAGNESIUM SULFATE HEPTAHYDRATE 2000 MG: 40 INJECTION, SOLUTION INTRAVENOUS at 21:51

## 2024-04-01 RX ADMIN — MORPHINE SULFATE 4 MG: 4 INJECTION, SOLUTION INTRAMUSCULAR; INTRAVENOUS at 21:59

## 2024-04-01 RX ADMIN — POTASSIUM CHLORIDE 10 MEQ: 7.46 INJECTION, SOLUTION INTRAVENOUS at 23:17

## 2024-04-01 ASSESSMENT — PAIN SCALES - GENERAL: PAINLEVEL_OUTOF10: 10

## 2024-04-01 ASSESSMENT — PAIN DESCRIPTION - ORIENTATION: ORIENTATION: RIGHT

## 2024-04-01 ASSESSMENT — PAIN DESCRIPTION - DESCRIPTORS: DESCRIPTORS: THROBBING

## 2024-04-01 ASSESSMENT — PAIN DESCRIPTION - LOCATION: LOCATION: HIP

## 2024-04-01 NOTE — ED PROVIDER NOTES
[facility]  EMERGENCY DEPARTMENT ENCOUNTER      Pt Name: Rebeka Renee  MRN: [id number]  Birthdate 1945  Date of evaluation: 4/1/2024  Provider: Cornelius Wilburn MD  PCP: Navneet Latif MD  Note Started: 7:40 PM EDT 4/1/24    CHIEF COMPLAINT       Chief Complaint   Patient presents with    Hip Pain     Starting today pt is unable to ambulate on right hip along with pain + w/ Hx of hip surgery and infected wound at site. Pt also compliant of bruising on right arm where pt's picc line is.       HISTORY OF PRESENT ILLNESS: 1 or more Elements   History From: Patient  Limitations to history : None    Rebeka Renee is a 78 y.o. female who presents with complaints of right hip pain and difficulty ambulating prior to arrival.  Per patient and per chart review patient is status post right hip I&D with femoral head exchange by orthopedic surgery on 3/21/2024.  She is also status post right open hip reduction hematoma evacuation on 2/12/2024.  Patient reports that since discharge home on 3/25/2024, she has been ambulating without major difficulty.  She reports that today she was able to ambulate to her car however upon getting out of the car she began feeling pain.  Does report some intermittent diarrhea.  Currently denies nausea, vomiting, fever, chills, chest pain, shortness of breath, dizziness, numbness, tingling extremities, dysuria.    Nursing Notes were all reviewed and agreed with or any disagreements were addressed in the HPI.    REVIEW OF SYSTEMS :    Positives and Pertinent negatives as per HPI.     PAST MEDICAL HISTORY/Chronic Conditions Affecting Care    has a past medical history of Anemia, Anxiety, Arthritis, Ascending aortic aneurysm (HCC), Asthma, Bipolar 1 disorder (HCC), Cancer (HCC), Cancer of breast, female (HCC) (01/2006), CHF (congestive heart failure) ([facility]), Chronic back pain, Depression, Depression with anxiety, Diabetes mellitus (HCC), Dyslipidemia, Fibromyalgia, History of blood  Subxiphoid pericardial window with drainage of pericardial effusion and pericardial biopsy:  Fluid and biopsy negative for metastases.     Pleural effusion 05/02/2010    Noted on chest x-ray.    TIA (transient ischemic attack)     Tobacco abuse     Patient smoked 3 ppd x 26 years.  Quit in 1995.    Type II or unspecified type diabetes mellitus without mention of complication, not stated as uncontrolled        CONSULTS: (Who and What was discussed)  IP CONSULT TO ORTHOPEDIC SURGERY    Discussion with Other Profesionals : Admitting Team Dr. Schaefer will admit patient and Consultant orthopedic surgery will consult on patient    Social Determinants : None    Records Reviewed : Inpatient Notes reviewed discharge summary from 3/25/2024, patient was discharged status post prosthetic joint infection to the right hip which was managed with right hip I&D and femoral head exchange.  She was prescribed DVT prophylaxis and pain medication.    CC/HPI Summary, DDx, ED Course, and Reassessment: 78 y.o. female who presents with complaints of right hip pain and difficulty ambulating prior to arrival.     On initial evaluation patient is in no acute distress, she does appear somewhat uncomfortable.  Afebrile, vital signs generally unremarkable.  On initial exam right hip surgical site appears CDI with minimal serous drainage, no obvious erythema or signs of active infection.  No obvious bony deformity, no crepitus.  Bilateral lower extremities with 2+ DP PT pulses.  Tender to palpation of the right hip.  No focal neurologic.  Presentation concerning for: Fracture, dislocation, sprain, strain to name a few.    Labs reviewed by me showed CMP with hypokalemia to 2, chloride 90, metabolic alkalosis with bicarb 38.  Lactic acid initial 3.2, repeat pending.  Random glucose 150.  Serum calcium 8.2.  Total protein 5.4.  Patient was ordered for repeat BMPs and was ordered for potassium repletion with magnesium.  CBC showing no leukocytosis or

## 2024-04-02 ENCOUNTER — APPOINTMENT (OUTPATIENT)
Dept: GENERAL RADIOLOGY | Age: 79
DRG: 560 | End: 2024-04-02
Payer: MEDICARE

## 2024-04-02 ENCOUNTER — APPOINTMENT (OUTPATIENT)
Dept: CT IMAGING | Age: 79
DRG: 560 | End: 2024-04-02
Payer: MEDICARE

## 2024-04-02 ENCOUNTER — ANESTHESIA (OUTPATIENT)
Dept: OPERATING ROOM | Age: 79
End: 2024-04-02
Payer: MEDICARE

## 2024-04-02 ENCOUNTER — ANESTHESIA EVENT (OUTPATIENT)
Dept: OPERATING ROOM | Age: 79
End: 2024-04-02
Payer: MEDICARE

## 2024-04-02 PROBLEM — E87.6 ACUTE HYPOKALEMIA: Status: ACTIVE | Noted: 2024-04-02

## 2024-04-02 LAB
ALBUMIN SERPL-MCNC: 2.2 G/DL (ref 3.5–5.2)
ALP SERPL-CCNC: 133 U/L (ref 35–104)
ALT SERPL-CCNC: <5 U/L (ref 0–32)
ANION GAP SERPL CALCULATED.3IONS-SCNC: 2 MMOL/L (ref 7–16)
ANION GAP SERPL CALCULATED.3IONS-SCNC: 7 MMOL/L (ref 7–16)
AST SERPL-CCNC: 21 U/L (ref 0–31)
BILIRUB SERPL-MCNC: 0.3 MG/DL (ref 0–1.2)
BUN SERPL-MCNC: 10 MG/DL (ref 6–23)
BUN SERPL-MCNC: 9 MG/DL (ref 6–23)
CALCIUM SERPL-MCNC: 7.7 MG/DL (ref 8.6–10.2)
CALCIUM SERPL-MCNC: 7.9 MG/DL (ref 8.6–10.2)
CHLORIDE SERPL-SCNC: 95 MMOL/L (ref 98–107)
CHLORIDE SERPL-SCNC: 96 MMOL/L (ref 98–107)
CHOLEST SERPL-MCNC: 96 MG/DL
CO2 SERPL-SCNC: 38 MMOL/L (ref 22–29)
CO2 SERPL-SCNC: 39 MMOL/L (ref 22–29)
CREAT SERPL-MCNC: 0.7 MG/DL (ref 0.5–1)
CREAT SERPL-MCNC: 0.7 MG/DL (ref 0.5–1)
EKG ATRIAL RATE: 78 BPM
EKG P AXIS: 70 DEGREES
EKG P-R INTERVAL: 138 MS
EKG Q-T INTERVAL: 370 MS
EKG QRS DURATION: 98 MS
EKG QTC CALCULATION (BAZETT): 421 MS
EKG R AXIS: -41 DEGREES
EKG T AXIS: 89 DEGREES
EKG VENTRICULAR RATE: 78 BPM
ERYTHROCYTE [DISTWIDTH] IN BLOOD BY AUTOMATED COUNT: 16.9 % (ref 11.5–15)
ERYTHROCYTE [DISTWIDTH] IN BLOOD BY AUTOMATED COUNT: 16.9 % (ref 11.5–15)
GFR SERPL CREATININE-BSD FRML MDRD: >90 ML/MIN/1.73M2
GFR SERPL CREATININE-BSD FRML MDRD: >90 ML/MIN/1.73M2
GLUCOSE SERPL-MCNC: 73 MG/DL (ref 74–99)
GLUCOSE SERPL-MCNC: 81 MG/DL (ref 74–99)
HBA1C MFR BLD: 4.4 % (ref 4–5.6)
HCT VFR BLD AUTO: 31.3 % (ref 34–48)
HCT VFR BLD AUTO: 33.2 % (ref 34–48)
HDLC SERPL-MCNC: 56 MG/DL
HGB BLD-MCNC: 10 G/DL (ref 11.5–15.5)
HGB BLD-MCNC: 9.7 G/DL (ref 11.5–15.5)
LACTATE BLDV-SCNC: 0.8 MMOL/L (ref 0.5–2.2)
LDLC SERPL CALC-MCNC: 29 MG/DL
MAGNESIUM SERPL-MCNC: 2 MG/DL (ref 1.6–2.6)
MCH RBC QN AUTO: 26 PG (ref 26–35)
MCH RBC QN AUTO: 26.2 PG (ref 26–35)
MCHC RBC AUTO-ENTMCNC: 30.1 G/DL (ref 32–34.5)
MCHC RBC AUTO-ENTMCNC: 31 G/DL (ref 32–34.5)
MCV RBC AUTO: 84.6 FL (ref 80–99.9)
MCV RBC AUTO: 86.2 FL (ref 80–99.9)
PARTIAL THROMBOPLASTIN TIME: 144.8 SEC (ref 24.5–35.1)
PARTIAL THROMBOPLASTIN TIME: >240 SEC (ref 24.5–35.1)
PHOSPHATE SERPL-MCNC: 3.1 MG/DL (ref 2.5–4.5)
PLATELET # BLD AUTO: 254 K/UL (ref 130–450)
PLATELET # BLD AUTO: 265 K/UL (ref 130–450)
PMV BLD AUTO: 11.5 FL (ref 7–12)
PMV BLD AUTO: 9.8 FL (ref 7–12)
POTASSIUM SERPL-SCNC: 2.7 MMOL/L (ref 3.5–5)
POTASSIUM SERPL-SCNC: 3.7 MMOL/L (ref 3.5–5)
PROT SERPL-MCNC: 5 G/DL (ref 6.4–8.3)
RBC # BLD AUTO: 3.7 M/UL (ref 3.5–5.5)
RBC # BLD AUTO: 3.85 M/UL (ref 3.5–5.5)
SODIUM SERPL-SCNC: 137 MMOL/L (ref 132–146)
SODIUM SERPL-SCNC: 140 MMOL/L (ref 132–146)
TRIGL SERPL-MCNC: 56 MG/DL
TSH SERPL DL<=0.05 MIU/L-ACNC: 6.39 UIU/ML (ref 0.27–4.2)
VLDLC SERPL CALC-MCNC: 11 MG/DL
WBC OTHER # BLD: 10.8 K/UL (ref 4.5–11.5)
WBC OTHER # BLD: 9.3 K/UL (ref 4.5–11.5)

## 2024-04-02 PROCEDURE — 3600000005 HC SURGERY LEVEL 5 BASE: Performed by: STUDENT IN AN ORGANIZED HEALTH CARE EDUCATION/TRAINING PROGRAM

## 2024-04-02 PROCEDURE — 85730 THROMBOPLASTIN TIME PARTIAL: CPT

## 2024-04-02 PROCEDURE — 3700000000 HC ANESTHESIA ATTENDED CARE: Performed by: STUDENT IN AN ORGANIZED HEALTH CARE EDUCATION/TRAINING PROGRAM

## 2024-04-02 PROCEDURE — 84443 ASSAY THYROID STIM HORMONE: CPT

## 2024-04-02 PROCEDURE — 6370000000 HC RX 637 (ALT 250 FOR IP): Performed by: INTERNAL MEDICINE

## 2024-04-02 PROCEDURE — 83735 ASSAY OF MAGNESIUM: CPT

## 2024-04-02 PROCEDURE — 3600000015 HC SURGERY LEVEL 5 ADDTL 15MIN: Performed by: STUDENT IN AN ORGANIZED HEALTH CARE EDUCATION/TRAINING PROGRAM

## 2024-04-02 PROCEDURE — 85027 COMPLETE CBC AUTOMATED: CPT

## 2024-04-02 PROCEDURE — 96375 TX/PRO/DX INJ NEW DRUG ADDON: CPT

## 2024-04-02 PROCEDURE — 99232 SBSQ HOSP IP/OBS MODERATE 35: CPT | Performed by: STUDENT IN AN ORGANIZED HEALTH CARE EDUCATION/TRAINING PROGRAM

## 2024-04-02 PROCEDURE — 6360000002 HC RX W HCPCS: Performed by: ANESTHESIOLOGY

## 2024-04-02 PROCEDURE — 96366 THER/PROPH/DIAG IV INF ADDON: CPT

## 2024-04-02 PROCEDURE — 6360000002 HC RX W HCPCS: Performed by: INTERNAL MEDICINE

## 2024-04-02 PROCEDURE — 1200000000 HC SEMI PRIVATE

## 2024-04-02 PROCEDURE — 2500000003 HC RX 250 WO HCPCS

## 2024-04-02 PROCEDURE — 2580000003 HC RX 258

## 2024-04-02 PROCEDURE — 72170 X-RAY EXAM OF PELVIS: CPT

## 2024-04-02 PROCEDURE — 84100 ASSAY OF PHOSPHORUS: CPT

## 2024-04-02 PROCEDURE — 73700 CT LOWER EXTREMITY W/O DYE: CPT

## 2024-04-02 PROCEDURE — 6360000002 HC RX W HCPCS

## 2024-04-02 PROCEDURE — 2709999900 HC NON-CHARGEABLE SUPPLY: Performed by: STUDENT IN AN ORGANIZED HEALTH CARE EDUCATION/TRAINING PROGRAM

## 2024-04-02 PROCEDURE — 36415 COLL VENOUS BLD VENIPUNCTURE: CPT

## 2024-04-02 PROCEDURE — 2580000003 HC RX 258: Performed by: ANESTHESIOLOGY

## 2024-04-02 PROCEDURE — 27266 TREAT HIP DISLOCATION: CPT | Performed by: STUDENT IN AN ORGANIZED HEALTH CARE EDUCATION/TRAINING PROGRAM

## 2024-04-02 PROCEDURE — 7100000000 HC PACU RECOVERY - FIRST 15 MIN: Performed by: STUDENT IN AN ORGANIZED HEALTH CARE EDUCATION/TRAINING PROGRAM

## 2024-04-02 PROCEDURE — 93005 ELECTROCARDIOGRAM TRACING: CPT | Performed by: INTERNAL MEDICINE

## 2024-04-02 PROCEDURE — 83036 HEMOGLOBIN GLYCOSYLATED A1C: CPT

## 2024-04-02 PROCEDURE — 3700000001 HC ADD 15 MINUTES (ANESTHESIA): Performed by: STUDENT IN AN ORGANIZED HEALTH CARE EDUCATION/TRAINING PROGRAM

## 2024-04-02 PROCEDURE — 2580000003 HC RX 258: Performed by: INTERNAL MEDICINE

## 2024-04-02 PROCEDURE — 80048 BASIC METABOLIC PNL TOTAL CA: CPT

## 2024-04-02 PROCEDURE — 7100000001 HC PACU RECOVERY - ADDTL 15 MIN: Performed by: STUDENT IN AN ORGANIZED HEALTH CARE EDUCATION/TRAINING PROGRAM

## 2024-04-02 PROCEDURE — 71045 X-RAY EXAM CHEST 1 VIEW: CPT

## 2024-04-02 PROCEDURE — 36592 COLLECT BLOOD FROM PICC: CPT

## 2024-04-02 PROCEDURE — 0QS6XZZ REPOSITION RIGHT UPPER FEMUR, EXTERNAL APPROACH: ICD-10-PCS | Performed by: STUDENT IN AN ORGANIZED HEALTH CARE EDUCATION/TRAINING PROGRAM

## 2024-04-02 PROCEDURE — 93010 ELECTROCARDIOGRAM REPORT: CPT | Performed by: INTERNAL MEDICINE

## 2024-04-02 PROCEDURE — 80053 COMPREHEN METABOLIC PANEL: CPT

## 2024-04-02 PROCEDURE — 80061 LIPID PANEL: CPT

## 2024-04-02 PROCEDURE — 83605 ASSAY OF LACTIC ACID: CPT

## 2024-04-02 RX ORDER — PROPOFOL 10 MG/ML
INJECTION, EMULSION INTRAVENOUS PRN
Status: DISCONTINUED | OUTPATIENT
Start: 2024-04-02 | End: 2024-04-02 | Stop reason: SDUPTHER

## 2024-04-02 RX ORDER — HYDROMORPHONE HYDROCHLORIDE 1 MG/ML
0.25 INJECTION, SOLUTION INTRAMUSCULAR; INTRAVENOUS; SUBCUTANEOUS EVERY 5 MIN PRN
Status: DISCONTINUED | OUTPATIENT
Start: 2024-04-02 | End: 2024-04-05 | Stop reason: HOSPADM

## 2024-04-02 RX ORDER — ONDANSETRON 2 MG/ML
INJECTION INTRAMUSCULAR; INTRAVENOUS PRN
Status: DISCONTINUED | OUTPATIENT
Start: 2024-04-02 | End: 2024-04-02 | Stop reason: SDUPTHER

## 2024-04-02 RX ORDER — POTASSIUM CHLORIDE 20 MEQ/1
40 TABLET, EXTENDED RELEASE ORAL ONCE
Status: COMPLETED | OUTPATIENT
Start: 2024-04-02 | End: 2024-04-02

## 2024-04-02 RX ORDER — HYDROMORPHONE HYDROCHLORIDE 1 MG/ML
0.5 INJECTION, SOLUTION INTRAMUSCULAR; INTRAVENOUS; SUBCUTANEOUS EVERY 5 MIN PRN
Status: DISCONTINUED | OUTPATIENT
Start: 2024-04-02 | End: 2024-04-05 | Stop reason: HOSPADM

## 2024-04-02 RX ORDER — MIDAZOLAM HYDROCHLORIDE 1 MG/ML
INJECTION INTRAMUSCULAR; INTRAVENOUS PRN
Status: DISCONTINUED | OUTPATIENT
Start: 2024-04-02 | End: 2024-04-02 | Stop reason: SDUPTHER

## 2024-04-02 RX ORDER — ONDANSETRON 2 MG/ML
4 INJECTION INTRAMUSCULAR; INTRAVENOUS
Status: ACTIVE | OUTPATIENT
Start: 2024-04-02 | End: 2024-04-03

## 2024-04-02 RX ORDER — DEXAMETHASONE SODIUM PHOSPHATE 10 MG/ML
INJECTION INTRAMUSCULAR; INTRAVENOUS PRN
Status: DISCONTINUED | OUTPATIENT
Start: 2024-04-02 | End: 2024-04-02 | Stop reason: SDUPTHER

## 2024-04-02 RX ORDER — MIDAZOLAM HYDROCHLORIDE 1 MG/ML
1 INJECTION INTRAMUSCULAR; INTRAVENOUS ONCE
Status: COMPLETED | OUTPATIENT
Start: 2024-04-02 | End: 2024-04-02

## 2024-04-02 RX ORDER — HEPARIN SODIUM 10000 [USP'U]/100ML
5-30 INJECTION, SOLUTION INTRAVENOUS CONTINUOUS
Status: DISCONTINUED | OUTPATIENT
Start: 2024-04-02 | End: 2024-04-03

## 2024-04-02 RX ORDER — HEPARIN SODIUM 1000 [USP'U]/ML
40 INJECTION, SOLUTION INTRAVENOUS; SUBCUTANEOUS PRN
Status: DISCONTINUED | OUTPATIENT
Start: 2024-04-02 | End: 2024-04-03 | Stop reason: ALTCHOICE

## 2024-04-02 RX ORDER — LIDOCAINE HYDROCHLORIDE 20 MG/ML
INJECTION, SOLUTION INTRAVENOUS PRN
Status: DISCONTINUED | OUTPATIENT
Start: 2024-04-02 | End: 2024-04-02 | Stop reason: SDUPTHER

## 2024-04-02 RX ORDER — HEPARIN SODIUM 1000 [USP'U]/ML
80 INJECTION, SOLUTION INTRAVENOUS; SUBCUTANEOUS ONCE
Status: COMPLETED | OUTPATIENT
Start: 2024-04-02 | End: 2024-04-02

## 2024-04-02 RX ORDER — OXYCODONE HYDROCHLORIDE AND ACETAMINOPHEN 5; 325 MG/1; MG/1
1 TABLET ORAL EVERY 4 HOURS PRN
Status: DISCONTINUED | OUTPATIENT
Start: 2024-04-02 | End: 2024-04-05 | Stop reason: HOSPADM

## 2024-04-02 RX ORDER — HEPARIN SODIUM 1000 [USP'U]/ML
80 INJECTION, SOLUTION INTRAVENOUS; SUBCUTANEOUS PRN
Status: DISCONTINUED | OUTPATIENT
Start: 2024-04-02 | End: 2024-04-03 | Stop reason: ALTCHOICE

## 2024-04-02 RX ORDER — SODIUM CHLORIDE 0.9 % (FLUSH) 0.9 %
5-40 SYRINGE (ML) INJECTION EVERY 12 HOURS SCHEDULED
Status: DISCONTINUED | OUTPATIENT
Start: 2024-04-02 | End: 2024-04-05 | Stop reason: HOSPADM

## 2024-04-02 RX ORDER — NALOXONE HYDROCHLORIDE 0.4 MG/ML
INJECTION, SOLUTION INTRAMUSCULAR; INTRAVENOUS; SUBCUTANEOUS PRN
Status: DISCONTINUED | OUTPATIENT
Start: 2024-04-02 | End: 2024-04-05 | Stop reason: HOSPADM

## 2024-04-02 RX ORDER — SODIUM CHLORIDE 9 MG/ML
INJECTION, SOLUTION INTRAVENOUS CONTINUOUS PRN
Status: DISCONTINUED | OUTPATIENT
Start: 2024-04-02 | End: 2024-04-02 | Stop reason: SDUPTHER

## 2024-04-02 RX ORDER — FENTANYL CITRATE 50 UG/ML
INJECTION, SOLUTION INTRAMUSCULAR; INTRAVENOUS PRN
Status: DISCONTINUED | OUTPATIENT
Start: 2024-04-02 | End: 2024-04-02 | Stop reason: SDUPTHER

## 2024-04-02 RX ORDER — SODIUM CHLORIDE 0.9 % (FLUSH) 0.9 %
5-40 SYRINGE (ML) INJECTION PRN
Status: DISCONTINUED | OUTPATIENT
Start: 2024-04-02 | End: 2024-04-05 | Stop reason: HOSPADM

## 2024-04-02 RX ORDER — ROCURONIUM BROMIDE 10 MG/ML
INJECTION, SOLUTION INTRAVENOUS PRN
Status: DISCONTINUED | OUTPATIENT
Start: 2024-04-02 | End: 2024-04-02 | Stop reason: SDUPTHER

## 2024-04-02 RX ORDER — SODIUM CHLORIDE 9 MG/ML
INJECTION, SOLUTION INTRAVENOUS PRN
Status: DISCONTINUED | OUTPATIENT
Start: 2024-04-02 | End: 2024-04-05 | Stop reason: HOSPADM

## 2024-04-02 RX ADMIN — SODIUM CHLORIDE, PRESERVATIVE FREE 20 ML: 5 INJECTION INTRAVENOUS at 23:09

## 2024-04-02 RX ADMIN — Medication 1000 UNITS: at 08:13

## 2024-04-02 RX ADMIN — PHENYLEPHRINE HYDROCHLORIDE 100 MCG: 10 INJECTION INTRAVENOUS at 15:09

## 2024-04-02 RX ADMIN — SODIUM CHLORIDE: 9 INJECTION, SOLUTION INTRAVENOUS at 14:15

## 2024-04-02 RX ADMIN — PROPOFOL 100 MG: 10 INJECTION, EMULSION INTRAVENOUS at 14:56

## 2024-04-02 RX ADMIN — POTASSIUM CHLORIDE 10 MEQ: 10 INJECTION, SOLUTION INTRAVENOUS at 08:24

## 2024-04-02 RX ADMIN — PANTOPRAZOLE SODIUM 40 MG: 40 TABLET, DELAYED RELEASE ORAL at 05:54

## 2024-04-02 RX ADMIN — SUGAMMADEX 200 MG: 100 INJECTION, SOLUTION INTRAVENOUS at 15:19

## 2024-04-02 RX ADMIN — WATER 2000 MG: 1 INJECTION INTRAMUSCULAR; INTRAVENOUS; SUBCUTANEOUS at 21:54

## 2024-04-02 RX ADMIN — OXYCODONE HYDROCHLORIDE AND ACETAMINOPHEN 1 TABLET: 5; 325 TABLET ORAL at 10:09

## 2024-04-02 RX ADMIN — GABAPENTIN 800 MG: 400 CAPSULE ORAL at 08:12

## 2024-04-02 RX ADMIN — BUPROPION HYDROCHLORIDE 100 MG: 100 TABLET, EXTENDED RELEASE ORAL at 08:13

## 2024-04-02 RX ADMIN — SODIUM CHLORIDE, PRESERVATIVE FREE 10 ML: 5 INJECTION INTRAVENOUS at 21:54

## 2024-04-02 RX ADMIN — POTASSIUM CHLORIDE 10 MEQ: 10 INJECTION, SOLUTION INTRAVENOUS at 10:12

## 2024-04-02 RX ADMIN — FENTANYL CITRATE 100 MCG: 0.05 INJECTION, SOLUTION INTRAMUSCULAR; INTRAVENOUS at 14:56

## 2024-04-02 RX ADMIN — PRAMIPEXOLE DIHYDROCHLORIDE 0.75 MG: 0.25 TABLET ORAL at 08:12

## 2024-04-02 RX ADMIN — PHENYLEPHRINE HYDROCHLORIDE 150 MCG: 10 INJECTION INTRAVENOUS at 15:12

## 2024-04-02 RX ADMIN — OXYCODONE HYDROCHLORIDE AND ACETAMINOPHEN 1 TABLET: 5; 325 TABLET ORAL at 01:34

## 2024-04-02 RX ADMIN — OXYCODONE HYDROCHLORIDE AND ACETAMINOPHEN 1 TABLET: 5; 325 TABLET ORAL at 21:53

## 2024-04-02 RX ADMIN — MIDAZOLAM 1 MG: 1 INJECTION INTRAMUSCULAR; INTRAVENOUS at 14:24

## 2024-04-02 RX ADMIN — ROSUVASTATIN 40 MG: 20 TABLET, FILM COATED ORAL at 21:53

## 2024-04-02 RX ADMIN — MIDAZOLAM 1 MG: 1 INJECTION INTRAMUSCULAR; INTRAVENOUS at 14:51

## 2024-04-02 RX ADMIN — DEXAMETHASONE SODIUM PHOSPHATE 10 MG: 10 INJECTION INTRAMUSCULAR; INTRAVENOUS at 14:56

## 2024-04-02 RX ADMIN — POTASSIUM CHLORIDE 40 MEQ: 1500 TABLET, EXTENDED RELEASE ORAL at 01:34

## 2024-04-02 RX ADMIN — PHENYLEPHRINE HYDROCHLORIDE 50 MCG: 10 INJECTION INTRAVENOUS at 15:06

## 2024-04-02 RX ADMIN — PHENYLEPHRINE HYDROCHLORIDE 100 MCG: 10 INJECTION INTRAVENOUS at 15:20

## 2024-04-02 RX ADMIN — LIDOCAINE HYDROCHLORIDE 60 MG: 20 INJECTION, SOLUTION INTRAVENOUS at 14:56

## 2024-04-02 RX ADMIN — POTASSIUM CHLORIDE 40 MEQ: 20 TABLET, EXTENDED RELEASE ORAL at 11:18

## 2024-04-02 RX ADMIN — SUCRALFATE 1 G: 1 TABLET ORAL at 08:13

## 2024-04-02 RX ADMIN — HEPARIN SODIUM 6530 UNITS: 1000 INJECTION INTRAVENOUS; SUBCUTANEOUS at 02:04

## 2024-04-02 RX ADMIN — POTASSIUM CHLORIDE 40 MEQ: 20 TABLET, EXTENDED RELEASE ORAL at 09:15

## 2024-04-02 RX ADMIN — SODIUM CHLORIDE: 9 INJECTION, SOLUTION INTRAVENOUS at 14:51

## 2024-04-02 RX ADMIN — Medication 3 MG: at 21:53

## 2024-04-02 RX ADMIN — VENLAFAXINE HYDROCHLORIDE 150 MG: 150 CAPSULE, EXTENDED RELEASE ORAL at 08:13

## 2024-04-02 RX ADMIN — SODIUM CHLORIDE, PRESERVATIVE FREE 10 ML: 5 INJECTION INTRAVENOUS at 08:14

## 2024-04-02 RX ADMIN — ROCURONIUM BROMIDE 40 MG: 10 INJECTION, SOLUTION INTRAVENOUS at 14:56

## 2024-04-02 RX ADMIN — ONDANSETRON HYDROCHLORIDE 4 MG: 2 SOLUTION INTRAMUSCULAR; INTRAVENOUS at 15:16

## 2024-04-02 RX ADMIN — WATER 2000 MG: 1 INJECTION INTRAMUSCULAR; INTRAVENOUS; SUBCUTANEOUS at 07:55

## 2024-04-02 RX ADMIN — OXYCODONE HYDROCHLORIDE AND ACETAMINOPHEN 1 TABLET: 5; 325 TABLET ORAL at 05:54

## 2024-04-02 RX ADMIN — LEVOTHYROXINE SODIUM 50 MCG: 0.05 TABLET ORAL at 05:54

## 2024-04-02 RX ADMIN — POTASSIUM CHLORIDE 10 MEQ: 10 INJECTION, SOLUTION INTRAVENOUS at 09:24

## 2024-04-02 RX ADMIN — GABAPENTIN 800 MG: 400 CAPSULE ORAL at 21:53

## 2024-04-02 ASSESSMENT — PAIN DESCRIPTION - LOCATION
LOCATION: LEG
LOCATION: HIP
LOCATION: HIP

## 2024-04-02 ASSESSMENT — PAIN DESCRIPTION - ONSET: ONSET: AWAKENED FROM SLEEP

## 2024-04-02 ASSESSMENT — PAIN - FUNCTIONAL ASSESSMENT
PAIN_FUNCTIONAL_ASSESSMENT: NONE - DENIES PAIN
PAIN_FUNCTIONAL_ASSESSMENT: INTOLERABLE, UNABLE TO DO ANY ACTIVE OR PASSIVE ACTIVITIES
PAIN_FUNCTIONAL_ASSESSMENT: PREVENTS OR INTERFERES SOME ACTIVE ACTIVITIES AND ADLS

## 2024-04-02 ASSESSMENT — PAIN DESCRIPTION - ORIENTATION
ORIENTATION: RIGHT

## 2024-04-02 ASSESSMENT — PAIN SCALES - GENERAL
PAINLEVEL_OUTOF10: 7
PAINLEVEL_OUTOF10: 10
PAINLEVEL_OUTOF10: 7
PAINLEVEL_OUTOF10: 8

## 2024-04-02 ASSESSMENT — ENCOUNTER SYMPTOMS
DYSPNEA ACTIVITY LEVEL: AFTER AMBULATING 1 FLIGHT OF STAIRS
SHORTNESS OF BREATH: 1

## 2024-04-02 ASSESSMENT — COPD QUESTIONNAIRES: CAT_SEVERITY: MODERATE

## 2024-04-02 ASSESSMENT — PAIN DESCRIPTION - FREQUENCY: FREQUENCY: INTERMITTENT

## 2024-04-02 ASSESSMENT — PAIN DESCRIPTION - DESCRIPTORS: DESCRIPTORS: ACHING;POUNDING;SORE

## 2024-04-02 ASSESSMENT — PAIN DESCRIPTION - PAIN TYPE: TYPE: ACUTE PAIN;SURGICAL PAIN

## 2024-04-02 NOTE — ACP (ADVANCE CARE PLANNING)
Advance Care Planning   The patient has the following advanced directives on file:  Advance Directives       Power of  Living Will ACP-Advance Directive ACP-Power of     Not on File Not on File Not on File Not on File            The patient has appointed the following active healthcare agents:    Primary Decision Maker: Nate Renee - Spouse - [phone]    The Patient has the following current code status:    Code Status: Full Code    Visit Documentation:      Amanda Wright RN  4/2/2024

## 2024-04-02 NOTE — PROGRESS NOTES
HOSPITALIST PROGRESS NOTE    Patient's name: Rebeka Renee  : 1945  Admission date: 2024  Date of service: 2024   Primary care physician: Navneet Latif MD    Assessment   Rebeka Renee is a 78 y.o. female patient of Navneet Latif MD with history of hypertension, hyperlipidemia, diabetes, hypothyroidism, AAA, lung/kidney/breast cancer status post nephrectomy and lobectomy, CHF, CVA, fibromyalgia, anxiety and depression presented to [facility] on 2024 with complaints of right hip pain.  Patient had recent admission for right hip infection of surgical site and was discharged on 3/25.  While in the ER patient had x-ray right hip that showed right hip dislocation.  Patient admitted for orthopedic surgery evaluation.    Right hip dislocation  Surgical site infection s/p I&D right hip wound and revision of right femoral head hemiarthroplasty  Management per orthopedic surgery  Continue IV cefazolin for 6 weeks till 2024    Diarrhea  Patient complained of having diarrhea for 4 days  Check for C. Difficile    Right axillary vein occlusive thrombus  Likely related to PICC line  PICC line is functional, will continue to use  Started on IV heparin, will plan to transition to Eliquis    Hypokalemia  Replace potassium    Mood disorder  Continue Wellbutrin, Effexor    GERD  Continue Protonix    Hyperlipidemia  Continue statin      Follow labs   DVT prophylaxis Heparin  Please see orders for further management and care.  Discharge plan: Pending surgery with orthopedic surgery and PT/OT.  Possibly will need placement    Subjective  Rebeka was seen and examined at bedside.  Is doing well, no acute overnight events.  Pain is reasonably controlled.      Review of Systems  All systems reviewed and negative except mentioned above.       Objective  Most Recent Recorded Vitals  /83   Pulse 83   Temp 97.6 °F (36.4 °C) (Temporal)   Resp 18   Ht 1.651 m (5' 5\")    IntraVENous Q6H PRN Oumou Schaefer, DO        melatonin tablet 3 mg  3 mg Oral Nightly PRN Oumou Schaefer, DO        Polyvinyl Alcohol-Povidone PF (REFRESH) 1.4-0.6 % ophthalmic solution 1 drop  1 drop Both Eyes PRN Oumou Schaefer, DO        sodium chloride (OCEAN, BABY AYR) 0.65 % nasal spray 1 spray  1 spray Each Nostril PRN Oumou Schaefer, DO        sodium chloride flush 0.9 % injection 5-40 mL  5-40 mL IntraVENous 2 times per day Oumou Schaefer, DO   10 mL at 04/02/24 0814    sodium chloride flush 0.9 % injection 5-40 mL  5-40 mL IntraVENous PRN Oumou Schaefer, DO        0.9 % sodium chloride infusion   IntraVENous PRN Oumou Schaefer, DO        magnesium sulfate 2000 mg in 50 mL IVPB premix  2,000 mg IntraVENous PRN Oumou Schaefer, DO        ondansetron (ZOFRAN-ODT) disintegrating tablet 4 mg  4 mg Oral Q8H PRN Oumou Schaefer, DO        Or    ondansetron (ZOFRAN) injection 4 mg  4 mg IntraVENous Q6H PRN Oumou Schaefer, DO        polyethylene glycol (GLYCOLAX) packet 17 g  17 g Oral Daily PRN Oumou Schaefer, DO        acetaminophen (TYLENOL) tablet 650 mg  650 mg Oral Q6H PRN Oumou Schaefer, DO        Or    acetaminophen (TYLENOL) suppository 650 mg  650 mg Rectal Q6H PRN Oumou Schaefer, DO        buPROPion (WELLBUTRIN SR) extended release tablet 100 mg  100 mg Oral Daily Oumou Schaefer, DO   100 mg at 04/02/24 0813    Vitamin D (CHOLECALCIFEROL) tablet 1,000 Units  1,000 Units Oral Daily Oumou Schaefer DO   1,000 Units at 04/02/24 0813    gabapentin (NEURONTIN) capsule 800 mg  800 mg Oral TID Oumou Schaefer DO   800 mg at 04/02/24 0812    levothyroxine (SYNTHROID) tablet 50 mcg  50 mcg Oral Daily Oumou Schaefer, DO   50 mcg at 04/02/24 0554    pantoprazole (PROTONIX) tablet 40 mg  40 mg Oral QAM AC Oumou Schaefer DO   40 mg at 04/02/24 0554    pramipexole (MIRAPEX) tablet 0.75 mg  0.75 mg Oral BID Oumou Schaefer DO   0.75 mg at

## 2024-04-02 NOTE — H&P
Collection Time: 04/01/24  7:57 PM   Result Value Ref Range    WBC 11.2 4.5 - 11.5 k/uL    RBC 3.83 3.50 - 5.50 m/uL    Hemoglobin 10.1 (L) 11.5 - 15.5 g/dL    Hematocrit 32.7 (L) 34.0 - 48.0 %    MCV 85.4 80.0 - 99.9 fL    MCH 26.4 26.0 - 35.0 pg    MCHC 30.9 (L) 32.0 - 34.5 g/dL    RDW 16.6 (H) 11.5 - 15.0 %    Platelets 268 130 - 450 k/uL    MPV 10.4 7.0 - 12.0 fL    Neutrophils % 71 43.0 - 80.0 %    Lymphocytes % 18 (L) 20.0 - 42.0 %    Monocytes % 9 2.0 - 12.0 %    Eosinophils % 2 0 - 6 %    Basophils % 0 0.0 - 2.0 %    Immature Granulocytes 1 0.0 - 5.0 %    Neutrophils Absolute 7.88 (H) 1.80 - 7.30 k/uL    Lymphocytes Absolute 2.02 1.50 - 4.00 k/uL    Monocytes Absolute 0.99 (H) 0.10 - 0.95 k/uL    Eosinophils Absolute 0.18 0.05 - 0.50 k/uL    Basophils Absolute 0.03 0.00 - 0.20 k/uL    Absolute Immature Granulocyte 0.07 0.00 - 0.58 k/uL   CMP    Collection Time: 04/01/24  7:57 PM   Result Value Ref Range    Sodium 135 132 - 146 mmol/L    Potassium 2.0 (LL) 3.5 - 5.0 mmol/L    Chloride 90 (L) 98 - 107 mmol/L    CO2 38 (H) 22 - 29 mmol/L    Anion Gap 7 7 - 16 mmol/L    Glucose 150 (H) 74 - 99 mg/dL    BUN 11 6 - 23 mg/dL    Creatinine 0.8 0.50 - 1.00 mg/dL    Est, Glom Filt Rate 77 >60 mL/min/1.73m2    Calcium 8.2 (L) 8.6 - 10.2 mg/dL    Total Protein 5.4 (L) 6.4 - 8.3 g/dL    Albumin 2.4 (L) 3.5 - 5.2 g/dL    Total Bilirubin 0.2 0.0 - 1.2 mg/dL    Alkaline Phosphatase 149 (H) 35 - 104 U/L    ALT <5 0 - 32 U/L    AST 29 0 - 31 U/L   Lactic Acid    Collection Time: 04/01/24  7:57 PM   Result Value Ref Range    Lactic Acid 3.2 (H) 0.5 - 2.2 mmol/L   Protime-INR    Collection Time: 04/01/24  7:57 PM   Result Value Ref Range    Protime 17.2 (H) 9.3 - 12.4 sec    INR 1.6    APTT    Collection Time: 04/01/24  7:57 PM   Result Value Ref Range    APTT 30.1 24.5 - 35.1 sec   Magnesium    Collection Time: 04/01/24  7:57 PM   Result Value Ref Range    Magnesium 1.6 1.6 - 2.6 mg/dL   Basic Metabolic Panel    Collection  procedure.  The bones and orthopedic hardware remain in satisfactory alignment and position.  There is no evidence of acute fracture or subluxation.     Status post right hip arthroplasty procedure with satisfactory alignment and position of the bones and orthopedic hardware.     XR HIP RIGHT (2-3 VIEWS)    Result Date: 3/21/2024  EXAMINATION: TWO XRAY VIEWS OF THE RIGHT HIP 3/21/2024 9:20 am COMPARISON: 19 March 2024 HISTORY: ORDERING SYSTEM PROVIDED HISTORY: Post op TECHNOLOGIST PROVIDED HISTORY: Of operative side while in recovery room. Reason for exam:->Post op What reading provider will be dictating this exam?->CRC FINDINGS: The patient is again status post total right hip arthroplasty.  The osseous structures remain in good alignment and position.     Status post total right hip arthroplasty.     XR HIP 2-3 VW W PELVIS RIGHT    Result Date: 3/5/2024  EXAMINATION: ONE XRAY VIEW OF THE PELVIS AND TWO XRAY VIEWS RIGHT HIP 3/5/2024 11:10 am COMPARISON: The previous study performed 02/12/2024.  Correlation is made with CT scan of the right hip performed 02/15/2024. HISTORY: ORDERING SYSTEM PROVIDED HISTORY: History of hemiarthroplasty of right hip FINDINGS: The patient is again status post total hip arthroplasty.  The bones and orthopedic hardware remain in good alignment and position.  No abnormal radiolucencies are identified at the bone/orthopedic hardware interfaces.  No new osseous or surrounding soft tissue abnormality is seen.  Overlying surgical staples are again seen along the lateral aspects of the right hip.     Again status post right total hip arthroplasty, when compared to previous studies dating back to 02/12/2024.  No radiographic evidence of complication.           Assessment and Plan  Patient is a 78 y.o. female who presented with pain.   The active problem list is as follows:    Principal Problem:    Hypokalemia  Active Problems:    HTN (hypertension)    Hip dislocation, right (HCC)  Resolved

## 2024-04-02 NOTE — PROGRESS NOTES
4 Eyes Skin Assessment     NAME:  Rebeka Renee  YOB: 1945  MEDICAL RECORD NUMBER:  [id number]    The patient is being assessed for  Transfer to New Unit    I agree that at least one RN has performed a thorough Head to Toe Skin Assessment on the patient. ALL assessment sites listed below have been assessed.      Areas assessed by both nurses:    Head, Face, Ears, Shoulders, Back, Chest, Arms, Elbows, Hands, Sacrum. Buttock, Coccyx, Ischium, Legs. Feet and Heels, and Under Medical Devices         Does the Patient have a Wound? Yes wound(s) were present on assessment. LDA wound assessment was Initiated and completed by RN       Norris Prevention initiated by RN: Yes  Wound Care Orders initiated by RN: No    Pressure Injury (Stage 3,4, Unstageable, DTI, NWPT, and Complex wounds) if present, place Wound referral order by RN under : No    New Ostomies, if present place, Ostomy referral order under : No     Nurse 1 eSignature: Electronically signed by Nora Zamora RN on 4/2/24 at 6:12 PM EDT    **SHARE this note so that the co-signing nurse can place an eSignature**    Nurse 2 eSignature: Electronically signed by Anny Boyd RN on 4/2/24 at 6:13 PM EDT

## 2024-04-02 NOTE — ANESTHESIA PRE PROCEDURE
Department of Anesthesiology  Preprocedure Note       Name:  Rebeka Renee   Age:  78 y.o.  :  1945                                          MRN:  [id number]         Date:  2024      Surgeon: Surgeon(s):  Matthew Peña DO    Procedure: Procedure(s):  RIGHT HIP CLOSED VS OPEN REDUCTION INTERNAL FIXATION    Medications prior to admission:   Prior to Admission medications    Medication Sig Start Date End Date Taking? Authorizing Provider   ceFAZolin (ANCEF) infusion Infuse 2,000 mg intravenously in the morning and 2,000 mg at noon and 2,000 mg in the evening. Compound per protocol. 3/23/24 5/2/24  Polly Gilliam MD   aspirin 325 MG EC tablet Take 1 tablet by mouth 2 times daily for 28 days 3/21/24 4/18/24  Florencio Collazo DO   ALBUTEROL IN Inhale into the lungs    Malou Brunson MD   Cholecalciferol (VITAMIN D) 25 MCG TABS Take 1 tablet by mouth daily 24   Javier Meyer MD   rosuvastatin (CRESTOR) 40 MG tablet Take 1 tablet by mouth nightly 24   Claudia Andrea MD   potassium chloride (KLOR-CON M) 10 MEQ extended release tablet Take 1 tablet by mouth daily    Malou Brunson MD   buPROPion (WELLBUTRIN SR) 100 MG extended release tablet Take 1 tablet by mouth daily    Malou Brunson MD   Semaglutide, 2 MG/DOSE, 8 MG/3ML SOPN Inject 3 mLs into the skin once a week Given Monday    Malou Brunson MD   omeprazole (PRILOSEC) 20 MG delayed release capsule Take 1 capsule by mouth daily    Malou Brunson MD   sucralfate (CARAFATE) 1 GM tablet Take 1 tablet by mouth daily    Malou Brunson MD   gabapentin (NEURONTIN) 800 MG tablet Take 1 tablet by mouth 3 times daily.    Malou Brunson MD   pramipexole (MIRAPEX) 0.75 MG tablet Take 1 tablet by mouth 2 times daily    Malou Brunson MD   levothyroxine (SYNTHROID) 50 MCG tablet Take 1 tablet by mouth Daily    Malou Brunson MD   venlafaxine (EFFEXOR-XR)  Patient with asthma it appears.    If vaccinated, overall low risk.    However, if she prefers treatment ok to send pended paxlovid.    GFR was > 90 less than 1 year ago.

## 2024-04-02 NOTE — PROGRESS NOTES
4 Eyes Skin Assessment     NAME:  Rebeka Renee  YOB: 1945  MEDICAL RECORD NUMBER:  [id number]    The patient is being assessed for  Admission    I agree that at least one RN has performed a thorough Head to Toe Skin Assessment on the patient. ALL assessment sites listed below have been assessed.      Areas assessed by both nurses:    Head, Face, Ears, Shoulders, Back, Chest, Arms, Elbows, Hands, Sacrum. Buttock, Coccyx, Ischium, Legs. Feet and Heels, and Under Medical Devices         Does the Patient have a Wound? Yes wound(s) were present on assessment. LDA wound assessment was Initiated and completed by RN       Norris Prevention initiated by RN: Yes  Wound Care Orders initiated by RN: No    Pressure Injury (Stage 3,4, Unstageable, DTI, NWPT, and Complex wounds) if present, place Wound referral order by RN under : No    New Ostomies, if present place, Ostomy referral order under : No     Nurse 1 eSignature: Electronically signed by Moon Bucther RN on 4/2/24 at 2:48 AM EDT    **SHARE this note so that the co-signing nurse can place an eSignature**    Nurse 2 eSignature: Electronically signed by Melissa Gilligan, RN on 4/2/24 at 2:49 AM EDT

## 2024-04-02 NOTE — CONSULTS
Department of Orthopedic Surgery  Resident Consult Note    Reason for Consult: Right hip pain    HISTORY OF PRESENT ILLNESS:       Patient is a 78 y.o. female who presents with right hip pain after attempting to get out of her car earlier today.  Of note, patient had right hip hemiarthroplasty by Dr Handy in January 2024.  Patient subsequently suffered a right hip dislocation requiring OR open reduction in February 2024.  Patient subsequently suffered infection to right hip requiring head exchange with I&D approximately 1 and half weeks ago.  Patient reports today she was going to her hair salon when she was in the passenger seat attempting to get out of the car when she felt immediate pain to right hip.  Patient denies falling, trauma to right hip.  Patient denies any other orthopedic complaints at this time.  Patient denies distal numbness/tingling/paresthesia.     Past Medical History:        Diagnosis Date    Anemia     S/P chemotherapy.    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    Ascending aortic aneurysm (HCC)     seen on CTA chest w/contrast on 2019    Asthma     Bipolar 1 disorder (HCC)     Cancer (HCC)     lung/ kidney    Cancer of breast, female (HCC) 01/2006    S/P bilaeral mastectomy with left breast lymph node resection and chemotherapy.    CHF (congestive heart failure) (HCC)     Chronic back pain     Depression     Depression with anxiety     Diabetes mellitus (HCC)     Resolved after losing 130 lbs. after gastric bypass surgery.    Dyslipidemia     Fibromyalgia     History of blood transfusion     anemia    Hypertension     Hypothyroidism     Neuropathy     Pericardial effusion 04/02/2010    Subxiphoid pericardial window with drainage of pericardial effusion and pericardial biopsy:  Fluid and biopsy negative for metastases.     Pleural effusion 05/02/2010    Noted on chest x-ray.    TIA

## 2024-04-02 NOTE — PROGRESS NOTES
STAT BMP drawn and sent to lab, patient is due to go to OR today and they need a repeat potassium level prior to surgery

## 2024-04-02 NOTE — PROGRESS NOTES
Per Dr. Lee patient is to receive 30mEq of Potassium IV, give 40 mEq oral, and then repeat 40mEq in 2 hours.    Repeat Potassium level in morning 4/3

## 2024-04-02 NOTE — BRIEF OP NOTE
Brief Postoperative Note      Patient: Rebeka Renee  YOB: 1945  MRN: [id number]    Date of Procedure: 4/2/2024    Pre-Op Diagnosis Codes:  Closed right dislocation of hip hemiarthroplasty    Post-Op Diagnosis: Same       Procedure(s):  Closed reduction of right hip hemiarthroplasty    Surgeon(s):  Matthew Peña DO    Assistant:  Resident: Francois Yoder DO    Anesthesia: General    Estimated Blood Loss (mL): Minimal    Complications: None    Specimens:   * No specimens in log *    Implants:  * No implants in log *      Drains:   Negative Pressure Wound Therapy Hip Right (Active)   Wound Type Surgical 03/25/24 1155   Unit Type prevena plus 03/23/24 2129   Dressing Type Black Foam 03/25/24 1155   Canister changed? Yes 03/24/24 0546   Dressing Status Clean, dry & intact 03/25/24 1155   Drainage Amount None 03/25/24 1155   Drainage Description Serosanguinous 03/25/24 0311   Output (ml) 100 ml 03/24/24 2338       Findings: Successful closed reduction of right hip hemiarthroplasty      Electronically signed by Matthew Peña DO on 4/2/2024 at 3:16 PM

## 2024-04-02 NOTE — PROGRESS NOTES
Per Dr. Lee patient is okay to downgrade from intermediate care and should be transferred to a more ortho focused unit. Transfer order placed.

## 2024-04-02 NOTE — PROGRESS NOTES
Orthopedic surgery update    Repeat BMP ordered stat now after potassium repletion.  Patient needs to go to the operating room today and we are trying to figure out if her potassium has improved.  Please draw as soon as you finish replacement            Matthew Peña DO   Orthopaedic Surgery   4/2/2024  11:19 AM    Note: This report was completed using computer[employer] voiced recognition software.  Every effort has been made to ensure accuracy; however, inadvertent computerized transcription errors may be present.

## 2024-04-02 NOTE — DISCHARGE INSTRUCTIONS
[facility] Department of Orthopedic Surgery  [address] [facility] [address]    Dr. Wood George, DO         MD Dr. Wood Barrientos MD Frank Ansevin, PA-C Sara Zatchok, PA-C Tyler Tsangaris PA-C      Orthopaedics Discharge Instructions   Weight bearing Status - Weight bearing as tolerated - on right lower Extremity  Pain medication Per Prescriptions  Contact Office for Medication Refill- [phone]  Office can refill pain med every 7 days  If patient discharging to facility then pain control will be continued per facility physician  Ice to operative/injured site for 15-30 minutes of each hour for next 5 days    Recommend that you continue to ice the area 2-3 times per day after this   Elevate operative/injured limb on 2 pillows at home  Goal is to have limb above the heart if able  Continue DVT Prophylaxis (blood clot prevention) as Prescribed: aspirin 325mg twice a day   Wound care - cover with a clean dry dressing as needed for drainage.     Anterolateral Hip Precautions    Do not step backwards with surgical leg. No hip extension.  Do not allow surgical leg to externally rotate (turn outwards).  Do not cross your legs. Use a pillow between legs when rolling.  Do not roll your leg outward or inward.   Do not move your leg outward  Do not bend forward past 90 degrees      Follow Up in Office in 2 weeks. Your first post op appointment is often with one of our PAs.     Call the office at [phone] or directions or with any questions.  Watch for these significant complications.  Call physician if they or any other problems occur:  Fever over 101°, redness, swelling or warmth at the operative site  Unrelieved nausea    Foul smelling or cloudy drainage at the operative site   Unrelieved pain    Blood soaked dressing. (Some oozing may be normal)     Numb, pale, blue, cold or tingling extremity    Future Appointments   Date Time Provider Department Center   4/8/2024 10:30 AM

## 2024-04-02 NOTE — OP NOTE
Operative Note      Patient: Rebeka Renee  YOB: 1945  MRN: [id number]    Date of Procedure: 4/2/2024    Pre-Op Diagnosis Codes:   Closed right dislocation of hip hemiarthroplasty     Post-Op Diagnosis: Same       Procedure(s):  Closed reduction of right hip hemiarthroplasty    Surgeon(s):  Matthew Peña DO    Assistant:   Resident: Francois Yoder DO    Anesthesia: General    Estimated Blood Loss (mL): Minimal    Complications: None    Specimens:   * No specimens in log *    Implants:  * No implants in log *      Drains:   Negative Pressure Wound Therapy Hip Right (Active)   Wound Type Surgical 03/25/24 1155   Unit Type prevena plus 03/23/24 2129   Dressing Type Black Foam 03/25/24 1155   Canister changed? Yes 03/24/24 0546   Dressing Status Clean, dry & intact 03/25/24 1155   Drainage Amount None 03/25/24 1155   Drainage Description Serosanguinous 03/25/24 0311   Output (ml) 100 ml 03/24/24 2338       Findings: See operative report below        Detailed Description of Procedure:   This is a 78-year-old female with a right hip hemiarthroplasty dislocation.  Index surgery was in January by my partner.  She subsequently dislocated in February and required an open reduction.  Approximately 2 weeks ago she developed an infection and required irrigation debridement with exchange of the femoral head.  She was getting into a car yesterday and dislocated her hip.  She is here today for hip dislocation.  She was unable to be closed reduced in the emergency department secondary to medical problems.  Due to the dislocation of her hip and recurrent instability she was consented today for open versus closed reduction with possible revision of femoral component versus explantation.  I explained the severity of her problem.  If I am unable to get this reduced closed she will require revision and possibly explant after she clears her infection she may require conversion to a constrained total hip

## 2024-04-02 NOTE — CARE COORDINATION
04/02/24 Transition of Care: patient is a return to the ER due to inability to ambulate with hip pain. She is currently at home with piccline and iv antibiotics and a disposable wound vac thru [facility] due to hip infection and post I/D of the hip site with femoral head exchange. She also has a new thrombus at the site of the right arm piccline. She is on iv heparin drip which is currently being placed on hold for OR today. Patient is NPO. Xray confirmed hip dislocation. K low at 2.7 and given oral supplement.  She is receiving iv ancef q8. Her plan is unclear as she is c/o weak. She will have PT/OT post procedure. She resides in a one level home with her . She has a wheeled walker, rollator and raised toilet seat. She states her  will transport her home at discharge. Will follow for further plan of care. [facility]leen at [facility] notified of admission. Electronically signed by Amanda Wright RN  on 4/2/2024 at 10:59 AM      Case Management Assessment  Initial Evaluation    Date/Time of Evaluation: 4/2/2024 11:04 AM  Assessment Completed by: Amanda Wright RN    If patient is discharged prior to next notation, then this note serves as note for discharge by case management.    Patient Name: Rebeka Renee                   YOB: 1945  Diagnosis: Hypokalemia [E87.6]  Dislocation of right hip, initial encounter ([facility]) [S73.004A]  Acute hypokalemia [E87.6]                   Date / Time: 4/1/2024  7:34 PM    Patient Admission Status: Inpatient   Readmission Risk (Low < 19, Mod (19-27), High > 27): Readmission Risk Score: 26.5    Current PCP: Navneet Latif MD  PCP verified by ? Yes    Chart Reviewed: Yes      History Provided by: Patient  Patient Orientation: Alert and Oriented    Patient Cognition: Alert    Hospitalization in the last 30 days (Readmission):  Yes    If yes, Readmission Assessment in  Navigator will be completed.    Advance Directives:      Code Status:

## 2024-04-02 NOTE — ANESTHESIA POSTPROCEDURE EVALUATION
Department of Anesthesiology  Postprocedure Note    Patient: Rebeka Renee  MRN: [id number]  YOB: 1945  Date of evaluation: 4/2/2024    Procedure Summary       Date: 04/02/24 Room / Location: [address]    Anesthesia Start: 1451 Anesthesia Stop: 1539    Procedure: RIGHT HIP CLOSED REDUCTION INTERNAL FIXATION (Right) Diagnosis:       Dislocation closed      (Dislocation closed [T14.8XXA])    Surgeons: Matthew Peña DO Responsible Provider: Kathryn Guzmán MD    Anesthesia Type: general ASA Status: 4            Anesthesia Type: No value filed.    Terrie Phase I: Terrie Score: 8    Terrie Phase II:      Anesthesia Post Evaluation    Patient location during evaluation: PACU  Patient participation: complete - patient participated  Level of consciousness: awake  Pain score: 3  Airway patency: patent  Nausea & Vomiting: no nausea  Cardiovascular status: hemodynamically stable  Respiratory status: acceptable  Hydration status: stable  Multimodal analgesia pain management approach    No notable events documented.

## 2024-04-02 NOTE — PROGRESS NOTES
Floor Called, nurse to nurse given. Patients test results review, VS reported to receiving nurse. Any and all important information regarding patient disclosed. Patient transferred to new room on bed on 3lnc in stable condition with ancillary staff. X ray recalled and notified patient was transferred to floor.

## 2024-04-03 LAB
PARTIAL THROMBOPLASTIN TIME: 101.3 SEC (ref 24.5–35.1)
PARTIAL THROMBOPLASTIN TIME: 29.8 SEC (ref 24.5–35.1)

## 2024-04-03 PROCEDURE — 97530 THERAPEUTIC ACTIVITIES: CPT

## 2024-04-03 PROCEDURE — 6370000000 HC RX 637 (ALT 250 FOR IP): Performed by: INTERNAL MEDICINE

## 2024-04-03 PROCEDURE — 99232 SBSQ HOSP IP/OBS MODERATE 35: CPT | Performed by: STUDENT IN AN ORGANIZED HEALTH CARE EDUCATION/TRAINING PROGRAM

## 2024-04-03 PROCEDURE — 36592 COLLECT BLOOD FROM PICC: CPT

## 2024-04-03 PROCEDURE — 2580000003 HC RX 258: Performed by: ANESTHESIOLOGY

## 2024-04-03 PROCEDURE — 6370000000 HC RX 637 (ALT 250 FOR IP): Performed by: STUDENT IN AN ORGANIZED HEALTH CARE EDUCATION/TRAINING PROGRAM

## 2024-04-03 PROCEDURE — 97161 PT EVAL LOW COMPLEX 20 MIN: CPT

## 2024-04-03 PROCEDURE — 1200000000 HC SEMI PRIVATE

## 2024-04-03 PROCEDURE — 97165 OT EVAL LOW COMPLEX 30 MIN: CPT

## 2024-04-03 PROCEDURE — 85730 THROMBOPLASTIN TIME PARTIAL: CPT

## 2024-04-03 PROCEDURE — 2700000000 HC OXYGEN THERAPY PER DAY

## 2024-04-03 PROCEDURE — 97535 SELF CARE MNGMENT TRAINING: CPT

## 2024-04-03 PROCEDURE — 6360000002 HC RX W HCPCS: Performed by: INTERNAL MEDICINE

## 2024-04-03 PROCEDURE — 2580000003 HC RX 258: Performed by: INTERNAL MEDICINE

## 2024-04-03 RX ORDER — DOCUSATE SODIUM 100 MG/1
100 CAPSULE, LIQUID FILLED ORAL DAILY
Status: DISCONTINUED | OUTPATIENT
Start: 2024-04-03 | End: 2024-04-05 | Stop reason: HOSPADM

## 2024-04-03 RX ORDER — POLYETHYLENE GLYCOL 3350 17 G/17G
17 POWDER, FOR SOLUTION ORAL DAILY
Status: DISCONTINUED | OUTPATIENT
Start: 2024-04-03 | End: 2024-04-05 | Stop reason: HOSPADM

## 2024-04-03 RX ADMIN — PRAMIPEXOLE DIHYDROCHLORIDE 0.75 MG: 0.25 TABLET ORAL at 20:14

## 2024-04-03 RX ADMIN — SODIUM CHLORIDE, PRESERVATIVE FREE 20 ML: 5 INJECTION INTRAVENOUS at 05:35

## 2024-04-03 RX ADMIN — POLYETHYLENE GLYCOL 3350 17 G: 17 POWDER, FOR SOLUTION ORAL at 10:43

## 2024-04-03 RX ADMIN — OXYCODONE HYDROCHLORIDE AND ACETAMINOPHEN 1 TABLET: 5; 325 TABLET ORAL at 20:31

## 2024-04-03 RX ADMIN — DOCUSATE SODIUM 100 MG: 100 CAPSULE, LIQUID FILLED ORAL at 10:43

## 2024-04-03 RX ADMIN — HEPARIN SODIUM 18 UNITS/KG/HR: 10000 INJECTION, SOLUTION INTRAVENOUS at 00:26

## 2024-04-03 RX ADMIN — APIXABAN 10 MG: 5 TABLET, FILM COATED ORAL at 10:43

## 2024-04-03 RX ADMIN — OXYCODONE HYDROCHLORIDE AND ACETAMINOPHEN 1 TABLET: 5; 325 TABLET ORAL at 14:15

## 2024-04-03 RX ADMIN — BUPROPION HYDROCHLORIDE 100 MG: 100 TABLET, EXTENDED RELEASE ORAL at 09:18

## 2024-04-03 RX ADMIN — LEVOTHYROXINE SODIUM 50 MCG: 0.05 TABLET ORAL at 05:17

## 2024-04-03 RX ADMIN — WATER 2000 MG: 1 INJECTION INTRAMUSCULAR; INTRAVENOUS; SUBCUTANEOUS at 14:15

## 2024-04-03 RX ADMIN — GABAPENTIN 800 MG: 400 CAPSULE ORAL at 09:17

## 2024-04-03 RX ADMIN — APIXABAN 10 MG: 5 TABLET, FILM COATED ORAL at 20:15

## 2024-04-03 RX ADMIN — OXYCODONE HYDROCHLORIDE AND ACETAMINOPHEN 1 TABLET: 5; 325 TABLET ORAL at 05:17

## 2024-04-03 RX ADMIN — GABAPENTIN 800 MG: 400 CAPSULE ORAL at 14:15

## 2024-04-03 RX ADMIN — VENLAFAXINE HYDROCHLORIDE 150 MG: 150 CAPSULE, EXTENDED RELEASE ORAL at 09:17

## 2024-04-03 RX ADMIN — SODIUM CHLORIDE, PRESERVATIVE FREE 10 ML: 5 INJECTION INTRAVENOUS at 00:26

## 2024-04-03 RX ADMIN — WATER 2000 MG: 1 INJECTION INTRAMUSCULAR; INTRAVENOUS; SUBCUTANEOUS at 20:15

## 2024-04-03 RX ADMIN — SODIUM CHLORIDE, PRESERVATIVE FREE 10 ML: 5 INJECTION INTRAVENOUS at 09:21

## 2024-04-03 RX ADMIN — SODIUM CHLORIDE, PRESERVATIVE FREE 10 ML: 5 INJECTION INTRAVENOUS at 05:17

## 2024-04-03 RX ADMIN — Medication 1000 UNITS: at 09:17

## 2024-04-03 RX ADMIN — SUCRALFATE 1 G: 1 TABLET ORAL at 09:17

## 2024-04-03 RX ADMIN — ROSUVASTATIN 40 MG: 20 TABLET, FILM COATED ORAL at 20:15

## 2024-04-03 RX ADMIN — SODIUM CHLORIDE, PRESERVATIVE FREE 10 ML: 5 INJECTION INTRAVENOUS at 20:15

## 2024-04-03 RX ADMIN — GABAPENTIN 800 MG: 400 CAPSULE ORAL at 20:14

## 2024-04-03 RX ADMIN — PANTOPRAZOLE SODIUM 40 MG: 40 TABLET, DELAYED RELEASE ORAL at 05:17

## 2024-04-03 RX ADMIN — WATER 2000 MG: 1 INJECTION INTRAMUSCULAR; INTRAVENOUS; SUBCUTANEOUS at 05:17

## 2024-04-03 RX ADMIN — OXYCODONE HYDROCHLORIDE AND ACETAMINOPHEN 1 TABLET: 5; 325 TABLET ORAL at 09:17

## 2024-04-03 RX ADMIN — PRAMIPEXOLE DIHYDROCHLORIDE 0.75 MG: 0.25 TABLET ORAL at 09:18

## 2024-04-03 ASSESSMENT — PAIN SCALES - GENERAL
PAINLEVEL_OUTOF10: 7
PAINLEVEL_OUTOF10: 4
PAINLEVEL_OUTOF10: 7
PAINLEVEL_OUTOF10: 8
PAINLEVEL_OUTOF10: 7
PAINLEVEL_OUTOF10: 4

## 2024-04-03 ASSESSMENT — PAIN DESCRIPTION - LOCATION
LOCATION: LEG
LOCATION: LEG
LOCATION: HIP

## 2024-04-03 ASSESSMENT — PAIN - FUNCTIONAL ASSESSMENT
PAIN_FUNCTIONAL_ASSESSMENT: PREVENTS OR INTERFERES SOME ACTIVE ACTIVITIES AND ADLS
PAIN_FUNCTIONAL_ASSESSMENT: PREVENTS OR INTERFERES SOME ACTIVE ACTIVITIES AND ADLS

## 2024-04-03 ASSESSMENT — PAIN DESCRIPTION - DESCRIPTORS
DESCRIPTORS: ACHING;POUNDING;SORE
DESCRIPTORS: ACHING;SORE;TENDER
DESCRIPTORS: ACHING;DISCOMFORT;SORE

## 2024-04-03 ASSESSMENT — PAIN DESCRIPTION - ONSET
ONSET: ON-GOING
ONSET: AWAKENED FROM SLEEP

## 2024-04-03 ASSESSMENT — PAIN DESCRIPTION - ORIENTATION
ORIENTATION: RIGHT
ORIENTATION: RIGHT

## 2024-04-03 ASSESSMENT — PAIN DESCRIPTION - FREQUENCY
FREQUENCY: INTERMITTENT
FREQUENCY: CONTINUOUS

## 2024-04-03 ASSESSMENT — PAIN DESCRIPTION - PAIN TYPE
TYPE: ACUTE PAIN;SURGICAL PAIN
TYPE: ACUTE PAIN;SURGICAL PAIN

## 2024-04-03 NOTE — PLAN OF CARE
Problem: Chronic Conditions and Co-morbidities  Goal: Patient's chronic conditions and co-morbidity symptoms are monitored and maintained or improved  Outcome: Progressing     Problem: Safety - Adult  Goal: Free from fall injury  Outcome: Progressing     Problem: ABCDS Injury Assessment  Goal: Absence of physical injury  Outcome: Progressing     Problem: Pain  Goal: Verbalizes/displays adequate comfort level or baseline comfort level  Outcome: Progressing     Problem: Skin/Tissue Integrity  Goal: Absence of new skin breakdown  Description: 1.  Monitor for areas of redness and/or skin breakdown  2.  Assess vascular access sites hourly  3.  Every 4-6 hours minimum:  Change oxygen saturation probe site  4.  Every 4-6 hours:  If on nasal continuous positive airway pressure, respiratory therapy assess nares and determine need for appliance change or resting period.  Outcome: Progressing     Problem: Discharge Planning  Goal: Discharge to home or other facility with appropriate resources  Outcome: Progressing     Problem: Respiratory - Adult  Goal: Achieves optimal ventilation and oxygenation  Outcome: Not Progressing     Problem: Skin/Tissue Integrity - Adult  Goal: Skin integrity remains intact  Outcome: Progressing  Goal: Incisions, wounds, or drain sites healing without S/S of infection  Outcome: Progressing     Problem: Musculoskeletal - Adult  Goal: Return mobility to safest level of function  Outcome: Not Progressing  Goal: Maintain proper alignment of affected body part  Outcome: Progressing  Goal: Return ADL status to a safe level of function  Outcome: Not Progressing     Problem: Genitourinary - Adult  Goal: Absence of urinary retention  Outcome: Progressing     Problem: Infection - Adult  Goal: Absence of infection at discharge  Outcome: Progressing     Problem: Hematologic - Adult  Goal: Maintains hematologic stability  Outcome: Progressing

## 2024-04-03 NOTE — PROGRESS NOTES
OCCUPATIONAL THERAPY INITIAL EVALUATION    [facility]  [address], [place]        Date:4/3/2024                                                  Patient Name: Rebeka Renee    MRN: [id number]    : 1945    Room: [address] [place] [address]        Evaluating OT: Sudhir Agarwal OTR/L; [id number]       Referring Provider: Francois Yoder DO     Specific Provider Orders/Date: OT Eval and Treat 24       Diagnosis: Hip dislocation, right;  Acute hypokalemia     Surgery: 24 Closed reduction of right hip hemiarthroplasty      Pertinent Medical History:  has a past medical history of Anemia, Anxiety, Arthritis, Ascending aortic aneurysm (HCC), Asthma, Bipolar 1 disorder (HCC), Cancer (HCC), Cancer of breast, female (HCC), CHF (congestive heart failure) (HCC), Chronic back pain, Depression, Depression with anxiety, Diabetes mellitus ([facility]), Dyslipidemia, Fibromyalgia, History of blood transfusion, Hypertension, Hypothyroidism, Neuropathy, Pericardial effusion, Pleural effusion, TIA (transient ischemic attack), Tobacco abuse, and Type II or unspecified type diabetes mellitus without mention of complication, not stated as uncontrolled.     Recommended Adaptive Equipment: BSC, AE for LE bathing and dressing PRN     Precautions:  Fall Risk, WBAT RLE w/ KI, maintain anterior & posterior hip precautions, abductor pillow, h/o hip dislocation     Assessment of current deficits    [x] Functional mobility  [x]ADLs  [x] Strength               []Cognition    [x] Functional transfers   [x] IADLs         [x] Safety Awareness   [x]Endurance    [] Fine Coordination              [x] Balance      [] Vision/perception   []Sensation     []Gross Motor Coordination  [] ROM  [] Delirium                   [] Motor Control     OT PLAN OF CARE   OT POC based on physician orders, patient diagnosis and results of clinical assessment    Frequency/Duration 3-5 days/wk for 2  Patient/Caregiver provided printed discharge information.

## 2024-04-03 NOTE — CARE COORDINATION
4/3/2024Social work transition of care planning  Sw followed up with pt at bedside. Plan remains home with [facility]. Pt declined snf at this time. Will need MELITON orders plus PT/OT eval and treat-charge notified. Spouse to transport at [place].  Electronically signed by ZA Gonzáles on 4/3/2024 at 11:12 AM

## 2024-04-03 NOTE — PLAN OF CARE
Problem: Chronic Conditions and Co-morbidities  Goal: Patient's chronic conditions and co-morbidity symptoms are monitored and maintained or improved  4/3/2024 1020 by Nora Zamora RN  Outcome: Progressing  4/3/2024 0201 by Kings Davis RN  Outcome: Progressing     Problem: Safety - Adult  Goal: Free from fall injury  4/3/2024 1020 by Nora Zamora RN  Outcome: Progressing  4/3/2024 0201 by Kings Davis RN  Outcome: Progressing     Problem: ABCDS Injury Assessment  Goal: Absence of physical injury  4/3/2024 1020 by Nora Zamora RN  Outcome: Progressing  4/3/2024 0201 by Kings Davis RN  Outcome: Progressing     Problem: Pain  Goal: Verbalizes/displays adequate comfort level or baseline comfort level  4/3/2024 1020 by Nora Zamora RN  Outcome: Progressing  4/3/2024 0201 by Kings Davis RN  Outcome: Progressing     Problem: Skin/Tissue Integrity  Goal: Absence of new skin breakdown  Description: 1.  Monitor for areas of redness and/or skin breakdown  2.  Assess vascular access sites hourly  3.  Every 4-6 hours minimum:  Change oxygen saturation probe site  4.  Every 4-6 hours:  If on nasal continuous positive airway pressure, respiratory therapy assess nares and determine need for appliance change or resting period.  4/3/2024 1020 by Nora Zamora RN  Outcome: Progressing  4/3/2024 0201 by Kings Davis RN  Outcome: Progressing     Problem: Discharge Planning  Goal: Discharge to home or other facility with appropriate resources  4/3/2024 1020 by Nora Zamora RN  Outcome: Progressing  4/3/2024 0201 by Kings Davis RN  Outcome: Progressing     Problem: Respiratory - Adult  Goal: Achieves optimal ventilation and oxygenation  4/3/2024 1020 by Nora Zamora RN  Outcome: Progressing  4/3/2024 0201 by Kings Davis RN  Outcome: Not Progressing     Problem: Skin/Tissue Integrity - Adult  Goal: Skin integrity remains intact  4/3/2024 1020 by Nora Zamora

## 2024-04-03 NOTE — PROGRESS NOTES
HOSPITALIST PROGRESS NOTE    Patient's name: Rebeka Renee  : 1945  Admission date: 2024  Date of service: 4/3/2024   Primary care physician: Navneet Latif MD    Assessment   Rebeka Renee is a 78 y.o. female patient of Navneet Latif MD with history of hypertension, hyperlipidemia, diabetes, hypothyroidism, AAA, lung/kidney/breast cancer status post nephrectomy and lobectomy, CHF, CVA, fibromyalgia, anxiety and depression presented to [facility] on 2024 with complaints of right hip pain.  Patient had recent admission for right hip infection of surgical site and was discharged on 3/25.  While in the ER patient had x-ray right hip that showed right hip dislocation.  Patient admitted for orthopedic surgery evaluation.    Right hip dislocation s/p closed reduction of right hip hemiarthroplasty on   Surgical site infection s/p I&D right hip wound and revision of right femoral head hemiarthroplasty  PT/OT  Symptomatic management for pain  Weightbearing as tolerated in knee immobilizer and maintain anterior and posterior hip precautions per orthopedic surgery.    Diarrhea-resolved    Right axillary vein occlusive thrombus  Likely related to PICC line  PICC line is functional, will continue to use  Transition IV heparin to Eliquis.  Since is a provoked DVT, patient would need anticoagulation for at least 3 months.  Patient to discuss with PCP after discharge.    Hypokalemia  Replace potassium    Mood disorder  Continue Wellbutrin, Effexor    GERD  Continue Protonix    Hyperlipidemia  Continue statin      Follow labs   DVT prophylaxis Heparin  Please see orders for further management and care.  Discharge plan: Pending surgery with orthopedic surgery and PT/OT.  Possibly will need placement    Subjective  Rebeka was seen and examined at bedside.  Doing well postsurgery.  Denies any nausea or vomiting.  Denies any chest pain.  Denies any significant hip pain.   [place], [place]  +++++++++++++++++++++++++++++++++++++++++++++++++  NOTE: This report was transcribed using voice recognition software. Every effort was made to ensure accuracy; however, inadvertent computerized transcription errors may be present.    I can be reached through [employer].

## 2024-04-03 NOTE — PROGRESS NOTES
Patient demanded the removal of the abductor pillow. Stressed importance to no avail. Patient states a regular pillow will do the same thing and she wont cross her legs.

## 2024-04-03 NOTE — PROGRESS NOTES
Department of Orthopedic Surgery  Resident Progress Note    POD 1.  S/p right hip closed reduction.  Patient seen and examined.  Pain controlled on current regimen.  She does have a knee immobilizer in place and is eager to begin ambulation.  No new complaints.  Denies chest pain, shortness of breath, dizziness/lightheadedness.    VITALS:  /74   Pulse 87   Temp (!) 96.7 °F (35.9 °C) (Temporal)   Resp 16   Ht 1.651 m (5' 5\")   Wt 81.6 kg (180 lb)   SpO2 96%   BMI 29.95 kg/m²     General: Awake alert in no acute distress    MUSCULOSKELETAL:   right lower extremity:  Dressing C/D/I without strikethrough  Compartments soft and compressible  +PF/DF/EHL  +2/4 DP & PT pulses, Brisk Cap refill, Toes warm and perfused  Distal sensation grossly intact to Peroneals, Sural, Saphenous, and tibial nrs    CBC:   Lab Results   Component Value Date/Time    WBC 9.3 04/02/2024 05:58 AM    HGB 9.7 04/02/2024 05:58 AM    HCT 31.3 04/02/2024 05:58 AM     04/02/2024 05:58 AM     PT/INR:    Lab Results   Component Value Date/Time    PROTIME 17.2 04/01/2024 07:57 PM    PROTIME 10.4 12/13/2010 09:00 AM    INR 1.6 04/01/2024 07:57 PM       ASSESSMENT  S/P right hip closed reduction    PLAN    Continue physical therapy and protocol: Weightbearing as tolerated in knee immobilizer maintain anterior and posterior hip precautions  Postoperative antibiotic protocol  Deep venous thrombosis prophylaxis: Heparin inpatient okay for aspirin 325 mg outpatient okay to resume postop day 1, early mobilization, SCD  PT/OT as able  Pain Control: IV and PO, wean as able  Monitor H&H: No labs drawn yet today  D/C Plan:  pending sw/cm and therapy recommendations.

## 2024-04-04 LAB
ANION GAP SERPL CALCULATED.3IONS-SCNC: 8 MMOL/L (ref 7–16)
BUN SERPL-MCNC: 17 MG/DL (ref 6–23)
CALCIUM SERPL-MCNC: 8.2 MG/DL (ref 8.6–10.2)
CHLORIDE SERPL-SCNC: 99 MMOL/L (ref 98–107)
CO2 SERPL-SCNC: 33 MMOL/L (ref 22–29)
CREAT SERPL-MCNC: 0.7 MG/DL (ref 0.5–1)
ERYTHROCYTE [DISTWIDTH] IN BLOOD BY AUTOMATED COUNT: 16.6 % (ref 11.5–15)
GFR SERPL CREATININE-BSD FRML MDRD: 88 ML/MIN/1.73M2
GLUCOSE SERPL-MCNC: 91 MG/DL (ref 74–99)
HCT VFR BLD AUTO: 27.9 % (ref 34–48)
HGB BLD-MCNC: 8.1 G/DL (ref 11.5–15.5)
MCH RBC QN AUTO: 26 PG (ref 26–35)
MCHC RBC AUTO-ENTMCNC: 29 G/DL (ref 32–34.5)
MCV RBC AUTO: 89.7 FL (ref 80–99.9)
PLATELET # BLD AUTO: 279 K/UL (ref 130–450)
PMV BLD AUTO: 10.2 FL (ref 7–12)
POTASSIUM SERPL-SCNC: 4.1 MMOL/L (ref 3.5–5)
RBC # BLD AUTO: 3.11 M/UL (ref 3.5–5.5)
SODIUM SERPL-SCNC: 140 MMOL/L (ref 132–146)
WBC OTHER # BLD: 11.7 K/UL (ref 4.5–11.5)

## 2024-04-04 PROCEDURE — 1200000000 HC SEMI PRIVATE

## 2024-04-04 PROCEDURE — 85027 COMPLETE CBC AUTOMATED: CPT

## 2024-04-04 PROCEDURE — 97530 THERAPEUTIC ACTIVITIES: CPT

## 2024-04-04 PROCEDURE — 2700000000 HC OXYGEN THERAPY PER DAY

## 2024-04-04 PROCEDURE — 2580000003 HC RX 258: Performed by: INTERNAL MEDICINE

## 2024-04-04 PROCEDURE — 6370000000 HC RX 637 (ALT 250 FOR IP): Performed by: INTERNAL MEDICINE

## 2024-04-04 PROCEDURE — 2580000003 HC RX 258: Performed by: ANESTHESIOLOGY

## 2024-04-04 PROCEDURE — 6370000000 HC RX 637 (ALT 250 FOR IP): Performed by: STUDENT IN AN ORGANIZED HEALTH CARE EDUCATION/TRAINING PROGRAM

## 2024-04-04 PROCEDURE — 99232 SBSQ HOSP IP/OBS MODERATE 35: CPT | Performed by: STUDENT IN AN ORGANIZED HEALTH CARE EDUCATION/TRAINING PROGRAM

## 2024-04-04 PROCEDURE — 97535 SELF CARE MNGMENT TRAINING: CPT

## 2024-04-04 PROCEDURE — 80048 BASIC METABOLIC PNL TOTAL CA: CPT

## 2024-04-04 PROCEDURE — 6360000002 HC RX W HCPCS: Performed by: INTERNAL MEDICINE

## 2024-04-04 PROCEDURE — 36592 COLLECT BLOOD FROM PICC: CPT

## 2024-04-04 RX ADMIN — WATER 2000 MG: 1 INJECTION INTRAMUSCULAR; INTRAVENOUS; SUBCUTANEOUS at 05:29

## 2024-04-04 RX ADMIN — ROSUVASTATIN 40 MG: 20 TABLET, FILM COATED ORAL at 20:20

## 2024-04-04 RX ADMIN — VENLAFAXINE HYDROCHLORIDE 150 MG: 150 CAPSULE, EXTENDED RELEASE ORAL at 08:15

## 2024-04-04 RX ADMIN — GABAPENTIN 800 MG: 400 CAPSULE ORAL at 20:20

## 2024-04-04 RX ADMIN — SODIUM CHLORIDE, PRESERVATIVE FREE 10 ML: 5 INJECTION INTRAVENOUS at 08:21

## 2024-04-04 RX ADMIN — SODIUM CHLORIDE, PRESERVATIVE FREE 20 ML: 5 INJECTION INTRAVENOUS at 05:29

## 2024-04-04 RX ADMIN — WATER 2000 MG: 1 INJECTION INTRAMUSCULAR; INTRAVENOUS; SUBCUTANEOUS at 13:46

## 2024-04-04 RX ADMIN — OXYCODONE HYDROCHLORIDE AND ACETAMINOPHEN 1 TABLET: 5; 325 TABLET ORAL at 12:32

## 2024-04-04 RX ADMIN — OXYCODONE HYDROCHLORIDE AND ACETAMINOPHEN 1 TABLET: 5; 325 TABLET ORAL at 17:08

## 2024-04-04 RX ADMIN — APIXABAN 10 MG: 5 TABLET, FILM COATED ORAL at 08:15

## 2024-04-04 RX ADMIN — DOCUSATE SODIUM 100 MG: 100 CAPSULE, LIQUID FILLED ORAL at 08:14

## 2024-04-04 RX ADMIN — WATER 2000 MG: 1 INJECTION INTRAMUSCULAR; INTRAVENOUS; SUBCUTANEOUS at 21:37

## 2024-04-04 RX ADMIN — SODIUM CHLORIDE, PRESERVATIVE FREE 10 ML: 5 INJECTION INTRAVENOUS at 21:38

## 2024-04-04 RX ADMIN — APIXABAN 10 MG: 5 TABLET, FILM COATED ORAL at 20:19

## 2024-04-04 RX ADMIN — SODIUM CHLORIDE, PRESERVATIVE FREE 10 ML: 5 INJECTION INTRAVENOUS at 20:20

## 2024-04-04 RX ADMIN — LEVOTHYROXINE SODIUM 50 MCG: 0.05 TABLET ORAL at 05:29

## 2024-04-04 RX ADMIN — GABAPENTIN 800 MG: 400 CAPSULE ORAL at 13:46

## 2024-04-04 RX ADMIN — BUPROPION HYDROCHLORIDE 100 MG: 100 TABLET, EXTENDED RELEASE ORAL at 08:15

## 2024-04-04 RX ADMIN — Medication 1000 UNITS: at 08:18

## 2024-04-04 RX ADMIN — PETROLATUM: 420 OINTMENT TOPICAL at 21:37

## 2024-04-04 RX ADMIN — OXYCODONE HYDROCHLORIDE AND ACETAMINOPHEN 1 TABLET: 5; 325 TABLET ORAL at 21:38

## 2024-04-04 RX ADMIN — CALCIUM CARBONATE 500 MG: 500 TABLET, CHEWABLE ORAL at 14:46

## 2024-04-04 RX ADMIN — POLYETHYLENE GLYCOL 3350 17 G: 17 POWDER, FOR SOLUTION ORAL at 08:16

## 2024-04-04 RX ADMIN — PANTOPRAZOLE SODIUM 40 MG: 40 TABLET, DELAYED RELEASE ORAL at 05:29

## 2024-04-04 RX ADMIN — SUCRALFATE 1 G: 1 TABLET ORAL at 08:15

## 2024-04-04 RX ADMIN — GABAPENTIN 800 MG: 400 CAPSULE ORAL at 08:15

## 2024-04-04 RX ADMIN — PRAMIPEXOLE DIHYDROCHLORIDE 0.75 MG: 0.25 TABLET ORAL at 20:19

## 2024-04-04 RX ADMIN — PRAMIPEXOLE DIHYDROCHLORIDE 0.75 MG: 0.25 TABLET ORAL at 08:15

## 2024-04-04 ASSESSMENT — PAIN - FUNCTIONAL ASSESSMENT
PAIN_FUNCTIONAL_ASSESSMENT: PREVENTS OR INTERFERES SOME ACTIVE ACTIVITIES AND ADLS
PAIN_FUNCTIONAL_ASSESSMENT: PREVENTS OR INTERFERES SOME ACTIVE ACTIVITIES AND ADLS
PAIN_FUNCTIONAL_ASSESSMENT: ACTIVITIES ARE NOT PREVENTED

## 2024-04-04 ASSESSMENT — PAIN DESCRIPTION - ORIENTATION
ORIENTATION: RIGHT

## 2024-04-04 ASSESSMENT — PAIN SCALES - GENERAL
PAINLEVEL_OUTOF10: 0
PAINLEVEL_OUTOF10: 5
PAINLEVEL_OUTOF10: 6
PAINLEVEL_OUTOF10: 7
PAINLEVEL_OUTOF10: 7

## 2024-04-04 ASSESSMENT — PAIN DESCRIPTION - FREQUENCY
FREQUENCY: INTERMITTENT
FREQUENCY: CONTINUOUS

## 2024-04-04 ASSESSMENT — PAIN DESCRIPTION - DESCRIPTORS
DESCRIPTORS: ACHING;BURNING;THROBBING
DESCRIPTORS: ACHING;BURNING;DISCOMFORT
DESCRIPTORS: ACHING;DISCOMFORT;SORE;SHARP

## 2024-04-04 ASSESSMENT — PAIN DESCRIPTION - ONSET
ONSET: GRADUAL
ONSET: ON-GOING

## 2024-04-04 ASSESSMENT — PAIN DESCRIPTION - LOCATION
LOCATION: LEG;HIP
LOCATION: LEG
LOCATION: LEG;HIP

## 2024-04-04 ASSESSMENT — PAIN DESCRIPTION - PAIN TYPE
TYPE: SURGICAL PAIN;ACUTE PAIN
TYPE: SURGICAL PAIN

## 2024-04-04 NOTE — CARE COORDINATION
4/4/2024Social work transition of care planning  [facility] notified that [facility] can accept. Sw requested precert be started today. Sw will complete hens paperwork and envelope. Pt updated at bedside.  Electronically signed by ZA Gonzáles on 4/4/2024 at 1:06 PM

## 2024-04-04 NOTE — DISCHARGE INSTR - COC
Continuity of Care Form    Patient Name: Rebeka Renee   :  1945  MRN:  [id number]    Admit date:  2024  Discharge date:  ***    Code Status Order: Full Code   Advance Directives:   Advance Care Flowsheet Documentation       Date/Time Healthcare Directive Type of Healthcare Directive Copy in Chart Healthcare Agent Appointed Healthcare Agent's Name Healthcare Agent's Phone Number    24 1339 No, patient does not have an advance directive for healthcare treatment -- -- -- -- --    24 0250 No, patient does not have an advance directive for healthcare treatment -- -- -- -- --            Admitting Physician:  Vitaly Lee MD  PCP: Navneet Latif MD    Discharging Nurse: ***  Discharging Hospital Unit/Room#: 5418/5418-A  Discharging Unit Phone Number: ***    Emergency Contact:   Extended Emergency Contact Information  Primary Emergency Contact: Nate Renee  Address: [address] [place] [address]  Home Phone: [phone]  Mobile Phone: [phone]  Relation: Spouse  Secondary Emergency Contact: Ariel Vyas   [facility]  Home Phone: [phone]  Mobile Phone: [phone]  Relation: Brother/Sister    Past Surgical History:  Past Surgical History:   Procedure Laterality Date    BREAST SURGERY      CATARACT REMOVAL WITH IMPLANT Bilateral     CHOLECYSTECTOMY      GASTRIC BYPASS SURGERY  2004    HERNIA REPAIR      HIP SURGERY Right 2024    RIGHT HIP HEMIARTHROPLASTY performed by Nemesio Handy MD at [facility] OR    HIP SURGERY Right 2024    RIGHT HIP CLOSED REDUCTION INTERNAL FIXATION performed by Matthew Peña DO at [facility] OR    HYSTERECTOMY (CERVIX STATUS UNKNOWN)      KIDNEY REMOVAL Left 2006    Cancer.    KIDNEY REMOVAL      left    KNEE SURGERY Right     arthroscopy    LIVER BIOPSY  2006    Fatty tumor.    LUNG REMOVAL, PARTIAL      For lung CA, thought to be metastatic from breast cancer. left upper lobe

## 2024-04-04 NOTE — PROGRESS NOTES
Department of Orthopedic Surgery  Resident Progress Note    POD 2.  S/p right hip closed reduction.  Patient seen and examined.  Pain controlled on current regimen.  She does have a knee immobilizer in place and is eager to begin ambulation.  She has been getting out of bed with physical therapy.  Doing well overall.  No new complaints.  Denies chest pain, shortness of breath, dizziness/lightheadedness.    VITALS:  BP (!) 162/93   Pulse (!) 103   Temp 97.3 °F (36.3 °C) (Temporal)   Resp 20   Ht 1.651 m (5' 5\")   Wt 81.6 kg (180 lb)   SpO2 97%   BMI 29.95 kg/m²     General: Awake alert in no acute distress    MUSCULOSKELETAL:   right lower extremity:  Dressing C/D/I without strikethrough  Compartments soft and compressible  +PF/DF/EHL  +2/4 DP & PT pulses, Brisk Cap refill, Toes warm and perfused  Distal sensation grossly intact to Peroneals, Sural, Saphenous, and tibial nrs    CBC:   Lab Results   Component Value Date/Time    WBC 11.7 04/04/2024 05:40 AM    HGB 8.1 04/04/2024 05:40 AM    HCT 27.9 04/04/2024 05:40 AM     04/04/2024 05:40 AM     PT/INR:    Lab Results   Component Value Date/Time    PROTIME 17.2 04/01/2024 07:57 PM    PROTIME 10.4 12/13/2010 09:00 AM    INR 1.6 04/01/2024 07:57 PM       ASSESSMENT  S/P right hip closed reduction    PLAN    Continue physical therapy and protocol: Weightbearing as tolerated in knee immobilizer maintain anterior and posterior hip precautions  Postoperative antibiotic protocol  Deep venous thrombosis prophylaxis: Heparin inpatient okay for aspirin 325 mg outpatient okay to resume postop day 1, early mobilization, SCD  PT/OT as able  Pain Control: IV and PO, wean as able  Monitor H&H: No labs drawn yet today  D/C Plan:  pending sw/cm and therapy recommendations. Ok to discharge from orthopedic standpoint once appropriate discharge plan is in place. Orthopedics will follow the patient peripherally for the remainder of their inpatient stay. Please call with

## 2024-04-04 NOTE — CARE COORDINATION
4/4/2024Social work transition of care planning  Pt plan is home with [facility](eliane orders are in). Spouse to transport at [place].  Electronically signed by ZA Gonzáles on 4/4/2024 at 9:19 AM    Addendum: Sw followed up with pt,pt is considering snf. Sw left list with pt,sw will follow up.  The Plan for Transition of Care is related to the following treatment goals: skilled services    The Patient and/or patient representative was provided with a choice of provider and agrees   with the discharge plan. [x] Yes [] No    Freedom of choice list was provided with basic dialogue that supports the patient's individualized plan of care/goals, treatment preferences and shares the quality data associated with the providers. [x] Yes [] No  Electronically signed by ZA Gonzáles on 4/4/2024 at 9:40 AM

## 2024-04-04 NOTE — PROGRESS NOTES
Physical Therapy  Physical Therapy Treatment     Name: Rebeka Renee  : 1945  MRN: [id number]      Date of Service: 2024    Evaluating PT:  Patt Goodson, PT, DPT, [id number]    Room #:  5418/5418-A  Diagnosis:  Hypokalemia [E87.6]  Dislocation of right hip, initial encounter ([facility]) [S73.004A]  Acute hypokalemia [E87.6]  PMHx/PSHx:    Past Medical History:   Diagnosis Date    Anemia     S/P chemotherapy.    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    Ascending aortic aneurysm (HCC)     seen on CTA chest w/contrast on     Asthma     Bipolar 1 disorder (HCC)     Cancer (HCC)     lung/ kidney    Cancer of breast, female (HCC) 2006    S/P bilaeral mastectomy with left breast lymph node resection and chemotherapy.    CHF (congestive heart failure) (HCC)     Chronic back pain     Depression     Depression with anxiety     Diabetes mellitus (HCC)     Resolved after losing 130 lbs. after gastric bypass surgery.    Dyslipidemia     Fibromyalgia     History of blood transfusion     anemia    Hypertension     Hypothyroidism     Neuropathy     Pericardial effusion 2010    Subxiphoid pericardial window with drainage of pericardial effusion and pericardial biopsy:  Fluid and biopsy negative for metastases.     Pleural effusion 2010    Noted on chest x-ray.    TIA (transient ischemic attack)     Tobacco abuse     Patient smoked 3 ppd x 26 years.  Quit in .    Type II or unspecified type diabetes mellitus without mention of complication, not stated as uncontrolled       Past Surgical History:   Procedure Laterality Date    BREAST SURGERY      CATARACT REMOVAL WITH IMPLANT Bilateral     CHOLECYSTECTOMY      GASTRIC BYPASS SURGERY  2004    HERNIA REPAIR      HIP SURGERY Right 2024    RIGHT HIP HEMIARTHROPLASTY performed by Nemesio Handy MD at [facility] [place]    HIP SURGERY Right  hip precautions, Abductor pillow, h/o hip dislocation, L knee buckle, anxious  Equipment Needs:  TBD    SUBJECTIVE:    Pt lives with her  in a 1.5 story home with 2 stairs to enter and 2 rail.  Bed is on 1 floor and bath is on 1 floor.  Pt ambulated with WW PTA. Equipment Owned: WW and rollator    OBJECTIVE:   Initial Evaluation  Date: 4/3/24 Treatment Date: 4/4/24 Short Term/ Long Term   Goals   AM-PAC 6 Clicks 15/24 11/24    Was pt agreeable to Eval/treatment? yes yes    Does pt have pain? 5/10 RLE pain No c/o pain    Bed Mobility  Rolling: NT  Supine to sit: Jessica  Sit to supine: Jessica  Scooting: Jessica Rolling: NT  Supine to sit: ModA  Sit to supine: ModA  Scooting: ModA Rolling: Independent   Supine to sit: Independent   Sit to supine: Independent   Scooting: Independent    Transfers Sit to stand: ModA  Stand to sit: ModA  Stand pivot: Jessica WW Sit to stand: ModAx2  Stand to sit: ModAx2  Stand pivot: ModAx2 WW Sit to stand: Independent   Stand to sit: Independent   Stand pivot: mod Independent with AAD   Ambulation    A few steps EOB<>chair bedside chair with WW Jessica A few steps to bsc/chair with WW ModAx2 150+ feet with AAD mod Independent   Stair negotiation: ascended and descended  NT NT 2+ steps with 2 rail mod Independent     ROM BUE:  Refer to OT note  BLE:  WFL, KI RLE     Strength BUE:  Refer to OT note  BLE:  WFL     Balance Sitting EOB:  SBA  Dynamic Standing:  Jessica WW Sitting EOB:  SBA  Dynamic Standing:  ModAx2 WW Sitting EOB:  Independent   Dynamic Standing:  mod Independent with AAD     Pt is A & O x 3; pt was able to follow 1-2 step instruction throughout session.   Sensation:  Pt reports numbness and tingling to RLE  Edema:  unremarkable     Vitals:  SPO2 and HR were stable throughout session.     Patient education  Pt educated on role of PT, WBAT RLE, global hip precautions, and safety with functional mobility     Patient response to education:   Pt verbalized understanding Pt demonstrated

## 2024-04-04 NOTE — PROGRESS NOTES
imaged.         Vascular duplex upper extremity venous right   Final Result   Addendum (preliminary) 1 of 1   ADDENDUM:   Findings discussed with Dr. Adams on 04/01/2024 at 11:50 p.m..         Final   Occlusive thrombus involving the right axillary vein.            XR HIP 2-3 VW W PELVIS RIGHT   Final Result   Right hip dislocation as described above.  No fracture is identified.      Diminished density of the proximal femur adjacent to the femoral stem is   nonspecific but infection/osteomyelitis cannot be excluded.  Consider CT scan   or white blood cell scintigraphy for further evaluation if clinically   warranted.             Hospital Medications  Current Facility-Administered Medications   Medication Dose Route Frequency Provider Last Rate Last Admin    apixaban (ELIQUIS) tablet 10 mg  10 mg Oral BID Vitaly Lee MD   10 mg at 04/04/24 0815    Followed by    [START ON 4/10/2024] apixaban (ELIQUIS) tablet 5 mg  5 mg Oral BID Vitaly Lee MD        polyethylene glycol (GLYCOLAX) packet 17 g  17 g Oral Daily Vitaly Lee MD   17 g at 04/04/24 0816    docusate sodium (COLACE) capsule 100 mg  100 mg Oral Daily Vitaly Lee MD   100 mg at 04/04/24 0814    oxyCODONE-acetaminophen (PERCOCET) 5-325 MG per tablet 1 tablet  1 tablet Oral Q4H PRN Oumou Schaefer DO   1 tablet at 04/03/24 2031    naloxone 0.4 mg in 10 mL sodium chloride syringe   IntraVENous PRN Kathryn Guzmán MD        sodium chloride flush 0.9 % injection 5-40 mL  5-40 mL IntraVENous 2 times per day Kathryn Guzmán MD   10 mL at 04/04/24 0821    sodium chloride flush 0.9 % injection 5-40 mL  5-40 mL IntraVENous PRN Kathryn Guzmán MD   20 mL at 04/04/24 0529    0.9 % sodium chloride infusion   IntraVENous PRN Kathryn Guzmán MD        HYDROmorphone HCl PF (DILAUDID) injection 0.25 mg  0.25 mg IntraVENous Q5 Min PRN Kathryn Guzmán MD        HYDROmorphone HCl PF (DILAUDID) injection 0.5 mg  0.5 mg IntraVENous Q5 Min PRN  - [place], [place]  +++++++++++++++++++++++++++++++++++++++++++++++++  NOTE: This report was transcribed using voice recognition software. Every effort was made to ensure accuracy; however, inadvertent computerized transcription errors may be present.    I can be reached through [employer].

## 2024-04-04 NOTE — PROGRESS NOTES
Patient less lethargic this morning. Mentation is still slightly off with periods of confusion. Patient still with intermittent tremors x4 extremities. The patient states this is a chronic issue and she has brought it up with her PCP in the past.

## 2024-04-04 NOTE — PROGRESS NOTES
Patient fairly lethargic this evening. Patient regularly drifting off asleep just to shortly thereafter stir back awake with spastic movements of the limbs. No change to neuro status once awake.

## 2024-04-04 NOTE — PROGRESS NOTES
OCCUPATIONAL THERAPY TREATMENT NOTE    [facility]      OT BEDSIDE TREATMENT NOTE      Date:2024  Patient Name: Rebeka Renee  MRN: [id number]  : 1945  Room: [address] [place], [place] [address]A      Evaluating OT: Sudhir Agarwal OTR/L; [id number]        Referring Provider: Francois Yoder DO     Specific Provider Orders/Date: OT Eval and Treat 24        Diagnosis: Hip dislocation, right;  Acute hypokalemia     Surgery: 24 Closed reduction of right hip hemiarthroplasty      Pertinent Medical History:  has a past medical history of Anemia, Anxiety, Arthritis, Ascending aortic aneurysm (HCC), Asthma, Bipolar 1 disorder (HCC), Cancer (HCC), Cancer of breast, female (HCC), CHF (congestive heart failure) (HCC), Chronic back pain, Depression, Depression with anxiety, Diabetes mellitus (HCC), Dyslipidemia, Fibromyalgia, History of blood transfusion, Hypertension, Hypothyroidism, Neuropathy, Pericardial effusion, Pleural effusion, TIA (transient ischemic attack), Tobacco abuse, and Type II or unspecified type diabetes mellitus without mention of complication, not stated as uncontrolled.      Recommended Adaptive Equipment: BSC, AE for LE bathing and dressing PRN      Precautions:  Fall Risk, WBAT RLE w/ KI, maintain anterior & posterior hip precautions, abductor pillow, h/o hip dislocation      Assessment of current deficits    [x] Functional mobility            [x]ADLs           [x] Strength                  []Cognition    [x] Functional transfers          [x] IADLs         [x] Safety Awareness   [x]Endurance    [] Fine Coordination                         [x] Balance      [] Vision/perception   []Sensation      []Gross Motor Coordination             [] ROM           [] Delirium                   [] Motor Control      OT PLAN OF CARE   OT POC based on physician orders, patient diagnosis and results of clinical assessment     Frequency/Duration 3-5 days/wk for 2 weeks PRN   Specific OT Treatment

## 2024-04-04 NOTE — PLAN OF CARE
Problem: Chronic Conditions and Co-morbidities  Goal: Patient's chronic conditions and co-morbidity symptoms are monitored and maintained or improved  Outcome: Progressing     Problem: Safety - Adult  Goal: Free from fall injury  Outcome: Progressing     Problem: ABCDS Injury Assessment  Goal: Absence of physical injury  Outcome: Progressing     Problem: Pain  Goal: Verbalizes/displays adequate comfort level or baseline comfort level  Outcome: Progressing     Problem: Skin/Tissue Integrity  Goal: Absence of new skin breakdown  Description: 1.  Monitor for areas of redness and/or skin breakdown  2.  Assess vascular access sites hourly  3.  Every 4-6 hours minimum:  Change oxygen saturation probe site  4.  Every 4-6 hours:  If on nasal continuous positive airway pressure, respiratory therapy assess nares and determine need for appliance change or resting period.  Outcome: Not Progressing     Problem: Discharge Planning  Goal: Discharge to home or other facility with appropriate resources  Outcome: Progressing     Problem: Respiratory - Adult  Goal: Achieves optimal ventilation and oxygenation  Outcome: Progressing     Problem: Skin/Tissue Integrity - Adult  Goal: Skin integrity remains intact  Outcome: Not Progressing  Goal: Incisions, wounds, or drain sites healing without S/S of infection  Outcome: Progressing     Problem: Musculoskeletal - Adult  Goal: Return mobility to safest level of function  Outcome: Progressing  Goal: Maintain proper alignment of affected body part  Outcome: Progressing  Goal: Return ADL status to a safe level of function  Outcome: Progressing     Problem: Genitourinary - Adult  Goal: Absence of urinary retention  Outcome: Progressing     Problem: Infection - Adult  Goal: Absence of infection at discharge  Outcome: Progressing     Problem: Hematologic - Adult  Goal: Maintains hematologic stability  Outcome: Progressing

## 2024-04-05 VITALS
BODY MASS INDEX: 29.99 KG/M2 | HEIGHT: 65 IN | OXYGEN SATURATION: 100 % | RESPIRATION RATE: 18 BRPM | TEMPERATURE: 96.9 F | HEART RATE: 92 BPM | DIASTOLIC BLOOD PRESSURE: 90 MMHG | SYSTOLIC BLOOD PRESSURE: 154 MMHG | WEIGHT: 180 LBS

## 2024-04-05 DIAGNOSIS — Z96.641 HISTORY OF HEMIARTHROPLASTY OF RIGHT HIP: Primary | ICD-10-CM

## 2024-04-05 LAB
ANION GAP SERPL CALCULATED.3IONS-SCNC: 12 MMOL/L (ref 7–16)
BUN SERPL-MCNC: 13 MG/DL (ref 6–23)
CALCIUM SERPL-MCNC: 8.2 MG/DL (ref 8.6–10.2)
CHLORIDE SERPL-SCNC: 98 MMOL/L (ref 98–107)
CO2 SERPL-SCNC: 30 MMOL/L (ref 22–29)
CREAT SERPL-MCNC: 0.6 MG/DL (ref 0.5–1)
ERYTHROCYTE [DISTWIDTH] IN BLOOD BY AUTOMATED COUNT: 16.5 % (ref 11.5–15)
GFR SERPL CREATININE-BSD FRML MDRD: >90 ML/MIN/1.73M2
GLUCOSE SERPL-MCNC: 151 MG/DL (ref 74–99)
HCT VFR BLD AUTO: 29.1 % (ref 34–48)
HGB BLD-MCNC: 8.8 G/DL (ref 11.5–15.5)
MCH RBC QN AUTO: 26 PG (ref 26–35)
MCHC RBC AUTO-ENTMCNC: 30.2 G/DL (ref 32–34.5)
MCV RBC AUTO: 85.8 FL (ref 80–99.9)
PLATELET # BLD AUTO: 257 K/UL (ref 130–450)
PMV BLD AUTO: 11 FL (ref 7–12)
POTASSIUM SERPL-SCNC: 3.7 MMOL/L (ref 3.5–5)
RBC # BLD AUTO: 3.39 M/UL (ref 3.5–5.5)
SODIUM SERPL-SCNC: 140 MMOL/L (ref 132–146)
WBC OTHER # BLD: 10.5 K/UL (ref 4.5–11.5)

## 2024-04-05 PROCEDURE — 6370000000 HC RX 637 (ALT 250 FOR IP): Performed by: INTERNAL MEDICINE

## 2024-04-05 PROCEDURE — 97530 THERAPEUTIC ACTIVITIES: CPT

## 2024-04-05 PROCEDURE — 80048 BASIC METABOLIC PNL TOTAL CA: CPT

## 2024-04-05 PROCEDURE — 85027 COMPLETE CBC AUTOMATED: CPT

## 2024-04-05 PROCEDURE — 2580000003 HC RX 258: Performed by: INTERNAL MEDICINE

## 2024-04-05 PROCEDURE — 6370000000 HC RX 637 (ALT 250 FOR IP): Performed by: STUDENT IN AN ORGANIZED HEALTH CARE EDUCATION/TRAINING PROGRAM

## 2024-04-05 PROCEDURE — 2700000000 HC OXYGEN THERAPY PER DAY

## 2024-04-05 PROCEDURE — 99239 HOSP IP/OBS DSCHRG MGMT >30: CPT | Performed by: STUDENT IN AN ORGANIZED HEALTH CARE EDUCATION/TRAINING PROGRAM

## 2024-04-05 PROCEDURE — 2580000003 HC RX 258: Performed by: ANESTHESIOLOGY

## 2024-04-05 PROCEDURE — 97535 SELF CARE MNGMENT TRAINING: CPT

## 2024-04-05 PROCEDURE — 6360000002 HC RX W HCPCS: Performed by: INTERNAL MEDICINE

## 2024-04-05 RX ORDER — OXYCODONE HYDROCHLORIDE AND ACETAMINOPHEN 5; 325 MG/1; MG/1
1 TABLET ORAL EVERY 4 HOURS PRN
Qty: 12 TABLET | Refills: 0 | Status: SHIPPED | OUTPATIENT
Start: 2024-04-05 | End: 2024-04-08

## 2024-04-05 RX ADMIN — Medication 1000 UNITS: at 08:27

## 2024-04-05 RX ADMIN — SUCRALFATE 1 G: 1 TABLET ORAL at 08:29

## 2024-04-05 RX ADMIN — PETROLATUM: 420 OINTMENT TOPICAL at 09:12

## 2024-04-05 RX ADMIN — LEVOTHYROXINE SODIUM 50 MCG: 0.05 TABLET ORAL at 05:41

## 2024-04-05 RX ADMIN — POLYETHYLENE GLYCOL 3350 17 G: 17 POWDER, FOR SOLUTION ORAL at 08:32

## 2024-04-05 RX ADMIN — BUPROPION HYDROCHLORIDE 100 MG: 100 TABLET, EXTENDED RELEASE ORAL at 08:31

## 2024-04-05 RX ADMIN — WATER 2000 MG: 1 INJECTION INTRAMUSCULAR; INTRAVENOUS; SUBCUTANEOUS at 05:41

## 2024-04-05 RX ADMIN — OXYCODONE HYDROCHLORIDE AND ACETAMINOPHEN 1 TABLET: 5; 325 TABLET ORAL at 04:51

## 2024-04-05 RX ADMIN — APIXABAN 10 MG: 5 TABLET, FILM COATED ORAL at 08:27

## 2024-04-05 RX ADMIN — DOCUSATE SODIUM 100 MG: 100 CAPSULE, LIQUID FILLED ORAL at 08:29

## 2024-04-05 RX ADMIN — PRAMIPEXOLE DIHYDROCHLORIDE 0.75 MG: 0.25 TABLET ORAL at 08:31

## 2024-04-05 RX ADMIN — PANTOPRAZOLE SODIUM 40 MG: 40 TABLET, DELAYED RELEASE ORAL at 05:41

## 2024-04-05 RX ADMIN — SODIUM CHLORIDE, PRESERVATIVE FREE 10 ML: 5 INJECTION INTRAVENOUS at 08:29

## 2024-04-05 RX ADMIN — VENLAFAXINE HYDROCHLORIDE 150 MG: 150 CAPSULE, EXTENDED RELEASE ORAL at 08:30

## 2024-04-05 RX ADMIN — OXYCODONE HYDROCHLORIDE AND ACETAMINOPHEN 1 TABLET: 5; 325 TABLET ORAL at 09:06

## 2024-04-05 RX ADMIN — GABAPENTIN 800 MG: 400 CAPSULE ORAL at 08:33

## 2024-04-05 ASSESSMENT — PAIN DESCRIPTION - LOCATION
LOCATION: LEG
LOCATION: LEG

## 2024-04-05 ASSESSMENT — PAIN SCALES - GENERAL
PAINLEVEL_OUTOF10: 7
PAINLEVEL_OUTOF10: 0

## 2024-04-05 ASSESSMENT — PAIN DESCRIPTION - FREQUENCY: FREQUENCY: INTERMITTENT

## 2024-04-05 ASSESSMENT — PAIN DESCRIPTION - ORIENTATION
ORIENTATION: RIGHT
ORIENTATION: RIGHT

## 2024-04-05 ASSESSMENT — PAIN DESCRIPTION - ONSET: ONSET: GRADUAL

## 2024-04-05 ASSESSMENT — PAIN DESCRIPTION - PAIN TYPE: TYPE: SURGICAL PAIN

## 2024-04-05 ASSESSMENT — PAIN DESCRIPTION - DESCRIPTORS
DESCRIPTORS: ACHING;DISCOMFORT;DULL
DESCRIPTORS: ACHING;GNAWING;THROBBING

## 2024-04-05 NOTE — CARE COORDINATION
4/5/2024Social work transition of care planning  Pt plan is to [facility],auth pending. Pasrr and envelope completed.  Electronically signed by ZA Gonzáles on 4/5/2024 at 9:06 AM    Addendum: Levon set up pas ambulance for 3 pm to [facility]. Rn,pt and Facility liaison notified.  Electronically signed by ZA Gonzáles on 4/5/2024 at 9:55 AM

## 2024-04-05 NOTE — PLAN OF CARE
Problem: Chronic Conditions and Co-morbidities  Goal: Patient's chronic conditions and co-morbidity symptoms are monitored and maintained or improved  4/4/2024 0953 by Marleen Marrero RN  Outcome: Progressing     Problem: Safety - Adult  Goal: Free from fall injury  4/4/2024 2249 by Sarah Bowen RN  Outcome: Progressing  4/4/2024 0953 by Marleen Marrero RN  Outcome: Progressing     Problem: Pain  Goal: Verbalizes/displays adequate comfort level or baseline comfort level  4/4/2024 2249 by Sarah Bowen RN  Outcome: Progressing  4/4/2024 0953 by Marleen Marrero RN  Outcome: Progressing     Problem: Skin/Tissue Integrity  Goal: Absence of new skin breakdown  Description: 1.  Monitor for areas of redness and/or skin breakdown  2.  Assess vascular access sites hourly  3.  Every 4-6 hours minimum:  Change oxygen saturation probe site  4.  Every 4-6 hours:  If on nasal continuous positive airway pressure, respiratory therapy assess nares and determine need for appliance change or resting period.  4/4/2024 2249 by Sarah Bowen RN  Outcome: Progressing  4/4/2024 0953 by Marleen Marrero RN  Outcome: Progressing

## 2024-04-05 NOTE — DISCHARGE SUMMARY
[facility] Hospitalist Discharge Summary         Rebeka Renee  MRN: [id number]  Account Number: [id number]  Admitted: 4/1/2024  Discharge Date: 04/05/24    PCP Handoff    Recommended Outpatient Testing  None    Results Pending At Discharge  None        Clinical Summary  Rebeka Renee is a 78 y.o. female patient of Navneet Latif MD with history of hypertension, hyperlipidemia, diabetes, hypothyroidism, AAA, lung/kidney/breast cancer status post nephrectomy and lobectomy, CHF, CVA, fibromyalgia, anxiety and depression presented to [facility] on 4/1/2024 with complaints of right hip pain.  Patient had recent admission for right hip infection of surgical site and was discharged on 3/25.  While in the ER patient had x-ray right hip that showed right hip dislocation.  Patient admitted for orthopedic surgery evaluation.     Right hip dislocation s/p closed reduction of right hip hemiarthroplasty on 4/2  Surgical site infection s/p I&D right hip wound and revision of right femoral head hemiarthroplasty  PT/OT  Symptomatic management for pain  Weightbearing as tolerated in knee immobilizer and maintain anterior and posterior hip precautions per orthopedic surgery.  Patient would need placement.   is helping.   Continue cefazolin 2 g every 8 hours for 6 weeks from 03/21-05/02        Diarrhea-resolved     Right axillary vein occlusive thrombus  Likely related to PICC line  PICC line is functional, will continue to use  Transition IV heparin to Eliquis.  Since is a provoked DVT, patient would need anticoagulation for at least 3 months.  Patient to discuss with PCP after discharge.     Delirium  Implement delirium protocol  Frequent reorientation.     Hypokalemia  Replace potassium     Mood disorder  Continue Wellbutrin, Effexor     GERD  Continue Protonix     Hyperlipidemia  Continue statin      Discharge Medications     Medication List        START taking these medications   Follow Up:  Nemesio Handy MD  [address] [facility] [address]  [phone]    Follow up in 1 week(s)      [address]  [facility] [address] [phone]          Condition at Discharge: Stable    Disposition:  SNF      I reviewed discharge recommendations with the patient in person.    Patient instructions, including activity, were given to the patient/family at discharge.  On day of discharge I saw Rebeka Renee and Total time spent on day of encounter: 35 minutes on discharge.    Completed by: Vitaly Lee MD on 04/05/24, 10:38 AM

## 2024-04-05 NOTE — PROGRESS NOTES
Attempted nurse to nurse and told no nurse available to take call.  took this name and phone number to unit and was told they would call back.

## 2024-04-05 NOTE — PROGRESS NOTES
Physical Therapy  Physical Therapy Treatment     Name: Rebeka Renee  : 1945  MRN: [id number]      Date of Service: 2024    Evaluating PT:  Patt Goodson, PT, DPT, [id number]    Room #:  5418/5418-A  Diagnosis:  Hypokalemia [E87.6]  Dislocation of right hip, initial encounter ([facility]) [S73.004A]  Acute hypokalemia [E87.6]  PMHx/PSHx:    Past Medical History:   Diagnosis Date    Anemia     S/P chemotherapy.    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    Ascending aortic aneurysm (HCC)     seen on CTA chest w/contrast on     Asthma     Bipolar 1 disorder (HCC)     Cancer (HCC)     lung/ kidney    Cancer of breast, female (HCC) 2006    S/P bilaeral mastectomy with left breast lymph node resection and chemotherapy.    CHF (congestive heart failure) (HCC)     Chronic back pain     Depression     Depression with anxiety     Diabetes mellitus (HCC)     Resolved after losing 130 lbs. after gastric bypass surgery.    Dyslipidemia     Fibromyalgia     History of blood transfusion     anemia    Hypertension     Hypothyroidism     Neuropathy     Pericardial effusion 2010    Subxiphoid pericardial window with drainage of pericardial effusion and pericardial biopsy:  Fluid and biopsy negative for metastases.     Pleural effusion 2010    Noted on chest x-ray.    TIA (transient ischemic attack)     Tobacco abuse     Patient smoked 3 ppd x 26 years.  Quit in .    Type II or unspecified type diabetes mellitus without mention of complication, not stated as uncontrolled       Past Surgical History:   Procedure Laterality Date    BREAST SURGERY      CATARACT REMOVAL WITH IMPLANT Bilateral     CHOLECYSTECTOMY      GASTRIC BYPASS SURGERY  2004    HERNIA REPAIR      HIP SURGERY Right 2024    RIGHT HIP HEMIARTHROPLASTY performed by Nemesio Handy MD at [facility] [place]    HIP SURGERY Right  in this area   x x x     ASSESSMENT:    Comments:  Medically cleared for session by RN. Pt was in bed upon PT entry and agreeable to participate. Pt was found on RA, SPO2 95%. Transferred to EOB with heavy assist for trunk and BLE. Once sitting EOB required light assist to maintain balance. Adjusted R knee immobilizer while sitting EOB. Noted adduction of RLE with light resistance when correcting. Cues provided throughout session to maintain hip precautions. Completed sit<>stand transfer to WW with lift assist. Pt was unable to bear weight through RLE. Pt returned to supine and positioned with abductor pillow in place. All needs were met and call light in reach. RN notified.     Treatment:  Patient practiced and was instructed in the following treatment:    Bed mobility training - pt given verbal and tactile cues to facilitate proper sequencing and safety during rolling and supine>sit as well as provided with physical assistance to complete task   Sitting EOB for >8 minutes for upright tolerance, postural awareness and BLE ROM  Transfer training - pt was given verbal and tactile cues to facilitate proper hand placement, technique and safety during sit to stand and stand to sit as well as provided with physical assistance.  Skilled positioning - Pt placed in the chair position with pillows utilized to facilitate upright posture, joint and skin integrity, and interaction with environment.     Skilled monitoring of vitals throughout session.     PLAN:    Patient is making slow progress towards established goals.  Will continue with current POC.      Time in  1101  Time out  1124    Total Treatment Time  23 minutes     CPT codes:  [] Gait training 56432 0 minutes  [] Manual therapy 14308 0 minutes  [x] Therapeutic activities 49134 23 minutes  [] Therapeutic exercises 93701 0 minutes  [] Neuromuscular reeducation 61020 0 minutes    Patt Goodson PT, DPT  [id number]

## 2024-04-05 NOTE — PROGRESS NOTES
Secured msg.  Sent to [facility] regarding evaluating the rt leg, abductor pillow in place, she's not following protocol and restrictions. PT/OT wet to work with her and rt leg adducting

## 2024-04-05 NOTE — PROGRESS NOTES
OCCUPATIONAL THERAPY TREATMENT NOTE    [facility]      OT BEDSIDE TREATMENT NOTE      Date:2024  Patient Name: Rebeka Renee  MRN: [id number]  : 1945  Room: [address] [place], [place] [address]A      Evaluating OT: Sudhir Agarwal OTR/L; [id number]        Referring Provider: Francois Yoder DO     Specific Provider Orders/Date: OT Eval and Treat 24        Diagnosis: Hip dislocation, right;  Acute hypokalemia     Surgery: 24 Closed reduction of right hip hemiarthroplasty      Pertinent Medical History:  has a past medical history of Anemia, Anxiety, Arthritis, Ascending aortic aneurysm (HCC), Asthma, Bipolar 1 disorder (HCC), Cancer (HCC), Cancer of breast, female (HCC), CHF (congestive heart failure) (HCC), Chronic back pain, Depression, Depression with anxiety, Diabetes mellitus (HCC), Dyslipidemia, Fibromyalgia, History of blood transfusion, Hypertension, Hypothyroidism, Neuropathy, Pericardial effusion, Pleural effusion, TIA (transient ischemic attack), Tobacco abuse, and Type II or unspecified type diabetes mellitus without mention of complication, not stated as uncontrolled.      Recommended Adaptive Equipment: BSC, AE for LE bathing and dressing PRN      Precautions:  Fall Risk, WBAT RLE w/ KI, maintain anterior & posterior hip precautions, abductor pillow, h/o hip dislocation      Assessment of current deficits    [x] Functional mobility            [x]ADLs           [x] Strength                  []Cognition    [x] Functional transfers          [x] IADLs         [x] Safety Awareness   [x]Endurance    [] Fine Coordination                         [x] Balance      [] Vision/perception   []Sensation      []Gross Motor Coordination             [] ROM           [] Delirium                   [] Motor Control      OT PLAN OF CARE   OT POC based on physician orders, patient diagnosis and results of clinical assessment     Frequency/Duration 3-5 days/wk for 2 weeks PRN   Specific OT Treatment

## 2024-04-08 ENCOUNTER — APPOINTMENT (OUTPATIENT)
Dept: GENERAL RADIOLOGY | Age: 79
DRG: 467 | End: 2024-04-08
Payer: MEDICARE

## 2024-04-08 ENCOUNTER — HOSPITAL ENCOUNTER (INPATIENT)
Age: 79
LOS: 9 days | Discharge: NURSING, REHAB OR LONG-TERM CARE | DRG: 467 | End: 2024-04-17
Attending: EMERGENCY MEDICINE | Admitting: STUDENT IN AN ORGANIZED HEALTH CARE EDUCATION/TRAINING PROGRAM
Payer: MEDICARE

## 2024-04-08 ENCOUNTER — TELEPHONE (OUTPATIENT)
Dept: ORTHOPEDIC SURGERY | Age: 79
End: 2024-04-08

## 2024-04-08 DIAGNOSIS — T84.59XA INFECTION OF PROSTHETIC HIP JOINT, INITIAL ENCOUNTER (HCC): ICD-10-CM

## 2024-04-08 DIAGNOSIS — S73.004D DISLOCATION OF RIGHT HIP, SUBSEQUENT ENCOUNTER: ICD-10-CM

## 2024-04-08 DIAGNOSIS — Z96.649 INFECTION OF PROSTHETIC HIP JOINT, INITIAL ENCOUNTER (HCC): ICD-10-CM

## 2024-04-08 DIAGNOSIS — S73.004A CLOSED DISLOCATION OF RIGHT HIP, INITIAL ENCOUNTER (HCC): Primary | ICD-10-CM

## 2024-04-08 DIAGNOSIS — S73.004A DISLOCATION OF RIGHT HIP, INITIAL ENCOUNTER (HCC): ICD-10-CM

## 2024-04-08 PROBLEM — S70.251A: Status: ACTIVE | Noted: 2024-04-08

## 2024-04-08 PROBLEM — L08.9: Status: ACTIVE | Noted: 2024-04-08

## 2024-04-08 PROCEDURE — 73502 X-RAY EXAM HIP UNI 2-3 VIEWS: CPT

## 2024-04-08 PROCEDURE — 96374 THER/PROPH/DIAG INJ IV PUSH: CPT

## 2024-04-08 PROCEDURE — 6360000002 HC RX W HCPCS: Performed by: EMERGENCY MEDICINE

## 2024-04-08 PROCEDURE — 6360000002 HC RX W HCPCS

## 2024-04-08 PROCEDURE — 2500000003 HC RX 250 WO HCPCS

## 2024-04-08 PROCEDURE — 6360000002 HC RX W HCPCS: Performed by: STUDENT IN AN ORGANIZED HEALTH CARE EDUCATION/TRAINING PROGRAM

## 2024-04-08 PROCEDURE — 99285 EMERGENCY DEPT VISIT HI MDM: CPT

## 2024-04-08 PROCEDURE — 6370000000 HC RX 637 (ALT 250 FOR IP): Performed by: STUDENT IN AN ORGANIZED HEALTH CARE EDUCATION/TRAINING PROGRAM

## 2024-04-08 PROCEDURE — 1200000000 HC SEMI PRIVATE

## 2024-04-08 PROCEDURE — 2580000003 HC RX 258: Performed by: STUDENT IN AN ORGANIZED HEALTH CARE EDUCATION/TRAINING PROGRAM

## 2024-04-08 RX ORDER — GABAPENTIN 400 MG/1
800 CAPSULE ORAL 3 TIMES DAILY
Status: DISCONTINUED | OUTPATIENT
Start: 2024-04-08 | End: 2024-04-17 | Stop reason: HOSPADM

## 2024-04-08 RX ORDER — LEVOTHYROXINE SODIUM 0.05 MG/1
50 TABLET ORAL DAILY
Status: DISCONTINUED | OUTPATIENT
Start: 2024-04-09 | End: 2024-04-17 | Stop reason: HOSPADM

## 2024-04-08 RX ORDER — OXYCODONE HYDROCHLORIDE AND ACETAMINOPHEN 5; 325 MG/1; MG/1
1 TABLET ORAL EVERY 4 HOURS PRN
Status: DISCONTINUED | OUTPATIENT
Start: 2024-04-08 | End: 2024-04-17 | Stop reason: HOSPADM

## 2024-04-08 RX ORDER — BUPROPION HYDROCHLORIDE 100 MG/1
100 TABLET, EXTENDED RELEASE ORAL DAILY
Status: DISCONTINUED | OUTPATIENT
Start: 2024-04-09 | End: 2024-04-17 | Stop reason: HOSPADM

## 2024-04-08 RX ORDER — VITAMIN B COMPLEX
1000 TABLET ORAL DAILY
Status: DISCONTINUED | OUTPATIENT
Start: 2024-04-09 | End: 2024-04-10

## 2024-04-08 RX ORDER — SODIUM CHLORIDE 0.9 % (FLUSH) 0.9 %
5-40 SYRINGE (ML) INJECTION PRN
Status: DISCONTINUED | OUTPATIENT
Start: 2024-04-08 | End: 2024-04-17 | Stop reason: HOSPADM

## 2024-04-08 RX ORDER — ONDANSETRON 2 MG/ML
4 INJECTION INTRAMUSCULAR; INTRAVENOUS EVERY 6 HOURS PRN
Status: DISCONTINUED | OUTPATIENT
Start: 2024-04-08 | End: 2024-04-17 | Stop reason: HOSPADM

## 2024-04-08 RX ORDER — SUCRALFATE 1 G/1
1 TABLET ORAL DAILY
Status: DISCONTINUED | OUTPATIENT
Start: 2024-04-09 | End: 2024-04-17 | Stop reason: HOSPADM

## 2024-04-08 RX ORDER — SODIUM CHLORIDE 9 MG/ML
INJECTION, SOLUTION INTRAVENOUS PRN
Status: DISCONTINUED | OUTPATIENT
Start: 2024-04-08 | End: 2024-04-17 | Stop reason: HOSPADM

## 2024-04-08 RX ORDER — MORPHINE SULFATE 2 MG/ML
2 INJECTION, SOLUTION INTRAMUSCULAR; INTRAVENOUS
Status: DISCONTINUED | OUTPATIENT
Start: 2024-04-08 | End: 2024-04-17 | Stop reason: HOSPADM

## 2024-04-08 RX ORDER — POTASSIUM CHLORIDE 7.45 MG/ML
10 INJECTION INTRAVENOUS PRN
Status: DISCONTINUED | OUTPATIENT
Start: 2024-04-08 | End: 2024-04-17 | Stop reason: HOSPADM

## 2024-04-08 RX ORDER — KETAMINE HCL IN NACL, ISO-OSM 100MG/10ML
SYRINGE (ML) INJECTION
Status: COMPLETED
Start: 2024-04-08 | End: 2024-04-08

## 2024-04-08 RX ORDER — SODIUM CHLORIDE 9 MG/ML
INJECTION, SOLUTION INTRAVENOUS CONTINUOUS
Status: ACTIVE | OUTPATIENT
Start: 2024-04-08 | End: 2024-04-09

## 2024-04-08 RX ORDER — POLYETHYLENE GLYCOL 3350 17 G/17G
17 POWDER, FOR SOLUTION ORAL DAILY PRN
Status: DISCONTINUED | OUTPATIENT
Start: 2024-04-08 | End: 2024-04-17 | Stop reason: HOSPADM

## 2024-04-08 RX ORDER — ONDANSETRON 4 MG/1
4 TABLET, ORALLY DISINTEGRATING ORAL EVERY 8 HOURS PRN
Status: DISCONTINUED | OUTPATIENT
Start: 2024-04-08 | End: 2024-04-17 | Stop reason: HOSPADM

## 2024-04-08 RX ORDER — VENLAFAXINE HYDROCHLORIDE 150 MG/1
150 CAPSULE, EXTENDED RELEASE ORAL DAILY
Status: DISCONTINUED | OUTPATIENT
Start: 2024-04-09 | End: 2024-04-17 | Stop reason: HOSPADM

## 2024-04-08 RX ORDER — PRAMIPEXOLE DIHYDROCHLORIDE 0.25 MG/1
0.75 TABLET ORAL 2 TIMES DAILY
Status: DISCONTINUED | OUTPATIENT
Start: 2024-04-08 | End: 2024-04-17 | Stop reason: HOSPADM

## 2024-04-08 RX ORDER — PANTOPRAZOLE SODIUM 40 MG/1
40 TABLET, DELAYED RELEASE ORAL
Status: DISCONTINUED | OUTPATIENT
Start: 2024-04-09 | End: 2024-04-17 | Stop reason: HOSPADM

## 2024-04-08 RX ORDER — MAGNESIUM SULFATE IN WATER 40 MG/ML
2000 INJECTION, SOLUTION INTRAVENOUS PRN
Status: DISCONTINUED | OUTPATIENT
Start: 2024-04-08 | End: 2024-04-17 | Stop reason: HOSPADM

## 2024-04-08 RX ORDER — ROSUVASTATIN CALCIUM 20 MG/1
40 TABLET, COATED ORAL NIGHTLY
Status: DISCONTINUED | OUTPATIENT
Start: 2024-04-08 | End: 2024-04-17 | Stop reason: HOSPADM

## 2024-04-08 RX ORDER — ACETAMINOPHEN 650 MG/1
650 SUPPOSITORY RECTAL EVERY 6 HOURS PRN
Status: DISCONTINUED | OUTPATIENT
Start: 2024-04-08 | End: 2024-04-17 | Stop reason: HOSPADM

## 2024-04-08 RX ORDER — FENTANYL CITRATE 50 UG/ML
50 INJECTION, SOLUTION INTRAMUSCULAR; INTRAVENOUS ONCE
Status: COMPLETED | OUTPATIENT
Start: 2024-04-08 | End: 2024-04-08

## 2024-04-08 RX ORDER — POTASSIUM CHLORIDE 20 MEQ/1
40 TABLET, EXTENDED RELEASE ORAL PRN
Status: DISCONTINUED | OUTPATIENT
Start: 2024-04-08 | End: 2024-04-17 | Stop reason: HOSPADM

## 2024-04-08 RX ORDER — SODIUM CHLORIDE 0.9 % (FLUSH) 0.9 %
5-40 SYRINGE (ML) INJECTION EVERY 12 HOURS SCHEDULED
Status: DISCONTINUED | OUTPATIENT
Start: 2024-04-08 | End: 2024-04-17 | Stop reason: HOSPADM

## 2024-04-08 RX ORDER — ACETAMINOPHEN 325 MG/1
650 TABLET ORAL EVERY 6 HOURS PRN
Status: DISCONTINUED | OUTPATIENT
Start: 2024-04-08 | End: 2024-04-17 | Stop reason: HOSPADM

## 2024-04-08 RX ORDER — PROPOFOL 10 MG/ML
INJECTION, EMULSION INTRAVENOUS
Status: COMPLETED
Start: 2024-04-08 | End: 2024-04-08

## 2024-04-08 RX ORDER — ASPIRIN 325 MG
325 TABLET, DELAYED RELEASE (ENTERIC COATED) ORAL 2 TIMES DAILY
Status: DISCONTINUED | OUTPATIENT
Start: 2024-04-08 | End: 2024-04-17 | Stop reason: HOSPADM

## 2024-04-08 RX ADMIN — SODIUM CHLORIDE, PRESERVATIVE FREE 10 ML: 5 INJECTION INTRAVENOUS at 21:41

## 2024-04-08 RX ADMIN — WATER 2000 MG: 1 INJECTION INTRAMUSCULAR; INTRAVENOUS; SUBCUTANEOUS at 22:57

## 2024-04-08 RX ADMIN — MORPHINE SULFATE 2 MG: 2 INJECTION, SOLUTION INTRAMUSCULAR; INTRAVENOUS at 21:41

## 2024-04-08 RX ADMIN — PROPOFOL 40 MG: 10 INJECTION, EMULSION INTRAVENOUS at 18:56

## 2024-04-08 RX ADMIN — SODIUM CHLORIDE: 9 INJECTION, SOLUTION INTRAVENOUS at 22:56

## 2024-04-08 RX ADMIN — Medication 50 MG: at 18:57

## 2024-04-08 RX ADMIN — ACETAMINOPHEN 650 MG: 325 TABLET ORAL at 21:40

## 2024-04-08 RX ADMIN — FENTANYL CITRATE 50 MCG: 50 INJECTION INTRAMUSCULAR; INTRAVENOUS at 16:36

## 2024-04-08 ASSESSMENT — PAIN SCALES - GENERAL
PAINLEVEL_OUTOF10: 0
PAINLEVEL_OUTOF10: 10

## 2024-04-08 ASSESSMENT — PAIN SCALES - WONG BAKER: WONGBAKER_NUMERICALRESPONSE: HURTS EVEN MORE

## 2024-04-08 ASSESSMENT — PAIN DESCRIPTION - ORIENTATION: ORIENTATION: RIGHT

## 2024-04-08 ASSESSMENT — PAIN DESCRIPTION - DESCRIPTORS: DESCRIPTORS: ACHING;DISCOMFORT;BURNING;THROBBING

## 2024-04-08 ASSESSMENT — PAIN DESCRIPTION - LOCATION: LOCATION: LEG

## 2024-04-08 NOTE — SEDATION DOCUMENTATION
Dr. Yara hutton, Dr. Slade Galindo, pharmacist yang and jake rn at bedside. Pt has line, oxygen, end tidal, suction and bolus prepared.

## 2024-04-08 NOTE — H&P
Hospitalist History & Physical      PCP: Navneet Latif MD    Date of Admission: 4/8/2024    Date of Service: Pt seen/examined on 4/8/2024 and is admitted to Inpatient with expected LOS greater than two midnights due to medical therapy.      Chief Complaint:  right hip pain     History Of Present Illness:       78 y.o. female patient  with history of hypertension, hyperlipidemia, diabetes, hypothyroidism, AAA, lung/kidney/breast cancer status post nephrectomy and lobectomy, CHF, CVA, fibromyalgia, anxiety and depression presented to the ER with complaint of right hip pain.  Patient had had recent right hip arthroplasty due to a fracture in January.  Patient was then admitted for dislocation and had had an ORIF that later developed surgical site infection on March and had I&D and placed on IV antibiotic via PICC line.  Patient was then readmitted on 4/1 for right dislocation and had closed reduction in the operating room.  Patient was discharged to rehab facility on continue IV evaluated.  Today patient presented with right hip pain and inability to ambulate.  On ER evaluation x-ray of the right hip demonstrated superolateral dislocation of the right hemiarthroplasty.  Orthopedic evaluated on the ER and Dx closed reduction under conscious sedation and admitted for further management.  Given patient multiple comorbidities internal medicine was called for admission/primary team.  Met patient at bedside in the ER.  Besides his severe right hip pain she has no other concerns.  Denies chest pain, abdominal pain, nausea or vomiting, fevers, chills    Past Medical History:   Diagnosis Date    Anemia     S/P chemotherapy.    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    Ascending aortic aneurysm (HCC)     seen on CTA chest w/contrast on 2019    Asthma     Bipolar 1 disorder (HCC)     Cancer (HCC)     lung/

## 2024-04-08 NOTE — TELEPHONE ENCOUNTER
Casi, PT at [facility] called. She states Rebeka is having some drainage from her incision. Also, her R leg is externally rotated and she is unable to maintain abduction.  Informed Casi that pt was to have and appt in the Ortho Trauma clinic today. She was unaware and transferred me to Latoya POON. Spoke with Latoya Villagran who confirms leg is externally rotated and states pt has been \"behavioral\".  Recommended transferring pt to ED for evaluation. Verbalized understanding.

## 2024-04-08 NOTE — ED PROVIDER NOTES
s/p hip replacement c/b wound infection and revision What reading provider will be dictating this exam?->CRC FINDINGS: The patient is status post bipolar right hip hemiarthroplasty.  The femoral head prosthesis is displaced superior and lateral to the acetabulum by over 6 cm.  No fracture is identified.  Diminished density around the proximal femur at its junction with the femoral stem could be projectional and or related to osteopenia.  No radiopaque foreign bodies are seen.     Right hip dislocation as described above.  No fracture is identified. Diminished density of the proximal femur adjacent to the femoral stem is nonspecific but infection/osteomyelitis cannot be excluded.  Consider CT scan or white blood cell scintigraphy for further evaluation if clinically warranted.       No results found.      PAST MEDICAL HISTORY/Chronic Conditions Affecting Care      has a past medical history of Anemia, Anxiety, Arthritis, Ascending aortic aneurysm ([facility]), Asthma, Bipolar 1 disorder ([facility]), Cancer ([facility]), Cancer of breast, female ([facility]) (01/2006), CHF (congestive heart failure) ([facility]), Chronic back pain, Depression, Depression with anxiety, Diabetes mellitus ([facility]), Dyslipidemia, Fibromyalgia, History of blood transfusion, Hypertension, Hypothyroidism, Neuropathy, Pericardial effusion (04/02/2010), Pleural effusion (05/02/2010), TIA (transient ischemic attack), Tobacco abuse, and Type II or unspecified type diabetes mellitus without mention of complication, not stated as uncontrolled.     EMERGENCY DEPARTMENT COURSE    Vitals:    Vitals:    04/08/24 1511   BP: (!) 162/117   Pulse: 83   Resp: 18   Temp: 98 °F (36.7 °C)   SpO2: 98%       Patient was given the following medications:  Medications   fentaNYL (SUBLIMAZE) injection 50 mcg (50 mcg IntraVENous Given 4/8/24 1636)         Is this patient to be included in the SEP-1 Core Measure due to severe sepsis or septic shock?   No   Exclusion criteria - the patient is NOT to be

## 2024-04-09 ENCOUNTER — APPOINTMENT (OUTPATIENT)
Dept: GENERAL RADIOLOGY | Age: 79
DRG: 467 | End: 2024-04-09
Payer: MEDICARE

## 2024-04-09 ENCOUNTER — APPOINTMENT (OUTPATIENT)
Dept: CT IMAGING | Age: 79
DRG: 467 | End: 2024-04-09
Payer: MEDICARE

## 2024-04-09 ENCOUNTER — APPOINTMENT (OUTPATIENT)
Dept: INTERVENTIONAL RADIOLOGY/VASCULAR | Age: 79
DRG: 467 | End: 2024-04-09
Payer: MEDICARE

## 2024-04-09 LAB
25(OH)D3 SERPL-MCNC: 9.2 NG/ML (ref 30–100)
ALBUMIN SERPL-MCNC: 2 G/DL (ref 3.5–5.2)
ANION GAP SERPL CALCULATED.3IONS-SCNC: 8 MMOL/L (ref 7–16)
BASOPHILS # BLD: 0.03 K/UL (ref 0–0.2)
BASOPHILS NFR BLD: 1 % (ref 0–2)
BUN SERPL-MCNC: 8 MG/DL (ref 6–23)
CALCIUM SERPL-MCNC: 7.1 MG/DL (ref 8.6–10.2)
CHLORIDE SERPL-SCNC: 104 MMOL/L (ref 98–107)
CO2 SERPL-SCNC: 31 MMOL/L (ref 22–29)
CREAT SERPL-MCNC: 0.5 MG/DL (ref 0.5–1)
CRP SERPL HS-MCNC: 7 MG/L (ref 0–5)
EOSINOPHIL # BLD: 0.38 K/UL (ref 0.05–0.5)
EOSINOPHILS RELATIVE PERCENT: 6 % (ref 0–6)
ERYTHROCYTE [DISTWIDTH] IN BLOOD BY AUTOMATED COUNT: 16 % (ref 11.5–15)
ERYTHROCYTE [SEDIMENTATION RATE] IN BLOOD BY WESTERGREN METHOD: 20 MM/HR (ref 0–20)
GFR SERPL CREATININE-BSD FRML MDRD: >90 ML/MIN/1.73M2
GLUCOSE BLD-MCNC: 122 MG/DL (ref 74–99)
GLUCOSE BLD-MCNC: 81 MG/DL (ref 74–99)
GLUCOSE BLD-MCNC: 81 MG/DL (ref 74–99)
GLUCOSE SERPL-MCNC: 61 MG/DL (ref 74–99)
HCT VFR BLD AUTO: 26.9 % (ref 34–48)
HGB BLD-MCNC: 7.8 G/DL (ref 11.5–15.5)
IMM GRANULOCYTES # BLD AUTO: 0.03 K/UL (ref 0–0.58)
IMM GRANULOCYTES NFR BLD: 1 % (ref 0–5)
INR PPP: 1.6
LYMPHOCYTES NFR BLD: 1.9 K/UL (ref 1.5–4)
LYMPHOCYTES RELATIVE PERCENT: 32 % (ref 20–42)
MAGNESIUM SERPL-MCNC: 1.2 MG/DL (ref 1.6–2.6)
MAGNESIUM SERPL-MCNC: 2.6 MG/DL (ref 1.6–2.6)
MCH RBC QN AUTO: 25.6 PG (ref 26–35)
MCHC RBC AUTO-ENTMCNC: 29 G/DL (ref 32–34.5)
MCV RBC AUTO: 88.2 FL (ref 80–99.9)
MONOCYTES NFR BLD: 0.66 K/UL (ref 0.1–0.95)
MONOCYTES NFR BLD: 11 % (ref 2–12)
NEUTROPHILS NFR BLD: 49 % (ref 43–80)
NEUTS SEG NFR BLD: 2.92 K/UL (ref 1.8–7.3)
PLATELET # BLD AUTO: 236 K/UL (ref 130–450)
PMV BLD AUTO: 10.7 FL (ref 7–12)
POTASSIUM SERPL-SCNC: 3.1 MMOL/L (ref 3.5–5)
PROTHROMBIN TIME: 17.6 SEC (ref 9.3–12.4)
RBC # BLD AUTO: 3.05 M/UL (ref 3.5–5.5)
SODIUM SERPL-SCNC: 143 MMOL/L (ref 132–146)
T4 FREE SERPL-MCNC: 1 NG/DL (ref 0.9–1.7)
WBC OTHER # BLD: 5.9 K/UL (ref 4.5–11.5)

## 2024-04-09 PROCEDURE — 86140 C-REACTIVE PROTEIN: CPT

## 2024-04-09 PROCEDURE — 2709999900 CT HIP ASPIRATION RIGHT

## 2024-04-09 PROCEDURE — 20610 DRAIN/INJ JOINT/BURSA W/O US: CPT

## 2024-04-09 PROCEDURE — 6360000002 HC RX W HCPCS: Performed by: STUDENT IN AN ORGANIZED HEALTH CARE EDUCATION/TRAINING PROGRAM

## 2024-04-09 PROCEDURE — 87070 CULTURE OTHR SPECIMN AEROBIC: CPT

## 2024-04-09 PROCEDURE — 83735 ASSAY OF MAGNESIUM: CPT

## 2024-04-09 PROCEDURE — 6370000000 HC RX 637 (ALT 250 FOR IP): Performed by: STUDENT IN AN ORGANIZED HEALTH CARE EDUCATION/TRAINING PROGRAM

## 2024-04-09 PROCEDURE — 2580000003 HC RX 258: Performed by: STUDENT IN AN ORGANIZED HEALTH CARE EDUCATION/TRAINING PROGRAM

## 2024-04-09 PROCEDURE — 82306 VITAMIN D 25 HYDROXY: CPT

## 2024-04-09 PROCEDURE — 85025 COMPLETE CBC W/AUTO DIFF WBC: CPT

## 2024-04-09 PROCEDURE — 71045 X-RAY EXAM CHEST 1 VIEW: CPT

## 2024-04-09 PROCEDURE — 2500000003 HC RX 250 WO HCPCS: Performed by: RADIOLOGY

## 2024-04-09 PROCEDURE — 6360000002 HC RX W HCPCS: Performed by: RADIOLOGY

## 2024-04-09 PROCEDURE — 2060000000 HC ICU INTERMEDIATE R&B

## 2024-04-09 PROCEDURE — 82040 ASSAY OF SERUM ALBUMIN: CPT

## 2024-04-09 PROCEDURE — 85610 PROTHROMBIN TIME: CPT

## 2024-04-09 PROCEDURE — 82962 GLUCOSE BLOOD TEST: CPT

## 2024-04-09 PROCEDURE — 85652 RBC SED RATE AUTOMATED: CPT

## 2024-04-09 PROCEDURE — 87205 SMEAR GRAM STAIN: CPT

## 2024-04-09 PROCEDURE — P9047 ALBUMIN (HUMAN), 25%, 50ML: HCPCS | Performed by: STUDENT IN AN ORGANIZED HEALTH CARE EDUCATION/TRAINING PROGRAM

## 2024-04-09 PROCEDURE — 36592 COLLECT BLOOD FROM PICC: CPT

## 2024-04-09 PROCEDURE — 80048 BASIC METABOLIC PNL TOTAL CA: CPT

## 2024-04-09 PROCEDURE — 84439 ASSAY OF FREE THYROXINE: CPT

## 2024-04-09 PROCEDURE — 99232 SBSQ HOSP IP/OBS MODERATE 35: CPT | Performed by: STUDENT IN AN ORGANIZED HEALTH CARE EDUCATION/TRAINING PROGRAM

## 2024-04-09 PROCEDURE — 2700000000 HC OXYGEN THERAPY PER DAY

## 2024-04-09 RX ORDER — INSULIN LISPRO 100 [IU]/ML
0-4 INJECTION, SOLUTION INTRAVENOUS; SUBCUTANEOUS
Status: DISCONTINUED | OUTPATIENT
Start: 2024-04-09 | End: 2024-04-17 | Stop reason: HOSPADM

## 2024-04-09 RX ORDER — INSULIN LISPRO 100 [IU]/ML
0-4 INJECTION, SOLUTION INTRAVENOUS; SUBCUTANEOUS NIGHTLY
Status: DISCONTINUED | OUTPATIENT
Start: 2024-04-09 | End: 2024-04-17 | Stop reason: HOSPADM

## 2024-04-09 RX ORDER — POTASSIUM CHLORIDE 20 MEQ/1
40 TABLET, EXTENDED RELEASE ORAL 2 TIMES DAILY WITH MEALS
Status: COMPLETED | OUTPATIENT
Start: 2024-04-09 | End: 2024-04-09

## 2024-04-09 RX ORDER — MAGNESIUM SULFATE IN WATER 40 MG/ML
2000 INJECTION, SOLUTION INTRAVENOUS
Status: DISCONTINUED | OUTPATIENT
Start: 2024-04-09 | End: 2024-04-09

## 2024-04-09 RX ORDER — GLUCAGON 1 MG/ML
1 KIT INJECTION PRN
Status: DISCONTINUED | OUTPATIENT
Start: 2024-04-09 | End: 2024-04-09 | Stop reason: SDUPTHER

## 2024-04-09 RX ORDER — LIDOCAINE HYDROCHLORIDE AND EPINEPHRINE BITARTRATE 20; .01 MG/ML; MG/ML
INJECTION, SOLUTION SUBCUTANEOUS PRN
Status: COMPLETED | OUTPATIENT
Start: 2024-04-09 | End: 2024-04-09

## 2024-04-09 RX ORDER — FENTANYL CITRATE 50 UG/ML
INJECTION, SOLUTION INTRAMUSCULAR; INTRAVENOUS PRN
Status: COMPLETED | OUTPATIENT
Start: 2024-04-09 | End: 2024-04-09

## 2024-04-09 RX ORDER — GLUCAGON 1 MG/ML
1 KIT INJECTION PRN
Status: DISCONTINUED | OUTPATIENT
Start: 2024-04-09 | End: 2024-04-17 | Stop reason: HOSPADM

## 2024-04-09 RX ORDER — LIDOCAINE HYDROCHLORIDE 20 MG/ML
INJECTION, SOLUTION INFILTRATION; PERINEURAL PRN
Status: COMPLETED | OUTPATIENT
Start: 2024-04-09 | End: 2024-04-09

## 2024-04-09 RX ORDER — ALBUMIN (HUMAN) 12.5 G/50ML
25 SOLUTION INTRAVENOUS EVERY 8 HOURS
Status: COMPLETED | OUTPATIENT
Start: 2024-04-09 | End: 2024-04-10

## 2024-04-09 RX ORDER — DEXTROSE MONOHYDRATE 100 MG/ML
INJECTION, SOLUTION INTRAVENOUS CONTINUOUS PRN
Status: DISCONTINUED | OUTPATIENT
Start: 2024-04-09 | End: 2024-04-17 | Stop reason: HOSPADM

## 2024-04-09 RX ORDER — DEXTROSE MONOHYDRATE 100 MG/ML
INJECTION, SOLUTION INTRAVENOUS CONTINUOUS PRN
Status: DISCONTINUED | OUTPATIENT
Start: 2024-04-09 | End: 2024-04-09 | Stop reason: SDUPTHER

## 2024-04-09 RX ORDER — MAGNESIUM SULFATE IN WATER 40 MG/ML
2000 INJECTION, SOLUTION INTRAVENOUS
Status: COMPLETED | OUTPATIENT
Start: 2024-04-09 | End: 2024-04-09

## 2024-04-09 RX ADMIN — VENLAFAXINE HYDROCHLORIDE 150 MG: 150 CAPSULE, EXTENDED RELEASE ORAL at 08:52

## 2024-04-09 RX ADMIN — PRAMIPEXOLE DIHYDROCHLORIDE 0.75 MG: 0.25 TABLET ORAL at 22:56

## 2024-04-09 RX ADMIN — OXYCODONE AND ACETAMINOPHEN 1 TABLET: 5; 325 TABLET ORAL at 09:15

## 2024-04-09 RX ADMIN — GABAPENTIN 800 MG: 400 CAPSULE ORAL at 21:14

## 2024-04-09 RX ADMIN — OXYCODONE AND ACETAMINOPHEN 1 TABLET: 5; 325 TABLET ORAL at 21:15

## 2024-04-09 RX ADMIN — BUPROPION HYDROCHLORIDE 100 MG: 100 TABLET, EXTENDED RELEASE ORAL at 08:53

## 2024-04-09 RX ADMIN — SODIUM CHLORIDE, PRESERVATIVE FREE 10 ML: 5 INJECTION INTRAVENOUS at 08:52

## 2024-04-09 RX ADMIN — ROSUVASTATIN 40 MG: 20 TABLET, FILM COATED ORAL at 21:15

## 2024-04-09 RX ADMIN — MORPHINE SULFATE 2 MG: 2 INJECTION, SOLUTION INTRAMUSCULAR; INTRAVENOUS at 10:25

## 2024-04-09 RX ADMIN — PANTOPRAZOLE SODIUM 40 MG: 40 TABLET, DELAYED RELEASE ORAL at 06:05

## 2024-04-09 RX ADMIN — FENTANYL CITRATE 25 MCG: 50 INJECTION, SOLUTION INTRAMUSCULAR; INTRAVENOUS at 15:20

## 2024-04-09 RX ADMIN — LIDOCAINE HYDROCHLORIDE 10 ML: 20 INJECTION, SOLUTION INFILTRATION; PERINEURAL at 15:08

## 2024-04-09 RX ADMIN — WATER 2000 MG: 1 INJECTION INTRAMUSCULAR; INTRAVENOUS; SUBCUTANEOUS at 06:31

## 2024-04-09 RX ADMIN — WATER 2000 MG: 1 INJECTION INTRAMUSCULAR; INTRAVENOUS; SUBCUTANEOUS at 16:40

## 2024-04-09 RX ADMIN — SODIUM CHLORIDE, PRESERVATIVE FREE 10 ML: 5 INJECTION INTRAVENOUS at 21:15

## 2024-04-09 RX ADMIN — GABAPENTIN 800 MG: 400 CAPSULE ORAL at 08:53

## 2024-04-09 RX ADMIN — GABAPENTIN 800 MG: 400 CAPSULE ORAL at 13:45

## 2024-04-09 RX ADMIN — LIDOCAINE HYDROCHLORIDE,EPINEPHRINE BITARTRATE 10 ML: 20; .01 INJECTION, SOLUTION INFILTRATION; PERINEURAL at 15:08

## 2024-04-09 RX ADMIN — PRAMIPEXOLE DIHYDROCHLORIDE 0.75 MG: 0.25 TABLET ORAL at 08:52

## 2024-04-09 RX ADMIN — Medication 1000 UNITS: at 08:53

## 2024-04-09 RX ADMIN — MAGNESIUM SULFATE HEPTAHYDRATE 2000 MG: 40 INJECTION, SOLUTION INTRAVENOUS at 10:51

## 2024-04-09 RX ADMIN — WATER 2000 MG: 1 INJECTION INTRAMUSCULAR; INTRAVENOUS; SUBCUTANEOUS at 22:55

## 2024-04-09 RX ADMIN — POTASSIUM CHLORIDE 40 MEQ: 1500 TABLET, EXTENDED RELEASE ORAL at 08:53

## 2024-04-09 RX ADMIN — MAGNESIUM SULFATE HEPTAHYDRATE 2000 MG: 40 INJECTION, SOLUTION INTRAVENOUS at 12:49

## 2024-04-09 RX ADMIN — ALBUMIN (HUMAN) 25 G: 0.25 INJECTION, SOLUTION INTRAVENOUS at 23:08

## 2024-04-09 RX ADMIN — MAGNESIUM SULFATE HEPTAHYDRATE 2000 MG: 40 INJECTION, SOLUTION INTRAVENOUS at 09:10

## 2024-04-09 RX ADMIN — MORPHINE SULFATE 2 MG: 2 INJECTION, SOLUTION INTRAMUSCULAR; INTRAVENOUS at 06:05

## 2024-04-09 RX ADMIN — LEVOTHYROXINE SODIUM 50 MCG: 0.05 TABLET ORAL at 06:05

## 2024-04-09 RX ADMIN — SODIUM CHLORIDE: 9 INJECTION, SOLUTION INTRAVENOUS at 09:08

## 2024-04-09 RX ADMIN — ALBUMIN (HUMAN) 25 G: 0.25 INJECTION, SOLUTION INTRAVENOUS at 16:38

## 2024-04-09 RX ADMIN — POTASSIUM CHLORIDE 40 MEQ: 1500 TABLET, EXTENDED RELEASE ORAL at 16:40

## 2024-04-09 ASSESSMENT — PAIN SCALES - GENERAL
PAINLEVEL_OUTOF10: 10
PAINLEVEL_OUTOF10: 10
PAINLEVEL_OUTOF10: 8
PAINLEVEL_OUTOF10: 10
PAINLEVEL_OUTOF10: 0
PAINLEVEL_OUTOF10: 8
PAINLEVEL_OUTOF10: 8

## 2024-04-09 ASSESSMENT — PAIN DESCRIPTION - LOCATION
LOCATION: LEG;KNEE
LOCATION: KNEE;LEG
LOCATION: HIP;LEG

## 2024-04-09 ASSESSMENT — PAIN DESCRIPTION - ORIENTATION
ORIENTATION: RIGHT

## 2024-04-09 ASSESSMENT — PAIN DESCRIPTION - DESCRIPTORS
DESCRIPTORS: ACHING
DESCRIPTORS: ACHING
DESCRIPTORS: ACHING;SORE

## 2024-04-09 NOTE — PLAN OF CARE
Problem: Chronic Conditions and Co-morbidities  Goal: Patient's chronic conditions and co-morbidity symptoms are monitored and maintained or improved  Outcome: Progressing  Flowsheets  Taken 4/9/2024 1615 by Karen Barillas RN  Care Plan - Patient's Chronic Conditions and Co-Morbidity Symptoms are Monitored and Maintained or Improved: Monitor and assess patient's chronic conditions and comorbid symptoms for stability, deterioration, or improvement  Taken 4/9/2024 0830 by Val Huntley RN  Care Plan - Patient's Chronic Conditions and Co-Morbidity Symptoms are Monitored and Maintained or Improved: Monitor and assess patient's chronic conditions and comorbid symptoms for stability, deterioration, or improvement     Problem: Discharge Planning  Goal: Discharge to home or other facility with appropriate resources  Outcome: Progressing  Flowsheets  Taken 4/9/2024 1615 by Karen Barillas RN  Discharge to home or other facility with appropriate resources: Identify barriers to discharge with patient and caregiver  Taken 4/9/2024 0830 by Val Huntley RN  Discharge to home or other facility with appropriate resources: Identify barriers to discharge with patient and caregiver     Problem: Pain  Goal: Verbalizes/displays adequate comfort level or baseline comfort level  4/9/2024 1759 by Karen Barillas RN  Outcome: Progressing  4/9/2024 1425 by Val Huntley RN  Outcome: Progressing  Flowsheets (Taken 4/9/2024 0735)  Verbalizes/displays adequate comfort level or baseline comfort level:   Encourage patient to monitor pain and request assistance   Assess pain using appropriate pain scale   Administer analgesics based on type and severity of pain and evaluate response   Implement non-pharmacological measures as appropriate and evaluate response   Consider cultural and social influences on pain and pain management     Problem: Skin/Tissue Integrity  Goal: Absence of new skin breakdown  Description: 1.

## 2024-04-09 NOTE — PRE SEDATION
total hip arthroplasty (Right, 3/21/2024); and hip surgery (Right, 4/2/2024).    Medications:   Scheduled Meds:    potassium chloride  40 mEq Oral BID WC    insulin lispro  0-4 Units SubCUTAneous TID WC    insulin lispro  0-4 Units SubCUTAneous Nightly    albumin human 25%  25 g IntraVENous Q8H    sodium chloride flush  5-40 mL IntraVENous 2 times per day    [Held by provider] apixaban  5 mg Oral BID    [Held by provider] aspirin  325 mg Oral BID    buPROPion  100 mg Oral Daily    Vitamin D  1,000 Units Oral Daily    gabapentin  800 mg Oral TID    levothyroxine  50 mcg Oral Daily    pantoprazole  40 mg Oral QAM AC    pramipexole  0.75 mg Oral BID    rosuvastatin  40 mg Oral Nightly    semaglutide (2 MG/DOSE)  2 mg SubCUTAneous Weekly    sucralfate  1 g Oral Daily    venlafaxine  150 mg Oral Daily    ceFAZolin  2,000 mg IntraVENous Q8H     Continuous Infusions:    dextrose      sodium chloride      sodium chloride 100 mL/hr at 04/09/24 1410     PRN Meds: glucose, dextrose bolus **OR** dextrose bolus, glucagon (rDNA), dextrose, sodium chloride flush, sodium chloride, potassium chloride **OR** potassium alternative oral replacement **OR** potassium chloride, magnesium sulfate, ondansetron **OR** ondansetron, polyethylene glycol, acetaminophen **OR** acetaminophen, morphine, oxyCODONE-acetaminophen  Home Meds:   Prior to Admission medications    Medication Sig Start Date End Date Taking? Authorizing Provider   apixaban (ELIQUIS) 5 MG TABS tablet Take 2 tablets by mouth 2 times daily for 4 days, THEN 1 tablet 2 times daily. 4/5/24 6/8/24  Vitaly Lee MD   ceFAZolin (ANCEF) infusion Infuse 2,000 mg intravenously in the morning and 2,000 mg at noon and 2,000 mg in the evening. Compound per protocol. 3/23/24 5/2/24  Polly Gilliam MD   aspirin 325 MG EC tablet Take 1 tablet by mouth 2 times daily for 28 days 3/21/24 4/18/24  Florencio Collazo,    ALBUTEROL IN Inhale into the lungs    Provider, Historical,

## 2024-04-09 NOTE — CONSULTS
Comprehensive Nutrition Assessment    Type and Reason for Visit:  Initial, Consult    Nutrition Recommendations/Plan:   Continue current diet  Start glucerna BID and josi BID to optimize wound healing   Will monitor     Malnutrition Assessment:  Malnutrition Status:  At risk for malnutrition (Comment) (04/09/24 8435)    Context:  Acute Illness     Findings of the 6 clinical characteristics of malnutrition:  Energy Intake:  Mild decrease in energy intake (Comment)  Weight Loss:  Unable to assess     Body Fat Loss:  Unable to assess (pt in CT)     Muscle Mass Loss:  Unable to assess (pt in CT)    Fluid Accumulation:  No significant fluid accumulation     Strength:  Not Performed    Nutrition Assessment:    pt adm d/t hip pain currently NPO for R-hip aspiration; PMhx of DM,HTN,AAA, CHF, lung/kidney/breast CA- s/p nephrectomy/lobectomy; hx of recent adm d/t hip dislocation s/p ORIF where pt developed post-op infection s/p I&D; will start ONS to optimize wound healing ; will monitor.    Nutrition Related Findings:    hypokalemia; responds to voice; oriented x 4; U/L dentures; active BS; trace edema; +I/O Wound Type: Multiple, Surgical Incision, Open Wounds       Current Nutrition Intake & Therapies:    Average Meal Intake: Unable to assess (diet just advanced today from NPO)  Average Supplements Intake: None Ordered  ADULT DIET; Regular  ADULT ORAL NUTRITION SUPPLEMENT; Breakfast, Lunch; Wound Healing Oral Supplement  ADULT ORAL NUTRITION SUPPLEMENT; Lunch, Dinner; Diabetic Oral Supplement    Anthropometric Measures:  Height: 165.1 cm (5' 5\")  Ideal Body Weight (IBW): 125 lbs (57 kg)       Current Body Weight: 81.6 kg (179 lb 14.3 oz) (4/8-no method), 143.9 % IBW.    Current BMI (kg/m2): 29.9  Usual Body Weight: 83.4 kg (183 lb 13.8 oz) (2/19/24-BS ; lack of wt hx per EMR)  % Weight Change (Calculated): -2.2  Weight Adjustment For: No Adjustment                 BMI Categories: Overweight (BMI 25.0-29.9)    Estimated 
[place] [facility] [place]    HYSTERECTOMY (CERVIX STATUS UNKNOWN)      KIDNEY REMOVAL Left 2006    Cancer.    KIDNEY REMOVAL      left    KNEE SURGERY Right     arthroscopy    LIVER BIOPSY      Fatty tumor.    LUNG REMOVAL, PARTIAL      For lung CA, thought to be metastatic from breast cancer. left upper lobe     MASTECTOMY Bilateral     NERVE BLOCK Bilateral 16    transforaminal nerve block, lumbar #1    OTHER SURGICAL HISTORY  03/16/15    stage 1 percutaneous trial lumbar spinal cord stimulator    OTHER SURGICAL HISTORY N/A 4/15/15    surgical implantation of medtronic spinal cord stimulator lumbar    OTHER SURGICAL HISTORY N/A 2021    surgical removal lumbar SCS    PAIN MANAGEMENT PROCEDURE N/A 2021    SURGICAL REMOVAL OF LUMBAR MEDTRONIC SPINAL CORD STIMULATOR (STIMULATOR BATTERY LEFT HIP AREA) (CPT 15775) performed by Lizzy Herbert DO at [facility] OR    REVISION TOTAL HIP ARTHROPLASTY Right 2024    RIGHT HIP OPEN REDUCTION AND HEMATOMA EVACUATION performed by Wood George DO [place] [facility] [place]    REVISION TOTAL HIP ARTHROPLASTY Right 3/21/2024    RIGHT HIP IRRIGATION AND DEBRIDEMENT OF RIGHT HIP DRAINING WOUND WITH POSSIBLE EXCHANGE OF FEMORAL COMPONENT performed by Nemesio Handy MD [place] [facility] [place]    THORACENTESIS Left 2010 and 3/2010.     Reportedly transudative.    TRANSTHORACIC ECHOCARDIOGRAM  2010    Small-to-moderate size pericardial effusion with early signs of tamponade with normal left ventricular size, wall thickness, wall motion and systolic function with stage I diastolic dysfunction with normal right ventricular size and function with moderately thickened pericardium and with no significant valvular abnormalities noted.         Social History  Social History     Socioeconomic History    Marital status:    Tobacco Use    Smoking status: Former     Current packs/day: 0.00     Types: Cigarettes     Quit date: 1999     Years since quittin.2    
agreeable to proceed with this plan.  She will then be admitted to the hospital for further management later this week.  Any and all questions were answered today.  Conscious sedation was performed by the emergency department and attempted closed reduction was performed.  Unfortunately this was unsuccessful due to the length of time she has been dislocated as well as repeat dislocations.  Nonweightbearing right lower extremity, maintain knee immobilizer and abduction pillow in place  Pain control as needed  Appreciate medical management  We will plan for operative intervention this week  D/W attending      Electronically signed by Florencio Collazo DO   Note: This report was completed using [employer] voiced recognition software.  Every effort has been made to ensure accuracy; however, inadvertent computerized transcription errors may be present.      Patient seen and examined.  Talked her about the fact she has been on multiple dislocating her right hip.  She has been washed out although had no positive cultures to date to my knowledge.  Patient is been out for an unknown period of time at this point in time.  We will take her today for possible close reduction.  I will plan on working with infectious disease and potentially revising her to a total hip arthroplasty with even a potential constrained liner.  If her incision needs irrigation debridement today we will perform this depending on how her wound looks.  I talked her about the fact she is most likely given the multiple surgeries.  She saw the risk of redislocation she understands this.  She also at the standard risk of surgery including death and anesthesia, infection, wound healing issues, deep venous fibrosis, redislocation, need for further operations, and any other unforeseeable complications.  She understood and decided proceed with surgical intervention for right prosthetic hip dislocation today.

## 2024-04-09 NOTE — CARE COORDINATION
4/9/2024Social work transition of care planning  Pt came in from [facility]. Sw spoke with pt. Pt does not want to return to snf. Plan will be home with [facility]. [facility] following. Pt stated that spouse will transport at [place].  Electronically signed by ZA Gonzáles on 4/9/2024 at 11:05 AM

## 2024-04-09 NOTE — DISCHARGE INSTR - COC
K76.0    Ascending aortic aneurysm ([facility]) I71.21    Neuropathic pain syndrome (non-herpetic) M79.2    Protruded lumbar disc M51.26    DDD (degenerative disc disease), lumbar M51.36    Lumbar radiculopathy M54.16    Acute kidney injury ([facility]) N17.9    Acute respiratory failure with hypercapnia ([facility]) J96.02    Acute respiratory failure ([facility]) J96.00    Hypokalemia E87.6    Postlaminectomy syndrome M96.1    Status post insertion of spinal cord stimulator Z96.89    Nondisplaced articular fracture of head of right femur, init ([facility]) S72.064A    Closed displaced fracture of right femoral neck ([facility]) S72.001A    Ischemic stroke ([facility]) I63.9    Aphasia R47.01    Urinary tract infection without hematuria N39.0    COVID-19 virus infection U07.1    Drainage from wound T14.8XXA    Closed nondisplaced articular fracture of head of right femur ([facility]) S72.064A    Closed fracture of neck of right femur ([facility]) S72.001A    History of hemiarthroplasty of right hip Z96.641    Other specified personal risk factors, not elsewhere classified Z91.89    Prosthetic hip infection, initial encounter ([facility]) T84.59XA, Z96.649    Pre-operative laboratory examination Z01.812    Hip dislocation, right ([facility]) S73.004A    Acute hypokalemia E87.6    Foreign body of right hip with infection S70.251A, L08.9       Isolation/Infection:   Isolation            No Isolation          Patient Infection Status       Infection Onset Added Last Indicated Last Indicated By Review Planned Expiration Resolved Resolved By    None active    Resolved    COVID-19 24 COVID-19, Rapid   24 Infection                        Nurse Assessment:  Last Vital Signs: BP (!) 148/96   Pulse 95   Temp 97 °F (36.1 °C) (Temporal)   Resp 18   Ht 1.651 m (5' 5\")   Wt 81.6 kg (180 lb)   SpO2 95%   BMI 29.95 kg/m²     Last documented pain score (0-10 scale): Pain Level: 8  Last Weight:   Wt Readings from Last 1 Encounters:   24 81.6 kg (180 lb)

## 2024-04-09 NOTE — BRIEF OP NOTE
Brief Postoperative Note      Patient: Rebeka Renee  YOB: 1945  MRN: [id number]    Date of Procedure: 4/9/2024    Right hip chronic dislocation    Post-Op Diagnosis: Same       Hip aspiration    M. Adal    Assistant:  * No surgical staff found *    Anesthesia: * No anesthesia type entered *    Estimated Blood Loss (mL): Minimal    Complications: None    Specimens:   Acetabulum, and femur shaft    Implants:  * No implants in log *      Drains:   External Urinary Catheter (Active)   Site Assessment Clean,dry & intact 04/09/24 1110   Placement Replaced 04/09/24 1110   Securement Method Other (Comment) 04/09/24 1110   Catheter Care Catheter/Wick replaced 04/09/24 1110   Perineal Care Yes 04/09/24 1110   Suction 40 mmgHg continuous 04/09/24 1110   Urine Color Yellow 04/09/24 1346   Urine Appearance Cloudy 04/09/24 1346   Output (mL) 300 mL 04/09/24 1346       [REMOVED] Negative Pressure Wound Therapy Hip Right (Removed)   Wound Type Surgical 03/25/24 1155   Unit Type prevena plus 03/23/24 2129   Dressing Type Black Foam 03/25/24 1155   Canister changed? Yes 03/24/24 0546   Dressing Status Clean, dry & intact 03/25/24 1155   Drainage Amount None 03/25/24 1155   Drainage Description Serosanguinous 03/25/24 0311   Output (ml) 100 ml 03/24/24 2338       [REMOVED] External Urinary Catheter (Removed)   Site Assessment Clean,dry & intact 04/05/24 0800   Placement Replaced 04/03/24 2301   Catheter Care Catheter/Wick replaced 04/03/24 2301   Perineal Care Yes 04/03/24 2301   Suction 40 mmgHg continuous 04/03/24 2301   Urine Color Genesis 04/03/24 2301   Urine Appearance Clear 04/03/24 2301   Output (mL) 100 mL 04/04/24 1923       Findings:  Infection Present At Time Of Surgery (PATOS) (choose all levels that have infection present):  No infection present  Other Findings: 18 G needles were placed. Firm material was in the joint. Possible blood or hematoma was filled in the acetabulum.     Electronically signed by ODELL

## 2024-04-10 LAB
ANION GAP SERPL CALCULATED.3IONS-SCNC: 4 MMOL/L (ref 7–16)
BASOPHILS # BLD: 0.02 K/UL (ref 0–0.2)
BASOPHILS NFR BLD: 0 % (ref 0–2)
BUN SERPL-MCNC: 15 MG/DL (ref 6–23)
CA-I BLD-SCNC: 1.17 MMOL/L (ref 1.15–1.33)
CALCIUM SERPL-MCNC: 8.3 MG/DL (ref 8.6–10.2)
CHLORIDE SERPL-SCNC: 98 MMOL/L (ref 98–107)
CO2 SERPL-SCNC: 35 MMOL/L (ref 22–29)
CREAT SERPL-MCNC: 0.6 MG/DL (ref 0.5–1)
EOSINOPHIL # BLD: 0.35 K/UL (ref 0.05–0.5)
EOSINOPHILS RELATIVE PERCENT: 6 % (ref 0–6)
ERYTHROCYTE [DISTWIDTH] IN BLOOD BY AUTOMATED COUNT: 16 % (ref 11.5–15)
GFR SERPL CREATININE-BSD FRML MDRD: >90 ML/MIN/1.73M2
GLUCOSE BLD-MCNC: 102 MG/DL (ref 74–99)
GLUCOSE BLD-MCNC: 118 MG/DL (ref 74–99)
GLUCOSE BLD-MCNC: 150 MG/DL (ref 74–99)
GLUCOSE BLD-MCNC: 83 MG/DL (ref 74–99)
GLUCOSE SERPL-MCNC: 71 MG/DL (ref 74–99)
HCT VFR BLD AUTO: 23 % (ref 34–48)
HCT VFR BLD AUTO: 23.3 % (ref 34–48)
HGB BLD-MCNC: 6.7 G/DL (ref 11.5–15.5)
HGB BLD-MCNC: 7 G/DL (ref 11.5–15.5)
IMM GRANULOCYTES # BLD AUTO: 0.03 K/UL (ref 0–0.58)
IMM GRANULOCYTES NFR BLD: 1 % (ref 0–5)
LYMPHOCYTES NFR BLD: 2.08 K/UL (ref 1.5–4)
LYMPHOCYTES RELATIVE PERCENT: 34 % (ref 20–42)
MAGNESIUM SERPL-MCNC: 2.2 MG/DL (ref 1.6–2.6)
MCH RBC QN AUTO: 25.7 PG (ref 26–35)
MCHC RBC AUTO-ENTMCNC: 29.1 G/DL (ref 32–34.5)
MCV RBC AUTO: 88.1 FL (ref 80–99.9)
MONOCYTES NFR BLD: 0.76 K/UL (ref 0.1–0.95)
MONOCYTES NFR BLD: 12 % (ref 2–12)
NEUTROPHILS NFR BLD: 48 % (ref 43–80)
NEUTS SEG NFR BLD: 2.97 K/UL (ref 1.8–7.3)
PLATELET # BLD AUTO: 257 K/UL (ref 130–450)
PMV BLD AUTO: 10 FL (ref 7–12)
POTASSIUM SERPL-SCNC: 4.4 MMOL/L (ref 3.5–5)
RBC # BLD AUTO: 2.61 M/UL (ref 3.5–5.5)
SODIUM SERPL-SCNC: 137 MMOL/L (ref 132–146)
WBC OTHER # BLD: 6.2 K/UL (ref 4.5–11.5)

## 2024-04-10 PROCEDURE — 86850 RBC ANTIBODY SCREEN: CPT

## 2024-04-10 PROCEDURE — 6370000000 HC RX 637 (ALT 250 FOR IP): Performed by: STUDENT IN AN ORGANIZED HEALTH CARE EDUCATION/TRAINING PROGRAM

## 2024-04-10 PROCEDURE — 80048 BASIC METABOLIC PNL TOTAL CA: CPT

## 2024-04-10 PROCEDURE — 82330 ASSAY OF CALCIUM: CPT

## 2024-04-10 PROCEDURE — 86901 BLOOD TYPING SEROLOGIC RH(D): CPT

## 2024-04-10 PROCEDURE — 6360000002 HC RX W HCPCS: Performed by: STUDENT IN AN ORGANIZED HEALTH CARE EDUCATION/TRAINING PROGRAM

## 2024-04-10 PROCEDURE — 85025 COMPLETE CBC W/AUTO DIFF WBC: CPT

## 2024-04-10 PROCEDURE — 82962 GLUCOSE BLOOD TEST: CPT

## 2024-04-10 PROCEDURE — 83735 ASSAY OF MAGNESIUM: CPT

## 2024-04-10 PROCEDURE — 2060000000 HC ICU INTERMEDIATE R&B

## 2024-04-10 PROCEDURE — 85018 HEMOGLOBIN: CPT

## 2024-04-10 PROCEDURE — 99232 SBSQ HOSP IP/OBS MODERATE 35: CPT | Performed by: STUDENT IN AN ORGANIZED HEALTH CARE EDUCATION/TRAINING PROGRAM

## 2024-04-10 PROCEDURE — 86900 BLOOD TYPING SEROLOGIC ABO: CPT

## 2024-04-10 PROCEDURE — P9016 RBC LEUKOCYTES REDUCED: HCPCS

## 2024-04-10 PROCEDURE — 36430 TRANSFUSION BLD/BLD COMPNT: CPT

## 2024-04-10 PROCEDURE — 2580000003 HC RX 258: Performed by: STUDENT IN AN ORGANIZED HEALTH CARE EDUCATION/TRAINING PROGRAM

## 2024-04-10 PROCEDURE — 85014 HEMATOCRIT: CPT

## 2024-04-10 PROCEDURE — 2700000000 HC OXYGEN THERAPY PER DAY

## 2024-04-10 PROCEDURE — 86923 COMPATIBILITY TEST ELECTRIC: CPT

## 2024-04-10 RX ORDER — SODIUM CHLORIDE 9 MG/ML
INJECTION, SOLUTION INTRAVENOUS PRN
Status: DISCONTINUED | OUTPATIENT
Start: 2024-04-10 | End: 2024-04-17 | Stop reason: HOSPADM

## 2024-04-10 RX ORDER — VITAMIN B COMPLEX
6000 TABLET ORAL DAILY
Status: COMPLETED | OUTPATIENT
Start: 2024-04-10 | End: 2024-04-16

## 2024-04-10 RX ORDER — VITAMIN B COMPLEX
2000 TABLET ORAL DAILY
Status: DISCONTINUED | OUTPATIENT
Start: 2024-04-17 | End: 2024-04-17 | Stop reason: HOSPADM

## 2024-04-10 RX ADMIN — OXYCODONE AND ACETAMINOPHEN 1 TABLET: 5; 325 TABLET ORAL at 14:16

## 2024-04-10 RX ADMIN — BUPROPION HYDROCHLORIDE 100 MG: 100 TABLET, EXTENDED RELEASE ORAL at 11:16

## 2024-04-10 RX ADMIN — Medication 6000 UNITS: at 16:17

## 2024-04-10 RX ADMIN — ROSUVASTATIN 40 MG: 20 TABLET, FILM COATED ORAL at 20:55

## 2024-04-10 RX ADMIN — LEVOTHYROXINE SODIUM 50 MCG: 0.05 TABLET ORAL at 06:35

## 2024-04-10 RX ADMIN — SODIUM CHLORIDE, PRESERVATIVE FREE 10 ML: 5 INJECTION INTRAVENOUS at 20:56

## 2024-04-10 RX ADMIN — WATER 2000 MG: 1 INJECTION INTRAMUSCULAR; INTRAVENOUS; SUBCUTANEOUS at 16:18

## 2024-04-10 RX ADMIN — GABAPENTIN 800 MG: 400 CAPSULE ORAL at 11:16

## 2024-04-10 RX ADMIN — GABAPENTIN 800 MG: 400 CAPSULE ORAL at 14:16

## 2024-04-10 RX ADMIN — VENLAFAXINE HYDROCHLORIDE 150 MG: 150 CAPSULE, EXTENDED RELEASE ORAL at 11:16

## 2024-04-10 RX ADMIN — SUCRALFATE 1 G: 1 TABLET ORAL at 11:16

## 2024-04-10 RX ADMIN — OXYCODONE AND ACETAMINOPHEN 1 TABLET: 5; 325 TABLET ORAL at 20:55

## 2024-04-10 RX ADMIN — SODIUM CHLORIDE, PRESERVATIVE FREE 10 ML: 5 INJECTION INTRAVENOUS at 11:18

## 2024-04-10 RX ADMIN — GABAPENTIN 800 MG: 400 CAPSULE ORAL at 20:55

## 2024-04-10 RX ADMIN — WATER 2000 MG: 1 INJECTION INTRAMUSCULAR; INTRAVENOUS; SUBCUTANEOUS at 06:35

## 2024-04-10 RX ADMIN — WATER 2000 MG: 1 INJECTION INTRAMUSCULAR; INTRAVENOUS; SUBCUTANEOUS at 23:34

## 2024-04-10 RX ADMIN — PANTOPRAZOLE SODIUM 40 MG: 40 TABLET, DELAYED RELEASE ORAL at 06:35

## 2024-04-10 RX ADMIN — PRAMIPEXOLE DIHYDROCHLORIDE 0.75 MG: 0.25 TABLET ORAL at 11:16

## 2024-04-10 RX ADMIN — Medication 1000 UNITS: at 11:16

## 2024-04-10 RX ADMIN — PRAMIPEXOLE DIHYDROCHLORIDE 0.75 MG: 0.25 TABLET ORAL at 20:55

## 2024-04-10 RX ADMIN — OXYCODONE AND ACETAMINOPHEN 1 TABLET: 5; 325 TABLET ORAL at 04:08

## 2024-04-10 ASSESSMENT — PAIN DESCRIPTION - DESCRIPTORS
DESCRIPTORS: ACHING;SORE
DESCRIPTORS: THROBBING;ACHING;DISCOMFORT
DESCRIPTORS: ACHING;CRUSHING

## 2024-04-10 ASSESSMENT — PAIN DESCRIPTION - LOCATION
LOCATION: LEG;KNEE;HIP
LOCATION: LEG;HIP
LOCATION: HIP

## 2024-04-10 ASSESSMENT — PAIN DESCRIPTION - FREQUENCY: FREQUENCY: CONTINUOUS

## 2024-04-10 ASSESSMENT — PAIN DESCRIPTION - PAIN TYPE
TYPE: ACUTE PAIN
TYPE: ACUTE PAIN

## 2024-04-10 ASSESSMENT — PAIN SCALES - GENERAL
PAINLEVEL_OUTOF10: 8
PAINLEVEL_OUTOF10: 10
PAINLEVEL_OUTOF10: 3

## 2024-04-10 ASSESSMENT — PAIN - FUNCTIONAL ASSESSMENT: PAIN_FUNCTIONAL_ASSESSMENT: PREVENTS OR INTERFERES SOME ACTIVE ACTIVITIES AND ADLS

## 2024-04-10 ASSESSMENT — PAIN DESCRIPTION - ONSET: ONSET: ON-GOING

## 2024-04-10 ASSESSMENT — PAIN DESCRIPTION - ORIENTATION
ORIENTATION: RIGHT

## 2024-04-10 ASSESSMENT — PAIN DESCRIPTION - DIRECTION: RADIATING_TOWARDS: LEG

## 2024-04-10 NOTE — PLAN OF CARE

## 2024-04-10 NOTE — CARE COORDINATION
SOCIAL WORK/[facility] TRANSITION OF CARE PLANNING( MARLEEN OLIVO, -[phone]):  met with pt in the room this a.m. pt is from [facility] and wants to go home. She is to have surgery with orthopedics tomorrow. Pt is on iv ancef per ID until 5/2 with a picc line already in pta. P/O day 1 of aspiration of fluid from right acetabular and femoral shaft. Hgb is 6.7 with 1 unit of PRBC's given today. Will have PT and OT see pt after surgery I told pt to make sure she can get around with help from spouse on discharge. Prior sw made a referral to [facility]. Will need script from ID and orders for c with ID protocol on discharge if goes home. .Marleen Olivo, ZA  4/10/2024

## 2024-04-10 NOTE — PLAN OF CARE
Problem: Chronic Conditions and Co-morbidities  Goal: Patient's chronic conditions and co-morbidity symptoms are monitored and maintained or improved  4/9/2024 2339 by Joaquina Shetty RN  Outcome: Progressing  Flowsheets (Taken 4/9/2024 2002)  Care Plan - Patient's Chronic Conditions and Co-Morbidity Symptoms are Monitored and Maintained or Improved: Monitor and assess patient's chronic conditions and comorbid symptoms for stability, deterioration, or improvement  4/9/2024 1759 by Karen Barillas RN  Outcome: Progressing  Flowsheets  Taken 4/9/2024 1615 by Karen Barillas RN  Care Plan - Patient's Chronic Conditions and Co-Morbidity Symptoms are Monitored and Maintained or Improved: Monitor and assess patient's chronic conditions and comorbid symptoms for stability, deterioration, or improvement  Taken 4/9/2024 0830 by Val Huntley RN  Care Plan - Patient's Chronic Conditions and Co-Morbidity Symptoms are Monitored and Maintained or Improved: Monitor and assess patient's chronic conditions and comorbid symptoms for stability, deterioration, or improvement     Problem: Discharge Planning  Goal: Discharge to home or other facility with appropriate resources  4/9/2024 2339 by Joaquina Shetty RN  Outcome: Progressing  Flowsheets (Taken 4/9/2024 2002)  Discharge to home or other facility with appropriate resources: Identify barriers to discharge with patient and caregiver  4/9/2024 1759 by Karen Barillas RN  Outcome: Progressing  Flowsheets  Taken 4/9/2024 1615 by Karen Barillas RN  Discharge to home or other facility with appropriate resources: Identify barriers to discharge with patient and caregiver  Taken 4/9/2024 0830 by Val Huntley RN  Discharge to home or other facility with appropriate resources: Identify barriers to discharge with patient and caregiver     Problem: Pain  Goal: Verbalizes/displays adequate comfort level or baseline comfort level  4/9/2024 2339 by Diogenes

## 2024-04-10 NOTE — CONSENT
Informed Consent for Blood Component Transfusion Note    I have discussed with the patient the rationale for blood component transfusion; its benefits in treating or preventing fatigue, organ damage, or death; and its risk which includes mild transfusion reactions, rare risk of blood borne infection, or more serious but rare reactions. I have discussed the alternatives to transfusion, including the risk and consequences of not receiving transfusion. The patient had an opportunity to ask questions and had agreed to proceed with transfusion of blood components.    Electronically signed by Ken Scott MD on 4/10/24 at 7:36 AM EDT

## 2024-04-11 ENCOUNTER — ANESTHESIA EVENT (OUTPATIENT)
Dept: OPERATING ROOM | Age: 79
End: 2024-04-11
Payer: MEDICARE

## 2024-04-11 ENCOUNTER — ANESTHESIA (OUTPATIENT)
Dept: OPERATING ROOM | Age: 79
End: 2024-04-11
Payer: MEDICARE

## 2024-04-11 LAB
ABO/RH: NORMAL
ANION GAP SERPL CALCULATED.3IONS-SCNC: 5 MMOL/L (ref 7–16)
ANTIBODY SCREEN: NEGATIVE
ARM BAND NUMBER: NORMAL
BASOPHILS # BLD: 0.03 K/UL (ref 0–0.2)
BASOPHILS NFR BLD: 0 % (ref 0–2)
BLOOD BANK BLOOD PRODUCT EXPIRATION DATE: NORMAL
BLOOD BANK DISPENSE STATUS: NORMAL
BLOOD BANK ISBT PRODUCT BLOOD TYPE: 6200
BLOOD BANK PRODUCT CODE: NORMAL
BLOOD BANK SAMPLE EXPIRATION: NORMAL
BLOOD BANK UNIT TYPE AND RH: NORMAL
BPU ID: NORMAL
BUN SERPL-MCNC: 17 MG/DL (ref 6–23)
CALCIUM SERPL-MCNC: 8.3 MG/DL (ref 8.6–10.2)
CHLORIDE SERPL-SCNC: 101 MMOL/L (ref 98–107)
CO2 SERPL-SCNC: 35 MMOL/L (ref 22–29)
COMPONENT: NORMAL
CREAT SERPL-MCNC: 0.5 MG/DL (ref 0.5–1)
CROSSMATCH RESULT: NORMAL
EOSINOPHIL # BLD: 0.6 K/UL (ref 0.05–0.5)
EOSINOPHILS RELATIVE PERCENT: 9 % (ref 0–6)
ERYTHROCYTE [DISTWIDTH] IN BLOOD BY AUTOMATED COUNT: 15.7 % (ref 11.5–15)
GFR SERPL CREATININE-BSD FRML MDRD: >90 ML/MIN/1.73M2
GLUCOSE BLD-MCNC: 115 MG/DL (ref 74–99)
GLUCOSE BLD-MCNC: 144 MG/DL (ref 74–99)
GLUCOSE BLD-MCNC: 84 MG/DL (ref 74–99)
GLUCOSE SERPL-MCNC: 74 MG/DL (ref 74–99)
HCT VFR BLD AUTO: 29 % (ref 34–48)
HGB BLD-MCNC: 8.7 G/DL (ref 11.5–15.5)
IMM GRANULOCYTES # BLD AUTO: 0.04 K/UL (ref 0–0.58)
IMM GRANULOCYTES NFR BLD: 1 % (ref 0–5)
LYMPHOCYTES NFR BLD: 2.46 K/UL (ref 1.5–4)
LYMPHOCYTES RELATIVE PERCENT: 37 % (ref 20–42)
MCH RBC QN AUTO: 26.4 PG (ref 26–35)
MCHC RBC AUTO-ENTMCNC: 30 G/DL (ref 32–34.5)
MCV RBC AUTO: 88.1 FL (ref 80–99.9)
MONOCYTES NFR BLD: 0.7 K/UL (ref 0.1–0.95)
MONOCYTES NFR BLD: 10 % (ref 2–12)
NEUTROPHILS NFR BLD: 43 % (ref 43–80)
NEUTS SEG NFR BLD: 2.89 K/UL (ref 1.8–7.3)
PLATELET # BLD AUTO: 209 K/UL (ref 130–450)
PMV BLD AUTO: 11.2 FL (ref 7–12)
POTASSIUM SERPL-SCNC: 3.7 MMOL/L (ref 3.5–5)
RBC # BLD AUTO: 3.29 M/UL (ref 3.5–5.5)
SODIUM SERPL-SCNC: 141 MMOL/L (ref 132–146)
TRANSFUSION STATUS: NORMAL
UNIT DIVISION: 0
UNIT ISSUE DATE/TIME: NORMAL
WBC OTHER # BLD: 6.7 K/UL (ref 4.5–11.5)

## 2024-04-11 PROCEDURE — 2500000003 HC RX 250 WO HCPCS

## 2024-04-11 PROCEDURE — C1776 JOINT DEVICE (IMPLANTABLE): HCPCS | Performed by: ORTHOPAEDIC SURGERY

## 2024-04-11 PROCEDURE — 6360000002 HC RX W HCPCS

## 2024-04-11 PROCEDURE — A4216 STERILE WATER/SALINE, 10 ML: HCPCS

## 2024-04-11 PROCEDURE — 2700000000 HC OXYGEN THERAPY PER DAY

## 2024-04-11 PROCEDURE — 6360000002 HC RX W HCPCS: Performed by: ORTHOPAEDIC SURGERY

## 2024-04-11 PROCEDURE — 0S993ZZ DRAINAGE OF RIGHT HIP JOINT, PERCUTANEOUS APPROACH: ICD-10-PCS | Performed by: ORTHOPAEDIC SURGERY

## 2024-04-11 PROCEDURE — 6370000000 HC RX 637 (ALT 250 FOR IP): Performed by: STUDENT IN AN ORGANIZED HEALTH CARE EDUCATION/TRAINING PROGRAM

## 2024-04-11 PROCEDURE — 6360000002 HC RX W HCPCS: Performed by: STUDENT IN AN ORGANIZED HEALTH CARE EDUCATION/TRAINING PROGRAM

## 2024-04-11 PROCEDURE — 7100000000 HC PACU RECOVERY - FIRST 15 MIN: Performed by: ORTHOPAEDIC SURGERY

## 2024-04-11 PROCEDURE — 7100000001 HC PACU RECOVERY - ADDTL 15 MIN: Performed by: ORTHOPAEDIC SURGERY

## 2024-04-11 PROCEDURE — 87070 CULTURE OTHR SPECIMN AEROBIC: CPT

## 2024-04-11 PROCEDURE — 2500000003 HC RX 250 WO HCPCS: Performed by: ORTHOPAEDIC SURGERY

## 2024-04-11 PROCEDURE — 1200000000 HC SEMI PRIVATE

## 2024-04-11 PROCEDURE — 85025 COMPLETE CBC W/AUTO DIFF WBC: CPT

## 2024-04-11 PROCEDURE — 3700000000 HC ANESTHESIA ATTENDED CARE: Performed by: ORTHOPAEDIC SURGERY

## 2024-04-11 PROCEDURE — P9041 ALBUMIN (HUMAN),5%, 50ML: HCPCS

## 2024-04-11 PROCEDURE — 87205 SMEAR GRAM STAIN: CPT

## 2024-04-11 PROCEDURE — 87075 CULTR BACTERIA EXCEPT BLOOD: CPT

## 2024-04-11 PROCEDURE — 2709999900 HC NON-CHARGEABLE SUPPLY: Performed by: ORTHOPAEDIC SURGERY

## 2024-04-11 PROCEDURE — 2580000003 HC RX 258: Performed by: STUDENT IN AN ORGANIZED HEALTH CARE EDUCATION/TRAINING PROGRAM

## 2024-04-11 PROCEDURE — 3600000016 HC SURGERY LEVEL 6 ADDTL 15MIN: Performed by: ORTHOPAEDIC SURGERY

## 2024-04-11 PROCEDURE — 80048 BASIC METABOLIC PNL TOTAL CA: CPT

## 2024-04-11 PROCEDURE — 2580000003 HC RX 258: Performed by: ORTHOPAEDIC SURGERY

## 2024-04-11 PROCEDURE — 0SRR0J9 REPLACEMENT OF RIGHT HIP JOINT, FEMORAL SURFACE WITH SYNTHETIC SUBSTITUTE, CEMENTED, OPEN APPROACH: ICD-10-PCS | Performed by: ORTHOPAEDIC SURGERY

## 2024-04-11 PROCEDURE — 99232 SBSQ HOSP IP/OBS MODERATE 35: CPT | Performed by: STUDENT IN AN ORGANIZED HEALTH CARE EDUCATION/TRAINING PROGRAM

## 2024-04-11 PROCEDURE — 2580000003 HC RX 258

## 2024-04-11 PROCEDURE — 0SPR0JZ REMOVAL OF SYNTHETIC SUBSTITUTE FROM RIGHT HIP JOINT, FEMORAL SURFACE, OPEN APPROACH: ICD-10-PCS | Performed by: ORTHOPAEDIC SURGERY

## 2024-04-11 PROCEDURE — 36592 COLLECT BLOOD FROM PICC: CPT

## 2024-04-11 PROCEDURE — 82962 GLUCOSE BLOOD TEST: CPT

## 2024-04-11 PROCEDURE — 3700000001 HC ADD 15 MINUTES (ANESTHESIA): Performed by: ORTHOPAEDIC SURGERY

## 2024-04-11 PROCEDURE — 87077 CULTURE AEROBIC IDENTIFY: CPT

## 2024-04-11 PROCEDURE — 27138 REVISE HIP JOINT REPLACEMENT: CPT | Performed by: ORTHOPAEDIC SURGERY

## 2024-04-11 PROCEDURE — 2500000003 HC RX 250 WO HCPCS: Performed by: STUDENT IN AN ORGANIZED HEALTH CARE EDUCATION/TRAINING PROGRAM

## 2024-04-11 PROCEDURE — 3600000006 HC SURGERY LEVEL 6 BASE: Performed by: ORTHOPAEDIC SURGERY

## 2024-04-11 DEVICE — IMPLANTABLE DEVICE: Type: IMPLANTABLE DEVICE | Site: HIP | Status: FUNCTIONAL

## 2024-04-11 DEVICE — HEAD FEM OD46MM ID26MM HIP CO CHROM POLYETH BPLR CEMENTLESS: Type: IMPLANTABLE DEVICE | Site: HIP | Status: FUNCTIONAL

## 2024-04-11 RX ORDER — OXYCODONE HYDROCHLORIDE AND ACETAMINOPHEN 5; 325 MG/1; MG/1
1 TABLET ORAL EVERY 6 HOURS PRN
Qty: 28 TABLET | Refills: 0 | Status: SHIPPED | OUTPATIENT
Start: 2024-04-11 | End: 2024-04-18

## 2024-04-11 RX ORDER — SODIUM CHLORIDE 9 MG/ML
INJECTION, SOLUTION INTRAMUSCULAR; INTRAVENOUS; SUBCUTANEOUS PRN
Status: DISCONTINUED | OUTPATIENT
Start: 2024-04-11 | End: 2024-04-11 | Stop reason: SDUPTHER

## 2024-04-11 RX ORDER — TOBRAMYCIN 1.2 G/30ML
INJECTION, POWDER, LYOPHILIZED, FOR SOLUTION INTRAVENOUS PRN
Status: DISCONTINUED | OUTPATIENT
Start: 2024-04-11 | End: 2024-04-11 | Stop reason: ALTCHOICE

## 2024-04-11 RX ORDER — ROCURONIUM BROMIDE 10 MG/ML
INJECTION, SOLUTION INTRAVENOUS PRN
Status: DISCONTINUED | OUTPATIENT
Start: 2024-04-11 | End: 2024-04-11 | Stop reason: SDUPTHER

## 2024-04-11 RX ORDER — SODIUM CHLORIDE 0.9 % (FLUSH) 0.9 %
5-40 SYRINGE (ML) INJECTION PRN
Status: DISCONTINUED | OUTPATIENT
Start: 2024-04-11 | End: 2024-04-11 | Stop reason: HOSPADM

## 2024-04-11 RX ORDER — ONDANSETRON 2 MG/ML
INJECTION INTRAMUSCULAR; INTRAVENOUS PRN
Status: DISCONTINUED | OUTPATIENT
Start: 2024-04-11 | End: 2024-04-11 | Stop reason: SDUPTHER

## 2024-04-11 RX ORDER — ALBUMIN, HUMAN INJ 5% 5 %
SOLUTION INTRAVENOUS PRN
Status: DISCONTINUED | OUTPATIENT
Start: 2024-04-11 | End: 2024-04-11 | Stop reason: SDUPTHER

## 2024-04-11 RX ORDER — PHENYLEPHRINE HCL IN 0.9% NACL 1 MG/10 ML
SYRINGE (ML) INTRAVENOUS PRN
Status: DISCONTINUED | OUTPATIENT
Start: 2024-04-11 | End: 2024-04-11 | Stop reason: SDUPTHER

## 2024-04-11 RX ORDER — SODIUM CHLORIDE, SODIUM LACTATE, POTASSIUM CHLORIDE, CALCIUM CHLORIDE 600; 310; 30; 20 MG/100ML; MG/100ML; MG/100ML; MG/100ML
INJECTION, SOLUTION INTRAVENOUS CONTINUOUS PRN
Status: DISCONTINUED | OUTPATIENT
Start: 2024-04-11 | End: 2024-04-11 | Stop reason: SDUPTHER

## 2024-04-11 RX ORDER — CEFAZOLIN SODIUM 1 G/3ML
INJECTION, POWDER, FOR SOLUTION INTRAMUSCULAR; INTRAVENOUS PRN
Status: DISCONTINUED | OUTPATIENT
Start: 2024-04-11 | End: 2024-04-11 | Stop reason: SDUPTHER

## 2024-04-11 RX ORDER — DEXMEDETOMIDINE HYDROCHLORIDE 100 UG/ML
INJECTION, SOLUTION INTRAVENOUS PRN
Status: DISCONTINUED | OUTPATIENT
Start: 2024-04-11 | End: 2024-04-11 | Stop reason: SDUPTHER

## 2024-04-11 RX ORDER — NALOXONE HYDROCHLORIDE 0.4 MG/ML
INJECTION, SOLUTION INTRAMUSCULAR; INTRAVENOUS; SUBCUTANEOUS PRN
Status: DISCONTINUED | OUTPATIENT
Start: 2024-04-11 | End: 2024-04-11 | Stop reason: HOSPADM

## 2024-04-11 RX ORDER — VANCOMYCIN HYDROCHLORIDE 1 G/20ML
INJECTION, POWDER, LYOPHILIZED, FOR SOLUTION INTRAVENOUS PRN
Status: DISCONTINUED | OUTPATIENT
Start: 2024-04-11 | End: 2024-04-11 | Stop reason: ALTCHOICE

## 2024-04-11 RX ORDER — HYDROMORPHONE HYDROCHLORIDE 1 MG/ML
0.5 INJECTION, SOLUTION INTRAMUSCULAR; INTRAVENOUS; SUBCUTANEOUS EVERY 5 MIN PRN
Status: DISCONTINUED | OUTPATIENT
Start: 2024-04-11 | End: 2024-04-11 | Stop reason: HOSPADM

## 2024-04-11 RX ORDER — LIDOCAINE HYDROCHLORIDE 20 MG/ML
INJECTION, SOLUTION INTRAVENOUS PRN
Status: DISCONTINUED | OUTPATIENT
Start: 2024-04-11 | End: 2024-04-11 | Stop reason: SDUPTHER

## 2024-04-11 RX ORDER — SODIUM CHLORIDE 0.9 % (FLUSH) 0.9 %
5-40 SYRINGE (ML) INJECTION EVERY 12 HOURS SCHEDULED
Status: DISCONTINUED | OUTPATIENT
Start: 2024-04-11 | End: 2024-04-11 | Stop reason: HOSPADM

## 2024-04-11 RX ORDER — HYDROMORPHONE HYDROCHLORIDE 1 MG/ML
0.25 INJECTION, SOLUTION INTRAMUSCULAR; INTRAVENOUS; SUBCUTANEOUS EVERY 5 MIN PRN
Status: DISCONTINUED | OUTPATIENT
Start: 2024-04-11 | End: 2024-04-11 | Stop reason: HOSPADM

## 2024-04-11 RX ORDER — SODIUM CHLORIDE 9 MG/ML
INJECTION, SOLUTION INTRAVENOUS PRN
Status: DISCONTINUED | OUTPATIENT
Start: 2024-04-11 | End: 2024-04-11 | Stop reason: HOSPADM

## 2024-04-11 RX ORDER — FENTANYL CITRATE 50 UG/ML
INJECTION, SOLUTION INTRAMUSCULAR; INTRAVENOUS PRN
Status: DISCONTINUED | OUTPATIENT
Start: 2024-04-11 | End: 2024-04-11 | Stop reason: SDUPTHER

## 2024-04-11 RX ORDER — DEXAMETHASONE SODIUM PHOSPHATE 10 MG/ML
INJECTION INTRAMUSCULAR; INTRAVENOUS PRN
Status: DISCONTINUED | OUTPATIENT
Start: 2024-04-11 | End: 2024-04-11 | Stop reason: SDUPTHER

## 2024-04-11 RX ORDER — PROPOFOL 10 MG/ML
INJECTION, EMULSION INTRAVENOUS PRN
Status: DISCONTINUED | OUTPATIENT
Start: 2024-04-11 | End: 2024-04-11 | Stop reason: SDUPTHER

## 2024-04-11 RX ORDER — KETAMINE HCL IN NACL, ISO-OSM 100MG/10ML
SYRINGE (ML) INJECTION PRN
Status: DISCONTINUED | OUTPATIENT
Start: 2024-04-11 | End: 2024-04-11 | Stop reason: SDUPTHER

## 2024-04-11 RX ORDER — MEPERIDINE HYDROCHLORIDE 25 MG/ML
12.5 INJECTION INTRAMUSCULAR; INTRAVENOUS; SUBCUTANEOUS EVERY 5 MIN PRN
Status: DISCONTINUED | OUTPATIENT
Start: 2024-04-11 | End: 2024-04-11 | Stop reason: HOSPADM

## 2024-04-11 RX ADMIN — PROPOFOL 80 MG: 10 INJECTION, EMULSION INTRAVENOUS at 15:29

## 2024-04-11 RX ADMIN — WATER 2000 MG: 1 INJECTION INTRAMUSCULAR; INTRAVENOUS; SUBCUTANEOUS at 06:50

## 2024-04-11 RX ADMIN — MORPHINE SULFATE 2 MG: 2 INJECTION, SOLUTION INTRAMUSCULAR; INTRAVENOUS at 23:02

## 2024-04-11 RX ADMIN — SODIUM CHLORIDE, PRESERVATIVE FREE 10 ML: 5 INJECTION INTRAVENOUS at 09:53

## 2024-04-11 RX ADMIN — HYDROMORPHONE HYDROCHLORIDE 0.5 MG: 1 INJECTION, SOLUTION INTRAMUSCULAR; INTRAVENOUS; SUBCUTANEOUS at 18:06

## 2024-04-11 RX ADMIN — DEXMEDETOMIDINE HCL 10 MCG: 100 INJECTION INTRAVENOUS at 16:38

## 2024-04-11 RX ADMIN — SODIUM CHLORIDE, POTASSIUM CHLORIDE, SODIUM LACTATE AND CALCIUM CHLORIDE: 600; 310; 30; 20 INJECTION, SOLUTION INTRAVENOUS at 16:13

## 2024-04-11 RX ADMIN — Medication 100 MCG: at 16:03

## 2024-04-11 RX ADMIN — FENTANYL CITRATE 100 MCG: 50 INJECTION, SOLUTION INTRAMUSCULAR; INTRAVENOUS at 16:33

## 2024-04-11 RX ADMIN — FENTANYL CITRATE 50 MCG: 50 INJECTION, SOLUTION INTRAMUSCULAR; INTRAVENOUS at 16:08

## 2024-04-11 RX ADMIN — SUGAMMADEX 200 MG: 100 INJECTION, SOLUTION INTRAVENOUS at 17:23

## 2024-04-11 RX ADMIN — LEVOTHYROXINE SODIUM 50 MCG: 0.05 TABLET ORAL at 05:33

## 2024-04-11 RX ADMIN — SODIUM CHLORIDE, PRESERVATIVE FREE 10 ML: 5 INJECTION INTRAVENOUS at 21:49

## 2024-04-11 RX ADMIN — CEFAZOLIN 2 G: 1 INJECTION, POWDER, FOR SOLUTION INTRAMUSCULAR; INTRAVENOUS at 16:08

## 2024-04-11 RX ADMIN — ROCURONIUM BROMIDE 10 MG: 10 INJECTION, SOLUTION INTRAVENOUS at 15:56

## 2024-04-11 RX ADMIN — ROCURONIUM BROMIDE 30 MG: 10 INJECTION, SOLUTION INTRAVENOUS at 15:29

## 2024-04-11 RX ADMIN — Medication 30 MG: at 15:29

## 2024-04-11 RX ADMIN — LIDOCAINE HYDROCHLORIDE 100 MG: 20 INJECTION, SOLUTION INTRAVENOUS at 15:29

## 2024-04-11 RX ADMIN — WATER 2000 MG: 1 INJECTION INTRAMUSCULAR; INTRAVENOUS; SUBCUTANEOUS at 23:02

## 2024-04-11 RX ADMIN — GABAPENTIN 800 MG: 400 CAPSULE ORAL at 09:45

## 2024-04-11 RX ADMIN — ROSUVASTATIN 40 MG: 20 TABLET, FILM COATED ORAL at 21:49

## 2024-04-11 RX ADMIN — GABAPENTIN 800 MG: 400 CAPSULE ORAL at 21:49

## 2024-04-11 RX ADMIN — DEXAMETHASONE SODIUM PHOSPHATE 10 MG: 10 INJECTION INTRAMUSCULAR; INTRAVENOUS at 15:29

## 2024-04-11 RX ADMIN — POTASSIUM PHOSPHATE, MONOBASIC AND POTASSIUM PHOSPHATE, DIBASIC 30 MMOL: 224; 236 INJECTION, SOLUTION, CONCENTRATE INTRAVENOUS at 21:55

## 2024-04-11 RX ADMIN — PRAMIPEXOLE DIHYDROCHLORIDE 0.75 MG: 0.25 TABLET ORAL at 09:45

## 2024-04-11 RX ADMIN — SODIUM CHLORIDE 20 ML: 9 INJECTION INTRAMUSCULAR; INTRAVENOUS; SUBCUTANEOUS at 16:08

## 2024-04-11 RX ADMIN — PANTOPRAZOLE SODIUM 40 MG: 40 TABLET, DELAYED RELEASE ORAL at 05:33

## 2024-04-11 RX ADMIN — BUPROPION HYDROCHLORIDE 100 MG: 100 TABLET, EXTENDED RELEASE ORAL at 09:45

## 2024-04-11 RX ADMIN — ROCURONIUM BROMIDE 20 MG: 10 INJECTION, SOLUTION INTRAVENOUS at 16:32

## 2024-04-11 RX ADMIN — VENLAFAXINE HYDROCHLORIDE 150 MG: 150 CAPSULE, EXTENDED RELEASE ORAL at 09:45

## 2024-04-11 RX ADMIN — SODIUM CHLORIDE, POTASSIUM CHLORIDE, SODIUM LACTATE AND CALCIUM CHLORIDE: 600; 310; 30; 20 INJECTION, SOLUTION INTRAVENOUS at 15:29

## 2024-04-11 RX ADMIN — FENTANYL CITRATE 50 MCG: 50 INJECTION, SOLUTION INTRAMUSCULAR; INTRAVENOUS at 16:28

## 2024-04-11 RX ADMIN — FENTANYL CITRATE 50 MCG: 50 INJECTION, SOLUTION INTRAMUSCULAR; INTRAVENOUS at 15:29

## 2024-04-11 RX ADMIN — ALBUMIN (HUMAN) 25 G: 12.5 INJECTION, SOLUTION INTRAVENOUS at 16:30

## 2024-04-11 RX ADMIN — DEXMEDETOMIDINE HCL 10 MCG: 100 INJECTION INTRAVENOUS at 17:16

## 2024-04-11 RX ADMIN — TRANEXAMIC ACID 1000 MG: 100 INJECTION, SOLUTION INTRAVENOUS at 16:44

## 2024-04-11 RX ADMIN — SUCRALFATE 1 G: 1 TABLET ORAL at 09:45

## 2024-04-11 RX ADMIN — OXYCODONE AND ACETAMINOPHEN 1 TABLET: 5; 325 TABLET ORAL at 05:33

## 2024-04-11 RX ADMIN — OXYCODONE AND ACETAMINOPHEN 1 TABLET: 5; 325 TABLET ORAL at 21:49

## 2024-04-11 RX ADMIN — Medication 100 MCG: at 16:18

## 2024-04-11 RX ADMIN — Medication 100 MCG: at 15:42

## 2024-04-11 RX ADMIN — ONDANSETRON 4 MG: 2 INJECTION INTRAMUSCULAR; INTRAVENOUS at 17:16

## 2024-04-11 RX ADMIN — ROCURONIUM BROMIDE 10 MG: 10 INJECTION, SOLUTION INTRAVENOUS at 15:57

## 2024-04-11 RX ADMIN — Medication 6000 UNITS: at 09:45

## 2024-04-11 ASSESSMENT — COPD QUESTIONNAIRES: CAT_SEVERITY: MODERATE

## 2024-04-11 ASSESSMENT — ENCOUNTER SYMPTOMS
DYSPNEA ACTIVITY LEVEL: AFTER AMBULATING 1 FLIGHT OF STAIRS
SHORTNESS OF BREATH: 1

## 2024-04-11 ASSESSMENT — PAIN DESCRIPTION - PAIN TYPE: TYPE: ACUTE PAIN

## 2024-04-11 ASSESSMENT — PAIN DESCRIPTION - LOCATION
LOCATION: LEG;HIP
LOCATION: HIP

## 2024-04-11 ASSESSMENT — PAIN SCALES - GENERAL
PAINLEVEL_OUTOF10: 9
PAINLEVEL_OUTOF10: 5
PAINLEVEL_OUTOF10: 8
PAINLEVEL_OUTOF10: 10
PAINLEVEL_OUTOF10: 10
PAINLEVEL_OUTOF10: 7

## 2024-04-11 ASSESSMENT — PAIN DESCRIPTION - DESCRIPTORS
DESCRIPTORS: THROBBING;ACHING;DISCOMFORT
DESCRIPTORS: ACHING
DESCRIPTORS: STABBING

## 2024-04-11 ASSESSMENT — PAIN - FUNCTIONAL ASSESSMENT: PAIN_FUNCTIONAL_ASSESSMENT: ADULT NONVERBAL PAIN SCALE (NPVS)

## 2024-04-11 ASSESSMENT — PAIN DESCRIPTION - ORIENTATION
ORIENTATION: RIGHT

## 2024-04-11 NOTE — PLAN OF CARE
Problem: Chronic Conditions and Co-morbidities  Goal: Patient's chronic conditions and co-morbidity symptoms are monitored and maintained or improved  4/11/2024 0145 by Kati Ross RN  Outcome: Progressing  4/10/2024 1450 by Moon Montez RN  Outcome: Progressing     Problem: Discharge Planning  Goal: Discharge to home or other facility with appropriate resources  4/11/2024 0145 by Kati Ross RN  Outcome: Progressing  4/10/2024 1450 by Moon Montez RN  Outcome: Progressing     Problem: Pain  Goal: Verbalizes/displays adequate comfort level or baseline comfort level  4/11/2024 0145 by Kati Ross RN  Outcome: Progressing  4/10/2024 1450 by Moon Montez RN  Outcome: Progressing     Problem: Skin/Tissue Integrity  Goal: Absence of new skin breakdown  Description: 1.  Monitor for areas of redness and/or skin breakdown  2.  Assess vascular access sites hourly  3.  Every 4-6 hours minimum:  Change oxygen saturation probe site  4.  Every 4-6 hours:  If on nasal continuous positive airway pressure, respiratory therapy assess nares and determine need for appliance change or resting period.  4/11/2024 0145 by Kati Ross RN  Outcome: Progressing  4/10/2024 1450 by Moon Montez RN  Outcome: Progressing     Problem: Safety - Adult  Goal: Free from fall injury  4/11/2024 0145 by Kati Ross RN  Outcome: Progressing  Flowsheets (Taken 4/10/2024 1504 by Moon Montez RN)  Free From Fall Injury: Instruct family/caregiver on patient safety  4/10/2024 1450 by Moon Montez RN  Outcome: Progressing     Problem: ABCDS Injury Assessment  Goal: Absence of physical injury  4/11/2024 0145 by Kati Ross RN  Outcome: Progressing  Flowsheets (Taken 4/10/2024 1504 by Moon Montez RN)  Absence of Physical Injury: Implement safety measures based on patient assessment  4/10/2024 1450 by Moon Montez RN  Outcome: Progressing

## 2024-04-11 NOTE — HOME CARE
[facility] intake following.  Will need new SOC orders will need to be placed PRIOR to discharge.  Also, will need signed scripts faxed to [phone] for IVAT.      Thank you!  Marleen Soto LPN  Central Intake Clinical Liaison  [facility].

## 2024-04-11 NOTE — ANESTHESIA PRE PROCEDURE
Department of Anesthesiology  Preprocedure Note       Name:  Rebeka Renee   Age:  78 y.o.  :  1945                                          MRN:  [id number]         Date:  2024      Surgeon: Surgeon(s):  Nemesio Handy MD    Procedure: Procedure(s):  RIGHT HIP EXPLANTATION WITH ANTIBIOTIC SPACER PLACEMENT WITH IRRIGATION AND DEBRIDEMENT    Medications prior to admission:   Prior to Admission medications    Medication Sig Start Date End Date Taking? Authorizing Provider   apixaban (ELIQUIS) 5 MG TABS tablet Take 2 tablets by mouth 2 times daily for 4 days, THEN 1 tablet 2 times daily. 24  Vitaly Lee MD   ceFAZolin (ANCEF) infusion Infuse 2,000 mg intravenously in the morning and 2,000 mg at noon and 2,000 mg in the evening. Compound per protocol. 3/23/24 5/2/24  Polly Gilliam MD   aspirin 325 MG EC tablet Take 1 tablet by mouth 2 times daily for 28 days 3/21/24 4/18/24  Florencio Collazo DO   ALBUTEROL IN Inhale into the lungs    Malou Brunson MD   Cholecalciferol (VITAMIN D) 25 MCG TABS Take 1 tablet by mouth daily 24   Javier Meyer MD   rosuvastatin (CRESTOR) 40 MG tablet Take 1 tablet by mouth nightly 24   Claudia Andrea MD   potassium chloride (KLOR-CON M) 10 MEQ extended release tablet Take 1 tablet by mouth daily    Malou Brunson MD   buPROPion (WELLBUTRIN SR) 100 MG extended release tablet Take 1 tablet by mouth daily    Malou Brunson MD   Semaglutide, 2 MG/DOSE, 8 MG/3ML SOPN Inject 8 mg into the skin once a week Given Monday    Malou Brunson MD   omeprazole (PRILOSEC) 20 MG delayed release capsule Take 1 capsule by mouth daily    Malou Brunson MD   sucralfate (CARAFATE) 1 GM tablet Take 1 tablet by mouth daily    Malou Brunson MD   gabapentin (NEURONTIN) 800 MG tablet Take 1 tablet by mouth 3 times daily.    Malou Brunson MD   pramipexole (MIRAPEX) 0.75 MG tablet

## 2024-04-11 NOTE — CARE COORDINATION
SOCIAL WORK/[facility] TRANSITION OF CARE PLANNING( LEIGHANN GOPI, W [phone]): I met with pt in the room this a.m. and she was adamant that she is not returning to [facility] and is going home. A referral was made to [facility] and I called them this a.m. they are going to run the cost of the iv ancef which is to run per ID until 5/2 and discuss cost with pt and myself. They will need orders for hhc , ID protocol order and script for the iv ancef from ID before discharge. I left a note for ID pertaining to this. Pt is for surgery with orthopedics today around 2 p.m per pt. We have received orders for down grade. Pt said her spouse and her have done iv abx's at home before. .ZA Kerr  4/11/2024    Cost of iv abx with [facility] is $0. [place] Kerr  4/11/2024

## 2024-04-11 NOTE — PLAN OF CARE
Problem: Chronic Conditions and Co-morbidities  Goal: Patient's chronic conditions and co-morbidity symptoms are monitored and maintained or improved  4/11/2024 1131 by Moon Montez RN  Outcome: Progressing     Problem: Discharge Planning  Goal: Discharge to home or other facility with appropriate resources  4/11/2024 1131 by Moon Montez RN  Outcome: Progressing     Problem: Pain  Goal: Verbalizes/displays adequate comfort level or baseline comfort level  4/11/2024 1131 by Moon Montez RN  Outcome: Progressing     Problem: Skin/Tissue Integrity  Goal: Absence of new skin breakdown  Description: 1.  Monitor for areas of redness and/or skin breakdown  2.  Assess vascular access sites hourly  3.  Every 4-6 hours minimum:  Change oxygen saturation probe site  4.  Every 4-6 hours:  If on nasal continuous positive airway pressure, respiratory therapy assess nares and determine need for appliance change or resting period.  4/11/2024 1131 by Moon Montez RN  Outcome: Progressing     Problem: Safety - Adult  Goal: Free from fall injury  4/11/2024 1131 by Moon Montez RN  Outcome: Progressing  Flowsheets (Taken 4/11/2024 1130)  Free From Fall Injury: Instruct family/caregiver on patient safety     Problem: ABCDS Injury Assessment  Goal: Absence of physical injury  4/11/2024 1131 by Moon Montez RN  Outcome: Progressing  Flowsheets (Taken 4/11/2024 1130)  Absence of Physical Injury: Implement safety measures based on patient assessment     Problem: Nutrition Deficit:  Goal: Optimize nutritional status  4/11/2024 1131 by Moon Montez RN  Outcome: Progressing

## 2024-04-11 NOTE — DISCHARGE INSTRUCTIONS
office hours.  3.  Twice weekly (preferably every Monday & Thursday):    - BUN Creatinine and liver panel    - Complete Blood Count with differential (CBC with dif)  4.  Once weekly:    - C-Reactive Protein (not high sensitivity CRP)    - Erythrocyte/Westergren Sedimentation Rate (WSR or ESR)  5. When clinically indicated obtain:    - Urine culture - if the patient has a fever with purulent drainage from Smith or suprapubic catheter, or foul smelling urine. Do not irrigate a clogged Smith catheter. Replace it.    - Blood cultures and Wound Gram stain with culture & sensitivity - if the patient has a fever or increasing drainage or foul odor from a wound. Notify the treating physician in a timely manner    - Stool specimen - if diarrhea occurs while on antibiotics, send stools for C. difficile and WBCs.  6.  When a drug is discontinued due to a low white blood cell count (WBCs) draw CBC with differential and CMP twice a week x 2 measurements.    NOTIFICATION AND FOLLOW UP WITH INFECTIOUS DISEASE PHYSICIAN:  1.  Notify ordering physician or office if patient requires admission to the hospital with reason for admission.  2.  Discontinue all blood work upon completion of IV antibiotics, unless otherwise specified by ordering physician.  3.  Notify ordering physician if the patient does not receive the scheduled antibiotic for 24 hours or more.  5.  Ensure patient has an appointment to follow up with the Infectious Disease physician within 2 weeks of discharge from the hospital or initiation of IV antibiotic therapy.  6.  Continue IV antibiotic therapy until patient is seen in the office or unless specific stop date is noted on the original order or unless otherwise ordered by physician.  Contact the Infectious Disease physician’s office to ensure stop date is correct before stopping antibiotics.

## 2024-04-11 NOTE — PLAN OF CARE
Problem: Chronic Conditions and Co-morbidities  Goal: Patient's chronic conditions and co-morbidity symptoms are monitored and maintained or improved  4/11/2024 1936 by Latoya Mitchell RN  Outcome: Progressing  4/11/2024 1131 by Moon Montez RN  Outcome: Progressing     Problem: Discharge Planning  Goal: Discharge to home or other facility with appropriate resources  4/11/2024 1936 by Latoya Mitchell RN  Outcome: Progressing  4/11/2024 1131 by Moon Montez RN  Outcome: Progressing     Problem: Nutrition Deficit:  Goal: Optimize nutritional status  4/11/2024 1936 by Latoya Mitchell RN  Outcome: Progressing  4/11/2024 1131 by Moon Montez RN  Outcome: Progressing     Problem: Pain  Goal: Verbalizes/displays adequate comfort level or baseline comfort level  4/11/2024 1936 by Latoya Mitchell RN  Outcome: Completed  4/11/2024 1131 by Moon Montez RN  Outcome: Progressing     Problem: Skin/Tissue Integrity  Goal: Absence of new skin breakdown  Description: 1.  Monitor for areas of redness and/or skin breakdown  2.  Assess vascular access sites hourly  3.  Every 4-6 hours minimum:  Change oxygen saturation probe site  4.  Every 4-6 hours:  If on nasal continuous positive airway pressure, respiratory therapy assess nares and determine need for appliance change or resting period.  4/11/2024 1936 by Latoya Mitchell RN  Outcome: Completed  4/11/2024 1131 by Moon Montez RN  Outcome: Progressing     Problem: Safety - Adult  Goal: Free from fall injury  4/11/2024 1936 by Latoya Mitchell RN  Outcome: Completed  4/11/2024 1131 by Moon Montez RN  Outcome: Progressing  Flowsheets (Taken 4/11/2024 1130)  Free From Fall Injury: Instruct family/caregiver on patient safety     Problem: ABCDS Injury Assessment  Goal: Absence of physical injury  4/11/2024 1936 by Latoya Mitchell RN  Outcome: Completed  4/11/2024 1131 by Tc

## 2024-04-12 LAB
ANION GAP SERPL CALCULATED.3IONS-SCNC: 11 MMOL/L (ref 7–16)
BASOPHILS # BLD: 0.01 K/UL (ref 0–0.2)
BASOPHILS NFR BLD: 0 % (ref 0–2)
BUN SERPL-MCNC: 15 MG/DL (ref 6–23)
CALCIUM SERPL-MCNC: 8.2 MG/DL (ref 8.6–10.2)
CHLORIDE SERPL-SCNC: 101 MMOL/L (ref 98–107)
CO2 SERPL-SCNC: 30 MMOL/L (ref 22–29)
CREAT SERPL-MCNC: 0.6 MG/DL (ref 0.5–1)
EOSINOPHIL # BLD: 0.01 K/UL (ref 0.05–0.5)
EOSINOPHILS RELATIVE PERCENT: 0 % (ref 0–6)
ERYTHROCYTE [DISTWIDTH] IN BLOOD BY AUTOMATED COUNT: 15.9 % (ref 11.5–15)
GFR SERPL CREATININE-BSD FRML MDRD: >90 ML/MIN/1.73M2
GLUCOSE BLD-MCNC: 122 MG/DL (ref 74–99)
GLUCOSE BLD-MCNC: 127 MG/DL (ref 74–99)
GLUCOSE BLD-MCNC: 160 MG/DL (ref 74–99)
GLUCOSE BLD-MCNC: 258 MG/DL (ref 74–99)
GLUCOSE SERPL-MCNC: 223 MG/DL (ref 74–99)
HCT VFR BLD AUTO: 26.5 % (ref 34–48)
HGB BLD-MCNC: 7.9 G/DL (ref 11.5–15.5)
IMM GRANULOCYTES # BLD AUTO: 0.05 K/UL (ref 0–0.58)
IMM GRANULOCYTES NFR BLD: 1 % (ref 0–5)
LYMPHOCYTES NFR BLD: 2.13 K/UL (ref 1.5–4)
LYMPHOCYTES RELATIVE PERCENT: 22 % (ref 20–42)
MCH RBC QN AUTO: 26.8 PG (ref 26–35)
MCHC RBC AUTO-ENTMCNC: 29.8 G/DL (ref 32–34.5)
MCV RBC AUTO: 89.8 FL (ref 80–99.9)
MONOCYTES NFR BLD: 1.05 K/UL (ref 0.1–0.95)
MONOCYTES NFR BLD: 11 % (ref 2–12)
NEUTROPHILS NFR BLD: 66 % (ref 43–80)
NEUTS SEG NFR BLD: 6.4 K/UL (ref 1.8–7.3)
PLATELET # BLD AUTO: 280 K/UL (ref 130–450)
PMV BLD AUTO: 10.7 FL (ref 7–12)
POTASSIUM SERPL-SCNC: 4.4 MMOL/L (ref 3.5–5)
RBC # BLD AUTO: 2.95 M/UL (ref 3.5–5.5)
SODIUM SERPL-SCNC: 142 MMOL/L (ref 132–146)
WBC OTHER # BLD: 9.7 K/UL (ref 4.5–11.5)

## 2024-04-12 PROCEDURE — 80048 BASIC METABOLIC PNL TOTAL CA: CPT

## 2024-04-12 PROCEDURE — 2700000000 HC OXYGEN THERAPY PER DAY

## 2024-04-12 PROCEDURE — 2580000003 HC RX 258: Performed by: STUDENT IN AN ORGANIZED HEALTH CARE EDUCATION/TRAINING PROGRAM

## 2024-04-12 PROCEDURE — 99232 SBSQ HOSP IP/OBS MODERATE 35: CPT | Performed by: STUDENT IN AN ORGANIZED HEALTH CARE EDUCATION/TRAINING PROGRAM

## 2024-04-12 PROCEDURE — 1200000000 HC SEMI PRIVATE

## 2024-04-12 PROCEDURE — 82962 GLUCOSE BLOOD TEST: CPT

## 2024-04-12 PROCEDURE — 6360000002 HC RX W HCPCS: Performed by: STUDENT IN AN ORGANIZED HEALTH CARE EDUCATION/TRAINING PROGRAM

## 2024-04-12 PROCEDURE — 6370000000 HC RX 637 (ALT 250 FOR IP): Performed by: STUDENT IN AN ORGANIZED HEALTH CARE EDUCATION/TRAINING PROGRAM

## 2024-04-12 PROCEDURE — 85025 COMPLETE CBC W/AUTO DIFF WBC: CPT

## 2024-04-12 RX ORDER — HEPARIN 100 UNIT/ML
3 SYRINGE INTRAVENOUS PRN
Status: DISCONTINUED | OUTPATIENT
Start: 2024-04-12 | End: 2024-04-17 | Stop reason: HOSPADM

## 2024-04-12 RX ORDER — HEPARIN 100 UNIT/ML
3 SYRINGE INTRAVENOUS EVERY 12 HOURS SCHEDULED
Status: DISCONTINUED | OUTPATIENT
Start: 2024-04-12 | End: 2024-04-17 | Stop reason: HOSPADM

## 2024-04-12 RX ADMIN — HEPARIN 300 UNITS: 100 SYRINGE at 20:52

## 2024-04-12 RX ADMIN — PRAMIPEXOLE DIHYDROCHLORIDE 0.75 MG: 0.25 TABLET ORAL at 20:52

## 2024-04-12 RX ADMIN — SODIUM CHLORIDE, PRESERVATIVE FREE 10 ML: 5 INJECTION INTRAVENOUS at 09:42

## 2024-04-12 RX ADMIN — LEVOTHYROXINE SODIUM 50 MCG: 0.05 TABLET ORAL at 06:27

## 2024-04-12 RX ADMIN — GABAPENTIN 800 MG: 400 CAPSULE ORAL at 09:42

## 2024-04-12 RX ADMIN — OXYCODONE AND ACETAMINOPHEN 1 TABLET: 5; 325 TABLET ORAL at 02:05

## 2024-04-12 RX ADMIN — GABAPENTIN 800 MG: 400 CAPSULE ORAL at 20:52

## 2024-04-12 RX ADMIN — SODIUM CHLORIDE, PRESERVATIVE FREE 10 ML: 5 INJECTION INTRAVENOUS at 20:52

## 2024-04-12 RX ADMIN — WATER 2000 MG: 1 INJECTION INTRAMUSCULAR; INTRAVENOUS; SUBCUTANEOUS at 15:38

## 2024-04-12 RX ADMIN — OXYCODONE AND ACETAMINOPHEN 1 TABLET: 5; 325 TABLET ORAL at 20:52

## 2024-04-12 RX ADMIN — Medication 6000 UNITS: at 09:43

## 2024-04-12 RX ADMIN — SUCRALFATE 1 G: 1 TABLET ORAL at 09:42

## 2024-04-12 RX ADMIN — VENLAFAXINE HYDROCHLORIDE 150 MG: 150 CAPSULE, EXTENDED RELEASE ORAL at 09:42

## 2024-04-12 RX ADMIN — OXYCODONE AND ACETAMINOPHEN 1 TABLET: 5; 325 TABLET ORAL at 15:37

## 2024-04-12 RX ADMIN — ROSUVASTATIN 40 MG: 20 TABLET, FILM COATED ORAL at 20:52

## 2024-04-12 RX ADMIN — OXYCODONE AND ACETAMINOPHEN 1 TABLET: 5; 325 TABLET ORAL at 06:35

## 2024-04-12 RX ADMIN — GABAPENTIN 800 MG: 400 CAPSULE ORAL at 14:32

## 2024-04-12 RX ADMIN — PRAMIPEXOLE DIHYDROCHLORIDE 0.75 MG: 0.25 TABLET ORAL at 09:45

## 2024-04-12 RX ADMIN — WATER 2000 MG: 1 INJECTION INTRAMUSCULAR; INTRAVENOUS; SUBCUTANEOUS at 06:47

## 2024-04-12 RX ADMIN — INSULIN LISPRO 2 UNITS: 100 INJECTION, SOLUTION INTRAVENOUS; SUBCUTANEOUS at 08:50

## 2024-04-12 RX ADMIN — PANTOPRAZOLE SODIUM 40 MG: 40 TABLET, DELAYED RELEASE ORAL at 06:27

## 2024-04-12 RX ADMIN — WATER 2000 MG: 1 INJECTION INTRAMUSCULAR; INTRAVENOUS; SUBCUTANEOUS at 23:29

## 2024-04-12 RX ADMIN — BUPROPION HYDROCHLORIDE 100 MG: 100 TABLET, EXTENDED RELEASE ORAL at 09:42

## 2024-04-12 RX ADMIN — PRAMIPEXOLE DIHYDROCHLORIDE 0.75 MG: 0.25 TABLET ORAL at 00:24

## 2024-04-12 RX ADMIN — MORPHINE SULFATE 2 MG: 2 INJECTION, SOLUTION INTRAMUSCULAR; INTRAVENOUS at 17:12

## 2024-04-12 ASSESSMENT — PAIN SCALES - GENERAL
PAINLEVEL_OUTOF10: 7
PAINLEVEL_OUTOF10: 10
PAINLEVEL_OUTOF10: 9
PAINLEVEL_OUTOF10: 7
PAINLEVEL_OUTOF10: 8
PAINLEVEL_OUTOF10: 7
PAINLEVEL_OUTOF10: 10

## 2024-04-12 ASSESSMENT — PAIN DESCRIPTION - ORIENTATION
ORIENTATION: RIGHT

## 2024-04-12 ASSESSMENT — PAIN DESCRIPTION - LOCATION
LOCATION: HIP
LOCATION: LEG;HIP
LOCATION: LEG;HIP
LOCATION: HIP

## 2024-04-12 ASSESSMENT — PAIN DESCRIPTION - DESCRIPTORS
DESCRIPTORS: BURNING;SHARP;STABBING
DESCRIPTORS: THROBBING;ACHING;DISCOMFORT

## 2024-04-12 NOTE — ANESTHESIA POSTPROCEDURE EVALUATION
Department of Anesthesiology  Postprocedure Note    Patient: Rebeka Renee  MRN: [id number]  YOB: 1945  Date of evaluation: 4/11/2024    Procedure Summary       Date: 04/11/24 Room / Location: [address]    Anesthesia Start: 1529 Anesthesia Stop: 1746    Procedure: RIGHT HIP OPEN REDUCTION FEMORAL HEAD EXCHANGE WITH IRRIGATION AND DEBRIDEMENT (Right: Hip) Diagnosis:       Dislocation of right hip, initial encounter (HCC)      Infection of prosthetic hip joint, initial encounter (HCC)      (Dislocation of right hip, initial encounter (HCC) [S73.004A])      (Infection of prosthetic hip joint, initial encounter (HCC) [T84.59XA, Z96.649])    Surgeons: Nemesio Handy MD Responsible Provider: Merlin Yeager MD    Anesthesia Type: General ASA Status: 4            Anesthesia Type: General    Terrie Phase I: Terrie Score: 10    Terrie Phase II:      Anesthesia Post Evaluation    Patient location during evaluation: PACU  Patient participation: complete - patient participated  Level of consciousness: awake and alert  Airway patency: patent  Nausea & Vomiting: no nausea and no vomiting  Cardiovascular status: hemodynamically stable  Respiratory status: acceptable  Hydration status: euvolemic  Multimodal analgesia pain management approach  Pain management: adequate    No notable events documented.

## 2024-04-12 NOTE — CARE COORDINATION
04/12/24 Update CM Note: Patient is POD 1  RIGHT HIP OPEN REDUCTION FEMORAL HEAD EXCHANGE WITH IRRIGATION AND DEBRIDEMENT (Right: Hip). She is alert and oriented. She is on iv ancef q8 and has a piccline. She is refusing to return to [facility]. She states she is returning home. Both patient and  have administerd iv antibiotics at home in the past.  [facility] homecare following for discharge. She will have 0$ out of pocket for homecare costs.Electronically signed by Amanda Wright RN CM on 4/12/2024 at 9:21 AM    Addendum: Order given to [facility] for right hip  Abd brace. Electronically signed by Amanda Wright RN CM on 4/12/2024 at 2:12 PM

## 2024-04-13 LAB
ANION GAP SERPL CALCULATED.3IONS-SCNC: 2 MMOL/L (ref 7–16)
BASOPHILS # BLD: 0.03 K/UL (ref 0–0.2)
BASOPHILS NFR BLD: 0 % (ref 0–2)
BUN SERPL-MCNC: 16 MG/DL (ref 6–23)
CALCIUM SERPL-MCNC: 8.3 MG/DL (ref 8.6–10.2)
CHLORIDE SERPL-SCNC: 99 MMOL/L (ref 98–107)
CO2 SERPL-SCNC: 36 MMOL/L (ref 22–29)
CREAT SERPL-MCNC: 0.7 MG/DL (ref 0.5–1)
EOSINOPHIL # BLD: 0.52 K/UL (ref 0.05–0.5)
EOSINOPHILS RELATIVE PERCENT: 6 % (ref 0–6)
ERYTHROCYTE [DISTWIDTH] IN BLOOD BY AUTOMATED COUNT: 16.1 % (ref 11.5–15)
GFR SERPL CREATININE-BSD FRML MDRD: 90 ML/MIN/1.73M2
GLUCOSE BLD-MCNC: 128 MG/DL (ref 74–99)
GLUCOSE BLD-MCNC: 138 MG/DL (ref 74–99)
GLUCOSE BLD-MCNC: 154 MG/DL (ref 74–99)
GLUCOSE BLD-MCNC: 96 MG/DL (ref 74–99)
GLUCOSE SERPL-MCNC: 74 MG/DL (ref 74–99)
HCT VFR BLD AUTO: 25.6 % (ref 34–48)
HGB BLD-MCNC: 7.5 G/DL (ref 11.5–15.5)
IMM GRANULOCYTES # BLD AUTO: 0.03 K/UL (ref 0–0.58)
IMM GRANULOCYTES NFR BLD: 0 % (ref 0–5)
LYMPHOCYTES NFR BLD: 2.39 K/UL (ref 1.5–4)
LYMPHOCYTES RELATIVE PERCENT: 28 % (ref 20–42)
MCH RBC QN AUTO: 26.3 PG (ref 26–35)
MCHC RBC AUTO-ENTMCNC: 29.3 G/DL (ref 32–34.5)
MCV RBC AUTO: 89.8 FL (ref 80–99.9)
MONOCYTES NFR BLD: 0.74 K/UL (ref 0.1–0.95)
MONOCYTES NFR BLD: 9 % (ref 2–12)
NEUTROPHILS NFR BLD: 56 % (ref 43–80)
NEUTS SEG NFR BLD: 4.75 K/UL (ref 1.8–7.3)
PLATELET # BLD AUTO: 231 K/UL (ref 130–450)
PMV BLD AUTO: 10.9 FL (ref 7–12)
POTASSIUM SERPL-SCNC: 4.2 MMOL/L (ref 3.5–5)
RBC # BLD AUTO: 2.85 M/UL (ref 3.5–5.5)
SODIUM SERPL-SCNC: 137 MMOL/L (ref 132–146)
WBC OTHER # BLD: 8.5 K/UL (ref 4.5–11.5)

## 2024-04-13 PROCEDURE — 2580000003 HC RX 258: Performed by: INTERNAL MEDICINE

## 2024-04-13 PROCEDURE — 2580000003 HC RX 258: Performed by: STUDENT IN AN ORGANIZED HEALTH CARE EDUCATION/TRAINING PROGRAM

## 2024-04-13 PROCEDURE — 6360000002 HC RX W HCPCS: Performed by: INTERNAL MEDICINE

## 2024-04-13 PROCEDURE — 82962 GLUCOSE BLOOD TEST: CPT

## 2024-04-13 PROCEDURE — 6360000002 HC RX W HCPCS: Performed by: STUDENT IN AN ORGANIZED HEALTH CARE EDUCATION/TRAINING PROGRAM

## 2024-04-13 PROCEDURE — 6370000000 HC RX 637 (ALT 250 FOR IP): Performed by: STUDENT IN AN ORGANIZED HEALTH CARE EDUCATION/TRAINING PROGRAM

## 2024-04-13 PROCEDURE — 80048 BASIC METABOLIC PNL TOTAL CA: CPT

## 2024-04-13 PROCEDURE — 99232 SBSQ HOSP IP/OBS MODERATE 35: CPT | Performed by: STUDENT IN AN ORGANIZED HEALTH CARE EDUCATION/TRAINING PROGRAM

## 2024-04-13 PROCEDURE — 1200000000 HC SEMI PRIVATE

## 2024-04-13 PROCEDURE — 2700000000 HC OXYGEN THERAPY PER DAY

## 2024-04-13 PROCEDURE — 85025 COMPLETE CBC W/AUTO DIFF WBC: CPT

## 2024-04-13 RX ORDER — SENNA AND DOCUSATE SODIUM 50; 8.6 MG/1; MG/1
2 TABLET, FILM COATED ORAL DAILY
Status: DISCONTINUED | OUTPATIENT
Start: 2024-04-13 | End: 2024-04-17 | Stop reason: HOSPADM

## 2024-04-13 RX ADMIN — SUCRALFATE 1 G: 1 TABLET ORAL at 08:56

## 2024-04-13 RX ADMIN — ROSUVASTATIN 40 MG: 20 TABLET, FILM COATED ORAL at 21:25

## 2024-04-13 RX ADMIN — OXYCODONE AND ACETAMINOPHEN 1 TABLET: 5; 325 TABLET ORAL at 12:32

## 2024-04-13 RX ADMIN — LEVOTHYROXINE SODIUM 50 MCG: 0.05 TABLET ORAL at 06:03

## 2024-04-13 RX ADMIN — SENNOSIDES AND DOCUSATE SODIUM 2 TABLET: 50; 8.6 TABLET ORAL at 10:14

## 2024-04-13 RX ADMIN — Medication 6000 UNITS: at 10:11

## 2024-04-13 RX ADMIN — SODIUM CHLORIDE, PRESERVATIVE FREE 10 ML: 5 INJECTION INTRAVENOUS at 08:56

## 2024-04-13 RX ADMIN — BUPROPION HYDROCHLORIDE 100 MG: 100 TABLET, EXTENDED RELEASE ORAL at 10:11

## 2024-04-13 RX ADMIN — GABAPENTIN 800 MG: 400 CAPSULE ORAL at 08:55

## 2024-04-13 RX ADMIN — GABAPENTIN 800 MG: 400 CAPSULE ORAL at 21:25

## 2024-04-13 RX ADMIN — PRAMIPEXOLE DIHYDROCHLORIDE 0.75 MG: 0.25 TABLET ORAL at 21:27

## 2024-04-13 RX ADMIN — PRAMIPEXOLE DIHYDROCHLORIDE 0.75 MG: 0.25 TABLET ORAL at 10:10

## 2024-04-13 RX ADMIN — HEPARIN 300 UNITS: 100 SYRINGE at 21:28

## 2024-04-13 RX ADMIN — VENLAFAXINE HYDROCHLORIDE 150 MG: 150 CAPSULE, EXTENDED RELEASE ORAL at 10:11

## 2024-04-13 RX ADMIN — WATER 2000 MG: 1 INJECTION INTRAMUSCULAR; INTRAVENOUS; SUBCUTANEOUS at 06:33

## 2024-04-13 RX ADMIN — ACETAMINOPHEN 650 MG: 325 TABLET ORAL at 03:57

## 2024-04-13 RX ADMIN — PIPERACILLIN AND TAZOBACTAM 4500 MG: 4; .5 INJECTION, POWDER, FOR SOLUTION INTRAVENOUS at 21:29

## 2024-04-13 RX ADMIN — PANTOPRAZOLE SODIUM 40 MG: 40 TABLET, DELAYED RELEASE ORAL at 06:03

## 2024-04-13 RX ADMIN — HEPARIN 300 UNITS: 100 SYRINGE at 08:56

## 2024-04-13 RX ADMIN — SODIUM CHLORIDE, PRESERVATIVE FREE 10 ML: 5 INJECTION INTRAVENOUS at 21:28

## 2024-04-13 RX ADMIN — OXYCODONE AND ACETAMINOPHEN 1 TABLET: 5; 325 TABLET ORAL at 17:22

## 2024-04-13 RX ADMIN — GABAPENTIN 800 MG: 400 CAPSULE ORAL at 14:44

## 2024-04-13 RX ADMIN — PIPERACILLIN AND TAZOBACTAM 4500 MG: 4; .5 INJECTION, POWDER, FOR SOLUTION INTRAVENOUS at 12:45

## 2024-04-13 ASSESSMENT — PAIN SCALES - GENERAL
PAINLEVEL_OUTOF10: 8
PAINLEVEL_OUTOF10: 8
PAINLEVEL_OUTOF10: 5
PAINLEVEL_OUTOF10: 3

## 2024-04-13 ASSESSMENT — PAIN DESCRIPTION - LOCATION
LOCATION: LEG;HIP
LOCATION: HIP
LOCATION: HIP

## 2024-04-13 ASSESSMENT — PAIN - FUNCTIONAL ASSESSMENT: PAIN_FUNCTIONAL_ASSESSMENT: PREVENTS OR INTERFERES SOME ACTIVE ACTIVITIES AND ADLS

## 2024-04-13 ASSESSMENT — PAIN DESCRIPTION - ORIENTATION
ORIENTATION: RIGHT

## 2024-04-13 ASSESSMENT — PAIN DESCRIPTION - ONSET: ONSET: ON-GOING

## 2024-04-13 ASSESSMENT — PAIN DESCRIPTION - PAIN TYPE: TYPE: SURGICAL PAIN

## 2024-04-13 ASSESSMENT — PAIN DESCRIPTION - DESCRIPTORS
DESCRIPTORS: THROBBING;STABBING;DISCOMFORT
DESCRIPTORS: STABBING;SHARP;CRAMPING

## 2024-04-13 ASSESSMENT — PAIN DESCRIPTION - FREQUENCY: FREQUENCY: INTERMITTENT

## 2024-04-14 LAB
ANION GAP SERPL CALCULATED.3IONS-SCNC: 8 MMOL/L (ref 7–16)
BASOPHILS # BLD: 0.02 K/UL (ref 0–0.2)
BASOPHILS NFR BLD: 0 % (ref 0–2)
BUN SERPL-MCNC: 14 MG/DL (ref 6–23)
CALCIUM SERPL-MCNC: 8.3 MG/DL (ref 8.6–10.2)
CHLORIDE SERPL-SCNC: 100 MMOL/L (ref 98–107)
CO2 SERPL-SCNC: 33 MMOL/L (ref 22–29)
CREAT SERPL-MCNC: 0.7 MG/DL (ref 0.5–1)
EOSINOPHIL # BLD: 0.66 K/UL (ref 0.05–0.5)
EOSINOPHILS RELATIVE PERCENT: 9 % (ref 0–6)
ERYTHROCYTE [DISTWIDTH] IN BLOOD BY AUTOMATED COUNT: 16 % (ref 11.5–15)
GFR SERPL CREATININE-BSD FRML MDRD: >90 ML/MIN/1.73M2
GLUCOSE BLD-MCNC: 108 MG/DL (ref 74–99)
GLUCOSE BLD-MCNC: 152 MG/DL (ref 74–99)
GLUCOSE BLD-MCNC: 171 MG/DL (ref 74–99)
GLUCOSE BLD-MCNC: 86 MG/DL (ref 74–99)
GLUCOSE SERPL-MCNC: 167 MG/DL (ref 74–99)
HCT VFR BLD AUTO: 25.6 % (ref 34–48)
HGB BLD-MCNC: 7.5 G/DL (ref 11.5–15.5)
IMM GRANULOCYTES # BLD AUTO: <0.03 K/UL (ref 0–0.58)
IMM GRANULOCYTES NFR BLD: 0 % (ref 0–5)
LYMPHOCYTES NFR BLD: 2.38 K/UL (ref 1.5–4)
LYMPHOCYTES RELATIVE PERCENT: 33 % (ref 20–42)
MAGNESIUM SERPL-MCNC: 1.5 MG/DL (ref 1.6–2.6)
MCH RBC QN AUTO: 26.3 PG (ref 26–35)
MCHC RBC AUTO-ENTMCNC: 29.3 G/DL (ref 32–34.5)
MCV RBC AUTO: 89.8 FL (ref 80–99.9)
MICROORGANISM SPEC CULT: ABNORMAL
MICROORGANISM SPEC CULT: NO GROWTH
MICROORGANISM SPEC CULT: NO GROWTH
MICROORGANISM SPEC CULT: NORMAL
MICROORGANISM/AGENT SPEC: ABNORMAL
MICROORGANISM/AGENT SPEC: NORMAL
MONOCYTES NFR BLD: 0.52 K/UL (ref 0.1–0.95)
MONOCYTES NFR BLD: 7 % (ref 2–12)
NEUTROPHILS NFR BLD: 50 % (ref 43–80)
NEUTS SEG NFR BLD: 3.57 K/UL (ref 1.8–7.3)
PLATELET # BLD AUTO: 253 K/UL (ref 130–450)
PMV BLD AUTO: 10.1 FL (ref 7–12)
POTASSIUM SERPL-SCNC: 3.2 MMOL/L (ref 3.5–5)
RBC # BLD AUTO: 2.85 M/UL (ref 3.5–5.5)
SERVICE CMNT-IMP: NORMAL
SODIUM SERPL-SCNC: 141 MMOL/L (ref 132–146)
SPECIMEN DESCRIPTION: ABNORMAL
SPECIMEN DESCRIPTION: ABNORMAL
SPECIMEN DESCRIPTION: NORMAL
WBC OTHER # BLD: 7.2 K/UL (ref 4.5–11.5)

## 2024-04-14 PROCEDURE — 6370000000 HC RX 637 (ALT 250 FOR IP): Performed by: STUDENT IN AN ORGANIZED HEALTH CARE EDUCATION/TRAINING PROGRAM

## 2024-04-14 PROCEDURE — 86901 BLOOD TYPING SEROLOGIC RH(D): CPT

## 2024-04-14 PROCEDURE — 2700000000 HC OXYGEN THERAPY PER DAY

## 2024-04-14 PROCEDURE — 80048 BASIC METABOLIC PNL TOTAL CA: CPT

## 2024-04-14 PROCEDURE — 85025 COMPLETE CBC W/AUTO DIFF WBC: CPT

## 2024-04-14 PROCEDURE — 1200000000 HC SEMI PRIVATE

## 2024-04-14 PROCEDURE — 2580000003 HC RX 258: Performed by: INTERNAL MEDICINE

## 2024-04-14 PROCEDURE — 83735 ASSAY OF MAGNESIUM: CPT

## 2024-04-14 PROCEDURE — 6360000002 HC RX W HCPCS: Performed by: STUDENT IN AN ORGANIZED HEALTH CARE EDUCATION/TRAINING PROGRAM

## 2024-04-14 PROCEDURE — 6360000002 HC RX W HCPCS: Performed by: INTERNAL MEDICINE

## 2024-04-14 PROCEDURE — 2580000003 HC RX 258: Performed by: STUDENT IN AN ORGANIZED HEALTH CARE EDUCATION/TRAINING PROGRAM

## 2024-04-14 PROCEDURE — A4216 STERILE WATER/SALINE, 10 ML: HCPCS | Performed by: INTERNAL MEDICINE

## 2024-04-14 PROCEDURE — 86923 COMPATIBILITY TEST ELECTRIC: CPT

## 2024-04-14 PROCEDURE — 86850 RBC ANTIBODY SCREEN: CPT

## 2024-04-14 PROCEDURE — 86900 BLOOD TYPING SEROLOGIC ABO: CPT

## 2024-04-14 PROCEDURE — 82962 GLUCOSE BLOOD TEST: CPT

## 2024-04-14 RX ORDER — POTASSIUM CHLORIDE 20 MEQ/1
40 TABLET, EXTENDED RELEASE ORAL 2 TIMES DAILY WITH MEALS
Status: DISPENSED | OUTPATIENT
Start: 2024-04-14 | End: 2024-04-15

## 2024-04-14 RX ADMIN — MAGNESIUM SULFATE HEPTAHYDRATE 6000 MG: 500 INJECTION, SOLUTION INTRAMUSCULAR; INTRAVENOUS at 13:40

## 2024-04-14 RX ADMIN — LEVOTHYROXINE SODIUM 50 MCG: 0.05 TABLET ORAL at 06:17

## 2024-04-14 RX ADMIN — POTASSIUM CHLORIDE 40 MEQ: 1500 TABLET, EXTENDED RELEASE ORAL at 16:48

## 2024-04-14 RX ADMIN — GABAPENTIN 800 MG: 400 CAPSULE ORAL at 09:27

## 2024-04-14 RX ADMIN — SUCRALFATE 1 G: 1 TABLET ORAL at 09:28

## 2024-04-14 RX ADMIN — OXYCODONE AND ACETAMINOPHEN 1 TABLET: 5; 325 TABLET ORAL at 02:20

## 2024-04-14 RX ADMIN — POTASSIUM CHLORIDE 40 MEQ: 1500 TABLET, EXTENDED RELEASE ORAL at 09:35

## 2024-04-14 RX ADMIN — SODIUM CHLORIDE, PRESERVATIVE FREE 10 ML: 5 INJECTION INTRAVENOUS at 20:25

## 2024-04-14 RX ADMIN — VENLAFAXINE HYDROCHLORIDE 150 MG: 150 CAPSULE, EXTENDED RELEASE ORAL at 09:27

## 2024-04-14 RX ADMIN — PRAMIPEXOLE DIHYDROCHLORIDE 0.75 MG: 0.25 TABLET ORAL at 21:51

## 2024-04-14 RX ADMIN — ERTAPENEM SODIUM 1000 MG: 1 INJECTION INTRAMUSCULAR; INTRAVENOUS at 13:00

## 2024-04-14 RX ADMIN — PANTOPRAZOLE SODIUM 40 MG: 40 TABLET, DELAYED RELEASE ORAL at 06:17

## 2024-04-14 RX ADMIN — PIPERACILLIN AND TAZOBACTAM 4500 MG: 4; .5 INJECTION, POWDER, FOR SOLUTION INTRAVENOUS at 05:10

## 2024-04-14 RX ADMIN — ACETAMINOPHEN 650 MG: 325 TABLET ORAL at 20:24

## 2024-04-14 RX ADMIN — Medication 6000 UNITS: at 09:28

## 2024-04-14 RX ADMIN — HEPARIN 300 UNITS: 100 SYRINGE at 20:25

## 2024-04-14 RX ADMIN — PRAMIPEXOLE DIHYDROCHLORIDE 0.75 MG: 0.25 TABLET ORAL at 09:28

## 2024-04-14 RX ADMIN — HEPARIN 300 UNITS: 100 SYRINGE at 09:27

## 2024-04-14 RX ADMIN — GABAPENTIN 800 MG: 400 CAPSULE ORAL at 20:24

## 2024-04-14 RX ADMIN — SODIUM CHLORIDE, PRESERVATIVE FREE 10 ML: 5 INJECTION INTRAVENOUS at 09:27

## 2024-04-14 RX ADMIN — ROSUVASTATIN 40 MG: 20 TABLET, FILM COATED ORAL at 20:24

## 2024-04-14 RX ADMIN — BUPROPION HYDROCHLORIDE 100 MG: 100 TABLET, EXTENDED RELEASE ORAL at 09:28

## 2024-04-14 RX ADMIN — GABAPENTIN 800 MG: 400 CAPSULE ORAL at 13:41

## 2024-04-14 ASSESSMENT — PAIN DESCRIPTION - LOCATION
LOCATION: HEAD
LOCATION: LEG;HIP

## 2024-04-14 ASSESSMENT — PAIN SCALES - GENERAL
PAINLEVEL_OUTOF10: 8
PAINLEVEL_OUTOF10: 0

## 2024-04-14 ASSESSMENT — PAIN DESCRIPTION - DESCRIPTORS
DESCRIPTORS: ACHING;DISCOMFORT;SORE
DESCRIPTORS: ACHING;DISCOMFORT

## 2024-04-14 ASSESSMENT — PAIN DESCRIPTION - ORIENTATION: ORIENTATION: RIGHT

## 2024-04-14 ASSESSMENT — PAIN - FUNCTIONAL ASSESSMENT: PAIN_FUNCTIONAL_ASSESSMENT: PREVENTS OR INTERFERES WITH MANY ACTIVE NOT PASSIVE ACTIVITIES

## 2024-04-14 NOTE — PLAN OF CARE
Problem: Chronic Conditions and Co-morbidities  Goal: Patient's chronic conditions and co-morbidity symptoms are monitored and maintained or improved  4/14/2024 0004 by Lizzy Mathias RN  Outcome: Progressing  4/13/2024 1954 by Jim Deng RN  Outcome: Progressing     Problem: Discharge Planning  Goal: Discharge to home or other facility with appropriate resources  4/14/2024 0004 by Lizzy Mathias RN  Outcome: Progressing  4/13/2024 1954 by Jim Deng RN  Outcome: Progressing

## 2024-04-15 ENCOUNTER — ANESTHESIA EVENT (OUTPATIENT)
Dept: OPERATING ROOM | Age: 79
DRG: 467 | End: 2024-04-15
Payer: MEDICARE

## 2024-04-15 ENCOUNTER — ANESTHESIA (OUTPATIENT)
Dept: OPERATING ROOM | Age: 79
DRG: 467 | End: 2024-04-15
Payer: MEDICARE

## 2024-04-15 ENCOUNTER — APPOINTMENT (OUTPATIENT)
Dept: GENERAL RADIOLOGY | Age: 79
DRG: 467 | End: 2024-04-15
Payer: MEDICARE

## 2024-04-15 LAB
ANION GAP SERPL CALCULATED.3IONS-SCNC: 5 MMOL/L (ref 7–16)
BASOPHILS # BLD: 0.02 K/UL (ref 0–0.2)
BASOPHILS NFR BLD: 0 % (ref 0–2)
BUN SERPL-MCNC: 11 MG/DL (ref 6–23)
CALCIUM SERPL-MCNC: 8.2 MG/DL (ref 8.6–10.2)
CHLORIDE SERPL-SCNC: 101 MMOL/L (ref 98–107)
CHP ED QC CHECK: NORMAL
CO2 SERPL-SCNC: 35 MMOL/L (ref 22–29)
CREAT SERPL-MCNC: 0.6 MG/DL (ref 0.5–1)
EOSINOPHIL # BLD: 0.67 K/UL (ref 0.05–0.5)
EOSINOPHILS RELATIVE PERCENT: 11 % (ref 0–6)
ERYTHROCYTE [DISTWIDTH] IN BLOOD BY AUTOMATED COUNT: 16.1 % (ref 11.5–15)
GFR SERPL CREATININE-BSD FRML MDRD: >90 ML/MIN/1.73M2
GLUCOSE BLD-MCNC: 101 MG/DL
GLUCOSE BLD-MCNC: 101 MG/DL (ref 74–99)
GLUCOSE BLD-MCNC: 208 MG/DL (ref 74–99)
GLUCOSE BLD-MCNC: 80 MG/DL (ref 74–99)
GLUCOSE SERPL-MCNC: 77 MG/DL (ref 74–99)
HCT VFR BLD AUTO: 26.9 % (ref 34–48)
HGB BLD-MCNC: 8 G/DL (ref 11.5–15.5)
IMM GRANULOCYTES # BLD AUTO: <0.03 K/UL (ref 0–0.58)
IMM GRANULOCYTES NFR BLD: 0 % (ref 0–5)
LYMPHOCYTES NFR BLD: 2.39 K/UL (ref 1.5–4)
LYMPHOCYTES RELATIVE PERCENT: 39 % (ref 20–42)
MCH RBC QN AUTO: 26.8 PG (ref 26–35)
MCHC RBC AUTO-ENTMCNC: 29.7 G/DL (ref 32–34.5)
MCV RBC AUTO: 90 FL (ref 80–99.9)
MONOCYTES NFR BLD: 0.55 K/UL (ref 0.1–0.95)
MONOCYTES NFR BLD: 9 % (ref 2–12)
NEUTROPHILS NFR BLD: 41 % (ref 43–80)
NEUTS SEG NFR BLD: 2.5 K/UL (ref 1.8–7.3)
PLATELET # BLD AUTO: 239 K/UL (ref 130–450)
PMV BLD AUTO: 10.5 FL (ref 7–12)
POTASSIUM SERPL-SCNC: 4.5 MMOL/L (ref 3.5–5)
RBC # BLD AUTO: 2.99 M/UL (ref 3.5–5.5)
SODIUM SERPL-SCNC: 141 MMOL/L (ref 132–146)
WBC OTHER # BLD: 6.1 K/UL (ref 4.5–11.5)

## 2024-04-15 PROCEDURE — 6360000002 HC RX W HCPCS: Performed by: ORTHOPAEDIC SURGERY

## 2024-04-15 PROCEDURE — 2709999900 HC NON-CHARGEABLE SUPPLY: Performed by: ORTHOPAEDIC SURGERY

## 2024-04-15 PROCEDURE — 6360000002 HC RX W HCPCS: Performed by: STUDENT IN AN ORGANIZED HEALTH CARE EDUCATION/TRAINING PROGRAM

## 2024-04-15 PROCEDURE — 2720000010 HC SURG SUPPLY STERILE: Performed by: ORTHOPAEDIC SURGERY

## 2024-04-15 PROCEDURE — 6370000000 HC RX 637 (ALT 250 FOR IP): Performed by: STUDENT IN AN ORGANIZED HEALTH CARE EDUCATION/TRAINING PROGRAM

## 2024-04-15 PROCEDURE — 2500000003 HC RX 250 WO HCPCS: Performed by: ANESTHESIOLOGY

## 2024-04-15 PROCEDURE — 3600000013 HC SURGERY LEVEL 3 ADDTL 15MIN: Performed by: ORTHOPAEDIC SURGERY

## 2024-04-15 PROCEDURE — C1776 JOINT DEVICE (IMPLANTABLE): HCPCS | Performed by: ORTHOPAEDIC SURGERY

## 2024-04-15 PROCEDURE — 3600000003 HC SURGERY LEVEL 3 BASE: Performed by: ORTHOPAEDIC SURGERY

## 2024-04-15 PROCEDURE — 0SP90JZ REMOVAL OF SYNTHETIC SUBSTITUTE FROM RIGHT HIP JOINT, OPEN APPROACH: ICD-10-PCS | Performed by: ORTHOPAEDIC SURGERY

## 2024-04-15 PROCEDURE — 7100000000 HC PACU RECOVERY - FIRST 15 MIN: Performed by: ORTHOPAEDIC SURGERY

## 2024-04-15 PROCEDURE — A4216 STERILE WATER/SALINE, 10 ML: HCPCS | Performed by: ORTHOPAEDIC SURGERY

## 2024-04-15 PROCEDURE — 87015 SPECIMEN INFECT AGNT CONCNTJ: CPT

## 2024-04-15 PROCEDURE — 2500000003 HC RX 250 WO HCPCS: Performed by: NURSE ANESTHETIST, CERTIFIED REGISTERED

## 2024-04-15 PROCEDURE — P9016 RBC LEUKOCYTES REDUCED: HCPCS

## 2024-04-15 PROCEDURE — 85025 COMPLETE CBC W/AUTO DIFF WBC: CPT

## 2024-04-15 PROCEDURE — 2580000003 HC RX 258: Performed by: ORTHOPAEDIC SURGERY

## 2024-04-15 PROCEDURE — 6360000002 HC RX W HCPCS: Performed by: NURSE ANESTHETIST, CERTIFIED REGISTERED

## 2024-04-15 PROCEDURE — 87075 CULTR BACTERIA EXCEPT BLOOD: CPT

## 2024-04-15 PROCEDURE — 87070 CULTURE OTHR SPECIMN AEROBIC: CPT

## 2024-04-15 PROCEDURE — 6360000002 HC RX W HCPCS: Performed by: INTERNAL MEDICINE

## 2024-04-15 PROCEDURE — 2580000003 HC RX 258: Performed by: INTERNAL MEDICINE

## 2024-04-15 PROCEDURE — 87176 TISSUE HOMOGENIZATION CULTR: CPT

## 2024-04-15 PROCEDURE — 87116 MYCOBACTERIA CULTURE: CPT

## 2024-04-15 PROCEDURE — 6360000002 HC RX W HCPCS

## 2024-04-15 PROCEDURE — 0SU90JZ SUPPLEMENT RIGHT HIP JOINT WITH SYNTHETIC SUBSTITUTE, OPEN APPROACH: ICD-10-PCS | Performed by: ORTHOPAEDIC SURGERY

## 2024-04-15 PROCEDURE — 2700000000 HC OXYGEN THERAPY PER DAY

## 2024-04-15 PROCEDURE — 87205 SMEAR GRAM STAIN: CPT

## 2024-04-15 PROCEDURE — 99232 SBSQ HOSP IP/OBS MODERATE 35: CPT | Performed by: STUDENT IN AN ORGANIZED HEALTH CARE EDUCATION/TRAINING PROGRAM

## 2024-04-15 PROCEDURE — 2580000003 HC RX 258

## 2024-04-15 PROCEDURE — L1686 HO POST-OP HIP ABDUCTION: HCPCS

## 2024-04-15 PROCEDURE — 0SR90EZ REPLACEMENT OF RIGHT HIP JOINT WITH ARTICULATING SPACER, OPEN APPROACH: ICD-10-PCS | Performed by: ORTHOPAEDIC SURGERY

## 2024-04-15 PROCEDURE — 2580000003 HC RX 258: Performed by: STUDENT IN AN ORGANIZED HEALTH CARE EDUCATION/TRAINING PROGRAM

## 2024-04-15 PROCEDURE — 2500000003 HC RX 250 WO HCPCS

## 2024-04-15 PROCEDURE — 3700000001 HC ADD 15 MINUTES (ANESTHESIA): Performed by: ORTHOPAEDIC SURGERY

## 2024-04-15 PROCEDURE — L2570 HIP CLEVIS TYPE 2 POSIT JNT: HCPCS

## 2024-04-15 PROCEDURE — 3700000000 HC ANESTHESIA ATTENDED CARE: Performed by: ORTHOPAEDIC SURGERY

## 2024-04-15 PROCEDURE — A4216 STERILE WATER/SALINE, 10 ML: HCPCS | Performed by: INTERNAL MEDICINE

## 2024-04-15 PROCEDURE — 1200000000 HC SEMI PRIVATE

## 2024-04-15 PROCEDURE — C1713 ANCHOR/SCREW BN/BN,TIS/BN: HCPCS | Performed by: ORTHOPAEDIC SURGERY

## 2024-04-15 PROCEDURE — 82962 GLUCOSE BLOOD TEST: CPT

## 2024-04-15 PROCEDURE — 80048 BASIC METABOLIC PNL TOTAL CA: CPT

## 2024-04-15 PROCEDURE — 87206 SMEAR FLUORESCENT/ACID STAI: CPT

## 2024-04-15 PROCEDURE — 7100000001 HC PACU RECOVERY - ADDTL 15 MIN: Performed by: ORTHOPAEDIC SURGERY

## 2024-04-15 PROCEDURE — 73502 X-RAY EXAM HIP UNI 2-3 VIEWS: CPT

## 2024-04-15 DEVICE — TOBRA FULL DOSE ANTIBIOTIC BONE CEMENT, 10 PACK CATALOG NUMBER IS 6197-9-010
Type: IMPLANTABLE DEVICE | Site: HIP | Status: FUNCTIONAL
Brand: SIMPLEX

## 2024-04-15 DEVICE — STEM MOD REMEDY LNG SM 227MM: Type: IMPLANTABLE DEVICE | Site: HIP | Status: FUNCTIONAL

## 2024-04-15 DEVICE — CUP ACET OD54MM ID46MM GENTMYCN BASE REMEDY: Type: IMPLANTABLE DEVICE | Site: HIP | Status: FUNCTIONAL

## 2024-04-15 DEVICE — HEAD ACET DIA46MM 0.9GM GENTMYCN BASE MOD REMEDY: Type: IMPLANTABLE DEVICE | Site: HIP | Status: FUNCTIONAL

## 2024-04-15 RX ORDER — SODIUM CHLORIDE 9 MG/ML
INJECTION, SOLUTION INTRAVENOUS PRN
Status: COMPLETED | OUTPATIENT
Start: 2024-04-15 | End: 2024-04-15

## 2024-04-15 RX ORDER — NALOXONE HYDROCHLORIDE 0.4 MG/ML
INJECTION, SOLUTION INTRAMUSCULAR; INTRAVENOUS; SUBCUTANEOUS PRN
Status: DISCONTINUED | OUTPATIENT
Start: 2024-04-15 | End: 2024-04-15 | Stop reason: HOSPADM

## 2024-04-15 RX ORDER — SODIUM CHLORIDE 9 MG/ML
INJECTION, SOLUTION INTRAVENOUS PRN
Status: DISCONTINUED | OUTPATIENT
Start: 2024-04-15 | End: 2024-04-15 | Stop reason: HOSPADM

## 2024-04-15 RX ORDER — HYDRALAZINE HYDROCHLORIDE 20 MG/ML
10 INJECTION INTRAMUSCULAR; INTRAVENOUS
Status: COMPLETED | OUTPATIENT
Start: 2024-04-15 | End: 2024-04-15

## 2024-04-15 RX ORDER — SODIUM CHLORIDE 0.9 % (FLUSH) 0.9 %
5-40 SYRINGE (ML) INJECTION EVERY 12 HOURS SCHEDULED
Status: DISCONTINUED | OUTPATIENT
Start: 2024-04-15 | End: 2024-04-15 | Stop reason: HOSPADM

## 2024-04-15 RX ORDER — SODIUM CHLORIDE 0.9 % (FLUSH) 0.9 %
5-40 SYRINGE (ML) INJECTION PRN
Status: DISCONTINUED | OUTPATIENT
Start: 2024-04-15 | End: 2024-04-15 | Stop reason: HOSPADM

## 2024-04-15 RX ORDER — ONDANSETRON 2 MG/ML
INJECTION INTRAMUSCULAR; INTRAVENOUS PRN
Status: DISCONTINUED | OUTPATIENT
Start: 2024-04-15 | End: 2024-04-15 | Stop reason: SDUPTHER

## 2024-04-15 RX ORDER — HYDROMORPHONE HYDROCHLORIDE 1 MG/ML
0.5 INJECTION, SOLUTION INTRAMUSCULAR; INTRAVENOUS; SUBCUTANEOUS EVERY 5 MIN PRN
Status: DISCONTINUED | OUTPATIENT
Start: 2024-04-15 | End: 2024-04-15 | Stop reason: HOSPADM

## 2024-04-15 RX ORDER — HYDRALAZINE HYDROCHLORIDE 20 MG/ML
INJECTION INTRAMUSCULAR; INTRAVENOUS
Status: COMPLETED
Start: 2024-04-15 | End: 2024-04-15

## 2024-04-15 RX ORDER — CEFAZOLIN SODIUM 1 G/3ML
INJECTION, POWDER, FOR SOLUTION INTRAMUSCULAR; INTRAVENOUS PRN
Status: DISCONTINUED | OUTPATIENT
Start: 2024-04-15 | End: 2024-04-15 | Stop reason: SDUPTHER

## 2024-04-15 RX ORDER — PHENYLEPHRINE HCL IN 0.9% NACL 1 MG/10 ML
SYRINGE (ML) INTRAVENOUS PRN
Status: DISCONTINUED | OUTPATIENT
Start: 2024-04-15 | End: 2024-04-15 | Stop reason: SDUPTHER

## 2024-04-15 RX ORDER — FENTANYL CITRATE 50 UG/ML
INJECTION, SOLUTION INTRAMUSCULAR; INTRAVENOUS PRN
Status: DISCONTINUED | OUTPATIENT
Start: 2024-04-15 | End: 2024-04-15 | Stop reason: SDUPTHER

## 2024-04-15 RX ORDER — LIDOCAINE HYDROCHLORIDE 20 MG/ML
INJECTION, SOLUTION INTRAVENOUS PRN
Status: DISCONTINUED | OUTPATIENT
Start: 2024-04-15 | End: 2024-04-15 | Stop reason: SDUPTHER

## 2024-04-15 RX ORDER — ROCURONIUM BROMIDE 10 MG/ML
INJECTION, SOLUTION INTRAVENOUS PRN
Status: DISCONTINUED | OUTPATIENT
Start: 2024-04-15 | End: 2024-04-15 | Stop reason: SDUPTHER

## 2024-04-15 RX ORDER — PROPOFOL 10 MG/ML
INJECTION, EMULSION INTRAVENOUS PRN
Status: DISCONTINUED | OUTPATIENT
Start: 2024-04-15 | End: 2024-04-15 | Stop reason: SDUPTHER

## 2024-04-15 RX ORDER — LABETALOL HYDROCHLORIDE 5 MG/ML
10 INJECTION, SOLUTION INTRAVENOUS EVERY 4 HOURS PRN
Status: DISCONTINUED | OUTPATIENT
Start: 2024-04-15 | End: 2024-04-17 | Stop reason: HOSPADM

## 2024-04-15 RX ORDER — MIDAZOLAM HYDROCHLORIDE 1 MG/ML
INJECTION INTRAMUSCULAR; INTRAVENOUS PRN
Status: DISCONTINUED | OUTPATIENT
Start: 2024-04-15 | End: 2024-04-15 | Stop reason: SDUPTHER

## 2024-04-15 RX ORDER — DEXAMETHASONE SODIUM PHOSPHATE 10 MG/ML
INJECTION INTRAMUSCULAR; INTRAVENOUS PRN
Status: DISCONTINUED | OUTPATIENT
Start: 2024-04-15 | End: 2024-04-15 | Stop reason: SDUPTHER

## 2024-04-15 RX ORDER — MEPERIDINE HYDROCHLORIDE 25 MG/ML
12.5 INJECTION INTRAMUSCULAR; INTRAVENOUS; SUBCUTANEOUS EVERY 5 MIN PRN
Status: DISCONTINUED | OUTPATIENT
Start: 2024-04-15 | End: 2024-04-15 | Stop reason: HOSPADM

## 2024-04-15 RX ADMIN — SODIUM CHLORIDE: 9 INJECTION, SOLUTION INTRAVENOUS at 13:18

## 2024-04-15 RX ADMIN — MORPHINE SULFATE 2 MG: 2 INJECTION, SOLUTION INTRAMUSCULAR; INTRAVENOUS at 23:45

## 2024-04-15 RX ADMIN — MORPHINE SULFATE 2 MG: 2 INJECTION, SOLUTION INTRAMUSCULAR; INTRAVENOUS at 06:00

## 2024-04-15 RX ADMIN — FENTANYL CITRATE 100 MCG: 0.05 INJECTION, SOLUTION INTRAMUSCULAR; INTRAVENOUS at 13:30

## 2024-04-15 RX ADMIN — HYDRALAZINE HYDROCHLORIDE 10 MG: 20 INJECTION, SOLUTION INTRAMUSCULAR; INTRAVENOUS at 16:49

## 2024-04-15 RX ADMIN — MIDAZOLAM 2 MG: 1 INJECTION INTRAMUSCULAR; INTRAVENOUS at 13:48

## 2024-04-15 RX ADMIN — SUGAMMADEX 163 MG: 100 INJECTION, SOLUTION INTRAVENOUS at 15:16

## 2024-04-15 RX ADMIN — OXYCODONE AND ACETAMINOPHEN 1 TABLET: 5; 325 TABLET ORAL at 18:35

## 2024-04-15 RX ADMIN — ERTAPENEM SODIUM 1000 MG: 1 INJECTION INTRAMUSCULAR; INTRAVENOUS at 18:23

## 2024-04-15 RX ADMIN — LABETALOL HYDROCHLORIDE 10 MG: 5 INJECTION INTRAVENOUS at 11:58

## 2024-04-15 RX ADMIN — LIDOCAINE HYDROCHLORIDE 100 MG: 20 INJECTION, SOLUTION INTRAVENOUS at 13:30

## 2024-04-15 RX ADMIN — GABAPENTIN 800 MG: 400 CAPSULE ORAL at 21:29

## 2024-04-15 RX ADMIN — BUPROPION HYDROCHLORIDE 100 MG: 100 TABLET, EXTENDED RELEASE ORAL at 09:23

## 2024-04-15 RX ADMIN — Medication 200 MCG: at 13:49

## 2024-04-15 RX ADMIN — PRAMIPEXOLE DIHYDROCHLORIDE 0.75 MG: 0.25 TABLET ORAL at 21:30

## 2024-04-15 RX ADMIN — SENNOSIDES AND DOCUSATE SODIUM 2 TABLET: 50; 8.6 TABLET ORAL at 09:24

## 2024-04-15 RX ADMIN — ROSUVASTATIN 40 MG: 20 TABLET, FILM COATED ORAL at 21:29

## 2024-04-15 RX ADMIN — CEFAZOLIN 2 G: 1 INJECTION, POWDER, FOR SOLUTION INTRAMUSCULAR; INTRAVENOUS at 13:43

## 2024-04-15 RX ADMIN — Medication 6000 UNITS: at 18:36

## 2024-04-15 RX ADMIN — DEXAMETHASONE SODIUM PHOSPHATE 10 MG: 10 INJECTION INTRAMUSCULAR; INTRAVENOUS at 13:46

## 2024-04-15 RX ADMIN — ROCURONIUM BROMIDE 40 MG: 10 INJECTION INTRAVENOUS at 13:30

## 2024-04-15 RX ADMIN — PRAMIPEXOLE DIHYDROCHLORIDE 0.75 MG: 0.25 TABLET ORAL at 09:29

## 2024-04-15 RX ADMIN — WATER 2000 MG: 1 INJECTION INTRAMUSCULAR; INTRAVENOUS; SUBCUTANEOUS at 23:45

## 2024-04-15 RX ADMIN — PROPOFOL 100 MG: 10 INJECTION, EMULSION INTRAVENOUS at 13:30

## 2024-04-15 RX ADMIN — HYDRALAZINE HYDROCHLORIDE 10 MG: 20 INJECTION INTRAMUSCULAR; INTRAVENOUS at 16:49

## 2024-04-15 RX ADMIN — Medication 200 MCG: at 13:42

## 2024-04-15 RX ADMIN — LEVOTHYROXINE SODIUM 50 MCG: 0.05 TABLET ORAL at 06:01

## 2024-04-15 RX ADMIN — OXYCODONE AND ACETAMINOPHEN 1 TABLET: 5; 325 TABLET ORAL at 02:48

## 2024-04-15 RX ADMIN — TRANEXAMIC ACID 1000 MG: 100 INJECTION, SOLUTION INTRAVENOUS at 13:50

## 2024-04-15 RX ADMIN — SODIUM CHLORIDE, PRESERVATIVE FREE 10 ML: 5 INJECTION INTRAVENOUS at 21:31

## 2024-04-15 RX ADMIN — ONDANSETRON 4 MG: 2 INJECTION INTRAMUSCULAR; INTRAVENOUS at 13:52

## 2024-04-15 RX ADMIN — GABAPENTIN 800 MG: 400 CAPSULE ORAL at 09:24

## 2024-04-15 RX ADMIN — PANTOPRAZOLE SODIUM 40 MG: 40 TABLET, DELAYED RELEASE ORAL at 06:01

## 2024-04-15 RX ADMIN — VENLAFAXINE HYDROCHLORIDE 150 MG: 150 CAPSULE, EXTENDED RELEASE ORAL at 09:29

## 2024-04-15 RX ADMIN — SODIUM CHLORIDE, PRESERVATIVE FREE 10 ML: 5 INJECTION INTRAVENOUS at 11:58

## 2024-04-15 RX ADMIN — HEPARIN 300 UNITS: 100 SYRINGE at 21:30

## 2024-04-15 RX ADMIN — HEPARIN 300 UNITS: 100 SYRINGE at 09:32

## 2024-04-15 RX ADMIN — Medication 200 MCG: at 15:01

## 2024-04-15 RX ADMIN — HYDROMORPHONE HYDROCHLORIDE 0.5 MG: 1 INJECTION, SOLUTION INTRAMUSCULAR; INTRAVENOUS; SUBCUTANEOUS at 17:20

## 2024-04-15 RX ADMIN — POTASSIUM CHLORIDE 40 MEQ: 1500 TABLET, EXTENDED RELEASE ORAL at 21:31

## 2024-04-15 RX ADMIN — SUCRALFATE 1 G: 1 TABLET ORAL at 09:25

## 2024-04-15 ASSESSMENT — PAIN DESCRIPTION - DESCRIPTORS
DESCRIPTORS: DISCOMFORT
DESCRIPTORS: DISCOMFORT
DESCRIPTORS: ACHING
DESCRIPTORS: ACHING;SHARP;THROBBING

## 2024-04-15 ASSESSMENT — ENCOUNTER SYMPTOMS
DYSPNEA ACTIVITY LEVEL: AFTER AMBULATING 1 FLIGHT OF STAIRS
SHORTNESS OF BREATH: 1

## 2024-04-15 ASSESSMENT — PAIN SCALES - WONG BAKER
WONGBAKER_NUMERICALRESPONSE: HURTS A LITTLE BIT
WONGBAKER_NUMERICALRESPONSE: NO HURT

## 2024-04-15 ASSESSMENT — PAIN SCALES - GENERAL
PAINLEVEL_OUTOF10: 4
PAINLEVEL_OUTOF10: 9
PAINLEVEL_OUTOF10: 0
PAINLEVEL_OUTOF10: 8
PAINLEVEL_OUTOF10: 4
PAINLEVEL_OUTOF10: 9
PAINLEVEL_OUTOF10: 8

## 2024-04-15 ASSESSMENT — PAIN DESCRIPTION - LOCATION
LOCATION: HIP

## 2024-04-15 ASSESSMENT — PAIN DESCRIPTION - ORIENTATION
ORIENTATION: RIGHT

## 2024-04-15 ASSESSMENT — PAIN DESCRIPTION - PAIN TYPE: TYPE: SURGICAL PAIN

## 2024-04-15 ASSESSMENT — COPD QUESTIONNAIRES: CAT_SEVERITY: MODERATE

## 2024-04-15 ASSESSMENT — PAIN DESCRIPTION - ONSET: ONSET: ON-GOING

## 2024-04-15 ASSESSMENT — PAIN DESCRIPTION - FREQUENCY: FREQUENCY: INTERMITTENT

## 2024-04-15 NOTE — CARE COORDINATION
Care Coordination  The patient was admitted from [facility] with right hip pain. The patient was taken to surgery on 4/11/24  for an ORIF right hip hemiarthroplasty for dislocation and infection of her femoral component. Dr Corrales is following right hip infection  an she is on Iv ancef 4.5 gm iv q8 hrs.Per ortho she may need to return to surgery for conversion total hip arthoplasty with open reduction. No orders as yet. She is nwb on rle. The patient would like to return home at discharge with her . [facility] is follow for possible home iv atb and she 100% coverage for home care. Will need pt ot to see post op.

## 2024-04-15 NOTE — ANESTHESIA PRE PROCEDURE
Anemia     S/P chemotherapy.    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    Ascending aortic aneurysm (HCC)     seen on CTA chest w/contrast on 2019    Asthma     Bipolar 1 disorder (HCC)     Cancer (HCC)     lung/ kidney    Cancer of breast, female (HCC) 01/2006    S/P bilaeral mastectomy with left breast lymph node resection and chemotherapy.    CHF (congestive heart failure) (HCC)     Chronic back pain     Depression     Depression with anxiety     Diabetes mellitus (HCC)     Resolved after losing 130 lbs. after gastric bypass surgery.    Dyslipidemia     Fibromyalgia     History of blood transfusion     anemia    Hypertension     Hypothyroidism     Neuropathy     Pericardial effusion 04/02/2010    Subxiphoid pericardial window with drainage of pericardial effusion and pericardial biopsy:  Fluid and biopsy negative for metastases.     Pleural effusion 05/02/2010    Noted on chest x-ray.    TIA (transient ischemic attack)     Tobacco abuse     Patient smoked 3 ppd x 26 years.  Quit in 1995.    Type II or unspecified type diabetes mellitus without mention of complication, not stated as uncontrolled        Past Surgical History:        Procedure Laterality Date    BREAST SURGERY      CATARACT REMOVAL WITH IMPLANT Bilateral     CHOLECYSTECTOMY      GASTRIC BYPASS SURGERY  9/2004    HERNIA REPAIR      HIP SURGERY Right 1/4/2024    RIGHT HIP HEMIARTHROPLASTY performed by Nemesio Handy MD at [facility] OR    HIP SURGERY Right 4/2/2024    RIGHT HIP CLOSED REDUCTION INTERNAL FIXATION performed by Matthew Peña DO at [facility] OR    HIP SURGERY Right 4/11/2024    RIGHT HIP OPEN REDUCTION FEMORAL HEAD EXCHANGE WITH IRRIGATION AND DEBRIDEMENT performed by Nemesio Handy MD at [facility] OR    HYSTERECTOMY (CERVIX STATUS UNKNOWN)      KIDNEY REMOVAL Left 2/2006    Cancer.    KIDNEY REMOVAL      left    KNEE
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
## 2024-04-15 NOTE — PLAN OF CARE
Problem: Chronic Conditions and Co-morbidities  Goal: Patient's chronic conditions and co-morbidity symptoms are monitored and maintained or improved  4/15/2024 0417 by Thomas Michelle, RN  Outcome: Progressing  4/15/2024 0417 by Thomas Michelle, RN  Outcome: Progressing  Flowsheets (Taken 4/15/2024 0248)  Care Plan - Patient's Chronic Conditions and Co-Morbidity Symptoms are Monitored and Maintained or Improved: Monitor and assess patient's chronic conditions and comorbid symptoms for stability, deterioration, or improvement  4/14/2024 2313 by Juan Arana RN  Outcome: Progressing     Problem: Discharge Planning  Goal: Discharge to home or other facility with appropriate resources  4/15/2024 0417 by Thomas Michelle RN  Outcome: Progressing  4/15/2024 0417 by Thomas Michelle, RN  Outcome: Progressing  Flowsheets (Taken 4/15/2024 0248)  Discharge to home or other facility with appropriate resources: Identify barriers to discharge with patient and caregiver  4/14/2024 2313 by Juan Arana RN  Outcome: Progressing     Problem: Pain  Goal: Verbalizes/displays adequate comfort level or baseline comfort level  Outcome: Progressing

## 2024-04-15 NOTE — OP NOTE
once again utilizing curettes and rongeur to clean out the canal along with a canal brush.  It was edil irrigated and washed with a chlorhexidine solution and then we irrigated once again.  The final small ostial remedy stem was then put in position with cement around metaphysis which was stable methylene blue.  The cement was allowed to cure in the hip was relocated.  It was grossly stable.  We then placed further irrigation along with antibiotic powder in the wound.  It was closed in a layered fashion with monofilament suture and the capsule, gluteus muscles that were able to close, iliotibial band, and subtendinous tissue.  The skin was closed with 2-0 nylon suture.  Compartments are soft compressible.  All bleeders controlled electrocautery.  An occlusive dressing along with a negative pressure dressing were put in position.  Patient was reversed of anesthesia without complication and placed back in her abductor brace to help the hip stay stable and taken to the PACU in stable condition.      Postoperative plan:  Multiple cultures were taken we will follow closely along with infectious disease    Patient may weight-bear as tolerated for transfers only no long gait may do a very short gait for transfers    DVT prophylaxis chemical    Antibiotics per ID    Will continue to follow closely and we intervene surgically if necessary.    Electronically signed by Nemesio Handy MD on 4/15/2024 at 2:51 PM

## 2024-04-16 LAB
ANION GAP SERPL CALCULATED.3IONS-SCNC: 7 MMOL/L (ref 7–16)
BASOPHILS # BLD: 0.02 K/UL (ref 0–0.2)
BASOPHILS NFR BLD: 0 % (ref 0–2)
BUN SERPL-MCNC: 13 MG/DL (ref 6–23)
CALCIUM SERPL-MCNC: 8.3 MG/DL (ref 8.6–10.2)
CHLORIDE SERPL-SCNC: 103 MMOL/L (ref 98–107)
CO2 SERPL-SCNC: 31 MMOL/L (ref 22–29)
CREAT SERPL-MCNC: 0.6 MG/DL (ref 0.5–1)
EOSINOPHIL # BLD: 0.03 K/UL (ref 0.05–0.5)
EOSINOPHILS RELATIVE PERCENT: 0 % (ref 0–6)
ERYTHROCYTE [DISTWIDTH] IN BLOOD BY AUTOMATED COUNT: 16.5 % (ref 11.5–15)
GFR SERPL CREATININE-BSD FRML MDRD: >90 ML/MIN/1.73M2
GLUCOSE BLD-MCNC: 102 MG/DL (ref 74–99)
GLUCOSE BLD-MCNC: 171 MG/DL (ref 74–99)
GLUCOSE BLD-MCNC: 180 MG/DL (ref 74–99)
GLUCOSE BLD-MCNC: 193 MG/DL (ref 74–99)
GLUCOSE SERPL-MCNC: 186 MG/DL (ref 74–99)
HCT VFR BLD AUTO: 27.8 % (ref 34–48)
HGB BLD-MCNC: 8.3 G/DL (ref 11.5–15.5)
IMM GRANULOCYTES # BLD AUTO: 0.04 K/UL (ref 0–0.58)
IMM GRANULOCYTES NFR BLD: 1 % (ref 0–5)
LYMPHOCYTES NFR BLD: 1.79 K/UL (ref 1.5–4)
LYMPHOCYTES RELATIVE PERCENT: 23 % (ref 20–42)
MCH RBC QN AUTO: 26.8 PG (ref 26–35)
MCHC RBC AUTO-ENTMCNC: 29.9 G/DL (ref 32–34.5)
MCV RBC AUTO: 89.7 FL (ref 80–99.9)
MONOCYTES NFR BLD: 0.79 K/UL (ref 0.1–0.95)
MONOCYTES NFR BLD: 10 % (ref 2–12)
NEUTROPHILS NFR BLD: 65 % (ref 43–80)
NEUTS SEG NFR BLD: 4.98 K/UL (ref 1.8–7.3)
PLATELET # BLD AUTO: 235 K/UL (ref 130–450)
PMV BLD AUTO: 10.7 FL (ref 7–12)
POTASSIUM SERPL-SCNC: 4.6 MMOL/L (ref 3.5–5)
RBC # BLD AUTO: 3.1 M/UL (ref 3.5–5.5)
SODIUM SERPL-SCNC: 141 MMOL/L (ref 132–146)
WBC OTHER # BLD: 7.7 K/UL (ref 4.5–11.5)

## 2024-04-16 PROCEDURE — 6370000000 HC RX 637 (ALT 250 FOR IP): Performed by: STUDENT IN AN ORGANIZED HEALTH CARE EDUCATION/TRAINING PROGRAM

## 2024-04-16 PROCEDURE — 97165 OT EVAL LOW COMPLEX 30 MIN: CPT

## 2024-04-16 PROCEDURE — 2700000000 HC OXYGEN THERAPY PER DAY

## 2024-04-16 PROCEDURE — 1200000000 HC SEMI PRIVATE

## 2024-04-16 PROCEDURE — 99232 SBSQ HOSP IP/OBS MODERATE 35: CPT | Performed by: STUDENT IN AN ORGANIZED HEALTH CARE EDUCATION/TRAINING PROGRAM

## 2024-04-16 PROCEDURE — A4216 STERILE WATER/SALINE, 10 ML: HCPCS | Performed by: INTERNAL MEDICINE

## 2024-04-16 PROCEDURE — 80048 BASIC METABOLIC PNL TOTAL CA: CPT

## 2024-04-16 PROCEDURE — 85025 COMPLETE CBC W/AUTO DIFF WBC: CPT

## 2024-04-16 PROCEDURE — 6360000002 HC RX W HCPCS: Performed by: INTERNAL MEDICINE

## 2024-04-16 PROCEDURE — 6360000002 HC RX W HCPCS: Performed by: STUDENT IN AN ORGANIZED HEALTH CARE EDUCATION/TRAINING PROGRAM

## 2024-04-16 PROCEDURE — 97530 THERAPEUTIC ACTIVITIES: CPT

## 2024-04-16 PROCEDURE — 87449 NOS EACH ORGANISM AG IA: CPT

## 2024-04-16 PROCEDURE — 97535 SELF CARE MNGMENT TRAINING: CPT

## 2024-04-16 PROCEDURE — 2580000003 HC RX 258: Performed by: STUDENT IN AN ORGANIZED HEALTH CARE EDUCATION/TRAINING PROGRAM

## 2024-04-16 PROCEDURE — 2580000003 HC RX 258: Performed by: ORTHOPAEDIC SURGERY

## 2024-04-16 PROCEDURE — 87324 CLOSTRIDIUM AG IA: CPT

## 2024-04-16 PROCEDURE — 97161 PT EVAL LOW COMPLEX 20 MIN: CPT

## 2024-04-16 PROCEDURE — 6360000002 HC RX W HCPCS: Performed by: ORTHOPAEDIC SURGERY

## 2024-04-16 PROCEDURE — 2580000003 HC RX 258: Performed by: INTERNAL MEDICINE

## 2024-04-16 PROCEDURE — 82962 GLUCOSE BLOOD TEST: CPT

## 2024-04-16 RX ADMIN — ROSUVASTATIN 40 MG: 20 TABLET, FILM COATED ORAL at 21:35

## 2024-04-16 RX ADMIN — PRAMIPEXOLE DIHYDROCHLORIDE 0.75 MG: 0.25 TABLET ORAL at 08:49

## 2024-04-16 RX ADMIN — PRAMIPEXOLE DIHYDROCHLORIDE 0.75 MG: 0.25 TABLET ORAL at 21:36

## 2024-04-16 RX ADMIN — SODIUM CHLORIDE, PRESERVATIVE FREE 10 ML: 5 INJECTION INTRAVENOUS at 21:38

## 2024-04-16 RX ADMIN — SUCRALFATE 1 G: 1 TABLET ORAL at 08:49

## 2024-04-16 RX ADMIN — HEPARIN 300 UNITS: 100 SYRINGE at 08:49

## 2024-04-16 RX ADMIN — ERTAPENEM SODIUM 1000 MG: 1 INJECTION INTRAMUSCULAR; INTRAVENOUS at 13:11

## 2024-04-16 RX ADMIN — OXYCODONE AND ACETAMINOPHEN 1 TABLET: 5; 325 TABLET ORAL at 11:32

## 2024-04-16 RX ADMIN — GABAPENTIN 800 MG: 400 CAPSULE ORAL at 08:49

## 2024-04-16 RX ADMIN — GABAPENTIN 800 MG: 400 CAPSULE ORAL at 13:11

## 2024-04-16 RX ADMIN — BUPROPION HYDROCHLORIDE 100 MG: 100 TABLET, EXTENDED RELEASE ORAL at 08:49

## 2024-04-16 RX ADMIN — HEPARIN 300 UNITS: 100 SYRINGE at 21:37

## 2024-04-16 RX ADMIN — Medication 6000 UNITS: at 15:32

## 2024-04-16 RX ADMIN — PANTOPRAZOLE SODIUM 40 MG: 40 TABLET, DELAYED RELEASE ORAL at 05:55

## 2024-04-16 RX ADMIN — APIXABAN 5 MG: 5 TABLET, FILM COATED ORAL at 21:35

## 2024-04-16 RX ADMIN — MORPHINE SULFATE 2 MG: 2 INJECTION, SOLUTION INTRAMUSCULAR; INTRAVENOUS at 05:54

## 2024-04-16 RX ADMIN — WATER 2000 MG: 1 INJECTION INTRAMUSCULAR; INTRAVENOUS; SUBCUTANEOUS at 06:55

## 2024-04-16 RX ADMIN — SODIUM CHLORIDE, PRESERVATIVE FREE 10 ML: 5 INJECTION INTRAVENOUS at 08:52

## 2024-04-16 RX ADMIN — OXYCODONE AND ACETAMINOPHEN 1 TABLET: 5; 325 TABLET ORAL at 21:36

## 2024-04-16 RX ADMIN — LEVOTHYROXINE SODIUM 50 MCG: 0.05 TABLET ORAL at 05:55

## 2024-04-16 RX ADMIN — OXYCODONE AND ACETAMINOPHEN 1 TABLET: 5; 325 TABLET ORAL at 15:31

## 2024-04-16 RX ADMIN — MORPHINE SULFATE 2 MG: 2 INJECTION, SOLUTION INTRAMUSCULAR; INTRAVENOUS at 03:00

## 2024-04-16 RX ADMIN — VENLAFAXINE HYDROCHLORIDE 150 MG: 150 CAPSULE, EXTENDED RELEASE ORAL at 08:49

## 2024-04-16 RX ADMIN — GABAPENTIN 800 MG: 400 CAPSULE ORAL at 21:37

## 2024-04-16 ASSESSMENT — PAIN SCALES - GENERAL
PAINLEVEL_OUTOF10: 7
PAINLEVEL_OUTOF10: 7
PAINLEVEL_OUTOF10: 9
PAINLEVEL_OUTOF10: 5
PAINLEVEL_OUTOF10: 9
PAINLEVEL_OUTOF10: 8
PAINLEVEL_OUTOF10: 4

## 2024-04-16 ASSESSMENT — PAIN DESCRIPTION - LOCATION
LOCATION: HIP

## 2024-04-16 ASSESSMENT — PAIN DESCRIPTION - PAIN TYPE: TYPE: SURGICAL PAIN

## 2024-04-16 ASSESSMENT — PAIN DESCRIPTION - ORIENTATION
ORIENTATION: RIGHT

## 2024-04-16 ASSESSMENT — PAIN DESCRIPTION - DESCRIPTORS
DESCRIPTORS: ACHING
DESCRIPTORS: ACHING;DISCOMFORT;SORE
DESCRIPTORS: ACHING
DESCRIPTORS: ACHING

## 2024-04-16 ASSESSMENT — PAIN SCALES - WONG BAKER: WONGBAKER_NUMERICALRESPONSE: HURTS A LITTLE BIT

## 2024-04-16 ASSESSMENT — PAIN DESCRIPTION - FREQUENCY: FREQUENCY: INTERMITTENT

## 2024-04-16 ASSESSMENT — PAIN DESCRIPTION - ONSET: ONSET: ON-GOING

## 2024-04-16 ASSESSMENT — PAIN - FUNCTIONAL ASSESSMENT: PAIN_FUNCTIONAL_ASSESSMENT: PREVENTS OR INTERFERES SOME ACTIVE ACTIVITIES AND ADLS

## 2024-04-16 NOTE — CARE COORDINATION
4/16/2024Social work transition of care planning  Pt plan is home with [facility] v snf. Sw will follow.  Electronically signed by ZA Gonzáles on 4/16/2024 at 8:42 AM

## 2024-04-16 NOTE — PLAN OF CARE
Problem: Chronic Conditions and Co-morbidities  Goal: Patient's chronic conditions and co-morbidity symptoms are monitored and maintained or improved  4/16/2024 1211 by Nora Zamora RN  Outcome: Progressing  4/16/2024 0253 by Thomas Michelle RN  Outcome: Progressing  Flowsheets (Taken 4/15/2024 2115)  Care Plan - Patient's Chronic Conditions and Co-Morbidity Symptoms are Monitored and Maintained or Improved: Monitor and assess patient's chronic conditions and comorbid symptoms for stability, deterioration, or improvement     Problem: Discharge Planning  Goal: Discharge to home or other facility with appropriate resources  4/16/2024 1211 by Nora Zamora RN  Outcome: Progressing  4/16/2024 0253 by Thomas Michelle RN  Outcome: Progressing  Flowsheets (Taken 4/15/2024 2115)  Discharge to home or other facility with appropriate resources: Identify barriers to discharge with patient and caregiver     Problem: Pain  Goal: Verbalizes/displays adequate comfort level or baseline comfort level  4/16/2024 1211 by Nora Zamora RN  Outcome: Progressing  4/16/2024 0253 by Thomas Michelle RN  Outcome: Progressing  Flowsheets (Taken 4/15/2024 2115)  Verbalizes/displays adequate comfort level or baseline comfort level: Encourage patient to monitor pain and request assistance     Problem: Nutrition Deficit:  Goal: Optimize nutritional status  Outcome: Progressing

## 2024-04-16 NOTE — PLAN OF CARE
Problem: Chronic Conditions and Co-morbidities  Goal: Patient's chronic conditions and co-morbidity symptoms are monitored and maintained or improved  Outcome: Progressing  Flowsheets (Taken 4/15/2024 2115)  Care Plan - Patient's Chronic Conditions and Co-Morbidity Symptoms are Monitored and Maintained or Improved: Monitor and assess patient's chronic conditions and comorbid symptoms for stability, deterioration, or improvement     Problem: Discharge Planning  Goal: Discharge to home or other facility with appropriate resources  Outcome: Progressing  Flowsheets (Taken 4/15/2024 2115)  Discharge to home or other facility with appropriate resources: Identify barriers to discharge with patient and caregiver     Problem: Pain  Goal: Verbalizes/displays adequate comfort level or baseline comfort level  Outcome: Progressing  Flowsheets (Taken 4/15/2024 2115)  Verbalizes/displays adequate comfort level or baseline comfort level: Encourage patient to monitor pain and request assistance     Problem: Pain  Goal: Verbalizes/displays adequate comfort level or baseline comfort level  Recent Flowsheet Documentation  Taken 4/15/2024 2115 by Thomas Michelle RN  Verbalizes/displays adequate comfort level or baseline comfort level: Encourage patient to monitor pain and request assistance     Problem: Safety - Adult  Goal: Free from fall injury  Recent Flowsheet Documentation  Taken 4/15/2024 2115 by Thomas Michelle RN  Free From Fall Injury: Based on caregiver fall risk screen, instruct family/caregiver to ask for assistance with transferring infant if caregiver noted to have fall risk factors     Problem: ABCDS Injury Assessment  Goal: Absence of physical injury  Recent Flowsheet Documentation  Taken 4/15/2024 2115 by Thomas Michelle RN  Absence of Physical Injury: Implement safety measures based on patient assessment

## 2024-04-17 ENCOUNTER — OUTSIDE SERVICES (OUTPATIENT)
Dept: PRIMARY CARE CLINIC | Age: 79
End: 2024-04-17

## 2024-04-17 VITALS
SYSTOLIC BLOOD PRESSURE: 123 MMHG | HEART RATE: 89 BPM | RESPIRATION RATE: 17 BRPM | BODY MASS INDEX: 29.99 KG/M2 | DIASTOLIC BLOOD PRESSURE: 60 MMHG | HEIGHT: 65 IN | OXYGEN SATURATION: 98 % | WEIGHT: 180 LBS | TEMPERATURE: 96.7 F

## 2024-04-17 DIAGNOSIS — R26.2 AMBULATORY DYSFUNCTION: ICD-10-CM

## 2024-04-17 DIAGNOSIS — T81.40XD POSTOPERATIVE INFECTION, UNSPECIFIED TYPE, SUBSEQUENT ENCOUNTER: ICD-10-CM

## 2024-04-17 DIAGNOSIS — R53.1 GENERALIZED WEAKNESS: ICD-10-CM

## 2024-04-17 DIAGNOSIS — E03.9 HYPOTHYROIDISM, UNSPECIFIED TYPE: ICD-10-CM

## 2024-04-17 DIAGNOSIS — R06.2 WHEEZING: ICD-10-CM

## 2024-04-17 DIAGNOSIS — Z86.73 HISTORY OF CEREBRAL INFARCTION: ICD-10-CM

## 2024-04-17 DIAGNOSIS — E11.9 TYPE 2 DIABETES MELLITUS WITHOUT COMPLICATION, UNSPECIFIED WHETHER LONG TERM INSULIN USE (HCC): ICD-10-CM

## 2024-04-17 DIAGNOSIS — J44.9 CHRONIC OBSTRUCTIVE PULMONARY DISEASE, UNSPECIFIED COPD TYPE (HCC): ICD-10-CM

## 2024-04-17 DIAGNOSIS — E78.5 HYPERLIPIDEMIA, UNSPECIFIED HYPERLIPIDEMIA TYPE: ICD-10-CM

## 2024-04-17 DIAGNOSIS — Z96.641 HISTORY OF TOTAL RIGHT HIP REPLACEMENT: Primary | ICD-10-CM

## 2024-04-17 LAB
ANION GAP SERPL CALCULATED.3IONS-SCNC: 8 MMOL/L (ref 7–16)
BASOPHILS # BLD: 0.03 K/UL (ref 0–0.2)
BASOPHILS NFR BLD: 0 % (ref 0–2)
BUN SERPL-MCNC: 20 MG/DL (ref 6–23)
CALCIUM SERPL-MCNC: 8.5 MG/DL (ref 8.6–10.2)
CHLORIDE SERPL-SCNC: 104 MMOL/L (ref 98–107)
CO2 SERPL-SCNC: 31 MMOL/L (ref 22–29)
CREAT SERPL-MCNC: 0.7 MG/DL (ref 0.5–1)
EOSINOPHIL # BLD: 0.6 K/UL (ref 0.05–0.5)
EOSINOPHILS RELATIVE PERCENT: 8 % (ref 0–6)
ERYTHROCYTE [DISTWIDTH] IN BLOOD BY AUTOMATED COUNT: 16.3 % (ref 11.5–15)
GFR SERPL CREATININE-BSD FRML MDRD: >90 ML/MIN/1.73M2
GLUCOSE BLD-MCNC: 125 MG/DL (ref 74–99)
GLUCOSE BLD-MCNC: 143 MG/DL (ref 74–99)
GLUCOSE BLD-MCNC: 71 MG/DL (ref 74–99)
GLUCOSE SERPL-MCNC: 84 MG/DL (ref 74–99)
HCT VFR BLD AUTO: 27.3 % (ref 34–48)
HGB BLD-MCNC: 7.8 G/DL (ref 11.5–15.5)
IMM GRANULOCYTES # BLD AUTO: 0.03 K/UL (ref 0–0.58)
IMM GRANULOCYTES NFR BLD: 0 % (ref 0–5)
LYMPHOCYTES NFR BLD: 2.18 K/UL (ref 1.5–4)
LYMPHOCYTES RELATIVE PERCENT: 28 % (ref 20–42)
MCH RBC QN AUTO: 26.1 PG (ref 26–35)
MCHC RBC AUTO-ENTMCNC: 28.6 G/DL (ref 32–34.5)
MCV RBC AUTO: 91.3 FL (ref 80–99.9)
MICROORGANISM SPEC CULT: NO GROWTH
MICROORGANISM SPEC CULT: NORMAL
MICROORGANISM/AGENT SPEC: NORMAL
MONOCYTES NFR BLD: 0.64 K/UL (ref 0.1–0.95)
MONOCYTES NFR BLD: 8 % (ref 2–12)
NEUTROPHILS NFR BLD: 56 % (ref 43–80)
NEUTS SEG NFR BLD: 4.41 K/UL (ref 1.8–7.3)
PLATELET # BLD AUTO: 223 K/UL (ref 130–450)
PMV BLD AUTO: 10.3 FL (ref 7–12)
POTASSIUM SERPL-SCNC: 4.5 MMOL/L (ref 3.5–5)
RBC # BLD AUTO: 2.99 M/UL (ref 3.5–5.5)
RBC # BLD: ABNORMAL 10*6/UL
SODIUM SERPL-SCNC: 143 MMOL/L (ref 132–146)
SPECIMEN DESCRIPTION: NORMAL
WBC OTHER # BLD: 7.9 K/UL (ref 4.5–11.5)

## 2024-04-17 PROCEDURE — 6360000002 HC RX W HCPCS: Performed by: STUDENT IN AN ORGANIZED HEALTH CARE EDUCATION/TRAINING PROGRAM

## 2024-04-17 PROCEDURE — 2580000003 HC RX 258: Performed by: INTERNAL MEDICINE

## 2024-04-17 PROCEDURE — 2700000000 HC OXYGEN THERAPY PER DAY

## 2024-04-17 PROCEDURE — A4216 STERILE WATER/SALINE, 10 ML: HCPCS | Performed by: INTERNAL MEDICINE

## 2024-04-17 PROCEDURE — 6370000000 HC RX 637 (ALT 250 FOR IP): Performed by: STUDENT IN AN ORGANIZED HEALTH CARE EDUCATION/TRAINING PROGRAM

## 2024-04-17 PROCEDURE — 97530 THERAPEUTIC ACTIVITIES: CPT

## 2024-04-17 PROCEDURE — 2580000003 HC RX 258: Performed by: STUDENT IN AN ORGANIZED HEALTH CARE EDUCATION/TRAINING PROGRAM

## 2024-04-17 PROCEDURE — 85025 COMPLETE CBC W/AUTO DIFF WBC: CPT

## 2024-04-17 PROCEDURE — 97535 SELF CARE MNGMENT TRAINING: CPT

## 2024-04-17 PROCEDURE — 6360000002 HC RX W HCPCS: Performed by: INTERNAL MEDICINE

## 2024-04-17 PROCEDURE — 80048 BASIC METABOLIC PNL TOTAL CA: CPT

## 2024-04-17 PROCEDURE — 99239 HOSP IP/OBS DSCHRG MGMT >30: CPT | Performed by: STUDENT IN AN ORGANIZED HEALTH CARE EDUCATION/TRAINING PROGRAM

## 2024-04-17 PROCEDURE — 82962 GLUCOSE BLOOD TEST: CPT

## 2024-04-17 RX ORDER — POLYETHYLENE GLYCOL 3350 17 G/17G
17 POWDER, FOR SOLUTION ORAL DAILY PRN
Qty: 100 G | Refills: 1 | Status: SHIPPED | OUTPATIENT
Start: 2024-04-17 | End: 2024-05-17

## 2024-04-17 RX ADMIN — OXYCODONE AND ACETAMINOPHEN 1 TABLET: 5; 325 TABLET ORAL at 13:53

## 2024-04-17 RX ADMIN — BUPROPION HYDROCHLORIDE 100 MG: 100 TABLET, EXTENDED RELEASE ORAL at 09:37

## 2024-04-17 RX ADMIN — MORPHINE SULFATE 2 MG: 2 INJECTION, SOLUTION INTRAMUSCULAR; INTRAVENOUS at 03:31

## 2024-04-17 RX ADMIN — VENLAFAXINE HYDROCHLORIDE 150 MG: 150 CAPSULE, EXTENDED RELEASE ORAL at 09:37

## 2024-04-17 RX ADMIN — SUCRALFATE 1 G: 1 TABLET ORAL at 09:36

## 2024-04-17 RX ADMIN — VITAMIN D, TAB 1000IU (100/BT) 2000 UNITS: 25 TAB at 16:32

## 2024-04-17 RX ADMIN — LEVOTHYROXINE SODIUM 50 MCG: 0.05 TABLET ORAL at 06:18

## 2024-04-17 RX ADMIN — SODIUM CHLORIDE, PRESERVATIVE FREE 10 ML: 5 INJECTION INTRAVENOUS at 09:40

## 2024-04-17 RX ADMIN — PRAMIPEXOLE DIHYDROCHLORIDE 0.75 MG: 0.25 TABLET ORAL at 09:36

## 2024-04-17 RX ADMIN — PANTOPRAZOLE SODIUM 40 MG: 40 TABLET, DELAYED RELEASE ORAL at 06:18

## 2024-04-17 RX ADMIN — ERTAPENEM SODIUM 1000 MG: 1 INJECTION INTRAMUSCULAR; INTRAVENOUS at 13:54

## 2024-04-17 RX ADMIN — GABAPENTIN 800 MG: 400 CAPSULE ORAL at 09:37

## 2024-04-17 RX ADMIN — HEPARIN 300 UNITS: 100 SYRINGE at 09:36

## 2024-04-17 RX ADMIN — GABAPENTIN 800 MG: 400 CAPSULE ORAL at 13:53

## 2024-04-17 RX ADMIN — APIXABAN 5 MG: 5 TABLET, FILM COATED ORAL at 09:37

## 2024-04-17 RX ADMIN — OXYCODONE AND ACETAMINOPHEN 1 TABLET: 5; 325 TABLET ORAL at 06:17

## 2024-04-17 ASSESSMENT — PAIN SCALES - GENERAL
PAINLEVEL_OUTOF10: 9
PAINLEVEL_OUTOF10: 9
PAINLEVEL_OUTOF10: 5
PAINLEVEL_OUTOF10: 8

## 2024-04-17 ASSESSMENT — PAIN DESCRIPTION - LOCATION
LOCATION: HIP
LOCATION: HIP

## 2024-04-17 ASSESSMENT — PAIN SCALES - WONG BAKER: WONGBAKER_NUMERICALRESPONSE: HURTS A LITTLE BIT

## 2024-04-17 ASSESSMENT — PAIN DESCRIPTION - ORIENTATION
ORIENTATION: RIGHT
ORIENTATION: RIGHT

## 2024-04-17 NOTE — CARE COORDINATION
4/17/2024Social work transition of care planning  If pt does not obtain auth today,sw will notify RN. Transport will then need to be cx.   Electronically signed by ZA Gonzáles on 4/17/2024 at 3:51 PM

## 2024-04-17 NOTE — CARE COORDINATION
4/17/2024social work transition of care planning  Sw followed up with pt at bedside. Pt adamant about going home. Pt then decided she will go to snf. Pt first choice is [facility]-referral made.  The Plan for Transition of Care is related to the following treatment goals: skilled services    The Patient and/or patient representative  was provided with a choice of provider and agrees   with the discharge plan. [x] Yes [] No    Freedom of choice list was provided with basic dialogue that supports the patient's individualized plan of care/goals, treatment preferences and shares the quality data associated with the providers. [x] Yes [] No    Electronically signed by ZA Gonzáles on 4/17/2024 at 9:56 AM    Addendum:Pt accepted at [facility]. [facility] set up [facility] [phone] for 5:30 pm,pending precert. Precert to be initiated today. Sw will follow. Rn updated (rn to update pt at bedside).  Electronically signed by ZA Gonzáles on 4/17/2024 at 11:49 AM

## 2024-04-17 NOTE — PLAN OF CARE
Problem: Chronic Conditions and Co-morbidities  Goal: Patient's chronic conditions and co-morbidity symptoms are monitored and maintained or improved  4/17/2024 1702 by Nora Zamora RN  Outcome: Adequate for Discharge  4/17/2024 1226 by Nora Zamora RN  Outcome: Progressing  4/17/2024 0348 by Thomas Michelle RN  Outcome: Progressing  Flowsheets (Taken 4/16/2024 2136)  Care Plan - Patient's Chronic Conditions and Co-Morbidity Symptoms are Monitored and Maintained or Improved: Monitor and assess patient's chronic conditions and comorbid symptoms for stability, deterioration, or improvement     Problem: Discharge Planning  Goal: Discharge to home or other facility with appropriate resources  4/17/2024 1702 by Noar Zamora RN  Outcome: Adequate for Discharge  4/17/2024 1226 by Nora Zamora RN  Outcome: Progressing  4/17/2024 0348 by Thomas Michelle RN  Outcome: Progressing  Flowsheets (Taken 4/16/2024 2136)  Discharge to home or other facility with appropriate resources: Identify barriers to discharge with patient and caregiver     Problem: Pain  Goal: Verbalizes/displays adequate comfort level or baseline comfort level  4/17/2024 1702 by Nora Zamora RN  Outcome: Adequate for Discharge  4/17/2024 1226 by Nora Zamora RN  Outcome: Progressing  4/17/2024 0348 by Thomas Michelle RN  Outcome: Progressing  Flowsheets (Taken 4/16/2024 2136)  Verbalizes/displays adequate comfort level or baseline comfort level: Encourage patient to monitor pain and request assistance     Problem: Nutrition Deficit:  Goal: Optimize nutritional status  4/17/2024 1702 by Nora Zamora RN  Outcome: Adequate for Discharge  4/17/2024 1226 by Nora Zamora RN  Outcome: Progressing  4/17/2024 0348 by Thomas Michelle RN  Outcome: Progressing

## 2024-04-17 NOTE — PROGRESS NOTES
[facility] Hospitalist Progress Note    SYNOPSIS: Patient admitted on 4/8/2024 for Foreign body of right hip with infection    This is a 78-year-old lady with past medical history of hypertension, HLD, diabetes, hypothyroidism, AAA, lung/kidney/breast cancer s/p nephrectomy and lobectomy, CHF, CVA, fibromyalgia, anxiety and depression who presented to ER with complaint of right hip pain.  Patient had a recent right hip arthroplasty due to fracture in January 2024.  Patient was then admitted for dislocation and had had an ORIF that later developed surgical site infection on March 2024 and had I&D and placed on IV antibiotic via PICC line.  Patient was then readmitted on 4/1/2024 for right hip dislocation and had closed reduction in the operating room.  She was discharged to the rehab facility to continue IV antibiotics.  She again had her right hip displaced and presented with right hip pain and inability to ambulate.  In the ED,  X-ray of right hip demonstrated superolateral dislocation of the right hemiarthroplasty.  Orthopedic surgery was consulted from ER and a trial of closed reduction under conscious sedation was attempted with unsuccessful result.  She has been planned for operative reduction.  Given her multiple comorbidities internal medicine service was consulted for admission/being primary.    4/9 patient has been planned for right hip aspiration per IR, follow orthopedic surgery recommendations, pain control as needed  Patient was unhappy about her going to rehab, wants to go home with home PT and OT,  following  4/10 OR for tomorrow  4/11 OR planned right hip explantation with antibiotics per orthopedic surgery, ID planning for IV cefazolin till 5/2, C consulted, CM following, patient does not want to go to rehab.  4/12 s/p RIGHT HIP OPEN REDUCTION FEMORAL HEAD EXCHANGE WITH IRRIGATION AND DEBRIDEMENT, Plan for repeat surgery tomorrow vs Monday.    SUBJECTIVE:  Stable overnight. No 
       [facility] Hospitalist Progress Note    SYNOPSIS: Patient admitted on 4/8/2024 for Foreign body of right hip with infection    This is a 78-year-old lady with past medical history of hypertension, HLD, diabetes, hypothyroidism, AAA, lung/kidney/breast cancer s/p nephrectomy and lobectomy, CHF, CVA, fibromyalgia, anxiety and depression who presented to ER with complaint of right hip pain.  Patient had a recent right hip arthroplasty due to fracture in January 2024.  Patient was then admitted for dislocation and had had an ORIF that later developed surgical site infection on March 2024 and had I&D and placed on IV antibiotic via PICC line.  Patient was then readmitted on 4/1/2024 for right hip dislocation and had closed reduction in the operating room.  She was discharged to the rehab facility to continue IV antibiotics.  She again had her right hip displaced and presented with right hip pain and inability to ambulate.  In the ED,  X-ray of right hip demonstrated superolateral dislocation of the right hemiarthroplasty.  Orthopedic surgery was consulted from ER and a trial of closed reduction under conscious sedation was attempted with unsuccessful result.  She has been planned for operative reduction.  Given her multiple comorbidities internal medicine service was consulted for admission/being primary.    4/9 patient has been planned for right hip aspiration per IR, follow orthopedic surgery recommendations, pain control as needed  Patient was unhappy about her going to rehab, wants to go home with home PT and OT,  following  4/10 OR for tomorrow  4/11 OR planned right hip explantation with antibiotics per orthopedic surgery, ID planning for IV cefazolin till 5/2, C consulted, CM following, patient does not want to go to rehab.  4/12 s/p RIGHT HIP OPEN REDUCTION FEMORAL HEAD EXCHANGE WITH IRRIGATION AND DEBRIDEMENT, Plan for repeat surgery tomorrow vs Monday.  4/13 continues to do well, pain is 
       [facility] Hospitalist Progress Note    SYNOPSIS: Patient admitted on 2024 for Foreign body of right hip with infection    This is a 78-year-old lady with past medical history of hypertension, HLD, diabetes, hypothyroidism, AAA, lung/kidney/breast cancer s/p nephrectomy and lobectomy, CHF, CVA, fibromyalgia, anxiety and depression who presented to ER with complaint of right hip pain.  Patient had a recent right hip arthroplasty due to fracture in 2024.  Patient was then admitted for dislocation and had had an ORIF that later developed surgical site infection on 2024 and had I&D and placed on IV antibiotic via PICC line.  Patient was then readmitted on 2024 for right hip dislocation and had closed reduction in the operating room.  She was discharged to the rehab facility to continue IV antibiotics.  She again had her right hip displaced and presented with right hip pain and inability to ambulate.  In the ED,  X-ray of right hip demonstrated superolateral dislocation of the right hemiarthroplasty.  Orthopedic surgery was consulted from ER and a trial of closed reduction under conscious sedation was attempted with unsuccessful result.  She has been planned for operative reduction.  Given her multiple comorbidities internal medicine service was consulted for admission/being primary.     patient has been planned for right hip aspiration per IR, follow orthopedic surgery recommendations, pain control as needed  Patient was unhappy about her going to rehab, wants to go home with home PT and OT,  following  4/10 OR for tomorrow   OR planned right hip explantation with antibiotics per orthopedic surgery, ID planning for IV cefazolin till , C consulted, CM following, patient does not want to go to rehab.    SUBJECTIVE:  Stable overnight. No other overnight issues reported.   Patient seen and examined  Records reviewed.         Temp (24hrs), Av.1 °F (36.7 °C), Min:97.8 
      HOSPITALIST PROGRESS NOTE    Patient's name: Rebeka Renee  : 1945  Admission date: 2024  Date of service: 4/15/2024   Primary care physician: Navneet Latif MD    Assessment   Rebeka Renee is a 78 y.o. female patient of Navneet Latif MD with history of hypertension, hyperlipidemia, diabetes, hypothyroidism, AAA, lung/kidney/breast cancer status post nephrectomy and lobectomy, CHF, CVA, fibromyalgia, anxiety and depression presented to [facility] on 2024 with concern of right hip pain and inability to ambulate.  While in the ER patient had x-ray of right hip that demonstrated superolateral dislocation of the right hip hemiarthroplasty.  Patient is admitted for further evaluation management.    Patient has had recent admission for right hip infection of surgical site and is currently on Ancef.  Patient was also admitted on 2024 for similar right hip dislocation and had closed reduction of right hip hemiarthroplasty on .  Patient was discharged to rehab on .    Recurrent right hip dislocation  Surgical site infection s/p I&D of right hip wound and revision of right femoral head hemiarthroplasty On 3/21:  Patient is s/p closed reduction of hemiarthroplasty on  and   Patient is s/p open reduction of right hip hemiarthroplasty with revision of femoral component on  during which no titus pus but some necrotic tissue and hematoma were noted.  Ancef discontinued, currently on ertapenem  Surgical culture growing gram-positive cocci in pair, culture from body fluid/synovial fluid grew ESBL  Plan for right hip hemiarthroplasty with conversion to right total hip arthroplasty on 4/15  Orthopedic surgery and infectious disease on board.    Right axillary vein occlusive thrombus on   Eliquis is on hold for now pending surgical intervention  Resume Eliquis once cleared by orthopedic surgery    Mood disorder  Continue Wellbutrin and 
  4 Eyes Skin Assessment     NAME:  Rebeka Renee  YOB: 1945  MEDICAL RECORD NUMBER:  [id number]    The patient is being assessed for  Transfer to New Unit    I agree that at least one RN has performed a thorough Head to Toe Skin Assessment on the patient. ALL assessment sites listed below have been assessed.      Areas assessed by both nurses:    Head, Face, Ears, Shoulders, Back, Chest, Arms, Elbows, Hands, Sacrum. Buttock, Coccyx, Ischium, Legs. Feet and Heels, Under Medical Devices , and Other Incision Right hip, Puncture Right hip         Does the Patient have a Wound? No noted wound(s)       Norris Prevention initiated by RN: Yes  Wound Care Orders initiated by RN: No    Pressure Injury (Stage 3,4, Unstageable, DTI, NWPT, and Complex wounds) if present, place Wound referral order by RN under : No    New Ostomies, if present place, Ostomy referral order under : No     Nurse 1 eSignature: Electronically signed by Karen Hernandez RN on 4/9/24 at 5:40 PM EDT    **SHARE this note so that the co-signing nurse can place an eSignature**    Nurse 2 eSignature: Electronically signed by Melvina Gandara RN on 4/9/24 at 5:46 PM EDT   
 [facility] PROGRESS NOTE      Chief complaint: Follow-up of ongoing antibiotic treatment for right hip prosthetic joint infection    The patient is a 78 y.o. female with history of ascending aortic aneurysm, DM, hypertension, hyperlipidemia, fibromyalgia, right femoral neck fracture sustained from a fall s/p right hip hemiarthroplasty on 01/04/2024 complicated by right hip dislocation and right femur greater trochanter fracture sustained from a fall s/p right hip prosthetic dislocation open reduction, right hip femur greater trochanter fracture open reduction with internal fixation, right hip hematoma excisional irrigation and debridement on 02/13 (tissue cultures showing no growth), further complicated by wound drainage since after discharge from rehab facility and has been on cefadroxil for 2 weeks per Orthopedic Surgery, then underwent  irrigation and debridement of right hip wound with revision of femoral head component of hemiarthroplasty on 03/21 (tissue cultures showed no growth) and discharged on 6-week course of cefazolin from 03/21-05/02, complicated by post right dislocation of hip hemiarthroplasty s/p closed reduction on 04/02, readmitted on 04/08 with right hip pain during physical therapy, found to have another right hip dislocation s/p closed reduction.  On admission, she was afebrile and hemodynamically stable with no leukocytosis.  Inflammatory markers were not elevated.  Right hip arthrocentesis on 04/09 yielded synovial fluid with Gram stain and culture showing few polymorphonuclear leukocytes, no epithelial cells, rare mononuclear leukocytes, no organisms, no growth to date.  Cefazolin was continued.      Subjective: Patient was seen and examined. No chills, no abdominal pain, has chronic unchanged diarrhea, no rash, no itching.  Afebrile.      Objective:    Vitals:    04/10/24 1707   BP: 120/73   Pulse: 81   Resp: 19   Temp: 98 °F (36.7 °C)   SpO2: 98%     Constitutional: Alert, not in 
 [facility] PROGRESS NOTE      Chief complaint: Follow-up of ongoing antibiotic treatment for right hip prosthetic joint infection    The patient is a 78 y.o. female with history of ascending aortic aneurysm, DM, hypertension, hyperlipidemia, fibromyalgia, right femoral neck fracture sustained from a fall s/p right hip hemiarthroplasty on 01/04/2024 complicated by right hip dislocation and right femur greater trochanter fracture sustained from a fall s/p right hip prosthetic dislocation open reduction, right hip femur greater trochanter fracture open reduction with internal fixation, right hip hematoma excisional irrigation and debridement on 02/13 (tissue cultures showing no growth), further complicated by wound drainage since after discharge from rehab facility and has been on cefadroxil for 2 weeks per Orthopedic Surgery, then underwent  irrigation and debridement of right hip wound with revision of femoral head component of hemiarthroplasty on 03/21 (tissue cultures showed no growth) and discharged on 6-week course of cefazolin from 03/21-05/02, complicated by post right dislocation of hip hemiarthroplasty s/p closed reduction on 04/02, readmitted on 04/08 with right hip pain during physical therapy, found to have another right hip dislocation s/p closed reduction.  On admission, she was afebrile and hemodynamically stable with no leukocytosis.  Inflammatory markers were not elevated.  Right hip arthrocentesis on 04/09 yielded synovial fluid with Gram stain and culture showing few polymorphonuclear leukocytes, no epithelial cells, rare mononuclear leukocytes, no organisms, no growth to date.  Cefazolin was continued.  She underwent open reduction of right hip hemiarthroplasty with revision of femoral component on 04/11 during which no titus pus but some necrotic tissue and hematoma were noted.  Tissue Gram stain and culture showed no polymorphonuclear leukocytes, no epithelial cells, rare gram-positive cocci in 
1630  Dr. Guzmán notified of sustained elevated blood pressure.  Apresoline given.   1640 xrays completed.   
4 Eyes Skin Assessment     NAME:  Rebeka Renee  YOB: 1945  MEDICAL RECORD NUMBER:  [id number]    The patient is being assessed for  Transfer to New Unit    I agree that at least one RN has performed a thorough Head to Toe Skin Assessment on the patient. ALL assessment sites listed below have been assessed.      Areas assessed by both nurses:    Head, Face, Ears        Does the Patient have a Wound? Yes wound(s) were present on assessment. LDA wound assessment was Initiated and completed by RN       Norris Prevention initiated by RN: Yes  Wound Care Orders initiated by RN: Yes    Pressure Injury (Stage 3,4, Unstageable, DTI, NWPT, and Complex wounds) if present, place Wound referral order by RN under : Yes    New Ostomies, if present place, Ostomy referral order under : No     Nurse 1 eSignature: breanne meng    **SHARE this note so that the co-signing nurse can place an eSignature**    Nurse 2 eSignature: Electronically signed by Breanne Meng RN on 4/14/24 at 3:30 PM EDT     
4 Eyes Skin Assessment     NAME:  Rebeka Renee  YOB: 1945  MEDICAL RECORD NUMBER:  [id number]    The patient is being assessed for  Transfer to New Unit    I agree that at least one RN has performed a thorough Head to Toe Skin Assessment on the patient. ALL assessment sites listed below have been assessed.      Areas assessed by both nurses:    Head, Face, Ears, Shoulders, Back, Chest, Arms, Elbows, Hands, Sacrum. Buttock, Coccyx, Ischium, Legs. Feet and Heels, and Under Medical Devices         Does the Patient have a Wound? No noted wound(s)       Norris Prevention initiated by RN: Yes  Wound Care Orders initiated by RN: No    Pressure Injury (Stage 3,4, Unstageable, DTI, NWPT, and Complex wounds) if present, place Wound referral order by RN under : No    New Ostomies, if present place, Ostomy referral order under : No   Ecchymosis: Generalized BUE/BLE and Right Labia  Redness/Pinkish: Buttocks-Old healing wound  Surgical Incision: Right Hip  Rt Heel: Old healed wound-pink [place]/Boggy  Left Heel: Boggy/Blanchable  Left Lateral Foot: 2 scabs    Nurse 1 eSignature: Electronically signed by Latoya Mitchell RN on 4/11/24 at 7:36 PM EDT    **SHARE this note so that the co-signing nurse can place an eSignature**    Nurse 2 eSignature: {Esignature:[id number]}  
4 Eyes Skin Assessment     NAME:  Rebeka Renee  YOB: 1945  MEDICAL RECORD NUMBER:  [id number]    The patient is being assessed for  Post-Op Surgical    I agree that at least one RN has performed a thorough Head to Toe Skin Assessment on the patient. ALL assessment sites listed below have been assessed.      Areas assessed by both nurses:    Head, Face, Ears, Shoulders, Back, Chest, Arms, Elbows, Hands, Sacrum. Buttock, Coccyx, Ischium, Legs. Feet and Heels, and Under Medical Devices         Does the Patient have a Wound? No noted wound(s)       Norris Prevention initiated by RN: Yes  Wound Care Orders initiated by RN: No    Pressure Injury (Stage 3,4, Unstageable, DTI, NWPT, and Complex wounds) if present, place Wound referral order by RN under : No    New Ostomies, if present place, Ostomy referral order under : No     Incision on rt hip (see LDA).  Old wound on rt heel that is healed.    Nurse 1 eSignature: Electronically signed by Harsh Chavarria RN on 4/11/24 at 11:38 PM EDT    **SHARE this note so that the co-signing nurse can place an eSignature**    Nurse 2 eSignature: Electronically signed by Melissa Gilligan, RN on 4/12/24 at 1:39 AM EDT    
4 Eyes Skin Assessment     NAME:  Rebeka Renee  YOB: 1945  MEDICAL RECORD NUMBER:  [id number]    The patient is being assessed for  Admission    I agree that at least one RN has performed a thorough Head to Toe Skin Assessment on the patient. ALL assessment sites listed below have been assessed.      Areas assessed by both nurses:    Head, Face, Ears, Shoulders, Back, Chest, Arms, Elbows, Hands, Sacrum. Buttock, Coccyx, Ischium, Legs. Feet and Heels, and Under Medical Devices         Does the Patient have a Wound? Yes wound(s) were present on assessment. LDA wound assessment was Initiated and completed by RN       Norris Prevention initiated by RN: Yes  Wound Care Orders initiated by RN: Yes    Pressure Injury (Stage 3,4, Unstageable, DTI, NWPT, and Complex wounds) if present, place Wound referral order by RN under : No    New Ostomies, if present place, Ostomy referral order under : No       Redness/Pinkish: Buttocks-Old healing wound  Surgical Incision: Right Hip- heavy drainage   Rt Heel: Old healed wound-pink [place]/Boggy  Left Heel: Boggy/Blanchable  Left Lateral Foot: 2 scabs    Nurse 1 eSignature: Electronically signed by Aileen Ugarte RN on 4/14/24 at 6:59 PM EDT    **SHARE this note so that the co-signing nurse can place an eSignature**    Nurse 2 eSignature: Electronically signed by Betty Qurehsi LPN on 4/14/24 at 7:00 PM EDT    
4 Eyes Skin Assessment     NAME:  Rebeka Renee  YOB: 1945  MEDICAL RECORD NUMBER:  [id number]    The patient is being assessed for  Admission    I agree that at least one RN has performed a thorough Head to Toe Skin Assessment on the patient. ALL assessment sites listed below have been assessed.      Areas assessed by both nurses:    Head, Face, Ears, Shoulders, Back, Chest, Arms, Elbows, Hands, Sacrum. Buttock, Coccyx, Ischium, Legs. Feet and Heels, Under Medical Devices , and Other ...        Does the Patient have a Wound? No noted wound(s)       Norris Prevention initiated by RN: Yes  Wound Care Orders initiated by RN: No    Pressure Injury (Stage 3,4, Unstageable, DTI, NWPT, and Complex wounds) if present, place Wound referral order by RN under : No    New Ostomies, if present place, Ostomy referral order under : No     Nurse 1 eSignature: Electronically signed by Malini Luke RN on 4/8/24 at 10:10 PM EDT    **SHARE this note so that the co-signing nurse can place an eSignature**    Nurse 2 eSignature: Electronically signed by Kelly Grossman RN on 4/8/24 at 10:13 PM EDT  
After realignment of hip was unsuccessful the open approach was started. The incision was made at 1615.   
Andrew from [place] is on the floor so Amanda printed out the orders and he is awaiting measurements for the Right Hip Abduction Brace.  
BEDS aware of Intermediate need.  
Called nurse to nurse to radha on 4500 attempted to call  Nate but there was no answer pt stated she will attempt to call him.   
Comprehensive Nutrition Assessment    Type and Reason for Visit:  Reassess    Nutrition Recommendations/Plan:   Continue NPO  Start ONS w/ diet to optimize wound healing and promote oral intake  Will monitor     Malnutrition Assessment:  Malnutrition Status:  At risk for malnutrition (Comment) (04/15/24 1126)    Context:  Acute Illness     Findings of the 6 clinical characteristics of malnutrition:  Energy Intake:  50% or less of estimated energy requirements for 5 or more days  Weight Loss:  Unable to assess     Body Fat Loss:  Unable to assess     Muscle Mass Loss:  Unable to assess    Fluid Accumulation:  No significant fluid accumulation     Strength:  Not Performed    Nutrition Assessment:    pt adm d/t hip pain currently NPO for R-hip aspiration; PMhx of DM,HTN,AAA, CHF, lung/kidney/breast CA- s/p nephrectomy/lobectomy; hx of recent adm d/t hip dislocation s/p ORIF where pt developed post-op infection s/p I&D; pt now s/p closed reduction on 04/02 and on 04/08, s/p open reduction of right hip hemiarthroplasty with revision of femoral component on 04/11 ; pt currently NPO anticipating revision hip arthroplasty with conversion to total hip arthroplasty with constrained liner today 4/15; will start ONS w/ diet to optimize wound healing; will continue to monitor.    Nutrition Related Findings:    A&Ox4; U/L dentures; active BS; trace edema; +I/O Wound Type: Surgical Incision (x2)       Current Nutrition Intake & Therapies:    Average Meal Intake: 1-25% (prior to NPO)  Average Supplements Intake: NPO  Diet NPO Exceptions are: Sips of Water with Meds  ADULT ORAL NUTRITION SUPPLEMENT; Breakfast, Lunch; Wound Healing Oral Supplement  ADULT ORAL NUTRITION SUPPLEMENT; Lunch, Dinner; Diabetic Oral Supplement    Anthropometric Measures:  Height: 165.1 cm (5' 5\")  Ideal Body Weight (IBW): 125 lbs (57 kg)       Current Body Weight: 81.6 kg (179 lb 14.3 oz) (4/8-no method), 143.9 % IBW.    Current BMI (kg/m2): 29.9  Usual 
Department of Orthopedic Surgery  Resident Progress Note    Patient seen and examined.  Overall she is feeling good and her hip pain is significantly improved from preoperative pain.  Leg lengths are appropriate.  Hip abduction brace in place.  Pain controlled. No new complaints.  Denies chest pain, shortness of breath, dizziness/lightheadedness.     VITALS:  /86   Pulse 82   Temp 97 °F (36.1 °C) (Temporal)   Resp 16   Ht 1.651 m (5' 5\")   Wt 81.6 kg (180 lb)   SpO2 92%   BMI 29.95 kg/m²     General: awake alert cooperative in no acute distress.     MUSCULOSKELETAL:   right lower extremity:  Dressing C/D/I  Hip abduction brace in place  Compartments soft and compressible  +PF/DF/EHL  +2/4 DP & PT pulses, Brisk Cap refill, Toes warm and perfused  Distal sensation grossly intact to Peroneals, Sural, Saphenous, and tibial nrs    CBC:   Lab Results   Component Value Date/Time    WBC 8.5 04/13/2024 06:08 AM    HGB 7.5 04/13/2024 06:08 AM    HCT 25.6 04/13/2024 06:08 AM     04/13/2024 06:08 AM     PT/INR:    Lab Results   Component Value Date/Time    PROTIME 17.6 04/09/2024 09:09 AM    PROTIME 10.4 12/13/2010 09:00 AM    INR 1.6 04/09/2024 09:09 AM       Intraop Cultures: pending    ASSESSMENT  S/P right maggy hip arthroplasty open reduction    PLAN      Continue physical therapy and protocol: NWB - RLE  24 hour abx coverage  Deep venous thrombosis prophylaxis - eliquis, early mobilization  PT/OT  Pain Control: multimodal wean narcotics as able  Monitor H&H pending this AM  Plan to go back to the OR Monday for revision arthroplasty with conversion to GE with possible constrained liner.   Npo the night before surgery      
Department of Orthopedic Surgery  Resident Progress Note    Patient seen and examined.  She is doing well today and ready for surgery tomorrow.  Any and all questions were answered today.  Tentative plan for surgery tomorrow for conversion to total hip arthroplasty.  Leg lengths are appropriate.  Hip abduction brace in place.  Pain controlled. No new complaints.  Denies chest pain, shortness of breath, dizziness/lightheadedness.     VITALS:  /78   Pulse 86   Temp 98 °F (36.7 °C) (Temporal)   Resp 16   Ht 1.651 m (5' 5\")   Wt 81.6 kg (180 lb)   SpO2 100%   BMI 29.95 kg/m²     General: awake alert cooperative in no acute distress.     MUSCULOSKELETAL:   right lower extremity:  Dressing C/D/I  Hip abduction brace in place  Compartments soft and compressible  +PF/DF/EHL  +2/4 DP & PT pulses, Brisk Cap refill, Toes warm and perfused  Distal sensation grossly intact to Peroneals, Sural, Saphenous, and tibial nrs    CBC:   Lab Results   Component Value Date/Time    WBC 7.2 04/14/2024 05:00 AM    HGB 7.5 04/14/2024 05:00 AM    HCT 25.6 04/14/2024 05:00 AM     04/14/2024 05:00 AM     PT/INR:    Lab Results   Component Value Date/Time    PROTIME 17.6 04/09/2024 09:09 AM    PROTIME 10.4 12/13/2010 09:00 AM    INR 1.6 04/09/2024 09:09 AM       Intraop Cultures: pending    ASSESSMENT  S/P right maggy hip arthroplasty open reduction    PLAN      Continue physical therapy and protocol: SISSYB - RLE  24 hour abx coverage  Deep venous thrombosis prophylaxis - eliquis, early mobilization  PT/OT  Pain Control: multimodal wean narcotics as able  Monitor H&H pending this AM  Plan for OR tomorrow for revision hip arthroplasty with conversion to total hip arthroplasty with constrained liner.  N.p.o. at midnight, treatment consent, Ancef on-call to the OR, hold anticoagulation      
Department of Orthopedic Surgery  Resident Progress Note    Patient seen and examined. Pain controlled. No new complaints.  Denies chest pain, shortness of breath, dizziness/lightheadedness.     VITALS:  /65   Pulse 79   Temp 97.9 °F (36.6 °C) (Temporal)   Resp 16   Ht 1.651 m (5' 5\")   Wt 81.6 kg (180 lb)   SpO2 100%   BMI 29.95 kg/m²     General: awake alert cooperative in no acute distress.     MUSCULOSKELETAL:   right lower extremity:  Dressing C/D/I  Compartments soft and compressible  +PF/DF/EHL  +2/4 DP & PT pulses, Brisk Cap refill, Toes warm and perfused  Distal sensation grossly intact to Peroneals, Sural, Saphenous, and tibial nrs    CBC:   Lab Results   Component Value Date/Time    WBC 6.7 04/11/2024 04:45 AM    HGB 8.7 04/11/2024 04:45 AM    HCT 29.0 04/11/2024 04:45 AM     04/11/2024 04:45 AM     PT/INR:    Lab Results   Component Value Date/Time    PROTIME 17.6 04/09/2024 09:09 AM    PROTIME 10.4 12/13/2010 09:00 AM    INR 1.6 04/09/2024 09:09 AM       Intraop Cultures: pending    ASSESSMENT  S/P right maggy hip arthroplasty open reduction    PLAN      Continue physical therapy and protocol: NWB - RLE  24 hour abx coverage  Deep venous thrombosis prophylaxis - eliquis, early mobilization  PT/OT  Pain Control: multimodal wean narcotics as able  Monitor H&H pending this AM  Plan to go back to the OR Monday for revision arthroplasty with conversion to GE with possible constrained liner.   Npo the night before surgery      
Department of Orthopedic Surgery  Resident Progress Note    Patient seen and examined. Pain controlled. No new complaints.  Denies chest pain, shortness of breath, dizziness/lightheadedness.  Patient seen and evaluated at bedside this morning.  Overall she is doing well.  We are waiting for infectious disease recommendations.  We did discuss her surgery at length with any questions she had today.  We discussed her toe-touch weightbearing status.    VITALS:  /64   Pulse 84   Temp 98.7 °F (37.1 °C) (Temporal)   Resp 16   Ht 1.651 m (5' 5\")   Wt 81.6 kg (180 lb)   SpO2 100%   BMI 29.95 kg/m²     General: Awake alert cooperative no acute distress answering questions appropriately.    MUSCULOSKELETAL:   right lower extremity:  Dressing C/D/I with minimal output to her Prevena dressing  Compartments soft and compressible  +PF/DF/EHL  +2/4 DP & PT pulses, Brisk Cap refill, Toes warm and perfused  Distal sensation grossly intact to peroneals, Sural, Saphenous, and tibial nrs although decreased due to her neuropathy    CBC:   Lab Results   Component Value Date/Time    WBC 7.7 04/16/2024 06:00 AM    HGB 8.3 04/16/2024 06:00 AM    HCT 27.8 04/16/2024 06:00 AM     04/16/2024 06:00 AM     PT/INR:    Lab Results   Component Value Date/Time    PROTIME 17.6 04/09/2024 09:09 AM    PROTIME 10.4 12/13/2010 09:00 AM    INR 1.6 04/09/2024 09:09 AM       Intraop Cultures: Repeat cultures taken and pending  Prior culture showing gram-negative rods and gram-positive cocci    ASSESSMENT  S/P right hip hemiarthroplasty explantation with antibiotic spacer placement-4/15/2024    PLAN      Continue physical therapy and protocol: Toe-touch weightbearing to the right lower extremity for transfers- RLE  Antibiotics per infectious disease recommendations appreciate consultation.  Deep venous thrombosis prophylaxis -Home Eliquis okay to resume today from an orthopedic standpoint., early mobilization  Bowel regimen  PT/OT  Pain 
Discussed consent form with pt. Pt unsure if she spoke to ortho doctor and had numerous questions about procedure. Pt did not wish to sign consent at this time.  
Estev[facility]erved Dr. Collazo for orders for testing for hip aspiration.  
Genesis Mcneil notified via perfect serve og Hgb 6.7.   
I called bed placement and spoke to Maria A and she will work on transfer to get the patient off the unit due to was just put in contact isolation .   
ID consult called to office and Dr Gilliam will see.  
Low air loss module ordered for pt, optiview clear mepilex ordered for pt's heels   
Message sent to Dr. Scott about transferring Pt. Off unit. Pt. Was just put into contact isolation.   
Message sent to pharmacy re: Patient states she last received her dose of Ozempic on Monday 4/1. Patient made aware that this medication is not stocked in our pharmacy.    Electronically signed by Val Huntley RN on 4/9/2024 at 6:28 PM    
Nurse to nurse called to peewee. All questions answered.  
OCCUPATIONAL THERAPY INITIAL EVALUATION    [facility] [address], [place]      Date:2024                                                Patient Name: Rebeka Renee  MRN: [id number]  : 1945  Room: [facility]     Evaluating OT:Edna Gamble, OTR/L   License #  [id number]       Referring Provider: Ken Scott MD     Specific Provider Orders/Date: OT evaluation & treatment        Diagnosis: Closed dislocation of right hip, initial encounter ([facility]) [S73.004A]  Foreign body of right hip with infection [S70.251A, L08.9] .  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     Pertinent Medical History:  has a past medical history of Anemia, Anxiety, Arthritis, Ascending aortic aneurysm ([facility]), Asthma, Bipolar 1 disorder ([facility]), Cancer ([facility]), Cancer of breast, female ([facility]), CHF (congestive heart failure) ([facility]), Chronic back pain, Depression, Depression with anxiety, Diabetes mellitus ([facility]), Dyslipidemia, Fibromyalgia, History of blood transfusion, Hypertension, Hypothyroidism, Neuropathy, Pericardial effusion, Pleural effusion, TIA (transient ischemic attack), Tobacco abuse, and Type II or unspecified type diabetes mellitus without mention of complication, not stated as uncontrolled.    Surgery: 4-15-24: Complex explantation of right hip arthroplasty with implantation of right hip methylmethacrylate spacer impregnated with antibiotics     Past Surgical History:  has a past surgical history that includes Lung removal, partial; Mastectomy (Bilateral); Kidney removal (Left, 2006); Hysterectomy; Gastric bypass surgery (2004); hernia repair; knee surgery (Right); transthoracic echocardiogram (2010); Cataract removal with implant (Bilateral); liver biopsy (); thoracentesis (Left, 2010 and 3/2010. ); Cholecystectomy; other surgical history (03/16/15); other surgical history (N/A, 4/15/15); Breast surgery; Nerve 
Occupational Therapy    Date:2024  Patient Name: Rebeka Renee  MRN: [id number]  : 1945  Room: [address] [place] [address]-A     OT orders received and chart reviewed. OT eval on hold at this time pending arrival of R hip abductor brace.  OT will follow and re-attempt eval as appropriate at a later time/date.    Sudhir Agarwal OTR/L; [id number]    
Occupational Therapy    Date:2024  Patient Name: Rebeka Renee  MRN: [id number]  : 1945  Room: [address] [place] [address]A     OT orders received and chart reviewed. OT eval on hold at this time pending tentative plan for OR 4/15/24 regarding \"revision arthroplasty with conversion to GE with possible constrained liner\" per ortho POC.  OT will follow and re-attempt eval as appropriate at a later time/date.    Sudhir Agarwal OTR/L; [id number]    
Orthopedic plan update:  Will plan for surgery on 4/11/2024.  Patient is okay for diet today n.p.o. at midnight tonight.  This will allow us to get the lab results back from her hip aspiration which was done yesterday.  
Patient arrived via bed with transport to Radiology department for right hip aspiration. Allergies, home medications, H&P and fasting instructions reviewed with patient. Vital signs taken. Procedural instructions given, questions answered, understanding expressed and consent signed. Patient given fluoroscopy education, no questions at this time. Consent obtained from spouse d/t patient having sedation medications within 24 hrs.   
Patient has possession of her: glasses, upper and lower dentures, and cell phone. She states her  took all of her other belongings home. No other belongings at bedside.    Patient updated her  that she is transferring to [facility].    Electronically signed by Val Huntley RN on 4/9/2024 at 1:24 PM      
Patient taken to IR dept by transport and myself. Glasses, upper and lower dentures, and cell phone taken to 8SE, given to Karen GALVAN RN. Updated 8SE that patient went to IR prior to transfer.    Electronically signed by Val Huntley RN on 4/9/2024 at 2:11 PM    
Patient updated on anticipated transfer to [facility].     Nurse handoff called to VANI Suarez on 8SE.    Electronically signed by Val Huntley RN on 4/9/2024 at 1:07 PM    
Patient was off the floor for a procedure. I will see her again tomorrow.    Thank you for involving me in the care of Rebeka Renee. ID will continue to follow. Please do not hesitate to call for any questions or concerns.    
Physical Therapy      Name: Rebeka Renee  : 1945  MRN: [id number]  Date of Service: 2024  Room #:  5207/5207-A    PT order received. PT held - surgical intervention planned 4/15/2024. PT will follow up as able/appropriate.    Porfirio Díaz, PT, DPT  [id number]      
Physical Therapy    PT evaluation orders received and chart review completed. Pt to have hip abduction brace for OOB activity. Brace not present this date.    Will follow and re-attempt as appropriate. Thank you.    Jeanine Crowell PT, DPT  [id number]      
Physical Therapy  Physical Therapy Initial Assessment     Name: Rebeka Renee  : 1945  MRN: [id number]      Date of Service: 2024    Evaluating PT:  Patt Goodson, PT, DPT, [id number]    Room #:  1690/0124-A  Diagnosis:  Closed dislocation of right hip, initial encounter ([facility]) [S73.004A]  Foreign body of right hip with infection [S70.251A, L08.9]  PMHx/PSHx:    Past Medical History:   Diagnosis Date    Anemia     S/P chemotherapy.    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    Ascending aortic aneurysm (HCC)     seen on CTA chest w/contrast on     Asthma     Bipolar 1 disorder ([facility])     Cancer (HCC)     lung/ kidney    Cancer of breast, female ([facility]) 2006    S/P bilaeral mastectomy with left breast lymph node resection and chemotherapy.    CHF (congestive heart failure) ([facility])     Chronic back pain     Depression     Depression with anxiety     Diabetes mellitus ([facility])     Resolved after losing 130 lbs. after gastric bypass surgery.    Dyslipidemia     Fibromyalgia     History of blood transfusion     anemia    Hypertension     Hypothyroidism     Neuropathy     Pericardial effusion 2010    Subxiphoid pericardial window with drainage of pericardial effusion and pericardial biopsy:  Fluid and biopsy negative for metastases.     Pleural effusion 2010    Noted on chest x-ray.    TIA (transient ischemic attack)     Tobacco abuse     Patient smoked 3 ppd x 26 years.  Quit in .    Type II or unspecified type diabetes mellitus without mention of complication, not stated as uncontrolled       Past Surgical History:   Procedure Laterality Date    BREAST SURGERY      CATARACT REMOVAL WITH IMPLANT Bilateral     CHOLECYSTECTOMY      GASTRIC BYPASS SURGERY  2004    HERNIA REPAIR      HIP SURGERY Right 2024    RIGHT HIP HEMIARTHROPLASTY performed by Nemesio Handy MD at [facility] 
Procedure consent for Mondays surgery printed and is in the soft chart for the patient to sign over the weekend.  She will be NPO on Sunday at midnight.   
Pt belongings packed for transfer and heart monitor removed and returned  
Pt observed for 15 min no signs of reaction blood transfusion continues  
Report called to 54 to transfer Pt. Into Rm [id number]  
Spoke with RN regarding hip aspiration. Please obtain INR per ordering physician. Will call when able to send for patient.  
Talk to the patient in detail preoperatively.  Plan was to be for potential implantation of a cup to get her hip stability better.  She grew gram-negative rods and gram-positive cocci in clusters which are just now resolving from our irrigation debridement and relocation of her hip on 4/11/2024.  I think at this point in time the best plan is to perform explantation of her stem and antibiotic spacer insertion.  I explained to her that this would lead to another surgery and the fact that the spacer could be unstable.  Dislocation can be caused by indolent infection at times and is explained the most important thing at this point is to clear her of infection.  She understands this.  I went through the risk of surgery including death and anesthesia, further infection, need for further planned replantation of her hip, potential need for further washouts, need for IV antibiotics per infectious disease, I also explained it can take up to 6 to 12 weeks for replantation of a total hip arthroplasty.  She understands this.  I with the risk of surgery I also explained there could be wound healing issues, deep venous fibrosis, and other unforeseeable complications.  She understood and would like to proceed with explantation of her hemiarthroplasty stem with implantation of a Prostalac antibiotic spacer.  
Plan: Per PT/OT/social work recommendations.  We appreciate the input and evaluation.    Agree with above plan on ABX and medical management for now.  
pairs.  Synovial fluid Gram stain and culture showed many polymorphonuclear leukocytes, no epithelial cells, rare gram-negative rods.    Subjective: Patient was seen and examined. No chills, no abdominal pain, has chronic unchanged diarrhea, no rash, no itching.  Afebrile.      Objective:    Vitals:    04/11/24 0716   BP: (!) 153/79   Pulse: 81   Resp: 18   Temp: 97.9 °F (36.6 °C)   SpO2: 100%     Constitutional: Alert, not in distress  Respiratory: Clear breath sounds, no crackles, no wheezes  Cardiovascular: Regular rate and rhythm, no murmurs  Gastrointestinal: Bowel sounds present, soft, nontender  Skin: Warm and dry, no active dermatoses  Musculoskeletal: No joint swelling, no joint erythema    Labs, imaging, and medical records/notes were personally reviewed.    Laboratory:  Lab Results   Component Value Date    WBC 9.7 04/12/2024    WBC 6.7 04/11/2024    WBC 6.2 04/10/2024    HGB 7.9 (L) 04/12/2024    HCT 26.5 (L) 04/12/2024    MCV 89.8 04/12/2024     04/12/2024     Lab Results   Component Value Date    NEUTROABS 6.40 04/12/2024    NEUTROABS 2.89 04/11/2024    NEUTROABS 2.97 04/10/2024     Lab Results   Component Value Date    CRP 7.0 (H) 04/09/2024    CRP 5.0 04/01/2024    CRP 4.0 03/21/2024     No results found for: \"CRPHS\"  Lab Results   Component Value Date    SEDRATE 20 04/09/2024    SEDRATE 10 04/01/2024    SEDRATE 9 03/21/2024     Lab Results   Component Value Date    ALT <5 04/02/2024    AST 21 04/02/2024    ALKPHOS 133 (H) 04/02/2024    BILITOT 0.3 04/02/2024     Lab Results   Component Value Date/Time     04/12/2024 06:46 AM    K 4.4 04/12/2024 06:46 AM    K 4.3 07/08/2022 12:12 PM     04/12/2024 06:46 AM    CO2 30 04/12/2024 06:46 AM    BUN 15 04/12/2024 06:46 AM    CREATININE 0.6 04/12/2024 06:46 AM    CREATININE 0.5 04/11/2024 04:45 AM    CREATININE 0.6 04/10/2024 04:55 AM    GFRAA >60 07/08/2022 12:12 PM    LABGLOM >90 04/12/2024 06:46 AM    GLUCOSE 223 04/12/2024 06:46 AM    
for increased safety/participation in ADL/IADL tasks  Splinting/positioning for increased function, prevention of contractures, and improve skin integrity  Therapeutic exercise to improve motor endurance, ROM, and functional strength for ADLs/functional transfers  Therapeutic activities to facilitate/challenge dynamic balance, stand tolerance for increased safety and independence with ADLs  Therapeutic activities to facilitate gross/fine motor skills for increased independence with ADLs  Positioning to improve skin integrity, interaction with environment and functional independence     Recommended Adaptive Equipment:  TBD      Home Living: Pt lives with spouse in a 1 1/2 story with 2 steps to enter with B HR.  B&B on main level.  Bathroom setup: walk in shower   Equipment owned: rollator, ww, shower seat     Prior Level of Function: Pt. admitted from [facility], where she had assist with ADLs , full assist with IADLs; ambulated minimally with device  Driving: not currently active  Occupation: none stated     Pain Level: Pt complained of R hip pain this session  Cognition: A&O: 4/4; Follows 1-2 step directions but easily distracted & demonstrates increased anxiety with movements or ax.              Memory:  F              Sequencing:  F              Problem solving:  F              Judgement/safety:  F                Functional Assessment:  AM-PAC Daily Activity Raw Score: 13/24    Initial Eval Status  Date: 4-16-24 Treatment Status  Date: 4/17/24 STGs = LTGs  Time frame: 10-14 days   Feeding Set up  Setup Mod I/ Ind   Grooming Min A bed level Andrew  Pt washed face, applied deodorant, combed hair seated EOB, assistance to maintain unsupported sitting balance Supervision    UB Dressing Min A bed level Andrew  [facility] gown seated EOB  Supervision    LB Dressing Dep Dependent  [facility] socks and abductor brace to R hip  Moderate Assist    Bathing Dep  maxA  Simulated Task    Pt able to wash of UB, 
internal fixation, right hip hematoma excisional irrigation and debridement on 02/13/2024    Recommendations:  Continue ertapenem 1g q24h for 6 weeks from 04/15-05/27.  Follow-up tissue cultures from 04/15.  Check stool C difficile toxin assay. Order was canceled by lab.   Maintain PICC care and monitor labs and schedule follow-up appointment per IV antibiotic protocol for OPAT as indicated on discharge instructions.  Follow up with me at [facility] on 05/23.  Follow up discharge planning.     Thank you for involving me in the care of Rebeka Renee. ID will continue to follow. Please do not hesitate to call for any questions or concerns.    Electronically signed by Polly Gilliam MD on 4/17/2024 at 11:08 AM  
SubCUTAneous TID WC    insulin lispro  0-4 Units SubCUTAneous Nightly    sodium chloride flush  5-40 mL IntraVENous 2 times per day    [Held by provider] apixaban  5 mg Oral BID    [Held by provider] aspirin  325 mg Oral BID    buPROPion  100 mg Oral Daily    Vitamin D  1,000 Units Oral Daily    gabapentin  800 mg Oral TID    levothyroxine  50 mcg Oral Daily    pantoprazole  40 mg Oral QAM AC    pramipexole  0.75 mg Oral BID    rosuvastatin  40 mg Oral Nightly    semaglutide (2 MG/DOSE)  2 mg SubCUTAneous Weekly    sucralfate  1 g Oral Daily    venlafaxine  150 mg Oral Daily    ceFAZolin  2,000 mg IntraVENous Q8H     PRN Meds: sodium chloride, glucose, dextrose bolus **OR** dextrose bolus, glucagon (rDNA), dextrose, sodium chloride flush, sodium chloride, potassium chloride **OR** potassium alternative oral replacement **OR** potassium chloride, magnesium sulfate, ondansetron **OR** ondansetron, polyethylene glycol, acetaminophen **OR** acetaminophen, morphine, oxyCODONE-acetaminophen    Labs:     Recent Labs     04/09/24  0515 04/10/24  0455 04/10/24  0655   WBC 5.9 6.2  --    HGB 7.8* 6.7* 7.0*   HCT 26.9* 23.0* 23.3*    257  --        Recent Labs     04/09/24  0515 04/10/24  0455    137   K 3.1* 4.4    98   CO2 31* 35*   BUN 8 15   CREATININE 0.5 0.6   CALCIUM 7.1* 8.3*       No results for input(s): \"PROT\", \"ALB\", \"ALKPHOS\", \"ALT\", \"AST\", \"BILITOT\", \"AMYLASE\", \"LIPASE\" in the last 72 hours.    Recent Labs     04/09/24  0909   INR 1.6       No results for input(s): \"CKTOTAL\", \"TROPONINI\" in the last 72 hours.    Chronic labs:    Lab Results   Component Value Date    CHOL 96 04/02/2024    TRIG 56 04/02/2024    HDL 56 04/02/2024    TSH 6.39 (H) 04/02/2024    INR 1.6 04/09/2024    LABA1C 4.4 04/02/2024       Radiology: REVIEWED DAILY    +++++++++++++++++++++++++++++++++++++++++++++++++  Ken Scott MD  [facility] 
dextrose bolus **OR** dextrose bolus, glucagon (rDNA), dextrose, sodium chloride flush, sodium chloride, potassium chloride **OR** potassium alternative oral replacement **OR** potassium chloride, magnesium sulfate, ondansetron **OR** ondansetron, polyethylene glycol, acetaminophen **OR** acetaminophen, morphine, oxyCODONE-acetaminophen    Labs:     Recent Labs     04/09/24  0515   WBC 5.9   HGB 7.8*   HCT 26.9*          Recent Labs     04/09/24  0515      K 3.1*      CO2 31*   BUN 8   CREATININE 0.5   CALCIUM 7.1*       No results for input(s): \"PROT\", \"ALB\", \"ALKPHOS\", \"ALT\", \"AST\", \"BILITOT\", \"AMYLASE\", \"LIPASE\" in the last 72 hours.    Recent Labs     04/09/24  0909   INR 1.6       No results for input(s): \"CKTOTAL\", \"TROPONINI\" in the last 72 hours.    Chronic labs:    Lab Results   Component Value Date    CHOL 96 04/02/2024    TRIG 56 04/02/2024    HDL 56 04/02/2024    TSH 6.39 (H) 04/02/2024    INR 1.6 04/09/2024    LABA1C 4.4 04/02/2024       Radiology: REVIEWED DAILY    +++++++++++++++++++++++++++++++++++++++++++++++++  Ken Scott MD  [facility] [place], [place]  +++++++++++++++++++++++++++++++++++++++++++++++++  NOTE: This report was transcribed using voice recognition software. Every effort was made to ensure accuracy; however, inadvertent computerized transcription errors may be present.       
reeducation [id number] 0 minutes    Patt Goodson PT, DPT  [id number]    
  XR HIP RIGHT (2-3 VIEWS)   Final Result   Superolateral dislocation of the right hip hemiarthroplasty.         XR HIP 2-3 VW W PELVIS RIGHT   Final Result   Posterolateral dislocation of the right hip with right hip arthroplasty   present.             Hospital Medications  Current Facility-Administered Medications   Medication Dose Route Frequency Provider Last Rate Last Admin    semaglutide (2 MG/DOSE) (OZEMPIC) sc injection 2 mg  (Patient Supplied)  2 mg SubCUTAneous Weekly Milanes Marino, Maria, MD        labetalol (NORMODYNE;TRANDATE) injection 10 mg  10 mg IntraVENous Q4H PRN Vitaly Lee MD   10 mg at 04/15/24 1158    ertapenem (INVanz) 1,000 mg in sodium chloride (PF) 0.9 % 10 mL IV syringe  1,000 mg IntraVENous Q24H Malcolm Corrales MD   1,000 mg at 04/15/24 1823    sennosides-docusate sodium (SENOKOT-S) 8.6-50 MG tablet 2 tablet  2 tablet Oral Daily Ken Scott MD   2 tablet at 04/15/24 0924    heparin (PF) 100 UNIT/ML injection 300 Units  3 mL IntraVENous 2 times per day Ken Scott MD   300 Units at 04/16/24 0849    heparin (PF) 100 UNIT/ML injection 300 Units  3 mL IntraVENous PRN Ken Scott MD   300 Units at 04/12/24 2052    0.9 % sodium chloride infusion   IntraVENous PRN Ken Scott MD        Vitamin D (CHOLECALCIFEROL) tablet 6,000 Units  6,000 Units Oral Daily Ken Scott MD   6,000 Units at 04/15/24 1836    Followed by    [START ON 4/17/2024] Vitamin D (CHOLECALCIFEROL) tablet 2,000 Units  2,000 Units Oral Daily Ken Scott MD        glucose chewable tablet 16 g  4 tablet Oral PRN Ken Scott MD        dextrose bolus 10% 125 mL  125 mL IntraVENous PRN Ken Scott MD        Or    dextrose bolus 10% 250 mL  250 mL IntraVENous PRN Ken Scott MD        glucagon injection 1 mg  1 mg SubCUTAneous PRN Ken Scott MD        dextrose 10 % infusion   IntraVENous Continuous PRN Ken Scott MD        insulin lispro (HUMALOG) injection vial 0-4 Units  
PRECAUTIONS INDICATED.    Resulting Agency [facility]        Susceptibility    Escherichia coli ESBL (1)    Antibiotic Interpretation Microscan Method Status   ceFAZolin Resistant >=64 BACTERIAL SUSCEPTIBILITY PANEL HERBER Final   cefepime Resistant 16 BACTERIAL SUSCEPTIBILITY PANEL HERBER Final   cefotaxime Resistant >=64 BACTERIAL SUSCEPTIBILITY PANEL HERBER Final   cefOXitin Sensitive <=4 BACTERIAL SUSCEPTIBILITY PANEL HERBER Final   cefTAZidime Sensitive 4 BACTERIAL SUSCEPTIBILITY PANEL HERBER Final   ciprofloxacin Sensitive 0.5 BACTERIAL SUSCEPTIBILITY PANEL HERBER Final   levofloxacin Intermediate 1 BACTERIAL SUSCEPTIBILITY PANEL HERBER Final   meropenem Sensitive <=0.25 BACTERIAL SUSCEPTIBILITY PANEL HERBER Final   piperacillin-tazobactam Sensitive <=4 BACTERIAL SUSCEPTIBILITY PANEL HERBER Final   trimethoprim-sulfamethoxazole Resistant >=320 BACTERIAL SUSCEPTIBILITY PANEL HERBER Final                  .TISSUE      Direct Exam Gram stain performed by tissue touch prep     NO Polymorphonuclear leukocytes     NO EPITHELIAL CELLS     RARE GRAM POSITIVE COCCI IN PAIRS Abnormal     Culture NO GROWTH 2 DAYS     Sed rate 20  CRP  7.0     Assessment:  Surgical site infection/right hip prosthetic joint infections, s/p irrigation and debridement of right hip wound with revision of femoral head component of hemiarthroplasty on 03/21, complicated by right hip dislocation x2 s/p closed reduction on 04/02 and on 04/08, s/p open reduction of right hip hemiarthroplasty with revision of femoral component on 04/11  History of right hip hemiarthroplasty in 01/04/2024 complicated by right hip dislocation and right femur greater trochanter fracture sustained from a fall s/p right hip prosthetic dislocation open reduction, right hip femur greater trochanter fracture open reduction with internal fixation, right hip hematoma excisional irrigation and debridement on 02/13/2024    Recommendations:      IV Ertapenem 1 gram q 24 hrs   Contact

## 2024-04-17 NOTE — PLAN OF CARE
Problem: Chronic Conditions and Co-morbidities  Goal: Patient's chronic conditions and co-morbidity symptoms are monitored and maintained or improved  Outcome: Progressing  Flowsheets (Taken 4/16/2024 2136)  Care Plan - Patient's Chronic Conditions and Co-Morbidity Symptoms are Monitored and Maintained or Improved: Monitor and assess patient's chronic conditions and comorbid symptoms for stability, deterioration, or improvement     Problem: Discharge Planning  Goal: Discharge to home or other facility with appropriate resources  Outcome: Progressing  Flowsheets (Taken 4/16/2024 2136)  Discharge to home or other facility with appropriate resources: Identify barriers to discharge with patient and caregiver     Problem: Pain  Goal: Verbalizes/displays adequate comfort level or baseline comfort level  Outcome: Progressing  Flowsheets (Taken 4/16/2024 2136)  Verbalizes/displays adequate comfort level or baseline comfort level: Encourage patient to monitor pain and request assistance     Problem: Nutrition Deficit:  Goal: Optimize nutritional status  Outcome: Progressing     Problem: Pain  Goal: Verbalizes/displays adequate comfort level or baseline comfort level  Recent Flowsheet Documentation  Taken 4/16/2024 2136 by Thomas Michelle RN  Verbalizes/displays adequate comfort level or baseline comfort level: Encourage patient to monitor pain and request assistance     Problem: Safety - Adult  Goal: Free from fall injury  Recent Flowsheet Documentation  Taken 4/16/2024 2136 by Thomas Michelle RN  Free From Fall Injury: Based on caregiver fall risk screen, instruct family/caregiver to ask for assistance with transferring infant if caregiver noted to have fall risk factors     Problem: ABCDS Injury Assessment  Goal: Absence of physical injury  Recent Flowsheet Documentation  Taken 4/16/2024 2136 by Thomas Michelle RN  Absence of Physical Injury: Implement safety measures based on patient assessment

## 2024-04-17 NOTE — DISCHARGE SUMMARY
[facility] Hospitalist Discharge Summary         Rebeka Renee  MRN: [id number]  Account Number: [id number]  Admitted: 4/8/2024  Discharge Date: 04/17/24    PCP Handoff    Recommended Outpatient Testing      Results Pending At Discharge          Clinical Summary  Rebeka Renee is a 78 y.o. female patient of Navneet Latif MD with history of hypertension, hyperlipidemia, diabetes, hypothyroidism, AAA, lung/kidney/breast cancer status post nephrectomy and lobectomy, CHF, CVA, fibromyalgia, anxiety and depression presented to [facility] on 4/8/2024 with concern of right hip pain and inability to ambulate.  While in the ER patient had x-ray of right hip that demonstrated superolateral dislocation of the right hip hemiarthroplasty.  Patient is admitted for further evaluation management.     Patient has had recent admission for right hip infection of surgical site and is currently on Ancef.  Patient was also admitted on 4/1/2024 for similar right hip dislocation and had closed reduction of right hip hemiarthroplasty on 4/2.  Patient was discharged to rehab on 4/5.     Recurrent right hip dislocation  Surgical site infection s/p I&D of right hip wound and revision of right femoral head hemiarthroplasty On 3/21:  S/p right hip hemiarthroplasty explantation with antibiotic spacer placement on 4/15/2024  Patient is s/p closed reduction of hemiarthroplasty on 4/2 and 4/8  Patient is s/p open reduction of right hip hemiarthroplasty with revision of femoral component on 4/11 during which no titus pus but some necrotic tissue and hematoma were noted.  Ancef discontinued, currently on ertapenem  Surgical culture growing gram-positive cocci in pair, culture from body fluid/synovial fluid grew ESBL  Repeat cultures from 4/15 pending  Patient cleared for discharge per ID on IV ertapenem.  Patient wants to be discharged home does not want to go to any rehab or nursing home.  Patient will be

## 2024-04-17 NOTE — PLAN OF CARE
Problem: Chronic Conditions and Co-morbidities  Goal: Patient's chronic conditions and co-morbidity symptoms are monitored and maintained or improved  4/17/2024 1226 by Nora Zamora RN  Outcome: Progressing  4/17/2024 0348 by Thomas Michelle RN  Outcome: Progressing  Flowsheets (Taken 4/16/2024 2136)  Care Plan - Patient's Chronic Conditions and Co-Morbidity Symptoms are Monitored and Maintained or Improved: Monitor and assess patient's chronic conditions and comorbid symptoms for stability, deterioration, or improvement     Problem: Discharge Planning  Goal: Discharge to home or other facility with appropriate resources  4/17/2024 1226 by Nora Zamora RN  Outcome: Progressing  4/17/2024 0348 by Thomas Michelle RN  Outcome: Progressing  Flowsheets (Taken 4/16/2024 2136)  Discharge to home or other facility with appropriate resources: Identify barriers to discharge with patient and caregiver     Problem: Pain  Goal: Verbalizes/displays adequate comfort level or baseline comfort level  4/17/2024 1226 by Nora Zamora RN  Outcome: Progressing  4/17/2024 0348 by Thomas Michelle RN  Outcome: Progressing  Flowsheets (Taken 4/16/2024 2136)  Verbalizes/displays adequate comfort level or baseline comfort level: Encourage patient to monitor pain and request assistance     Problem: Nutrition Deficit:  Goal: Optimize nutritional status  4/17/2024 1226 by Nora Zamora RN  Outcome: Progressing  4/17/2024 0348 by Thomas Michelle RN  Outcome: Progressing

## 2024-04-18 ENCOUNTER — TELEPHONE (OUTPATIENT)
Dept: ORTHOPEDIC SURGERY | Age: 79
End: 2024-04-18

## 2024-04-18 VITALS
HEART RATE: 88 BPM | OXYGEN SATURATION: 99 % | DIASTOLIC BLOOD PRESSURE: 62 MMHG | TEMPERATURE: 98.2 F | RESPIRATION RATE: 18 BRPM | SYSTOLIC BLOOD PRESSURE: 118 MMHG

## 2024-04-18 LAB
25(OH)D3 SERPL-MCNC: 17.6 NG/ML (ref 30–100)
ABO/RH: NORMAL
ALBUMIN SERPL-MCNC: 2.5 G/DL (ref 3.5–5.2)
ALP SERPL-CCNC: 185 U/L (ref 35–104)
ALT SERPL-CCNC: 29 U/L (ref 0–32)
ANION GAP SERPL CALCULATED.3IONS-SCNC: 11 MMOL/L (ref 7–16)
ANTIBODY SCREEN: NEGATIVE
ARM BAND NUMBER: NORMAL
AST SERPL-CCNC: 97 U/L (ref 0–31)
BILIRUB SERPL-MCNC: 0.3 MG/DL (ref 0–1.2)
BLOOD BANK BLOOD PRODUCT EXPIRATION DATE: NORMAL
BLOOD BANK DISPENSE STATUS: NORMAL
BLOOD BANK DISPENSE STATUS: NORMAL
BLOOD BANK ISBT PRODUCT BLOOD TYPE: 6200
BLOOD BANK PRODUCT CODE: NORMAL
BLOOD BANK SAMPLE EXPIRATION: NORMAL
BLOOD BANK UNIT TYPE AND RH: NORMAL
BPU ID: NORMAL
BPU ID: NORMAL
BUN SERPL-MCNC: 12 MG/DL (ref 6–23)
CALCIUM SERPL-MCNC: 8.5 MG/DL (ref 8.6–10.2)
CHLORIDE SERPL-SCNC: 98 MMOL/L (ref 98–107)
CHOLEST SERPL-MCNC: 82 MG/DL
CO2 SERPL-SCNC: 31 MMOL/L (ref 22–29)
COMPONENT: NORMAL
COMPONENT: NORMAL
CREAT SERPL-MCNC: 0.6 MG/DL (ref 0.5–1)
CROSSMATCH RESULT: NORMAL
CROSSMATCH RESULT: NORMAL
ERYTHROCYTE [DISTWIDTH] IN BLOOD BY AUTOMATED COUNT: 16.5 % (ref 11.5–15)
GFR SERPL CREATININE-BSD FRML MDRD: >90 ML/MIN/1.73M2
GLUCOSE SERPL-MCNC: 59 MG/DL (ref 74–99)
HBA1C MFR BLD: 4.6 % (ref 4–5.6)
HCT VFR BLD AUTO: 28.5 % (ref 34–48)
HDLC SERPL-MCNC: 41 MG/DL
HGB BLD-MCNC: 8.5 G/DL (ref 11.5–15.5)
LDLC SERPL CALC-MCNC: 26 MG/DL
MCH RBC QN AUTO: 26.4 PG (ref 26–35)
MCHC RBC AUTO-ENTMCNC: 29.8 G/DL (ref 32–34.5)
MCV RBC AUTO: 88.5 FL (ref 80–99.9)
PLATELET # BLD AUTO: 231 K/UL (ref 130–450)
PMV BLD AUTO: 10.5 FL (ref 7–12)
POTASSIUM SERPL-SCNC: 3.9 MMOL/L (ref 3.5–5)
PROT SERPL-MCNC: 4.9 G/DL (ref 6.4–8.3)
RBC # BLD AUTO: 3.22 M/UL (ref 3.5–5.5)
SODIUM SERPL-SCNC: 140 MMOL/L (ref 132–146)
TRANSFUSION STATUS: NORMAL
TRANSFUSION STATUS: NORMAL
TRIGL SERPL-MCNC: 76 MG/DL
TSH SERPL DL<=0.05 MIU/L-ACNC: 7.55 UIU/ML (ref 0.27–4.2)
UNIT DIVISION: 0
UNIT DIVISION: 0
UNIT ISSUE DATE/TIME: NORMAL
VLDLC SERPL CALC-MCNC: 15 MG/DL
WBC OTHER # BLD: 7.2 K/UL (ref 4.5–11.5)

## 2024-04-18 ASSESSMENT — ENCOUNTER SYMPTOMS
DIARRHEA: 0
NAUSEA: 0
ABDOMINAL PAIN: 0
SHORTNESS OF BREATH: 0
VOMITING: 0

## 2024-04-18 NOTE — TELEPHONE ENCOUNTER
Michaela Puentes from [facility] called.  Yesterday, Pt's wound vac became disconnected and was found on the floor, so they cannot reattach it.  She is requesting orders for patient's wound.    Date of Procedure: 4/15/2024     Procedure:  Complex explantation of right hip arthroplasty with implantation of right hip methylmethacrylate spacer impregnated with antibiotics     Surgeon:  Nemesio Handy MD

## 2024-04-18 NOTE — PROGRESS NOTES
History     Socioeconomic History    Marital status:    Tobacco Use    Smoking status: Former     Current packs/day: 0.00     Types: Cigarettes     Quit date: 1999     Years since quittin.3    Smokeless tobacco: Never    Tobacco comments:     Quit in .   Vaping Use    Vaping Use: Never used   Substance and Sexual Activity    Alcohol use: Yes     Alcohol/week: 1.0 standard drink of alcohol     Types: 1 Glasses of wine per week     Comment: occasional    Drug use: Yes     Types: Marijuana (Weed)     Comment: \"I am on medical marijuana\"     Social Determinants of Health     Food Insecurity: No Food Insecurity (2024)    Hunger Vital Sign     Worried About Running Out of Food in the Last Year: Never true     Ran Out of Food in the Last Year: Never true   Transportation Needs: No Transportation Needs (2024)    PRAPARE - Transportation     Lack of Transportation (Medical): No     Lack of Transportation (Non-Medical): No   Housing Stability: Low Risk  (2024)    Housing Stability Vital Sign     Unable to Pay for Housing in the Last Year: No     Number of Places Lived in the Last Year: 2     Unstable Housing in the Last Year: No        Patient stated she is status post total hip replacement done 2024 and had postop infection. She had surgery and a wound VAC placed. She had presented to [facility] with right hip pain and inability to ambulate. X-ray demonstrated superolateral dislocation of the right hip hemiarthroplasty. She was admitted for further management. She had closed reduction and wound culture showed gram-positive cocci in pair, culture from body fluid/synovial fluid grew ESBL.ID placed patient on IV ertapenem.  The patient is seen today for initial visit/medical evaluation, medication assessment, and admission to the rehab.         Current Outpatient Medications   Medication Sig Dispense Refill    polyethylene glycol (GLYCOLAX) 17 g packet Take 1 packet by mouth

## 2024-04-19 ENCOUNTER — OUTSIDE SERVICES (OUTPATIENT)
Dept: PRIMARY CARE CLINIC | Age: 79
End: 2024-04-19
Payer: MEDICARE

## 2024-04-19 DIAGNOSIS — R26.2 AMBULATORY DYSFUNCTION: ICD-10-CM

## 2024-04-19 DIAGNOSIS — R06.2 WHEEZING: ICD-10-CM

## 2024-04-19 DIAGNOSIS — E03.9 HYPOTHYROIDISM, UNSPECIFIED TYPE: ICD-10-CM

## 2024-04-19 DIAGNOSIS — J44.9 CHRONIC OBSTRUCTIVE PULMONARY DISEASE, UNSPECIFIED COPD TYPE (HCC): ICD-10-CM

## 2024-04-19 DIAGNOSIS — Z96.641 HISTORY OF TOTAL RIGHT HIP REPLACEMENT: ICD-10-CM

## 2024-04-19 DIAGNOSIS — R60.0 EDEMA OF RIGHT UPPER ARM: Primary | ICD-10-CM

## 2024-04-19 DIAGNOSIS — E11.9 TYPE 2 DIABETES MELLITUS WITHOUT COMPLICATION, UNSPECIFIED WHETHER LONG TERM INSULIN USE (HCC): ICD-10-CM

## 2024-04-19 DIAGNOSIS — R53.1 GENERALIZED WEAKNESS: ICD-10-CM

## 2024-04-19 DIAGNOSIS — Z86.73 HISTORY OF CEREBRAL INFARCTION: ICD-10-CM

## 2024-04-19 DIAGNOSIS — T81.40XD POSTOPERATIVE INFECTION, UNSPECIFIED TYPE, SUBSEQUENT ENCOUNTER: ICD-10-CM

## 2024-04-19 DIAGNOSIS — E78.5 HYPERLIPIDEMIA, UNSPECIFIED HYPERLIPIDEMIA TYPE: ICD-10-CM

## 2024-04-19 LAB
MICROORGANISM SPEC CULT: NORMAL
MICROORGANISM/AGENT SPEC: NORMAL
SPECIMEN DESCRIPTION: NORMAL

## 2024-04-19 PROCEDURE — 99309 SBSQ NF CARE MODERATE MDM 30: CPT | Performed by: INTERNAL MEDICINE

## 2024-04-20 PROBLEM — Z01.812 PRE-OPERATIVE LABORATORY EXAMINATION: Status: RESOLVED | Noted: 2024-03-21 | Resolved: 2024-04-20

## 2024-04-21 VITALS
TEMPERATURE: 98.3 F | HEART RATE: 78 BPM | OXYGEN SATURATION: 98 % | RESPIRATION RATE: 18 BRPM | DIASTOLIC BLOOD PRESSURE: 70 MMHG | WEIGHT: 188 LBS | BODY MASS INDEX: 31.28 KG/M2 | SYSTOLIC BLOOD PRESSURE: 130 MMHG

## 2024-04-21 LAB
MICROORGANISM SPEC CULT: NO GROWTH
MICROORGANISM SPEC CULT: NORMAL
MICROORGANISM/AGENT SPEC: NORMAL
SPECIMEN DESCRIPTION: NORMAL

## 2024-04-21 NOTE — PROGRESS NOTES
mouth nightly 30 tablet 3    potassium chloride (KLOR-CON M) 10 MEQ extended release tablet Take 1 tablet by mouth daily      buPROPion (WELLBUTRIN SR) 100 MG extended release tablet Take 1 tablet by mouth daily      Semaglutide, 2 MG/DOSE, 8 MG/3ML SOPN Inject 8 mg into the skin once a week Given Monday      omeprazole (PRILOSEC) 20 MG delayed release capsule Take 1 capsule by mouth daily      sucralfate (CARAFATE) 1 GM tablet Take 1 tablet by mouth daily      gabapentin (NEURONTIN) 800 MG tablet Take 1 tablet by mouth 3 times daily.      pramipexole (MIRAPEX) 0.75 MG tablet Take 1 tablet by mouth 2 times daily      levothyroxine (SYNTHROID) 50 MCG tablet Take 1 tablet by mouth Daily      venlafaxine (EFFEXOR-XR) 150 MG XR capsule Take 1 capsule by mouth daily       No current facility-administered medications for this visit.      Allergies   Allergen Reactions    Benadryl [Diphenhydramine] Hives     Tongue Swells    Other      benzodine  Tongue Swells    Sulfa Antibiotics Hives     Tongue Swells    Ciprofloxacin Hives    Tape [Adhesive Tape] Itching        Review of Systems   Constitutional:  Negative for chills and fever.   Respiratory:  Negative for shortness of breath.    Cardiovascular:  Negative for chest pain.   Gastrointestinal:  Negative for abdominal pain, diarrhea, nausea and vomiting.   Genitourinary:  Negative for dysuria and frequency.   Musculoskeletal:  Positive for gait problem.        Right upper extremity swelling   Neurological:  Positive for weakness. Negative for dizziness, light-headedness and headaches.          Objective   Physical Exam  Constitutional:       General: She is awake. She is not in acute distress.  HENT:      Head: Normocephalic and atraumatic.      Right Ear: External ear normal.      Left Ear: External ear normal.      Nose: Nose normal. No rhinorrhea.      Mouth/Throat:      Mouth: Mucous membranes are moist.   Eyes:      General: No scleral icterus.        Right eye: No

## 2024-04-22 ENCOUNTER — OUTSIDE SERVICES (OUTPATIENT)
Dept: PRIMARY CARE CLINIC | Age: 79
End: 2024-04-22

## 2024-04-22 DIAGNOSIS — E03.9 HYPOTHYROIDISM, UNSPECIFIED TYPE: ICD-10-CM

## 2024-04-22 DIAGNOSIS — G47.00 INSOMNIA, UNSPECIFIED TYPE: ICD-10-CM

## 2024-04-22 DIAGNOSIS — E11.9 TYPE 2 DIABETES MELLITUS WITHOUT COMPLICATION, UNSPECIFIED WHETHER LONG TERM INSULIN USE (HCC): ICD-10-CM

## 2024-04-22 DIAGNOSIS — R26.2 AMBULATORY DYSFUNCTION: ICD-10-CM

## 2024-04-22 DIAGNOSIS — R79.89 ABNORMAL LIVER FUNCTION TEST: ICD-10-CM

## 2024-04-22 DIAGNOSIS — Z86.73 HISTORY OF CEREBRAL INFARCTION: ICD-10-CM

## 2024-04-22 DIAGNOSIS — E88.09 HYPOALBUMINEMIA: ICD-10-CM

## 2024-04-22 DIAGNOSIS — T81.40XD POSTOPERATIVE INFECTION, UNSPECIFIED TYPE, SUBSEQUENT ENCOUNTER: ICD-10-CM

## 2024-04-22 DIAGNOSIS — E55.9 VITAMIN D DEFICIENCY: ICD-10-CM

## 2024-04-22 DIAGNOSIS — J44.9 CHRONIC OBSTRUCTIVE PULMONARY DISEASE, UNSPECIFIED COPD TYPE (HCC): ICD-10-CM

## 2024-04-22 DIAGNOSIS — E78.5 HYPERLIPIDEMIA, UNSPECIFIED HYPERLIPIDEMIA TYPE: ICD-10-CM

## 2024-04-22 DIAGNOSIS — R44.1 VISUAL HALLUCINATIONS: Primary | ICD-10-CM

## 2024-04-22 DIAGNOSIS — Z96.641 HISTORY OF TOTAL RIGHT HIP REPLACEMENT: ICD-10-CM

## 2024-04-22 DIAGNOSIS — R53.1 GENERALIZED WEAKNESS: ICD-10-CM

## 2024-04-22 DIAGNOSIS — F32.A DEPRESSION, UNSPECIFIED DEPRESSION TYPE: ICD-10-CM

## 2024-04-22 LAB
CRP SERPL HS-MCNC: <3 MG/L (ref 0–5)
ERYTHROCYTE [DISTWIDTH] IN BLOOD BY AUTOMATED COUNT: 17.7 % (ref 11.5–15)
ERYTHROCYTE [SEDIMENTATION RATE] IN BLOOD BY WESTERGREN METHOD: 15 MM/HR (ref 0–20)
HCT VFR BLD AUTO: 28.3 % (ref 34–48)
HGB BLD-MCNC: 8.3 G/DL (ref 11.5–15.5)
MCH RBC QN AUTO: 26.9 PG (ref 26–35)
MCHC RBC AUTO-ENTMCNC: 29.3 G/DL (ref 32–34.5)
MCV RBC AUTO: 91.9 FL (ref 80–99.9)
PLATELET # BLD AUTO: 158 K/UL (ref 130–450)
PMV BLD AUTO: 11.1 FL (ref 7–12)
PREALB SERPL-MCNC: 12 MG/DL (ref 20–40)
RBC # BLD AUTO: 3.08 M/UL (ref 3.5–5.5)
WBC OTHER # BLD: 7.8 K/UL (ref 4.5–11.5)

## 2024-04-24 LAB
AMMONIA PLAS-SCNC: 33 UMOL/L (ref 11–51)
ANION GAP SERPL CALCULATED.3IONS-SCNC: 13 MMOL/L (ref 7–16)
BUN SERPL-MCNC: 9 MG/DL (ref 6–23)
CALCIUM SERPL-MCNC: 8.6 MG/DL (ref 8.6–10.2)
CHLORIDE SERPL-SCNC: 100 MMOL/L (ref 98–107)
CO2 SERPL-SCNC: 26 MMOL/L (ref 22–29)
CREAT SERPL-MCNC: 0.7 MG/DL (ref 0.5–1)
GFR SERPL CREATININE-BSD FRML MDRD: >90 ML/MIN/1.73M2
GLUCOSE SERPL-MCNC: 72 MG/DL (ref 74–99)
MICROORGANISM SPEC CULT: NORMAL
MICROORGANISM/AGENT SPEC: NORMAL
POTASSIUM SERPL-SCNC: 4.6 MMOL/L (ref 3.5–5)
SODIUM SERPL-SCNC: 139 MMOL/L (ref 132–146)
SPECIMEN DESCRIPTION: NORMAL

## 2024-04-25 VITALS
BODY MASS INDEX: 31.28 KG/M2 | TEMPERATURE: 97.9 F | WEIGHT: 188 LBS | DIASTOLIC BLOOD PRESSURE: 82 MMHG | SYSTOLIC BLOOD PRESSURE: 152 MMHG | HEART RATE: 96 BPM | OXYGEN SATURATION: 96 % | RESPIRATION RATE: 18 BRPM

## 2024-04-25 LAB
ALBUMIN SERPL-MCNC: 2.6 G/DL (ref 3.5–5.2)
ALP SERPL-CCNC: 155 U/L (ref 35–104)
ALT SERPL-CCNC: 27 U/L (ref 0–32)
ANION GAP SERPL CALCULATED.3IONS-SCNC: 8 MMOL/L (ref 7–16)
AST SERPL-CCNC: 63 U/L (ref 0–31)
BASOPHILS # BLD: 0.04 K/UL (ref 0–0.2)
BASOPHILS NFR BLD: 0 % (ref 0–2)
BILIRUB SERPL-MCNC: 0.2 MG/DL (ref 0–1.2)
BUN SERPL-MCNC: 11 MG/DL (ref 6–23)
CALCIUM SERPL-MCNC: 8.5 MG/DL (ref 8.6–10.2)
CHLORIDE SERPL-SCNC: 102 MMOL/L (ref 98–107)
CO2 SERPL-SCNC: 30 MMOL/L (ref 22–29)
CREAT SERPL-MCNC: 0.7 MG/DL (ref 0.5–1)
EOSINOPHIL # BLD: 0.4 K/UL (ref 0.05–0.5)
EOSINOPHILS RELATIVE PERCENT: 4 % (ref 0–6)
ERYTHROCYTE [DISTWIDTH] IN BLOOD BY AUTOMATED COUNT: 17.6 % (ref 11.5–15)
GFR SERPL CREATININE-BSD FRML MDRD: 90 ML/MIN/1.73M2
GLUCOSE SERPL-MCNC: 125 MG/DL (ref 74–99)
HCT VFR BLD AUTO: 30.2 % (ref 34–48)
HGB BLD-MCNC: 8.8 G/DL (ref 11.5–15.5)
IMM GRANULOCYTES # BLD AUTO: 0.03 K/UL (ref 0–0.58)
IMM GRANULOCYTES NFR BLD: 0 % (ref 0–5)
LYMPHOCYTES NFR BLD: 3.14 K/UL (ref 1.5–4)
LYMPHOCYTES RELATIVE PERCENT: 34 % (ref 20–42)
MCH RBC QN AUTO: 26.7 PG (ref 26–35)
MCHC RBC AUTO-ENTMCNC: 29.1 G/DL (ref 32–34.5)
MCV RBC AUTO: 91.5 FL (ref 80–99.9)
MONOCYTES NFR BLD: 0.75 K/UL (ref 0.1–0.95)
MONOCYTES NFR BLD: 8 % (ref 2–12)
NEUTROPHILS NFR BLD: 53 % (ref 43–80)
NEUTS SEG NFR BLD: 4.84 K/UL (ref 1.8–7.3)
PLATELET # BLD AUTO: 280 K/UL (ref 130–450)
PMV BLD AUTO: 10.3 FL (ref 7–12)
POTASSIUM SERPL-SCNC: 4.6 MMOL/L (ref 3.5–5)
PROT SERPL-MCNC: 5.2 G/DL (ref 6.4–8.3)
RBC # BLD AUTO: 3.3 M/UL (ref 3.5–5.5)
SODIUM SERPL-SCNC: 140 MMOL/L (ref 132–146)
WBC OTHER # BLD: 9.2 K/UL (ref 4.5–11.5)

## 2024-04-25 ASSESSMENT — ENCOUNTER SYMPTOMS
ABDOMINAL PAIN: 0
DIARRHEA: 0
SHORTNESS OF BREATH: 0
NAUSEA: 0
VOMITING: 0

## 2024-04-25 NOTE — PROGRESS NOTES
Visit Date: 2024  Rebeka Renee (:  1945) is a 78 y.o. female.    Subjective   SUBJECTIVE/OBJECTIVE:  Past Medical History:   Diagnosis Date    Anemia     S/P chemotherapy.    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    Ascending aortic aneurysm (HCC)     seen on CTA chest w/contrast on     Asthma     Bipolar 1 disorder (HCC)     Cancer (HCC)     lung/ kidney    Cancer of breast, female (HCC) 2006    S/P bilaeral mastectomy with left breast lymph node resection and chemotherapy.    CHF (congestive heart failure) (HCC)     Chronic back pain     Depression     Depression with anxiety     Diabetes mellitus (HCC)     Resolved after losing 130 lbs. after gastric bypass surgery.    Dyslipidemia     Fibromyalgia     History of blood transfusion     anemia    Hypertension     Hypothyroidism     Neuropathy     Pericardial effusion 2010    Subxiphoid pericardial window with drainage of pericardial effusion and pericardial biopsy:  Fluid and biopsy negative for metastases.     Pleural effusion 2010    Noted on chest x-ray.    TIA (transient ischemic attack)     Tobacco abuse     Patient smoked 3 ppd x 26 years.  Quit in .    Type II or unspecified type diabetes mellitus without mention of complication, not stated as uncontrolled       Past Surgical History:   Procedure Laterality Date    BREAST SURGERY      CATARACT REMOVAL WITH IMPLANT Bilateral     CHOLECYSTECTOMY      GASTRIC BYPASS SURGERY  2004    HERNIA REPAIR      HIP SURGERY Right 2024    RIGHT HIP HEMIARTHROPLASTY performed by Nemesio Handy MD at [facility] [place]    HIP SURGERY Right 2024    RIGHT HIP CLOSED REDUCTION INTERNAL FIXATION performed by Matthew Peña DO at [facility] [place]    HIP SURGERY Right 2024    RIGHT HIP OPEN REDUCTION FEMORAL HEAD EXCHANGE WITH IRRIGATION AND DEBRIDEMENT performed by

## 2024-04-26 ENCOUNTER — OUTSIDE SERVICES (OUTPATIENT)
Dept: PRIMARY CARE CLINIC | Age: 79
End: 2024-04-26

## 2024-04-26 DIAGNOSIS — I50.9 CONGESTIVE HEART FAILURE, UNSPECIFIED HF CHRONICITY, UNSPECIFIED HEART FAILURE TYPE (HCC): ICD-10-CM

## 2024-04-26 DIAGNOSIS — J44.9 CHRONIC OBSTRUCTIVE PULMONARY DISEASE, UNSPECIFIED COPD TYPE (HCC): ICD-10-CM

## 2024-04-26 DIAGNOSIS — R53.1 GENERALIZED WEAKNESS: ICD-10-CM

## 2024-04-26 DIAGNOSIS — Z86.73 HISTORY OF CEREBRAL INFARCTION: ICD-10-CM

## 2024-04-26 DIAGNOSIS — R79.89 ABNORMAL LIVER FUNCTION TEST: ICD-10-CM

## 2024-04-26 DIAGNOSIS — R44.1 VISUAL HALLUCINATIONS: Primary | ICD-10-CM

## 2024-04-26 DIAGNOSIS — E11.9 TYPE 2 DIABETES MELLITUS WITHOUT COMPLICATION, UNSPECIFIED WHETHER LONG TERM INSULIN USE (HCC): ICD-10-CM

## 2024-04-26 DIAGNOSIS — F32.A DEPRESSION, UNSPECIFIED DEPRESSION TYPE: ICD-10-CM

## 2024-04-26 DIAGNOSIS — E88.09 HYPOALBUMINEMIA: ICD-10-CM

## 2024-04-26 DIAGNOSIS — R26.2 AMBULATORY DYSFUNCTION: ICD-10-CM

## 2024-04-26 DIAGNOSIS — E78.5 HYPERLIPIDEMIA, UNSPECIFIED HYPERLIPIDEMIA TYPE: ICD-10-CM

## 2024-04-26 DIAGNOSIS — G47.00 INSOMNIA, UNSPECIFIED TYPE: ICD-10-CM

## 2024-04-26 DIAGNOSIS — T81.40XD POSTOPERATIVE INFECTION, UNSPECIFIED TYPE, SUBSEQUENT ENCOUNTER: ICD-10-CM

## 2024-04-26 DIAGNOSIS — Z96.641 HISTORY OF HEMIARTHROPLASTY OF RIGHT HIP: Primary | ICD-10-CM

## 2024-04-26 DIAGNOSIS — E55.9 VITAMIN D DEFICIENCY: ICD-10-CM

## 2024-04-26 DIAGNOSIS — Z96.641 HISTORY OF TOTAL RIGHT HIP REPLACEMENT: ICD-10-CM

## 2024-04-26 DIAGNOSIS — E03.9 HYPOTHYROIDISM, UNSPECIFIED TYPE: ICD-10-CM

## 2024-04-28 VITALS
SYSTOLIC BLOOD PRESSURE: 172 MMHG | DIASTOLIC BLOOD PRESSURE: 88 MMHG | OXYGEN SATURATION: 92 % | HEART RATE: 98 BPM | BODY MASS INDEX: 31.28 KG/M2 | TEMPERATURE: 98.2 F | WEIGHT: 188 LBS | RESPIRATION RATE: 18 BRPM

## 2024-04-29 LAB
BASOPHILS # BLD: 0.04 K/UL (ref 0–0.2)
BASOPHILS NFR BLD: 1 % (ref 0–2)
CRP SERPL HS-MCNC: 36 MG/L (ref 0–5)
EOSINOPHIL # BLD: 0.54 K/UL (ref 0.05–0.5)
EOSINOPHILS RELATIVE PERCENT: 6 % (ref 0–6)
ERYTHROCYTE [DISTWIDTH] IN BLOOD BY AUTOMATED COUNT: 17.1 % (ref 11.5–15)
ERYTHROCYTE [SEDIMENTATION RATE] IN BLOOD BY WESTERGREN METHOD: 16 MM/HR (ref 0–20)
HCT VFR BLD AUTO: 31.7 % (ref 34–48)
HGB BLD-MCNC: 9.2 G/DL (ref 11.5–15.5)
IMM GRANULOCYTES # BLD AUTO: 0.03 K/UL (ref 0–0.58)
IMM GRANULOCYTES NFR BLD: 0 % (ref 0–5)
LYMPHOCYTES NFR BLD: 2.96 K/UL (ref 1.5–4)
LYMPHOCYTES RELATIVE PERCENT: 35 % (ref 20–42)
MCH RBC QN AUTO: 25.8 PG (ref 26–35)
MCHC RBC AUTO-ENTMCNC: 29 G/DL (ref 32–34.5)
MCV RBC AUTO: 89 FL (ref 80–99.9)
MONOCYTES NFR BLD: 0.61 K/UL (ref 0.1–0.95)
MONOCYTES NFR BLD: 7 % (ref 2–12)
NEUTROPHILS NFR BLD: 50 % (ref 43–80)
NEUTS SEG NFR BLD: 4.2 K/UL (ref 1.8–7.3)
PLATELET # BLD AUTO: 269 K/UL (ref 130–450)
PMV BLD AUTO: 10.5 FL (ref 7–12)
RBC # BLD AUTO: 3.56 M/UL (ref 3.5–5.5)
TSH SERPL DL<=0.05 MIU/L-ACNC: 1.57 UIU/ML (ref 0.27–4.2)
WBC OTHER # BLD: 8.4 K/UL (ref 4.5–11.5)

## 2024-04-30 ENCOUNTER — OFFICE VISIT (OUTPATIENT)
Dept: ORTHOPEDIC SURGERY | Age: 79
End: 2024-04-30
Payer: MEDICARE

## 2024-04-30 ENCOUNTER — HOSPITAL ENCOUNTER (OUTPATIENT)
Dept: GENERAL RADIOLOGY | Age: 79
Discharge: HOME | End: 2024-05-02
Payer: MEDICARE

## 2024-04-30 DIAGNOSIS — Z96.641 HISTORY OF HEMIARTHROPLASTY OF RIGHT HIP: Primary | ICD-10-CM

## 2024-04-30 DIAGNOSIS — T84.59XA PROSTHETIC HIP INFECTION, INITIAL ENCOUNTER (HCC): ICD-10-CM

## 2024-04-30 DIAGNOSIS — Z96.649 PROSTHETIC HIP INFECTION, INITIAL ENCOUNTER (HCC): ICD-10-CM

## 2024-04-30 DIAGNOSIS — Z96.641 HISTORY OF HEMIARTHROPLASTY OF RIGHT HIP: ICD-10-CM

## 2024-04-30 PROCEDURE — 73502 X-RAY EXAM HIP UNI 2-3 VIEWS: CPT

## 2024-04-30 PROCEDURE — 99213 OFFICE O/P EST LOW 20 MIN: CPT

## 2024-04-30 PROCEDURE — 99024 POSTOP FOLLOW-UP VISIT: CPT | Performed by: PHYSICIAN ASSISTANT

## 2024-04-30 RX ORDER — ACETAMINOPHEN 500 MG
1000 TABLET ORAL 2 TIMES DAILY
COMMUNITY

## 2024-04-30 RX ORDER — PANTOPRAZOLE SODIUM 20 MG/1
20 TABLET, DELAYED RELEASE ORAL DAILY
COMMUNITY

## 2024-04-30 RX ORDER — ARFORMOTEROL TARTRATE 15 UG/2ML
1 SOLUTION RESPIRATORY (INHALATION) 2 TIMES DAILY
COMMUNITY

## 2024-04-30 RX ORDER — PHENOL 1.4 %
1 AEROSOL, SPRAY (ML) MUCOUS MEMBRANE NIGHTLY
COMMUNITY

## 2024-04-30 RX ORDER — ATORVASTATIN CALCIUM 80 MG/1
80 TABLET, FILM COATED ORAL DAILY
COMMUNITY

## 2024-04-30 NOTE — PROGRESS NOTES
Patient did have negative x-ray, but continues to have some minor swelling posterior of the right knee  There was no laxity in the joint  Recommend consider orthopedic follow-up  This would be for the possible Baker cyst   We could alternatively do an ultrasound of the right knee  well-approximated with surrounding border of erythema most likely from adhesive used and the bordered gauze placed after her surgery although she does have adhesive allergy.  Sutures are intact.  Incision is dry, no drainage, no warmth or malodor.  Mild tenderness about the incision at the lateral hip.  Compartments supple throughout thigh and leg  Calf supple and nontender  Demonstrates active knee flexion/extension, ankle plantar/dorsiflexion/great toe extension.   States sensation intact to touch in sural/deep peroneal/superficial peroneal/saphenous/posterior tibial nerve distributions to foot/ankle.   Palpable dorsalis pedis and posterior tibialis pulses, cap refill brisk in toes, foot warm/perfused.           XR:   3 views of right hip and pelvis demonstrating antibiotic spacer hip that appears intact without interval displacement, loosening, or failure. No significant change in alignment. No acute fractures or dislocations or any other osseus abnormality identified.    Assessment:   Diagnosis Orders   1. History of hemiarthroplasty of right hip  XR HIP 2-3 VW W PELVIS RIGHT      2. Prosthetic hip infection, initial encounter (formerly [facility])  XR HIP 2-3 VW W PELVIS RIGHT          Plan:  Reviewed imaging with patient today in office     Weight Bearing: Weightbearing as tolerated to the right lower extremity for transfers only/short distances.  Use assistive devices and supervision.    Therapy: Continue PT/OT.  See above.  Wean from Urvashi lift.  Strict hip precautions.  No abduction, abduction, internal or external rotation, extension past neutral or flexion past 90 degrees.  Continue hip brace.  Keep toes pointed forward.    Pain control: per facility physician.  Ice, elevation if needed.    Incisional Care: sutures removed today in office.Continue to monitor for signs or symptoms of infection until skin has completely healed. Recommend thin layer of Vaseline 1-2x daily over incision line to aid in healing. Okay to

## 2024-04-30 NOTE — PATIENT INSTRUCTIONS
INSTRUCTIONS FOR FACILITY      Weight Bearing: Weightbearing as tolerated to the right lower extremity for transfers only/short distances.  Use assistive devices and supervision.    Therapy: Continue PT/OT.  See above.  Wean from Urvashi lift.  Strict hip precautions.  No abduction, abduction, internal or external rotation, extension past neutral or flexion past 90 degrees.  Continue hip brace.  Keep toes pointed forward.    Pain control: per facility physician.  Ice, elevation if needed.    Incisional Care: sutures removed today in office.Continue to monitor for signs or symptoms of infection until skin has completely healed. Recommend thin layer of Vaseline 1-2x daily over incision line to aid in healing. Okay to shower. No scrubbing near incision until skin has completely healed. Thoroughly pat dry with clean towel.     Patient does have adhesive allergy.  Do not recommend any adhesive.  Border surrounding incision at the right hip does have erythema, but likely from bordered adhesive gauze from surgery.  Continue to monitor until erythema resolves.    DVT Prophylaxis: Continue with Eliquis to treat known thrombus.    Continue antibiotics per infectious disease.  Recommend reaching out to infectious disease office to determine if patient is able to administer antibiotics independently at home.  Okay for discharge home with home health nursing and physical therapy from an orthopedic standpoint as long as infectious disease clears her for this as well.    Follow up: 4 weeks with orthopedics        Call with any questions or concerns.   [phone]

## 2024-05-01 LAB
MICROORGANISM SPEC CULT: NORMAL
MICROORGANISM/AGENT SPEC: NORMAL
SPECIMEN DESCRIPTION: NORMAL

## 2024-05-02 LAB
ALBUMIN SERPL-MCNC: 2.5 G/DL (ref 3.5–5.2)
ALP SERPL-CCNC: 174 U/L (ref 35–104)
ALT SERPL-CCNC: 44 U/L (ref 0–32)
ANION GAP SERPL CALCULATED.3IONS-SCNC: 7 MMOL/L (ref 7–16)
AST SERPL-CCNC: 70 U/L (ref 0–31)
BILIRUB SERPL-MCNC: 0.2 MG/DL (ref 0–1.2)
BUN SERPL-MCNC: 11 MG/DL (ref 6–23)
CALCIUM SERPL-MCNC: 8.8 MG/DL (ref 8.6–10.2)
CHLORIDE SERPL-SCNC: 106 MMOL/L (ref 98–107)
CO2 SERPL-SCNC: 27 MMOL/L (ref 22–29)
CREAT SERPL-MCNC: 0.7 MG/DL (ref 0.5–1)
ERYTHROCYTE [DISTWIDTH] IN BLOOD BY AUTOMATED COUNT: 17 % (ref 11.5–15)
GFR, ESTIMATED: >90 ML/MIN/1.73M2
GLUCOSE SERPL-MCNC: 68 MG/DL (ref 74–99)
HCT VFR BLD AUTO: 29.8 % (ref 34–48)
HGB BLD-MCNC: 8.8 G/DL (ref 11.5–15.5)
MCH RBC QN AUTO: 25.8 PG (ref 26–35)
MCHC RBC AUTO-ENTMCNC: 29.5 G/DL (ref 32–34.5)
MCV RBC AUTO: 87.4 FL (ref 80–99.9)
PLATELET # BLD AUTO: 260 K/UL (ref 130–450)
PMV BLD AUTO: 10.7 FL (ref 7–12)
POTASSIUM SERPL-SCNC: 5.3 MMOL/L (ref 3.5–5)
PROT SERPL-MCNC: 5.4 G/DL (ref 6.4–8.3)
RBC # BLD AUTO: 3.41 M/UL (ref 3.5–5.5)
SODIUM SERPL-SCNC: 140 MMOL/L (ref 132–146)
WBC OTHER # BLD: 7.6 K/UL (ref 4.5–11.5)

## 2024-05-03 ENCOUNTER — HOSPITAL ENCOUNTER (EMERGENCY)
Age: 79
Discharge: HOME | End: 2024-05-04
Attending: EMERGENCY MEDICINE
Payer: MEDICARE

## 2024-05-03 ENCOUNTER — OUTSIDE SERVICES (OUTPATIENT)
Dept: PRIMARY CARE CLINIC | Age: 79
End: 2024-05-03

## 2024-05-03 ENCOUNTER — APPOINTMENT (OUTPATIENT)
Dept: GENERAL RADIOLOGY | Age: 79
End: 2024-05-03
Payer: MEDICARE

## 2024-05-03 DIAGNOSIS — Z96.641 HISTORY OF TOTAL RIGHT HIP REPLACEMENT: ICD-10-CM

## 2024-05-03 DIAGNOSIS — I50.9 CONGESTIVE HEART FAILURE, UNSPECIFIED HF CHRONICITY, UNSPECIFIED HEART FAILURE TYPE (HCC): ICD-10-CM

## 2024-05-03 DIAGNOSIS — I10 PRIMARY HYPERTENSION: ICD-10-CM

## 2024-05-03 DIAGNOSIS — E03.9 HYPOTHYROIDISM, UNSPECIFIED TYPE: ICD-10-CM

## 2024-05-03 DIAGNOSIS — E11.40 CONTROLLED TYPE 2 DIABETES MELLITUS WITH DIABETIC NEUROPATHY, WITH LONG-TERM CURRENT USE OF INSULIN (HCC): ICD-10-CM

## 2024-05-03 DIAGNOSIS — S73.004D HIP DISLOCATION, RIGHT, SUBSEQUENT ENCOUNTER: Primary | ICD-10-CM

## 2024-05-03 DIAGNOSIS — T81.40XD POSTOPERATIVE INFECTION, UNSPECIFIED TYPE, SUBSEQUENT ENCOUNTER: ICD-10-CM

## 2024-05-03 DIAGNOSIS — E78.5 HYPERLIPIDEMIA, UNSPECIFIED HYPERLIPIDEMIA TYPE: ICD-10-CM

## 2024-05-03 DIAGNOSIS — Z86.73 HISTORY OF CEREBRAL INFARCTION: ICD-10-CM

## 2024-05-03 DIAGNOSIS — S73.004A CLOSED DISLOCATION OF RIGHT HIP, INITIAL ENCOUNTER (HCC): Primary | ICD-10-CM

## 2024-05-03 DIAGNOSIS — R53.1 GENERALIZED WEAKNESS: ICD-10-CM

## 2024-05-03 DIAGNOSIS — R79.89 ABNORMAL LIVER FUNCTION TEST: ICD-10-CM

## 2024-05-03 DIAGNOSIS — J44.9 CHRONIC OBSTRUCTIVE PULMONARY DISEASE, UNSPECIFIED COPD TYPE (HCC): ICD-10-CM

## 2024-05-03 DIAGNOSIS — Z79.4 CONTROLLED TYPE 2 DIABETES MELLITUS WITH DIABETIC NEUROPATHY, WITH LONG-TERM CURRENT USE OF INSULIN (HCC): ICD-10-CM

## 2024-05-03 PROCEDURE — 6370000000 HC RX 637 (ALT 250 FOR IP)

## 2024-05-03 PROCEDURE — 73502 X-RAY EXAM HIP UNI 2-3 VIEWS: CPT

## 2024-05-03 PROCEDURE — 73590 X-RAY EXAM OF LOWER LEG: CPT

## 2024-05-03 PROCEDURE — 73562 X-RAY EXAM OF KNEE 3: CPT

## 2024-05-03 PROCEDURE — 99285 EMERGENCY DEPT VISIT HI MDM: CPT

## 2024-05-03 PROCEDURE — 96374 THER/PROPH/DIAG INJ IV PUSH: CPT

## 2024-05-03 PROCEDURE — 73552 X-RAY EXAM OF FEMUR 2/>: CPT

## 2024-05-03 PROCEDURE — 27265 TREAT HIP DISLOCATION: CPT

## 2024-05-03 PROCEDURE — 6360000002 HC RX W HCPCS: Performed by: EMERGENCY MEDICINE

## 2024-05-03 PROCEDURE — 6360000002 HC RX W HCPCS

## 2024-05-03 RX ORDER — FENTANYL CITRATE 50 UG/ML
50 INJECTION, SOLUTION INTRAMUSCULAR; INTRAVENOUS ONCE
Status: DISCONTINUED | OUTPATIENT
Start: 2024-05-03 | End: 2024-05-04 | Stop reason: HOSPADM

## 2024-05-03 RX ORDER — PROPOFOL 10 MG/ML
INJECTION, EMULSION INTRAVENOUS CONTINUOUS PRN
Status: COMPLETED | OUTPATIENT
Start: 2024-05-03 | End: 2024-05-03

## 2024-05-03 RX ORDER — FENTANYL CITRATE 50 UG/ML
25 INJECTION, SOLUTION INTRAMUSCULAR; INTRAVENOUS ONCE
Status: COMPLETED | OUTPATIENT
Start: 2024-05-03 | End: 2024-05-03

## 2024-05-03 RX ORDER — ACETAMINOPHEN 325 MG/1
650 TABLET ORAL ONCE
Status: COMPLETED | OUTPATIENT
Start: 2024-05-03 | End: 2024-05-03

## 2024-05-03 RX ADMIN — FENTANYL CITRATE 25 MCG: 50 INJECTION INTRAMUSCULAR; INTRAVENOUS at 22:32

## 2024-05-03 RX ADMIN — PROPOFOL 10 MG: 10 INJECTION, EMULSION INTRAVENOUS at 22:05

## 2024-05-03 RX ADMIN — PROPOFOL 40 MG: 10 INJECTION, EMULSION INTRAVENOUS at 22:02

## 2024-05-03 RX ADMIN — PROPOFOL 20 MG: 10 INJECTION, EMULSION INTRAVENOUS at 22:04

## 2024-05-03 RX ADMIN — PROPOFOL 10 MG: 10 INJECTION, EMULSION INTRAVENOUS at 22:09

## 2024-05-03 RX ADMIN — PROPOFOL 15 MG: 10 INJECTION, EMULSION INTRAVENOUS at 22:06

## 2024-05-03 RX ADMIN — PROPOFOL 85.3 MG: 10 INJECTION, EMULSION INTRAVENOUS at 22:02

## 2024-05-03 RX ADMIN — ACETAMINOPHEN 650 MG: 325 TABLET ORAL at 21:26

## 2024-05-03 ASSESSMENT — PAIN DESCRIPTION - LOCATION
LOCATION: HIP

## 2024-05-03 ASSESSMENT — PAIN DESCRIPTION - ORIENTATION
ORIENTATION: LEFT
ORIENTATION: RIGHT
ORIENTATION: RIGHT;LEFT

## 2024-05-03 ASSESSMENT — PAIN SCALES - GENERAL
PAINLEVEL_OUTOF10: 10

## 2024-05-03 ASSESSMENT — PAIN - FUNCTIONAL ASSESSMENT: PAIN_FUNCTIONAL_ASSESSMENT: 0-10

## 2024-05-04 VITALS
WEIGHT: 188 LBS | OXYGEN SATURATION: 97 % | TEMPERATURE: 98.4 F | BODY MASS INDEX: 31.32 KG/M2 | RESPIRATION RATE: 25 BRPM | HEART RATE: 85 BPM | SYSTOLIC BLOOD PRESSURE: 162 MMHG | HEIGHT: 65 IN | DIASTOLIC BLOOD PRESSURE: 87 MMHG

## 2024-05-04 LAB
ANION GAP SERPL CALCULATED.3IONS-SCNC: 7 MMOL/L (ref 7–16)
BUN SERPL-MCNC: 8 MG/DL (ref 6–23)
CALCIUM SERPL-MCNC: 8.3 MG/DL (ref 8.6–10.2)
CHLORIDE SERPL-SCNC: 102 MMOL/L (ref 98–107)
CO2 SERPL-SCNC: 29 MMOL/L (ref 22–29)
CREAT SERPL-MCNC: 0.6 MG/DL (ref 0.5–1)
GFR, ESTIMATED: >90 ML/MIN/1.73M2
GLUCOSE SERPL-MCNC: 68 MG/DL (ref 74–99)
POTASSIUM SERPL-SCNC: 4.3 MMOL/L (ref 3.5–5)
SODIUM SERPL-SCNC: 138 MMOL/L (ref 132–146)

## 2024-05-04 NOTE — CONSULTS
Department of Orthopedic Surgery  Resident Consult Note          Reason for Consult: Right periprosthetic hip dislocation    HISTORY OF PRESENT ILLNESS:       Patient is a 78 y.o. female who presents with right periprosthetic hip dislocation.  Patient is known to us.  Patient has had multiple surgeries with [facility] orthopedic physicians for repeat hip dislocations after right hip hemiarthroplasty for right femoral neck fracture.  Patient subsequently had right hip hemiarthroplasty explant with placement of antibiotic spacer on 4/15/2024 with Dr Handy.  Patient comes in today complaining that after rolling over in bed last night she was unable to ambulate this morning and had severe pain to her right hip.  Patient states that she has been nonweightbearing except for standing up for transfers during physical therapy.  Patient denies any problems and states she has been very strict with her limitations given to her after her last surgery.  Patient has full numbness and tingling of right lower extremity distal to the knee.  Patient states this is normal at baseline and is the same on the left side.  Denies any other orthopedic complaints at this time.      Past Medical History:        Diagnosis Date    Anemia     S/P chemotherapy.    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    Ascending aortic aneurysm (HCC)     seen on CTA chest w/contrast on 2019    Asthma     Bipolar 1 disorder (HCC)     Cancer (HCC)     lung/ kidney    Cancer of breast, female (HCC) 01/2006    S/P bilaeral mastectomy with left breast lymph node resection and chemotherapy.    CHF (congestive heart failure) (HCC)     Chronic back pain     Depression     Depression with anxiety     Diabetes mellitus (HCC)     Resolved after losing 130 lbs. after gastric bypass surgery.    Dyslipidemia     Fibromyalgia     History of blood transfusion      leg, knee, thigh, hip.-- Patient able to flex/extend toes, ankle, knee and hip with active and passive ROM without pain,+2/4 DP & PT pulses, cap refill <3sec, +5/5 PF/DF/EHL, distal sensation grossly intact to L4-S1 dermatomes, compartments soft and compressible.        DATA:    CBC:   Lab Results   Component Value Date/Time    WBC 7.6 05/02/2024 02:38 AM    RBC 3.41 05/02/2024 02:38 AM    HGB 8.8 05/02/2024 02:38 AM    HCT 29.8 05/02/2024 02:38 AM    MCV 87.4 05/02/2024 02:38 AM    MCH 25.8 05/02/2024 02:38 AM    MCHC 29.5 05/02/2024 02:38 AM    RDW 17.0 05/02/2024 02:38 AM     05/02/2024 02:38 AM    MPV 10.7 05/02/2024 02:38 AM     PT/INR:    Lab Results   Component Value Date/Time    PROTIME 17.6 04/09/2024 09:09 AM    PROTIME 10.4 12/13/2010 09:00 AM    INR 1.6 04/09/2024 09:09 AM     Procedure: Sedation and reduction  After informed consent was obtained patient was properly sedated by ED staff once properly sedated right hip was reduced.  Imaging was then taken to ensure proper reduction including AP and crosstable lateral.  Hip was in correct positioning within the acetabulum.  Well-padded knee immobilizer was then placed.  Repeat imaging was then taken to ensure maintenance of reduction.  Patient tolerated this well.    Radiology Review:  X-ray right hip/femur/tibia and fibula: Demonstrate status post right hip cement spacer placement that is completely dislocated and riding significantly superior to the acetabular cup.  Cement spacer appears well-placed no signs of breakage or fracture.  No other abnormalities noted    X-ray right hip postreduction: Demonstrates properly located antibiotic cement spacer with an the acetabulum, the cement spacer head does appear significantly smaller than the acetabulum.  No other abnormalities noted.    IMPRESSION:  Right periprosthetic hip dislocation    PLAN:  Plan discussed with patient all patient's questions answered their satisfaction  Discussed with patient

## 2024-05-04 NOTE — ED PROVIDER NOTES
[facility] EMERGENCY DEPARTMENT  EMERGENCY DEPARTMENT ENCOUNTER        Pt Name: Rebeka Renee  MRN: [id number]  Birthdate 1945  Date of evaluation: 5/3/2024  Provider: Nasra Doran MD  PCP: Navneet Latif MD  Note Started: 8:43 PM EDT 5/3/24    CHIEF COMPLAINT       Chief Complaint   Patient presents with    Hip Pain     (Patient states last night she rolled over in bed her body went one way and R hip went another way, unable to stand on it today, facility xray showed dislocated R hip)       HISTORY OF PRESENT ILLNESS: 1 or more Elements   History From: Patient    Limitations to history : None    Rebeka Renee is a 78 y.o. female who [facility] for right hip/knee pain.  Patient had recent admission on 4/8/2022 for close dislocation of right hip and infection of prosthetic hip joint.  Patient underwent surgery on 4/11/2024 for right hip open reduction femoral head exchange with irrigation and debridement.  She is currently on ertapenem.  She is usually able to participate with physical therapy and walks somewhat however this morning she was unable to.  She is unsure if something happened overnight while sleeping.  She had an x-ray done at her facility that showed possible dislocation of hip.  The patient endorses more pain in the right knee than the hip.  No other associated symptoms such as fever, chest pain, shortness of breath, abdominal pain, back pain.  She is on 2 L of oxygen at baseline.    Nursing Notes were all reviewed and agreed with or any disagreements were addressed in the HPI.        REVIEW OF EXTERNAL NOTE :       Patient was seen on 4/8/2024 and admitted for right hip prosthetic infection and dislocation and underwent surgery on 4/11/2024    REVIEW OF SYSTEMS :           Positives and Pertinent negatives as per HPI.     SURGICAL HISTORY     Past Surgical History:   Procedure Laterality Date    BREAST  98.4 °F (36.9 °C) Oral 89 12 97 % 1.651 m (5' 5\") 85.3 kg (188 lb)       Constitutional/General: Alert and oriented x3. No acute distress  Head: Normocephalic and atraumatic  Eyes: PERRL, EOMI, conjunctiva normal, sclera non icteric  ENT:  Oropharynx clear, handling secretions  Neck: Supple, full ROM, no stridor, no meningeal signs  Respiratory: Lungs clear to auscultation bilaterally, no wheezes, rales, or rhonchi. Not in respiratory distress  Cardiovascular:  Regular rate. Regular rhythm.   GI:  Abdomen Soft, Non tender, Non distended.  No rebound, guarding, or rigidity.   Musculoskeletal: Moves all extremities x 4. Warm and well perfused, no clubbing, no cyanosis.  Right hip incision with erythema.  No tenderness to palpation of right hip.  Tenderness to palpation of right knee.  Right lower extremity appears shorter than left.  No abnormalities of left lower extremity.  Integument: skin warm and dry. No rashes.   Neurologic: GCS 15  Psychiatric: Normal Affect            DIAGNOSTIC RESULTS   LABS:    Labs Reviewed - No data to display    As interpreted by me, the above displayed labs are abnormal. All other labs obtained during this visit were within normal range or not returned as of this dictation.      EKG Interpretation  Interpreted by emergency department physician, MD STEWART Vo      RADIOLOGY:   Non-plain film images such as CT, Ultrasound and MRI are read by the radiologist. Plain radiographic images are visualized and preliminarily interpreted by the ED Provider with the below findings:    XR right hip shows dislocation     Interpretation per the Radiologist below, if available at the time of this note:    XR HIP RIGHT (2-3 VIEWS)   Final Result   1.  There is an acute displaced fracture of the right hip prosthesis.  The   fracture is posteriorly displaced migrating superolateral to the right   acetabulum.      2.  No acute fracture seen in the right acetabulum.      3.  No acute fracture

## 2024-05-06 ENCOUNTER — TELEPHONE (OUTPATIENT)
Dept: ORTHOPEDIC SURGERY | Age: 79
End: 2024-05-06

## 2024-05-06 ENCOUNTER — OUTSIDE SERVICES (OUTPATIENT)
Dept: PRIMARY CARE CLINIC | Age: 79
End: 2024-05-06

## 2024-05-06 DIAGNOSIS — R79.89 ABNORMAL LIVER FUNCTION TEST: ICD-10-CM

## 2024-05-06 DIAGNOSIS — Z86.73 HISTORY OF CEREBRAL INFARCTION: ICD-10-CM

## 2024-05-06 DIAGNOSIS — R26.2 AMBULATORY DYSFUNCTION: ICD-10-CM

## 2024-05-06 DIAGNOSIS — Z96.641 HISTORY OF TOTAL RIGHT HIP REPLACEMENT: ICD-10-CM

## 2024-05-06 DIAGNOSIS — I10 PRIMARY HYPERTENSION: ICD-10-CM

## 2024-05-06 DIAGNOSIS — T81.40XD POSTOPERATIVE INFECTION, UNSPECIFIED TYPE, SUBSEQUENT ENCOUNTER: ICD-10-CM

## 2024-05-06 DIAGNOSIS — I50.9 CONGESTIVE HEART FAILURE, UNSPECIFIED HF CHRONICITY, UNSPECIFIED HEART FAILURE TYPE (HCC): ICD-10-CM

## 2024-05-06 DIAGNOSIS — E11.40 CONTROLLED TYPE 2 DIABETES MELLITUS WITH DIABETIC NEUROPATHY, WITH LONG-TERM CURRENT USE OF INSULIN (HCC): ICD-10-CM

## 2024-05-06 DIAGNOSIS — S73.004D HIP DISLOCATION, RIGHT, SUBSEQUENT ENCOUNTER: Primary | ICD-10-CM

## 2024-05-06 DIAGNOSIS — E03.9 HYPOTHYROIDISM, UNSPECIFIED TYPE: ICD-10-CM

## 2024-05-06 DIAGNOSIS — Z79.4 CONTROLLED TYPE 2 DIABETES MELLITUS WITH DIABETIC NEUROPATHY, WITH LONG-TERM CURRENT USE OF INSULIN (HCC): ICD-10-CM

## 2024-05-06 DIAGNOSIS — Z90.5 H/O UNILATERAL NEPHRECTOMY: ICD-10-CM

## 2024-05-06 DIAGNOSIS — J44.9 CHRONIC OBSTRUCTIVE PULMONARY DISEASE, UNSPECIFIED COPD TYPE (HCC): ICD-10-CM

## 2024-05-06 LAB
ALBUMIN SERPL-MCNC: 2.1 G/DL (ref 3.5–5.2)
ALP SERPL-CCNC: 161 U/L (ref 35–104)
ALT SERPL-CCNC: 27 U/L (ref 0–32)
ANION GAP SERPL CALCULATED.3IONS-SCNC: 13 MMOL/L (ref 7–16)
AST SERPL-CCNC: 39 U/L (ref 0–31)
BILIRUB SERPL-MCNC: 0.2 MG/DL (ref 0–1.2)
BUN SERPL-MCNC: 10 MG/DL (ref 6–23)
CALCIUM SERPL-MCNC: 8.7 MG/DL (ref 8.6–10.2)
CHLORIDE SERPL-SCNC: 103 MMOL/L (ref 98–107)
CO2 SERPL-SCNC: 21 MMOL/L (ref 22–29)
CREAT SERPL-MCNC: 0.6 MG/DL (ref 0.5–1)
CRP SERPL HS-MCNC: 124 MG/L (ref 0–5)
ERYTHROCYTE [DISTWIDTH] IN BLOOD BY AUTOMATED COUNT: 17 % (ref 11.5–15)
ERYTHROCYTE [SEDIMENTATION RATE] IN BLOOD BY WESTERGREN METHOD: 30 MM/HR (ref 0–20)
GFR, ESTIMATED: >90 ML/MIN/1.73M2
GLUCOSE SERPL-MCNC: 58 MG/DL (ref 74–99)
HCT VFR BLD AUTO: 30.7 % (ref 34–48)
HGB BLD-MCNC: 9 G/DL (ref 11.5–15.5)
MCH RBC QN AUTO: 25.9 PG (ref 26–35)
MCHC RBC AUTO-ENTMCNC: 29.3 G/DL (ref 32–34.5)
MCV RBC AUTO: 88.5 FL (ref 80–99.9)
PLATELET # BLD AUTO: 120 K/UL (ref 130–450)
PMV BLD AUTO: 10.4 FL (ref 7–12)
POTASSIUM SERPL-SCNC: 5.4 MMOL/L (ref 3.5–5)
PROT SERPL-MCNC: 5.2 G/DL (ref 6.4–8.3)
RBC # BLD AUTO: 3.47 M/UL (ref 3.5–5.5)
SODIUM SERPL-SCNC: 137 MMOL/L (ref 132–146)
WBC OTHER # BLD: 8.6 K/UL (ref 4.5–11.5)

## 2024-05-06 NOTE — TELEPHONE ENCOUNTER
Chioma with [facility] called. She states pt was seen in the ED over the weekend and was sent back with a knee immobilizer.  Pt also has a hip brace.  PT is asking which brace the pt should be wearing.    ED visit: 5/3/24    LOV: 4/30/24    Future Appointments   Date Time Provider Department Center   5/15/2024  1:00 PM Nemesio Handy MD [facility]   5/23/2024  1:30 PM Polly Gilliam MD [facility]

## 2024-05-07 LAB — POTASSIUM SERPL-SCNC: 4.3 MMOL/L (ref 3.5–5)

## 2024-05-07 NOTE — TELEPHONE ENCOUNTER
Spoke to Erum at [facility] and gave instructions. Verbalized understanding

## 2024-05-08 ENCOUNTER — OUTSIDE SERVICES (OUTPATIENT)
Dept: PRIMARY CARE CLINIC | Age: 79
End: 2024-05-08

## 2024-05-08 DIAGNOSIS — I10 PRIMARY HYPERTENSION: ICD-10-CM

## 2024-05-08 DIAGNOSIS — T81.40XD POSTOPERATIVE INFECTION, UNSPECIFIED TYPE, SUBSEQUENT ENCOUNTER: ICD-10-CM

## 2024-05-08 DIAGNOSIS — R26.2 AMBULATORY DYSFUNCTION: ICD-10-CM

## 2024-05-08 DIAGNOSIS — Z79.4 CONTROLLED TYPE 2 DIABETES MELLITUS WITH DIABETIC NEUROPATHY, WITH LONG-TERM CURRENT USE OF INSULIN (HCC): ICD-10-CM

## 2024-05-08 DIAGNOSIS — J44.9 CHRONIC OBSTRUCTIVE PULMONARY DISEASE, UNSPECIFIED COPD TYPE (HCC): ICD-10-CM

## 2024-05-08 DIAGNOSIS — E11.40 CONTROLLED TYPE 2 DIABETES MELLITUS WITH DIABETIC NEUROPATHY, WITH LONG-TERM CURRENT USE OF INSULIN (HCC): ICD-10-CM

## 2024-05-08 DIAGNOSIS — R79.89 ABNORMAL LIVER FUNCTION TEST: ICD-10-CM

## 2024-05-08 DIAGNOSIS — Z96.641 HISTORY OF TOTAL RIGHT HIP REPLACEMENT: ICD-10-CM

## 2024-05-08 DIAGNOSIS — E03.9 HYPOTHYROIDISM, UNSPECIFIED TYPE: ICD-10-CM

## 2024-05-08 DIAGNOSIS — I50.9 CONGESTIVE HEART FAILURE, UNSPECIFIED HF CHRONICITY, UNSPECIFIED HEART FAILURE TYPE (HCC): ICD-10-CM

## 2024-05-08 DIAGNOSIS — Z86.73 HISTORY OF CEREBRAL INFARCTION: ICD-10-CM

## 2024-05-08 DIAGNOSIS — Z90.5 H/O UNILATERAL NEPHRECTOMY: ICD-10-CM

## 2024-05-08 DIAGNOSIS — S73.004D HIP DISLOCATION, RIGHT, SUBSEQUENT ENCOUNTER: Primary | ICD-10-CM

## 2024-05-08 LAB
MICROORGANISM SPEC CULT: NORMAL
MICROORGANISM/AGENT SPEC: NORMAL
SPECIMEN DESCRIPTION: NORMAL

## 2024-05-09 LAB
ALBUMIN SERPL-MCNC: 2.1 G/DL (ref 3.5–5.2)
ALP SERPL-CCNC: 166 U/L (ref 35–104)
ALT SERPL-CCNC: 41 U/L (ref 0–32)
ANION GAP SERPL CALCULATED.3IONS-SCNC: 8 MMOL/L (ref 7–16)
AST SERPL-CCNC: 62 U/L (ref 0–31)
BILIRUB SERPL-MCNC: <0.2 MG/DL (ref 0–1.2)
BUN SERPL-MCNC: 6 MG/DL (ref 6–23)
CALCIUM SERPL-MCNC: 8.4 MG/DL (ref 8.6–10.2)
CHLORIDE SERPL-SCNC: 102 MMOL/L (ref 98–107)
CO2 SERPL-SCNC: 28 MMOL/L (ref 22–29)
CREAT SERPL-MCNC: 0.5 MG/DL (ref 0.5–1)
ERYTHROCYTE [DISTWIDTH] IN BLOOD BY AUTOMATED COUNT: 16.5 % (ref 11.5–15)
GFR, ESTIMATED: >90 ML/MIN/1.73M2
GLUCOSE SERPL-MCNC: 63 MG/DL (ref 74–99)
HCT VFR BLD AUTO: 30.3 % (ref 34–48)
HGB BLD-MCNC: 9 G/DL (ref 11.5–15.5)
MCH RBC QN AUTO: 25.8 PG (ref 26–35)
MCHC RBC AUTO-ENTMCNC: 29.7 G/DL (ref 32–34.5)
MCV RBC AUTO: 86.8 FL (ref 80–99.9)
PLATELET # BLD AUTO: 328 K/UL (ref 130–450)
PMV BLD AUTO: 10.2 FL (ref 7–12)
POTASSIUM SERPL-SCNC: 4 MMOL/L (ref 3.5–5)
PROT SERPL-MCNC: 5.4 G/DL (ref 6.4–8.3)
RBC # BLD AUTO: 3.49 M/UL (ref 3.5–5.5)
SODIUM SERPL-SCNC: 138 MMOL/L (ref 132–146)
WBC OTHER # BLD: 7.1 K/UL (ref 4.5–11.5)

## 2024-05-10 ENCOUNTER — OUTSIDE SERVICES (OUTPATIENT)
Dept: PRIMARY CARE CLINIC | Age: 79
End: 2024-05-10

## 2024-05-10 DIAGNOSIS — J44.9 CHRONIC OBSTRUCTIVE PULMONARY DISEASE, UNSPECIFIED COPD TYPE (HCC): ICD-10-CM

## 2024-05-10 DIAGNOSIS — I50.9 CONGESTIVE HEART FAILURE, UNSPECIFIED HF CHRONICITY, UNSPECIFIED HEART FAILURE TYPE (HCC): ICD-10-CM

## 2024-05-10 DIAGNOSIS — I10 PRIMARY HYPERTENSION: ICD-10-CM

## 2024-05-10 DIAGNOSIS — T81.40XD POSTOPERATIVE INFECTION, UNSPECIFIED TYPE, SUBSEQUENT ENCOUNTER: ICD-10-CM

## 2024-05-10 DIAGNOSIS — Z79.4 CONTROLLED TYPE 2 DIABETES MELLITUS WITH DIABETIC NEUROPATHY, WITH LONG-TERM CURRENT USE OF INSULIN (HCC): ICD-10-CM

## 2024-05-10 DIAGNOSIS — R26.2 AMBULATORY DYSFUNCTION: ICD-10-CM

## 2024-05-10 DIAGNOSIS — E11.40 CONTROLLED TYPE 2 DIABETES MELLITUS WITH DIABETIC NEUROPATHY, WITH LONG-TERM CURRENT USE OF INSULIN (HCC): ICD-10-CM

## 2024-05-10 DIAGNOSIS — Z86.73 HISTORY OF CEREBRAL INFARCTION: ICD-10-CM

## 2024-05-10 DIAGNOSIS — R79.89 ABNORMAL LIVER FUNCTION TEST: ICD-10-CM

## 2024-05-10 DIAGNOSIS — S73.004D HIP DISLOCATION, RIGHT, SUBSEQUENT ENCOUNTER: Primary | ICD-10-CM

## 2024-05-10 DIAGNOSIS — Z96.641 HISTORY OF TOTAL RIGHT HIP REPLACEMENT: ICD-10-CM

## 2024-05-10 DIAGNOSIS — E03.9 HYPOTHYROIDISM, UNSPECIFIED TYPE: ICD-10-CM

## 2024-05-10 DIAGNOSIS — Z90.5 H/O UNILATERAL NEPHRECTOMY: ICD-10-CM

## 2024-05-10 LAB
ANION GAP SERPL CALCULATED.3IONS-SCNC: 11 MMOL/L (ref 7–16)
BUN SERPL-MCNC: 7 MG/DL (ref 6–23)
CALCIUM SERPL-MCNC: 8.4 MG/DL (ref 8.6–10.2)
CHLORIDE SERPL-SCNC: 104 MMOL/L (ref 98–107)
CO2 SERPL-SCNC: 26 MMOL/L (ref 22–29)
CREAT SERPL-MCNC: 0.5 MG/DL (ref 0.5–1)
GFR, ESTIMATED: >90 ML/MIN/1.73M2
GLUCOSE SERPL-MCNC: 67 MG/DL (ref 74–99)
POTASSIUM SERPL-SCNC: 3.2 MMOL/L (ref 3.5–5)
SODIUM SERPL-SCNC: 141 MMOL/L (ref 132–146)

## 2024-05-13 ENCOUNTER — OUTSIDE SERVICES (OUTPATIENT)
Dept: PRIMARY CARE CLINIC | Age: 79
End: 2024-05-13

## 2024-05-13 DIAGNOSIS — I50.9 CONGESTIVE HEART FAILURE, UNSPECIFIED HF CHRONICITY, UNSPECIFIED HEART FAILURE TYPE (HCC): ICD-10-CM

## 2024-05-13 DIAGNOSIS — T81.40XD POSTOPERATIVE INFECTION, UNSPECIFIED TYPE, SUBSEQUENT ENCOUNTER: ICD-10-CM

## 2024-05-13 DIAGNOSIS — Z90.5 H/O UNILATERAL NEPHRECTOMY: ICD-10-CM

## 2024-05-13 DIAGNOSIS — R79.89 ABNORMAL LIVER FUNCTION TEST: ICD-10-CM

## 2024-05-13 DIAGNOSIS — E03.9 HYPOTHYROIDISM, UNSPECIFIED TYPE: ICD-10-CM

## 2024-05-13 DIAGNOSIS — J44.9 CHRONIC OBSTRUCTIVE PULMONARY DISEASE, UNSPECIFIED COPD TYPE (HCC): ICD-10-CM

## 2024-05-13 DIAGNOSIS — E11.40 CONTROLLED TYPE 2 DIABETES MELLITUS WITH DIABETIC NEUROPATHY, WITH LONG-TERM CURRENT USE OF INSULIN (HCC): ICD-10-CM

## 2024-05-13 DIAGNOSIS — Z86.73 HISTORY OF CEREBRAL INFARCTION: ICD-10-CM

## 2024-05-13 DIAGNOSIS — S73.004D HIP DISLOCATION, RIGHT, SUBSEQUENT ENCOUNTER: Primary | ICD-10-CM

## 2024-05-13 DIAGNOSIS — I10 PRIMARY HYPERTENSION: ICD-10-CM

## 2024-05-13 DIAGNOSIS — Z96.641 HISTORY OF TOTAL RIGHT HIP REPLACEMENT: ICD-10-CM

## 2024-05-13 DIAGNOSIS — R26.2 AMBULATORY DYSFUNCTION: ICD-10-CM

## 2024-05-13 DIAGNOSIS — Z79.4 CONTROLLED TYPE 2 DIABETES MELLITUS WITH DIABETIC NEUROPATHY, WITH LONG-TERM CURRENT USE OF INSULIN (HCC): ICD-10-CM

## 2024-05-13 LAB
ALBUMIN SERPL-MCNC: 2.2 G/DL (ref 3.5–5.2)
ALP SERPL-CCNC: 159 U/L (ref 35–104)
ALT SERPL-CCNC: 41 U/L (ref 0–32)
ANION GAP SERPL CALCULATED.3IONS-SCNC: 10 MMOL/L (ref 7–16)
AST SERPL-CCNC: 83 U/L (ref 0–31)
BASOPHILS # BLD: 0.04 K/UL (ref 0–0.2)
BASOPHILS NFR BLD: 1 % (ref 0–2)
BILIRUB SERPL-MCNC: <0.2 MG/DL (ref 0–1.2)
BUN SERPL-MCNC: 8 MG/DL (ref 6–23)
CALCIUM SERPL-MCNC: 8.5 MG/DL (ref 8.6–10.2)
CHLORIDE SERPL-SCNC: 105 MMOL/L (ref 98–107)
CO2 SERPL-SCNC: 28 MMOL/L (ref 22–29)
CREAT SERPL-MCNC: 0.5 MG/DL (ref 0.5–1)
CRP SERPL HS-MCNC: 6 MG/L (ref 0–5)
EOSINOPHIL # BLD: 0.4 K/UL (ref 0.05–0.5)
EOSINOPHILS RELATIVE PERCENT: 5 % (ref 0–6)
ERYTHROCYTE [DISTWIDTH] IN BLOOD BY AUTOMATED COUNT: 16.8 % (ref 11.5–15)
ERYTHROCYTE [SEDIMENTATION RATE] IN BLOOD BY WESTERGREN METHOD: 16 MM/HR (ref 0–20)
GFR, ESTIMATED: >90 ML/MIN/1.73M2
GLUCOSE SERPL-MCNC: 78 MG/DL (ref 74–99)
HCT VFR BLD AUTO: 30.7 % (ref 34–48)
HGB BLD-MCNC: 9.1 G/DL (ref 11.5–15.5)
IMM GRANULOCYTES # BLD AUTO: <0.03 K/UL (ref 0–0.58)
IMM GRANULOCYTES NFR BLD: 0 % (ref 0–5)
LYMPHOCYTES NFR BLD: 2.67 K/UL (ref 1.5–4)
LYMPHOCYTES RELATIVE PERCENT: 36 % (ref 20–42)
MCH RBC QN AUTO: 24.9 PG (ref 26–35)
MCHC RBC AUTO-ENTMCNC: 29.6 G/DL (ref 32–34.5)
MCV RBC AUTO: 84.1 FL (ref 80–99.9)
MONOCYTES NFR BLD: 0.61 K/UL (ref 0.1–0.95)
MONOCYTES NFR BLD: 8 % (ref 2–12)
NEUTROPHILS NFR BLD: 49 % (ref 43–80)
NEUTS SEG NFR BLD: 3.64 K/UL (ref 1.8–7.3)
PLATELET # BLD AUTO: 135 K/UL (ref 130–450)
PMV BLD AUTO: 10.7 FL (ref 7–12)
POTASSIUM SERPL-SCNC: 2.9 MMOL/L (ref 3.5–5)
PROT SERPL-MCNC: 5.3 G/DL (ref 6.4–8.3)
RBC # BLD AUTO: 3.65 M/UL (ref 3.5–5.5)
SODIUM SERPL-SCNC: 143 MMOL/L (ref 132–146)
WBC OTHER # BLD: 7.4 K/UL (ref 4.5–11.5)

## 2024-05-15 ENCOUNTER — OFFICE VISIT (OUTPATIENT)
Dept: ORTHOPEDIC SURGERY | Age: 79
End: 2024-05-15
Payer: MEDICARE

## 2024-05-15 ENCOUNTER — APPOINTMENT (OUTPATIENT)
Dept: GENERAL RADIOLOGY | Age: 79
DRG: 480 | End: 2024-05-15
Payer: MEDICARE

## 2024-05-15 ENCOUNTER — OUTSIDE SERVICES (OUTPATIENT)
Dept: PRIMARY CARE CLINIC | Age: 79
End: 2024-05-15

## 2024-05-15 ENCOUNTER — HOSPITAL ENCOUNTER (OUTPATIENT)
Dept: GENERAL RADIOLOGY | Age: 79
Discharge: HOME | End: 2024-05-17
Payer: MEDICARE

## 2024-05-15 ENCOUNTER — HOSPITAL ENCOUNTER (INPATIENT)
Age: 79
LOS: 3 days | Discharge: OTHER INSTITUTION | DRG: 480 | End: 2024-05-18
Attending: EMERGENCY MEDICINE | Admitting: FAMILY MEDICINE
Payer: MEDICARE

## 2024-05-15 VITALS — HEIGHT: 65 IN | WEIGHT: 188.05 LBS | BODY MASS INDEX: 31.33 KG/M2

## 2024-05-15 DIAGNOSIS — I50.9 CONGESTIVE HEART FAILURE, UNSPECIFIED HF CHRONICITY, UNSPECIFIED HEART FAILURE TYPE (HCC): ICD-10-CM

## 2024-05-15 DIAGNOSIS — Z90.5 H/O UNILATERAL NEPHRECTOMY: ICD-10-CM

## 2024-05-15 DIAGNOSIS — Z96.649 PROSTHETIC HIP INFECTION, INITIAL ENCOUNTER (HCC): Primary | ICD-10-CM

## 2024-05-15 DIAGNOSIS — T84.59XA PROSTHETIC HIP INFECTION, INITIAL ENCOUNTER (HCC): Primary | ICD-10-CM

## 2024-05-15 DIAGNOSIS — E03.9 HYPOTHYROIDISM, UNSPECIFIED TYPE: ICD-10-CM

## 2024-05-15 DIAGNOSIS — I10 PRIMARY HYPERTENSION: ICD-10-CM

## 2024-05-15 DIAGNOSIS — R26.2 AMBULATORY DYSFUNCTION: ICD-10-CM

## 2024-05-15 DIAGNOSIS — Z96.641 HISTORY OF TOTAL RIGHT HIP REPLACEMENT: ICD-10-CM

## 2024-05-15 DIAGNOSIS — Z96.649 PERIPROSTHETIC FRACTURE AROUND INTERNAL PROSTHETIC HIP JOINT, INITIAL ENCOUNTER: ICD-10-CM

## 2024-05-15 DIAGNOSIS — S73.004A DISLOCATED HIP, RIGHT, INITIAL ENCOUNTER (HCC): Primary | ICD-10-CM

## 2024-05-15 DIAGNOSIS — S73.004D HIP DISLOCATION, RIGHT, SUBSEQUENT ENCOUNTER: Primary | ICD-10-CM

## 2024-05-15 DIAGNOSIS — Z96.641 HISTORY OF HEMIARTHROPLASTY OF RIGHT HIP: Primary | ICD-10-CM

## 2024-05-15 DIAGNOSIS — Z96.649 PROSTHETIC HIP INFECTION, INITIAL ENCOUNTER (HCC): ICD-10-CM

## 2024-05-15 DIAGNOSIS — E11.40 CONTROLLED TYPE 2 DIABETES MELLITUS WITH DIABETIC NEUROPATHY, WITH LONG-TERM CURRENT USE OF INSULIN (HCC): ICD-10-CM

## 2024-05-15 DIAGNOSIS — M97.8XXA PERIPROSTHETIC FRACTURE AROUND INTERNAL PROSTHETIC HIP JOINT, INITIAL ENCOUNTER: ICD-10-CM

## 2024-05-15 DIAGNOSIS — R79.89 ABNORMAL LIVER FUNCTION TEST: ICD-10-CM

## 2024-05-15 DIAGNOSIS — T84.59XA PROSTHETIC HIP INFECTION, INITIAL ENCOUNTER (HCC): ICD-10-CM

## 2024-05-15 DIAGNOSIS — G89.18 POST-OP PAIN: ICD-10-CM

## 2024-05-15 DIAGNOSIS — Z79.4 CONTROLLED TYPE 2 DIABETES MELLITUS WITH DIABETIC NEUROPATHY, WITH LONG-TERM CURRENT USE OF INSULIN (HCC): ICD-10-CM

## 2024-05-15 DIAGNOSIS — J44.9 CHRONIC OBSTRUCTIVE PULMONARY DISEASE, UNSPECIFIED COPD TYPE (HCC): ICD-10-CM

## 2024-05-15 DIAGNOSIS — Z86.73 HISTORY OF CEREBRAL INFARCTION: ICD-10-CM

## 2024-05-15 DIAGNOSIS — T84.020A DISLOCATION OF INTERNAL RIGHT HIP PROSTHESIS, INITIAL ENCOUNTER (HCC): ICD-10-CM

## 2024-05-15 DIAGNOSIS — T81.40XD POSTOPERATIVE INFECTION, UNSPECIFIED TYPE, SUBSEQUENT ENCOUNTER: ICD-10-CM

## 2024-05-15 PROBLEM — S72.001A HIP FRACTURE REQUIRING OPERATIVE REPAIR, RIGHT, CLOSED, INITIAL ENCOUNTER (HCC): Status: ACTIVE | Noted: 2024-05-15

## 2024-05-15 LAB
ABO + RH BLD: NORMAL
ALBUMIN SERPL-MCNC: 2.2 G/DL (ref 3.5–5.2)
ALP SERPL-CCNC: 169 U/L (ref 35–104)
ALT SERPL-CCNC: 38 U/L (ref 0–32)
ANION GAP SERPL CALCULATED.3IONS-SCNC: 7 MMOL/L (ref 7–16)
ARM BAND NUMBER: NORMAL
AST SERPL-CCNC: 50 U/L (ref 0–31)
BASOPHILS # BLD: 0.04 K/UL (ref 0–0.2)
BASOPHILS NFR BLD: 0 % (ref 0–2)
BILIRUB SERPL-MCNC: 0.2 MG/DL (ref 0–1.2)
BLOOD BANK SAMPLE EXPIRATION: NORMAL
BLOOD GROUP ANTIBODIES SERPL: NEGATIVE
BUN SERPL-MCNC: 8 MG/DL (ref 6–23)
CALCIUM SERPL-MCNC: 7.7 MG/DL (ref 8.6–10.2)
CHLORIDE SERPL-SCNC: 105 MMOL/L (ref 98–107)
CO2 SERPL-SCNC: 31 MMOL/L (ref 22–29)
CREAT SERPL-MCNC: 0.6 MG/DL (ref 0.5–1)
EOSINOPHIL # BLD: 0.31 K/UL (ref 0.05–0.5)
EOSINOPHILS RELATIVE PERCENT: 3 % (ref 0–6)
ERYTHROCYTE [DISTWIDTH] IN BLOOD BY AUTOMATED COUNT: 16.8 % (ref 11.5–15)
GFR, ESTIMATED: >90 ML/MIN/1.73M2
GLUCOSE SERPL-MCNC: 88 MG/DL (ref 74–99)
HCT VFR BLD AUTO: 29.6 % (ref 34–48)
HGB BLD-MCNC: 8.9 G/DL (ref 11.5–15.5)
IMM GRANULOCYTES # BLD AUTO: 0.06 K/UL (ref 0–0.58)
IMM GRANULOCYTES NFR BLD: 1 % (ref 0–5)
INR PPP: 1.5
LYMPHOCYTES NFR BLD: 1.87 K/UL (ref 1.5–4)
LYMPHOCYTES RELATIVE PERCENT: 18 % (ref 20–42)
MCH RBC QN AUTO: 25.1 PG (ref 26–35)
MCHC RBC AUTO-ENTMCNC: 30.1 G/DL (ref 32–34.5)
MCV RBC AUTO: 83.4 FL (ref 80–99.9)
MICROORGANISM SPEC CULT: NORMAL
MICROORGANISM/AGENT SPEC: NORMAL
MONOCYTES NFR BLD: 0.58 K/UL (ref 0.1–0.95)
MONOCYTES NFR BLD: 5 % (ref 2–12)
NEUTROPHILS NFR BLD: 73 % (ref 43–80)
NEUTS SEG NFR BLD: 7.83 K/UL (ref 1.8–7.3)
PARTIAL THROMBOPLASTIN TIME: 32.2 SEC (ref 24.5–35.1)
PLATELET # BLD AUTO: 178 K/UL (ref 130–450)
PMV BLD AUTO: 10.8 FL (ref 7–12)
POTASSIUM SERPL-SCNC: 3.5 MMOL/L (ref 3.5–5)
PROT SERPL-MCNC: 5.4 G/DL (ref 6.4–8.3)
PROTHROMBIN TIME: 15.7 SEC (ref 9.3–12.4)
RBC # BLD AUTO: 3.55 M/UL (ref 3.5–5.5)
SODIUM SERPL-SCNC: 143 MMOL/L (ref 132–146)
SPECIMEN DESCRIPTION: NORMAL
WBC OTHER # BLD: 10.7 K/UL (ref 4.5–11.5)

## 2024-05-15 PROCEDURE — 99024 POSTOP FOLLOW-UP VISIT: CPT | Performed by: ORTHOPAEDIC SURGERY

## 2024-05-15 PROCEDURE — 73502 X-RAY EXAM HIP UNI 2-3 VIEWS: CPT

## 2024-05-15 PROCEDURE — 2500000003 HC RX 250 WO HCPCS

## 2024-05-15 PROCEDURE — 80053 COMPREHEN METABOLIC PANEL: CPT

## 2024-05-15 PROCEDURE — 86900 BLOOD TYPING SEROLOGIC ABO: CPT

## 2024-05-15 PROCEDURE — 99285 EMERGENCY DEPT VISIT HI MDM: CPT

## 2024-05-15 PROCEDURE — 94640 AIRWAY INHALATION TREATMENT: CPT

## 2024-05-15 PROCEDURE — 6360000002 HC RX W HCPCS: Performed by: FAMILY MEDICINE

## 2024-05-15 PROCEDURE — 2580000003 HC RX 258: Performed by: FAMILY MEDICINE

## 2024-05-15 PROCEDURE — 85610 PROTHROMBIN TIME: CPT

## 2024-05-15 PROCEDURE — 73501 X-RAY EXAM HIP UNI 1 VIEW: CPT

## 2024-05-15 PROCEDURE — 86850 RBC ANTIBODY SCREEN: CPT

## 2024-05-15 PROCEDURE — 99155 MOD SED OTH PHYS/QHP <5 YRS: CPT

## 2024-05-15 PROCEDURE — 1200000000 HC SEMI PRIVATE

## 2024-05-15 PROCEDURE — 73552 X-RAY EXAM OF FEMUR 2/>: CPT

## 2024-05-15 PROCEDURE — 85025 COMPLETE CBC W/AUTO DIFF WBC: CPT

## 2024-05-15 PROCEDURE — 6370000000 HC RX 637 (ALT 250 FOR IP): Performed by: FAMILY MEDICINE

## 2024-05-15 PROCEDURE — 99223 1ST HOSP IP/OBS HIGH 75: CPT | Performed by: FAMILY MEDICINE

## 2024-05-15 PROCEDURE — 85730 THROMBOPLASTIN TIME PARTIAL: CPT

## 2024-05-15 PROCEDURE — 99212 OFFICE O/P EST SF 10 MIN: CPT | Performed by: ORTHOPAEDIC SURGERY

## 2024-05-15 PROCEDURE — 86901 BLOOD TYPING SEROLOGIC RH(D): CPT

## 2024-05-15 RX ORDER — FENTANYL CITRATE 50 UG/ML
50 INJECTION, SOLUTION INTRAMUSCULAR; INTRAVENOUS
Status: DISCONTINUED | OUTPATIENT
Start: 2024-05-15 | End: 2024-05-18 | Stop reason: HOSPADM

## 2024-05-15 RX ORDER — PANTOPRAZOLE SODIUM 40 MG/1
40 TABLET, DELAYED RELEASE ORAL
Status: DISCONTINUED | OUTPATIENT
Start: 2024-05-16 | End: 2024-05-18 | Stop reason: HOSPADM

## 2024-05-15 RX ORDER — ARFORMOTEROL TARTRATE 15 UG/2ML
15 SOLUTION RESPIRATORY (INHALATION)
Status: DISCONTINUED | OUTPATIENT
Start: 2024-05-15 | End: 2024-05-18 | Stop reason: HOSPADM

## 2024-05-15 RX ORDER — SODIUM CHLORIDE 0.9 % (FLUSH) 0.9 %
5-40 SYRINGE (ML) INJECTION PRN
Status: DISCONTINUED | OUTPATIENT
Start: 2024-05-15 | End: 2024-05-18 | Stop reason: HOSPADM

## 2024-05-15 RX ORDER — ENOXAPARIN SODIUM 100 MG/ML
40 INJECTION SUBCUTANEOUS DAILY
Status: DISCONTINUED | OUTPATIENT
Start: 2024-05-17 | End: 2024-05-18 | Stop reason: HOSPADM

## 2024-05-15 RX ORDER — SENNA AND DOCUSATE SODIUM 50; 8.6 MG/1; MG/1
1 TABLET, FILM COATED ORAL 2 TIMES DAILY
Status: DISCONTINUED | OUTPATIENT
Start: 2024-05-15 | End: 2024-05-18 | Stop reason: HOSPADM

## 2024-05-15 RX ORDER — ATORVASTATIN CALCIUM 80 MG/1
80 TABLET, FILM COATED ORAL DAILY
Status: DISCONTINUED | OUTPATIENT
Start: 2024-05-16 | End: 2024-05-18 | Stop reason: HOSPADM

## 2024-05-15 RX ORDER — ONDANSETRON 4 MG/1
4 TABLET, ORALLY DISINTEGRATING ORAL EVERY 8 HOURS PRN
Status: DISCONTINUED | OUTPATIENT
Start: 2024-05-15 | End: 2024-05-18 | Stop reason: HOSPADM

## 2024-05-15 RX ORDER — BUPROPION HYDROCHLORIDE 100 MG/1
100 TABLET, EXTENDED RELEASE ORAL DAILY
Status: DISCONTINUED | OUTPATIENT
Start: 2024-05-16 | End: 2024-05-18 | Stop reason: HOSPADM

## 2024-05-15 RX ORDER — LEVOTHYROXINE SODIUM 0.05 MG/1
50 TABLET ORAL DAILY
Status: DISCONTINUED | OUTPATIENT
Start: 2024-05-16 | End: 2024-05-18 | Stop reason: HOSPADM

## 2024-05-15 RX ORDER — VENLAFAXINE HYDROCHLORIDE 150 MG/1
150 CAPSULE, EXTENDED RELEASE ORAL DAILY
Status: DISCONTINUED | OUTPATIENT
Start: 2024-05-16 | End: 2024-05-18 | Stop reason: HOSPADM

## 2024-05-15 RX ORDER — LANOLIN ALCOHOL/MO/W.PET/CERES
1.5 CREAM (GRAM) TOPICAL NIGHTLY
Status: DISCONTINUED | OUTPATIENT
Start: 2024-05-15 | End: 2024-05-16

## 2024-05-15 RX ORDER — GABAPENTIN 400 MG/1
800 CAPSULE ORAL 3 TIMES DAILY
Status: DISCONTINUED | OUTPATIENT
Start: 2024-05-15 | End: 2024-05-18 | Stop reason: HOSPADM

## 2024-05-15 RX ORDER — PRAMIPEXOLE DIHYDROCHLORIDE 0.25 MG/1
0.75 TABLET ORAL 2 TIMES DAILY
Status: DISCONTINUED | OUTPATIENT
Start: 2024-05-15 | End: 2024-05-18 | Stop reason: HOSPADM

## 2024-05-15 RX ORDER — FENTANYL CITRATE 50 UG/ML
25 INJECTION, SOLUTION INTRAMUSCULAR; INTRAVENOUS
Status: DISCONTINUED | OUTPATIENT
Start: 2024-05-15 | End: 2024-05-18 | Stop reason: HOSPADM

## 2024-05-15 RX ORDER — ACETAMINOPHEN 325 MG/1
650 TABLET ORAL EVERY 6 HOURS PRN
Status: DISCONTINUED | OUTPATIENT
Start: 2024-05-15 | End: 2024-05-18 | Stop reason: HOSPADM

## 2024-05-15 RX ORDER — ACETAMINOPHEN 650 MG/1
650 SUPPOSITORY RECTAL EVERY 6 HOURS PRN
Status: DISCONTINUED | OUTPATIENT
Start: 2024-05-15 | End: 2024-05-18 | Stop reason: HOSPADM

## 2024-05-15 RX ORDER — SODIUM CHLORIDE 0.9 % (FLUSH) 0.9 %
5-40 SYRINGE (ML) INJECTION EVERY 12 HOURS SCHEDULED
Status: DISCONTINUED | OUTPATIENT
Start: 2024-05-15 | End: 2024-05-18 | Stop reason: HOSPADM

## 2024-05-15 RX ORDER — VITAMIN B COMPLEX
1000 TABLET ORAL DAILY
Status: DISCONTINUED | OUTPATIENT
Start: 2024-05-16 | End: 2024-05-18 | Stop reason: HOSPADM

## 2024-05-15 RX ORDER — KETAMINE HCL IN NACL, ISO-OSM 100MG/10ML
SYRINGE (ML) INJECTION
Status: COMPLETED
Start: 2024-05-15 | End: 2024-05-15

## 2024-05-15 RX ORDER — SODIUM CHLORIDE, SODIUM LACTATE, POTASSIUM CHLORIDE, CALCIUM CHLORIDE 600; 310; 30; 20 MG/100ML; MG/100ML; MG/100ML; MG/100ML
INJECTION, SOLUTION INTRAVENOUS CONTINUOUS
Status: ACTIVE | OUTPATIENT
Start: 2024-05-15 | End: 2024-05-16

## 2024-05-15 RX ORDER — SODIUM CHLORIDE 9 MG/ML
INJECTION, SOLUTION INTRAVENOUS PRN
Status: DISCONTINUED | OUTPATIENT
Start: 2024-05-15 | End: 2024-05-18 | Stop reason: HOSPADM

## 2024-05-15 RX ORDER — ONDANSETRON 2 MG/ML
4 INJECTION INTRAMUSCULAR; INTRAVENOUS EVERY 6 HOURS PRN
Status: DISCONTINUED | OUTPATIENT
Start: 2024-05-15 | End: 2024-05-18 | Stop reason: HOSPADM

## 2024-05-15 RX ORDER — KETAMINE HCL IN NACL, ISO-OSM 100MG/10ML
80 SYRINGE (ML) INJECTION ONCE
Status: COMPLETED | OUTPATIENT
Start: 2024-05-15 | End: 2024-05-15

## 2024-05-15 RX ADMIN — SODIUM CHLORIDE, POTASSIUM CHLORIDE, SODIUM LACTATE AND CALCIUM CHLORIDE: 600; 310; 30; 20 INJECTION, SOLUTION INTRAVENOUS at 23:22

## 2024-05-15 RX ADMIN — ARFORMOTEROL TARTRATE 15 MCG: 15 SOLUTION RESPIRATORY (INHALATION) at 22:11

## 2024-05-15 RX ADMIN — PRAMIPEXOLE DIHYDROCHLORIDE 0.75 MG: 0.25 TABLET ORAL at 23:18

## 2024-05-15 RX ADMIN — Medication 80 MG: at 18:41

## 2024-05-15 RX ADMIN — FENTANYL CITRATE 50 MCG: 50 INJECTION INTRAMUSCULAR; INTRAVENOUS at 20:11

## 2024-05-15 RX ADMIN — Medication 1.5 MG: at 23:14

## 2024-05-15 RX ADMIN — GABAPENTIN 800 MG: 400 CAPSULE ORAL at 23:14

## 2024-05-15 ASSESSMENT — PAIN SCALES - GENERAL
PAINLEVEL_OUTOF10: 9
PAINLEVEL_OUTOF10: 6

## 2024-05-15 ASSESSMENT — PAIN DESCRIPTION - ORIENTATION: ORIENTATION: RIGHT

## 2024-05-15 ASSESSMENT — PAIN DESCRIPTION - LOCATION: LOCATION: LEG

## 2024-05-15 ASSESSMENT — PAIN - FUNCTIONAL ASSESSMENT: PAIN_FUNCTIONAL_ASSESSMENT: 0-10

## 2024-05-15 NOTE — CONSULTS
05/13/2024 02:36 AM    MCHC 29.6 05/13/2024 02:36 AM    RDW 16.8 05/13/2024 02:36 AM     05/13/2024 02:36 AM    MPV 10.7 05/13/2024 02:36 AM     PT/INR:    Lab Results   Component Value Date/Time    PROTIME 17.6 04/09/2024 09:09 AM    PROTIME 10.4 12/13/2010 09:00 AM    INR 1.6 04/09/2024 09:09 AM       Radiology Review:  X-ray -single view AP pelvis demonstrating superior lateral dislocation right hip hemiarthroplasty status post antibiotic cement spacer.  No acute fractures/dislocations noted    Postreduction AP right femur demonstrating oblique spiral fracture about proximal one third femur.    IMPRESSION:  Closed right periprosthetic hip dislocation    PLAN:  Nonweightbearing right lower extremity  Physical exam and imaging finding reviewed with patient  After verbal consent obtained, patient was sedated by emergency department staff and right hip underwent closed reduction attempt.  Hip was unable to be reduced, patient subsequently suffered proximal one third femur fracture  Knee immobilizer placed  Medicine admit, appreciate medical optimization prior to surgical intervention  We will plan for operative intervention regarding right femur fracture 5/16/2024.  N.p.o. at midnight  Multimodal pain control  Hold anticoagulants  Procedure consent  All patient questions sought and answered to best ability, patient is currently agreeable to plan.  Discuss with attending    Electronically signed by Andre Robbins DO on 5/15/2024 at 6:22 PM          Patient seen in the office prior to going to the emergency department with another dislocation of her right antibiotic spacer of her hip.  Attempted reduction resulted in a spiral fracture around her proximal femur.  I talked her in detail about the fact that I would place cable fixation for now.  I also obtain cultures.  If they are negative for bacteria we will continue antibiotic treatment most likely for approximately 2 more weeks and can do replantation of her  final total hip arthroplasty with restoration modular stem for the femur which will address her fracture along with new cables.  Talked her in detail about the surgery including the risk of death and anesthesia, infection, neurovascular damage, limb length discrepancy, the fact that her hip will most likely remain dislocated because of instability, the fact that her cultures could grow new bacteria require different treatment.  Also talked about the risk of deep venous thrombosis and pulmonary embolism.  Talked her about any other unforeseen complications.  She understood and decided proceed with operative intervention today.

## 2024-05-15 NOTE — PROGRESS NOTES
Chief Complaint   Patient presents with    Follow-up     **FACILITY* 4 week P/o Complex explantation R Hip arthroplasty with implantation of R Hip methylmethacrylate spacer impregnated with antibiotics 4/15/24; TTS 5/3/24 reduction to rt hip(*GP 7/14/24). Patient states pain lvl 5/10. Patient is using wheel chair            SUBJECTIVE: Patient is a very less than 78-year-old female who had an explantation of right infected hemiarthroplasty and loss of the superior rim of her acetabulum.  She is currently under the care of infectious disease with a PICC line in for an E. coli infection.  Her labs have trended down especially her CRP.  She did have a dislocation on the third due to the fact that she has 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 acetabulum currently.  She is in an abductor brace.  She would like to get moving again so she gets strong prior to her replantation.  She states that she feels like the hip is in today but her foot is externally rotated.  She denies any infectious symptoms.          Past Medical History:   Diagnosis Date    Anemia     S/P chemotherapy.    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    Ascending aortic aneurysm (HCC)     seen on CTA chest w/contrast on 2019    Asthma     Bipolar 1 disorder (HCC)     Cancer (HCC)     lung/ kidney    Cancer of breast, female (HCC) 01/2006    S/P bilaeral mastectomy with left breast lymph node resection and chemotherapy.    CHF (congestive heart failure) (HCC)     Chronic back pain     Depression     Depression with anxiety     Diabetes mellitus (HCC)     Resolved after losing 130 lbs. after gastric bypass surgery.    Dyslipidemia     Fibromyalgia     History of blood transfusion     anemia    Hypertension     Hypothyroidism     Neuropathy     Pericardial effusion 04/02/2010    Subxiphoid pericardial window with drainage of pericardial effusion and

## 2024-05-15 NOTE — ED TRIAGE NOTES
Department of Emergency Medicine  FIRST PROVIDER TRIAGE NOTE             Independent MLP           5/15/24  1:46 PM EDT    Date of Encounter: 5/15/24   MRN: [id number]      HPI: Rebeka Renee is a 78 y.o. female who presents to the ED for Hip Problem (Right hip is dislocated, had appt with Dr bates who did xrs and sent her here )       ROS: Negative for cp or sob.    PE: Gen Appearance/Constitutional: alert     Initial Plan of Care: All treatment areas with department are currently occupied. Plan to order/Initiate the following while awaiting opening in ED:.  Initiate Treatment-Testing, Proceed toTreatment Area When Bed Available for ED Attending/MLP to Continue Care    Electronically signed by Kati Warner PA-C   DD: 5/15/24

## 2024-05-15 NOTE — ED PROVIDER NOTES
Department of Emergency Medicine     Written by: Ángel Corley MD  Patient Name: Rebeka Renee  Admit Date: 5/15/2024  1:48 PM  MRN: [id number]                   : 1945    HPI  Chief Complaint   Patient presents with    Hip Problem     Right hip is dislocated, had appt with Dr bates who did xrs and sent her here        Rebeka Renee is a 78 y.o. female that presents to the ED with concern for right-sided dislocated hip.  Patient had prosthesis inserted in January for a fracture.  She has spacer inserted for infection.  She is currently on antibiotics daily until May 17.  She is following infectious disease for this.  She was at her orthopedic surgeon office today, and noted to have a dislocated hip.  She is unsure how or when this occurred.  She had dislocated hip 2 weeks ago, which was reduced in the ER.    Review of systems:  Pertinent positives and negatives mentioned in the HPI/MDM.    Physical Exam  Constitutional:       General: She is not in acute distress.     Appearance: Normal appearance.   Eyes:      Extraocular Movements: Extraocular movements intact.      Pupils: Pupils are equal, round, and reactive to light.   Cardiovascular:      Rate and Rhythm: Normal rate and regular rhythm.   Pulmonary:      Effort: Pulmonary effort is normal. No respiratory distress.   Abdominal:      Palpations: Abdomen is soft.      Tenderness: There is no abdominal tenderness.   Musculoskeletal:         General: Tenderness and deformity present.      Comments: Right lower extremity shorter and externally rotated   Neurological:      Mental Status: She is alert and oriented to person, place, and time. Mental status is at baseline.        Chart review: The patient followed with orthopedics postop on 2024 for her hemiarthroplasty    Social determinants: the patient has a PCP to follow up with outpatient    Acute or chronic illness limiting or affecting care: Prosthesis, spacer and right hip for  of right-sided hip dislocation.  Orthopedic consultation was placed, we provided procedural sedation with ketamine please see procedure note above.  Reduction was unfortunately unsuccessful, please see orthopedics note.    Clinical Impression  1. Dislocated hip, right, initial encounter ([facility])         Disposition  Patient's disposition: Admit to med/surg floor    Ángel Corley MD  Resident PGY-2

## 2024-05-15 NOTE — PATIENT INSTRUCTIONS
Patient evaluated today for right prosthetic hip infection with antibiotic spacer    Transferred to the emergency department for attempted closed reduction of right hip.  If relocated would wear abduction brace at all times.  May be weightbearing as tolerated with brace on.  Will follow-up here in 2 weeks time.

## 2024-05-15 NOTE — H&P
[facility] Hospitalist Group   History and Physical      CHIEF COMPLAINT:  hip pain    History of Present Illness:  78 y.o. female with a history of breast, lung, kidney cancer, CHF, depression, anxiety, type 2 DM, s/p gastric bypass, HTN, hypothyroid, tobacco use presents with hip pain.  Had recent right hip arthroplasty explanation with antibiotic spacer 4/15/24, then 5/3/24 reduction right hip after dislocation due to incompetent superior rim of acetabulum.  She followed up at ortho clinic today, in a wheelchair from a facility, in an abductor brace, however found to have dislocation of right antibiotic spacer.  Sent to ED, with unfortunate oblique spiral femur fracture during attempted reduction.  Patient complaining of right hip pain and hungry.    Informant(s) for H&P: patient, chart    REVIEW OF SYSTEMS:  no fevers, chills, cp, sob, n/v, ha, vision/hearing changes, wt changes, hot/cold flashes, other open skin lesions, diarrhea, constipation, dysuria/hematuria unless noted in HPI. Complete ROS performed with the patient and is otherwise negative.      PMH:  Past Medical History:   Diagnosis Date    Anemia     S/P chemotherapy.    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    Ascending aortic aneurysm (HCC)     seen on CTA chest w/contrast on 2019    Asthma     Bipolar 1 disorder (HCC)     Cancer (HCC)     lung/ kidney    Cancer of breast, female (HCC) 01/2006    S/P bilaeral mastectomy with left breast lymph node resection and chemotherapy.    CHF (congestive heart failure) (HCC)     Chronic back pain     Depression     Depression with anxiety     Diabetes mellitus (HCC)     Resolved after losing 130 lbs. after gastric bypass surgery.    Dyslipidemia     Fibromyalgia     History of blood transfusion     anemia    Hypertension     Hypothyroidism     Neuropathy     Pericardial  intravenously every 24 hours Compound per protocol. 4/16/24 5/27/24  Polly Gilliam MD   apixaban (ELIQUIS) 5 MG TABS tablet Take 2 tablets by mouth 2 times daily for 4 days, THEN 1 tablet 2 times daily. 4/5/24 6/8/24  Vitaly Lee MD   ALBUTEROL IN Inhale into the lungs    Malou Brunson MD   Cholecalciferol (VITAMIN D) 25 MCG TABS Take 1 tablet by mouth daily 2/21/24   Javier Meyer MD   rosuvastatin (CRESTOR) 40 MG tablet Take 1 tablet by mouth nightly 2/14/24   Claudia Andrea MD   potassium chloride (KLOR-CON M) 10 MEQ extended release tablet Take 1 tablet by mouth daily    Malou Brunson MD   buPROPion (WELLBUTRIN SR) 100 MG extended release tablet Take 1 tablet by mouth daily    Malou Brunson MD   Semaglutide, 2 MG/DOSE, 8 MG/3ML SOPN Inject 8 mg into the skin once a week Given Monday    Malou Brunson MD   omeprazole (PRILOSEC) 20 MG delayed release capsule Take 1 capsule by mouth daily    Malou Brunson MD   sucralfate (CARAFATE) 1 GM tablet Take 1 tablet by mouth daily    Malou Brunson MD   gabapentin (NEURONTIN) 800 MG tablet Take 1 tablet by mouth 3 times daily.    Malou Brunson MD   pramipexole (MIRAPEX) 0.75 MG tablet Take 1 tablet by mouth 2 times daily    Malou Brunson MD   levothyroxine (SYNTHROID) 50 MCG tablet Take 1 tablet by mouth Daily    Malou Brunson MD   venlafaxine (EFFEXOR-XR) 150 MG XR capsule Take 1 capsule by mouth daily    ProviderMalou MD       Allergies:    Benadryl [diphenhydramine], Other, Sulfa antibiotics, Ciprofloxacin, Oxycodone, and Tape [adhesive tape]    Social History:    reports that she quit smoking about 25 years ago. Her smoking use included cigarettes. She has never used smokeless tobacco. She reports current alcohol use of about 1.0 standard drink of alcohol per week. She reports current drug use. Drug: Marijuana (Weed).      Family History:   family

## 2024-05-16 ENCOUNTER — APPOINTMENT (OUTPATIENT)
Dept: GENERAL RADIOLOGY | Age: 79
DRG: 480 | End: 2024-05-16
Payer: MEDICARE

## 2024-05-16 ENCOUNTER — ANESTHESIA (OUTPATIENT)
Dept: OPERATING ROOM | Age: 79
End: 2024-05-16
Payer: MEDICARE

## 2024-05-16 ENCOUNTER — ANESTHESIA EVENT (OUTPATIENT)
Dept: OPERATING ROOM | Age: 79
End: 2024-05-16
Payer: MEDICARE

## 2024-05-16 LAB
GLUCOSE BLD-MCNC: 104 MG/DL (ref 74–99)
GLUCOSE BLD-MCNC: 84 MG/DL (ref 74–99)
GLUCOSE BLD-MCNC: 91 MG/DL (ref 74–99)
GLUCOSE BLD-MCNC: 97 MG/DL (ref 74–99)
PROCALCITONIN SERPL-MCNC: 0.07 NG/ML (ref 0–0.08)

## 2024-05-16 PROCEDURE — 2700000000 HC OXYGEN THERAPY PER DAY

## 2024-05-16 PROCEDURE — 73552 X-RAY EXAM OF FEMUR 2/>: CPT

## 2024-05-16 PROCEDURE — 6360000002 HC RX W HCPCS: Performed by: ANESTHESIOLOGY

## 2024-05-16 PROCEDURE — 6360000002 HC RX W HCPCS: Performed by: ORTHOPAEDIC SURGERY

## 2024-05-16 PROCEDURE — 87116 MYCOBACTERIA CULTURE: CPT

## 2024-05-16 PROCEDURE — 94640 AIRWAY INHALATION TREATMENT: CPT

## 2024-05-16 PROCEDURE — 6360000002 HC RX W HCPCS

## 2024-05-16 PROCEDURE — 2500000003 HC RX 250 WO HCPCS

## 2024-05-16 PROCEDURE — 84145 PROCALCITONIN (PCT): CPT

## 2024-05-16 PROCEDURE — 71045 X-RAY EXAM CHEST 1 VIEW: CPT

## 2024-05-16 PROCEDURE — 87176 TISSUE HOMOGENIZATION CULTR: CPT

## 2024-05-16 PROCEDURE — 99232 SBSQ HOSP IP/OBS MODERATE 35: CPT | Performed by: INTERNAL MEDICINE

## 2024-05-16 PROCEDURE — 82962 GLUCOSE BLOOD TEST: CPT

## 2024-05-16 PROCEDURE — 27507 TREATMENT OF THIGH FRACTURE: CPT | Performed by: ORTHOPAEDIC SURGERY

## 2024-05-16 PROCEDURE — 2580000003 HC RX 258: Performed by: FAMILY MEDICINE

## 2024-05-16 PROCEDURE — 2580000003 HC RX 258

## 2024-05-16 PROCEDURE — 0QS604Z REPOSITION RIGHT UPPER FEMUR WITH INTERNAL FIXATION DEVICE, OPEN APPROACH: ICD-10-PCS | Performed by: ORTHOPAEDIC SURGERY

## 2024-05-16 PROCEDURE — 2709999900 HC NON-CHARGEABLE SUPPLY: Performed by: ORTHOPAEDIC SURGERY

## 2024-05-16 PROCEDURE — 3600000015 HC SURGERY LEVEL 5 ADDTL 15MIN: Performed by: ORTHOPAEDIC SURGERY

## 2024-05-16 PROCEDURE — C1776 JOINT DEVICE (IMPLANTABLE): HCPCS | Performed by: ORTHOPAEDIC SURGERY

## 2024-05-16 PROCEDURE — 6360000002 HC RX W HCPCS: Performed by: FAMILY MEDICINE

## 2024-05-16 PROCEDURE — 6370000000 HC RX 637 (ALT 250 FOR IP): Performed by: FAMILY MEDICINE

## 2024-05-16 PROCEDURE — 3600000005 HC SURGERY LEVEL 5 BASE: Performed by: ORTHOPAEDIC SURGERY

## 2024-05-16 PROCEDURE — 3700000000 HC ANESTHESIA ATTENDED CARE: Performed by: ORTHOPAEDIC SURGERY

## 2024-05-16 PROCEDURE — 87075 CULTR BACTERIA EXCEPT BLOOD: CPT

## 2024-05-16 PROCEDURE — 7100000001 HC PACU RECOVERY - ADDTL 15 MIN: Performed by: ORTHOPAEDIC SURGERY

## 2024-05-16 PROCEDURE — 87205 SMEAR GRAM STAIN: CPT

## 2024-05-16 PROCEDURE — 3700000001 HC ADD 15 MINUTES (ANESTHESIA): Performed by: ORTHOPAEDIC SURGERY

## 2024-05-16 PROCEDURE — 87070 CULTURE OTHR SPECIMN AEROBIC: CPT

## 2024-05-16 PROCEDURE — 87206 SMEAR FLUORESCENT/ACID STAI: CPT

## 2024-05-16 PROCEDURE — 87015 SPECIMEN INFECT AGNT CONCNTJ: CPT

## 2024-05-16 PROCEDURE — 7100000000 HC PACU RECOVERY - FIRST 15 MIN: Performed by: ORTHOPAEDIC SURGERY

## 2024-05-16 PROCEDURE — 1200000000 HC SEMI PRIVATE

## 2024-05-16 DEVICE — CABLE SURG DIA1.7MM S STL HA CERCLAGE W/ CRMP 29880101S] DEPUY SYNTHES USA]: Type: IMPLANTABLE DEVICE | Site: FEMUR | Status: FUNCTIONAL

## 2024-05-16 RX ORDER — SODIUM CHLORIDE 0.9 % (FLUSH) 0.9 %
5-40 SYRINGE (ML) INJECTION EVERY 12 HOURS SCHEDULED
Status: DISCONTINUED | OUTPATIENT
Start: 2024-05-16 | End: 2024-05-16 | Stop reason: HOSPADM

## 2024-05-16 RX ORDER — ACETAMINOPHEN 650 MG
TABLET, EXTENDED RELEASE ORAL PRN
Status: DISCONTINUED | OUTPATIENT
Start: 2024-05-16 | End: 2024-05-16 | Stop reason: ALTCHOICE

## 2024-05-16 RX ORDER — CEFAZOLIN SODIUM 1 G/3ML
INJECTION, POWDER, FOR SOLUTION INTRAMUSCULAR; INTRAVENOUS PRN
Status: DISCONTINUED | OUTPATIENT
Start: 2024-05-16 | End: 2024-05-16 | Stop reason: SDUPTHER

## 2024-05-16 RX ORDER — OXYCODONE HYDROCHLORIDE AND ACETAMINOPHEN 5; 325 MG/1; MG/1
1 TABLET ORAL EVERY 6 HOURS PRN
Qty: 28 TABLET | Refills: 0 | Status: SHIPPED | OUTPATIENT
Start: 2024-05-16 | End: 2024-05-23

## 2024-05-16 RX ORDER — DEXTROSE MONOHYDRATE 100 MG/ML
INJECTION, SOLUTION INTRAVENOUS CONTINUOUS PRN
Status: DISCONTINUED | OUTPATIENT
Start: 2024-05-16 | End: 2024-05-18 | Stop reason: HOSPADM

## 2024-05-16 RX ORDER — VANCOMYCIN HYDROCHLORIDE 1 G/20ML
INJECTION, POWDER, LYOPHILIZED, FOR SOLUTION INTRAVENOUS PRN
Status: DISCONTINUED | OUTPATIENT
Start: 2024-05-16 | End: 2024-05-16 | Stop reason: ALTCHOICE

## 2024-05-16 RX ORDER — INSULIN LISPRO 100 [IU]/ML
0-4 INJECTION, SOLUTION INTRAVENOUS; SUBCUTANEOUS NIGHTLY
Status: DISCONTINUED | OUTPATIENT
Start: 2024-05-16 | End: 2024-05-18 | Stop reason: HOSPADM

## 2024-05-16 RX ORDER — LIDOCAINE HYDROCHLORIDE 20 MG/ML
INJECTION, SOLUTION INTRAVENOUS PRN
Status: DISCONTINUED | OUTPATIENT
Start: 2024-05-16 | End: 2024-05-16 | Stop reason: SDUPTHER

## 2024-05-16 RX ORDER — SODIUM CHLORIDE 9 MG/ML
INJECTION, SOLUTION INTRAVENOUS CONTINUOUS PRN
Status: DISCONTINUED | OUTPATIENT
Start: 2024-05-16 | End: 2024-05-16 | Stop reason: SDUPTHER

## 2024-05-16 RX ORDER — HYDROMORPHONE HYDROCHLORIDE 1 MG/ML
0.5 INJECTION, SOLUTION INTRAMUSCULAR; INTRAVENOUS; SUBCUTANEOUS EVERY 5 MIN PRN
Status: DISCONTINUED | OUTPATIENT
Start: 2024-05-16 | End: 2024-05-16 | Stop reason: HOSPADM

## 2024-05-16 RX ORDER — ASPIRIN 325 MG
325 TABLET, DELAYED RELEASE (ENTERIC COATED) ORAL 2 TIMES DAILY
Qty: 56 TABLET | Refills: 0 | Status: SHIPPED | OUTPATIENT
Start: 2024-05-16 | End: 2024-05-16 | Stop reason: HOSPADM

## 2024-05-16 RX ORDER — SODIUM CHLORIDE 9 MG/ML
INJECTION, SOLUTION INTRAVENOUS PRN
Status: DISCONTINUED | OUTPATIENT
Start: 2024-05-16 | End: 2024-05-16 | Stop reason: HOSPADM

## 2024-05-16 RX ORDER — INSULIN LISPRO 100 [IU]/ML
0-4 INJECTION, SOLUTION INTRAVENOUS; SUBCUTANEOUS
Status: DISCONTINUED | OUTPATIENT
Start: 2024-05-16 | End: 2024-05-18 | Stop reason: HOSPADM

## 2024-05-16 RX ORDER — HYDROMORPHONE HYDROCHLORIDE 1 MG/ML
0.25 INJECTION, SOLUTION INTRAMUSCULAR; INTRAVENOUS; SUBCUTANEOUS EVERY 5 MIN PRN
Status: DISCONTINUED | OUTPATIENT
Start: 2024-05-16 | End: 2024-05-16 | Stop reason: HOSPADM

## 2024-05-16 RX ORDER — PROPOFOL 10 MG/ML
INJECTION, EMULSION INTRAVENOUS PRN
Status: DISCONTINUED | OUTPATIENT
Start: 2024-05-16 | End: 2024-05-16 | Stop reason: SDUPTHER

## 2024-05-16 RX ORDER — ONDANSETRON 2 MG/ML
INJECTION INTRAMUSCULAR; INTRAVENOUS PRN
Status: DISCONTINUED | OUTPATIENT
Start: 2024-05-16 | End: 2024-05-16 | Stop reason: SDUPTHER

## 2024-05-16 RX ORDER — MIDAZOLAM HYDROCHLORIDE 1 MG/ML
INJECTION INTRAMUSCULAR; INTRAVENOUS PRN
Status: DISCONTINUED | OUTPATIENT
Start: 2024-05-16 | End: 2024-05-16 | Stop reason: SDUPTHER

## 2024-05-16 RX ORDER — DEXAMETHASONE SODIUM PHOSPHATE 10 MG/ML
INJECTION INTRAMUSCULAR; INTRAVENOUS PRN
Status: DISCONTINUED | OUTPATIENT
Start: 2024-05-16 | End: 2024-05-16 | Stop reason: SDUPTHER

## 2024-05-16 RX ORDER — FENTANYL CITRATE 50 UG/ML
INJECTION, SOLUTION INTRAMUSCULAR; INTRAVENOUS PRN
Status: DISCONTINUED | OUTPATIENT
Start: 2024-05-16 | End: 2024-05-16 | Stop reason: SDUPTHER

## 2024-05-16 RX ORDER — NALOXONE HYDROCHLORIDE 0.4 MG/ML
INJECTION, SOLUTION INTRAMUSCULAR; INTRAVENOUS; SUBCUTANEOUS PRN
Status: DISCONTINUED | OUTPATIENT
Start: 2024-05-16 | End: 2024-05-16 | Stop reason: HOSPADM

## 2024-05-16 RX ORDER — GLUCAGON 1 MG/ML
1 KIT INJECTION PRN
Status: DISCONTINUED | OUTPATIENT
Start: 2024-05-16 | End: 2024-05-18 | Stop reason: HOSPADM

## 2024-05-16 RX ORDER — LANOLIN ALCOHOL/MO/W.PET/CERES
6 CREAM (GRAM) TOPICAL NIGHTLY
Status: DISCONTINUED | OUTPATIENT
Start: 2024-05-16 | End: 2024-05-18 | Stop reason: HOSPADM

## 2024-05-16 RX ORDER — SODIUM CHLORIDE 0.9 % (FLUSH) 0.9 %
5-40 SYRINGE (ML) INJECTION PRN
Status: DISCONTINUED | OUTPATIENT
Start: 2024-05-16 | End: 2024-05-16 | Stop reason: HOSPADM

## 2024-05-16 RX ORDER — ROCURONIUM BROMIDE 10 MG/ML
INJECTION, SOLUTION INTRAVENOUS PRN
Status: DISCONTINUED | OUTPATIENT
Start: 2024-05-16 | End: 2024-05-16 | Stop reason: SDUPTHER

## 2024-05-16 RX ORDER — ONDANSETRON 2 MG/ML
4 INJECTION INTRAMUSCULAR; INTRAVENOUS
Status: DISCONTINUED | OUTPATIENT
Start: 2024-05-16 | End: 2024-05-16 | Stop reason: HOSPADM

## 2024-05-16 RX ORDER — ASPIRIN 325 MG
325 TABLET, DELAYED RELEASE (ENTERIC COATED) ORAL 2 TIMES DAILY
Status: DISCONTINUED | OUTPATIENT
Start: 2024-05-17 | End: 2024-05-17

## 2024-05-16 RX ADMIN — ARFORMOTEROL TARTRATE 15 MCG: 15 SOLUTION RESPIRATORY (INHALATION) at 09:28

## 2024-05-16 RX ADMIN — PANTOPRAZOLE SODIUM 40 MG: 40 TABLET, DELAYED RELEASE ORAL at 09:07

## 2024-05-16 RX ADMIN — SODIUM CHLORIDE, PRESERVATIVE FREE 10 ML: 5 INJECTION INTRAVENOUS at 22:04

## 2024-05-16 RX ADMIN — ACETAMINOPHEN 650 MG: 325 TABLET ORAL at 09:07

## 2024-05-16 RX ADMIN — SENNOSIDES AND DOCUSATE SODIUM 1 TABLET: 50; 8.6 TABLET ORAL at 09:07

## 2024-05-16 RX ADMIN — SODIUM CHLORIDE: 9 INJECTION, SOLUTION INTRAVENOUS at 19:10

## 2024-05-16 RX ADMIN — GABAPENTIN 800 MG: 400 CAPSULE ORAL at 22:03

## 2024-05-16 RX ADMIN — LEVOTHYROXINE SODIUM 50 MCG: 0.05 TABLET ORAL at 09:07

## 2024-05-16 RX ADMIN — HYDROMORPHONE HYDROCHLORIDE 0.5 MG: 1 INJECTION, SOLUTION INTRAMUSCULAR; INTRAVENOUS; SUBCUTANEOUS at 21:06

## 2024-05-16 RX ADMIN — GABAPENTIN 800 MG: 400 CAPSULE ORAL at 09:06

## 2024-05-16 RX ADMIN — PRAMIPEXOLE DIHYDROCHLORIDE 0.75 MG: 0.25 TABLET ORAL at 22:04

## 2024-05-16 RX ADMIN — SODIUM CHLORIDE, PRESERVATIVE FREE 10 ML: 5 INJECTION INTRAVENOUS at 09:32

## 2024-05-16 RX ADMIN — FENTANYL CITRATE 100 MCG: 50 INJECTION, SOLUTION INTRAMUSCULAR; INTRAVENOUS at 19:18

## 2024-05-16 RX ADMIN — FENTANYL CITRATE 50 MCG: 50 INJECTION INTRAMUSCULAR; INTRAVENOUS at 15:25

## 2024-05-16 RX ADMIN — ROCURONIUM BROMIDE 50 MG: 50 INJECTION INTRAVENOUS at 19:18

## 2024-05-16 RX ADMIN — FENTANYL CITRATE 50 MCG: 50 INJECTION INTRAMUSCULAR; INTRAVENOUS at 09:29

## 2024-05-16 RX ADMIN — PRAMIPEXOLE DIHYDROCHLORIDE 0.75 MG: 0.25 TABLET ORAL at 09:28

## 2024-05-16 RX ADMIN — DEXAMETHASONE SODIUM PHOSPHATE 10 MG: 10 INJECTION INTRAMUSCULAR; INTRAVENOUS at 19:18

## 2024-05-16 RX ADMIN — SUGAMMADEX 200 MG: 100 INJECTION, SOLUTION INTRAVENOUS at 20:41

## 2024-05-16 RX ADMIN — LIDOCAINE HYDROCHLORIDE 100 MG: 20 INJECTION, SOLUTION INTRAVENOUS at 19:18

## 2024-05-16 RX ADMIN — ATORVASTATIN CALCIUM 80 MG: 80 TABLET, FILM COATED ORAL at 09:06

## 2024-05-16 RX ADMIN — Medication 1000 UNITS: at 09:07

## 2024-05-16 RX ADMIN — MIDAZOLAM 2 MG: 1 INJECTION INTRAMUSCULAR; INTRAVENOUS at 19:10

## 2024-05-16 RX ADMIN — CEFAZOLIN 2 G: 1 INJECTION, POWDER, FOR SOLUTION INTRAMUSCULAR; INTRAVENOUS at 19:31

## 2024-05-16 RX ADMIN — SENNOSIDES AND DOCUSATE SODIUM 1 TABLET: 50; 8.6 TABLET ORAL at 22:03

## 2024-05-16 RX ADMIN — ONDANSETRON 4 MG: 2 INJECTION INTRAMUSCULAR; INTRAVENOUS at 20:09

## 2024-05-16 RX ADMIN — FENTANYL CITRATE 50 MCG: 50 INJECTION INTRAMUSCULAR; INTRAVENOUS at 04:27

## 2024-05-16 RX ADMIN — FENTANYL CITRATE 50 MCG: 50 INJECTION INTRAMUSCULAR; INTRAVENOUS at 11:46

## 2024-05-16 RX ADMIN — FENTANYL CITRATE 50 MCG: 50 INJECTION INTRAMUSCULAR; INTRAVENOUS at 00:49

## 2024-05-16 RX ADMIN — VENLAFAXINE HYDROCHLORIDE 150 MG: 150 CAPSULE, EXTENDED RELEASE ORAL at 09:28

## 2024-05-16 RX ADMIN — PROPOFOL 130 MG: 10 INJECTION, EMULSION INTRAVENOUS at 19:18

## 2024-05-16 RX ADMIN — MELATONIN 3 MG ORAL TABLET 6 MG: 3 TABLET ORAL at 22:03

## 2024-05-16 ASSESSMENT — PAIN SCALES - GENERAL
PAINLEVEL_OUTOF10: 10
PAINLEVEL_OUTOF10: 3
PAINLEVEL_OUTOF10: 3
PAINLEVEL_OUTOF10: 5
PAINLEVEL_OUTOF10: 10
PAINLEVEL_OUTOF10: 5
PAINLEVEL_OUTOF10: 9

## 2024-05-16 ASSESSMENT — PAIN DESCRIPTION - FREQUENCY
FREQUENCY: CONTINUOUS

## 2024-05-16 ASSESSMENT — PAIN DESCRIPTION - DESCRIPTORS
DESCRIPTORS: TENDER;THROBBING;ACHING
DESCRIPTORS: THROBBING;ACHING;DISCOMFORT
DESCRIPTORS: ACHING
DESCRIPTORS: DISCOMFORT;BURNING;ACHING
DESCRIPTORS: ACHING;DISCOMFORT;TENDER
DESCRIPTORS: DISCOMFORT;TENDER;THROBBING
DESCRIPTORS: ACHING;DISCOMFORT;THROBBING

## 2024-05-16 ASSESSMENT — PAIN DESCRIPTION - PAIN TYPE
TYPE: SURGICAL PAIN

## 2024-05-16 ASSESSMENT — PAIN DESCRIPTION - ORIENTATION
ORIENTATION: RIGHT

## 2024-05-16 ASSESSMENT — ENCOUNTER SYMPTOMS: DYSPNEA ACTIVITY LEVEL: AFTER AMBULATING 1 FLIGHT OF STAIRS

## 2024-05-16 ASSESSMENT — PAIN DESCRIPTION - LOCATION
LOCATION: HIP
LOCATION: HIP
LOCATION: LEG
LOCATION: HIP
LOCATION: HIP
LOCATION: LEG
LOCATION: LEG

## 2024-05-16 ASSESSMENT — PAIN DESCRIPTION - ONSET
ONSET: ON-GOING

## 2024-05-16 ASSESSMENT — LIFESTYLE VARIABLES: SMOKING_STATUS: 1

## 2024-05-16 ASSESSMENT — COPD QUESTIONNAIRES: CAT_SEVERITY: MODERATE

## 2024-05-16 NOTE — PROGRESS NOTES
4 Eyes Skin Assessment     NAME:  Rebeka Renee  YOB: 1945  MEDICAL RECORD NUMBER:  [id number]    The patient is being assessed for  Admission    I agree that at least one RN has performed a thorough Head to Toe Skin Assessment on the patient. ALL assessment sites listed below have been assessed.      Areas assessed by both nurses:    Head, Face, Ears, Shoulders, Back, Chest, Arms, Elbows, Hands, Sacrum. Buttock, Coccyx, Ischium, Legs. Feet and Heels, and Under Medical Devices  Incision to right hip that's red blanchable and secured with steri strips. Pt going to surgery 5/16/2024.        Does the Patient have a Wound? No noted wound(s)        Norris Prevention initiated by RN: Yes  Wound Care Orders initiated by RN: No    Pressure Injury (Stage 3,4, Unstageable, DTI, NWPT, and Complex wounds) if present, place Wound referral order by RN under : No    New Ostomies, if present place, Ostomy referral order under : No     Nurse 1 eSignature: Electronically signed by Negin Bhatt RN on 5/16/24 at 5:01 PM EDT    **SHARE this note so that the co-signing nurse can place an eSignature**    Nurse 2 eSignature: Electronically signed by Azalia Hills RN on 5/16/24 at 5:54 PM EDT

## 2024-05-16 NOTE — PROGRESS NOTES
[facility] Hospitalist Progress Note    Admitting Date and Time: 5/15/2024  1:48 PM  Admit Dx: Dislocated hip, right, initial encounter ([facility]) [S73.004A]  Hip fracture requiring operative repair, right, closed, initial encounter ([facility]) [S72.001A]    Subjective:  Patient is being followed for Dislocated hip, right, initial encounter ([facility]) [S73.004A]  Hip fracture requiring operative repair, right, closed, initial encounter ([facility]) [S72.001A]   Pt seen and examined this morning  No acute events overnight  Lying in bed, complaining of pain in her right hip.    ROS: denies fever, chills, cp, sob, n/v, HA unless stated above.      insulin lispro  0-4 Units SubCUTAneous TID WC    insulin lispro  0-4 Units SubCUTAneous Nightly    melatonin  6 mg Oral Nightly    sodium chloride flush  5-40 mL IntraVENous 2 times per day    [START ON 5/17/2024] enoxaparin  40 mg SubCUTAneous Daily    sennosides-docusate sodium  1 tablet Oral BID    arformoterol tartrate  15 mcg Nebulization BID RT    atorvastatin  80 mg Oral Daily    buPROPion  100 mg Oral Daily    Vitamin D  1,000 Units Oral Daily    gabapentin  800 mg Oral TID    levothyroxine  50 mcg Oral Daily    pantoprazole  40 mg Oral QAM AC    pramipexole  0.75 mg Oral BID    venlafaxine  150 mg Oral Daily     glucose, 4 tablet, PRN  dextrose bolus, 125 mL, PRN   Or  dextrose bolus, 250 mL, PRN  glucagon (rDNA), 1 mg, PRN  dextrose, , Continuous PRN  sodium chloride flush, 5-40 mL, PRN  sodium chloride, , PRN  ondansetron, 4 mg, Q8H PRN   Or  ondansetron, 4 mg, Q6H PRN  acetaminophen, 650 mg, Q6H PRN   Or  acetaminophen, 650 mg, Q6H PRN  fentanNYL, 25 mcg, Q2H PRN   Or  fentanNYL, 50 mcg, Q2H PRN         Objective:    /80   Pulse 100   Temp 98.1 °F (36.7 °C) (Temporal)   Resp 17   Ht 1.651 m (5' 5\")   Wt 75.8 kg (167 lb)   SpO2 92%   BMI 27.79 kg/m²     General Appearance: alert and oriented to person, place and time and in no acute distress  Skin: warm and  dry  Head: normocephalic and atraumatic  Eyes: pupils equal, round, and reactive to light, extraocular eye movements intact, conjunctivae normal  Neck: neck supple and non tender without mass   Pulmonary/Chest: Diminished at the bases, no respiratory distress  Cardiovascular: normal rate, normal S1 and S2 and no carotid bruits  Abdomen: soft, non-tender, non-distended, normal bowel sounds, no masses or organomegaly  Extremities: no cyanosis, no clubbing and no edema.  Right hip tenderness with decreased ROM and early  Neurologic: no cranial nerve deficit and speech normal        Recent Labs     05/15/24  2003      K 3.5      CO2 31*   BUN 8   CREATININE 0.6   GLUCOSE 88   CALCIUM 7.7*       Recent Labs     05/15/24  2003   WBC 10.7   RBC 3.55   HGB 8.9*   HCT 29.6*   MCV 83.4   MCH 25.1*   MCHC 30.1*   RDW 16.8*      MPV 10.8         Assessment and Plan:     Principal Problem:    Hip fracture requiring operative repair, right, closed, initial encounter ([facility])  Resolved Problems:    * No resolved hospital problems. *    Close right right femoral head dislocation with fractures.  Ortho following.  Unsuccessful closed reduction attempt in the ED.  Plan 4 OR today.  Pain control.  PT OT.  Anticoagulation.  IS  Right lower lobe infiltrate vs atelectasis vs pleural effusion.  Check Pro-Vamsi.  Add incentive spirometry.  In no respiratory distress.  Consider starting antibiotics if Pro-Vamsi is elevated.  Consider diuresis postop  Hypothyroidism.  Continue home Synthroid  History of VTE.  Had a thrombus and right axillary vein in April.  Eliquis on hold preop.  Anxiety/depression.  Continue home meds  DM2.  Controlled.  Hold home meds and continue LD SS.  Hypoglycemia protocol.      NOTE: This report was transcribed using voice recognition software. Every effort was made to ensure accuracy; however, inadvertent computerized transcription errors may be present.  Electronically signed by Augustine Vigil MD on

## 2024-05-16 NOTE — DISCHARGE INSTRUCTIONS
[facility] Department of Orthopedic Surgery  [address] [facility] [address]    Dr. Wood George, DO         MD Dr. Wood Barrientos MD Frank Ansevin, PA-C Sara Zatchok, PA-C Tyler Tsangaris PA-C      Orthopaedics Discharge Instructions   Weight bearing Status - Non-weight bearing - on right lower Extremity  Pain Medication   Contact Office for Medication Refill- [phone]  Office can refill pain medications no sooner than every 7 days  If patient discharging to facility then pain control will be continued per facility physician  Ice to operative/injured site for 15-30 minutes of each hour for next 5 days    Recommend that you continue to ice the area 2-3 times per day after this   Elevate operative/injured limb on 2 pillows at home  Goal is to have limb above the heart if able  Continue DVT Prophylaxis (blood clot prevention) as prescribed: eliquis home    Wound care - Please keep aquacell in place for 7 days      Fracture Care -  ice nd elevate     Follow up in office in approximately 2 weeks. Your first follow up appointment is often with one of our physician assistants.     Call the office at [phone] if having any concerns.     Watch for these significant complications.  Call physician if they or any other problems occur:  Fever over 101°, redness, swelling or warmth at the operative site  Unrelieved nausea    Foul smelling or cloudy drainage at the operative site   Unrelieved pain    Blood soaked dressing. (Some oozing may be normal)     Numb, pale, blue, cold or tingling extremity          It is the Department of Orthopaedic Trauma's standard of practice that providers will de-escalate (wean) all patients from narcotic (opioid) medications during the post-operative period.   We provide multimodal pain control, but opioid medications are tapered in all of our patients.  If patient requires referral to pain management for prolonged taper from opioid pain medication,

## 2024-05-16 NOTE — PROGRESS NOTES
Comprehensive Nutrition Assessment    Type and Reason for Visit:  Initial, Positive Nutrition Screen    Nutrition Recommendations/Plan:   Continue NPO ; will start ONS w/ diet  Will monitor     Malnutrition Assessment:  Malnutrition Status:  At risk for malnutrition (Comment) (05/16/24 1025)    Context:  Acute Illness     Findings of the 6 clinical characteristics of malnutrition:  Energy Intake:  Mild decrease in energy intake (Comment)  Weight Loss:  Unable to assess     Body Fat Loss:  Unable to assess     Muscle Mass Loss:  Unable to assess    Fluid Accumulation:  No significant fluid accumulation     Strength:  Not Performed    Nutrition Assessment:    pt adm d/t dislocation of R-antibiotic spacer/discplaced frx- currently NPO; pt ~4 weeks post-op complex R-hip arthroplasty; recent adm d/t hip pain w/ R-hip aspiration; PMhx of DM,HTN,AAA, CHF, lung/kidney/breast CA- s/p nephrectomy/lobectomy; hx of gastric bypass; hx of recent hip dislocation s/p ORIF where pt developed post-op infection s/p I&D;pt w/ hx of multiple operations of hip in April; incision appears well healed this adm per EMR notes; will start ONS w/ diet advancement to promote oral intake ; will monitor.    Nutrition Related Findings:    DAVIAN I/O; A&Ox4; active BS; no edema Wound Type: None       Current Nutrition Intake & Therapies:    Average Meal Intake: NPO  Average Supplements Intake: NPO  Diet NPO Exceptions are: Sips of Water with Meds    Anthropometric Measures:  Height: 165.1 cm (5' 5\")  Ideal Body Weight (IBW): 125 lbs (57 kg)       Current Body Weight: 75.8 kg (167 lb 1.7 oz) (5/15-no method), 133.7 % IBW.    Current BMI (kg/m2): 27.8  Usual Body Weight: 83.4 kg (183 lb 13.8 oz) (2/19/24-BS)  % Weight Change (Calculated): -9.1  Weight Adjustment For: No Adjustment                 BMI Categories: Overweight (BMI 25.0-29.9)    Estimated Daily Nutrient Needs:  Energy Requirements Based On: Formula  Weight Used for Energy Requirements:

## 2024-05-16 NOTE — ANESTHESIA PRE PROCEDURE
Department of Anesthesiology  Preprocedure Note       Name:  Rebeka Renee   Age:  78 y.o.  :  1945                                          MRN:  [id number]         Date:  2024      Surgeon: Surgeon(s):  Nemesio Handy MD    Procedure: Procedure(s):  RIGHT FEMUR OPEN REDUCTION INTERNAL FIXATION, OPEN TREATMENT OF RIGHT HIP DISLOCATION-[facility]    Medications prior to admission:   Prior to Admission medications    Medication Sig Start Date End Date Taking? Authorizing Provider   arformoterol tartrate (BROVANA) 15 MCG/2ML NEBU Take 1 ampule by nebulization 2 times daily    Malou Brunson MD   atorvastatin (LIPITOR) 80 MG tablet Take 1 tablet by mouth daily    Malou Brunson MD   Melatonin 10 MG TABS Take 1 tablet by mouth at bedtime    Malou Brunson MD   magnesium hydroxide (MILK OF MAGNESIA) 400 MG/5ML suspension Take 30 mLs by mouth daily as needed for Constipation    Malou Brunson MD   pantoprazole (PROTONIX) 20 MG tablet Take 1 tablet by mouth daily    Malou Brunson MD   acetaminophen (TYLENOL) 500 MG tablet Take 2 tablets by mouth in the morning and at bedtime    Malou Brunson MD   polyethylene glycol (GLYCOLAX) 17 g packet Take 1 packet by mouth daily as needed for Constipation 24  Vitaly Lee MD   ertapenem (INVANZ) infusion Infuse 1,000 mg intravenously every 24 hours Compound per protocol. 24  Polly Gilliam MD   apixaban (ELIQUIS) 5 MG TABS tablet Take 2 tablets by mouth 2 times daily for 4 days, THEN 1 tablet 2 times daily. 24  Vitaly Lee MD   ALBUTEROL IN Inhale into the lungs    ProviderMalou MD   Cholecalciferol (VITAMIN D) 25 MCG TABS Take 1 tablet by mouth daily 24   Javier Meyer MD   rosuvastatin (CRESTOR) 40 MG tablet Take 1 tablet by mouth nightly 24   Claudia Andrea MD   potassium chloride (KLOR-CON M) 10 MEQ extended

## 2024-05-16 NOTE — CARE COORDINATION
Care Coordination  Spoke to Nahomy at [facility] and the patient is not a bed hold and will need a precert to return if a bed is available. Spoke to the patients   Nate Renee and he wants her to return there upon discharge. She had previous reduction of her right hip after dislocation . The patient  came in to the clinic for a check up . She was found to have dislocation of right antibiotic spacer. Sent to ED, with unfortunate oblique spiral femur fracture during attempted reduction .Per right femur head fx, displaced oblique fracute of the right femoral shaft. The patient went  for an orif right femur today . The patient will need therapy ordered.           Case Management Assessment  Initial Evaluation    Date/Time of Evaluation: 5/16/2024 2:43 PM  Assessment Completed by: Sandra Wilson RN    If patient is discharged prior to next notation, then this note serves as note for discharge by case management.    Patient Name: Rebeka Renee                   YOB: 1945  Diagnosis: Dislocated hip, right, initial encounter ([facility]) [S73.004A]  Hip fracture requiring operative repair, right, closed, initial encounter ([facility]) [S72.001A]                   Date / Time: 5/15/2024  1:48 PM    Patient Admission Status: Inpatient   Readmission Risk (Low < 19, Mod (19-27), High > 27): Readmission Risk Score: 33    Current PCP: Navneet Latif MD  PCP verified by ? Yes    Chart Reviewed: Yes      History Provided by: Spouse  Patient Orientation: Alert and Oriented    Patient Cognition: Alert    Hospitalization in the last 30 days (Readmission):  No    If yes, Readmission Assessment in  Navigator will be completed.    Advance Directives:      Code Status: Full Code   Patient's Primary Decision Maker is: Legal Next of Kin    Primary Decision Maker: Nate Renee - Spouse - [phone]    Discharge Planning:    Patient lives with: Other (Comment) Type of Home: Acute Rehab  Primary Care Giver:  patient representative Rebeka and her family were provided with a choice of provider and agrees with the discharge plan. Freedom of choice list with basic dialogue that supports the patient's individualized plan of care/goals and shares the quality data associated with the providers was provided to:     Patient Representative Name:       The Patient and/or Patient Representative Agree with the Discharge Plan?      Sadnra Wilson RN  Case Management Department  Ph: [phone]

## 2024-05-17 LAB
GLUCOSE BLD-MCNC: 105 MG/DL (ref 74–99)
GLUCOSE BLD-MCNC: 117 MG/DL (ref 74–99)
GLUCOSE BLD-MCNC: 120 MG/DL (ref 74–99)
GLUCOSE BLD-MCNC: 159 MG/DL (ref 74–99)

## 2024-05-17 PROCEDURE — A4216 STERILE WATER/SALINE, 10 ML: HCPCS | Performed by: INTERNAL MEDICINE

## 2024-05-17 PROCEDURE — 6360000002 HC RX W HCPCS: Performed by: FAMILY MEDICINE

## 2024-05-17 PROCEDURE — 2580000003 HC RX 258: Performed by: FAMILY MEDICINE

## 2024-05-17 PROCEDURE — 94640 AIRWAY INHALATION TREATMENT: CPT

## 2024-05-17 PROCEDURE — 99239 HOSP IP/OBS DSCHRG MGMT >30: CPT | Performed by: STUDENT IN AN ORGANIZED HEALTH CARE EDUCATION/TRAINING PROGRAM

## 2024-05-17 PROCEDURE — 2580000003 HC RX 258

## 2024-05-17 PROCEDURE — 97161 PT EVAL LOW COMPLEX 20 MIN: CPT

## 2024-05-17 PROCEDURE — 97535 SELF CARE MNGMENT TRAINING: CPT

## 2024-05-17 PROCEDURE — 97165 OT EVAL LOW COMPLEX 30 MIN: CPT

## 2024-05-17 PROCEDURE — 6360000002 HC RX W HCPCS

## 2024-05-17 PROCEDURE — 6360000002 HC RX W HCPCS: Performed by: INTERNAL MEDICINE

## 2024-05-17 PROCEDURE — 6370000000 HC RX 637 (ALT 250 FOR IP): Performed by: FAMILY MEDICINE

## 2024-05-17 PROCEDURE — 1200000000 HC SEMI PRIVATE

## 2024-05-17 PROCEDURE — 2580000003 HC RX 258: Performed by: INTERNAL MEDICINE

## 2024-05-17 PROCEDURE — 82962 GLUCOSE BLOOD TEST: CPT

## 2024-05-17 PROCEDURE — 97530 THERAPEUTIC ACTIVITIES: CPT

## 2024-05-17 RX ORDER — LANOLIN ALCOHOL/MO/W.PET/CERES
3 CREAM (GRAM) TOPICAL NIGHTLY PRN
Qty: 30 TABLET | Refills: 0 | Status: SHIPPED | OUTPATIENT
Start: 2024-05-17

## 2024-05-17 RX ADMIN — FENTANYL CITRATE 50 MCG: 50 INJECTION INTRAMUSCULAR; INTRAVENOUS at 12:10

## 2024-05-17 RX ADMIN — BUPROPION HYDROCHLORIDE 100 MG: 100 TABLET, EXTENDED RELEASE ORAL at 08:20

## 2024-05-17 RX ADMIN — PANTOPRAZOLE SODIUM 40 MG: 40 TABLET, DELAYED RELEASE ORAL at 05:41

## 2024-05-17 RX ADMIN — Medication 1000 UNITS: at 08:15

## 2024-05-17 RX ADMIN — FENTANYL CITRATE 50 MCG: 50 INJECTION INTRAMUSCULAR; INTRAVENOUS at 21:03

## 2024-05-17 RX ADMIN — GABAPENTIN 800 MG: 400 CAPSULE ORAL at 21:05

## 2024-05-17 RX ADMIN — SODIUM CHLORIDE, PRESERVATIVE FREE 10 ML: 5 INJECTION INTRAVENOUS at 21:04

## 2024-05-17 RX ADMIN — FENTANYL CITRATE 50 MCG: 50 INJECTION INTRAMUSCULAR; INTRAVENOUS at 15:57

## 2024-05-17 RX ADMIN — ARFORMOTEROL TARTRATE 15 MCG: 15 SOLUTION RESPIRATORY (INHALATION) at 19:47

## 2024-05-17 RX ADMIN — FENTANYL CITRATE 50 MCG: 50 INJECTION INTRAMUSCULAR; INTRAVENOUS at 00:45

## 2024-05-17 RX ADMIN — LEVOTHYROXINE SODIUM 50 MCG: 0.05 TABLET ORAL at 05:41

## 2024-05-17 RX ADMIN — SENNOSIDES AND DOCUSATE SODIUM 1 TABLET: 50; 8.6 TABLET ORAL at 21:04

## 2024-05-17 RX ADMIN — GABAPENTIN 800 MG: 400 CAPSULE ORAL at 15:57

## 2024-05-17 RX ADMIN — GABAPENTIN 800 MG: 400 CAPSULE ORAL at 08:15

## 2024-05-17 RX ADMIN — FENTANYL CITRATE 50 MCG: 50 INJECTION INTRAMUSCULAR; INTRAVENOUS at 08:15

## 2024-05-17 RX ADMIN — MELATONIN 3 MG ORAL TABLET 6 MG: 3 TABLET ORAL at 21:03

## 2024-05-17 RX ADMIN — SODIUM CHLORIDE, PRESERVATIVE FREE 10 ML: 5 INJECTION INTRAVENOUS at 00:46

## 2024-05-17 RX ADMIN — SENNOSIDES AND DOCUSATE SODIUM 1 TABLET: 50; 8.6 TABLET ORAL at 08:15

## 2024-05-17 RX ADMIN — PRAMIPEXOLE DIHYDROCHLORIDE 0.75 MG: 0.25 TABLET ORAL at 21:04

## 2024-05-17 RX ADMIN — VENLAFAXINE HYDROCHLORIDE 150 MG: 150 CAPSULE, EXTENDED RELEASE ORAL at 08:20

## 2024-05-17 RX ADMIN — SODIUM CHLORIDE 1000 MG: 9 INJECTION INTRAMUSCULAR; INTRAVENOUS; SUBCUTANEOUS at 22:17

## 2024-05-17 RX ADMIN — SODIUM CHLORIDE, PRESERVATIVE FREE 10 ML: 5 INJECTION INTRAVENOUS at 02:36

## 2024-05-17 RX ADMIN — PRAMIPEXOLE DIHYDROCHLORIDE 0.75 MG: 0.25 TABLET ORAL at 08:19

## 2024-05-17 RX ADMIN — ARFORMOTEROL TARTRATE 15 MCG: 15 SOLUTION RESPIRATORY (INHALATION) at 08:46

## 2024-05-17 RX ADMIN — WATER 2000 MG: 1 INJECTION INTRAMUSCULAR; INTRAVENOUS; SUBCUTANEOUS at 10:45

## 2024-05-17 RX ADMIN — ENOXAPARIN SODIUM 40 MG: 100 INJECTION SUBCUTANEOUS at 08:15

## 2024-05-17 RX ADMIN — ATORVASTATIN CALCIUM 80 MG: 80 TABLET, FILM COATED ORAL at 08:20

## 2024-05-17 RX ADMIN — WATER 2000 MG: 1 INJECTION INTRAMUSCULAR; INTRAVENOUS; SUBCUTANEOUS at 02:36

## 2024-05-17 ASSESSMENT — PAIN DESCRIPTION - ORIENTATION
ORIENTATION: RIGHT

## 2024-05-17 ASSESSMENT — PAIN DESCRIPTION - ONSET
ONSET: ON-GOING
ONSET: GRADUAL
ONSET: GRADUAL

## 2024-05-17 ASSESSMENT — PAIN SCALES - GENERAL
PAINLEVEL_OUTOF10: 10
PAINLEVEL_OUTOF10: 3
PAINLEVEL_OUTOF10: 10
PAINLEVEL_OUTOF10: 7
PAINLEVEL_OUTOF10: 10
PAINLEVEL_OUTOF10: 0
PAINLEVEL_OUTOF10: 10

## 2024-05-17 ASSESSMENT — PAIN DESCRIPTION - LOCATION
LOCATION: HIP
LOCATION: HIP;LEG

## 2024-05-17 ASSESSMENT — PAIN DESCRIPTION - DESCRIPTORS
DESCRIPTORS: ACHING;TENDER;THROBBING
DESCRIPTORS: ACHING;SHARP;STABBING
DESCRIPTORS: ACHING;THROBBING;TENDER
DESCRIPTORS: ACHING;THROBBING;BURNING
DESCRIPTORS: THROBBING;ACHING;TENDER

## 2024-05-17 ASSESSMENT — PAIN - FUNCTIONAL ASSESSMENT
PAIN_FUNCTIONAL_ASSESSMENT: PREVENTS OR INTERFERES SOME ACTIVE ACTIVITIES AND ADLS

## 2024-05-17 ASSESSMENT — PAIN DESCRIPTION - FREQUENCY
FREQUENCY: INTERMITTENT

## 2024-05-17 ASSESSMENT — PAIN SCALES - WONG BAKER: WONGBAKER_NUMERICALRESPONSE: HURTS A LITTLE BIT

## 2024-05-17 ASSESSMENT — PAIN DESCRIPTION - PAIN TYPE
TYPE: SURGICAL PAIN

## 2024-05-17 NOTE — PROGRESS NOTES
Nurse to nurse called to Yelena @ [place] @ [facility]. Discharge papers faxed to [phone].

## 2024-05-17 NOTE — PROGRESS NOTES
Physical Therapy  Physical Therapy Initial Assessment     Name: Rebeka Renee  : 1945  MRN: [id number]      Date of Service: 2024    Evaluating PT:  Patt Goodson, PT, DPT, [id number]    Room #:  5411/5411-A  Diagnosis:  Dislocated hip, right, initial encounter ([facility]) [S73.004A]  Hip fracture requiring operative repair, right, closed, initial encounter ([facility]) [S72.001A]  PMHx/PSHx:    Past Medical History:   Diagnosis Date    Anemia     S/P chemotherapy.    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    Ascending aortic aneurysm ([facility])     seen on CTA chest w/contrast on     Asthma     Bipolar 1 disorder ([facility])     Cancer ([facility])     lung/ kidney    Cancer of breast, female ([facility]) 2006    S/P bilaeral mastectomy with left breast lymph node resection and chemotherapy.    CHF (congestive heart failure) ([facility])     Chronic back pain     Depression     Depression with anxiety     Diabetes mellitus ([facility])     Resolved after losing 130 lbs. after gastric bypass surgery.    Dyslipidemia     Fibromyalgia     History of blood transfusion     anemia    Hypertension     Hypothyroidism     Neuropathy     Pericardial effusion 2010    Subxiphoid pericardial window with drainage of pericardial effusion and pericardial biopsy:  Fluid and biopsy negative for metastases.     Pleural effusion 2010    Noted on chest x-ray.    TIA (transient ischemic attack)     Tobacco abuse     Patient smoked 3 ppd x 26 years.  Quit in .    Type II or unspecified type diabetes mellitus without mention of complication, not stated as uncontrolled       Past Surgical History:   Procedure Laterality Date    BREAST SURGERY      CATARACT REMOVAL WITH IMPLANT Bilateral     CHOLECYSTECTOMY      FIBULA FRACTURE SURGERY Right 2024    RIGHT FEMUR OPEN REDUCTION INTERNAL FIXATION, OPEN TREATMENT OF RIGHT HIP DISLOCATION  [place] [facility] [place]    TRANSTHORACIC ECHOCARDIOGRAM  4/1/2010    Small-to-moderate size pericardial effusion with early signs of tamponade with normal left ventricular size, wall thickness, wall motion and systolic function with stage I diastolic dysfunction with normal right ventricular size and function with moderately thickened pericardium and with no significant valvular abnormalities noted.       Procedure/Surgery:  Open treatment right proximal femur fracture around antibiotic spacer and harvesting of cultures right periprosthetic joint infection 5/16  Precautions:  Fall Risk, per op note 5/16/24 \"Nonweightbearing right lower extremity may use toe-touch weightbearing for balance and transfers\", global hip precautions, h/o L knee buckle, Contact Isolation- ESBL, + Alarms, O2, Purewick, Current R hip dislocation with h/o multiple R hip dislocations  Equipment Needs:  TBD    SUBJECTIVE:    Pt was admitted from [facility]. Pt reports working with PT/OT on BUE strengthening. Reported using w/c for mobility with tamara lift to transfer.     OBJECTIVE:   Initial Evaluation  Date: 5/17/24 Treatment Short Term/ Long Term   Goals   AM-PAC 6 Clicks 7/24     Was pt agreeable to Eval/treatment? yes     Does pt have pain? RLE pain increased with mobility       Bed Mobility  Rolling: MaxA toward L, MaxAx2 toward R  Supine to sit: NT  Sit to supine: NT  Scooting: NT  Rolling: Paul  Supine to sit: Paul  Sit to supine: Paul  Scooting: Paul   Transfers Sit to stand: NT  Stand to sit: NT  Stand pivot: NT  Sit to stand: ModA  Stand to sit: ModA  Stand pivot: MaxA with AAD   Ambulation    NT  TBD   Stair negotiation: ascended and descended  NT  TBD   ROM BUE:  Refer to OT note  LLE: WFL  RLE: NT     Strength BUE:  Refer to OT note  BLE: NT     Balance Sitting EOB:  NT  Dynamic Standing:  NT  Sitting EOB:  Paul  Dynamic Standing:  MaxA AAD     Pt is A & O x 4  Sensation:  Neuropathy BLE  Edema:  unremarkable     Patient  reeducation 45205 0 minutes     Patt Goodson PT, DPT  [id number]

## 2024-05-17 NOTE — PROGRESS NOTES
Department of Orthopedic Surgery  Resident Progress Note    POD 1. S/P Right periprosthetic femur ORIF. Patient seen and examined at bedside today. Complains of some pain however improved from prior to surgery. Pain controlled on current regimen. No new complaints.  Denies chest pain, shortness of breath, dizziness/lightheadedness. Any questions regarding her surgery or post operative course were answered today.    VITALS:  /72   Pulse 82   Temp 97.4 °F (36.3 °C) (Temporal)   Resp 18   Ht 1.651 m (5' 5\")   Wt 75.8 kg (167 lb)   SpO2 92%   BMI 27.79 kg/m²     General: Awake, alert and in no acute distress    MUSCULOSKELETAL:   right lower extremity:  Dressing C/D/I with mild strikethrough  Compartments soft and compressible  +PF/DF/EHL  +2/4 DP & PT pulses, Brisk Cap refill, Toes warm and perfused  Distal sensation grossly intact to Peroneals, Sural, Saphenous, and tibial nrs    CBC:   Lab Results   Component Value Date/Time    WBC 10.7 05/15/2024 08:03 PM    HGB 8.9 05/15/2024 08:03 PM    HCT 29.6 05/15/2024 08:03 PM     05/15/2024 08:03 PM     PT/INR:    Lab Results   Component Value Date/Time    PROTIME 15.7 05/15/2024 08:03 PM    PROTIME 10.4 12/13/2010 09:00 AM    INR 1.5 05/15/2024 08:03 PM       ASSESSMENT  S/P Open treatment right proximal femur fracture around antibiotic spacer and harvesting of cultures right periprosthetic joint infection      PLAN    Continue physical therapy and protocol: NWELIZABETH RLSOPHIA  Postoperative antibiotic protocol  Pending surgical cultures  Deep venous thrombosis prophylaxis: Lovenox daily. Okay for Aspirin 325 mg twice daily outpatient from orthopedic standpoint.  Okay to resume postop day 1, early mobilization, SCD  PT/OT as able  Pain Control: IV and PO, wean as able  Monitor H&H: Not yet resulted today.  D/C Plan:  pending sw/cm and therapy recommendations.

## 2024-05-17 NOTE — ANESTHESIA POSTPROCEDURE EVALUATION
Department of Anesthesiology  Postprocedure Note    Patient: Rebeka Renee  MRN: [id number]  YOB: 1945  Date of evaluation: 5/16/2024    Procedure Summary       Date: 05/16/24 Room / Location: [address]    Anesthesia Start: 1910 Anesthesia Stop: 2102    Procedure: RIGHT FEMUR OPEN REDUCTION INTERNAL FIXATION, OPEN TREATMENT OF RIGHT HIP DISLOCATION (Right: Leg Upper) Diagnosis:       Periprosthetic fracture around internal prosthetic hip joint, initial encounter      Dislocation of internal right hip prosthesis, initial encounter ([facility])      (Periprosthetic fracture around internal prosthetic hip joint, initial encounter [M97.8XXA, Z96.649])      (Dislocation of internal right hip prosthesis, initial encounter ([facility]) [T84.020A])    Surgeons: Nemesio Handy MD Responsible Provider: Kathryn Guzmán MD    Anesthesia Type: General ASA Status: 4            Anesthesia Type: General    Terrie Phase I: Terrie Score: 9    Terrie Phase II:      Anesthesia Post Evaluation    Patient location during evaluation: PACU  Patient participation: complete - patient participated  Level of consciousness: awake  Pain score: 2  Airway patency: patent  Nausea & Vomiting: no nausea  Cardiovascular status: hemodynamically stable  Respiratory status: acceptable  Hydration status: stable  Multimodal analgesia pain management approach    No notable events documented.

## 2024-05-17 NOTE — DISCHARGE SUMMARY
Patient had previous correction of anterior abdominal wall eventration. 2.  RIGHT FEMUR: There is a dislocated right hip prosthesis.  The prosthesis is posteriorly dislocated projects superiorly and lateral to the right acetabulum. There is no periprostatic fracture in the proximal right femur.  There is along intramedullary component for the left femoral prosthesis.  There is also cement applied at the base of the head of the right femoral prosthesis. There is no acute fractures in the distal femoral shaft. There is no acute fractures towards the humeral condyle ostium. The right knee joint is evaluated on the x-ray series of the right knee. 3.  RIGHT KNEE: Presence of mild degenerative changes in the right knee joint with relative narrowing of the joint space in the mid lateral compartment.  There discrete degenerative changes in the patellofemoral joint.  There is no right knee joint effusion. No acute fractures seen in the distal right femur including condylar regions, right patella and in the proximal right tibia and fibula. 4.  RIGHT TIBIA AND FIBULA: There is no acute fractures in the right tibia and fibula extend from the proximal tibial epiphysis to the distal epiphysis. No acute fracture seen in the right fibula extending from the fibular head to the base of the lateral malleolus.  Lateral malleolus is not fully covered on this study.  For evaluation the lateral malleolus consider x-ray series of the right ankle.  The tibiotalar joint appears preserved.  The tip of the medial malleolus is also not fully covered in the frontal projection.     1.  There is an acute displaced fracture of the right hip prosthesis.  The fracture is posteriorly displaced migrating superolateral to the right acetabulum. 2.  No acute fracture seen in the right acetabulum. 3.  No acute fracture seen the femoral shaft from the entrance of the right hip prosthesis down to the level of the femoral condyles. 4.  No acute fractures or  tablet  Commonly known as: WELLBUTRIN SR     ertapenem  infusion  Commonly known as: INVanz  Infuse 1,000 mg intravenously every 24 hours Compound per protocol.     gabapentin 800 MG tablet  Commonly known as: NEURONTIN     levothyroxine 50 MCG tablet  Commonly known as: SYNTHROID     magnesium hydroxide 400 MG/5ML suspension  Commonly known as: MILK OF MAGNESIA     omeprazole 20 MG delayed release capsule  Commonly known as: PRILOSEC     polyethylene glycol 17 g packet  Commonly known as: GLYCOLAX  Take 1 packet by mouth daily as needed for Constipation     potassium chloride 10 MEQ extended release tablet  Commonly known as: KLOR-CON M     pramipexole 0.75 MG tablet  Commonly known as: MIRAPEX     semaglutide (2 MG/DOSE) 8 MG/3ML Sopn sc injection  Commonly known as: OZEMPIC     sucralfate 1 GM tablet  Commonly known as: CARAFATE     venlafaxine 150 MG extended release capsule  Commonly known as: EFFEXOR XR     Vitamin D3 25 MCG Tabs  Take 1 tablet by mouth daily            STOP taking these medications      pantoprazole 20 MG tablet  Commonly known as: PROTONIX     rosuvastatin 40 MG tablet  Commonly known as: CRESTOR               Where to Get Your Medications        These medications were sent to [id number] [place], [place] - [address] [facility] -  [phone] - F [phone]  [address] [place] [id number]      Phone: [phone]   oxyCODONE-acetaminophen 5-325 MG per tablet       You can get these medications from any pharmacy    Bring a paper prescription for each of these medications  melatonin 3 MG Tabs tablet         Time Spent on discharge is 30 minutes in the examination, evaluation, counseling and review of medications and discharge plan.    +++++++++++++++++++++++++++++++++++++++++++++++++  Maria Marino Milanes, MD [facility]  [place], [place]  +++++++++++++++++++++++++++++++++++++++++++++++++  NOTE: This

## 2024-05-17 NOTE — CONSULTS
dysfunction with normal right ventricular size and function with moderately thickened pericardium and with no significant valvular abnormalities noted.         Social History  Social History     Socioeconomic History    Marital status:    Tobacco Use    Smoking status: Former     Current packs/day: 0.00     Types: Cigarettes     Quit date: 1999     Years since quittin.3    Smokeless tobacco: Never    Tobacco comments:     Quit in .   Vaping Use    Vaping Use: Never used   Substance and Sexual Activity    Alcohol use: Yes     Alcohol/week: 1.0 standard drink of alcohol     Types: 1 Glasses of wine per week     Comment: occasional    Drug use: Yes     Types: Marijuana (Weed)     Comment: \"I am on medical marijuana\"     Family Medical History  No family history on file.    Review of Systems:  Constitutional: No fever, no chills  Eyes: No vision changes, no retroorbital pain  ENT: No hearing changes, no ear pain  Respiratory: No cough, no dyspnea  Cardiovascular: No chest pain, no palpitations  Gastrointestinal: No abdominal pain, no diarrhea  Genitourinary: No dysuria, no hematuria  Integumentary: No rash, no itching  Musculoskeletal: No muscle pain, has right hip pain  Neurologic: No headache, no numbness in extremities    Physical Examination:  Vitals:    24 2130 24 2220 24 0300 24 1240   BP: 130/77 124/76 126/72    Pulse: 89 89 82    Resp: 19  16   Temp:  97.2 °F (36.2 °C) 97.4 °F (36.3 °C)    TempSrc:  Temporal Temporal    SpO2: 93% 92%     Weight:       Height:         Constitutional: Alert, not in distress  Eyes: Sclerae anicteric, no conjunctival erythema  ENT: No buccal lesion, no pharyngeal exudates  Neck: No nuchal rigidity, no cervical adenopathy  Lungs: Clear breath sounds, no crackles, no wheezes  Heart: Regular rate and rhythm, no murmurs  Abdomen: Bowel sounds present, soft, nontender  Skin: Warm and dry, no active dermatoses  Musculoskeletal: Right lower  extremity with dressing and immobilizer in place    Labs, imaging, and medical records/notes were personally reviewed.      Assessment:  Recurrent right hip dislocation complicated by right proximal femur fracture s/p open treatment of right proximal femur fracture on 05/16   Surgical site ESBL E coli (resistant to co-trimoxazole and fluoroquinolones) infection/right hip prosthetic joint infection, s/p irrigation and debridement of right hip wound with revision of femoral head component of hemiarthroplasty on 03/21, complicated by right hip dislocation x2 s/p closed reduction on 04/02 and on 04/08, s/p open reduction of right hip hemiarthroplasty with revision of femoral component on 04/11, then complex explantation of right hip arthroplasty with implantation of antibiotic spacer on 04/15, antibiotic treatment ongoing  History of right hip hemiarthroplasty in 01/04/2024 complicated by right hip dislocation and right femur greater trochanter fracture sustained from a fall s/p right hip prosthetic dislocation open reduction, right hip femur greater trochanter fracture open reduction with internal fixation, right hip hematoma excisional irrigation and debridement on 02/13/2024    Plan:  Resume ertapenem 1g q24h for 6 weeks from 04/15-05/27 as previously planned.  Follow up tissue culture.  Maintain PICC care and monitor labs and schedule follow-up appointment per IV antibiotic protocol for OPAT as indicated on discharge instructions.  Follow up with me at [facility] on 05/23.    Thank you for involving me in the care of Rebeka Renee. ID will continue to follow. Please do not hesitate to call for any questions or concerns.      Electronically signed by Polly Gilliam MD on 5/17/2024 at 5:18 PM

## 2024-05-17 NOTE — FLOWSHEET NOTE
Messaged from Sandra the manager to set pt up if gets discharge order. [facility] called and next transport 8:30

## 2024-05-17 NOTE — OP NOTE
Operative Note      Patient: Rebeka Renee  YOB: 1945  MRN: [id number]    Date of Procedure: 5/16/2024    Preoperative diagnosis:  1.  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 chronic hip dislocation with new periprosthetic fracture      Post-Op Diagnosis: Same       Procedure:  Open treatment right proximal femur fracture around antibiotic spacer and harvesting of cultures right periprosthetic joint infection    Surgeon(s):  Nemesio Handy MD    Assistant:   Resident: Florencio Collazo DO; Yovany Masters DO    Anesthesia: General    Estimated Blood Loss (mL): less than 100     Complications: Other: Patient has no superior lip of the some time therefore cannot contain or antibiotic spacer head in the acetabulum    Specimens:   ID Type Source Tests Collected by Time Destination   1 : DEEP TISSUE RIGHT HIP Tissue Tissue CULTURE, ANAEROBIC, GRAM STAIN, CULTURE, SURGICAL, CULTURE WITH SMEAR, ACID FAST BACILLIUS Nemesio Handy MD 5/16/2024 1955    2 : DEEP TISSUE RIGHT HIP #2 Tissue Tissue CULTURE, ANAEROBIC, GRAM STAIN, CULTURE, SURGICAL, CULTURE WITH SMEAR, ACID FAST BACILLIUS Nemesio Handy MD 5/16/2024 1956    3 : DEEP TISSUE RIGHT HIP #3 Tissue Tissue CULTURE, ANAEROBIC, GRAM STAIN, CULTURE, SURGICAL, CULTURE WITH SMEAR, ACID FAST BACILLIUS Nemesio Handy MD 5/16/2024 1956        Implants:  Implant Name Type Inv. Item Serial No.  Lot No. LRB No. Used Action   CABLE SURG DIA1.7MM S STL HA CERCLAGE W/ CRMP [id number]] DEPUY SYNTHES [facility]] - [id number]  CABLE SURG DIA1.7MM S STL HA CERCLAGE W/ CRMP [id number]] DEPUY [employer]]  DEPU[employer]- [id number] Right 1 Implanted   CABLE SURG DIA1.7MM S STL HA CERCLAGE W/ CRMP [id number]] DEPU[employer]] - [id number]  CABLE SURG DIA1.7MM S STL HA CERCLAGE W/ CRMP [id number]] DEPU[employer]]  DEPUY SYN[employer] [place]-WD [id number] Right 1 Implanted   CABLE SURG DIA1.7MM S STL HA  the patient, the procedure to be performed, and side to be performed upon this incision was then made.  We did ellipse out some invaginated previous incision and extended the incision distally.  We dissected down to the scarred iliotibial band which was incised line with its fibers.  There is no soft tissue under the iliotibial band covering the proximal femur which was noted including the prosthetic antibiotic spacer.  The fracture was identified we are able to visualize areas of weak acute in reduction clamps were utilized to reduce it.  We then placed three 1.7 mm cables which reduced the fracture nicely.  This was checked under fluoroscopy.  I with her fracture was fixed and reduced we then turned our attention to the hip joint where we took multiple samples around the hip joint itself and sent for culture.  We also performed copious irrigation debridement along with Irrisept and Betadine shock.  We cleaned out the acetabular itself and the hip would not maintain containment due to the deficient superior portion acetabular rim.  We therefore left the hip in near location unable to continue currently until we are clear of infection.  Placed 1 g of vancomycin powder in the wound and closed in layers with monofilament suture.  Skin was also closed with monofilament suture.  Compartments are soft compressible all bleeders controlled electrocautery.  Sterile dressing was put in position and patient was reversed of anesthesia without complication taken to the PACU in stable condition.      Postoperative plan:  1.  Continue antibiotics per infectious disease    2.  Nonweightbearing right lower extremity may use toe-touch weightbearing for balance and transfers    3.  Hip remained most likely dislocated and tore revision surgery after cleared by infectious disease for implantation of a total hip arthroplasty    4.  Continue DVT prophylaxis    Electronically signed by Nemesio Handy MD on 5/16/2024 at 8:11  PM

## 2024-05-17 NOTE — PROGRESS NOTES
Occupational Therapy    OCCUPATIONAL THERAPY INITIAL EVALUATION    [facility]  [address], [place]        Date:2024                                                  Patient Name: Rebeka Renee    MRN: [id number]    : 1945    Room: [facility]          Evaluating OT: Danae Arndt, VIRGILD, OTR/L; [id number]      Referring Provider: Nemesio Handy MD     Specific Provider Orders/Date: OT Eval and Treat 2024      Diagnosis: Dislocated hip, right, initial encounter ([facility]) [S73.004A]  Hip fracture requiring operative repair, right, closed, initial encounter ([facility]) [S72.001A]       Surgery: Open treatment right proximal femur fracture around antibiotic spacer and harvesting of cultures right periprosthetic joint infection 2024    Past Surgical History: has a past surgical history that includes Lung removal, partial; Mastectomy (Bilateral); Kidney removal (Left, 2006); Hysterectomy; Gastric bypass surgery (2004); hernia repair; knee surgery (Right); transthoracic echocardiogram (2010); Cataract removal with implant (Bilateral); liver biopsy (); thoracentesis (Left, 2010 and 3/2010. ); Cholecystectomy; other surgical history (03/16/15); other surgical history (N/A, 4/15/15); Breast surgery; Nerve Block (Bilateral, 16); Kidney removal; other surgical history (N/A, 2021); Pain management procedure (N/A, 2021); hip surgery (Right, 2024); Revision total hip arthroplasty (Right, 2024); Revision total hip arthroplasty (Right, 3/21/2024); hip surgery (Right, 2024); and hip surgery (Right, 2024).         Pertinent Medical History:  has a past medical history of Anemia, Anxiety, Arthritis, Ascending aortic aneurysm (HCC), Asthma, Bipolar 1 disorder (HCC), Cancer (HCC), Cancer of breast, female (HCC), CHF (congestive heart failure) ([facility]), Chronic back pain, Depression, Depression  mobility, bed mobility to address safety awareness, implementation of fall prevention strategies, & functional engagement throughout daily activities. Pt would benefit from continued skilled OT to increase safety and independence with completion of ADL/IADL tasks for functional independence and quality of life.    Treatment: OT treatment provided this date includes:   ADL-  Instruction/training on safety and adapted techniques for completion of ADLs: Therapist facilitated & pt educated on activity modifications/adaptations to promote implementation of fall prevention strategies, EC/WS strategies, & safety awareness throughout ADLs.   Mobility-  Instruction/training on safety and improved independence with bed mobility  Activity tolerance- Instruction/training on energy conservation/work simplification for completion of ADLs:.     Neuromuscular Reeducation to facilitate balance/righting reactions for increased function with ADLs tasks:    Neuromuscular Facilitation of  UE functional movement/ROM./fine motor dexterity    Cognitive retraining -  Cues for safety, sequencing, and problem solving    Skilled positioning/alignment-  Therapist facilitated proper positioning/alignment throughout session to maintain skin/joint integrity & proper body mechanics.      Rehab Potential: Good  for established goals     LTG: maximize independence with ADLs to return to PLOF    Patient and/or family were instructed on functional diagnosis, prognosis/goals and OT plan of care. Demonstrated good understanding.     Eval Complexity: Low  History: Expanded chart review of medical records and additional review of physical, cognitive, or psychosocial history related to current functional performance  Exam: 3+ performance deficits  Assistance/Modification: Min/mod assistance or modifications required to perform tasks. May have comorbidities that affect occupational performance.    Evaluation time includes thorough review of current medical

## 2024-05-17 NOTE — CARE COORDINATION
Care Coordination   The patient was admitted from [facility] and was found to have an infected right hip joint with implanted antibiotic spacer. The patient went to surgery yesterday for an orif of her right proximal  femur fx.  Pt [facility] 7/24, ot [facility] 12/24. Called Nahomy at [facility] to re start her precert to return to [facility]. Per Nahomy she will get the auth started. Return envelope done. Have received the precert for the patient to return to [facility] and its good through  5/21/24. Messaged Dr Maria Marino Milanes and no answer back as yet. Return envelope completed.

## 2024-05-18 VITALS
HEIGHT: 65 IN | OXYGEN SATURATION: 95 % | BODY MASS INDEX: 27.82 KG/M2 | WEIGHT: 167 LBS | RESPIRATION RATE: 16 BRPM | DIASTOLIC BLOOD PRESSURE: 51 MMHG | HEART RATE: 79 BPM | SYSTOLIC BLOOD PRESSURE: 103 MMHG | TEMPERATURE: 97.2 F

## 2024-05-18 LAB
ALBUMIN SERPL-MCNC: 2 G/DL (ref 3.5–5.2)
ALP SERPL-CCNC: 120 U/L (ref 35–104)
ALT SERPL-CCNC: 12 U/L (ref 0–32)
ANION GAP SERPL CALCULATED.3IONS-SCNC: 9 MMOL/L (ref 7–16)
AST SERPL-CCNC: 24 U/L (ref 0–31)
BILIRUB SERPL-MCNC: 0.2 MG/DL (ref 0–1.2)
BUN SERPL-MCNC: 22 MG/DL (ref 6–23)
CALCIUM SERPL-MCNC: 8.3 MG/DL (ref 8.6–10.2)
CHLORIDE SERPL-SCNC: 99 MMOL/L (ref 98–107)
CO2 SERPL-SCNC: 25 MMOL/L (ref 22–29)
CREAT SERPL-MCNC: 1 MG/DL (ref 0.5–1)
ERYTHROCYTE [DISTWIDTH] IN BLOOD BY AUTOMATED COUNT: 17.2 % (ref 11.5–15)
GFR, ESTIMATED: 59 ML/MIN/1.73M2
GLUCOSE SERPL-MCNC: 65 MG/DL (ref 74–99)
HCT VFR BLD AUTO: 26 % (ref 34–48)
HGB BLD-MCNC: 7.2 G/DL (ref 11.5–15.5)
MCH RBC QN AUTO: 24.7 PG (ref 26–35)
MCHC RBC AUTO-ENTMCNC: 27.7 G/DL (ref 32–34.5)
MCV RBC AUTO: 89 FL (ref 80–99.9)
PLATELET # BLD AUTO: 321 K/UL (ref 130–450)
PMV BLD AUTO: 10.4 FL (ref 7–12)
POTASSIUM SERPL-SCNC: 4.1 MMOL/L (ref 3.5–5)
PROT SERPL-MCNC: 5 G/DL (ref 6.4–8.3)
RBC # BLD AUTO: 2.92 M/UL (ref 3.5–5.5)
SODIUM SERPL-SCNC: 133 MMOL/L (ref 132–146)
WBC OTHER # BLD: 9.8 K/UL (ref 4.5–11.5)

## 2024-05-18 PROCEDURE — 6360000002 HC RX W HCPCS: Performed by: FAMILY MEDICINE

## 2024-05-18 RX ADMIN — FENTANYL CITRATE 50 MCG: 50 INJECTION INTRAMUSCULAR; INTRAVENOUS at 02:13

## 2024-05-18 ASSESSMENT — PAIN DESCRIPTION - DESCRIPTORS: DESCRIPTORS: ACHING;THROBBING;TEARING

## 2024-05-18 ASSESSMENT — ENCOUNTER SYMPTOMS
RHINORRHEA: 0
EYE ITCHING: 0
CONSTIPATION: 0
NAUSEA: 0
DIARRHEA: 0
DIARRHEA: 0
ABDOMINAL PAIN: 0
VOMITING: 0
NAUSEA: 0
ABDOMINAL DISTENTION: 0
WHEEZING: 0
COUGH: 0
EYE REDNESS: 0
DIARRHEA: 0
SHORTNESS OF BREATH: 0
EYE DISCHARGE: 0
EYE DISCHARGE: 0
VOMITING: 0
CHEST TIGHTNESS: 0
EYE PAIN: 0
CONSTIPATION: 0
NAUSEA: 0
COUGH: 0
SINUS PRESSURE: 0
DIARRHEA: 0
CHEST TIGHTNESS: 0
RHINORRHEA: 0
ABDOMINAL PAIN: 0
COUGH: 0
ABDOMINAL DISTENTION: 0
SINUS PRESSURE: 0
WHEEZING: 0
ABDOMINAL DISTENTION: 0
EYE PAIN: 0
CONSTIPATION: 0
SINUS PRESSURE: 0
SHORTNESS OF BREATH: 0
SINUS PRESSURE: 0
EYE DISCHARGE: 0
EYE REDNESS: 0
SHORTNESS OF BREATH: 0
EYE DISCHARGE: 0
SORE THROAT: 0
WHEEZING: 0
SORE THROAT: 0
EYE PAIN: 0
SINUS PRESSURE: 0
ABDOMINAL DISTENTION: 0
EYE ITCHING: 0
EYE ITCHING: 0
EYE PAIN: 0
ABDOMINAL DISTENTION: 0
NAUSEA: 0
ABDOMINAL PAIN: 0
VOMITING: 0
COUGH: 0
SORE THROAT: 0
CHEST TIGHTNESS: 0
ABDOMINAL PAIN: 0
VOMITING: 0
ABDOMINAL PAIN: 0
EYE DISCHARGE: 0
RHINORRHEA: 0
EYE ITCHING: 0
CONSTIPATION: 0
EYE PAIN: 0
WHEEZING: 0
SHORTNESS OF BREATH: 0
SORE THROAT: 0

## 2024-05-18 ASSESSMENT — PAIN DESCRIPTION - LOCATION: LOCATION: HIP

## 2024-05-18 ASSESSMENT — PAIN SCALES - GENERAL: PAINLEVEL_OUTOF10: 10

## 2024-05-18 ASSESSMENT — PAIN DESCRIPTION - ORIENTATION: ORIENTATION: RIGHT

## 2024-05-18 NOTE — PROGRESS NOTES
Rebeka Renee (:  1945) is a 78 y.o. female that is seen today for skilled assessment    Subjective     Rebeka is awake alert and in no obvious distress.  She is sitting up in bed in room with her TV on.  She has some complaints of discomfort to right leg and hip and states the tramadol is helping but she still having discomfort, will increase to every 8 hours as needed.  She is no longer wearing the immobilizer to her right leg it was not fitting correctly, she does not have her hip brace on right now because she is in bed, but is wearing it when she gets up.  She continues to work in therapies as tolerated.  She will follow-up with Ortho and ID. Nursing has no concerns and will let Dr. NEAL or PA know of any changes.      Location: [facility] nursing [facility]  All nursing and progress notes reviewed.    Past Medical History:   Diagnosis Date    Anemia     S/P chemotherapy.    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    Ascending aortic aneurysm (HCC)     seen on CTA chest w/contrast on     Asthma     Bipolar 1 disorder (HCC)     Cancer (HCC)     lung/ kidney    Cancer of breast, female (HCC) 2006    S/P bilaeral mastectomy with left breast lymph node resection and chemotherapy.    CHF (congestive heart failure) (HCC)     Chronic back pain     Depression     Depression with anxiety     Diabetes mellitus (HCC)     Resolved after losing 130 lbs. after gastric bypass surgery.    Dyslipidemia     Fibromyalgia     History of blood transfusion     anemia    Hypertension     Hypothyroidism     Neuropathy     Pericardial effusion 2010    Subxiphoid pericardial window with drainage of pericardial effusion and pericardial biopsy:  Fluid and biopsy negative for metastases.     Pleural effusion 2010    Noted on chest x-ray.    TIA (transient ischemic attack)     Tobacco abuse

## 2024-05-18 NOTE — PROGRESS NOTES
Rebeka Renee (:  1945) is a 78 y.o. female that is seen today for skilled assessment    Subjective     Rebeka is awake alert and in no obvious distress.  She is sitting up in bed in room in room and eating her breakfast.  She has some complaints of discomfort to right leg and hip.  She does have an appointment today with Ortho.  She states she has a little more discomfort to her right hip and it is noted that her right leg is a little bit shorter than her left leg and concerns for it to be displaced once again.  She does have brace for right hip but does not have a brace on right now due to being in bed.  She states she will see what happens at the Ortho appointment today.  She continues to work in therapies as tolerated.  She remains on IV antibiotics per ID for hip infection.  Nursing has no concerns and will let Dr. NEAL or PA know of any changes.  Addendum-shortly after seeing patient she did go to Ortho appointment and was sent to the ED due to hip displacement.    Location: [facility] nursing [facility]  All nursing and progress notes reviewed.    Past Medical History:   Diagnosis Date    Anemia     S/P chemotherapy.    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    Ascending aortic aneurysm (HCC)     seen on CTA chest w/contrast on     Asthma     Bipolar 1 disorder (HCC)     Cancer (HCC)     lung/ kidney    Cancer of breast, female (HCC) 2006    S/P bilaeral mastectomy with left breast lymph node resection and chemotherapy.    CHF (congestive heart failure) (HCC)     Chronic back pain     Depression     Depression with anxiety     Diabetes mellitus (HCC)     Resolved after losing 130 lbs. after gastric bypass surgery.    Dyslipidemia     Fibromyalgia     History of blood transfusion     anemia    Hypertension     Hypothyroidism     Neuropathy     Pericardial effusion 2010

## 2024-05-18 NOTE — PROGRESS NOTES
tablet Take 1 tablet by mouth 2 times daily      levothyroxine (SYNTHROID) 50 MCG tablet Take 1 tablet by mouth Daily      venlafaxine (EFFEXOR-XR) 150 MG XR capsule Take 1 capsule by mouth daily       No current facility-administered medications for this visit.        Review of Systems   Constitutional:  Negative for appetite change, chills, diaphoresis, fatigue and fever.   HENT:  Negative for congestion, ear pain, postnasal drip, rhinorrhea, sinus pressure, sneezing and sore throat.    Eyes:  Negative for pain, discharge, redness and itching.   Respiratory:  Negative for cough, chest tightness, shortness of breath and wheezing.    Cardiovascular:  Negative for chest pain and palpitations.   Gastrointestinal:  Negative for abdominal distention, abdominal pain, constipation, diarrhea, nausea and vomiting.   Musculoskeletal:  Positive for arthralgias, gait problem and joint swelling. Negative for neck pain and neck stiffness.   Skin:  Negative for rash.   Neurological:  Positive for weakness. Negative for dizziness, seizures, syncope, light-headedness and headaches.          Objective   Physical Exam  Vitals and nursing note reviewed.   Constitutional:       General: She is not in acute distress.     Appearance: Normal appearance. She is not ill-appearing.   HENT:      Head: Normocephalic and atraumatic.      Right Ear: External ear normal. There is no impacted cerumen.      Left Ear: External ear normal. There is no impacted cerumen.      Nose: Nose normal. No congestion or rhinorrhea.      Mouth/Throat:      Mouth: Mucous membranes are moist.      Pharynx: Oropharynx is clear. No posterior oropharyngeal erythema.   Eyes:      General:         Right eye: No discharge.         Left eye: No discharge.      Conjunctiva/sclera: Conjunctivae normal.      Pupils: Pupils are equal, round, and reactive to light.   Cardiovascular:      Rate and Rhythm: Normal rate and regular rhythm.      Pulses: Normal pulses.      Heart

## 2024-05-18 NOTE — PROGRESS NOTES
Cardiovascular:      Rate and Rhythm: Normal rate and regular rhythm.      Pulses: Normal pulses.      Heart sounds: Normal heart sounds. No murmur heard.     No friction rub. No gallop.   Pulmonary:      Effort: Pulmonary effort is normal. No respiratory distress.      Breath sounds: Normal breath sounds. No wheezing, rhonchi or rales.   Abdominal:      General: Bowel sounds are normal. There is no distension.      Palpations: Abdomen is soft.      Tenderness: There is no abdominal tenderness. There is no guarding or rebound.   Musculoskeletal:      Cervical back: Normal range of motion. No rigidity or tenderness.      Comments: Immobilizer to right leg   Limited range of motion right lower extremity     Skin:     General: Skin is warm and dry.      Coloration: Skin is not jaundiced or pale.      Findings: No rash.   Neurological:      Mental Status: She is alert and oriented to person, place, and time. Mental status is at baseline.      Motor: Weakness present.      Gait: Gait abnormal.   Psychiatric:         Mood and Affect: Mood normal.         Behavior: Behavior normal.         Thought Content: Thought content normal.         Judgment: Judgment normal.     Recent Labs     05/18/24  0808 05/15/24  2003 05/13/24  0236   WBC 9.8 10.7 7.4   HGB 7.2* 8.9* 9.1*   HCT 26.0* 29.6* 30.7*   MCV 89.0 83.4 84.1    178 135        Lab Results   Component Value Date     05/18/2024    K 4.1 05/18/2024    CL 99 05/18/2024    CO2 25 05/18/2024    BUN 22 05/18/2024    CREATININE 1.0 05/18/2024    GLUCOSE 65 (L) 05/18/2024    CALCIUM 8.3 (L) 05/18/2024    BILITOT 0.2 05/18/2024    ALKPHOS 120 (H) 05/18/2024    AST 24 05/18/2024    ALT 12 05/18/2024    LABGLOM 59 (L) 05/18/2024    GFRAA >60 07/08/2022        Hemoglobin A1C   Date Value Ref Range Status   04/18/2024 4.6 4.0 - 5.6 % Final     Lab Results   Component Value Date    CHOL 82 04/18/2024    CHOL 96 04/02/2024    CHOL 121 02/12/2024     Lab Results

## 2024-05-18 NOTE — PROGRESS NOTES
Rebeka Renee (:  1945) is a 78 y.o. female that is seen today for skilled assessment    Subjective     Rebeka is awake alert and in no obvious distress.  She is sitting up in bed in room with her long-leg immobilizer brace in place.  She did have to go out to the emergency department and her hip was dislocated and they placed her hip back in the normal position.  She has a follow-up on the  with Ortho and she is nonweightbearing and is not allowed to have any physical therapy to her lower legs until this appointment.  She will continue OT.  She has some complaints of discomfort to right leg and hip and remains on tramadol as needed.  She continues to work in therapies as tolerated.  She remains on IV antibiotics for infection managed by ID she will follow-up with Ortho and ID. Nursing has no concerns and will let Dr. NEAL or PA know of any changes.      Location: [facility]  All nursing and progress notes reviewed.    Past Medical History:   Diagnosis Date    Anemia     S/P chemotherapy.    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    Ascending aortic aneurysm (HCC)     seen on CTA chest w/contrast on     Asthma     Bipolar 1 disorder (HCC)     Cancer (HCC)     lung/ kidney    Cancer of breast, female (HCC) 2006    S/P bilaeral mastectomy with left breast lymph node resection and chemotherapy.    CHF (congestive heart failure) (HCC)     Chronic back pain     Depression     Depression with anxiety     Diabetes mellitus (HCC)     Resolved after losing 130 lbs. after gastric bypass surgery.    Dyslipidemia     Fibromyalgia     History of blood transfusion     anemia    Hypertension     Hypothyroidism     Neuropathy     Pericardial effusion 2010    Subxiphoid pericardial window with drainage of pericardial effusion and pericardial biopsy:  Fluid and biopsy

## 2024-05-19 LAB
MICROORGANISM SPEC CULT: NORMAL
MICROORGANISM/AGENT SPEC: NORMAL
SPECIMEN DESCRIPTION: NORMAL

## 2024-05-20 ENCOUNTER — OUTSIDE SERVICES (OUTPATIENT)
Dept: PRIMARY CARE CLINIC | Age: 79
End: 2024-05-20

## 2024-05-20 VITALS
HEART RATE: 91 BPM | OXYGEN SATURATION: 98 % | WEIGHT: 168 LBS | BODY MASS INDEX: 27.96 KG/M2 | DIASTOLIC BLOOD PRESSURE: 66 MMHG | SYSTOLIC BLOOD PRESSURE: 136 MMHG | TEMPERATURE: 98.3 F | RESPIRATION RATE: 18 BRPM

## 2024-05-20 DIAGNOSIS — Z90.5 H/O UNILATERAL NEPHRECTOMY: ICD-10-CM

## 2024-05-20 DIAGNOSIS — Z79.4 CONTROLLED TYPE 2 DIABETES MELLITUS WITH DIABETIC NEUROPATHY, WITH LONG-TERM CURRENT USE OF INSULIN (HCC): ICD-10-CM

## 2024-05-20 DIAGNOSIS — E83.51 HYPOCALCEMIA: ICD-10-CM

## 2024-05-20 DIAGNOSIS — Z86.73 HISTORY OF CEREBRAL INFARCTION: ICD-10-CM

## 2024-05-20 DIAGNOSIS — J44.9 CHRONIC OBSTRUCTIVE PULMONARY DISEASE, UNSPECIFIED COPD TYPE (HCC): ICD-10-CM

## 2024-05-20 DIAGNOSIS — Z96.641 HISTORY OF TOTAL RIGHT HIP REPLACEMENT: ICD-10-CM

## 2024-05-20 DIAGNOSIS — F32.A DEPRESSION, UNSPECIFIED DEPRESSION TYPE: ICD-10-CM

## 2024-05-20 DIAGNOSIS — R26.2 AMBULATORY DYSFUNCTION: ICD-10-CM

## 2024-05-20 DIAGNOSIS — E11.40 CONTROLLED TYPE 2 DIABETES MELLITUS WITH DIABETIC NEUROPATHY, WITH LONG-TERM CURRENT USE OF INSULIN (HCC): ICD-10-CM

## 2024-05-20 DIAGNOSIS — D64.9 ANEMIA, UNSPECIFIED TYPE: ICD-10-CM

## 2024-05-20 DIAGNOSIS — E66.9 OBESITY, UNSPECIFIED CLASSIFICATION, UNSPECIFIED OBESITY TYPE, UNSPECIFIED WHETHER SERIOUS COMORBIDITY PRESENT: ICD-10-CM

## 2024-05-20 DIAGNOSIS — E03.9 HYPOTHYROIDISM, UNSPECIFIED TYPE: ICD-10-CM

## 2024-05-20 DIAGNOSIS — S73.004D HIP DISLOCATION, RIGHT, SUBSEQUENT ENCOUNTER: ICD-10-CM

## 2024-05-20 DIAGNOSIS — T84.59XD INFECTION AND INFLAMMATORY REACTION DUE TO OTHER INTERNAL JOINT PROSTHESIS, SUBSEQUENT ENCOUNTER: Primary | ICD-10-CM

## 2024-05-20 DIAGNOSIS — E78.5 HYPERLIPIDEMIA, UNSPECIFIED HYPERLIPIDEMIA TYPE: ICD-10-CM

## 2024-05-20 DIAGNOSIS — R53.1 GENERALIZED WEAKNESS: ICD-10-CM

## 2024-05-20 DIAGNOSIS — S72.001D CLOSED FRACTURE OF NECK OF RIGHT FEMUR WITH ROUTINE HEALING, SUBSEQUENT ENCOUNTER: ICD-10-CM

## 2024-05-20 DIAGNOSIS — I10 PRIMARY HYPERTENSION: ICD-10-CM

## 2024-05-20 LAB
ALBUMIN SERPL-MCNC: 2 G/DL (ref 3.5–5.2)
ALP SERPL-CCNC: 135 U/L (ref 35–104)
ALT SERPL-CCNC: 15 U/L (ref 0–32)
ANION GAP SERPL CALCULATED.3IONS-SCNC: 9 MMOL/L (ref 7–16)
AST SERPL-CCNC: 61 U/L (ref 0–31)
BASOPHILS # BLD: 0.02 K/UL (ref 0–0.2)
BASOPHILS NFR BLD: 0 % (ref 0–2)
BILIRUB SERPL-MCNC: 0.2 MG/DL (ref 0–1.2)
BUN SERPL-MCNC: 15 MG/DL (ref 6–23)
CALCIUM SERPL-MCNC: 8.2 MG/DL (ref 8.6–10.2)
CHLORIDE SERPL-SCNC: 103 MMOL/L (ref 98–107)
CO2 SERPL-SCNC: 28 MMOL/L (ref 22–29)
CREAT SERPL-MCNC: 0.5 MG/DL (ref 0.5–1)
CRP SERPL HS-MCNC: 42 MG/L (ref 0–5)
EOSINOPHIL # BLD: 0.37 K/UL (ref 0.05–0.5)
EOSINOPHILS RELATIVE PERCENT: 5 % (ref 0–6)
ERYTHROCYTE [DISTWIDTH] IN BLOOD BY AUTOMATED COUNT: 17 % (ref 11.5–15)
ERYTHROCYTE [SEDIMENTATION RATE] IN BLOOD BY WESTERGREN METHOD: 44 MM/HR (ref 0–20)
GFR, ESTIMATED: >90 ML/MIN/1.73M2
GLUCOSE SERPL-MCNC: 98 MG/DL (ref 74–99)
HCT VFR BLD AUTO: 25.1 % (ref 34–48)
HGB BLD-MCNC: 7.4 G/DL (ref 11.5–15.5)
IMM GRANULOCYTES # BLD AUTO: 0.03 K/UL (ref 0–0.58)
IMM GRANULOCYTES NFR BLD: 0 % (ref 0–5)
LYMPHOCYTES NFR BLD: 2.63 K/UL (ref 1.5–4)
LYMPHOCYTES RELATIVE PERCENT: 34 % (ref 20–42)
MCH RBC QN AUTO: 24.7 PG (ref 26–35)
MCHC RBC AUTO-ENTMCNC: 29.5 G/DL (ref 32–34.5)
MCV RBC AUTO: 83.7 FL (ref 80–99.9)
MONOCYTES NFR BLD: 0.66 K/UL (ref 0.1–0.95)
MONOCYTES NFR BLD: 9 % (ref 2–12)
NEUTROPHILS NFR BLD: 52 % (ref 43–80)
NEUTS SEG NFR BLD: 4.06 K/UL (ref 1.8–7.3)
PLATELET # BLD AUTO: 114 K/UL (ref 130–450)
PMV BLD AUTO: 11.5 FL (ref 7–12)
POTASSIUM SERPL-SCNC: 3.6 MMOL/L (ref 3.5–5)
PROT SERPL-MCNC: 5.1 G/DL (ref 6.4–8.3)
RBC # BLD AUTO: 3 M/UL (ref 3.5–5.5)
SODIUM SERPL-SCNC: 140 MMOL/L (ref 132–146)
WBC OTHER # BLD: 7.8 K/UL (ref 4.5–11.5)

## 2024-05-20 ASSESSMENT — ENCOUNTER SYMPTOMS
VOMITING: 0
DIARRHEA: 0
SHORTNESS OF BREATH: 0
NAUSEA: 0
ABDOMINAL PAIN: 0

## 2024-05-20 NOTE — PROGRESS NOTES
Visit Date: 2024  Rebeka Renee (:  1945) is a 78 y.o. female.    History & Physical    Subjective   SUBJECTIVE/OBJECTIVE:  Past Medical History:   Diagnosis Date    Anemia     S/P chemotherapy.    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    Ascending aortic aneurysm (HCC)     seen on CTA chest w/contrast on     Asthma     Bipolar 1 disorder (HCC)     Cancer (HCC)     lung/ kidney    Cancer of breast, female (HCC) 2006    S/P bilaeral mastectomy with left breast lymph node resection and chemotherapy.    CHF (congestive heart failure) (HCC)     Chronic back pain     Depression     Depression with anxiety     Diabetes mellitus (HCC)     Resolved after losing 130 lbs. after gastric bypass surgery.    Dyslipidemia     Fibromyalgia     History of blood transfusion     anemia    Hypertension     Hypothyroidism     Neuropathy     Pericardial effusion 2010    Subxiphoid pericardial window with drainage of pericardial effusion and pericardial biopsy:  Fluid and biopsy negative for metastases.     Pleural effusion 2010    Noted on chest x-ray.    TIA (transient ischemic attack)     Tobacco abuse     Patient smoked 3 ppd x 26 years.  Quit in .    Type II or unspecified type diabetes mellitus without mention of complication, not stated as uncontrolled       Past Surgical History:   Procedure Laterality Date    BREAST SURGERY      CATARACT REMOVAL WITH IMPLANT Bilateral     CHOLECYSTECTOMY      FIBULA FRACTURE SURGERY Right 2024    RIGHT FEMUR OPEN REDUCTION INTERNAL FIXATION, OPEN TREATMENT OF RIGHT HIP DISLOCATION [place] [facility] [place]    GASTRIC BYPASS SURGERY  2004    HERNIA REPAIR      HIP SURGERY Right 2024    RIGHT HIP HEMIARTHROPLASTY performed [place] [facility] [place]    HIP SURGERY Right 2024    RIGHT HIP CLOSED

## 2024-05-22 ENCOUNTER — OUTSIDE SERVICES (OUTPATIENT)
Dept: PRIMARY CARE CLINIC | Age: 79
End: 2024-05-22

## 2024-05-22 DIAGNOSIS — J44.9 CHRONIC OBSTRUCTIVE PULMONARY DISEASE, UNSPECIFIED COPD TYPE (HCC): ICD-10-CM

## 2024-05-22 DIAGNOSIS — Z79.4 CONTROLLED TYPE 2 DIABETES MELLITUS WITH DIABETIC NEUROPATHY, WITH LONG-TERM CURRENT USE OF INSULIN (HCC): ICD-10-CM

## 2024-05-22 DIAGNOSIS — R26.2 AMBULATORY DYSFUNCTION: ICD-10-CM

## 2024-05-22 DIAGNOSIS — R53.1 GENERALIZED WEAKNESS: ICD-10-CM

## 2024-05-22 DIAGNOSIS — E78.5 HYPERLIPIDEMIA, UNSPECIFIED HYPERLIPIDEMIA TYPE: ICD-10-CM

## 2024-05-22 DIAGNOSIS — S73.004D HIP DISLOCATION, RIGHT, SUBSEQUENT ENCOUNTER: ICD-10-CM

## 2024-05-22 DIAGNOSIS — Z90.5 H/O UNILATERAL NEPHRECTOMY: ICD-10-CM

## 2024-05-22 DIAGNOSIS — T84.59XD INFECTION AND INFLAMMATORY REACTION DUE TO OTHER INTERNAL JOINT PROSTHESIS, SUBSEQUENT ENCOUNTER: ICD-10-CM

## 2024-05-22 DIAGNOSIS — S72.001D CLOSED FRACTURE OF NECK OF RIGHT FEMUR WITH ROUTINE HEALING, SUBSEQUENT ENCOUNTER: Primary | ICD-10-CM

## 2024-05-22 DIAGNOSIS — E03.9 HYPOTHYROIDISM, UNSPECIFIED TYPE: ICD-10-CM

## 2024-05-22 DIAGNOSIS — Z86.73 HISTORY OF CEREBRAL INFARCTION: ICD-10-CM

## 2024-05-22 DIAGNOSIS — E11.40 CONTROLLED TYPE 2 DIABETES MELLITUS WITH DIABETIC NEUROPATHY, WITH LONG-TERM CURRENT USE OF INSULIN (HCC): ICD-10-CM

## 2024-05-22 DIAGNOSIS — I10 PRIMARY HYPERTENSION: ICD-10-CM

## 2024-05-23 LAB
ALBUMIN SERPL-MCNC: 2 G/DL (ref 3.5–5.2)
ALP SERPL-CCNC: 145 U/L (ref 35–104)
ALT SERPL-CCNC: 21 U/L (ref 0–32)
ANION GAP SERPL CALCULATED.3IONS-SCNC: 11 MMOL/L (ref 7–16)
AST SERPL-CCNC: 54 U/L (ref 0–31)
BILIRUB SERPL-MCNC: 0.2 MG/DL (ref 0–1.2)
BUN SERPL-MCNC: 6 MG/DL (ref 6–23)
CALCIUM SERPL-MCNC: 8.2 MG/DL (ref 8.6–10.2)
CHLORIDE SERPL-SCNC: 101 MMOL/L (ref 98–107)
CO2 SERPL-SCNC: 31 MMOL/L (ref 22–29)
CREAT SERPL-MCNC: 0.5 MG/DL (ref 0.5–1)
ERYTHROCYTE [DISTWIDTH] IN BLOOD BY AUTOMATED COUNT: 16.9 % (ref 11.5–15)
GFR, ESTIMATED: >90 ML/MIN/1.73M2
GLUCOSE SERPL-MCNC: 75 MG/DL (ref 74–99)
HCT VFR BLD AUTO: 24.6 % (ref 34–48)
HGB BLD-MCNC: 7.4 G/DL (ref 11.5–15.5)
MCH RBC QN AUTO: 24.7 PG (ref 26–35)
MCHC RBC AUTO-ENTMCNC: 30.1 G/DL (ref 32–34.5)
MCV RBC AUTO: 82.3 FL (ref 80–99.9)
PLATELET # BLD AUTO: 128 K/UL (ref 130–450)
PMV BLD AUTO: 12.2 FL (ref 7–12)
POTASSIUM SERPL-SCNC: 2.9 MMOL/L (ref 3.5–5)
PROT SERPL-MCNC: 4.9 G/DL (ref 6.4–8.3)
RBC # BLD AUTO: 2.99 M/UL (ref 3.5–5.5)
SODIUM SERPL-SCNC: 143 MMOL/L (ref 132–146)
WBC OTHER # BLD: 7.2 K/UL (ref 4.5–11.5)

## 2024-05-24 ENCOUNTER — OUTSIDE SERVICES (OUTPATIENT)
Dept: PRIMARY CARE CLINIC | Age: 79
End: 2024-05-24

## 2024-05-24 DIAGNOSIS — T84.59XD INFECTION AND INFLAMMATORY REACTION DUE TO OTHER INTERNAL JOINT PROSTHESIS, SUBSEQUENT ENCOUNTER: ICD-10-CM

## 2024-05-24 DIAGNOSIS — E03.9 HYPOTHYROIDISM, UNSPECIFIED TYPE: ICD-10-CM

## 2024-05-24 DIAGNOSIS — Z90.5 H/O UNILATERAL NEPHRECTOMY: ICD-10-CM

## 2024-05-24 DIAGNOSIS — E78.5 HYPERLIPIDEMIA, UNSPECIFIED HYPERLIPIDEMIA TYPE: ICD-10-CM

## 2024-05-24 DIAGNOSIS — E11.40 CONTROLLED TYPE 2 DIABETES MELLITUS WITH DIABETIC NEUROPATHY, WITH LONG-TERM CURRENT USE OF INSULIN (HCC): ICD-10-CM

## 2024-05-24 DIAGNOSIS — S72.001D CLOSED FRACTURE OF NECK OF RIGHT FEMUR WITH ROUTINE HEALING, SUBSEQUENT ENCOUNTER: Primary | ICD-10-CM

## 2024-05-24 DIAGNOSIS — R26.2 AMBULATORY DYSFUNCTION: ICD-10-CM

## 2024-05-24 DIAGNOSIS — Z86.73 HISTORY OF CEREBRAL INFARCTION: ICD-10-CM

## 2024-05-24 DIAGNOSIS — I10 PRIMARY HYPERTENSION: ICD-10-CM

## 2024-05-24 DIAGNOSIS — R53.1 GENERALIZED WEAKNESS: ICD-10-CM

## 2024-05-24 DIAGNOSIS — S73.004D HIP DISLOCATION, RIGHT, SUBSEQUENT ENCOUNTER: ICD-10-CM

## 2024-05-24 DIAGNOSIS — J44.9 CHRONIC OBSTRUCTIVE PULMONARY DISEASE, UNSPECIFIED COPD TYPE (HCC): ICD-10-CM

## 2024-05-24 DIAGNOSIS — Z79.4 CONTROLLED TYPE 2 DIABETES MELLITUS WITH DIABETIC NEUROPATHY, WITH LONG-TERM CURRENT USE OF INSULIN (HCC): ICD-10-CM

## 2024-05-25 LAB
MICROORGANISM SPEC CULT: NORMAL
MICROORGANISM/AGENT SPEC: NORMAL
POTASSIUM SERPL-SCNC: 2.9 MMOL/L (ref 3.5–5)
SPECIMEN DESCRIPTION: NORMAL

## 2024-05-28 LAB
ALBUMIN SERPL-MCNC: 2.2 G/DL (ref 3.5–5.2)
ALP SERPL-CCNC: 191 U/L (ref 35–104)
ALT SERPL-CCNC: 30 U/L (ref 0–32)
ANION GAP SERPL CALCULATED.3IONS-SCNC: 10 MMOL/L (ref 7–16)
AST SERPL-CCNC: 62 U/L (ref 0–31)
BASOPHILS # BLD: 0 K/UL (ref 0–0.2)
BASOPHILS NFR BLD: 0 % (ref 0–2)
BILIRUB SERPL-MCNC: 0.2 MG/DL (ref 0–1.2)
BUN SERPL-MCNC: 7 MG/DL (ref 6–23)
CALCIUM SERPL-MCNC: 8.4 MG/DL (ref 8.6–10.2)
CHLORIDE SERPL-SCNC: 102 MMOL/L (ref 98–107)
CO2 SERPL-SCNC: 32 MMOL/L (ref 22–29)
CREAT SERPL-MCNC: 0.4 MG/DL (ref 0.5–1)
CRP SERPL HS-MCNC: 10 MG/L (ref 0–5)
EOSINOPHIL # BLD: 0.77 K/UL (ref 0.05–0.5)
EOSINOPHILS RELATIVE PERCENT: 8 % (ref 0–6)
ERYTHROCYTE [DISTWIDTH] IN BLOOD BY AUTOMATED COUNT: 18.1 % (ref 11.5–15)
ERYTHROCYTE [SEDIMENTATION RATE] IN BLOOD BY WESTERGREN METHOD: 30 MM/HR (ref 0–20)
GFR, ESTIMATED: >90 ML/MIN/1.73M2
GLUCOSE SERPL-MCNC: 73 MG/DL (ref 74–99)
HCT VFR BLD AUTO: 29.3 % (ref 34–48)
HGB BLD-MCNC: 8.4 G/DL (ref 11.5–15.5)
LYMPHOCYTES NFR BLD: 1.79 K/UL (ref 1.5–4)
LYMPHOCYTES RELATIVE PERCENT: 18 % (ref 20–42)
MCH RBC QN AUTO: 24.8 PG (ref 26–35)
MCHC RBC AUTO-ENTMCNC: 28.7 G/DL (ref 32–34.5)
MCV RBC AUTO: 86.4 FL (ref 80–99.9)
MONOCYTES NFR BLD: 0.77 K/UL (ref 0.1–0.95)
MONOCYTES NFR BLD: 8 % (ref 2–12)
NEUTROPHILS NFR BLD: 66 % (ref 43–80)
NEUTS SEG NFR BLD: 6.48 K/UL (ref 1.8–7.3)
PLATELET # BLD AUTO: 154 K/UL (ref 130–450)
PMV BLD AUTO: 11.5 FL (ref 7–12)
POTASSIUM SERPL-SCNC: 4 MMOL/L (ref 3.5–5)
PROT SERPL-MCNC: 5.4 G/DL (ref 6.4–8.3)
RBC # BLD AUTO: 3.39 M/UL (ref 3.5–5.5)
RBC # BLD: ABNORMAL 10*6/UL
SODIUM SERPL-SCNC: 144 MMOL/L (ref 132–146)
WBC # BLD: ABNORMAL 10*3/UL
WBC OTHER # BLD: 9.8 K/UL (ref 4.5–11.5)

## 2024-05-29 LAB
MICROORGANISM SPEC CULT: NORMAL
MICROORGANISM/AGENT SPEC: NORMAL
SPECIMEN DESCRIPTION: NORMAL

## 2024-05-30 DIAGNOSIS — S72.001A CLOSED DISPLACED FRACTURE OF RIGHT FEMORAL NECK (HCC): Primary | ICD-10-CM

## 2024-05-30 LAB
ALBUMIN SERPL-MCNC: 2.2 G/DL (ref 3.5–5.2)
ALP SERPL-CCNC: 206 U/L (ref 35–104)
ALT SERPL-CCNC: 27 U/L (ref 0–32)
ANION GAP SERPL CALCULATED.3IONS-SCNC: 10 MMOL/L (ref 7–16)
AST SERPL-CCNC: 40 U/L (ref 0–31)
BASOPHILS # BLD: 0.05 K/UL (ref 0–0.2)
BASOPHILS NFR BLD: 1 % (ref 0–2)
BILIRUB SERPL-MCNC: 0.3 MG/DL (ref 0–1.2)
BUN SERPL-MCNC: 8 MG/DL (ref 6–23)
CALCIUM SERPL-MCNC: 8.6 MG/DL (ref 8.6–10.2)
CHLORIDE SERPL-SCNC: 99 MMOL/L (ref 98–107)
CO2 SERPL-SCNC: 30 MMOL/L (ref 22–29)
CREAT SERPL-MCNC: 0.5 MG/DL (ref 0.5–1)
EOSINOPHIL # BLD: 0.56 K/UL (ref 0.05–0.5)
EOSINOPHILS RELATIVE PERCENT: 5 % (ref 0–6)
ERYTHROCYTE [DISTWIDTH] IN BLOOD BY AUTOMATED COUNT: 19.2 % (ref 11.5–15)
GFR, ESTIMATED: >90 ML/MIN/1.73M2
GLUCOSE SERPL-MCNC: 126 MG/DL (ref 74–99)
HCT VFR BLD AUTO: 31.9 % (ref 34–48)
HGB BLD-MCNC: 9 G/DL (ref 11.5–15.5)
IMM GRANULOCYTES # BLD AUTO: 0.05 K/UL (ref 0–0.58)
IMM GRANULOCYTES NFR BLD: 1 % (ref 0–5)
LYMPHOCYTES NFR BLD: 2.79 K/UL (ref 1.5–4)
LYMPHOCYTES RELATIVE PERCENT: 26 % (ref 20–42)
MCH RBC QN AUTO: 23.9 PG (ref 26–35)
MCHC RBC AUTO-ENTMCNC: 28.2 G/DL (ref 32–34.5)
MCV RBC AUTO: 84.8 FL (ref 80–99.9)
MONOCYTES NFR BLD: 0.82 K/UL (ref 0.1–0.95)
MONOCYTES NFR BLD: 8 % (ref 2–12)
NEUTROPHILS NFR BLD: 60 % (ref 43–80)
NEUTS SEG NFR BLD: 6.37 K/UL (ref 1.8–7.3)
PLATELET # BLD AUTO: 148 K/UL (ref 130–450)
PMV BLD AUTO: 11.7 FL (ref 7–12)
POTASSIUM SERPL-SCNC: 5.1 MMOL/L (ref 3.5–5)
PROT SERPL-MCNC: 5.8 G/DL (ref 6.4–8.3)
RBC # BLD AUTO: 3.76 M/UL (ref 3.5–5.5)
RBC # BLD: ABNORMAL 10*6/UL
SODIUM SERPL-SCNC: 139 MMOL/L (ref 132–146)
WBC OTHER # BLD: 10.6 K/UL (ref 4.5–11.5)

## 2024-05-31 ENCOUNTER — OUTSIDE SERVICES (OUTPATIENT)
Dept: PRIMARY CARE CLINIC | Age: 79
End: 2024-05-31

## 2024-05-31 DIAGNOSIS — E78.5 HYPERLIPIDEMIA, UNSPECIFIED HYPERLIPIDEMIA TYPE: ICD-10-CM

## 2024-05-31 DIAGNOSIS — E11.40 CONTROLLED TYPE 2 DIABETES MELLITUS WITH DIABETIC NEUROPATHY, WITH LONG-TERM CURRENT USE OF INSULIN (HCC): ICD-10-CM

## 2024-05-31 DIAGNOSIS — T84.59XD INFECTION AND INFLAMMATORY REACTION DUE TO OTHER INTERNAL JOINT PROSTHESIS, SUBSEQUENT ENCOUNTER: ICD-10-CM

## 2024-05-31 DIAGNOSIS — S72.001D CLOSED FRACTURE OF NECK OF RIGHT FEMUR WITH ROUTINE HEALING, SUBSEQUENT ENCOUNTER: Primary | ICD-10-CM

## 2024-05-31 DIAGNOSIS — Z86.73 HISTORY OF CEREBRAL INFARCTION: ICD-10-CM

## 2024-05-31 DIAGNOSIS — Z79.4 CONTROLLED TYPE 2 DIABETES MELLITUS WITH DIABETIC NEUROPATHY, WITH LONG-TERM CURRENT USE OF INSULIN (HCC): ICD-10-CM

## 2024-05-31 DIAGNOSIS — J44.9 CHRONIC OBSTRUCTIVE PULMONARY DISEASE, UNSPECIFIED COPD TYPE (HCC): ICD-10-CM

## 2024-05-31 DIAGNOSIS — Z90.5 H/O UNILATERAL NEPHRECTOMY: ICD-10-CM

## 2024-05-31 DIAGNOSIS — R26.2 AMBULATORY DYSFUNCTION: ICD-10-CM

## 2024-05-31 DIAGNOSIS — R53.1 GENERALIZED WEAKNESS: ICD-10-CM

## 2024-05-31 DIAGNOSIS — S73.004D HIP DISLOCATION, RIGHT, SUBSEQUENT ENCOUNTER: ICD-10-CM

## 2024-05-31 DIAGNOSIS — E03.9 HYPOTHYROIDISM, UNSPECIFIED TYPE: ICD-10-CM

## 2024-05-31 DIAGNOSIS — I10 PRIMARY HYPERTENSION: ICD-10-CM

## 2024-06-01 LAB
MICROORGANISM SPEC CULT: NORMAL
MICROORGANISM/AGENT SPEC: NORMAL
SPECIMEN DESCRIPTION: NORMAL

## 2024-06-01 ASSESSMENT — ENCOUNTER SYMPTOMS
SHORTNESS OF BREATH: 0
SORE THROAT: 0
WHEEZING: 0
CHEST TIGHTNESS: 0
ABDOMINAL PAIN: 0
EYE ITCHING: 0
VOMITING: 0
EYE PAIN: 0
EYE REDNESS: 0
NAUSEA: 0
CONSTIPATION: 0
NAUSEA: 0
EYE REDNESS: 0
ABDOMINAL DISTENTION: 0
ABDOMINAL PAIN: 0
CHEST TIGHTNESS: 0
SINUS PRESSURE: 0
CONSTIPATION: 0
COUGH: 0
EYE PAIN: 0
ABDOMINAL PAIN: 0
EYE DISCHARGE: 0
EYE REDNESS: 0
RHINORRHEA: 0
ABDOMINAL DISTENTION: 0
SORE THROAT: 0
VOMITING: 0
EYE PAIN: 0
COUGH: 0
WHEEZING: 0
SINUS PRESSURE: 0
SHORTNESS OF BREATH: 0
NAUSEA: 0
CHEST TIGHTNESS: 0
ABDOMINAL DISTENTION: 0
EYE ITCHING: 0
SHORTNESS OF BREATH: 0
DIARRHEA: 0
WHEEZING: 0
SORE THROAT: 0
COUGH: 0
DIARRHEA: 0
DIARRHEA: 0
SINUS PRESSURE: 0
RHINORRHEA: 0
VOMITING: 0
EYE ITCHING: 0
EYE DISCHARGE: 0
RHINORRHEA: 0
CONSTIPATION: 0
EYE DISCHARGE: 0

## 2024-06-01 NOTE — PROGRESS NOTES
Rebeka Renee (:  1945) is a 78 y.o. female that is seen today for skilled assessment    Subjective     Rebeka is sitting up in bed in room.  She is awake alert and in no obvious distress.  She has some complaints of discomfort to her right lower extremity and continues pain medication as needed.  She continues to work in therapies as tolerated.  Spoke with nursing supervisor and okayed labs scheduled for Monday to be drawn on Tuesday due to the holiday.  She is to follow-up with Ortho and ID.  No concerns from nursing.  Nursing will let Dr. NP or PA know of any changes.      Location: [facility]  All nursing and progress notes reviewed.    Past Medical History:   Diagnosis Date    Anemia     S/P chemotherapy.    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    Ascending aortic aneurysm (HCC)     seen on CTA chest w/contrast on     Asthma     Bipolar 1 disorder (HCC)     Cancer (HCC)     lung/ kidney    Cancer of breast, female (HCC) 2006    S/P bilaeral mastectomy with left breast lymph node resection and chemotherapy.    CHF (congestive heart failure) (HCC)     Chronic back pain     Depression     Depression with anxiety     Diabetes mellitus (HCC)     Resolved after losing 130 lbs. after gastric bypass surgery.    Dyslipidemia     Fibromyalgia     History of blood transfusion     anemia    Hypertension     Hypothyroidism     Neuropathy     Pericardial effusion 2010    Subxiphoid pericardial window with drainage of pericardial effusion and pericardial biopsy:  Fluid and biopsy negative for metastases.     Pleural effusion 2010    Noted on chest x-ray.    TIA (transient ischemic attack)     Tobacco abuse     Patient smoked 3 ppd x 26 years.  Quit in .    Type II or unspecified type diabetes mellitus without mention of complication, not stated as

## 2024-06-01 NOTE — PROGRESS NOTES
Rebeka Renee (:  1945) is a 78 y.o. female that is seen today for skilled assessment    Subjective     Rebeka is awake alert and in no obvious distress.  She is sitting up in bed in room and dozing in bed with her TV on.  She has some complaints of discomfort to her right lower extremity and remains on pain medication as needed.  She continues to work in therapies as tolerated.  She will follow-up with Ortho and ID.  Nursing has no concerns and will let Dr. NP or PA know of any changes.      Location: [facility]  All nursing and progress notes reviewed.    Past Medical History:   Diagnosis Date    Anemia     S/P chemotherapy.    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    Ascending aortic aneurysm (HCC)     seen on CTA chest w/contrast on     Asthma     Bipolar 1 disorder (HCC)     Cancer (HCC)     lung/ kidney    Cancer of breast, female (HCC) 2006    S/P bilaeral mastectomy with left breast lymph node resection and chemotherapy.    CHF (congestive heart failure) (HCC)     Chronic back pain     Depression     Depression with anxiety     Diabetes mellitus (HCC)     Resolved after losing 130 lbs. after gastric bypass surgery.    Dyslipidemia     Fibromyalgia     History of blood transfusion     anemia    Hypertension     Hypothyroidism     Neuropathy     Pericardial effusion 2010    Subxiphoid pericardial window with drainage of pericardial effusion and pericardial biopsy:  Fluid and biopsy negative for metastases.     Pleural effusion 2010    Noted on chest x-ray.    TIA (transient ischemic attack)     Tobacco abuse     Patient smoked 3 ppd x 26 years.  Quit in .    Type II or unspecified type diabetes mellitus without mention of complication, not stated as uncontrolled        Allergies   Allergen Reactions    Benadryl [Diphenhydramine] Hives

## 2024-06-03 ENCOUNTER — HOSPITAL ENCOUNTER (OUTPATIENT)
Dept: GENERAL RADIOLOGY | Age: 79
Discharge: HOME | End: 2024-06-05
Payer: MEDICARE

## 2024-06-03 ENCOUNTER — OUTSIDE SERVICES (OUTPATIENT)
Dept: PRIMARY CARE CLINIC | Age: 79
End: 2024-06-03

## 2024-06-03 DIAGNOSIS — E03.9 HYPOTHYROIDISM, UNSPECIFIED TYPE: ICD-10-CM

## 2024-06-03 DIAGNOSIS — S73.004D HIP DISLOCATION, RIGHT, SUBSEQUENT ENCOUNTER: ICD-10-CM

## 2024-06-03 DIAGNOSIS — R26.2 AMBULATORY DYSFUNCTION: ICD-10-CM

## 2024-06-03 DIAGNOSIS — S72.001D CLOSED FRACTURE OF NECK OF RIGHT FEMUR WITH ROUTINE HEALING, SUBSEQUENT ENCOUNTER: Primary | ICD-10-CM

## 2024-06-03 DIAGNOSIS — Z86.73 HISTORY OF CEREBRAL INFARCTION: ICD-10-CM

## 2024-06-03 DIAGNOSIS — Z96.641 HISTORY OF HEMIARTHROPLASTY OF RIGHT HIP: ICD-10-CM

## 2024-06-03 DIAGNOSIS — Z90.5 H/O UNILATERAL NEPHRECTOMY: ICD-10-CM

## 2024-06-03 DIAGNOSIS — T84.59XD INFECTION AND INFLAMMATORY REACTION DUE TO OTHER INTERNAL JOINT PROSTHESIS, SUBSEQUENT ENCOUNTER: ICD-10-CM

## 2024-06-03 DIAGNOSIS — E78.5 HYPERLIPIDEMIA, UNSPECIFIED HYPERLIPIDEMIA TYPE: ICD-10-CM

## 2024-06-03 DIAGNOSIS — R53.1 GENERALIZED WEAKNESS: ICD-10-CM

## 2024-06-03 DIAGNOSIS — J44.9 CHRONIC OBSTRUCTIVE PULMONARY DISEASE, UNSPECIFIED COPD TYPE (HCC): ICD-10-CM

## 2024-06-03 DIAGNOSIS — E11.40 CONTROLLED TYPE 2 DIABETES MELLITUS WITH DIABETIC NEUROPATHY, WITH LONG-TERM CURRENT USE OF INSULIN (HCC): ICD-10-CM

## 2024-06-03 DIAGNOSIS — Z79.4 CONTROLLED TYPE 2 DIABETES MELLITUS WITH DIABETIC NEUROPATHY, WITH LONG-TERM CURRENT USE OF INSULIN (HCC): ICD-10-CM

## 2024-06-03 DIAGNOSIS — I10 PRIMARY HYPERTENSION: ICD-10-CM

## 2024-06-03 PROCEDURE — 73502 X-RAY EXAM HIP UNI 2-3 VIEWS: CPT

## 2024-06-05 ENCOUNTER — OUTSIDE SERVICES (OUTPATIENT)
Dept: PRIMARY CARE CLINIC | Age: 79
End: 2024-06-05

## 2024-06-05 DIAGNOSIS — T84.59XD INFECTION AND INFLAMMATORY REACTION DUE TO OTHER INTERNAL JOINT PROSTHESIS, SUBSEQUENT ENCOUNTER: ICD-10-CM

## 2024-06-05 DIAGNOSIS — R53.1 GENERALIZED WEAKNESS: ICD-10-CM

## 2024-06-05 DIAGNOSIS — Z90.5 H/O UNILATERAL NEPHRECTOMY: ICD-10-CM

## 2024-06-05 DIAGNOSIS — R26.2 AMBULATORY DYSFUNCTION: ICD-10-CM

## 2024-06-05 DIAGNOSIS — S73.004D HIP DISLOCATION, RIGHT, SUBSEQUENT ENCOUNTER: ICD-10-CM

## 2024-06-05 DIAGNOSIS — E11.40 CONTROLLED TYPE 2 DIABETES MELLITUS WITH DIABETIC NEUROPATHY, WITH LONG-TERM CURRENT USE OF INSULIN (HCC): ICD-10-CM

## 2024-06-05 DIAGNOSIS — Z86.73 HISTORY OF CEREBRAL INFARCTION: ICD-10-CM

## 2024-06-05 DIAGNOSIS — I10 PRIMARY HYPERTENSION: ICD-10-CM

## 2024-06-05 DIAGNOSIS — S72.001D CLOSED FRACTURE OF NECK OF RIGHT FEMUR WITH ROUTINE HEALING, SUBSEQUENT ENCOUNTER: Primary | ICD-10-CM

## 2024-06-05 DIAGNOSIS — J44.9 CHRONIC OBSTRUCTIVE PULMONARY DISEASE, UNSPECIFIED COPD TYPE (HCC): ICD-10-CM

## 2024-06-05 DIAGNOSIS — E78.5 HYPERLIPIDEMIA, UNSPECIFIED HYPERLIPIDEMIA TYPE: ICD-10-CM

## 2024-06-05 DIAGNOSIS — Z79.4 CONTROLLED TYPE 2 DIABETES MELLITUS WITH DIABETIC NEUROPATHY, WITH LONG-TERM CURRENT USE OF INSULIN (HCC): ICD-10-CM

## 2024-06-05 DIAGNOSIS — E03.9 HYPOTHYROIDISM, UNSPECIFIED TYPE: ICD-10-CM

## 2024-06-08 LAB
MICROORGANISM SPEC CULT: NORMAL
MICROORGANISM/AGENT SPEC: NORMAL
SPECIMEN DESCRIPTION: NORMAL

## 2024-06-08 ASSESSMENT — ENCOUNTER SYMPTOMS
SINUS PRESSURE: 0
COUGH: 0
CHEST TIGHTNESS: 0
COUGH: 0
CONSTIPATION: 0
DIARRHEA: 0
DIARRHEA: 0
NAUSEA: 0
NAUSEA: 0
RHINORRHEA: 0
EYE PAIN: 0
SORE THROAT: 0
CONSTIPATION: 0
ABDOMINAL DISTENTION: 0
SORE THROAT: 0
EYE DISCHARGE: 0
EYE REDNESS: 0
EYE ITCHING: 0
EYE DISCHARGE: 0
SHORTNESS OF BREATH: 0
SHORTNESS OF BREATH: 0
RHINORRHEA: 0
VOMITING: 0
ABDOMINAL DISTENTION: 0
ABDOMINAL PAIN: 0
SINUS PRESSURE: 0
VOMITING: 0
EYE ITCHING: 0
EYE PAIN: 0
WHEEZING: 0
WHEEZING: 0
CHEST TIGHTNESS: 0
ABDOMINAL PAIN: 0
EYE REDNESS: 0

## 2024-06-08 NOTE — PROGRESS NOTES
Rebeka Renee (:  1945) is a 78 y.o. female that is seen today for skilled assessment    Subjective     Rebeka is awake alert and in no obvious distress.  She is sitting up in bed in room with her TV on.  She continues to have some complaints of discomfort to her right lower extremity and continues pain medication as needed. She continues to work in therapies as tolerated.  She is being cut from skilled event and will be going long-term care due to her nonweightbearing status and inability to participate fully in therapies. She is to follow-up with Ortho and ID. Nursing has no concerns and will let Dr. NEAL or PA know of any changes.      Location: [facility]  All nursing and progress notes reviewed.    Past Medical History:   Diagnosis Date   • Anemia     S/P chemotherapy.   • Anxiety    • Arthritis     With DJD of the lumbar spine with L4/L5 and L5/S1 radiculopathy with bilateral lower extremity pain, left more than right.   • Ascending aortic aneurysm (HCC)     seen on CTA chest w/contrast on    • Asthma    • Bipolar 1 disorder (HCC)    • Cancer (HCC)     lung/ kidney   • Cancer of breast, female (HCC) 2006    S/P bilaeral mastectomy with left breast lymph node resection and chemotherapy.   • CHF (congestive heart failure) (HCC)    • Chronic back pain    • Depression    • Depression with anxiety    • Diabetes mellitus (HCC)     Resolved after losing 130 lbs. after gastric bypass surgery.   • Dyslipidemia    • Fibromyalgia    • History of blood transfusion     anemia   • Hypertension    • Hypothyroidism    • Neuropathy    • Pericardial effusion 2010    Subxiphoid pericardial window with drainage of pericardial effusion and pericardial biopsy:  Fluid and biopsy negative for metastases.    • Pleural effusion 2010    Noted on chest x-ray.   • TIA (transient ischemic attack)    • Tobacco abuse     Patient smoked 3 ppd x 26 years.  Quit in .   • Type II or

## 2024-06-08 NOTE — PROGRESS NOTES
Rebeka Renee (:  1945) is a 78 y.o. female that is seen today for skilled assessment    Subjective     Rebeka is awake alert and in no obvious distress.  She is sitting up in bed in room is very tearful and upset. She is returned from the Ortho appointment and was found that she will not be having her surgery and will continue to be nonweightbearing for another 6 weeks. She states she has some drainage at the incision and a dressing was placed by Ortho and she will be scheduled for an aspiration of her hip by Ortho.  She states that she really just needs to get home, this NP provided understanding and sympathy.  She continues to have some complaints of discomfort to her right lower extremity and remains on pain medication as needed.  She continues to work in therapies as tolerated. She will follow-up with Ortho and ID. Nursing has no concerns and will let Dr. NEAL or PA know of any changes.      Location: [facility] nursing [facility]  All nursing and progress notes reviewed.    Past Medical History:   Diagnosis Date    Anemia     S/P chemotherapy.    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    Ascending aortic aneurysm (HCC)     seen on CTA chest w/contrast on     Asthma     Bipolar 1 disorder (HCC)     Cancer (HCC)     lung/ kidney    Cancer of breast, female (HCC) 2006    S/P bilaeral mastectomy with left breast lymph node resection and chemotherapy.    CHF (congestive heart failure) (HCC)     Chronic back pain     Depression     Depression with anxiety     Diabetes mellitus (HCC)     Resolved after losing 130 lbs. after gastric bypass surgery.    Dyslipidemia     Fibromyalgia     History of blood transfusion     anemia    Hypertension     Hypothyroidism     Neuropathy     Pericardial effusion 2010    Subxiphoid pericardial window with drainage of pericardial effusion and

## 2024-06-11 ENCOUNTER — HOSPITAL ENCOUNTER (OUTPATIENT)
Dept: CT IMAGING | Age: 79
Discharge: HOME | End: 2024-06-13
Payer: MEDICARE

## 2024-06-11 VITALS — OXYGEN SATURATION: 95 % | SYSTOLIC BLOOD PRESSURE: 178 MMHG | DIASTOLIC BLOOD PRESSURE: 107 MMHG | HEART RATE: 90 BPM

## 2024-06-11 DIAGNOSIS — T84.59XD PROSTHETIC HIP INFECTION, SUBSEQUENT ENCOUNTER: ICD-10-CM

## 2024-06-11 DIAGNOSIS — Z96.649 PROSTHETIC HIP INFECTION, SUBSEQUENT ENCOUNTER: ICD-10-CM

## 2024-06-11 LAB
APPEARANCE: ABNORMAL
APPEARANCE: ABNORMAL
BODY FLD TYPE: ABNORMAL
BODY FLD TYPE: ABNORMAL
CLOT CHECK: ABNORMAL
CLOT CHECK: ABNORMAL
COLOR FLUID: ABNORMAL
COLOR FLUID: ABNORMAL
MONO/MACROPHAGE FLUID: 51 %
MONO/MACROPHAGE FLUID: 6 %
NEUTROPHIL, FLUID: 49 %
NEUTROPHIL, FLUID: 94 %
RBC, SYNOVIAL FLUID: ABNORMAL CELLS/UL
RBC, SYNOVIAL FLUID: ABNORMAL CELLS/UL
WBC COUNT, SYNOVIAL FLUID: ABNORMAL CELLS/UL
WBC COUNT, SYNOVIAL FLUID: ABNORMAL CELLS/UL

## 2024-06-11 PROCEDURE — 87205 SMEAR GRAM STAIN: CPT

## 2024-06-11 PROCEDURE — 20610 DRAIN/INJ JOINT/BURSA W/O US: CPT

## 2024-06-11 PROCEDURE — 2500000003 HC RX 250 WO HCPCS: Performed by: RADIOLOGY

## 2024-06-11 PROCEDURE — 87075 CULTR BACTERIA EXCEPT BLOOD: CPT

## 2024-06-11 PROCEDURE — 87070 CULTURE OTHR SPECIMN AEROBIC: CPT

## 2024-06-11 PROCEDURE — 89051 BODY FLUID CELL COUNT: CPT

## 2024-06-11 RX ORDER — LIDOCAINE HYDROCHLORIDE 20 MG/ML
INJECTION, SOLUTION INFILTRATION; PERINEURAL PRN
Status: COMPLETED | OUTPATIENT
Start: 2024-06-11 | End: 2024-06-11

## 2024-06-11 RX ORDER — LIDOCAINE HYDROCHLORIDE AND EPINEPHRINE BITARTRATE 20; .01 MG/ML; MG/ML
INJECTION, SOLUTION SUBCUTANEOUS PRN
Status: COMPLETED | OUTPATIENT
Start: 2024-06-11 | End: 2024-06-11

## 2024-06-11 RX ADMIN — LIDOCAINE HYDROCHLORIDE,EPINEPHRINE BITARTRATE 6 ML: 20; .01 INJECTION, SOLUTION INFILTRATION; PERINEURAL at 14:17

## 2024-06-11 RX ADMIN — LIDOCAINE HYDROCHLORIDE 10 ML: 20 INJECTION, SOLUTION INFILTRATION; PERINEURAL at 14:17

## 2024-06-11 NOTE — PROCEDURES
PROCEDURE NOTE  Date: 6/11/2024   Name: Rebeka Renee  YOB: 1945    Procedures: Right hip aspiration    Patient arrived to Radiology department for right hip aspiration. Allergies, home medications, H&P and fasting instructions reviewed with patient. Vital signs taken. Procedural instructions given, questions answered, understanding expressed and consent signed. Patient given fluoroscopy education, no questions at this time.

## 2024-06-12 DIAGNOSIS — T84.59XD PROSTHETIC HIP INFECTION, SUBSEQUENT ENCOUNTER: Primary | ICD-10-CM

## 2024-06-12 DIAGNOSIS — Z96.649 PROSTHETIC HIP INFECTION, SUBSEQUENT ENCOUNTER: Primary | ICD-10-CM

## 2024-06-12 RX ORDER — CEPHALEXIN 500 MG/1
500 CAPSULE ORAL DAILY
Qty: 30 CAPSULE | Refills: 0 | Status: SHIPPED | OUTPATIENT
Start: 2024-06-12 | End: 2024-07-12

## 2024-06-15 LAB
MICROORGANISM SPEC CULT: NORMAL
MICROORGANISM/AGENT SPEC: NORMAL
SPECIMEN DESCRIPTION: NORMAL

## 2024-06-16 LAB
MICROORGANISM SPEC CULT: ABNORMAL
MICROORGANISM SPEC CULT: NORMAL
MICROORGANISM/AGENT SPEC: ABNORMAL
MICROORGANISM/AGENT SPEC: NORMAL
SERVICE CMNT-IMP: ABNORMAL
SERVICE CMNT-IMP: NORMAL
SPECIMEN DESCRIPTION: ABNORMAL
SPECIMEN DESCRIPTION: NORMAL

## 2024-06-21 LAB
MICROORGANISM SPEC CULT: NO GROWTH
MICROORGANISM SPEC CULT: NO GROWTH
MICROORGANISM SPEC CULT: NORMAL
MICROORGANISM SPEC CULT: NORMAL
MICROORGANISM/AGENT SPEC: ABNORMAL
MICROORGANISM/AGENT SPEC: NORMAL
SERVICE CMNT-IMP: ABNORMAL
SERVICE CMNT-IMP: NORMAL
SPECIMEN DESCRIPTION: ABNORMAL
SPECIMEN DESCRIPTION: NORMAL

## 2024-06-22 LAB
MICROORGANISM SPEC CULT: NORMAL
MICROORGANISM/AGENT SPEC: NORMAL
SPECIMEN DESCRIPTION: NORMAL

## 2024-06-24 ENCOUNTER — OUTSIDE SERVICES (OUTPATIENT)
Dept: PRIMARY CARE CLINIC | Age: 79
End: 2024-06-24
Payer: MEDICARE

## 2024-06-24 DIAGNOSIS — S72.001D CLOSED FRACTURE OF NECK OF RIGHT FEMUR WITH ROUTINE HEALING, SUBSEQUENT ENCOUNTER: Primary | ICD-10-CM

## 2024-06-24 DIAGNOSIS — R53.1 GENERALIZED WEAKNESS: ICD-10-CM

## 2024-06-24 DIAGNOSIS — Z90.5 H/O UNILATERAL NEPHRECTOMY: ICD-10-CM

## 2024-06-24 DIAGNOSIS — E78.5 HYPERLIPIDEMIA, UNSPECIFIED HYPERLIPIDEMIA TYPE: ICD-10-CM

## 2024-06-24 DIAGNOSIS — Z79.4 CONTROLLED TYPE 2 DIABETES MELLITUS WITH DIABETIC NEUROPATHY, WITH LONG-TERM CURRENT USE OF INSULIN (HCC): ICD-10-CM

## 2024-06-24 DIAGNOSIS — K21.9 GASTROESOPHAGEAL REFLUX DISEASE WITHOUT ESOPHAGITIS: ICD-10-CM

## 2024-06-24 DIAGNOSIS — E11.40 CONTROLLED TYPE 2 DIABETES MELLITUS WITH DIABETIC NEUROPATHY, WITH LONG-TERM CURRENT USE OF INSULIN (HCC): ICD-10-CM

## 2024-06-24 DIAGNOSIS — R26.2 AMBULATORY DYSFUNCTION: ICD-10-CM

## 2024-06-24 DIAGNOSIS — D64.9 ANEMIA, UNSPECIFIED TYPE: ICD-10-CM

## 2024-06-24 DIAGNOSIS — J44.9 CHRONIC OBSTRUCTIVE PULMONARY DISEASE, UNSPECIFIED COPD TYPE (HCC): ICD-10-CM

## 2024-06-24 DIAGNOSIS — S73.004D HIP DISLOCATION, RIGHT, SUBSEQUENT ENCOUNTER: ICD-10-CM

## 2024-06-24 DIAGNOSIS — M48.00 SPINAL STENOSIS, UNSPECIFIED SPINAL REGION: ICD-10-CM

## 2024-06-24 DIAGNOSIS — E03.9 HYPOTHYROIDISM, UNSPECIFIED TYPE: ICD-10-CM

## 2024-06-24 DIAGNOSIS — I10 PRIMARY HYPERTENSION: ICD-10-CM

## 2024-06-24 DIAGNOSIS — F32.A DEPRESSION, UNSPECIFIED DEPRESSION TYPE: ICD-10-CM

## 2024-06-24 DIAGNOSIS — E66.9 OBESITY, UNSPECIFIED CLASSIFICATION, UNSPECIFIED OBESITY TYPE, UNSPECIFIED WHETHER SERIOUS COMORBIDITY PRESENT: ICD-10-CM

## 2024-06-24 DIAGNOSIS — T84.59XD INFECTION AND INFLAMMATORY REACTION DUE TO OTHER INTERNAL JOINT PROSTHESIS, SUBSEQUENT ENCOUNTER: ICD-10-CM

## 2024-06-24 DIAGNOSIS — Z86.73 HISTORY OF CEREBRAL INFARCTION: ICD-10-CM

## 2024-06-24 LAB
ALBUMIN SERPL-MCNC: 2.6 G/DL (ref 3.5–5.2)
ALP SERPL-CCNC: 153 U/L (ref 35–104)
ALT SERPL-CCNC: 41 U/L (ref 0–32)
ANION GAP SERPL CALCULATED.3IONS-SCNC: 16 MMOL/L (ref 7–16)
AST SERPL-CCNC: 63 U/L (ref 0–31)
BASOPHILS # BLD: 0.08 K/UL (ref 0–0.2)
BASOPHILS NFR BLD: 1 % (ref 0–2)
BILIRUB SERPL-MCNC: <0.2 MG/DL (ref 0–1.2)
BUN SERPL-MCNC: 12 MG/DL (ref 6–23)
CALCIUM SERPL-MCNC: 9.4 MG/DL (ref 8.6–10.2)
CHLORIDE SERPL-SCNC: 104 MMOL/L (ref 98–107)
CO2 SERPL-SCNC: 24 MMOL/L (ref 22–29)
CREAT SERPL-MCNC: 0.6 MG/DL (ref 0.5–1)
CRP SERPL HS-MCNC: 3 MG/L (ref 0–5)
EOSINOPHIL # BLD: 0.25 K/UL (ref 0.05–0.5)
EOSINOPHILS RELATIVE PERCENT: 3 % (ref 0–6)
ERYTHROCYTE [DISTWIDTH] IN BLOOD BY AUTOMATED COUNT: 20.8 % (ref 11.5–15)
ERYTHROCYTE [SEDIMENTATION RATE] IN BLOOD BY WESTERGREN METHOD: 30 MM/HR (ref 0–20)
GFR, ESTIMATED: >90 ML/MIN/1.73M2
GLUCOSE SERPL-MCNC: 90 MG/DL (ref 74–99)
HCT VFR BLD AUTO: 38.9 % (ref 34–48)
HGB BLD-MCNC: 11.1 G/DL (ref 11.5–15.5)
LYMPHOCYTES NFR BLD: 3.6 K/UL (ref 1.5–4)
LYMPHOCYTES RELATIVE PERCENT: 38 % (ref 20–42)
MCH RBC QN AUTO: 24.7 PG (ref 26–35)
MCHC RBC AUTO-ENTMCNC: 28.5 G/DL (ref 32–34.5)
MCV RBC AUTO: 86.6 FL (ref 80–99.9)
MONOCYTES NFR BLD: 0.82 K/UL (ref 0.1–0.95)
MONOCYTES NFR BLD: 9 % (ref 2–12)
NEUTROPHILS NFR BLD: 50 % (ref 43–80)
NEUTS SEG NFR BLD: 4.66 K/UL (ref 1.8–7.3)
PLATELET CONFIRMATION: NORMAL
PLATELET, FLUORESCENCE: 142 K/UL (ref 130–450)
PMV BLD AUTO: 12.1 FL (ref 7–12)
POTASSIUM SERPL-SCNC: 4.8 MMOL/L (ref 3.5–5)
PROT SERPL-MCNC: 6.3 G/DL (ref 6.4–8.3)
RBC # BLD AUTO: 4.49 M/UL (ref 3.5–5.5)
RBC # BLD: ABNORMAL 10*6/UL
SODIUM SERPL-SCNC: 144 MMOL/L (ref 132–146)
WBC OTHER # BLD: 9.4 K/UL (ref 4.5–11.5)

## 2024-06-24 PROCEDURE — 99309 SBSQ NF CARE MODERATE MDM 30: CPT | Performed by: INTERNAL MEDICINE

## 2024-06-24 ASSESSMENT — ENCOUNTER SYMPTOMS
DIARRHEA: 0
NAUSEA: 0
ABDOMINAL PAIN: 0
SHORTNESS OF BREATH: 0
VOMITING: 0

## 2024-06-24 NOTE — PROGRESS NOTES
(constipation)      pantoprazole (PROTONIX) 20 MG tablet Take 1 tablet by mouth daily      calcium carbonate (TUMS) 500 MG chewable tablet Take 1 tablet by mouth 2 times daily      melatonin (RA MELATONIN) 3 MG TABS tablet Take 1 tablet by mouth nightly as needed (sleep) 30 tablet 0    arformoterol tartrate (BROVANA) 15 MCG/2ML NEBU Take 1 ampule by nebulization 2 times daily (Patient not taking: Reported on 6/3/2024)      atorvastatin (LIPITOR) 80 MG tablet Take 1 tablet by mouth daily      magnesium hydroxide (MILK OF MAGNESIA) 400 MG/5ML suspension Take 30 mLs by mouth daily as needed for Constipation      ACETAMINOPHEN PO Take 650 mg by mouth in the morning and at bedtime      apixaban (ELIQUIS) 5 MG TABS tablet Take 2 tablets by mouth 2 times daily for 4 days, THEN 1 tablet 2 times daily. (Patient taking differently: 1 tablet 2 times daily.) 136 tablet 0    ALBUTEROL IN Inhale into the lungs      Cholecalciferol (VITAMIN D) 25 MCG TABS Take 1 tablet by mouth daily 60 tablet     potassium chloride (KLOR-CON M) 10 MEQ extended release tablet Take 1 tablet by mouth daily      buPROPion (WELLBUTRIN SR) 100 MG extended release tablet Take 1 tablet by mouth daily      Semaglutide, 2 MG/DOSE, 8 MG/3ML SOPN Inject 8 mg into the skin once a week Given Monday      omeprazole (PRILOSEC) 20 MG delayed release capsule Take 1 capsule by mouth daily (Patient not taking: Reported on 6/3/2024)      sucralfate (CARAFATE) 1 GM tablet Take 1 tablet by mouth daily      gabapentin (NEURONTIN) 800 MG tablet Take 1 tablet by mouth 3 times daily.      pramipexole (MIRAPEX) 0.75 MG tablet Take 1 tablet by mouth 2 times daily      levothyroxine (SYNTHROID) 50 MCG tablet Take 1 tablet by mouth Daily      venlafaxine (EFFEXOR-XR) 150 MG XR capsule Take 1 capsule by mouth daily       No current facility-administered medications for this visit.      Allergies   Allergen Reactions    Benadryl [Diphenhydramine] Hives     Tongue Swells    Other

## 2024-06-25 ENCOUNTER — OFFICE VISIT (OUTPATIENT)
Dept: ORTHOPEDIC SURGERY | Age: 79
End: 2024-06-25

## 2024-06-25 DIAGNOSIS — M24.451 RECURRENT DISLOCATION OF RIGHT HIP: ICD-10-CM

## 2024-06-25 DIAGNOSIS — Z98.890 HISTORY OF REMOVAL OF JOINT PROSTHESIS OF RIGHT HIP DUE TO INFECTION: ICD-10-CM

## 2024-06-25 DIAGNOSIS — Z87.81 STATUS POST OPEN REDUCTION AND INTERNAL FIXATION (ORIF) OF FRACTURE: Primary | ICD-10-CM

## 2024-06-25 DIAGNOSIS — Z86.19 HISTORY OF REMOVAL OF JOINT PROSTHESIS OF RIGHT HIP DUE TO INFECTION: ICD-10-CM

## 2024-06-25 DIAGNOSIS — Z98.890 STATUS POST OPEN REDUCTION AND INTERNAL FIXATION (ORIF) OF FRACTURE: Primary | ICD-10-CM

## 2024-06-25 PROCEDURE — 99024 POSTOP FOLLOW-UP VISIT: CPT | Performed by: STUDENT IN AN ORGANIZED HEALTH CARE EDUCATION/TRAINING PROGRAM

## 2024-06-25 NOTE — PROGRESS NOTES
04/02/2010    Subxiphoid pericardial window with drainage of pericardial effusion and pericardial biopsy:  Fluid and biopsy negative for metastases.     Pleural effusion 05/02/2010    Noted on chest x-ray.    TIA (transient ischemic attack)     Tobacco abuse     Patient smoked 3 ppd x 26 years.  Quit in 1995.    Type II or unspecified type diabetes mellitus without mention of complication, not stated as uncontrolled        PAST SURGICAL HISTORY  Past Surgical History:   Procedure Laterality Date    BREAST SURGERY      CATARACT REMOVAL WITH IMPLANT Bilateral     CHOLECYSTECTOMY      FIBULA FRACTURE SURGERY Right 5/16/2024    RIGHT FEMUR OPEN REDUCTION INTERNAL FIXATION, OPEN TREATMENT OF RIGHT HIP DISLOCATION [place] [facility] [place]    GASTRIC BYPASS SURGERY  9/2004    HERNIA REPAIR      HIP SURGERY Right 1/4/2024    RIGHT HIP HEMIARTHROPLASTY [place] [facility] [place]    HIP SURGERY Right 4/2/2024    RIGHT HIP CLOSED REDUCTION INTERNAL FIXATION performed [place] Pe[place] [facility] [place]    HIP SURGERY Right 4/11/2024    RIGHT HIP OPEN REDUCTION FEMORAL HEAD EXCHANGE WITH IRRIGATION AND DEBRIDEMENT [place] [facility] [place]    HYSTERECTOMY (CERVIX STATUS UNKNOWN)      KIDNEY REMOVAL Left 2/2006    Cancer.    KIDNEY REMOVAL      left    KNEE SURGERY Right     arthroscopy    LIVER BIOPSY  2006    Fatty tumor.    LUNG REMOVAL, PARTIAL      For lung CA, thought to be metastatic from breast cancer. left upper lobe     MASTECTOMY Bilateral     NERVE BLOCK Bilateral 06/06/16    transforaminal nerve block, lumbar #1    OTHER SURGICAL HISTORY  03/16/15    stage 1 percutaneous trial lumbar spinal cord stimulator    OTHER SURGICAL HISTORY N/A 4/15/15    surgical implantation of medtronic spinal cord stimulator lumbar    OTHER SURGICAL HISTORY N/A 02/24/2021    surgical removal lumbar SCS    PAIN MANAGEMENT PROCEDURE N/A 2/24/2021    SURGICAL

## 2024-06-27 VITALS
HEART RATE: 91 BPM | TEMPERATURE: 98.1 F | WEIGHT: 160 LBS | DIASTOLIC BLOOD PRESSURE: 84 MMHG | SYSTOLIC BLOOD PRESSURE: 126 MMHG | BODY MASS INDEX: 26.63 KG/M2 | OXYGEN SATURATION: 96 % | RESPIRATION RATE: 17 BRPM

## 2024-06-27 LAB
ALBUMIN SERPL-MCNC: 2.3 G/DL (ref 3.5–5.2)
ALP SERPL-CCNC: 140 U/L (ref 35–104)
ALT SERPL-CCNC: 74 U/L (ref 0–32)
ANION GAP SERPL CALCULATED.3IONS-SCNC: 11 MMOL/L (ref 7–16)
AST SERPL-CCNC: 111 U/L (ref 0–31)
BASOPHILS # BLD: 0.07 K/UL (ref 0–0.2)
BASOPHILS NFR BLD: 1 % (ref 0–2)
BILIRUB SERPL-MCNC: <0.2 MG/DL (ref 0–1.2)
BUN SERPL-MCNC: 11 MG/DL (ref 6–23)
CALCIUM SERPL-MCNC: 9.2 MG/DL (ref 8.6–10.2)
CHLORIDE SERPL-SCNC: 105 MMOL/L (ref 98–107)
CO2 SERPL-SCNC: 24 MMOL/L (ref 22–29)
CREAT SERPL-MCNC: 0.5 MG/DL (ref 0.5–1)
EOSINOPHIL # BLD: 0.31 K/UL (ref 0.05–0.5)
EOSINOPHILS RELATIVE PERCENT: 4 % (ref 0–6)
ERYTHROCYTE [DISTWIDTH] IN BLOOD BY AUTOMATED COUNT: 20.6 % (ref 11.5–15)
GFR, ESTIMATED: >90 ML/MIN/1.73M2
GLUCOSE SERPL-MCNC: 45 MG/DL (ref 74–99)
HCT VFR BLD AUTO: 38.6 % (ref 34–48)
HGB BLD-MCNC: 10.6 G/DL (ref 11.5–15.5)
IMM GRANULOCYTES # BLD AUTO: 0.04 K/UL (ref 0–0.58)
IMM GRANULOCYTES NFR BLD: 1 % (ref 0–5)
LYMPHOCYTES NFR BLD: 3.15 K/UL (ref 1.5–4)
LYMPHOCYTES RELATIVE PERCENT: 38 % (ref 20–42)
MCH RBC QN AUTO: 24.9 PG (ref 26–35)
MCHC RBC AUTO-ENTMCNC: 27.5 G/DL (ref 32–34.5)
MCV RBC AUTO: 90.8 FL (ref 80–99.9)
MONOCYTES NFR BLD: 0.67 K/UL (ref 0.1–0.95)
MONOCYTES NFR BLD: 8 % (ref 2–12)
NEUTROPHILS NFR BLD: 49 % (ref 43–80)
NEUTS SEG NFR BLD: 4.08 K/UL (ref 1.8–7.3)
PLATELET # BLD AUTO: 284 K/UL (ref 130–450)
PMV BLD AUTO: 11.9 FL (ref 7–12)
POTASSIUM SERPL-SCNC: 5.2 MMOL/L (ref 3.5–5)
PROT SERPL-MCNC: 6 G/DL (ref 6.4–8.3)
RBC # BLD AUTO: 4.25 M/UL (ref 3.5–5.5)
RBC # BLD: ABNORMAL 10*6/UL
SODIUM SERPL-SCNC: 140 MMOL/L (ref 132–146)
WBC OTHER # BLD: 8.3 K/UL (ref 4.5–11.5)

## 2024-06-27 ASSESSMENT — ENCOUNTER SYMPTOMS
CHEST TIGHTNESS: 0
SORE THROAT: 0
RHINORRHEA: 0
PHOTOPHOBIA: 0
VOICE CHANGE: 0
EYE PAIN: 0
CONSTIPATION: 0
TROUBLE SWALLOWING: 0
WHEEZING: 0
BLOOD IN STOOL: 0
COUGH: 0
BACK PAIN: 0

## 2024-06-28 ENCOUNTER — TELEPHONE (OUTPATIENT)
Dept: ORTHOPEDIC SURGERY | Age: 79
End: 2024-06-28

## 2024-06-28 LAB
ALBUMIN SERPL-MCNC: 2.5 G/DL (ref 3.5–5.2)
ALP SERPL-CCNC: 142 U/L (ref 35–104)
ALT SERPL-CCNC: 59 U/L (ref 0–32)
ANION GAP SERPL CALCULATED.3IONS-SCNC: 12 MMOL/L (ref 7–16)
AST SERPL-CCNC: 56 U/L (ref 0–31)
BASOPHILS # BLD: 0.04 K/UL (ref 0–0.2)
BASOPHILS NFR BLD: 1 % (ref 0–2)
BILIRUB SERPL-MCNC: <0.2 MG/DL (ref 0–1.2)
BUN SERPL-MCNC: 11 MG/DL (ref 6–23)
CALCIUM SERPL-MCNC: 9.3 MG/DL (ref 8.6–10.2)
CHLORIDE SERPL-SCNC: 107 MMOL/L (ref 98–107)
CO2 SERPL-SCNC: 26 MMOL/L (ref 22–29)
CREAT SERPL-MCNC: 0.5 MG/DL (ref 0.5–1)
EOSINOPHIL # BLD: 0.35 K/UL (ref 0.05–0.5)
EOSINOPHILS RELATIVE PERCENT: 4 % (ref 0–6)
ERYTHROCYTE [DISTWIDTH] IN BLOOD BY AUTOMATED COUNT: 19.7 % (ref 11.5–15)
GFR, ESTIMATED: >90 ML/MIN/1.73M2
GLUCOSE SERPL-MCNC: 72 MG/DL (ref 74–99)
HCT VFR BLD AUTO: 38.3 % (ref 34–48)
HGB BLD-MCNC: 11.1 G/DL (ref 11.5–15.5)
IMM GRANULOCYTES # BLD AUTO: 0.05 K/UL (ref 0–0.58)
IMM GRANULOCYTES NFR BLD: 1 % (ref 0–5)
LYMPHOCYTES NFR BLD: 2.89 K/UL (ref 1.5–4)
LYMPHOCYTES RELATIVE PERCENT: 35 % (ref 20–42)
MCH RBC QN AUTO: 25.6 PG (ref 26–35)
MCHC RBC AUTO-ENTMCNC: 29 G/DL (ref 32–34.5)
MCV RBC AUTO: 88.2 FL (ref 80–99.9)
MONOCYTES NFR BLD: 0.62 K/UL (ref 0.1–0.95)
MONOCYTES NFR BLD: 8 % (ref 2–12)
NEUTROPHILS NFR BLD: 52 % (ref 43–80)
NEUTS SEG NFR BLD: 4.32 K/UL (ref 1.8–7.3)
PLATELET # BLD AUTO: 119 K/UL (ref 130–450)
PMV BLD AUTO: 10.3 FL (ref 7–12)
POTASSIUM SERPL-SCNC: 3.9 MMOL/L (ref 3.5–5)
PROT SERPL-MCNC: 5.8 G/DL (ref 6.4–8.3)
RBC # BLD AUTO: 4.34 M/UL (ref 3.5–5.5)
SODIUM SERPL-SCNC: 145 MMOL/L (ref 132–146)
WBC OTHER # BLD: 8.3 K/UL (ref 4.5–11.5)

## 2024-06-28 NOTE — TELEPHONE ENCOUNTER
Skilled nursing facility called the office regarding current Eliquis use.  Patient was actually not on Eliquis at baseline prior to her hip issues.  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 Recommend following up with internal medicine regarding treatment duration.  For her orthopedic condition still recommend at least aspirin 325 mg twice daily due to nonweightbearing status.

## 2024-06-29 LAB
MICROORGANISM SPEC CULT: NORMAL
MICROORGANISM/AGENT SPEC: NORMAL
SPECIMEN DESCRIPTION: NORMAL

## 2024-07-01 LAB
ALBUMIN SERPL-MCNC: 2.4 G/DL (ref 3.5–5.2)
ALP SERPL-CCNC: 150 U/L (ref 35–104)
ALT SERPL-CCNC: 70 U/L (ref 0–32)
ANION GAP SERPL CALCULATED.3IONS-SCNC: 7 MMOL/L (ref 7–16)
AST SERPL-CCNC: 80 U/L (ref 0–31)
BASOPHILS # BLD: 0.04 K/UL (ref 0–0.2)
BASOPHILS NFR BLD: 1 % (ref 0–2)
BILIRUB SERPL-MCNC: 0.2 MG/DL (ref 0–1.2)
BUN SERPL-MCNC: 10 MG/DL (ref 6–23)
CALCIUM SERPL-MCNC: 9.2 MG/DL (ref 8.6–10.2)
CHLORIDE SERPL-SCNC: 104 MMOL/L (ref 98–107)
CO2 SERPL-SCNC: 29 MMOL/L (ref 22–29)
CREAT SERPL-MCNC: 0.5 MG/DL (ref 0.5–1)
CRP SERPL HS-MCNC: 3 MG/L (ref 0–5)
EOSINOPHIL # BLD: 0.32 K/UL (ref 0.05–0.5)
EOSINOPHILS RELATIVE PERCENT: 4 % (ref 0–6)
ERYTHROCYTE [DISTWIDTH] IN BLOOD BY AUTOMATED COUNT: 18.9 % (ref 11.5–15)
ERYTHROCYTE [SEDIMENTATION RATE] IN BLOOD BY WESTERGREN METHOD: 24 MM/HR (ref 0–20)
GFR, ESTIMATED: >90 ML/MIN/1.73M2
GLUCOSE SERPL-MCNC: 69 MG/DL (ref 74–99)
HCT VFR BLD AUTO: 40 % (ref 34–48)
HGB BLD-MCNC: 11.6 G/DL (ref 11.5–15.5)
IMM GRANULOCYTES # BLD AUTO: <0.03 K/UL (ref 0–0.58)
IMM GRANULOCYTES NFR BLD: 0 % (ref 0–5)
LYMPHOCYTES NFR BLD: 2.86 K/UL (ref 1.5–4)
LYMPHOCYTES RELATIVE PERCENT: 37 % (ref 20–42)
MCH RBC QN AUTO: 25.6 PG (ref 26–35)
MCHC RBC AUTO-ENTMCNC: 29 G/DL (ref 32–34.5)
MCV RBC AUTO: 88.3 FL (ref 80–99.9)
MONOCYTES NFR BLD: 0.5 K/UL (ref 0.1–0.95)
MONOCYTES NFR BLD: 6 % (ref 2–12)
NEUTROPHILS NFR BLD: 52 % (ref 43–80)
NEUTS SEG NFR BLD: 4.06 K/UL (ref 1.8–7.3)
PLATELET # BLD AUTO: 138 K/UL (ref 130–450)
PMV BLD AUTO: 11 FL (ref 7–12)
POTASSIUM SERPL-SCNC: 4.6 MMOL/L (ref 3.5–5)
PROT SERPL-MCNC: 5.9 G/DL (ref 6.4–8.3)
RBC # BLD AUTO: 4.53 M/UL (ref 3.5–5.5)
SODIUM SERPL-SCNC: 140 MMOL/L (ref 132–146)
WBC OTHER # BLD: 7.8 K/UL (ref 4.5–11.5)

## 2024-07-03 LAB
ALBUMIN SERPL-MCNC: 2.4 G/DL (ref 3.5–5.2)
ALP SERPL-CCNC: 144 U/L (ref 35–104)
ALT SERPL-CCNC: 55 U/L (ref 0–32)
ANION GAP SERPL CALCULATED.3IONS-SCNC: 9 MMOL/L (ref 7–16)
AST SERPL-CCNC: 60 U/L (ref 0–31)
BILIRUB SERPL-MCNC: <0.2 MG/DL (ref 0–1.2)
BUN SERPL-MCNC: 11 MG/DL (ref 6–23)
CALCIUM SERPL-MCNC: 9.3 MG/DL (ref 8.6–10.2)
CHLORIDE SERPL-SCNC: 103 MMOL/L (ref 98–107)
CO2 SERPL-SCNC: 29 MMOL/L (ref 22–29)
CREAT SERPL-MCNC: 0.5 MG/DL (ref 0.5–1)
ERYTHROCYTE [DISTWIDTH] IN BLOOD BY AUTOMATED COUNT: 18.8 % (ref 11.5–15)
GFR, ESTIMATED: >90 ML/MIN/1.73M2
GLUCOSE SERPL-MCNC: 72 MG/DL (ref 74–99)
HBA1C MFR BLD: 4.9 % (ref 4–5.6)
HCT VFR BLD AUTO: 37.5 % (ref 34–48)
HGB BLD-MCNC: 10.9 G/DL (ref 11.5–15.5)
MCH RBC QN AUTO: 24.9 PG (ref 26–35)
MCHC RBC AUTO-ENTMCNC: 29.1 G/DL (ref 32–34.5)
MCV RBC AUTO: 85.8 FL (ref 80–99.9)
PLATELET # BLD AUTO: 123 K/UL (ref 130–450)
PMV BLD AUTO: 11.8 FL (ref 7–12)
POTASSIUM SERPL-SCNC: 3.9 MMOL/L (ref 3.5–5)
PROT SERPL-MCNC: 5.7 G/DL (ref 6.4–8.3)
RBC # BLD AUTO: 4.37 M/UL (ref 3.5–5.5)
SODIUM SERPL-SCNC: 141 MMOL/L (ref 132–146)
WBC OTHER # BLD: 8.5 K/UL (ref 4.5–11.5)

## 2024-07-08 LAB
ALBUMIN SERPL-MCNC: 2.5 G/DL (ref 3.5–5.2)
ALP SERPL-CCNC: 138 U/L (ref 35–104)
ALT SERPL-CCNC: 48 U/L (ref 0–32)
ANION GAP SERPL CALCULATED.3IONS-SCNC: 8 MMOL/L (ref 7–16)
AST SERPL-CCNC: 53 U/L (ref 0–31)
BASOPHILS # BLD: 0.03 K/UL (ref 0–0.2)
BASOPHILS NFR BLD: 0 % (ref 0–2)
BILIRUB SERPL-MCNC: <0.2 MG/DL (ref 0–1.2)
BUN SERPL-MCNC: 10 MG/DL (ref 6–23)
CALCIUM SERPL-MCNC: 9.1 MG/DL (ref 8.6–10.2)
CHLORIDE SERPL-SCNC: 102 MMOL/L (ref 98–107)
CO2 SERPL-SCNC: 30 MMOL/L (ref 22–29)
CREAT SERPL-MCNC: 0.5 MG/DL (ref 0.5–1)
CRP SERPL HS-MCNC: 5 MG/L (ref 0–5)
EOSINOPHIL # BLD: 0.32 K/UL (ref 0.05–0.5)
EOSINOPHILS RELATIVE PERCENT: 4 % (ref 0–6)
ERYTHROCYTE [DISTWIDTH] IN BLOOD BY AUTOMATED COUNT: 17.9 % (ref 11.5–15)
ERYTHROCYTE [SEDIMENTATION RATE] IN BLOOD BY WESTERGREN METHOD: 34 MM/HR (ref 0–20)
GFR, ESTIMATED: >90 ML/MIN/1.73M2
GLUCOSE SERPL-MCNC: 78 MG/DL (ref 74–99)
HCT VFR BLD AUTO: 37.9 % (ref 34–48)
HGB BLD-MCNC: 11.4 G/DL (ref 11.5–15.5)
IMM GRANULOCYTES # BLD AUTO: <0.03 K/UL (ref 0–0.58)
IMM GRANULOCYTES NFR BLD: 0 % (ref 0–5)
LYMPHOCYTES NFR BLD: 3.1 K/UL (ref 1.5–4)
LYMPHOCYTES RELATIVE PERCENT: 36 % (ref 20–42)
MCH RBC QN AUTO: 25.4 PG (ref 26–35)
MCHC RBC AUTO-ENTMCNC: 30.1 G/DL (ref 32–34.5)
MCV RBC AUTO: 84.6 FL (ref 80–99.9)
MONOCYTES NFR BLD: 0.73 K/UL (ref 0.1–0.95)
MONOCYTES NFR BLD: 9 % (ref 2–12)
NEUTROPHILS NFR BLD: 51 % (ref 43–80)
NEUTS SEG NFR BLD: 4.32 K/UL (ref 1.8–7.3)
PLATELET # BLD AUTO: 134 K/UL (ref 130–450)
PMV BLD AUTO: 11 FL (ref 7–12)
POTASSIUM SERPL-SCNC: 4.4 MMOL/L (ref 3.5–5)
PROT SERPL-MCNC: 5.9 G/DL (ref 6.4–8.3)
RBC # BLD AUTO: 4.48 M/UL (ref 3.5–5.5)
SODIUM SERPL-SCNC: 140 MMOL/L (ref 132–146)
WBC OTHER # BLD: 8.5 K/UL (ref 4.5–11.5)

## 2024-07-09 ENCOUNTER — OUTSIDE SERVICES (OUTPATIENT)
Dept: PRIMARY CARE CLINIC | Age: 79
End: 2024-07-09
Payer: MEDICARE

## 2024-07-09 DIAGNOSIS — Z79.4 CONTROLLED TYPE 2 DIABETES MELLITUS WITH DIABETIC NEUROPATHY, WITH LONG-TERM CURRENT USE OF INSULIN (HCC): ICD-10-CM

## 2024-07-09 DIAGNOSIS — T84.59XD INFECTION AND INFLAMMATORY REACTION DUE TO OTHER INTERNAL JOINT PROSTHESIS, SUBSEQUENT ENCOUNTER: ICD-10-CM

## 2024-07-09 DIAGNOSIS — E78.5 HYPERLIPIDEMIA, UNSPECIFIED HYPERLIPIDEMIA TYPE: ICD-10-CM

## 2024-07-09 DIAGNOSIS — I10 PRIMARY HYPERTENSION: ICD-10-CM

## 2024-07-09 DIAGNOSIS — E03.9 HYPOTHYROIDISM, UNSPECIFIED TYPE: ICD-10-CM

## 2024-07-09 DIAGNOSIS — E11.40 CONTROLLED TYPE 2 DIABETES MELLITUS WITH DIABETIC NEUROPATHY, WITH LONG-TERM CURRENT USE OF INSULIN (HCC): ICD-10-CM

## 2024-07-09 DIAGNOSIS — R53.1 GENERALIZED WEAKNESS: ICD-10-CM

## 2024-07-09 DIAGNOSIS — S73.004D HIP DISLOCATION, RIGHT, SUBSEQUENT ENCOUNTER: ICD-10-CM

## 2024-07-09 DIAGNOSIS — Z90.5 H/O UNILATERAL NEPHRECTOMY: ICD-10-CM

## 2024-07-09 DIAGNOSIS — Z86.73 HISTORY OF CEREBRAL INFARCTION: ICD-10-CM

## 2024-07-09 DIAGNOSIS — J44.9 CHRONIC OBSTRUCTIVE PULMONARY DISEASE, UNSPECIFIED COPD TYPE (HCC): ICD-10-CM

## 2024-07-09 DIAGNOSIS — S72.001D CLOSED FRACTURE OF NECK OF RIGHT FEMUR WITH ROUTINE HEALING, SUBSEQUENT ENCOUNTER: Primary | ICD-10-CM

## 2024-07-09 PROCEDURE — 99309 SBSQ NF CARE MODERATE MDM 30: CPT

## 2024-07-11 LAB
ALBUMIN SERPL-MCNC: 2.5 G/DL (ref 3.5–5.2)
ALP SERPL-CCNC: 145 U/L (ref 35–104)
ALT SERPL-CCNC: 64 U/L (ref 0–32)
ANION GAP SERPL CALCULATED.3IONS-SCNC: 10 MMOL/L (ref 7–16)
AST SERPL-CCNC: 82 U/L (ref 0–31)
BASOPHILS # BLD: 0.03 K/UL (ref 0–0.2)
BASOPHILS NFR BLD: 0 % (ref 0–2)
BILIRUB SERPL-MCNC: <0.2 MG/DL (ref 0–1.2)
BUN SERPL-MCNC: 10 MG/DL (ref 6–23)
CALCIUM SERPL-MCNC: 9.3 MG/DL (ref 8.6–10.2)
CHLORIDE SERPL-SCNC: 101 MMOL/L (ref 98–107)
CO2 SERPL-SCNC: 29 MMOL/L (ref 22–29)
CREAT SERPL-MCNC: 0.5 MG/DL (ref 0.5–1)
EOSINOPHIL # BLD: 0.37 K/UL (ref 0.05–0.5)
EOSINOPHILS RELATIVE PERCENT: 5 % (ref 0–6)
ERYTHROCYTE [DISTWIDTH] IN BLOOD BY AUTOMATED COUNT: 17.4 % (ref 11.5–15)
GFR, ESTIMATED: >90 ML/MIN/1.73M2
GLUCOSE SERPL-MCNC: 54 MG/DL (ref 74–99)
HCT VFR BLD AUTO: 37.8 % (ref 34–48)
HGB BLD-MCNC: 10.9 G/DL (ref 11.5–15.5)
IMM GRANULOCYTES # BLD AUTO: <0.03 K/UL (ref 0–0.58)
IMM GRANULOCYTES NFR BLD: 0 % (ref 0–5)
LYMPHOCYTES NFR BLD: 3.1 K/UL (ref 1.5–4)
LYMPHOCYTES RELATIVE PERCENT: 41 % (ref 20–42)
MCH RBC QN AUTO: 24.8 PG (ref 26–35)
MCHC RBC AUTO-ENTMCNC: 28.8 G/DL (ref 32–34.5)
MCV RBC AUTO: 86.1 FL (ref 80–99.9)
MONOCYTES NFR BLD: 0.64 K/UL (ref 0.1–0.95)
MONOCYTES NFR BLD: 8 % (ref 2–12)
NEUTROPHILS NFR BLD: 46 % (ref 43–80)
NEUTS SEG NFR BLD: 3.48 K/UL (ref 1.8–7.3)
PLATELET # BLD AUTO: 136 K/UL (ref 130–450)
PMV BLD AUTO: 11 FL (ref 7–12)
POTASSIUM SERPL-SCNC: 4.9 MMOL/L (ref 3.5–5)
PROT SERPL-MCNC: 5.8 G/DL (ref 6.4–8.3)
RBC # BLD AUTO: 4.39 M/UL (ref 3.5–5.5)
RBC # BLD: ABNORMAL 10*6/UL
SODIUM SERPL-SCNC: 140 MMOL/L (ref 132–146)
WBC OTHER # BLD: 7.6 K/UL (ref 4.5–11.5)

## 2024-07-15 LAB
ALBUMIN SERPL-MCNC: 2.6 G/DL (ref 3.5–5.2)
ALP SERPL-CCNC: 144 U/L (ref 35–104)
ALT SERPL-CCNC: 74 U/L (ref 0–32)
ANION GAP SERPL CALCULATED.3IONS-SCNC: 9 MMOL/L (ref 7–16)
AST SERPL-CCNC: 119 U/L (ref 0–31)
BASOPHILS # BLD: 0.03 K/UL (ref 0–0.2)
BASOPHILS NFR BLD: 0 % (ref 0–2)
BILIRUB SERPL-MCNC: <0.2 MG/DL (ref 0–1.2)
BUN SERPL-MCNC: 13 MG/DL (ref 6–23)
CALCIUM SERPL-MCNC: 9.2 MG/DL (ref 8.6–10.2)
CHLORIDE SERPL-SCNC: 104 MMOL/L (ref 98–107)
CO2 SERPL-SCNC: 30 MMOL/L (ref 22–29)
CREAT SERPL-MCNC: 0.5 MG/DL (ref 0.5–1)
CRP SERPL HS-MCNC: <3 MG/L (ref 0–5)
EOSINOPHIL # BLD: 0.38 K/UL (ref 0.05–0.5)
EOSINOPHILS RELATIVE PERCENT: 5 % (ref 0–6)
ERYTHROCYTE [DISTWIDTH] IN BLOOD BY AUTOMATED COUNT: 17.2 % (ref 11.5–15)
ERYTHROCYTE [SEDIMENTATION RATE] IN BLOOD BY WESTERGREN METHOD: 24 MM/HR (ref 0–20)
GFR, ESTIMATED: >90 ML/MIN/1.73M2
GLUCOSE SERPL-MCNC: 84 MG/DL (ref 74–99)
HCT VFR BLD AUTO: 39.1 % (ref 34–48)
HGB BLD-MCNC: 11.2 G/DL (ref 11.5–15.5)
IMM GRANULOCYTES # BLD AUTO: 0.03 K/UL (ref 0–0.58)
IMM GRANULOCYTES NFR BLD: 0 % (ref 0–5)
LYMPHOCYTES NFR BLD: 3.09 K/UL (ref 1.5–4)
LYMPHOCYTES RELATIVE PERCENT: 43 % (ref 20–42)
MCH RBC QN AUTO: 24.5 PG (ref 26–35)
MCHC RBC AUTO-ENTMCNC: 28.6 G/DL (ref 32–34.5)
MCV RBC AUTO: 85.6 FL (ref 80–99.9)
MONOCYTES NFR BLD: 0.5 K/UL (ref 0.1–0.95)
MONOCYTES NFR BLD: 7 % (ref 2–12)
NEUTROPHILS NFR BLD: 45 % (ref 43–80)
NEUTS SEG NFR BLD: 3.24 K/UL (ref 1.8–7.3)
PLATELET # BLD AUTO: 160 K/UL (ref 130–450)
PMV BLD AUTO: 11.7 FL (ref 7–12)
POTASSIUM SERPL-SCNC: 4.3 MMOL/L (ref 3.5–5)
PROT SERPL-MCNC: 5.9 G/DL (ref 6.4–8.3)
RBC # BLD AUTO: 4.57 M/UL (ref 3.5–5.5)
RBC # BLD: NORMAL 10*6/UL
SODIUM SERPL-SCNC: 143 MMOL/L (ref 132–146)
WBC OTHER # BLD: 7.3 K/UL (ref 4.5–11.5)

## 2024-07-18 LAB
ALBUMIN SERPL-MCNC: 2.6 G/DL (ref 3.5–5.2)
ALP SERPL-CCNC: 138 U/L (ref 35–104)
ALT SERPL-CCNC: 76 U/L (ref 0–32)
ANION GAP SERPL CALCULATED.3IONS-SCNC: 13 MMOL/L (ref 7–16)
AST SERPL-CCNC: 72 U/L (ref 0–31)
BASOPHILS # BLD: 0.03 K/UL (ref 0–0.2)
BASOPHILS NFR BLD: 0 % (ref 0–2)
BILIRUB SERPL-MCNC: <0.2 MG/DL (ref 0–1.2)
BUN SERPL-MCNC: 12 MG/DL (ref 6–23)
CALCIUM SERPL-MCNC: 8.9 MG/DL (ref 8.6–10.2)
CHLORIDE SERPL-SCNC: 104 MMOL/L (ref 98–107)
CO2 SERPL-SCNC: 25 MMOL/L (ref 22–29)
CREAT SERPL-MCNC: 0.5 MG/DL (ref 0.5–1)
EOSINOPHIL # BLD: 0.36 K/UL (ref 0.05–0.5)
EOSINOPHILS RELATIVE PERCENT: 4 % (ref 0–6)
ERYTHROCYTE [DISTWIDTH] IN BLOOD BY AUTOMATED COUNT: 17.5 % (ref 11.5–15)
GFR, ESTIMATED: >90 ML/MIN/1.73M2
GLUCOSE SERPL-MCNC: 150 MG/DL (ref 74–99)
HCT VFR BLD AUTO: 37.9 % (ref 34–48)
HGB BLD-MCNC: 10.9 G/DL (ref 11.5–15.5)
IMM GRANULOCYTES # BLD AUTO: 0.03 K/UL (ref 0–0.58)
IMM GRANULOCYTES NFR BLD: 0 % (ref 0–5)
LYMPHOCYTES NFR BLD: 2.69 K/UL (ref 1.5–4)
LYMPHOCYTES RELATIVE PERCENT: 32 % (ref 20–42)
MCH RBC QN AUTO: 24.3 PG (ref 26–35)
MCHC RBC AUTO-ENTMCNC: 28.8 G/DL (ref 32–34.5)
MCV RBC AUTO: 84.4 FL (ref 80–99.9)
MONOCYTES NFR BLD: 0.6 K/UL (ref 0.1–0.95)
MONOCYTES NFR BLD: 7 % (ref 2–12)
NEUTROPHILS NFR BLD: 56 % (ref 43–80)
NEUTS SEG NFR BLD: 4.74 K/UL (ref 1.8–7.3)
PLATELET # BLD AUTO: 137 K/UL (ref 130–450)
PMV BLD AUTO: 11.4 FL (ref 7–12)
POTASSIUM SERPL-SCNC: 4.1 MMOL/L (ref 3.5–5)
PROT SERPL-MCNC: 6 G/DL (ref 6.4–8.3)
RBC # BLD AUTO: 4.49 M/UL (ref 3.5–5.5)
RBC # BLD: ABNORMAL 10*6/UL
SODIUM SERPL-SCNC: 142 MMOL/L (ref 132–146)
WBC OTHER # BLD: 8.5 K/UL (ref 4.5–11.5)

## 2024-07-22 LAB
ALBUMIN SERPL-MCNC: 2.6 G/DL (ref 3.5–5.2)
ALP SERPL-CCNC: 126 U/L (ref 35–104)
ALT SERPL-CCNC: 62 U/L (ref 0–32)
ANION GAP SERPL CALCULATED.3IONS-SCNC: 8 MMOL/L (ref 7–16)
AST SERPL-CCNC: 55 U/L (ref 0–31)
BASOPHILS # BLD: 0 K/UL (ref 0–0.2)
BASOPHILS NFR BLD: 0 % (ref 0–2)
BILIRUB SERPL-MCNC: 0.2 MG/DL (ref 0–1.2)
BUN SERPL-MCNC: 15 MG/DL (ref 6–23)
CALCIUM SERPL-MCNC: 9.3 MG/DL (ref 8.6–10.2)
CHLORIDE SERPL-SCNC: 106 MMOL/L (ref 98–107)
CO2 SERPL-SCNC: 28 MMOL/L (ref 22–29)
CREAT SERPL-MCNC: 0.5 MG/DL (ref 0.5–1)
CRP SERPL HS-MCNC: <3 MG/L (ref 0–5)
EOSINOPHIL # BLD: 0.21 K/UL (ref 0.05–0.5)
EOSINOPHILS RELATIVE PERCENT: 3 % (ref 0–6)
ERYTHROCYTE [DISTWIDTH] IN BLOOD BY AUTOMATED COUNT: 17.6 % (ref 11.5–15)
ERYTHROCYTE [SEDIMENTATION RATE] IN BLOOD BY WESTERGREN METHOD: 24 MM/HR (ref 0–20)
GFR, ESTIMATED: >90 ML/MIN/1.73M2
GLUCOSE SERPL-MCNC: 77 MG/DL (ref 74–99)
HCT VFR BLD AUTO: 38.1 % (ref 34–48)
HGB BLD-MCNC: 11 G/DL (ref 11.5–15.5)
LYMPHOCYTES NFR BLD: 2.5 K/UL (ref 1.5–4)
LYMPHOCYTES RELATIVE PERCENT: 31 % (ref 20–42)
MCH RBC QN AUTO: 24.9 PG (ref 26–35)
MCHC RBC AUTO-ENTMCNC: 28.9 G/DL (ref 32–34.5)
MCV RBC AUTO: 86.2 FL (ref 80–99.9)
MONOCYTES NFR BLD: 0.49 K/UL (ref 0.1–0.95)
MONOCYTES NFR BLD: 6 % (ref 2–12)
NEUTROPHILS NFR BLD: 60 % (ref 43–80)
NEUTS SEG NFR BLD: 4.8 K/UL (ref 1.8–7.3)
PLATELET # BLD AUTO: 164 K/UL (ref 130–450)
PMV BLD AUTO: 11.6 FL (ref 7–12)
POTASSIUM SERPL-SCNC: 4.4 MMOL/L (ref 3.5–5)
PROT SERPL-MCNC: 5.9 G/DL (ref 6.4–8.3)
RBC # BLD AUTO: 4.42 M/UL (ref 3.5–5.5)
RBC # BLD: ABNORMAL 10*6/UL
SODIUM SERPL-SCNC: 142 MMOL/L (ref 132–146)
WBC OTHER # BLD: 8 K/UL (ref 4.5–11.5)

## 2024-07-23 ASSESSMENT — ENCOUNTER SYMPTOMS
SORE THROAT: 0
BACK PAIN: 0
ABDOMINAL PAIN: 0
VOMITING: 0
DIARRHEA: 0
PHOTOPHOBIA: 0
SHORTNESS OF BREATH: 0
WHEEZING: 0
CONSTIPATION: 0
COUGH: 0
RHINORRHEA: 0

## 2024-07-23 NOTE — PROGRESS NOTES
Rebeka Renee (:  1945) is a 79 y.o. female.    Subjective     Reebka is lying upright in bed. Seen today for routine monthly examination. She continues to follow with ortho. Hx of R hip hemiarthroplasty w multiple dislocating events. During reduction she had a periprosthetic fracture. Hip was left in dislocated position while she continues on Invanz x 1 month. Was also found to have low blood sugar during labs. She is currently on ozempic for weight loss. Also had a drop in hemoglobin on CBC. Nonweight bearing at this time     Location: [facility] Room 325  All nursing and progress notes reviewed.    Past Medical History:   Diagnosis Date    Anemia     S/P chemotherapy.    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    Ascending aortic aneurysm (HCC)     seen on CTA chest w/contrast on     Asthma     Bipolar 1 disorder (HCC)     Cancer (HCC)     lung/ kidney    Cancer of breast, female (HCC) 2006    S/P bilaeral mastectomy with left breast lymph node resection and chemotherapy.    CHF (congestive heart failure) (HCC)     Chronic back pain     Depression     Depression with anxiety     Diabetes mellitus (HCC)     Resolved after losing 130 lbs. after gastric bypass surgery.    Dyslipidemia     Fibromyalgia     History of blood transfusion     anemia    Hypertension     Hypothyroidism     Neuropathy     Pericardial effusion 2010    Subxiphoid pericardial window with drainage of pericardial effusion and pericardial biopsy:  Fluid and biopsy negative for metastases.     Pleural effusion 2010    Noted on chest x-ray.    TIA (transient ischemic attack)     Tobacco abuse     Patient smoked 3 ppd x 26 years.  Quit in .    Type II or unspecified type diabetes mellitus without mention of complication, not stated as uncontrolled        Allergies   Allergen Reactions    Benadryl

## 2024-07-24 ENCOUNTER — HOSPITAL ENCOUNTER (OUTPATIENT)
Dept: GENERAL RADIOLOGY | Age: 79
Discharge: HOME | End: 2024-07-26
Payer: MEDICARE

## 2024-07-24 ENCOUNTER — OFFICE VISIT (OUTPATIENT)
Dept: ORTHOPEDIC SURGERY | Age: 79
End: 2024-07-24
Payer: MEDICARE

## 2024-07-24 DIAGNOSIS — Z98.890 STATUS POST OPEN REDUCTION AND INTERNAL FIXATION (ORIF) OF FRACTURE: ICD-10-CM

## 2024-07-24 DIAGNOSIS — Z87.81 STATUS POST OPEN REDUCTION AND INTERNAL FIXATION (ORIF) OF FRACTURE: ICD-10-CM

## 2024-07-24 DIAGNOSIS — Z96.641 HISTORY OF HEMIARTHROPLASTY OF RIGHT HIP: ICD-10-CM

## 2024-07-24 DIAGNOSIS — M24.451 RECURRENT DISLOCATION OF RIGHT HIP: Primary | ICD-10-CM

## 2024-07-24 DIAGNOSIS — Z98.890 HISTORY OF REMOVAL OF JOINT PROSTHESIS OF RIGHT HIP DUE TO INFECTION: ICD-10-CM

## 2024-07-24 DIAGNOSIS — Z86.19 HISTORY OF REMOVAL OF JOINT PROSTHESIS OF RIGHT HIP DUE TO INFECTION: ICD-10-CM

## 2024-07-24 DIAGNOSIS — S72.001A CLOSED DISPLACED FRACTURE OF RIGHT FEMORAL NECK (HCC): ICD-10-CM

## 2024-07-24 PROCEDURE — 99213 OFFICE O/P EST LOW 20 MIN: CPT

## 2024-07-24 PROCEDURE — 73552 X-RAY EXAM OF FEMUR 2/>: CPT

## 2024-07-24 PROCEDURE — 73502 X-RAY EXAM HIP UNI 2-3 VIEWS: CPT

## 2024-07-24 PROCEDURE — 99024 POSTOP FOLLOW-UP VISIT: CPT | Performed by: PHYSICIAN ASSISTANT

## 2024-07-24 RX ORDER — MICONAZOLE NITRATE 2 %
CREAM WITH APPLICATOR VAGINAL
COMMUNITY
Start: 2024-07-19

## 2024-07-24 RX ORDER — OXYCODONE HYDROCHLORIDE AND ACETAMINOPHEN 5; 325 MG/1; MG/1
1 TABLET ORAL EVERY 6 HOURS PRN
COMMUNITY
Start: 2024-07-10

## 2024-07-24 RX ORDER — ERTAPENEM 1 G/1
1000 INJECTION, POWDER, LYOPHILIZED, FOR SOLUTION INTRAMUSCULAR; INTRAVENOUS EVERY 24 HOURS
COMMUNITY
Start: 2024-07-22

## 2024-07-24 NOTE — PROGRESS NOTES
Chief Complaint   Patient presents with    Post-Op Check     4wk PO R femur ORIF and open treatment of R hip dislocation. Pt ambulating in wheelchair from facility. Pt states 8/10 pain.      OP:SURGEON: Dr. Nemesio Handy MD  DATE OF PROCEDURE: 1/4/24  PROCEDURE: RIGHT HIP HEMIARTHROPLASTY for femoral neck fracture       OP:SURGEON: Dr. Wood George DO  DATE OF PROCEDURE: 2/12/24  PROCEDURE: Left hip prosthetic dislocation open reduction  Left femur greater trochanter fracture open reduction with internal fixation with suture fixation  Left hip hematoma excisional irrigation and debridement        OP:SURGEON: Dr. Nemesio Handy MD  DATE OF PROCEDURE: 4/11/24  PROCEDURE: Open reduction of right hip hemiarthroplasty after dislocation with revision of femoral component due to dislocation and infection        OP:SURGEON: Dr. Nemesio Handy MD  DATE OF PROCEDURE: 4/15/24  PROCEDURE:Complex explantation of right hip arthroplasty with implantation of right hip methylmethacrylate spacer impregnated with antibiotics     OP:SURGEON: Dr. Nemesio Handy MD  DATE OF PROCEDURE: 5/16/2024  PROCEDURE:Open treatment right proximal femur fracture around antibiotic spacer and harvesting of cultures right periprosthetic joint infection     Subjective:  Rebeka Renee is following up from extensive history of chronic right hip dislocation and infection.  She has 3 days left of PICC line antibiotics.  Recommended due to multiple repeat hip dislocations to leave her right hip dislocated at this time.  Also recommended to finish antibiotic therapy and plan for right hip revision with Dr. Handy.  She has been nonweightbearing to the right lower extremity.  Denies calf pain, CP, SOB, fever, chills, myalgias.  Still having a skilled nursing facility.    Review of Systems -  all pertinent positives and negatives in HPI.      Objective:    General: Alert and oriented X 3, normocephalic atraumatic, external ears and eye normal,

## 2024-07-24 NOTE — PATIENT INSTRUCTIONS
INSTRUCTIONS FOR FACILITY      Weight Bearing: Non-weight bearing right lower extremity.  Weightbearing as tolerated to the left lower extremity.  Okay to attempt to assist transfers while weightbearing to left lower extremity and nonweightbearing to the right lower extremity using assistive devices if needed.  If unable to do this or pain increases to an uncontrollable level, okay to resume Urvashi use.      Pain control: Per facility physician. Frequent ice, elevation.    Antibiotics: Finish remaining antibiotic therapy per infectious disease recommendations.  Recommend 2-week holiday between antibiotics and right hip aspiration.  Right hip aspiration ordered today to be done by interventional radiology at least 2 weeks from now.    Continue Eliquis.    Consider vitamin D lab and supplemental vitamin D if needed.    Follow up in 2-3 weeks with Dr. Handy to review labs from aspiration and plan for revision right hip    Call with any questions or concerns.   [phone]

## 2024-07-25 LAB
ALBUMIN SERPL-MCNC: 2.6 G/DL (ref 3.5–5.2)
ALP SERPL-CCNC: 124 U/L (ref 35–104)
ALT SERPL-CCNC: 51 U/L (ref 0–32)
ANION GAP SERPL CALCULATED.3IONS-SCNC: 11 MMOL/L (ref 7–16)
AST SERPL-CCNC: 47 U/L (ref 0–31)
BASOPHILS # BLD: 0.03 K/UL (ref 0–0.2)
BASOPHILS NFR BLD: 0 % (ref 0–2)
BILIRUB SERPL-MCNC: 0.2 MG/DL (ref 0–1.2)
BUN SERPL-MCNC: 12 MG/DL (ref 6–23)
CALCIUM SERPL-MCNC: 9.1 MG/DL (ref 8.6–10.2)
CHLORIDE SERPL-SCNC: 106 MMOL/L (ref 98–107)
CO2 SERPL-SCNC: 25 MMOL/L (ref 22–29)
CREAT SERPL-MCNC: 0.5 MG/DL (ref 0.5–1)
EOSINOPHIL # BLD: 0.32 K/UL (ref 0.05–0.5)
EOSINOPHILS RELATIVE PERCENT: 3 % (ref 0–6)
ERYTHROCYTE [DISTWIDTH] IN BLOOD BY AUTOMATED COUNT: 17.6 % (ref 11.5–15)
GFR, ESTIMATED: >90 ML/MIN/1.73M2
GLUCOSE SERPL-MCNC: 75 MG/DL (ref 74–99)
HCT VFR BLD AUTO: 37 % (ref 34–48)
HGB BLD-MCNC: 11 G/DL (ref 11.5–15.5)
IMM GRANULOCYTES # BLD AUTO: 0.14 K/UL (ref 0–0.58)
IMM GRANULOCYTES NFR BLD: 1 % (ref 0–5)
LYMPHOCYTES NFR BLD: 3.19 K/UL (ref 1.5–4)
LYMPHOCYTES RELATIVE PERCENT: 31 % (ref 20–42)
MCH RBC QN AUTO: 25.8 PG (ref 26–35)
MCHC RBC AUTO-ENTMCNC: 29.7 G/DL (ref 32–34.5)
MCV RBC AUTO: 86.7 FL (ref 80–99.9)
MONOCYTES NFR BLD: 0.62 K/UL (ref 0.1–0.95)
MONOCYTES NFR BLD: 6 % (ref 2–12)
NEUTROPHILS NFR BLD: 59 % (ref 43–80)
NEUTS SEG NFR BLD: 6.14 K/UL (ref 1.8–7.3)
PLATELET # BLD AUTO: 120 K/UL (ref 130–450)
PMV BLD AUTO: 11.4 FL (ref 7–12)
POTASSIUM SERPL-SCNC: 4.1 MMOL/L (ref 3.5–5)
PROT SERPL-MCNC: 5.7 G/DL (ref 6.4–8.3)
RBC # BLD AUTO: 4.27 M/UL (ref 3.5–5.5)
SODIUM SERPL-SCNC: 142 MMOL/L (ref 132–146)
WBC OTHER # BLD: 10.4 K/UL (ref 4.5–11.5)

## 2024-07-29 ENCOUNTER — OUTSIDE SERVICES (OUTPATIENT)
Dept: PRIMARY CARE CLINIC | Age: 79
End: 2024-07-29

## 2024-07-29 DIAGNOSIS — K21.9 GASTROESOPHAGEAL REFLUX DISEASE WITHOUT ESOPHAGITIS: ICD-10-CM

## 2024-07-29 DIAGNOSIS — Z79.4 CONTROLLED TYPE 2 DIABETES MELLITUS WITH DIABETIC NEUROPATHY, WITH LONG-TERM CURRENT USE OF INSULIN (HCC): ICD-10-CM

## 2024-07-29 DIAGNOSIS — S73.004D HIP DISLOCATION, RIGHT, SUBSEQUENT ENCOUNTER: ICD-10-CM

## 2024-07-29 DIAGNOSIS — Z86.73 HISTORY OF CEREBRAL INFARCTION: ICD-10-CM

## 2024-07-29 DIAGNOSIS — T84.59XD INFECTION AND INFLAMMATORY REACTION DUE TO OTHER INTERNAL JOINT PROSTHESIS, SUBSEQUENT ENCOUNTER: ICD-10-CM

## 2024-07-29 DIAGNOSIS — M79.605 PAIN AND SWELLING OF LEFT LOWER EXTREMITY: Primary | ICD-10-CM

## 2024-07-29 DIAGNOSIS — R26.2 AMBULATORY DYSFUNCTION: ICD-10-CM

## 2024-07-29 DIAGNOSIS — J44.9 CHRONIC OBSTRUCTIVE PULMONARY DISEASE, UNSPECIFIED COPD TYPE (HCC): ICD-10-CM

## 2024-07-29 DIAGNOSIS — I10 PRIMARY HYPERTENSION: ICD-10-CM

## 2024-07-29 DIAGNOSIS — E78.5 HYPERLIPIDEMIA, UNSPECIFIED HYPERLIPIDEMIA TYPE: ICD-10-CM

## 2024-07-29 DIAGNOSIS — R53.1 GENERALIZED WEAKNESS: ICD-10-CM

## 2024-07-29 DIAGNOSIS — M79.89 PAIN AND SWELLING OF LEFT LOWER EXTREMITY: Primary | ICD-10-CM

## 2024-07-29 DIAGNOSIS — E11.40 CONTROLLED TYPE 2 DIABETES MELLITUS WITH DIABETIC NEUROPATHY, WITH LONG-TERM CURRENT USE OF INSULIN (HCC): ICD-10-CM

## 2024-07-29 DIAGNOSIS — S72.001D CLOSED FRACTURE OF NECK OF RIGHT FEMUR WITH ROUTINE HEALING, SUBSEQUENT ENCOUNTER: ICD-10-CM

## 2024-07-29 DIAGNOSIS — F32.A DEPRESSION, UNSPECIFIED DEPRESSION TYPE: ICD-10-CM

## 2024-07-29 DIAGNOSIS — Z90.5 H/O UNILATERAL NEPHRECTOMY: ICD-10-CM

## 2024-07-29 DIAGNOSIS — E03.9 HYPOTHYROIDISM, UNSPECIFIED TYPE: ICD-10-CM

## 2024-07-29 DIAGNOSIS — D64.9 ANEMIA, UNSPECIFIED TYPE: ICD-10-CM

## 2024-07-29 LAB
ALBUMIN SERPL-MCNC: 2.9 G/DL (ref 3.5–5.2)
ALP SERPL-CCNC: 140 U/L (ref 35–104)
ALT SERPL-CCNC: 59 U/L (ref 0–32)
ANION GAP SERPL CALCULATED.3IONS-SCNC: 8 MMOL/L (ref 7–16)
AST SERPL-CCNC: 48 U/L (ref 0–31)
BASOPHILS # BLD: 0.04 K/UL (ref 0–0.2)
BASOPHILS NFR BLD: 1 % (ref 0–2)
BILIRUB SERPL-MCNC: <0.2 MG/DL (ref 0–1.2)
BUN SERPL-MCNC: 10 MG/DL (ref 6–23)
CALCIUM SERPL-MCNC: 9.2 MG/DL (ref 8.6–10.2)
CHLORIDE SERPL-SCNC: 106 MMOL/L (ref 98–107)
CO2 SERPL-SCNC: 28 MMOL/L (ref 22–29)
CREAT SERPL-MCNC: 0.5 MG/DL (ref 0.5–1)
CRP SERPL HS-MCNC: <3 MG/L (ref 0–5)
EOSINOPHIL # BLD: 0.31 K/UL (ref 0.05–0.5)
EOSINOPHILS RELATIVE PERCENT: 4 % (ref 0–6)
ERYTHROCYTE [DISTWIDTH] IN BLOOD BY AUTOMATED COUNT: 17.3 % (ref 11.5–15)
ERYTHROCYTE [SEDIMENTATION RATE] IN BLOOD BY WESTERGREN METHOD: 23 MM/HR (ref 0–20)
GFR, ESTIMATED: >90 ML/MIN/1.73M2
GLUCOSE SERPL-MCNC: 90 MG/DL (ref 74–99)
HCT VFR BLD AUTO: 39.5 % (ref 34–48)
HGB BLD-MCNC: 11.8 G/DL (ref 11.5–15.5)
IMM GRANULOCYTES # BLD AUTO: <0.03 K/UL (ref 0–0.58)
IMM GRANULOCYTES NFR BLD: 0 % (ref 0–5)
LYMPHOCYTES NFR BLD: 2.97 K/UL (ref 1.5–4)
LYMPHOCYTES RELATIVE PERCENT: 42 % (ref 20–42)
MCH RBC QN AUTO: 25.7 PG (ref 26–35)
MCHC RBC AUTO-ENTMCNC: 29.9 G/DL (ref 32–34.5)
MCV RBC AUTO: 86.1 FL (ref 80–99.9)
MONOCYTES NFR BLD: 0.56 K/UL (ref 0.1–0.95)
MONOCYTES NFR BLD: 8 % (ref 2–12)
NEUTROPHILS NFR BLD: 45 % (ref 43–80)
NEUTS SEG NFR BLD: 3.23 K/UL (ref 1.8–7.3)
PLATELET # BLD AUTO: 208 K/UL (ref 130–450)
PMV BLD AUTO: 11 FL (ref 7–12)
POTASSIUM SERPL-SCNC: 5 MMOL/L (ref 3.5–5)
PROT SERPL-MCNC: 6.1 G/DL (ref 6.4–8.3)
RBC # BLD AUTO: 4.59 M/UL (ref 3.5–5.5)
SODIUM SERPL-SCNC: 142 MMOL/L (ref 132–146)
WBC OTHER # BLD: 7.1 K/UL (ref 4.5–11.5)

## 2024-07-29 NOTE — PROGRESS NOTES
repeat aspiration depending on results of aspiration will determine when to have her surgery  Chart, labs, and medications reviewed  See orders  Continue PT/OT     I have spent more than 30 minutes in face-to-face evaluation of the patient, reviewing prior records, addressing orders, and completing the documentation.            An electronic signature was used to authenticate this note.    --Taty Green MD

## 2024-07-30 VITALS
OXYGEN SATURATION: 97 % | RESPIRATION RATE: 18 BRPM | TEMPERATURE: 98.2 F | SYSTOLIC BLOOD PRESSURE: 148 MMHG | WEIGHT: 161 LBS | BODY MASS INDEX: 26.79 KG/M2 | HEART RATE: 90 BPM | DIASTOLIC BLOOD PRESSURE: 80 MMHG

## 2024-08-02 ENCOUNTER — HOSPITAL ENCOUNTER (OUTPATIENT)
Dept: CT IMAGING | Age: 79
End: 2024-08-02
Payer: MEDICARE

## 2024-08-02 ENCOUNTER — HOSPITAL ENCOUNTER (OUTPATIENT)
Dept: INTERVENTIONAL RADIOLOGY/VASCULAR | Age: 79
Discharge: HOME | End: 2024-08-02
Payer: MEDICARE

## 2024-08-02 VITALS
HEART RATE: 101 BPM | DIASTOLIC BLOOD PRESSURE: 92 MMHG | TEMPERATURE: 97.7 F | SYSTOLIC BLOOD PRESSURE: 163 MMHG | RESPIRATION RATE: 22 BRPM | OXYGEN SATURATION: 96 %

## 2024-08-02 DIAGNOSIS — Z98.890 HISTORY OF REMOVAL OF JOINT PROSTHESIS OF RIGHT HIP DUE TO INFECTION: ICD-10-CM

## 2024-08-02 DIAGNOSIS — Z86.19 HISTORY OF REMOVAL OF JOINT PROSTHESIS OF RIGHT HIP DUE TO INFECTION: ICD-10-CM

## 2024-08-02 DIAGNOSIS — M24.451 RECURRENT DISLOCATION OF RIGHT HIP: ICD-10-CM

## 2024-08-02 LAB
APPEARANCE: ABNORMAL
BODY FLD TYPE: ABNORMAL
CLOT CHECK: ABNORMAL
COLOR FLUID: ABNORMAL
MONO/MACROPHAGE FLUID: 11 %
NEUTROPHIL, FLUID: 89 %
RBC, SYNOVIAL FLUID: ABNORMAL CELLS/UL
WBC COUNT, SYNOVIAL FLUID: 6207 CELLS/UL

## 2024-08-02 PROCEDURE — 20610 DRAIN/INJ JOINT/BURSA W/O US: CPT

## 2024-08-02 PROCEDURE — 2709999900 CT HIP ASPIRATION RIGHT

## 2024-08-02 PROCEDURE — 87205 SMEAR GRAM STAIN: CPT

## 2024-08-02 PROCEDURE — 2500000003 HC RX 250 WO HCPCS: Performed by: RADIOLOGY

## 2024-08-02 PROCEDURE — 89060 EXAM SYNOVIAL FLUID CRYSTALS: CPT

## 2024-08-02 PROCEDURE — 89051 BODY FLUID CELL COUNT: CPT

## 2024-08-02 PROCEDURE — 87070 CULTURE OTHR SPECIMN AEROBIC: CPT

## 2024-08-02 RX ORDER — LIDOCAINE HYDROCHLORIDE AND EPINEPHRINE BITARTRATE 20; .01 MG/ML; MG/ML
INJECTION, SOLUTION SUBCUTANEOUS PRN
Status: COMPLETED | OUTPATIENT
Start: 2024-08-02 | End: 2024-08-02

## 2024-08-02 RX ORDER — LIDOCAINE HYDROCHLORIDE 20 MG/ML
INJECTION, SOLUTION INFILTRATION; PERINEURAL PRN
Status: COMPLETED | OUTPATIENT
Start: 2024-08-02 | End: 2024-08-02

## 2024-08-02 RX ADMIN — LIDOCAINE HYDROCHLORIDE,EPINEPHRINE BITARTRATE 5 ML: 20; .01 INJECTION, SOLUTION INFILTRATION; PERINEURAL at 12:03

## 2024-08-02 RX ADMIN — LIDOCAINE HYDROCHLORIDE 5 ML: 20 INJECTION, SOLUTION INFILTRATION; PERINEURAL at 12:03

## 2024-08-02 RX ADMIN — LIDOCAINE HYDROCHLORIDE,EPINEPHRINE BITARTRATE 5 ML: 20; .01 INJECTION, SOLUTION INFILTRATION; PERINEURAL at 11:56

## 2024-08-02 RX ADMIN — LIDOCAINE HYDROCHLORIDE 5 ML: 20 INJECTION, SOLUTION INFILTRATION; PERINEURAL at 11:56

## 2024-08-02 NOTE — PRE SEDATION
Sedation Pre-Procedure Note    Patient Name: Rebeka Renee   YOB: 1945  Room/Bed: Room/bed info not found  Medical Record Number: [id number]  Date: 8/2/2024   Time: 12:11 PM       Indication:  Right hip osteomyelitis    Consent: I have discussed with the patient and/or the patient representative the indication, alternatives, and the possible risks and/or complications of the planned procedure and the anesthesia methods. The patient and/or patient representative appear to understand and agree to proceed.    Vital Signs:   Vitals:    08/02/24 1134   BP: (!) 163/92   Pulse: (!) 101   Resp: 22   Temp: 97.7 °F (36.5 °C)   SpO2: 96%       Past Medical History:   has a past medical history of Anemia, Anxiety, Arthritis, Ascending aortic aneurysm (HCC), Asthma, Bipolar 1 disorder (HCC), Cancer (HCC), Cancer of breast, female (HCC), CHF (congestive heart failure) (HCC), Chronic back pain, Depression, Depression with anxiety, Diabetes mellitus (HCC), Dyslipidemia, Fibromyalgia, History of blood transfusion, Hypertension, Hypothyroidism, Neuropathy, Pericardial effusion, Pleural effusion, TIA (transient ischemic attack), Tobacco abuse, and Type II or unspecified type diabetes mellitus without mention of complication, not stated as uncontrolled.    Past Surgical History:   has a past surgical history that includes Lung removal, partial; Mastectomy (Bilateral); Kidney removal (Left, 2/2006); Hysterectomy; Gastric bypass surgery (9/2004); hernia repair; knee surgery (Right); transthoracic echocardiogram (4/1/2010); Cataract removal with implant (Bilateral); liver biopsy (2006); thoracentesis (Left, 1/2010 and 3/2010. ); Cholecystectomy; other surgical history (03/16/15); other surgical history (N/A, 4/15/15); Breast surgery; Nerve Block (Bilateral, 06/06/16); Kidney removal; other surgical history (N/A, 02/24/2021); Pain management procedure (N/A, 2/24/2021); hip surgery (Right, 1/4/2024); Revision total hip

## 2024-08-05 LAB
CRYSTALS FLD MICRO: NORMAL
PATH INTERP FLD-IMP: NORMAL
SPECIMEN TYPE: NORMAL

## 2024-08-06 ENCOUNTER — OUTSIDE SERVICES (OUTPATIENT)
Dept: PRIMARY CARE CLINIC | Age: 79
End: 2024-08-06
Payer: MEDICARE

## 2024-08-06 DIAGNOSIS — T84.59XD INFECTION AND INFLAMMATORY REACTION DUE TO OTHER INTERNAL JOINT PROSTHESIS, SUBSEQUENT ENCOUNTER: ICD-10-CM

## 2024-08-06 DIAGNOSIS — S72.001D CLOSED FRACTURE OF NECK OF RIGHT FEMUR WITH ROUTINE HEALING, SUBSEQUENT ENCOUNTER: Primary | ICD-10-CM

## 2024-08-06 DIAGNOSIS — E11.40 CONTROLLED TYPE 2 DIABETES MELLITUS WITH DIABETIC NEUROPATHY, WITH LONG-TERM CURRENT USE OF INSULIN (HCC): ICD-10-CM

## 2024-08-06 DIAGNOSIS — E03.9 HYPOTHYROIDISM, UNSPECIFIED TYPE: ICD-10-CM

## 2024-08-06 DIAGNOSIS — E78.5 HYPERLIPIDEMIA, UNSPECIFIED HYPERLIPIDEMIA TYPE: ICD-10-CM

## 2024-08-06 DIAGNOSIS — S73.004D HIP DISLOCATION, RIGHT, SUBSEQUENT ENCOUNTER: ICD-10-CM

## 2024-08-06 DIAGNOSIS — Z86.73 HISTORY OF CEREBRAL INFARCTION: ICD-10-CM

## 2024-08-06 DIAGNOSIS — I10 PRIMARY HYPERTENSION: ICD-10-CM

## 2024-08-06 DIAGNOSIS — Z79.4 CONTROLLED TYPE 2 DIABETES MELLITUS WITH DIABETIC NEUROPATHY, WITH LONG-TERM CURRENT USE OF INSULIN (HCC): ICD-10-CM

## 2024-08-06 DIAGNOSIS — J44.9 CHRONIC OBSTRUCTIVE PULMONARY DISEASE, UNSPECIFIED COPD TYPE (HCC): ICD-10-CM

## 2024-08-06 PROCEDURE — 99309 SBSQ NF CARE MODERATE MDM 30: CPT

## 2024-08-06 ASSESSMENT — ENCOUNTER SYMPTOMS
WHEEZING: 0
PHOTOPHOBIA: 0
VOMITING: 0
SHORTNESS OF BREATH: 0
RHINORRHEA: 0
BACK PAIN: 0
SORE THROAT: 0
DIARRHEA: 0
COUGH: 0
ABDOMINAL PAIN: 0
CONSTIPATION: 0

## 2024-08-06 NOTE — PROGRESS NOTES
Rebeka Renee (:  1945) is a 79 y.o. female.    Subjective     Rebeka is sitting upright in wheelchair in room. Seen today for routine follow up. Doing well today. Waiting for hip aspiration results. So far preliminary results show no growth x 4 days. Has a follow up next week with ortho. No acute complaints today. No concerns per nursing.     Location: [facility] Room 325  All nursing and progress notes reviewed.    Past Medical History:   Diagnosis Date    Anemia     S/P chemotherapy.    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    Ascending aortic aneurysm (HCC)     seen on CTA chest w/contrast on     Asthma     Bipolar 1 disorder (HCC)     Cancer (HCC)     lung/ kidney    Cancer of breast, female (HCC) 2006    S/P bilaeral mastectomy with left breast lymph node resection and chemotherapy.    CHF (congestive heart failure) (HCC)     Chronic back pain     Depression     Depression with anxiety     Diabetes mellitus (HCC)     Resolved after losing 130 lbs. after gastric bypass surgery.    Dyslipidemia     Fibromyalgia     History of blood transfusion     anemia    Hypertension     Hypothyroidism     Neuropathy     Pericardial effusion 2010    Subxiphoid pericardial window with drainage of pericardial effusion and pericardial biopsy:  Fluid and biopsy negative for metastases.     Pleural effusion 2010    Noted on chest x-ray.    TIA (transient ischemic attack)     Tobacco abuse     Patient smoked 3 ppd x 26 years.  Quit in .    Type II or unspecified type diabetes mellitus without mention of complication, not stated as uncontrolled        Allergies   Allergen Reactions    Benadryl [Diphenhydramine] Hives     Tongue Swells    Other      benzodine  Tongue Swells    Sulfa Antibiotics Hives     Tongue Swells    Ciprofloxacin Hives    Oxycodone     Tape [Adhesive Tape] Itching

## 2024-08-07 ENCOUNTER — TELEPHONE (OUTPATIENT)
Dept: ORTHOPEDIC SURGERY | Age: 79
End: 2024-08-07

## 2024-08-07 LAB
MICROORGANISM SPEC CULT: NO GROWTH
MICROORGANISM/AGENT SPEC: NORMAL
SERVICE CMNT-IMP: NORMAL
SPECIMEN DESCRIPTION: NORMAL

## 2024-08-07 NOTE — TELEPHONE ENCOUNTER
----- Message from Adarsh Osborne PA-C sent at 8/7/2024  8:16 AM EDT -----  Regarding: results  Culture negative, cell count improving. Keep next appt with Ole to discuss surgery       Future Appointments  8/14/2024  1:30 PM    Nemesio Handy, [facility]            [facility]  8/19/2024  1:15 PM    Melissa Myers APRN - C# [id number]        [facility]    ----- Message -----  From: Jono Palacios Incoming Lab Results From [facility]  Sent: 8/2/2024   1:39 PM EDT  To: Adarsh Osborne PA-C

## 2024-08-07 NOTE — TELEPHONE ENCOUNTER
Left VM for pt with results and instructions to keep follow up appt. Requested call back with any questions or concerns.

## 2024-08-14 ENCOUNTER — OFFICE VISIT (OUTPATIENT)
Dept: ORTHOPEDIC SURGERY | Age: 79
End: 2024-08-14
Payer: MEDICARE

## 2024-08-14 DIAGNOSIS — M24.451 RECURRENT DISLOCATION OF RIGHT HIP: ICD-10-CM

## 2024-08-14 DIAGNOSIS — Z86.19 HISTORY OF REMOVAL OF JOINT PROSTHESIS OF RIGHT HIP DUE TO INFECTION: Primary | ICD-10-CM

## 2024-08-14 DIAGNOSIS — Z98.890 HISTORY OF REMOVAL OF JOINT PROSTHESIS OF RIGHT HIP DUE TO INFECTION: Primary | ICD-10-CM

## 2024-08-14 PROCEDURE — 99214 OFFICE O/P EST MOD 30 MIN: CPT

## 2024-08-14 RX ORDER — ONDANSETRON 4 MG/1
4 TABLET, FILM COATED ORAL EVERY 8 HOURS PRN
COMMUNITY
Start: 2024-08-02

## 2024-08-14 RX ORDER — FUROSEMIDE 20 MG/1
20 TABLET ORAL
COMMUNITY
Start: 2024-07-29

## 2024-08-14 NOTE — PROGRESS NOTES
chemotherapy.    CHF (congestive heart failure) (HCC)     Chronic back pain     Depression     Depression with anxiety     Diabetes mellitus (HCC)     Resolved after losing 130 lbs. after gastric bypass surgery.    Dyslipidemia     Fibromyalgia     History of blood transfusion     anemia    Hypertension     Hypothyroidism     Neuropathy     Pericardial effusion 04/02/2010    Subxiphoid pericardial window with drainage of pericardial effusion and pericardial biopsy:  Fluid and biopsy negative for metastases.     Pleural effusion 05/02/2010    Noted on chest x-ray.    TIA (transient ischemic attack)     Tobacco abuse     Patient smoked 3 ppd x 26 years.  Quit in 1995.    Type II or unspecified type diabetes mellitus without mention of complication, not stated as uncontrolled        Past Surgical History:   Procedure Laterality Date    BREAST SURGERY      CATARACT REMOVAL WITH IMPLANT Bilateral     CHOLECYSTECTOMY      FIBULA FRACTURE SURGERY Right 5/16/2024    RIGHT FEMUR OPEN REDUCTION INTERNAL FIXATION, OPEN TREATMENT OF RIGHT HIP DISLOCATION performed by [facility]y [facility]    GASTRIC BYPASS SURGERY  9/2004    HERNIA REPAIR      HIP SURGERY Right 1/4/2024    RIGHT HIP HEMIARTHROPLASTY performed by Nemesio Handy MD at [facility]    HIP SURGERY Right 4/2/2024    RIGHT HIP CLOSED REDUCTION INTERNAL FIXATION performed by Matthew Peña DO [facility]    HIP SURGERY Right 4/11/2024    RIGHT HIP OPEN REDUCTION FEMORAL HEAD EXCHANGE WITH IRRIGATION AND DEBRIDEMENT performed by Nemesio Handy MD at [facility]    HYSTERECTOMY (CERVIX STATUS UNKNOWN)      KIDNEY REMOVAL Left 2/2006    Cancer.    KIDNEY REMOVAL      left    KNEE SURGERY Right     arthroscopy    LIVER BIOPSY  2006    Fatty tumor.    LUNG REMOVAL, PARTIAL      For lung CA, thought to be metastatic from breast cancer. left upper lobe     MASTECTOMY Bilateral     NERVE BLOCK Bilateral 06/06/16    transforaminal nerve block,

## 2024-08-14 NOTE — PATIENT INSTRUCTIONS
fruits, leafy green veggies, whole-grain breads, and fortified cereals.    Potassium  Get enough of this mineral in your diet, and you won't lose as much calcium when you pee. There are lots of fresh fruits rich in potassium.    Good sources: Bananas, orange juice, potatoes, nuts, seeds, fish, meat, and milk.    What Not to Eat  It's a good idea to cut back on or skip these:    Alcohol: While you don't have to cut out alcoholic drinks, these beverages slow down bone healing. You won't build new bone as fast to fix the fracture. A bit too much alcohol can also make you unsteady on your feet, which can make you more likely to fall and risk an injury to the same bone.    Salt: Too much of this in your diet can make you lose more calcium in your urine. Salt can be in some foods or drinks that don't taste salty, so check labels and aim for about 1 teaspoon, or 6 grams, a day.    Coffee: Lots of caffeine -- more than four cups of strong coffee a day -- can slow down bone healing a little. It might make you pee more, and that could mean you lose more calcium through your urine. A moderate amount of coffee or tea should be fine.           Weight bearing is an important component to your healing fracture or injury. If you put weight on your extremity too soon, the fixation (plates/screws) could loosen and cause your fracture to shift or move. It is crucial you listen to your surgeon regarding weight bearing recommendations.    After surgery your weight bearing status is determined by your surgeon. For a significant portion of lower extremity and upper extremity fractures, this will be a non-weightbearing or touch-down weight bearing status. Usually this is continued for 6-10 weeks before you are allowed to start weightbearing.    Weightbearing as tolerated (WBAT)  This means that you can put as much weight as you can tolerated through your arm or leg. The key is AS TOLERATED. You should not push through the pain and use your

## 2024-08-15 ENCOUNTER — TELEPHONE (OUTPATIENT)
Dept: ORTHOPEDIC SURGERY | Age: 79
End: 2024-08-15

## 2024-08-15 NOTE — TELEPHONE ENCOUNTER
Message sent to Dr. Burgess office today regarding Right Hip surgery for this patient.    wants to perform Reimplantation of Right GE, Possible Removal of ATB Spacer, with Possible Reinsertion of ATB Spacer in the near future. Requesting Dr. Burgess to assist with this surgery.     Possible surgery date of Monday 8- @ 9 am @ [facility] (depending if Dr. Burgess is available).

## 2024-08-19 ENCOUNTER — OUTSIDE SERVICES (OUTPATIENT)
Dept: PRIMARY CARE CLINIC | Age: 79
End: 2024-08-19
Payer: MEDICARE

## 2024-08-19 DIAGNOSIS — E03.9 HYPOTHYROIDISM, UNSPECIFIED TYPE: ICD-10-CM

## 2024-08-19 DIAGNOSIS — S73.004D HIP DISLOCATION, RIGHT, SUBSEQUENT ENCOUNTER: ICD-10-CM

## 2024-08-19 DIAGNOSIS — E11.40 CONTROLLED TYPE 2 DIABETES MELLITUS WITH DIABETIC NEUROPATHY, WITH LONG-TERM CURRENT USE OF INSULIN (HCC): ICD-10-CM

## 2024-08-19 DIAGNOSIS — J44.9 CHRONIC OBSTRUCTIVE PULMONARY DISEASE, UNSPECIFIED COPD TYPE (HCC): ICD-10-CM

## 2024-08-19 DIAGNOSIS — R26.2 AMBULATORY DYSFUNCTION: ICD-10-CM

## 2024-08-19 DIAGNOSIS — F32.A DEPRESSION, UNSPECIFIED DEPRESSION TYPE: ICD-10-CM

## 2024-08-19 DIAGNOSIS — T84.59XD INFECTION AND INFLAMMATORY REACTION DUE TO OTHER INTERNAL JOINT PROSTHESIS, SUBSEQUENT ENCOUNTER: Primary | ICD-10-CM

## 2024-08-19 DIAGNOSIS — R53.1 GENERALIZED WEAKNESS: ICD-10-CM

## 2024-08-19 DIAGNOSIS — E78.5 HYPERLIPIDEMIA, UNSPECIFIED HYPERLIPIDEMIA TYPE: ICD-10-CM

## 2024-08-19 DIAGNOSIS — Z79.4 CONTROLLED TYPE 2 DIABETES MELLITUS WITH DIABETIC NEUROPATHY, WITH LONG-TERM CURRENT USE OF INSULIN (HCC): ICD-10-CM

## 2024-08-19 DIAGNOSIS — D64.9 ANEMIA, UNSPECIFIED TYPE: ICD-10-CM

## 2024-08-19 DIAGNOSIS — Z86.73 HISTORY OF CEREBRAL INFARCTION: ICD-10-CM

## 2024-08-19 PROCEDURE — 99309 SBSQ NF CARE MODERATE MDM 30: CPT | Performed by: INTERNAL MEDICINE

## 2024-08-20 NOTE — TELEPHONE ENCOUNTER
Patient will need to have Pre-testing Done prior to her surgery date.   Prior Auth needs submitted to her insurance after surgery date is confirmed.  At this time, the surgery date may need to be changed to 9-9-2024 @ 9 am to be the second case for Dr. Handy (current 9 am patient will have to move to 3rd case). Lauren will discuss possible surgery date of 9-9-2024 with Dr. Handy on 8-.

## 2024-08-21 VITALS
WEIGHT: 161 LBS | SYSTOLIC BLOOD PRESSURE: 110 MMHG | OXYGEN SATURATION: 96 % | BODY MASS INDEX: 26.79 KG/M2 | RESPIRATION RATE: 17 BRPM | DIASTOLIC BLOOD PRESSURE: 67 MMHG | HEART RATE: 95 BPM | TEMPERATURE: 98.3 F

## 2024-08-23 ENCOUNTER — TELEPHONE (OUTPATIENT)
Dept: ORTHOPEDIC SURGERY | Age: 79
End: 2024-08-23

## 2024-08-23 ENCOUNTER — PREP FOR PROCEDURE (OUTPATIENT)
Dept: ORTHOPEDIC SURGERY | Age: 79
End: 2024-08-23

## 2024-08-23 DIAGNOSIS — M24.451 RECURRENT DISLOCATION OF RIGHT HIP: ICD-10-CM

## 2024-08-23 DIAGNOSIS — Z96.641 HX OF TOTAL HIP ARTHROPLASTY, RIGHT: ICD-10-CM

## 2024-08-23 PROBLEM — T84.50XA: Status: ACTIVE | Noted: 2024-08-23

## 2024-08-23 RX ORDER — SODIUM CHLORIDE 0.9 % (FLUSH) 0.9 %
5-40 SYRINGE (ML) INJECTION EVERY 12 HOURS SCHEDULED
Status: CANCELLED | OUTPATIENT
Start: 2024-08-23

## 2024-08-23 RX ORDER — SODIUM CHLORIDE 9 MG/ML
INJECTION, SOLUTION INTRAVENOUS PRN
Status: CANCELLED | OUTPATIENT
Start: 2024-08-23

## 2024-08-23 RX ORDER — SODIUM CHLORIDE 0.9 % (FLUSH) 0.9 %
5-40 SYRINGE (ML) INJECTION PRN
Status: CANCELLED | OUTPATIENT
Start: 2024-08-23

## 2024-08-23 NOTE — TELEPHONE ENCOUNTER
Prior Authorization Form:    DEMOGRAPHICS:                     Patient Name:  Rebeka Lozoya  Patient :  1945            Insurance:  Payor: [facility] MEDICARE / Plan: [facility] MEDICARE COMPLETE / Product Type: *No Product type* /   Insurance ID Number:    Payer/Plan Subscr  Sex Relation Sub. Ins. ID Effective Group Num   1. [facility] MEDICARE * REBEKA LOZOYA 1945 Female Self [id number]                                    [address] [address]       [x] Prior authorization obtained by through [facility] Provider Portal for CPT 09137 and 86841.  Auth # [id number]. Auth is Valid from: 2024 through 2024.  I requested Dr. Paresh Burgess to be included in the Prior Auth since he will be assisting  with this surgery. I will contact the insurance company to make sure he is included in the surgery authorization (surgery approval information located in ).     Prior Auth request was started on 2024 through [facility] Provider Portal (since we needed a range of dates to pick from to find a possible surgery date that would work for both Dr. Handy and Dr. Burgess).Clinicals were uploaded to [facility] on 2024.     Auth # [id number] was cancelled through [facility] Provider Portal since it was a duplicate surgery request.     [] No Prior authorization required     [] Prior authorization pending - Case #       DIAGNOSIS & PROCEDURE:                       Procedure/Operation: Re-implantation of Right Total Hip Arthroplasty with Possible Insertion of ATB Spacer           CPT Code: 80992 with Possible 05286    Diagnosis:  Closed Displaced Fracture of Right Femoral Neck, History of Re-Occurring Right Hip Dislocation, History of Removal of Joint Prothesis of Right Hip due to Infection, Other Specified Personal Risk Factors not Elsewhere Classified     ICD10 Code: S72.001A, M24.451, T84.59XA, Z96.641, Z91.89    Location:  [facility]    Surgeon:    Dr. Nemesio Handy (Primary Surgeon)  Dr. Paresh Burgess (Assistant

## 2024-08-23 NOTE — TELEPHONE ENCOUNTER
Received voicemail from [facility] ([facility] where the patient is residing).   [facility] wanted to know if  has decided on a surgery date for Right Total Hip Arthroplasty.   Requesting a return call to discuss surgery date.   Call back # [phone]    I returned the call to [facility] today. Spoke with Krista at the . Surgery date is 9-9-2024 @ 12 pm. Patient will need to arrive at 10 am the morning of surgery or arrive at 9:30 am the morning of surgery depending on labs needing done. Surgery will be Outpatient with Possible Overnight Stay needed for OBS.     I spoke with Dr. Burgess office this morning. Dr. Burgess will assist Dr. Handy for this surgery.   Surgery time has to be at 12:00 pm since Dr. Burgess will be seeing patients at his practice until 11:15 am the morning of surgery.     Krista states she will notify the person who handles transportation of the surgery date and time. Informed Krista the PAT Department @ [facility] will contact their facility to go over the surgery instructions/questions, discuss if the facility will draw the labs needed prior to surgery or if the patient will need the labs to be drawn at [facility] the morning of surgery (T&S will need to be drawn @ [facility] the morning of surgery. MRSA Nares to be done at [facility] if possible prior to surgery and fax the results to our office. If [facility] can not do the MRSA Nares, it will need done the morning of surgery at [facility]). Krista verbalized understanding.

## 2024-08-28 LAB
ALBUMIN SERPL-MCNC: 3.1 G/DL (ref 3.5–5.2)
ALP SERPL-CCNC: 99 U/L (ref 35–104)
ALT SERPL-CCNC: 22 U/L (ref 0–32)
ANION GAP SERPL CALCULATED.3IONS-SCNC: 10 MMOL/L (ref 7–16)
AST SERPL-CCNC: 18 U/L (ref 0–31)
BASOPHILS # BLD: 0.05 K/UL (ref 0–0.2)
BASOPHILS NFR BLD: 1 % (ref 0–2)
BILIRUB SERPL-MCNC: 0.2 MG/DL (ref 0–1.2)
BUN SERPL-MCNC: 19 MG/DL (ref 6–23)
CALCIUM SERPL-MCNC: 9.3 MG/DL (ref 8.6–10.2)
CHLORIDE SERPL-SCNC: 104 MMOL/L (ref 98–107)
CO2 SERPL-SCNC: 28 MMOL/L (ref 22–29)
CREAT SERPL-MCNC: 0.6 MG/DL (ref 0.5–1)
EOSINOPHIL # BLD: 0.32 K/UL (ref 0.05–0.5)
EOSINOPHILS RELATIVE PERCENT: 3 % (ref 0–6)
ERYTHROCYTE [DISTWIDTH] IN BLOOD BY AUTOMATED COUNT: 18.3 % (ref 11.5–15)
GFR, ESTIMATED: >90 ML/MIN/1.73M2
GLUCOSE SERPL-MCNC: 87 MG/DL (ref 74–99)
HBA1C MFR BLD: 5.5 % (ref 4–5.6)
HCT VFR BLD AUTO: 41.6 % (ref 34–48)
HGB BLD-MCNC: 12.4 G/DL (ref 11.5–15.5)
IMM GRANULOCYTES # BLD AUTO: 0.05 K/UL (ref 0–0.58)
IMM GRANULOCYTES NFR BLD: 1 % (ref 0–5)
INR PPP: 1.4
LYMPHOCYTES NFR BLD: 3.84 K/UL (ref 1.5–4)
LYMPHOCYTES RELATIVE PERCENT: 41 % (ref 20–42)
MCH RBC QN AUTO: 26.5 PG (ref 26–35)
MCHC RBC AUTO-ENTMCNC: 29.8 G/DL (ref 32–34.5)
MCV RBC AUTO: 88.9 FL (ref 80–99.9)
MONOCYTES NFR BLD: 0.77 K/UL (ref 0.1–0.95)
MONOCYTES NFR BLD: 8 % (ref 2–12)
NEUTROPHILS NFR BLD: 47 % (ref 43–80)
NEUTS SEG NFR BLD: 4.46 K/UL (ref 1.8–7.3)
PLATELET # BLD AUTO: 130 K/UL (ref 130–450)
PMV BLD AUTO: 11.1 FL (ref 7–12)
POTASSIUM SERPL-SCNC: 4.3 MMOL/L (ref 3.5–5)
PROT SERPL-MCNC: 6 G/DL (ref 6.4–8.3)
PROTHROMBIN TIME: 15.3 SEC (ref 9.3–12.4)
RBC # BLD AUTO: 4.68 M/UL (ref 3.5–5.5)
SODIUM SERPL-SCNC: 142 MMOL/L (ref 132–146)
WBC OTHER # BLD: 9.5 K/UL (ref 4.5–11.5)

## 2024-09-05 ENCOUNTER — TELEPHONE (OUTPATIENT)
Dept: ORTHOPEDIC SURGERY | Age: 79
End: 2024-09-05

## 2024-09-05 ENCOUNTER — PREP FOR PROCEDURE (OUTPATIENT)
Dept: ORTHOPEDIC SURGERY | Age: 79
End: 2024-09-05

## 2024-09-05 DIAGNOSIS — Z87.81 PERSONAL HISTORY OF HEALED TRAUMATIC FRACTURE: ICD-10-CM

## 2024-09-05 DIAGNOSIS — Z96.641 HISTORY OF RIGHT HIP REPLACEMENT: ICD-10-CM

## 2024-09-05 DIAGNOSIS — Z96.641 HISTORY OF RIGHT HIP HEMIARTHROPLASTY: ICD-10-CM

## 2024-09-05 DIAGNOSIS — Z86.19 PERSONAL HISTORY OF INFECTIOUS DISEASE: ICD-10-CM

## 2024-09-05 PROBLEM — S72.001A FRACTURE OF FEMORAL NECK, RIGHT, CLOSED (HCC): Status: ACTIVE | Noted: 2024-09-05

## 2024-09-05 PROBLEM — T84.50XA: Status: ACTIVE | Noted: 2024-09-05

## 2024-09-05 NOTE — TELEPHONE ENCOUNTER
Prior Authorization Form:    DEMOGRAPHICS:                     Patient Name:  Rebeka Lozoya  Patient :  1945            Insurance:  Payor: [facility] MEDICARE / Plan: [facility] MEDICARE COMPLETE / Product Type: *No Product type* /   Insurance ID Number:    Payer/Plan Subscr  Sex Relation Sub. Ins. ID Effective Group Num   1. [facility] MEDICARE * REBEKA LOZOYA 1945 Female Self [id number]                                    [address] [address]   2. [facility]* REBEKA LOZOYA 1945 Female Self [id number]                                   [address]       [x] Prior authorization obtained through [facility] Provider Portal.   Surgery Approval Details scanned into [employer] Manager.     [] No Prior authorization required   [] Prior authorization pending - Case #       DIAGNOSIS & PROCEDURE:                       Procedure/Operation: Removal of ATB Spacer from Right Hip with Possible Re-insertion of New ATB Spacer, Possible Right Hip Acetabular Cup Placement            CPT Code: 42349, Possible 24861, Possible 42459    Diagnosis:  Closed Right Femoral Neck Fracture, Re-occurring Dislocation of Right Hip, History of Removal of Right Hip Joint Prothesis, History of Right Hip Hemiarthroplasty, History of Joint Prothesis Infection, History of Traumatic Fracture    ICD10 Code: S72.001A, M24.45, Z96.641, Z96.649, T84.59XA, Z87.81, Z98.890    Location:  [facility]    Surgeon:  Dr. Handy as the Primary Surgeon, Dr. Paresh Burgess as the Assistant Surgeon     SCHEDULING INFORMATION:                          Date: 2024    Time: 12:00 pm              Anesthesia:  General                                                        Status: Inpatient     Special Comments:         Vendor: [employer]  [x]   Notified 2024 by KLF and 2024 by KLF    Length of Surgery (with 30 min clean up time): 2 hours to 2.5 hours     Medical clearance: No Medical Clearance Needed    Pre-Op Labs:       []  Orders Placed    Electronically

## 2024-09-05 NOTE — TELEPHONE ENCOUNTER
Previous Surgery Details for DOS 9-9-2024 were incorrect so the surgery case needed to be cancelled.   New surgery case request with the correct surgery details was submitted today for DOS 9-9-2024.    New surgery pre-op orders/labs may need placed.

## 2024-09-06 LAB
MICROORGANISM SPEC CULT: NORMAL
SPECIMEN DESCRIPTION: NORMAL

## 2024-09-06 RX ORDER — SODIUM CHLORIDE 0.9 % (FLUSH) 0.9 %
5-40 SYRINGE (ML) INJECTION EVERY 12 HOURS SCHEDULED
Status: CANCELLED | OUTPATIENT
Start: 2024-09-06

## 2024-09-06 RX ORDER — SODIUM CHLORIDE 9 MG/ML
INJECTION, SOLUTION INTRAVENOUS PRN
Status: CANCELLED | OUTPATIENT
Start: 2024-09-06

## 2024-09-06 RX ORDER — SODIUM CHLORIDE 0.9 % (FLUSH) 0.9 %
5-40 SYRINGE (ML) INJECTION PRN
Status: CANCELLED | OUTPATIENT
Start: 2024-09-06

## 2024-09-06 NOTE — H&P
Chief Complaint   Patient presents with    Post-Op Check       12wk PO R femur ORIF with open treatment of R hip dislocation. Pt ambulating in wheelchair. Pt states 6/10 pain.          SUBJECTIVE: Patient very pleasant 79-year-old female who is nearly 3 months out from explantation for infection right hip hemiarthroplasty.  She has had dislocation and fracture around her antibiotic stem with attempted relocation which was fixed as well.  She recently had aspiration of the joint showing 6200 white blood cells.  Gram stain and culture been negative.  Her white blood cell count is elevated I went over this with her in the office today.  Explained we would go back in the remove her antibiotic spacer most likely due to 1 stage replant.  We would check a frozen section and if positive consider placing a cup to get her located with a temporary stem.  She understands this may still require multiple surgeries.  I talked her about the risk of limb length shortening due to it being dislocated for swelling requiring custom shoe lift she understands that as well.  With the risk of surgery including death of anesthesia, continued infection, need for lifelong suppression with antibiotics, neurovascular damage including foot drop, deep venous thrombosis, and any other unforeseeable complications.  Patient understood and decided proceed with operative intervention for replantation right total hip arthroplasty/revision total hip arthroplasty.              Past Medical History        Past Medical History:   Diagnosis Date    Anemia       S/P chemotherapy.    Anxiety      Arthritis       With DJD of the lumbar spine with L4/L5 and L5/S1 radiculopathy with bilateral lower extremity pain, left more than right.    Ascending aortic aneurysm (HCC)       seen on CTA chest w/contrast on 2019    Asthma      Bipolar 1 disorder (HCC)      Cancer (HCC)       lung/ kidney    Cancer of breast, female (HCC) 01/2006     S/P bilaeral mastectomy with

## 2024-09-08 ENCOUNTER — ANESTHESIA EVENT (OUTPATIENT)
Dept: OPERATING ROOM | Age: 79
End: 2024-09-08
Payer: MEDICARE

## 2024-09-09 ENCOUNTER — HOSPITAL ENCOUNTER (INPATIENT)
Age: 79
LOS: 6 days | Discharge: NURSING, REHAB OR LONG-TERM CARE | DRG: 480 | End: 2024-09-15
Attending: ORTHOPAEDIC SURGERY | Admitting: ORTHOPAEDIC SURGERY
Payer: MEDICARE

## 2024-09-09 ENCOUNTER — APPOINTMENT (OUTPATIENT)
Dept: GENERAL RADIOLOGY | Age: 79
DRG: 480 | End: 2024-09-09
Attending: ORTHOPAEDIC SURGERY
Payer: MEDICARE

## 2024-09-09 ENCOUNTER — ANESTHESIA (OUTPATIENT)
Dept: OPERATING ROOM | Age: 79
End: 2024-09-09
Payer: MEDICARE

## 2024-09-09 DIAGNOSIS — Z87.81 PERSONAL HISTORY OF HEALED TRAUMATIC FRACTURE: ICD-10-CM

## 2024-09-09 DIAGNOSIS — Z86.19 PERSONAL HISTORY OF INFECTIOUS DISEASE: ICD-10-CM

## 2024-09-09 DIAGNOSIS — S72.001A FRACTURE OF FEMORAL NECK, RIGHT, CLOSED (HCC): ICD-10-CM

## 2024-09-09 DIAGNOSIS — Z96.641 HISTORY OF RIGHT HIP HEMIARTHROPLASTY: ICD-10-CM

## 2024-09-09 DIAGNOSIS — Z96.641 HISTORY OF RIGHT HIP REPLACEMENT: ICD-10-CM

## 2024-09-09 DIAGNOSIS — M24.451 RECURRENT DISLOCATION OF RIGHT HIP: ICD-10-CM

## 2024-09-09 DIAGNOSIS — I82.A21 DVT OF RIGHT AXILLARY VEIN, CHRONIC (HCC): Primary | ICD-10-CM

## 2024-09-09 DIAGNOSIS — T84.59XD INFECTION ASSOCIATED WITH OTHER INTERNAL JOINT PROSTHESIS, SUBSEQUENT ENCOUNTER: ICD-10-CM

## 2024-09-09 LAB
ALBUMIN SERPL-MCNC: 3.4 G/DL (ref 3.5–5.2)
ALP SERPL-CCNC: 125 U/L (ref 35–104)
ALT SERPL-CCNC: 23 U/L (ref 0–32)
ANION GAP SERPL CALCULATED.3IONS-SCNC: 7 MMOL/L (ref 7–16)
AST SERPL-CCNC: 30 U/L (ref 0–31)
BASOPHILS # BLD: 0.04 K/UL (ref 0–0.2)
BASOPHILS NFR BLD: 1 % (ref 0–2)
BILIRUB SERPL-MCNC: 0.3 MG/DL (ref 0–1.2)
BUN SERPL-MCNC: 21 MG/DL (ref 6–23)
CALCIUM SERPL-MCNC: 10 MG/DL (ref 8.6–10.2)
CHLORIDE SERPL-SCNC: 98 MMOL/L (ref 98–107)
CO2 SERPL-SCNC: 36 MMOL/L (ref 22–29)
CREAT SERPL-MCNC: 0.5 MG/DL (ref 0.5–1)
EOSINOPHIL # BLD: 0.16 K/UL (ref 0.05–0.5)
EOSINOPHILS RELATIVE PERCENT: 2 % (ref 0–6)
ERYTHROCYTE [DISTWIDTH] IN BLOOD BY AUTOMATED COUNT: 17.8 % (ref 11.5–15)
GFR, ESTIMATED: >90 ML/MIN/1.73M2
GLUCOSE SERPL-MCNC: 108 MG/DL (ref 74–99)
HCT VFR BLD AUTO: 47.2 % (ref 34–48)
HGB BLD-MCNC: 14.4 G/DL (ref 11.5–15.5)
IMM GRANULOCYTES # BLD AUTO: <0.03 K/UL (ref 0–0.58)
IMM GRANULOCYTES NFR BLD: 0 % (ref 0–5)
INR PPP: 1.1
LYMPHOCYTES NFR BLD: 2.84 K/UL (ref 1.5–4)
LYMPHOCYTES RELATIVE PERCENT: 37 % (ref 20–42)
MCH RBC QN AUTO: 26.6 PG (ref 26–35)
MCHC RBC AUTO-ENTMCNC: 30.5 G/DL (ref 32–34.5)
MCV RBC AUTO: 87.1 FL (ref 80–99.9)
MONOCYTES NFR BLD: 0.52 K/UL (ref 0.1–0.95)
MONOCYTES NFR BLD: 7 % (ref 2–12)
NEUTROPHILS NFR BLD: 53 % (ref 43–80)
NEUTS SEG NFR BLD: 4.06 K/UL (ref 1.8–7.3)
PARTIAL THROMBOPLASTIN TIME: 32.9 SEC (ref 24.5–35.1)
PLATELET CONFIRMATION: NORMAL
PLATELET, FLUORESCENCE: 132 K/UL (ref 130–450)
PMV BLD AUTO: 10.3 FL (ref 7–12)
POTASSIUM SERPL-SCNC: 5.2 MMOL/L (ref 3.5–5)
PROT SERPL-MCNC: 7.3 G/DL (ref 6.4–8.3)
PROTHROMBIN TIME: 12 SEC (ref 9.3–12.4)
RBC # BLD AUTO: 5.42 M/UL (ref 3.5–5.5)
SODIUM SERPL-SCNC: 141 MMOL/L (ref 132–146)
WBC OTHER # BLD: 7.6 K/UL (ref 4.5–11.5)

## 2024-09-09 PROCEDURE — 86923 COMPATIBILITY TEST ELECTRIC: CPT

## 2024-09-09 PROCEDURE — 2500000003 HC RX 250 WO HCPCS

## 2024-09-09 PROCEDURE — 2580000003 HC RX 258: Performed by: PHYSICIAN ASSISTANT

## 2024-09-09 PROCEDURE — 85025 COMPLETE CBC W/AUTO DIFF WBC: CPT

## 2024-09-09 PROCEDURE — 11982 REMOVE DRUG IMPLANT DEVICE: CPT | Performed by: ORTHOPAEDIC SURGERY

## 2024-09-09 PROCEDURE — 6360000002 HC RX W HCPCS: Performed by: STUDENT IN AN ORGANIZED HEALTH CARE EDUCATION/TRAINING PROGRAM

## 2024-09-09 PROCEDURE — 3E033XZ INTRODUCTION OF VASOPRESSOR INTO PERIPHERAL VEIN, PERCUTANEOUS APPROACH: ICD-10-PCS | Performed by: ORTHOPAEDIC SURGERY

## 2024-09-09 PROCEDURE — 3700000001 HC ADD 15 MINUTES (ANESTHESIA): Performed by: ORTHOPAEDIC SURGERY

## 2024-09-09 PROCEDURE — 73502 X-RAY EXAM HIP UNI 2-3 VIEWS: CPT

## 2024-09-09 PROCEDURE — 85730 THROMBOPLASTIN TIME PARTIAL: CPT

## 2024-09-09 PROCEDURE — 99221 1ST HOSP IP/OBS SF/LOW 40: CPT | Performed by: STUDENT IN AN ORGANIZED HEALTH CARE EDUCATION/TRAINING PROGRAM

## 2024-09-09 PROCEDURE — 6360000002 HC RX W HCPCS: Performed by: ORTHOPAEDIC SURGERY

## 2024-09-09 PROCEDURE — 87205 SMEAR GRAM STAIN: CPT

## 2024-09-09 PROCEDURE — 2580000003 HC RX 258: Performed by: ORTHOPAEDIC SURGERY

## 2024-09-09 PROCEDURE — 2500000003 HC RX 250 WO HCPCS: Performed by: PHYSICIAN ASSISTANT

## 2024-09-09 PROCEDURE — 3600000015 HC SURGERY LEVEL 5 ADDTL 15MIN: Performed by: ORTHOPAEDIC SURGERY

## 2024-09-09 PROCEDURE — 87015 SPECIMEN INFECT AGNT CONCNTJ: CPT

## 2024-09-09 PROCEDURE — 6360000002 HC RX W HCPCS: Performed by: PHYSICIAN ASSISTANT

## 2024-09-09 PROCEDURE — 64447 NJX AA&/STRD FEMORAL NRV IMG: CPT | Performed by: STUDENT IN AN ORGANIZED HEALTH CARE EDUCATION/TRAINING PROGRAM

## 2024-09-09 PROCEDURE — 80053 COMPREHEN METABOLIC PANEL: CPT

## 2024-09-09 PROCEDURE — 0SW909Z REVISION OF LINER IN RIGHT HIP JOINT, OPEN APPROACH: ICD-10-PCS | Performed by: ORTHOPAEDIC SURGERY

## 2024-09-09 PROCEDURE — 86901 BLOOD TYPING SEROLOGIC RH(D): CPT

## 2024-09-09 PROCEDURE — 0SP908Z REMOVAL OF SPACER FROM RIGHT HIP JOINT, OPEN APPROACH: ICD-10-PCS | Performed by: ORTHOPAEDIC SURGERY

## 2024-09-09 PROCEDURE — 2709999900 HC NON-CHARGEABLE SUPPLY: Performed by: ORTHOPAEDIC SURGERY

## 2024-09-09 PROCEDURE — 85610 PROTHROMBIN TIME: CPT

## 2024-09-09 PROCEDURE — A4217 STERILE WATER/SALINE, 500 ML: HCPCS | Performed by: ORTHOPAEDIC SURGERY

## 2024-09-09 PROCEDURE — 87116 MYCOBACTERIA CULTURE: CPT

## 2024-09-09 PROCEDURE — 7100000000 HC PACU RECOVERY - FIRST 15 MIN: Performed by: ORTHOPAEDIC SURGERY

## 2024-09-09 PROCEDURE — 2580000003 HC RX 258

## 2024-09-09 PROCEDURE — 2500000003 HC RX 250 WO HCPCS: Performed by: ORTHOPAEDIC SURGERY

## 2024-09-09 PROCEDURE — 1200000000 HC SEMI PRIVATE

## 2024-09-09 PROCEDURE — 87176 TISSUE HOMOGENIZATION CULTR: CPT

## 2024-09-09 PROCEDURE — 87206 SMEAR FLUORESCENT/ACID STAI: CPT

## 2024-09-09 PROCEDURE — 6360000002 HC RX W HCPCS

## 2024-09-09 PROCEDURE — 88331 PATH CONSLTJ SURG 1 BLK 1SPC: CPT

## 2024-09-09 PROCEDURE — 87075 CULTR BACTERIA EXCEPT BLOOD: CPT

## 2024-09-09 PROCEDURE — 27134 REVISE HIP JOINT REPLACEMENT: CPT | Performed by: ORTHOPAEDIC SURGERY

## 2024-09-09 PROCEDURE — 2500000003 HC RX 250 WO HCPCS: Performed by: STUDENT IN AN ORGANIZED HEALTH CARE EDUCATION/TRAINING PROGRAM

## 2024-09-09 PROCEDURE — 6370000000 HC RX 637 (ALT 250 FOR IP)

## 2024-09-09 PROCEDURE — 2580000003 HC RX 258: Performed by: STUDENT IN AN ORGANIZED HEALTH CARE EDUCATION/TRAINING PROGRAM

## 2024-09-09 PROCEDURE — 3700000000 HC ANESTHESIA ATTENDED CARE: Performed by: ORTHOPAEDIC SURGERY

## 2024-09-09 PROCEDURE — 3600000005 HC SURGERY LEVEL 5 BASE: Performed by: ORTHOPAEDIC SURGERY

## 2024-09-09 PROCEDURE — 73552 X-RAY EXAM OF FEMUR 2/>: CPT

## 2024-09-09 PROCEDURE — 87070 CULTURE OTHR SPECIMN AEROBIC: CPT

## 2024-09-09 PROCEDURE — 87102 FUNGUS ISOLATION CULTURE: CPT

## 2024-09-09 PROCEDURE — 86850 RBC ANTIBODY SCREEN: CPT

## 2024-09-09 PROCEDURE — 88304 TISSUE EXAM BY PATHOLOGIST: CPT

## 2024-09-09 PROCEDURE — 86900 BLOOD TYPING SEROLOGIC ABO: CPT

## 2024-09-09 PROCEDURE — 7100000001 HC PACU RECOVERY - ADDTL 15 MIN: Performed by: ORTHOPAEDIC SURGERY

## 2024-09-09 PROCEDURE — C1713 ANCHOR/SCREW BN/BN,TIS/BN: HCPCS | Performed by: ORTHOPAEDIC SURGERY

## 2024-09-09 PROCEDURE — C1776 JOINT DEVICE (IMPLANTABLE): HCPCS | Performed by: ORTHOPAEDIC SURGERY

## 2024-09-09 DEVICE — IMPLANTABLE DEVICE: Type: IMPLANTABLE DEVICE | Site: HIP | Status: FUNCTIONAL

## 2024-09-09 DEVICE — SCREW BNE L15MM DIA6.5MM LO PROF HEX TRIDENT LL: Type: IMPLANTABLE DEVICE | Site: HIP | Status: FUNCTIONAL

## 2024-09-09 DEVICE — IMPLANTABLE DEVICE: Type: IMPLANTABLE DEVICE | Status: FUNCTIONAL

## 2024-09-09 DEVICE — SCREW BNE L30MM DIA6.5MM STD CANC HIP TI HMSPHR THRD DRVR: Type: IMPLANTABLE DEVICE | Site: HIP | Status: FUNCTIONAL

## 2024-09-09 DEVICE — SCREW BNE L35MM DIA65MM LO PROF HEX TRIDENT LL: Type: IMPLANTABLE DEVICE | Site: HIP | Status: FUNCTIONAL

## 2024-09-09 DEVICE — SHELL ACET SZ E DIA52MM TRITANIUM 11 H TRIDENT II: Type: IMPLANTABLE DEVICE | Site: HIP | Status: FUNCTIONAL

## 2024-09-09 RX ORDER — SODIUM CHLORIDE 0.9 % (FLUSH) 0.9 %
5-40 SYRINGE (ML) INJECTION EVERY 12 HOURS SCHEDULED
Status: DISCONTINUED | OUTPATIENT
Start: 2024-09-09 | End: 2024-09-09 | Stop reason: HOSPADM

## 2024-09-09 RX ORDER — MORPHINE SULFATE 4 MG/ML
4 INJECTION, SOLUTION INTRAMUSCULAR; INTRAVENOUS
Status: DISCONTINUED | OUTPATIENT
Start: 2024-09-09 | End: 2024-09-14

## 2024-09-09 RX ORDER — HYDROMORPHONE HYDROCHLORIDE 1 MG/ML
0.25 INJECTION, SOLUTION INTRAMUSCULAR; INTRAVENOUS; SUBCUTANEOUS EVERY 5 MIN PRN
Status: DISCONTINUED | OUTPATIENT
Start: 2024-09-09 | End: 2024-09-09 | Stop reason: HOSPADM

## 2024-09-09 RX ORDER — SODIUM CHLORIDE, SODIUM LACTATE, POTASSIUM CHLORIDE, CALCIUM CHLORIDE 600; 310; 30; 20 MG/100ML; MG/100ML; MG/100ML; MG/100ML
INJECTION, SOLUTION INTRAVENOUS CONTINUOUS PRN
Status: DISCONTINUED | OUTPATIENT
Start: 2024-09-09 | End: 2024-09-09 | Stop reason: SDUPTHER

## 2024-09-09 RX ORDER — NALOXONE HYDROCHLORIDE 0.4 MG/ML
INJECTION, SOLUTION INTRAMUSCULAR; INTRAVENOUS; SUBCUTANEOUS PRN
Status: DISCONTINUED | OUTPATIENT
Start: 2024-09-09 | End: 2024-09-09 | Stop reason: HOSPADM

## 2024-09-09 RX ORDER — ONDANSETRON 2 MG/ML
4 INJECTION INTRAMUSCULAR; INTRAVENOUS EVERY 6 HOURS PRN
Status: DISCONTINUED | OUTPATIENT
Start: 2024-09-09 | End: 2024-09-15 | Stop reason: HOSPADM

## 2024-09-09 RX ORDER — ROCURONIUM BROMIDE 10 MG/ML
INJECTION, SOLUTION INTRAVENOUS PRN
Status: DISCONTINUED | OUTPATIENT
Start: 2024-09-09 | End: 2024-09-09 | Stop reason: SDUPTHER

## 2024-09-09 RX ORDER — SODIUM CHLORIDE 9 MG/ML
INJECTION, SOLUTION INTRAVENOUS CONTINUOUS PRN
Status: DISCONTINUED | OUTPATIENT
Start: 2024-09-09 | End: 2024-09-09 | Stop reason: SDUPTHER

## 2024-09-09 RX ORDER — PROPOFOL 10 MG/ML
INJECTION, EMULSION INTRAVENOUS PRN
Status: DISCONTINUED | OUTPATIENT
Start: 2024-09-09 | End: 2024-09-09 | Stop reason: SDUPTHER

## 2024-09-09 RX ORDER — ONDANSETRON 2 MG/ML
INJECTION INTRAMUSCULAR; INTRAVENOUS PRN
Status: DISCONTINUED | OUTPATIENT
Start: 2024-09-09 | End: 2024-09-09 | Stop reason: SDUPTHER

## 2024-09-09 RX ORDER — OXYCODONE HYDROCHLORIDE 10 MG/1
10 TABLET ORAL EVERY 4 HOURS PRN
Status: DISCONTINUED | OUTPATIENT
Start: 2024-09-09 | End: 2024-09-12

## 2024-09-09 RX ORDER — HYDROMORPHONE HYDROCHLORIDE 1 MG/ML
0.5 INJECTION, SOLUTION INTRAMUSCULAR; INTRAVENOUS; SUBCUTANEOUS EVERY 5 MIN PRN
Status: DISCONTINUED | OUTPATIENT
Start: 2024-09-09 | End: 2024-09-09 | Stop reason: HOSPADM

## 2024-09-09 RX ORDER — SODIUM CHLORIDE 0.9 % (FLUSH) 0.9 %
5-40 SYRINGE (ML) INJECTION PRN
Status: DISCONTINUED | OUTPATIENT
Start: 2024-09-09 | End: 2024-09-15 | Stop reason: HOSPADM

## 2024-09-09 RX ORDER — FENTANYL CITRATE 50 UG/ML
INJECTION, SOLUTION INTRAMUSCULAR; INTRAVENOUS PRN
Status: DISCONTINUED | OUTPATIENT
Start: 2024-09-09 | End: 2024-09-09 | Stop reason: SDUPTHER

## 2024-09-09 RX ORDER — SODIUM CHLORIDE 0.9 % (FLUSH) 0.9 %
5-40 SYRINGE (ML) INJECTION PRN
Status: DISCONTINUED | OUTPATIENT
Start: 2024-09-09 | End: 2024-09-09 | Stop reason: HOSPADM

## 2024-09-09 RX ORDER — MIDAZOLAM HYDROCHLORIDE 1 MG/ML
2 INJECTION INTRAMUSCULAR; INTRAVENOUS ONCE
Status: COMPLETED | OUTPATIENT
Start: 2024-09-09 | End: 2024-09-09

## 2024-09-09 RX ORDER — DEXAMETHASONE SODIUM PHOSPHATE 10 MG/ML
INJECTION INTRAMUSCULAR; INTRAVENOUS PRN
Status: DISCONTINUED | OUTPATIENT
Start: 2024-09-09 | End: 2024-09-09 | Stop reason: SDUPTHER

## 2024-09-09 RX ORDER — MORPHINE SULFATE 2 MG/ML
2 INJECTION, SOLUTION INTRAMUSCULAR; INTRAVENOUS
Status: DISCONTINUED | OUTPATIENT
Start: 2024-09-09 | End: 2024-09-14

## 2024-09-09 RX ORDER — SENNA AND DOCUSATE SODIUM 50; 8.6 MG/1; MG/1
1 TABLET, FILM COATED ORAL 2 TIMES DAILY
Status: DISCONTINUED | OUTPATIENT
Start: 2024-09-09 | End: 2024-09-15 | Stop reason: HOSPADM

## 2024-09-09 RX ORDER — SODIUM CHLORIDE 9 MG/ML
INJECTION, SOLUTION INTRAVENOUS PRN
Status: DISCONTINUED | OUTPATIENT
Start: 2024-09-09 | End: 2024-09-09 | Stop reason: HOSPADM

## 2024-09-09 RX ORDER — ACETAMINOPHEN 325 MG/1
650 TABLET ORAL EVERY 6 HOURS
Status: DISCONTINUED | OUTPATIENT
Start: 2024-09-09 | End: 2024-09-15 | Stop reason: HOSPADM

## 2024-09-09 RX ORDER — VASOPRESSIN 20 U/ML
INJECTION PARENTERAL PRN
Status: DISCONTINUED | OUTPATIENT
Start: 2024-09-09 | End: 2024-09-09 | Stop reason: SDUPTHER

## 2024-09-09 RX ORDER — OXYCODONE HYDROCHLORIDE 5 MG/1
5 TABLET ORAL EVERY 4 HOURS PRN
Status: DISCONTINUED | OUTPATIENT
Start: 2024-09-09 | End: 2024-09-12

## 2024-09-09 RX ORDER — TRANEXAMIC ACID 10 MG/ML
1000 INJECTION, SOLUTION INTRAVENOUS ONCE
Status: COMPLETED | OUTPATIENT
Start: 2024-09-09 | End: 2024-09-09

## 2024-09-09 RX ORDER — PROCHLORPERAZINE EDISYLATE 5 MG/ML
5 INJECTION INTRAMUSCULAR; INTRAVENOUS
Status: DISCONTINUED | OUTPATIENT
Start: 2024-09-09 | End: 2024-09-09 | Stop reason: HOSPADM

## 2024-09-09 RX ORDER — ONDANSETRON 4 MG/1
4 TABLET, ORALLY DISINTEGRATING ORAL EVERY 8 HOURS PRN
Status: DISCONTINUED | OUTPATIENT
Start: 2024-09-09 | End: 2024-09-15 | Stop reason: HOSPADM

## 2024-09-09 RX ORDER — SODIUM CHLORIDE 0.9 % (FLUSH) 0.9 %
5-40 SYRINGE (ML) INJECTION EVERY 12 HOURS SCHEDULED
Status: DISCONTINUED | OUTPATIENT
Start: 2024-09-09 | End: 2024-09-15 | Stop reason: HOSPADM

## 2024-09-09 RX ORDER — ROPIVACAINE HYDROCHLORIDE 5 MG/ML
30 INJECTION, SOLUTION EPIDURAL; INFILTRATION; PERINEURAL ONCE
Status: DISCONTINUED | OUTPATIENT
Start: 2024-09-09 | End: 2024-09-09 | Stop reason: HOSPADM

## 2024-09-09 RX ORDER — TRANEXAMIC ACID 10 MG/ML
1000 INJECTION, SOLUTION INTRAVENOUS
Status: COMPLETED | OUTPATIENT
Start: 2024-09-09 | End: 2024-09-09

## 2024-09-09 RX ORDER — SODIUM CHLORIDE 9 MG/ML
10 INJECTION, SOLUTION INTRAMUSCULAR; INTRAVENOUS; SUBCUTANEOUS ONCE
Status: DISCONTINUED | OUTPATIENT
Start: 2024-09-09 | End: 2024-09-09 | Stop reason: HOSPADM

## 2024-09-09 RX ORDER — EPHEDRINE SULFATE/0.9% NACL/PF 25 MG/5 ML
SYRINGE (ML) INTRAVENOUS PRN
Status: DISCONTINUED | OUTPATIENT
Start: 2024-09-09 | End: 2024-09-09 | Stop reason: SDUPTHER

## 2024-09-09 RX ORDER — TOBRAMYCIN 1.2 G/30ML
INJECTION, POWDER, LYOPHILIZED, FOR SOLUTION INTRAVENOUS PRN
Status: DISCONTINUED | OUTPATIENT
Start: 2024-09-09 | End: 2024-09-09 | Stop reason: ALTCHOICE

## 2024-09-09 RX ORDER — SODIUM CHLORIDE 9 MG/ML
INJECTION, SOLUTION INTRAVENOUS PRN
Status: DISCONTINUED | OUTPATIENT
Start: 2024-09-09 | End: 2024-09-15 | Stop reason: HOSPADM

## 2024-09-09 RX ORDER — CYCLOBENZAPRINE HCL 10 MG
10 TABLET ORAL EVERY 12 HOURS PRN
Status: DISCONTINUED | OUTPATIENT
Start: 2024-09-09 | End: 2024-09-15 | Stop reason: HOSPADM

## 2024-09-09 RX ORDER — LIDOCAINE HYDROCHLORIDE 20 MG/ML
INJECTION, SOLUTION INTRAVENOUS PRN
Status: DISCONTINUED | OUTPATIENT
Start: 2024-09-09 | End: 2024-09-09 | Stop reason: SDUPTHER

## 2024-09-09 RX ORDER — PHENYLEPHRINE HCL IN 0.9% NACL 1 MG/10 ML
SYRINGE (ML) INTRAVENOUS PRN
Status: DISCONTINUED | OUTPATIENT
Start: 2024-09-09 | End: 2024-09-09 | Stop reason: SDUPTHER

## 2024-09-09 RX ADMIN — SODIUM CHLORIDE, POTASSIUM CHLORIDE, SODIUM LACTATE AND CALCIUM CHLORIDE: 600; 310; 30; 20 INJECTION, SOLUTION INTRAVENOUS at 11:42

## 2024-09-09 RX ADMIN — ROCURONIUM BROMIDE 10 MG: 10 INJECTION, SOLUTION INTRAVENOUS at 13:33

## 2024-09-09 RX ADMIN — MIDAZOLAM 2 MG: 1 INJECTION INTRAMUSCULAR; INTRAVENOUS at 10:55

## 2024-09-09 RX ADMIN — VASOPRESSIN 3 UNITS: 20 INJECTION INTRAVENOUS at 12:36

## 2024-09-09 RX ADMIN — Medication 200 MCG: at 12:32

## 2024-09-09 RX ADMIN — Medication 100 MCG: at 13:02

## 2024-09-09 RX ADMIN — SODIUM CHLORIDE: 9 INJECTION, SOLUTION INTRAVENOUS at 11:41

## 2024-09-09 RX ADMIN — CEFAZOLIN 2000 MG: 2 INJECTION, POWDER, FOR SOLUTION INTRAMUSCULAR; INTRAVENOUS at 11:56

## 2024-09-09 RX ADMIN — TRANEXAMIC ACID 1000 MG: 10 INJECTION, SOLUTION INTRAVENOUS at 12:08

## 2024-09-09 RX ADMIN — Medication 200 MCG: at 12:13

## 2024-09-09 RX ADMIN — ROPIVACAINE HYDROCHLORIDE 50 ML: 5 INJECTION, SOLUTION EPIDURAL; INFILTRATION; PERINEURAL at 11:00

## 2024-09-09 RX ADMIN — PROPOFOL 150 MG: 10 INJECTION, EMULSION INTRAVENOUS at 11:48

## 2024-09-09 RX ADMIN — VASOPRESSIN 1 UNITS: 20 INJECTION INTRAVENOUS at 13:25

## 2024-09-09 RX ADMIN — TRANEXAMIC ACID 1000 MG: 10 INJECTION, SOLUTION INTRAVENOUS at 15:11

## 2024-09-09 RX ADMIN — EPHEDRINE SULFATE 10 MG: 5 INJECTION INTRAVENOUS at 12:05

## 2024-09-09 RX ADMIN — OXYCODONE HYDROCHLORIDE 10 MG: 10 TABLET ORAL at 19:44

## 2024-09-09 RX ADMIN — FENTANYL CITRATE 50 MCG: 0.05 INJECTION, SOLUTION INTRAMUSCULAR; INTRAVENOUS at 12:55

## 2024-09-09 RX ADMIN — SODIUM CHLORIDE, PRESERVATIVE FREE 10 ML: 5 INJECTION INTRAVENOUS at 21:14

## 2024-09-09 RX ADMIN — FENTANYL CITRATE 50 MCG: 0.05 INJECTION, SOLUTION INTRAMUSCULAR; INTRAVENOUS at 12:23

## 2024-09-09 RX ADMIN — Medication 100 MCG: at 12:36

## 2024-09-09 RX ADMIN — ROCURONIUM BROMIDE 20 MG: 10 INJECTION, SOLUTION INTRAVENOUS at 12:42

## 2024-09-09 RX ADMIN — Medication 200 MCG: at 12:03

## 2024-09-09 RX ADMIN — SODIUM CHLORIDE: 9 INJECTION, SOLUTION INTRAVENOUS at 10:04

## 2024-09-09 RX ADMIN — VASOPRESSIN 1 UNITS: 20 INJECTION INTRAVENOUS at 13:50

## 2024-09-09 RX ADMIN — WATER 2000 MG: 1 INJECTION INTRAMUSCULAR; INTRAVENOUS; SUBCUTANEOUS at 21:13

## 2024-09-09 RX ADMIN — DEXAMETHASONE SODIUM PHOSPHATE 10 MG: 10 INJECTION INTRAMUSCULAR; INTRAVENOUS at 11:56

## 2024-09-09 RX ADMIN — LIDOCAINE HYDROCHLORIDE 100 MG: 20 INJECTION, SOLUTION INTRAVENOUS at 11:48

## 2024-09-09 RX ADMIN — ROCURONIUM BROMIDE 50 MG: 10 INJECTION, SOLUTION INTRAVENOUS at 11:49

## 2024-09-09 RX ADMIN — Medication 100 MCG: at 12:07

## 2024-09-09 RX ADMIN — SUGAMMADEX 200 MG: 100 INJECTION, SOLUTION INTRAVENOUS at 14:08

## 2024-09-09 RX ADMIN — SODIUM CHLORIDE, POTASSIUM CHLORIDE, SODIUM LACTATE AND CALCIUM CHLORIDE: 600; 310; 30; 20 INJECTION, SOLUTION INTRAVENOUS at 14:18

## 2024-09-09 RX ADMIN — ONDANSETRON 4 MG: 2 INJECTION INTRAMUSCULAR; INTRAVENOUS at 13:53

## 2024-09-09 RX ADMIN — HYDROMORPHONE HYDROCHLORIDE 0.25 MG: 1 INJECTION, SOLUTION INTRAMUSCULAR; INTRAVENOUS; SUBCUTANEOUS at 14:50

## 2024-09-09 RX ADMIN — FENTANYL CITRATE 100 MCG: 0.05 INJECTION, SOLUTION INTRAMUSCULAR; INTRAVENOUS at 11:48

## 2024-09-09 RX ADMIN — EPHEDRINE SULFATE 15 MG: 5 INJECTION INTRAVENOUS at 12:29

## 2024-09-09 ASSESSMENT — PAIN DESCRIPTION - ORIENTATION
ORIENTATION: RIGHT

## 2024-09-09 ASSESSMENT — PAIN DESCRIPTION - DESCRIPTORS
DESCRIPTORS: SHARP;THROBBING
DESCRIPTORS: THROBBING;DISCOMFORT
DESCRIPTORS: THROBBING;ACHING
DESCRIPTORS: ACHING;THROBBING

## 2024-09-09 ASSESSMENT — PAIN SCALES - GENERAL
PAINLEVEL_OUTOF10: 6
PAINLEVEL_OUTOF10: 8
PAINLEVEL_OUTOF10: 10
PAINLEVEL_OUTOF10: 6
PAINLEVEL_OUTOF10: 10

## 2024-09-09 ASSESSMENT — PAIN DESCRIPTION - LOCATION
LOCATION: HIP
LOCATION: LEG;HIP
LOCATION: LEG
LOCATION: LEG;HIP

## 2024-09-09 ASSESSMENT — COPD QUESTIONNAIRES: CAT_SEVERITY: MODERATE

## 2024-09-09 ASSESSMENT — PAIN DESCRIPTION - PAIN TYPE
TYPE: ACUTE PAIN
TYPE: SURGICAL PAIN

## 2024-09-09 ASSESSMENT — ENCOUNTER SYMPTOMS: SHORTNESS OF BREATH: 1

## 2024-09-09 ASSESSMENT — PAIN - FUNCTIONAL ASSESSMENT
PAIN_FUNCTIONAL_ASSESSMENT: PREVENTS OR INTERFERES SOME ACTIVE ACTIVITIES AND ADLS
PAIN_FUNCTIONAL_ASSESSMENT: NONE - DENIES PAIN

## 2024-09-09 ASSESSMENT — LIFESTYLE VARIABLES: SMOKING_STATUS: 1

## 2024-09-10 PROBLEM — I82.A21: Status: ACTIVE | Noted: 2024-09-10

## 2024-09-10 LAB
ANION GAP SERPL CALCULATED.3IONS-SCNC: 18 MMOL/L (ref 7–16)
BUN SERPL-MCNC: 48 MG/DL (ref 6–23)
CALCIUM SERPL-MCNC: 8.5 MG/DL (ref 8.6–10.2)
CHLORIDE SERPL-SCNC: 95 MMOL/L (ref 98–107)
CO2 SERPL-SCNC: 22 MMOL/L (ref 22–29)
CREAT SERPL-MCNC: 1.7 MG/DL (ref 0.5–1)
GFR, ESTIMATED: 30 ML/MIN/1.73M2
GLUCOSE BLD-MCNC: 147 MG/DL (ref 74–99)
GLUCOSE SERPL-MCNC: 171 MG/DL (ref 74–99)
HCT VFR BLD AUTO: 36.3 % (ref 34–48)
HGB BLD-MCNC: 10.8 G/DL (ref 11.5–15.5)
POTASSIUM SERPL-SCNC: 5.2 MMOL/L (ref 3.5–5)
SODIUM SERPL-SCNC: 135 MMOL/L (ref 132–146)

## 2024-09-10 PROCEDURE — 6360000002 HC RX W HCPCS

## 2024-09-10 PROCEDURE — 82962 GLUCOSE BLOOD TEST: CPT

## 2024-09-10 PROCEDURE — 85018 HEMOGLOBIN: CPT

## 2024-09-10 PROCEDURE — 6370000000 HC RX 637 (ALT 250 FOR IP)

## 2024-09-10 PROCEDURE — 97165 OT EVAL LOW COMPLEX 30 MIN: CPT

## 2024-09-10 PROCEDURE — 85014 HEMATOCRIT: CPT

## 2024-09-10 PROCEDURE — 97530 THERAPEUTIC ACTIVITIES: CPT

## 2024-09-10 PROCEDURE — 80048 BASIC METABOLIC PNL TOTAL CA: CPT

## 2024-09-10 PROCEDURE — 94640 AIRWAY INHALATION TREATMENT: CPT

## 2024-09-10 PROCEDURE — 97535 SELF CARE MNGMENT TRAINING: CPT

## 2024-09-10 PROCEDURE — 97161 PT EVAL LOW COMPLEX 20 MIN: CPT

## 2024-09-10 PROCEDURE — 6360000002 HC RX W HCPCS: Performed by: STUDENT IN AN ORGANIZED HEALTH CARE EDUCATION/TRAINING PROGRAM

## 2024-09-10 PROCEDURE — 99231 SBSQ HOSP IP/OBS SF/LOW 25: CPT | Performed by: STUDENT IN AN ORGANIZED HEALTH CARE EDUCATION/TRAINING PROGRAM

## 2024-09-10 PROCEDURE — 36415 COLL VENOUS BLD VENIPUNCTURE: CPT

## 2024-09-10 PROCEDURE — 2580000003 HC RX 258

## 2024-09-10 PROCEDURE — 1200000000 HC SEMI PRIVATE

## 2024-09-10 PROCEDURE — 6370000000 HC RX 637 (ALT 250 FOR IP): Performed by: STUDENT IN AN ORGANIZED HEALTH CARE EDUCATION/TRAINING PROGRAM

## 2024-09-10 RX ORDER — SUCRALFATE 1 G/1
1 TABLET ORAL DAILY
Status: DISCONTINUED | OUTPATIENT
Start: 2024-09-10 | End: 2024-09-15 | Stop reason: HOSPADM

## 2024-09-10 RX ORDER — PANTOPRAZOLE SODIUM 20 MG/1
20 TABLET, DELAYED RELEASE ORAL DAILY
Status: DISCONTINUED | OUTPATIENT
Start: 2024-09-10 | End: 2024-09-15 | Stop reason: HOSPADM

## 2024-09-10 RX ORDER — ARFORMOTEROL TARTRATE 15 UG/2ML
15 SOLUTION RESPIRATORY (INHALATION) 2 TIMES DAILY
Status: DISCONTINUED | OUTPATIENT
Start: 2024-09-10 | End: 2024-09-15 | Stop reason: HOSPADM

## 2024-09-10 RX ORDER — LANOLIN ALCOHOL/MO/W.PET/CERES
3 CREAM (GRAM) TOPICAL NIGHTLY PRN
Status: DISCONTINUED | OUTPATIENT
Start: 2024-09-10 | End: 2024-09-15 | Stop reason: HOSPADM

## 2024-09-10 RX ORDER — CALCIUM CARBONATE 500 MG/1
1 TABLET, CHEWABLE ORAL 2 TIMES DAILY
Status: DISCONTINUED | OUTPATIENT
Start: 2024-09-10 | End: 2024-09-15 | Stop reason: HOSPADM

## 2024-09-10 RX ORDER — LEVOTHYROXINE SODIUM 50 UG/1
50 TABLET ORAL DAILY
Status: DISCONTINUED | OUTPATIENT
Start: 2024-09-10 | End: 2024-09-15 | Stop reason: HOSPADM

## 2024-09-10 RX ORDER — POLYETHYLENE GLYCOL 3350 17 G/17G
17 POWDER, FOR SOLUTION ORAL DAILY PRN
Status: DISCONTINUED | OUTPATIENT
Start: 2024-09-10 | End: 2024-09-15 | Stop reason: HOSPADM

## 2024-09-10 RX ORDER — PRAMIPEXOLE DIHYDROCHLORIDE 0.25 MG/1
0.75 TABLET ORAL 2 TIMES DAILY
Status: DISCONTINUED | OUTPATIENT
Start: 2024-09-10 | End: 2024-09-13

## 2024-09-10 RX ORDER — GABAPENTIN 400 MG/1
800 CAPSULE ORAL 3 TIMES DAILY
Status: DISCONTINUED | OUTPATIENT
Start: 2024-09-10 | End: 2024-09-11

## 2024-09-10 RX ORDER — FERROUS SULFATE 325(65) MG
325 TABLET ORAL DAILY
Status: DISCONTINUED | OUTPATIENT
Start: 2024-09-10 | End: 2024-09-15 | Stop reason: HOSPADM

## 2024-09-10 RX ORDER — ATORVASTATIN CALCIUM 80 MG/1
80 TABLET, FILM COATED ORAL DAILY
Status: DISCONTINUED | OUTPATIENT
Start: 2024-09-10 | End: 2024-09-15 | Stop reason: HOSPADM

## 2024-09-10 RX ORDER — BUPROPION HYDROCHLORIDE 100 MG/1
100 TABLET, EXTENDED RELEASE ORAL DAILY
Status: DISCONTINUED | OUTPATIENT
Start: 2024-09-10 | End: 2024-09-15 | Stop reason: HOSPADM

## 2024-09-10 RX ADMIN — PRAMIPEXOLE DIHYDROCHLORIDE 0.75 MG: 0.25 TABLET ORAL at 21:04

## 2024-09-10 RX ADMIN — ACETAMINOPHEN 650 MG: 325 TABLET ORAL at 17:12

## 2024-09-10 RX ADMIN — SODIUM CHLORIDE, PRESERVATIVE FREE 10 ML: 5 INJECTION INTRAVENOUS at 08:40

## 2024-09-10 RX ADMIN — PANTOPRAZOLE SODIUM 20 MG: 20 TABLET, DELAYED RELEASE ORAL at 17:03

## 2024-09-10 RX ADMIN — CALCIUM CARBONATE 500 MG: 500 TABLET, CHEWABLE ORAL at 21:03

## 2024-09-10 RX ADMIN — GABAPENTIN 800 MG: 400 CAPSULE ORAL at 17:03

## 2024-09-10 RX ADMIN — OXYCODONE HYDROCHLORIDE 10 MG: 10 TABLET ORAL at 02:34

## 2024-09-10 RX ADMIN — LEVOTHYROXINE SODIUM 50 MCG: 0.05 TABLET ORAL at 17:03

## 2024-09-10 RX ADMIN — SODIUM ZIRCONIUM CYCLOSILICATE 10 G: 10 POWDER, FOR SUSPENSION ORAL at 17:13

## 2024-09-10 RX ADMIN — APIXABAN 5 MG: 5 TABLET, FILM COATED ORAL at 21:04

## 2024-09-10 RX ADMIN — SENNOSIDES AND DOCUSATE SODIUM 1 TABLET: 50; 8.6 TABLET ORAL at 08:40

## 2024-09-10 RX ADMIN — WATER 2000 MG: 1 INJECTION INTRAMUSCULAR; INTRAVENOUS; SUBCUTANEOUS at 06:18

## 2024-09-10 RX ADMIN — OXYCODONE HYDROCHLORIDE 10 MG: 10 TABLET ORAL at 21:04

## 2024-09-10 RX ADMIN — ACETAMINOPHEN 650 MG: 325 TABLET ORAL at 06:17

## 2024-09-10 RX ADMIN — FERROUS SULFATE TAB 325 MG (65 MG ELEMENTAL FE) 325 MG: 325 (65 FE) TAB at 17:03

## 2024-09-10 RX ADMIN — OXYCODONE HYDROCHLORIDE 10 MG: 10 TABLET ORAL at 08:40

## 2024-09-10 RX ADMIN — GABAPENTIN 800 MG: 400 CAPSULE ORAL at 21:03

## 2024-09-10 RX ADMIN — BUPROPION HYDROCHLORIDE 100 MG: 100 TABLET, EXTENDED RELEASE ORAL at 17:12

## 2024-09-10 RX ADMIN — ARFORMOTEROL TARTRATE 15 MCG: 15 SOLUTION RESPIRATORY (INHALATION) at 21:16

## 2024-09-10 RX ADMIN — ATORVASTATIN CALCIUM 80 MG: 80 TABLET, FILM COATED ORAL at 17:03

## 2024-09-10 RX ADMIN — SODIUM CHLORIDE, PRESERVATIVE FREE 10 ML: 5 INJECTION INTRAVENOUS at 21:04

## 2024-09-10 RX ADMIN — SENNOSIDES AND DOCUSATE SODIUM 1 TABLET: 50; 8.6 TABLET ORAL at 21:04

## 2024-09-10 RX ADMIN — SUCRALFATE 1 G: 1 TABLET ORAL at 17:03

## 2024-09-10 RX ADMIN — ACETAMINOPHEN 650 MG: 325 TABLET ORAL at 11:54

## 2024-09-10 RX ADMIN — APIXABAN 5 MG: 5 TABLET, FILM COATED ORAL at 11:54

## 2024-09-10 RX ADMIN — ACETAMINOPHEN 650 MG: 325 TABLET ORAL at 00:36

## 2024-09-10 ASSESSMENT — PAIN DESCRIPTION - ONSET
ONSET: GRADUAL

## 2024-09-10 ASSESSMENT — PAIN SCALES - GENERAL
PAINLEVEL_OUTOF10: 7
PAINLEVEL_OUTOF10: 7
PAINLEVEL_OUTOF10: 0
PAINLEVEL_OUTOF10: 0
PAINLEVEL_OUTOF10: 8
PAINLEVEL_OUTOF10: 8
PAINLEVEL_OUTOF10: 6
PAINLEVEL_OUTOF10: 6
PAINLEVEL_OUTOF10: 10
PAINLEVEL_OUTOF10: 10
PAINLEVEL_OUTOF10: 4
PAINLEVEL_OUTOF10: 6
PAINLEVEL_OUTOF10: 0

## 2024-09-10 ASSESSMENT — PAIN DESCRIPTION - LOCATION
LOCATION: HIP
LOCATION: HIP;LEG
LOCATION: HIP
LOCATION: HIP;LEG
LOCATION: HIP
LOCATION: HIP

## 2024-09-10 ASSESSMENT — PAIN - FUNCTIONAL ASSESSMENT

## 2024-09-10 ASSESSMENT — PAIN DESCRIPTION - ORIENTATION
ORIENTATION: RIGHT

## 2024-09-10 ASSESSMENT — PAIN DESCRIPTION - DESCRIPTORS
DESCRIPTORS: ACHING
DESCRIPTORS: ACHING
DESCRIPTORS: ACHING;THROBBING;SORE
DESCRIPTORS: ACHING;CRAMPING;DISCOMFORT
DESCRIPTORS: ACHING;TENDER;THROBBING
DESCRIPTORS: ACHING
DESCRIPTORS: ACHING;DISCOMFORT;THROBBING

## 2024-09-10 ASSESSMENT — PAIN DESCRIPTION - FREQUENCY
FREQUENCY: INTERMITTENT

## 2024-09-10 ASSESSMENT — PAIN DESCRIPTION - PAIN TYPE
TYPE: SURGICAL PAIN

## 2024-09-11 LAB
ANION GAP SERPL CALCULATED.3IONS-SCNC: 12 MMOL/L (ref 7–16)
ANION GAP SERPL CALCULATED.3IONS-SCNC: 14 MMOL/L (ref 7–16)
ANION GAP SERPL CALCULATED.3IONS-SCNC: 16 MMOL/L (ref 7–16)
BACTERIA URNS QL MICRO: ABNORMAL
BASOPHILS # BLD: 0.04 K/UL (ref 0–0.2)
BASOPHILS NFR BLD: 0 % (ref 0–2)
BILIRUB UR QL STRIP: NEGATIVE
BUN SERPL-MCNC: 61 MG/DL (ref 6–23)
BUN SERPL-MCNC: 65 MG/DL (ref 6–23)
BUN SERPL-MCNC: 67 MG/DL (ref 6–23)
CALCIUM SERPL-MCNC: 7.6 MG/DL (ref 8.6–10.2)
CALCIUM SERPL-MCNC: 7.6 MG/DL (ref 8.6–10.2)
CALCIUM SERPL-MCNC: 8.2 MG/DL (ref 8.6–10.2)
CHLORIDE SERPL-SCNC: 94 MMOL/L (ref 98–107)
CHLORIDE SERPL-SCNC: 95 MMOL/L (ref 98–107)
CHLORIDE SERPL-SCNC: 99 MMOL/L (ref 98–107)
CHLORIDE UR-SCNC: <20 MMOL/L
CLARITY UR: CLEAR
CO2 SERPL-SCNC: 21 MMOL/L (ref 22–29)
CO2 SERPL-SCNC: 22 MMOL/L (ref 22–29)
CO2 SERPL-SCNC: 26 MMOL/L (ref 22–29)
COLOR UR: YELLOW
CREAT SERPL-MCNC: 2.1 MG/DL (ref 0.5–1)
CREAT SERPL-MCNC: 2.2 MG/DL (ref 0.5–1)
CREAT SERPL-MCNC: 2.3 MG/DL (ref 0.5–1)
CREAT UR-MCNC: 86.6 MG/DL (ref 29–226)
EOSINOPHIL # BLD: 0.17 K/UL (ref 0.05–0.5)
EOSINOPHILS RELATIVE PERCENT: 2 % (ref 0–6)
EPI CELLS #/AREA URNS HPF: ABNORMAL /HPF
ERYTHROCYTE [DISTWIDTH] IN BLOOD BY AUTOMATED COUNT: 18 % (ref 11.5–15)
GFR, ESTIMATED: 22 ML/MIN/1.73M2
GFR, ESTIMATED: 23 ML/MIN/1.73M2
GFR, ESTIMATED: 24 ML/MIN/1.73M2
GLUCOSE SERPL-MCNC: 124 MG/DL (ref 74–99)
GLUCOSE SERPL-MCNC: 126 MG/DL (ref 74–99)
GLUCOSE SERPL-MCNC: 135 MG/DL (ref 74–99)
GLUCOSE UR STRIP-MCNC: NEGATIVE MG/DL
HCT VFR BLD AUTO: 27.7 % (ref 34–48)
HGB BLD-MCNC: 8.1 G/DL (ref 11.5–15.5)
HGB UR QL STRIP.AUTO: NEGATIVE
IMM GRANULOCYTES # BLD AUTO: 0.06 K/UL (ref 0–0.58)
IMM GRANULOCYTES NFR BLD: 1 % (ref 0–5)
KETONES UR STRIP-MCNC: ABNORMAL MG/DL
LEUKOCYTE ESTERASE UR QL STRIP: ABNORMAL
LYMPHOCYTES NFR BLD: 2.82 K/UL (ref 1.5–4)
LYMPHOCYTES RELATIVE PERCENT: 26 % (ref 20–42)
MCH RBC QN AUTO: 26.8 PG (ref 26–35)
MCHC RBC AUTO-ENTMCNC: 29.2 G/DL (ref 32–34.5)
MCV RBC AUTO: 91.7 FL (ref 80–99.9)
MICROORGANISM SPEC CULT: NO GROWTH
MICROORGANISM/AGENT SPEC: NORMAL
MONOCYTES NFR BLD: 0.88 K/UL (ref 0.1–0.95)
MONOCYTES NFR BLD: 8 % (ref 2–12)
NEUTROPHILS NFR BLD: 63 % (ref 43–80)
NEUTS SEG NFR BLD: 6.81 K/UL (ref 1.8–7.3)
NITRITE UR QL STRIP: NEGATIVE
PH UR STRIP: 5.5 [PH] (ref 5–9)
PLATELET # BLD AUTO: 169 K/UL (ref 130–450)
PMV BLD AUTO: 11.2 FL (ref 7–12)
POTASSIUM SERPL-SCNC: 3.8 MMOL/L (ref 3.5–5)
POTASSIUM SERPL-SCNC: 4.2 MMOL/L (ref 3.5–5)
POTASSIUM SERPL-SCNC: 4.9 MMOL/L (ref 3.5–5)
PROT UR STRIP-MCNC: ABNORMAL MG/DL
RBC # BLD AUTO: 3.02 M/UL (ref 3.5–5.5)
RBC #/AREA URNS HPF: ABNORMAL /HPF
SERVICE CMNT-IMP: NORMAL
SODIUM SERPL-SCNC: 131 MMOL/L (ref 132–146)
SODIUM SERPL-SCNC: 131 MMOL/L (ref 132–146)
SODIUM SERPL-SCNC: 137 MMOL/L (ref 132–146)
SODIUM UR-SCNC: <20 MMOL/L
SP GR UR STRIP: 1.02 (ref 1–1.03)
SPECIMEN DESCRIPTION: NORMAL
TOTAL PROTEIN, URINE: 39 MG/DL (ref 0–12)
URINE TOTAL PROTEIN CREATININE RATIO: 0.45 (ref 0–0.2)
UROBILINOGEN UR STRIP-ACNC: 0.2 EU/DL (ref 0–1)
WBC #/AREA URNS HPF: ABNORMAL /HPF
WBC OTHER # BLD: 10.8 K/UL (ref 4.5–11.5)

## 2024-09-11 PROCEDURE — 84300 ASSAY OF URINE SODIUM: CPT

## 2024-09-11 PROCEDURE — 1200000000 HC SEMI PRIVATE

## 2024-09-11 PROCEDURE — 2580000003 HC RX 258: Performed by: INTERNAL MEDICINE

## 2024-09-11 PROCEDURE — 97530 THERAPEUTIC ACTIVITIES: CPT

## 2024-09-11 PROCEDURE — 51798 US URINE CAPACITY MEASURE: CPT

## 2024-09-11 PROCEDURE — 85025 COMPLETE CBC W/AUTO DIFF WBC: CPT

## 2024-09-11 PROCEDURE — 82570 ASSAY OF URINE CREATININE: CPT

## 2024-09-11 PROCEDURE — 97112 NEUROMUSCULAR REEDUCATION: CPT

## 2024-09-11 PROCEDURE — 2580000003 HC RX 258: Performed by: ORTHOPAEDIC SURGERY

## 2024-09-11 PROCEDURE — 2580000003 HC RX 258

## 2024-09-11 PROCEDURE — 80048 BASIC METABOLIC PNL TOTAL CA: CPT

## 2024-09-11 PROCEDURE — 82436 ASSAY OF URINE CHLORIDE: CPT

## 2024-09-11 PROCEDURE — 2700000000 HC OXYGEN THERAPY PER DAY

## 2024-09-11 PROCEDURE — 6370000000 HC RX 637 (ALT 250 FOR IP): Performed by: INTERNAL MEDICINE

## 2024-09-11 PROCEDURE — 81001 URINALYSIS AUTO W/SCOPE: CPT

## 2024-09-11 PROCEDURE — 99233 SBSQ HOSP IP/OBS HIGH 50: CPT | Performed by: INTERNAL MEDICINE

## 2024-09-11 PROCEDURE — 87205 SMEAR GRAM STAIN: CPT

## 2024-09-11 PROCEDURE — 97535 SELF CARE MNGMENT TRAINING: CPT

## 2024-09-11 PROCEDURE — 6370000000 HC RX 637 (ALT 250 FOR IP)

## 2024-09-11 PROCEDURE — 36415 COLL VENOUS BLD VENIPUNCTURE: CPT

## 2024-09-11 PROCEDURE — 94640 AIRWAY INHALATION TREATMENT: CPT

## 2024-09-11 PROCEDURE — 6370000000 HC RX 637 (ALT 250 FOR IP): Performed by: STUDENT IN AN ORGANIZED HEALTH CARE EDUCATION/TRAINING PROGRAM

## 2024-09-11 PROCEDURE — 84156 ASSAY OF PROTEIN URINE: CPT

## 2024-09-11 RX ORDER — SODIUM CHLORIDE 9 MG/ML
INJECTION, SOLUTION INTRAVENOUS CONTINUOUS
Status: DISCONTINUED | OUTPATIENT
Start: 2024-09-11 | End: 2024-09-15

## 2024-09-11 RX ORDER — GABAPENTIN 100 MG/1
200 CAPSULE ORAL EVERY 8 HOURS
Status: DISCONTINUED | OUTPATIENT
Start: 2024-09-11 | End: 2024-09-13

## 2024-09-11 RX ORDER — 0.9 % SODIUM CHLORIDE 0.9 %
500 INTRAVENOUS SOLUTION INTRAVENOUS ONCE
Status: COMPLETED | OUTPATIENT
Start: 2024-09-11 | End: 2024-09-11

## 2024-09-11 RX ADMIN — ARFORMOTEROL TARTRATE 15 MCG: 15 SOLUTION RESPIRATORY (INHALATION) at 21:13

## 2024-09-11 RX ADMIN — SODIUM CHLORIDE, PRESERVATIVE FREE 10 ML: 5 INJECTION INTRAVENOUS at 20:16

## 2024-09-11 RX ADMIN — CALCIUM CARBONATE 500 MG: 500 TABLET, CHEWABLE ORAL at 20:08

## 2024-09-11 RX ADMIN — PANTOPRAZOLE SODIUM 20 MG: 20 TABLET, DELAYED RELEASE ORAL at 09:32

## 2024-09-11 RX ADMIN — ACETAMINOPHEN 650 MG: 325 TABLET ORAL at 11:24

## 2024-09-11 RX ADMIN — ACETAMINOPHEN 650 MG: 325 TABLET ORAL at 05:06

## 2024-09-11 RX ADMIN — LEVOTHYROXINE SODIUM 50 MCG: 0.05 TABLET ORAL at 05:06

## 2024-09-11 RX ADMIN — FERROUS SULFATE TAB 325 MG (65 MG ELEMENTAL FE) 325 MG: 325 (65 FE) TAB at 09:32

## 2024-09-11 RX ADMIN — SODIUM CHLORIDE: 9 INJECTION, SOLUTION INTRAVENOUS at 09:35

## 2024-09-11 RX ADMIN — GABAPENTIN 200 MG: 100 CAPSULE ORAL at 17:30

## 2024-09-11 RX ADMIN — SENNOSIDES AND DOCUSATE SODIUM 1 TABLET: 50; 8.6 TABLET ORAL at 09:32

## 2024-09-11 RX ADMIN — SODIUM CHLORIDE: 9 INJECTION, SOLUTION INTRAVENOUS at 20:08

## 2024-09-11 RX ADMIN — APIXABAN 5 MG: 5 TABLET, FILM COATED ORAL at 09:31

## 2024-09-11 RX ADMIN — SENNOSIDES AND DOCUSATE SODIUM 1 TABLET: 50; 8.6 TABLET ORAL at 20:08

## 2024-09-11 RX ADMIN — BUPROPION HYDROCHLORIDE 100 MG: 100 TABLET, EXTENDED RELEASE ORAL at 09:32

## 2024-09-11 RX ADMIN — SUCRALFATE 1 G: 1 TABLET ORAL at 09:32

## 2024-09-11 RX ADMIN — APIXABAN 5 MG: 5 TABLET, FILM COATED ORAL at 20:08

## 2024-09-11 RX ADMIN — ATORVASTATIN CALCIUM 80 MG: 80 TABLET, FILM COATED ORAL at 09:32

## 2024-09-11 RX ADMIN — PRAMIPEXOLE DIHYDROCHLORIDE 0.75 MG: 0.25 TABLET ORAL at 09:32

## 2024-09-11 RX ADMIN — ACETAMINOPHEN 650 MG: 325 TABLET ORAL at 17:30

## 2024-09-11 RX ADMIN — GABAPENTIN 800 MG: 400 CAPSULE ORAL at 09:31

## 2024-09-11 RX ADMIN — CALCIUM CARBONATE 500 MG: 500 TABLET, CHEWABLE ORAL at 09:32

## 2024-09-11 RX ADMIN — SODIUM CHLORIDE 500 ML: 9 INJECTION, SOLUTION INTRAVENOUS at 10:00

## 2024-09-11 ASSESSMENT — PAIN SCALES - GENERAL
PAINLEVEL_OUTOF10: 6
PAINLEVEL_OUTOF10: 0

## 2024-09-11 ASSESSMENT — PAIN DESCRIPTION - ORIENTATION: ORIENTATION: RIGHT

## 2024-09-11 ASSESSMENT — PAIN DESCRIPTION - LOCATION: LOCATION: LEG

## 2024-09-12 ENCOUNTER — APPOINTMENT (OUTPATIENT)
Dept: ULTRASOUND IMAGING | Age: 79
DRG: 480 | End: 2024-09-12
Attending: ORTHOPAEDIC SURGERY
Payer: MEDICARE

## 2024-09-12 LAB
ANION GAP SERPL CALCULATED.3IONS-SCNC: 11 MMOL/L (ref 7–16)
BUN SERPL-MCNC: 57 MG/DL (ref 6–23)
CALCIUM SERPL-MCNC: 8 MG/DL (ref 8.6–10.2)
CHLORIDE SERPL-SCNC: 104 MMOL/L (ref 98–107)
CO2 SERPL-SCNC: 24 MMOL/L (ref 22–29)
CREAT SERPL-MCNC: 1.8 MG/DL (ref 0.5–1)
EOSINOPHILS PERCENT, URINE: 0 % (ref 0–1)
ERYTHROCYTE [DISTWIDTH] IN BLOOD BY AUTOMATED COUNT: 17.9 % (ref 11.5–15)
FERRITIN SERPL-MCNC: 149 NG/ML
GFR, ESTIMATED: 29 ML/MIN/1.73M2
GLUCOSE SERPL-MCNC: 94 MG/DL (ref 74–99)
HCT VFR BLD AUTO: 24 % (ref 34–48)
HCT VFR BLD AUTO: 27.8 % (ref 34–48)
HGB BLD-MCNC: 6.8 G/DL (ref 11.5–15.5)
HGB BLD-MCNC: 8.5 G/DL (ref 11.5–15.5)
IRON SATN MFR SERPL: 8 % (ref 15–50)
IRON SERPL-MCNC: 14 UG/DL (ref 37–145)
MAGNESIUM SERPL-MCNC: 1.7 MG/DL (ref 1.6–2.6)
MCH RBC QN AUTO: 26.2 PG (ref 26–35)
MCHC RBC AUTO-ENTMCNC: 28.3 G/DL (ref 32–34.5)
MCV RBC AUTO: 92.3 FL (ref 80–99.9)
PHOSPHATE SERPL-MCNC: 4.4 MG/DL (ref 2.5–4.5)
PLATELET # BLD AUTO: 154 K/UL (ref 130–450)
PMV BLD AUTO: 11 FL (ref 7–12)
POTASSIUM SERPL-SCNC: 3.8 MMOL/L (ref 3.5–5)
RBC # BLD AUTO: 2.6 M/UL (ref 3.5–5.5)
SODIUM SERPL-SCNC: 139 MMOL/L (ref 132–146)
TIBC SERPL-MCNC: 186 UG/DL (ref 250–450)
WBC OTHER # BLD: 6.6 K/UL (ref 4.5–11.5)

## 2024-09-12 PROCEDURE — 85014 HEMATOCRIT: CPT

## 2024-09-12 PROCEDURE — 82728 ASSAY OF FERRITIN: CPT

## 2024-09-12 PROCEDURE — 97530 THERAPEUTIC ACTIVITIES: CPT

## 2024-09-12 PROCEDURE — 6360000002 HC RX W HCPCS

## 2024-09-12 PROCEDURE — 6370000000 HC RX 637 (ALT 250 FOR IP)

## 2024-09-12 PROCEDURE — 80048 BASIC METABOLIC PNL TOTAL CA: CPT

## 2024-09-12 PROCEDURE — 93971 EXTREMITY STUDY: CPT

## 2024-09-12 PROCEDURE — 2580000003 HC RX 258: Performed by: INTERNAL MEDICINE

## 2024-09-12 PROCEDURE — 36415 COLL VENOUS BLD VENIPUNCTURE: CPT

## 2024-09-12 PROCEDURE — 83735 ASSAY OF MAGNESIUM: CPT

## 2024-09-12 PROCEDURE — 36430 TRANSFUSION BLD/BLD COMPNT: CPT

## 2024-09-12 PROCEDURE — 83550 IRON BINDING TEST: CPT

## 2024-09-12 PROCEDURE — 6370000000 HC RX 637 (ALT 250 FOR IP): Performed by: INTERNAL MEDICINE

## 2024-09-12 PROCEDURE — 2580000003 HC RX 258

## 2024-09-12 PROCEDURE — 97535 SELF CARE MNGMENT TRAINING: CPT

## 2024-09-12 PROCEDURE — 1200000000 HC SEMI PRIVATE

## 2024-09-12 PROCEDURE — 85027 COMPLETE CBC AUTOMATED: CPT

## 2024-09-12 PROCEDURE — 2700000000 HC OXYGEN THERAPY PER DAY

## 2024-09-12 PROCEDURE — 83540 ASSAY OF IRON: CPT

## 2024-09-12 PROCEDURE — 97112 NEUROMUSCULAR REEDUCATION: CPT

## 2024-09-12 PROCEDURE — 94640 AIRWAY INHALATION TREATMENT: CPT

## 2024-09-12 PROCEDURE — 6370000000 HC RX 637 (ALT 250 FOR IP): Performed by: STUDENT IN AN ORGANIZED HEALTH CARE EDUCATION/TRAINING PROGRAM

## 2024-09-12 PROCEDURE — 84100 ASSAY OF PHOSPHORUS: CPT

## 2024-09-12 PROCEDURE — 99233 SBSQ HOSP IP/OBS HIGH 50: CPT | Performed by: INTERNAL MEDICINE

## 2024-09-12 PROCEDURE — 9900000074 HC THERAPEUTIC ACTIVITIES PER 15 MIN (SELF-PAY)

## 2024-09-12 PROCEDURE — 85018 HEMOGLOBIN: CPT

## 2024-09-12 PROCEDURE — P9016 RBC LEUKOCYTES REDUCED: HCPCS

## 2024-09-12 PROCEDURE — 76770 US EXAM ABDO BACK WALL COMP: CPT

## 2024-09-12 RX ORDER — TRAMADOL HYDROCHLORIDE 50 MG/1
25 TABLET ORAL EVERY 12 HOURS PRN
Status: DISCONTINUED | OUTPATIENT
Start: 2024-09-12 | End: 2024-09-14

## 2024-09-12 RX ORDER — ROPINIROLE 0.25 MG/1
0.25 TABLET, FILM COATED ORAL 2 TIMES DAILY PRN
Status: DISCONTINUED | OUTPATIENT
Start: 2024-09-12 | End: 2024-09-13

## 2024-09-12 RX ORDER — SODIUM CHLORIDE 9 MG/ML
INJECTION, SOLUTION INTRAVENOUS PRN
Status: COMPLETED | OUTPATIENT
Start: 2024-09-12 | End: 2024-09-12

## 2024-09-12 RX ADMIN — APIXABAN 5 MG: 5 TABLET, FILM COATED ORAL at 08:14

## 2024-09-12 RX ADMIN — SODIUM CHLORIDE, PRESERVATIVE FREE 10 ML: 5 INJECTION INTRAVENOUS at 08:14

## 2024-09-12 RX ADMIN — GABAPENTIN 200 MG: 100 CAPSULE ORAL at 08:13

## 2024-09-12 RX ADMIN — SUCRALFATE 1 G: 1 TABLET ORAL at 08:14

## 2024-09-12 RX ADMIN — SODIUM CHLORIDE: 9 INJECTION, SOLUTION INTRAVENOUS at 08:12

## 2024-09-12 RX ADMIN — ROPINIROLE HYDROCHLORIDE 0.25 MG: 0.25 TABLET, FILM COATED ORAL at 17:24

## 2024-09-12 RX ADMIN — CALCIUM CARBONATE 500 MG: 500 TABLET, CHEWABLE ORAL at 20:46

## 2024-09-12 RX ADMIN — ACETAMINOPHEN 650 MG: 325 TABLET ORAL at 16:33

## 2024-09-12 RX ADMIN — PANTOPRAZOLE SODIUM 20 MG: 20 TABLET, DELAYED RELEASE ORAL at 08:14

## 2024-09-12 RX ADMIN — ACETAMINOPHEN 650 MG: 325 TABLET ORAL at 00:01

## 2024-09-12 RX ADMIN — APIXABAN 5 MG: 5 TABLET, FILM COATED ORAL at 20:47

## 2024-09-12 RX ADMIN — SODIUM CHLORIDE: 9 INJECTION, SOLUTION INTRAVENOUS at 20:48

## 2024-09-12 RX ADMIN — ATORVASTATIN CALCIUM 80 MG: 80 TABLET, FILM COATED ORAL at 08:14

## 2024-09-12 RX ADMIN — FERROUS SULFATE TAB 325 MG (65 MG ELEMENTAL FE) 325 MG: 325 (65 FE) TAB at 08:14

## 2024-09-12 RX ADMIN — CYCLOBENZAPRINE 10 MG: 10 TABLET, FILM COATED ORAL at 08:27

## 2024-09-12 RX ADMIN — GABAPENTIN 200 MG: 100 CAPSULE ORAL at 00:01

## 2024-09-12 RX ADMIN — ACETAMINOPHEN 650 MG: 325 TABLET ORAL at 11:33

## 2024-09-12 RX ADMIN — SODIUM CHLORIDE, PRESERVATIVE FREE 10 ML: 5 INJECTION INTRAVENOUS at 20:46

## 2024-09-12 RX ADMIN — ACETAMINOPHEN 650 MG: 325 TABLET ORAL at 05:37

## 2024-09-12 RX ADMIN — GABAPENTIN 200 MG: 100 CAPSULE ORAL at 16:33

## 2024-09-12 RX ADMIN — SODIUM CHLORIDE 125 MG: 9 INJECTION, SOLUTION INTRAVENOUS at 15:14

## 2024-09-12 RX ADMIN — ARFORMOTEROL TARTRATE 15 MCG: 15 SOLUTION RESPIRATORY (INHALATION) at 21:14

## 2024-09-12 RX ADMIN — CALCIUM CARBONATE 500 MG: 500 TABLET, CHEWABLE ORAL at 08:14

## 2024-09-12 RX ADMIN — TRAMADOL HYDROCHLORIDE 25 MG: 50 TABLET ORAL at 17:26

## 2024-09-12 RX ADMIN — CYCLOBENZAPRINE 10 MG: 10 TABLET, FILM COATED ORAL at 20:52

## 2024-09-12 RX ADMIN — BUPROPION HYDROCHLORIDE 100 MG: 100 TABLET, EXTENDED RELEASE ORAL at 08:14

## 2024-09-12 RX ADMIN — LEVOTHYROXINE SODIUM 50 MCG: 0.05 TABLET ORAL at 05:37

## 2024-09-12 ASSESSMENT — PAIN SCALES - GENERAL
PAINLEVEL_OUTOF10: 7
PAINLEVEL_OUTOF10: 10
PAINLEVEL_OUTOF10: 6
PAINLEVEL_OUTOF10: 7
PAINLEVEL_OUTOF10: 7
PAINLEVEL_OUTOF10: 6
PAINLEVEL_OUTOF10: 7
PAINLEVEL_OUTOF10: 6
PAINLEVEL_OUTOF10: 7
PAINLEVEL_OUTOF10: 8

## 2024-09-12 ASSESSMENT — PAIN DESCRIPTION - LOCATION
LOCATION: LEG
LOCATION: LEG
LOCATION: HIP

## 2024-09-12 ASSESSMENT — PAIN DESCRIPTION - ONSET
ONSET: GRADUAL
ONSET: GRADUAL

## 2024-09-12 ASSESSMENT — PAIN DESCRIPTION - DESCRIPTORS
DESCRIPTORS: ACHING;DISCOMFORT;SORE;SPASM
DESCRIPTORS: ACHING;DISCOMFORT;CRAMPING
DESCRIPTORS: ACHING;CRAMPING;DISCOMFORT
DESCRIPTORS: ACHING;CRAMPING
DESCRIPTORS: ACHING;DISCOMFORT;SORE;CRAMPING

## 2024-09-12 ASSESSMENT — PAIN DESCRIPTION - ORIENTATION
ORIENTATION: LEFT;RIGHT
ORIENTATION: RIGHT

## 2024-09-12 ASSESSMENT — PAIN DESCRIPTION - PAIN TYPE
TYPE: SURGICAL PAIN
TYPE: SURGICAL PAIN

## 2024-09-12 ASSESSMENT — PAIN - FUNCTIONAL ASSESSMENT
PAIN_FUNCTIONAL_ASSESSMENT: PREVENTS OR INTERFERES SOME ACTIVE ACTIVITIES AND ADLS

## 2024-09-12 ASSESSMENT — PAIN DESCRIPTION - FREQUENCY
FREQUENCY: INTERMITTENT
FREQUENCY: INTERMITTENT

## 2024-09-13 LAB
ABO/RH: NORMAL
ANION GAP SERPL CALCULATED.3IONS-SCNC: 12 MMOL/L (ref 7–16)
ANTIBODY SCREEN: NEGATIVE
ARM BAND NUMBER: NORMAL
BASOPHILS # BLD: 0.02 K/UL (ref 0–0.2)
BASOPHILS NFR BLD: 0 % (ref 0–2)
BLOOD BANK BLOOD PRODUCT EXPIRATION DATE: NORMAL
BLOOD BANK DISPENSE STATUS: NORMAL
BLOOD BANK ISBT PRODUCT BLOOD TYPE: 6200
BLOOD BANK PRODUCT CODE: NORMAL
BLOOD BANK SAMPLE EXPIRATION: NORMAL
BLOOD BANK UNIT TYPE AND RH: NORMAL
BPU ID: NORMAL
BUN SERPL-MCNC: 49 MG/DL (ref 6–23)
CALCIUM SERPL-MCNC: 8.2 MG/DL (ref 8.6–10.2)
CHLORIDE SERPL-SCNC: 109 MMOL/L (ref 98–107)
CO2 SERPL-SCNC: 22 MMOL/L (ref 22–29)
COMPONENT: NORMAL
CREAT SERPL-MCNC: 1.4 MG/DL (ref 0.5–1)
CROSSMATCH RESULT: NORMAL
EOSINOPHIL # BLD: 0.21 K/UL (ref 0.05–0.5)
EOSINOPHILS RELATIVE PERCENT: 2 % (ref 0–6)
ERYTHROCYTE [DISTWIDTH] IN BLOOD BY AUTOMATED COUNT: 18 % (ref 11.5–15)
GFR, ESTIMATED: 38 ML/MIN/1.73M2
GLUCOSE SERPL-MCNC: 167 MG/DL (ref 74–99)
HCT VFR BLD AUTO: 27.7 % (ref 34–48)
HGB BLD-MCNC: 8.3 G/DL (ref 11.5–15.5)
IMM GRANULOCYTES # BLD AUTO: 0.07 K/UL (ref 0–0.58)
IMM GRANULOCYTES NFR BLD: 1 % (ref 0–5)
LYMPHOCYTES NFR BLD: 2.02 K/UL (ref 1.5–4)
LYMPHOCYTES RELATIVE PERCENT: 23 % (ref 20–42)
MCH RBC QN AUTO: 26.1 PG (ref 26–35)
MCHC RBC AUTO-ENTMCNC: 30 G/DL (ref 32–34.5)
MCV RBC AUTO: 87.1 FL (ref 80–99.9)
MONOCYTES NFR BLD: 0.7 K/UL (ref 0.1–0.95)
MONOCYTES NFR BLD: 8 % (ref 2–12)
NEUTROPHILS NFR BLD: 65 % (ref 43–80)
NEUTS SEG NFR BLD: 5.64 K/UL (ref 1.8–7.3)
PLATELET # BLD AUTO: 164 K/UL (ref 130–450)
PMV BLD AUTO: 10.9 FL (ref 7–12)
POTASSIUM SERPL-SCNC: 3.5 MMOL/L (ref 3.5–5)
RBC # BLD AUTO: 3.18 M/UL (ref 3.5–5.5)
SODIUM SERPL-SCNC: 143 MMOL/L (ref 132–146)
SURGICAL PATHOLOGY REPORT: NORMAL
TRANSFUSION STATUS: NORMAL
UNIT DIVISION: 0
UNIT ISSUE DATE/TIME: NORMAL
WBC OTHER # BLD: 8.7 K/UL (ref 4.5–11.5)

## 2024-09-13 PROCEDURE — 6370000000 HC RX 637 (ALT 250 FOR IP): Performed by: INTERNAL MEDICINE

## 2024-09-13 PROCEDURE — 6360000002 HC RX W HCPCS

## 2024-09-13 PROCEDURE — 1200000000 HC SEMI PRIVATE

## 2024-09-13 PROCEDURE — 80048 BASIC METABOLIC PNL TOTAL CA: CPT

## 2024-09-13 PROCEDURE — 97530 THERAPEUTIC ACTIVITIES: CPT

## 2024-09-13 PROCEDURE — 36415 COLL VENOUS BLD VENIPUNCTURE: CPT

## 2024-09-13 PROCEDURE — 94640 AIRWAY INHALATION TREATMENT: CPT

## 2024-09-13 PROCEDURE — 99233 SBSQ HOSP IP/OBS HIGH 50: CPT | Performed by: INTERNAL MEDICINE

## 2024-09-13 PROCEDURE — 2580000003 HC RX 258: Performed by: INTERNAL MEDICINE

## 2024-09-13 PROCEDURE — 6370000000 HC RX 637 (ALT 250 FOR IP)

## 2024-09-13 PROCEDURE — 6370000000 HC RX 637 (ALT 250 FOR IP): Performed by: STUDENT IN AN ORGANIZED HEALTH CARE EDUCATION/TRAINING PROGRAM

## 2024-09-13 PROCEDURE — 97112 NEUROMUSCULAR REEDUCATION: CPT

## 2024-09-13 PROCEDURE — 85025 COMPLETE CBC W/AUTO DIFF WBC: CPT

## 2024-09-13 PROCEDURE — 2580000003 HC RX 258

## 2024-09-13 PROCEDURE — 97535 SELF CARE MNGMENT TRAINING: CPT

## 2024-09-13 RX ORDER — GABAPENTIN 300 MG/1
300 CAPSULE ORAL EVERY 8 HOURS
Status: DISCONTINUED | OUTPATIENT
Start: 2024-09-13 | End: 2024-09-14

## 2024-09-13 RX ORDER — POTASSIUM CHLORIDE 1500 MG/1
40 TABLET, EXTENDED RELEASE ORAL ONCE
Status: COMPLETED | OUTPATIENT
Start: 2024-09-13 | End: 2024-09-13

## 2024-09-13 RX ADMIN — GABAPENTIN 200 MG: 100 CAPSULE ORAL at 07:54

## 2024-09-13 RX ADMIN — ATORVASTATIN CALCIUM 80 MG: 80 TABLET, FILM COATED ORAL at 07:54

## 2024-09-13 RX ADMIN — SODIUM CHLORIDE, PRESERVATIVE FREE 10 ML: 5 INJECTION INTRAVENOUS at 07:54

## 2024-09-13 RX ADMIN — GABAPENTIN 200 MG: 100 CAPSULE ORAL at 00:32

## 2024-09-13 RX ADMIN — ACETAMINOPHEN 650 MG: 325 TABLET ORAL at 11:13

## 2024-09-13 RX ADMIN — CALCIUM CARBONATE 500 MG: 500 TABLET, CHEWABLE ORAL at 07:54

## 2024-09-13 RX ADMIN — APIXABAN 5 MG: 5 TABLET, FILM COATED ORAL at 20:52

## 2024-09-13 RX ADMIN — SUCRALFATE 1 G: 1 TABLET ORAL at 07:54

## 2024-09-13 RX ADMIN — PANTOPRAZOLE SODIUM 20 MG: 20 TABLET, DELAYED RELEASE ORAL at 07:54

## 2024-09-13 RX ADMIN — ROPINIROLE HYDROCHLORIDE 0.25 MG: 0.25 TABLET, FILM COATED ORAL at 11:13

## 2024-09-13 RX ADMIN — APIXABAN 5 MG: 5 TABLET, FILM COATED ORAL at 07:54

## 2024-09-13 RX ADMIN — GABAPENTIN 300 MG: 300 CAPSULE ORAL at 17:00

## 2024-09-13 RX ADMIN — ACETAMINOPHEN 650 MG: 325 TABLET ORAL at 06:10

## 2024-09-13 RX ADMIN — BUPROPION HYDROCHLORIDE 100 MG: 100 TABLET, EXTENDED RELEASE ORAL at 07:54

## 2024-09-13 RX ADMIN — LEVOTHYROXINE SODIUM 50 MCG: 0.05 TABLET ORAL at 06:11

## 2024-09-13 RX ADMIN — ACETAMINOPHEN 650 MG: 325 TABLET ORAL at 00:32

## 2024-09-13 RX ADMIN — CALCIUM CARBONATE 500 MG: 500 TABLET, CHEWABLE ORAL at 20:52

## 2024-09-13 RX ADMIN — ACETAMINOPHEN 650 MG: 325 TABLET ORAL at 17:00

## 2024-09-13 RX ADMIN — ARFORMOTEROL TARTRATE 15 MCG: 15 SOLUTION RESPIRATORY (INHALATION) at 10:17

## 2024-09-13 RX ADMIN — SODIUM CHLORIDE 125 MG: 9 INJECTION, SOLUTION INTRAVENOUS at 13:32

## 2024-09-13 RX ADMIN — SODIUM CHLORIDE: 9 INJECTION, SOLUTION INTRAVENOUS at 13:27

## 2024-09-13 RX ADMIN — TRAMADOL HYDROCHLORIDE 25 MG: 50 TABLET ORAL at 06:11

## 2024-09-13 RX ADMIN — TRAMADOL HYDROCHLORIDE 25 MG: 50 TABLET ORAL at 18:02

## 2024-09-13 RX ADMIN — POTASSIUM CHLORIDE 40 MEQ: 1500 TABLET, EXTENDED RELEASE ORAL at 15:40

## 2024-09-13 RX ADMIN — ARFORMOTEROL TARTRATE 15 MCG: 15 SOLUTION RESPIRATORY (INHALATION) at 20:55

## 2024-09-13 ASSESSMENT — PAIN SCALES - GENERAL
PAINLEVEL_OUTOF10: 8
PAINLEVEL_OUTOF10: 8
PAINLEVEL_OUTOF10: 0
PAINLEVEL_OUTOF10: 0
PAINLEVEL_OUTOF10: 7
PAINLEVEL_OUTOF10: 7

## 2024-09-13 ASSESSMENT — PAIN DESCRIPTION - ORIENTATION
ORIENTATION: RIGHT
ORIENTATION: RIGHT
ORIENTATION: RIGHT;LEFT
ORIENTATION: RIGHT

## 2024-09-13 ASSESSMENT — PAIN DESCRIPTION - DESCRIPTORS
DESCRIPTORS: ACHING;DISCOMFORT;SORE
DESCRIPTORS: SHARP;STABBING
DESCRIPTORS: ACHING;DISCOMFORT;SORE;SPASM
DESCRIPTORS: CRAMPING;ACHING;SORE

## 2024-09-13 ASSESSMENT — PAIN DESCRIPTION - LOCATION
LOCATION: LEG
LOCATION: LEG;HIP
LOCATION: LEG;HIP
LOCATION: INCISION;HIP;LEG

## 2024-09-14 LAB
ANION GAP SERPL CALCULATED.3IONS-SCNC: 12 MMOL/L (ref 7–16)
BASOPHILS # BLD: 0.02 K/UL (ref 0–0.2)
BASOPHILS NFR BLD: 0 % (ref 0–2)
BUN SERPL-MCNC: 35 MG/DL (ref 6–23)
CALCIUM SERPL-MCNC: 8.6 MG/DL (ref 8.6–10.2)
CHLORIDE SERPL-SCNC: 108 MMOL/L (ref 98–107)
CO2 SERPL-SCNC: 21 MMOL/L (ref 22–29)
CREAT SERPL-MCNC: 1.4 MG/DL (ref 0.5–1)
EOSINOPHIL # BLD: 0.24 K/UL (ref 0.05–0.5)
EOSINOPHILS RELATIVE PERCENT: 3 % (ref 0–6)
ERYTHROCYTE [DISTWIDTH] IN BLOOD BY AUTOMATED COUNT: 18 % (ref 11.5–15)
GFR, ESTIMATED: 40 ML/MIN/1.73M2
GLUCOSE SERPL-MCNC: 115 MG/DL (ref 74–99)
HCT VFR BLD AUTO: 28.1 % (ref 34–48)
HGB BLD-MCNC: 8.6 G/DL (ref 11.5–15.5)
IMM GRANULOCYTES # BLD AUTO: 0.1 K/UL (ref 0–0.58)
IMM GRANULOCYTES NFR BLD: 1 % (ref 0–5)
LYMPHOCYTES NFR BLD: 2.28 K/UL (ref 1.5–4)
LYMPHOCYTES RELATIVE PERCENT: 24 % (ref 20–42)
MAGNESIUM SERPL-MCNC: 1.4 MG/DL (ref 1.6–2.6)
MCH RBC QN AUTO: 26.8 PG (ref 26–35)
MCHC RBC AUTO-ENTMCNC: 30.6 G/DL (ref 32–34.5)
MCV RBC AUTO: 87.5 FL (ref 80–99.9)
MICROORGANISM SPEC CULT: NORMAL
MONOCYTES NFR BLD: 0.88 K/UL (ref 0.1–0.95)
MONOCYTES NFR BLD: 9 % (ref 2–12)
NEUTROPHILS NFR BLD: 64 % (ref 43–80)
NEUTS SEG NFR BLD: 6.16 K/UL (ref 1.8–7.3)
PHOSPHATE SERPL-MCNC: 2.4 MG/DL (ref 2.5–4.5)
PLATELET # BLD AUTO: 208 K/UL (ref 130–450)
PMV BLD AUTO: 10.4 FL (ref 7–12)
POTASSIUM SERPL-SCNC: 3.8 MMOL/L (ref 3.5–5)
RBC # BLD AUTO: 3.21 M/UL (ref 3.5–5.5)
SERVICE CMNT-IMP: NORMAL
SODIUM SERPL-SCNC: 141 MMOL/L (ref 132–146)
SPECIMEN DESCRIPTION: NORMAL
WBC OTHER # BLD: 9.7 K/UL (ref 4.5–11.5)

## 2024-09-14 PROCEDURE — 36415 COLL VENOUS BLD VENIPUNCTURE: CPT

## 2024-09-14 PROCEDURE — 6370000000 HC RX 637 (ALT 250 FOR IP): Performed by: STUDENT IN AN ORGANIZED HEALTH CARE EDUCATION/TRAINING PROGRAM

## 2024-09-14 PROCEDURE — 6360000002 HC RX W HCPCS

## 2024-09-14 PROCEDURE — 2580000003 HC RX 258

## 2024-09-14 PROCEDURE — 1200000000 HC SEMI PRIVATE

## 2024-09-14 PROCEDURE — 85025 COMPLETE CBC W/AUTO DIFF WBC: CPT

## 2024-09-14 PROCEDURE — 80048 BASIC METABOLIC PNL TOTAL CA: CPT

## 2024-09-14 PROCEDURE — 51798 US URINE CAPACITY MEASURE: CPT

## 2024-09-14 PROCEDURE — 83735 ASSAY OF MAGNESIUM: CPT

## 2024-09-14 PROCEDURE — 94640 AIRWAY INHALATION TREATMENT: CPT

## 2024-09-14 PROCEDURE — 6370000000 HC RX 637 (ALT 250 FOR IP)

## 2024-09-14 PROCEDURE — 99232 SBSQ HOSP IP/OBS MODERATE 35: CPT | Performed by: INTERNAL MEDICINE

## 2024-09-14 PROCEDURE — 84100 ASSAY OF PHOSPHORUS: CPT

## 2024-09-14 PROCEDURE — 6370000000 HC RX 637 (ALT 250 FOR IP): Performed by: INTERNAL MEDICINE

## 2024-09-14 PROCEDURE — 6360000002 HC RX W HCPCS: Performed by: INTERNAL MEDICINE

## 2024-09-14 RX ORDER — MAGNESIUM SULFATE IN WATER 40 MG/ML
2000 INJECTION, SOLUTION INTRAVENOUS ONCE
Status: COMPLETED | OUTPATIENT
Start: 2024-09-14 | End: 2024-09-14

## 2024-09-14 RX ORDER — GABAPENTIN 400 MG/1
400 CAPSULE ORAL EVERY 8 HOURS
Status: DISCONTINUED | OUTPATIENT
Start: 2024-09-14 | End: 2024-09-15 | Stop reason: HOSPADM

## 2024-09-14 RX ORDER — GABAPENTIN 400 MG/1
400 CAPSULE ORAL EVERY 8 HOURS
Qty: 90 CAPSULE | Refills: 0 | Status: ON HOLD | OUTPATIENT
Start: 2024-09-14 | End: 2024-10-14

## 2024-09-14 RX ORDER — TRAMADOL HYDROCHLORIDE 50 MG/1
50 TABLET ORAL EVERY 8 HOURS PRN
Status: DISCONTINUED | OUTPATIENT
Start: 2024-09-14 | End: 2024-09-15 | Stop reason: HOSPADM

## 2024-09-14 RX ADMIN — APIXABAN 5 MG: 5 TABLET, FILM COATED ORAL at 20:15

## 2024-09-14 RX ADMIN — TRAMADOL HYDROCHLORIDE 50 MG: 50 TABLET ORAL at 15:39

## 2024-09-14 RX ADMIN — ARFORMOTEROL TARTRATE 15 MCG: 15 SOLUTION RESPIRATORY (INHALATION) at 21:51

## 2024-09-14 RX ADMIN — SENNOSIDES AND DOCUSATE SODIUM 1 TABLET: 50; 8.6 TABLET ORAL at 20:15

## 2024-09-14 RX ADMIN — GABAPENTIN 300 MG: 300 CAPSULE ORAL at 08:17

## 2024-09-14 RX ADMIN — SODIUM CHLORIDE, PRESERVATIVE FREE 10 ML: 5 INJECTION INTRAVENOUS at 08:16

## 2024-09-14 RX ADMIN — MAGNESIUM SULFATE HEPTAHYDRATE 2000 MG: 40 INJECTION, SOLUTION INTRAVENOUS at 16:47

## 2024-09-14 RX ADMIN — ACETAMINOPHEN 650 MG: 325 TABLET ORAL at 06:05

## 2024-09-14 RX ADMIN — SODIUM CHLORIDE: 9 INJECTION, SOLUTION INTRAVENOUS at 00:23

## 2024-09-14 RX ADMIN — GABAPENTIN 300 MG: 300 CAPSULE ORAL at 00:21

## 2024-09-14 RX ADMIN — APIXABAN 5 MG: 5 TABLET, FILM COATED ORAL at 08:16

## 2024-09-14 RX ADMIN — ACETAMINOPHEN 650 MG: 325 TABLET ORAL at 00:21

## 2024-09-14 RX ADMIN — TRAMADOL HYDROCHLORIDE 25 MG: 50 TABLET ORAL at 06:05

## 2024-09-14 RX ADMIN — ACETAMINOPHEN 650 MG: 325 TABLET ORAL at 16:48

## 2024-09-14 RX ADMIN — CALCIUM CARBONATE 500 MG: 500 TABLET, CHEWABLE ORAL at 20:15

## 2024-09-14 RX ADMIN — CALCIUM CARBONATE 500 MG: 500 TABLET, CHEWABLE ORAL at 08:16

## 2024-09-14 RX ADMIN — GABAPENTIN 400 MG: 400 CAPSULE ORAL at 15:39

## 2024-09-14 RX ADMIN — SUCRALFATE 1 G: 1 TABLET ORAL at 08:17

## 2024-09-14 RX ADMIN — PANTOPRAZOLE SODIUM 20 MG: 20 TABLET, DELAYED RELEASE ORAL at 08:17

## 2024-09-14 RX ADMIN — SODIUM CHLORIDE: 9 INJECTION, SOLUTION INTRAVENOUS at 15:46

## 2024-09-14 RX ADMIN — ATORVASTATIN CALCIUM 80 MG: 80 TABLET, FILM COATED ORAL at 08:17

## 2024-09-14 RX ADMIN — SODIUM CHLORIDE, PRESERVATIVE FREE 10 ML: 5 INJECTION INTRAVENOUS at 20:15

## 2024-09-14 RX ADMIN — LEVOTHYROXINE SODIUM 50 MCG: 0.05 TABLET ORAL at 06:05

## 2024-09-14 RX ADMIN — ACETAMINOPHEN 650 MG: 325 TABLET ORAL at 12:44

## 2024-09-14 RX ADMIN — CYCLOBENZAPRINE 10 MG: 10 TABLET, FILM COATED ORAL at 00:25

## 2024-09-14 RX ADMIN — CYCLOBENZAPRINE 10 MG: 10 TABLET, FILM COATED ORAL at 12:44

## 2024-09-14 RX ADMIN — BUPROPION HYDROCHLORIDE 100 MG: 100 TABLET, EXTENDED RELEASE ORAL at 08:17

## 2024-09-14 RX ADMIN — SODIUM CHLORIDE 125 MG: 9 INJECTION, SOLUTION INTRAVENOUS at 15:41

## 2024-09-14 ASSESSMENT — PAIN SCALES - GENERAL
PAINLEVEL_OUTOF10: 8
PAINLEVEL_OUTOF10: 0
PAINLEVEL_OUTOF10: 0
PAINLEVEL_OUTOF10: 7
PAINLEVEL_OUTOF10: 8
PAINLEVEL_OUTOF10: 3
PAINLEVEL_OUTOF10: 0
PAINLEVEL_OUTOF10: 1
PAINLEVEL_OUTOF10: 8
PAINLEVEL_OUTOF10: 7
PAINLEVEL_OUTOF10: 9
PAINLEVEL_OUTOF10: 10

## 2024-09-14 ASSESSMENT — PAIN DESCRIPTION - DESCRIPTORS
DESCRIPTORS: ACHING;SORE;SPASM
DESCRIPTORS: ACHING;SHOOTING;SPASM
DESCRIPTORS: DISCOMFORT;NAGGING;SPASM
DESCRIPTORS: DISCOMFORT;NAGGING;SPASM
DESCRIPTORS: SPASM;ACHING;DISCOMFORT
DESCRIPTORS: DISCOMFORT;NAGGING;SPASM
DESCRIPTORS: ACHING;SORE;SPASM

## 2024-09-14 ASSESSMENT — PAIN DESCRIPTION - ONSET
ONSET: GRADUAL
ONSET: GRADUAL
ONSET: ON-GOING
ONSET: GRADUAL

## 2024-09-14 ASSESSMENT — PAIN DESCRIPTION - PAIN TYPE
TYPE: CHRONIC PAIN
TYPE: CHRONIC PAIN
TYPE: SURGICAL PAIN
TYPE: SURGICAL PAIN
TYPE: CHRONIC PAIN
TYPE: SURGICAL PAIN
TYPE: CHRONIC PAIN
TYPE: SURGICAL PAIN
TYPE: SURGICAL PAIN

## 2024-09-14 ASSESSMENT — PAIN DESCRIPTION - ORIENTATION
ORIENTATION: RIGHT
ORIENTATION: LEFT;RIGHT
ORIENTATION: RIGHT
ORIENTATION: RIGHT;LEFT
ORIENTATION: LEFT;RIGHT
ORIENTATION: LEFT;RIGHT
ORIENTATION: RIGHT

## 2024-09-14 ASSESSMENT — PAIN DESCRIPTION - LOCATION
LOCATION: LEG;HIP
LOCATION: HIP;LEG
LOCATION: LEG
LOCATION: LEG
LOCATION: HIP;LEG
LOCATION: LEG
LOCATION: LEG

## 2024-09-14 ASSESSMENT — PAIN DESCRIPTION - FREQUENCY
FREQUENCY: INTERMITTENT
FREQUENCY: CONTINUOUS
FREQUENCY: INTERMITTENT
FREQUENCY: CONTINUOUS

## 2024-09-15 VITALS
RESPIRATION RATE: 20 BRPM | OXYGEN SATURATION: 97 % | SYSTOLIC BLOOD PRESSURE: 161 MMHG | HEART RATE: 97 BPM | DIASTOLIC BLOOD PRESSURE: 84 MMHG | TEMPERATURE: 97.8 F

## 2024-09-15 LAB
ANION GAP SERPL CALCULATED.3IONS-SCNC: 11 MMOL/L (ref 7–16)
BASOPHILS # BLD: 0.02 K/UL (ref 0–0.2)
BASOPHILS NFR BLD: 0 % (ref 0–2)
BUN SERPL-MCNC: 26 MG/DL (ref 6–23)
CALCIUM SERPL-MCNC: 9.2 MG/DL (ref 8.6–10.2)
CHLORIDE SERPL-SCNC: 107 MMOL/L (ref 98–107)
CO2 SERPL-SCNC: 23 MMOL/L (ref 22–29)
CREAT SERPL-MCNC: 1.2 MG/DL (ref 0.5–1)
EOSINOPHIL # BLD: 0.33 K/UL (ref 0.05–0.5)
EOSINOPHILS RELATIVE PERCENT: 4 % (ref 0–6)
ERYTHROCYTE [DISTWIDTH] IN BLOOD BY AUTOMATED COUNT: 17.9 % (ref 11.5–15)
GFR, ESTIMATED: 46 ML/MIN/1.73M2
GLUCOSE SERPL-MCNC: 125 MG/DL (ref 74–99)
HCT VFR BLD AUTO: 31 % (ref 34–48)
HGB BLD-MCNC: 9.2 G/DL (ref 11.5–15.5)
IMM GRANULOCYTES # BLD AUTO: 0.06 K/UL (ref 0–0.58)
IMM GRANULOCYTES NFR BLD: 1 % (ref 0–5)
LYMPHOCYTES NFR BLD: 2.11 K/UL (ref 1.5–4)
LYMPHOCYTES RELATIVE PERCENT: 26 % (ref 20–42)
MAGNESIUM SERPL-MCNC: 1.7 MG/DL (ref 1.6–2.6)
MCH RBC QN AUTO: 26.4 PG (ref 26–35)
MCHC RBC AUTO-ENTMCNC: 29.7 G/DL (ref 32–34.5)
MCV RBC AUTO: 89.1 FL (ref 80–99.9)
MONOCYTES NFR BLD: 0.77 K/UL (ref 0.1–0.95)
MONOCYTES NFR BLD: 9 % (ref 2–12)
NEUTROPHILS NFR BLD: 60 % (ref 43–80)
NEUTS SEG NFR BLD: 4.99 K/UL (ref 1.8–7.3)
PLATELET # BLD AUTO: 211 K/UL (ref 130–450)
PMV BLD AUTO: 11.1 FL (ref 7–12)
POTASSIUM SERPL-SCNC: 3.1 MMOL/L (ref 3.5–5)
RBC # BLD AUTO: 3.48 M/UL (ref 3.5–5.5)
SODIUM SERPL-SCNC: 141 MMOL/L (ref 132–146)
WBC OTHER # BLD: 8.3 K/UL (ref 4.5–11.5)

## 2024-09-15 PROCEDURE — 6370000000 HC RX 637 (ALT 250 FOR IP): Performed by: STUDENT IN AN ORGANIZED HEALTH CARE EDUCATION/TRAINING PROGRAM

## 2024-09-15 PROCEDURE — 36415 COLL VENOUS BLD VENIPUNCTURE: CPT

## 2024-09-15 PROCEDURE — 6370000000 HC RX 637 (ALT 250 FOR IP)

## 2024-09-15 PROCEDURE — 80048 BASIC METABOLIC PNL TOTAL CA: CPT

## 2024-09-15 PROCEDURE — 85025 COMPLETE CBC W/AUTO DIFF WBC: CPT

## 2024-09-15 PROCEDURE — 99232 SBSQ HOSP IP/OBS MODERATE 35: CPT | Performed by: INTERNAL MEDICINE

## 2024-09-15 PROCEDURE — 83735 ASSAY OF MAGNESIUM: CPT

## 2024-09-15 PROCEDURE — 6370000000 HC RX 637 (ALT 250 FOR IP): Performed by: INTERNAL MEDICINE

## 2024-09-15 PROCEDURE — 2580000003 HC RX 258

## 2024-09-15 RX ORDER — POTASSIUM CHLORIDE 1500 MG/1
20 TABLET, EXTENDED RELEASE ORAL ONCE
Status: COMPLETED | OUTPATIENT
Start: 2024-09-15 | End: 2024-09-15

## 2024-09-15 RX ORDER — POTASSIUM CHLORIDE 1500 MG/1
20 TABLET, EXTENDED RELEASE ORAL ONCE
Status: DISCONTINUED | OUTPATIENT
Start: 2024-09-15 | End: 2024-09-15 | Stop reason: HOSPADM

## 2024-09-15 RX ORDER — POTASSIUM CHLORIDE 1500 MG/1
40 TABLET, EXTENDED RELEASE ORAL ONCE
Status: DISCONTINUED | OUTPATIENT
Start: 2024-09-15 | End: 2024-09-15

## 2024-09-15 RX ADMIN — TRAMADOL HYDROCHLORIDE 50 MG: 50 TABLET ORAL at 08:55

## 2024-09-15 RX ADMIN — POTASSIUM CHLORIDE 20 MEQ: 1500 TABLET, EXTENDED RELEASE ORAL at 12:42

## 2024-09-15 RX ADMIN — BUPROPION HYDROCHLORIDE 100 MG: 100 TABLET, EXTENDED RELEASE ORAL at 08:51

## 2024-09-15 RX ADMIN — GABAPENTIN 400 MG: 400 CAPSULE ORAL at 00:36

## 2024-09-15 RX ADMIN — PANTOPRAZOLE SODIUM 20 MG: 20 TABLET, DELAYED RELEASE ORAL at 08:50

## 2024-09-15 RX ADMIN — ACETAMINOPHEN 650 MG: 325 TABLET ORAL at 05:46

## 2024-09-15 RX ADMIN — ARFORMOTEROL TARTRATE 15 MCG: 15 SOLUTION RESPIRATORY (INHALATION) at 08:53

## 2024-09-15 RX ADMIN — ACETAMINOPHEN 650 MG: 325 TABLET ORAL at 00:36

## 2024-09-15 RX ADMIN — SODIUM CHLORIDE: 9 INJECTION, SOLUTION INTRAVENOUS at 05:40

## 2024-09-15 RX ADMIN — ACETAMINOPHEN 650 MG: 325 TABLET ORAL at 12:42

## 2024-09-15 RX ADMIN — CALCIUM CARBONATE 500 MG: 500 TABLET, CHEWABLE ORAL at 08:50

## 2024-09-15 RX ADMIN — SUCRALFATE 1 G: 1 TABLET ORAL at 08:50

## 2024-09-15 RX ADMIN — GABAPENTIN 400 MG: 400 CAPSULE ORAL at 08:50

## 2024-09-15 RX ADMIN — LEVOTHYROXINE SODIUM 50 MCG: 0.05 TABLET ORAL at 05:46

## 2024-09-15 RX ADMIN — TRAMADOL HYDROCHLORIDE 50 MG: 50 TABLET ORAL at 00:36

## 2024-09-15 RX ADMIN — ATORVASTATIN CALCIUM 80 MG: 80 TABLET, FILM COATED ORAL at 08:50

## 2024-09-15 RX ADMIN — SENNOSIDES AND DOCUSATE SODIUM 1 TABLET: 50; 8.6 TABLET ORAL at 08:50

## 2024-09-15 RX ADMIN — APIXABAN 5 MG: 5 TABLET, FILM COATED ORAL at 08:50

## 2024-09-15 RX ADMIN — CYCLOBENZAPRINE 10 MG: 10 TABLET, FILM COATED ORAL at 00:36

## 2024-09-15 ASSESSMENT — PAIN DESCRIPTION - LOCATION
LOCATION: LEG

## 2024-09-15 ASSESSMENT — PAIN DESCRIPTION - ONSET
ONSET: GRADUAL

## 2024-09-15 ASSESSMENT — PAIN DESCRIPTION - FREQUENCY
FREQUENCY: CONTINUOUS

## 2024-09-15 ASSESSMENT — PAIN DESCRIPTION - DESCRIPTORS
DESCRIPTORS: ACHING;DULL;NAGGING
DESCRIPTORS: ACHING;SHOOTING;SPASM
DESCRIPTORS: GNAWING;OTHER (COMMENT)
DESCRIPTORS: ACHING;DULL;NAGGING
DESCRIPTORS: ACHING;SHOOTING;SPASM

## 2024-09-15 ASSESSMENT — PAIN DESCRIPTION - PAIN TYPE
TYPE: CHRONIC PAIN

## 2024-09-15 ASSESSMENT — PAIN DESCRIPTION - ORIENTATION
ORIENTATION: RIGHT;LEFT

## 2024-09-15 ASSESSMENT — PAIN SCALES - GENERAL
PAINLEVEL_OUTOF10: 4
PAINLEVEL_OUTOF10: 9
PAINLEVEL_OUTOF10: 4
PAINLEVEL_OUTOF10: 4
PAINLEVEL_OUTOF10: 7
PAINLEVEL_OUTOF10: 8

## 2024-09-16 LAB
25(OH)D3 SERPL-MCNC: 13.9 NG/ML (ref 30–100)
ALBUMIN SERPL-MCNC: 2.7 G/DL (ref 3.5–5.2)
ALP SERPL-CCNC: 96 U/L (ref 35–104)
ALT SERPL-CCNC: 7 U/L (ref 0–32)
ANION GAP SERPL CALCULATED.3IONS-SCNC: 9 MMOL/L (ref 7–16)
AST SERPL-CCNC: 15 U/L (ref 0–31)
BILIRUB SERPL-MCNC: 0.4 MG/DL (ref 0–1.2)
BUN SERPL-MCNC: 23 MG/DL (ref 6–23)
CALCIUM SERPL-MCNC: 9.2 MG/DL (ref 8.6–10.2)
CHLORIDE SERPL-SCNC: 107 MMOL/L (ref 98–107)
CHOLEST SERPL-MCNC: 71 MG/DL
CO2 SERPL-SCNC: 28 MMOL/L (ref 22–29)
CREAT SERPL-MCNC: 1.3 MG/DL (ref 0.5–1)
ERYTHROCYTE [DISTWIDTH] IN BLOOD BY AUTOMATED COUNT: 18.2 % (ref 11.5–15)
GFR, ESTIMATED: 44 ML/MIN/1.73M2
GLUCOSE SERPL-MCNC: 99 MG/DL (ref 74–99)
HBA1C MFR BLD: 5.5 % (ref 4–5.6)
HCT VFR BLD AUTO: 29 % (ref 34–48)
HDLC SERPL-MCNC: 39 MG/DL
HGB BLD-MCNC: 8.7 G/DL (ref 11.5–15.5)
LDLC SERPL CALC-MCNC: 19 MG/DL
MCH RBC QN AUTO: 26.9 PG (ref 26–35)
MCHC RBC AUTO-ENTMCNC: 30 G/DL (ref 32–34.5)
MCV RBC AUTO: 89.5 FL (ref 80–99.9)
PLATELET # BLD AUTO: 226 K/UL (ref 130–450)
PMV BLD AUTO: 10.5 FL (ref 7–12)
POTASSIUM SERPL-SCNC: 3.6 MMOL/L (ref 3.5–5)
PROT SERPL-MCNC: 5.5 G/DL (ref 6.4–8.3)
RBC # BLD AUTO: 3.24 M/UL (ref 3.5–5.5)
SODIUM SERPL-SCNC: 144 MMOL/L (ref 132–146)
TRIGL SERPL-MCNC: 65 MG/DL
TSH SERPL DL<=0.05 MIU/L-ACNC: 7.7 UIU/ML (ref 0.27–4.2)
VLDLC SERPL CALC-MCNC: 13 MG/DL
WBC OTHER # BLD: 10 K/UL (ref 4.5–11.5)

## 2024-09-17 ENCOUNTER — OUTSIDE SERVICES (OUTPATIENT)
Dept: PRIMARY CARE CLINIC | Age: 79
End: 2024-09-17

## 2024-09-17 DIAGNOSIS — R26.2 AMBULATORY DYSFUNCTION: ICD-10-CM

## 2024-09-17 DIAGNOSIS — E11.40 CONTROLLED TYPE 2 DIABETES MELLITUS WITH DIABETIC NEUROPATHY, WITH LONG-TERM CURRENT USE OF INSULIN (HCC): ICD-10-CM

## 2024-09-17 DIAGNOSIS — F32.A DEPRESSION, UNSPECIFIED DEPRESSION TYPE: ICD-10-CM

## 2024-09-17 DIAGNOSIS — J44.9 CHRONIC OBSTRUCTIVE PULMONARY DISEASE, UNSPECIFIED COPD TYPE (HCC): ICD-10-CM

## 2024-09-17 DIAGNOSIS — Z96.649 HISTORY OF REVISION OF TOTAL HIP ARTHROPLASTY: Primary | ICD-10-CM

## 2024-09-17 DIAGNOSIS — Z86.73 HISTORY OF CEREBRAL INFARCTION: ICD-10-CM

## 2024-09-17 DIAGNOSIS — E03.9 HYPOTHYROIDISM, UNSPECIFIED TYPE: ICD-10-CM

## 2024-09-17 DIAGNOSIS — Z79.4 CONTROLLED TYPE 2 DIABETES MELLITUS WITH DIABETIC NEUROPATHY, WITH LONG-TERM CURRENT USE OF INSULIN (HCC): ICD-10-CM

## 2024-09-17 DIAGNOSIS — E78.5 HYPERLIPIDEMIA, UNSPECIFIED HYPERLIPIDEMIA TYPE: ICD-10-CM

## 2024-09-17 DIAGNOSIS — D64.9 ANEMIA, UNSPECIFIED TYPE: ICD-10-CM

## 2024-09-17 DIAGNOSIS — Z96.641 HISTORY OF RIGHT HIP REPLACEMENT: Primary | ICD-10-CM

## 2024-09-17 DIAGNOSIS — R53.1 GENERALIZED WEAKNESS: ICD-10-CM

## 2024-09-19 VITALS
OXYGEN SATURATION: 97 % | RESPIRATION RATE: 20 BRPM | DIASTOLIC BLOOD PRESSURE: 84 MMHG | TEMPERATURE: 97.8 F | HEART RATE: 97 BPM | SYSTOLIC BLOOD PRESSURE: 161 MMHG

## 2024-09-19 ASSESSMENT — ENCOUNTER SYMPTOMS
VOICE CHANGE: 0
ABDOMINAL PAIN: 0
NAUSEA: 0
CHEST TIGHTNESS: 0
SORE THROAT: 0
VOMITING: 0
SHORTNESS OF BREATH: 0
WHEEZING: 0

## 2024-09-20 ENCOUNTER — HOSPITAL ENCOUNTER (INPATIENT)
Age: 79
LOS: 4 days | Discharge: NURSING, REHAB OR LONG-TERM CARE | DRG: 481 | End: 2024-09-24
Attending: STUDENT IN AN ORGANIZED HEALTH CARE EDUCATION/TRAINING PROGRAM | Admitting: HOSPITALIST
Payer: MEDICARE

## 2024-09-20 ENCOUNTER — APPOINTMENT (OUTPATIENT)
Dept: GENERAL RADIOLOGY | Age: 79
DRG: 481 | End: 2024-09-20
Payer: MEDICARE

## 2024-09-20 DIAGNOSIS — S72.401A CLOSED FRACTURE OF DISTAL END OF RIGHT FEMUR, UNSPECIFIED FRACTURE MORPHOLOGY, INITIAL ENCOUNTER (HCC): ICD-10-CM
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
## 2024-09-20 LAB
ALBUMIN SERPL-MCNC: 2.9 G/DL (ref 3.5–5.2)
ALP SERPL-CCNC: 111 U/L (ref 35–104)
ALT SERPL-CCNC: 10 U/L (ref 0–32)
ANION GAP SERPL CALCULATED.3IONS-SCNC: 8 MMOL/L (ref 7–16)
AST SERPL-CCNC: 15 U/L (ref 0–31)
BASOPHILS # BLD: 0.03 K/UL (ref 0–0.2)
BASOPHILS NFR BLD: 0 % (ref 0–2)
BILIRUB SERPL-MCNC: 0.3 MG/DL (ref 0–1.2)
BUN SERPL-MCNC: 15 MG/DL (ref 6–23)
CALCIUM SERPL-MCNC: 8.6 MG/DL (ref 8.6–10.2)
CHLORIDE SERPL-SCNC: 103 MMOL/L (ref 98–107)
CO2 SERPL-SCNC: 31 MMOL/L (ref 22–29)
CREAT SERPL-MCNC: 1.1 MG/DL (ref 0.5–1)
EOSINOPHIL # BLD: 0.29 K/UL (ref 0.05–0.5)
EOSINOPHILS RELATIVE PERCENT: 2 % (ref 0–6)
ERYTHROCYTE [DISTWIDTH] IN BLOOD BY AUTOMATED COUNT: 18.9 % (ref 11.5–15)
GFR, ESTIMATED: 53 ML/MIN/1.73M2
GLUCOSE SERPL-MCNC: 104 MG/DL (ref 74–99)
HCT VFR BLD AUTO: 31.4 % (ref 34–48)
HGB BLD-MCNC: 9.2 G/DL (ref 11.5–15.5)
IMM GRANULOCYTES # BLD AUTO: 0.09 K/UL (ref 0–0.58)
IMM GRANULOCYTES NFR BLD: 1 % (ref 0–5)
INR PPP: 1.5
LYMPHOCYTES NFR BLD: 2.22 K/UL (ref 1.5–4)
LYMPHOCYTES RELATIVE PERCENT: 14 % (ref 20–42)
MCH RBC QN AUTO: 27.1 PG (ref 26–35)
MCHC RBC AUTO-ENTMCNC: 29.3 G/DL (ref 32–34.5)
MCV RBC AUTO: 92.6 FL (ref 80–99.9)
MONOCYTES NFR BLD: 0.82 K/UL (ref 0.1–0.95)
MONOCYTES NFR BLD: 5 % (ref 2–12)
NEUTROPHILS NFR BLD: 78 % (ref 43–80)
NEUTS SEG NFR BLD: 12.45 K/UL (ref 1.8–7.3)
PARTIAL THROMBOPLASTIN TIME: 34.3 SEC (ref 24.5–35.1)
PLATELET # BLD AUTO: 299 K/UL (ref 130–450)
PMV BLD AUTO: 9.5 FL (ref 7–12)
POTASSIUM SERPL-SCNC: 3.9 MMOL/L (ref 3.5–5)
PROT SERPL-MCNC: 5.8 G/DL (ref 6.4–8.3)
PROTHROMBIN TIME: 16.3 SEC (ref 9.3–12.4)
RBC # BLD AUTO: 3.39 M/UL (ref 3.5–5.5)
SODIUM SERPL-SCNC: 142 MMOL/L (ref 132–146)
WBC OTHER # BLD: 15.9 K/UL (ref 4.5–11.5)

## 2024-09-20 PROCEDURE — 2580000003 HC RX 258: Performed by: STUDENT IN AN ORGANIZED HEALTH CARE EDUCATION/TRAINING PROGRAM

## 2024-09-20 PROCEDURE — 6360000002 HC RX W HCPCS: Performed by: STUDENT IN AN ORGANIZED HEALTH CARE EDUCATION/TRAINING PROGRAM

## 2024-09-20 PROCEDURE — 85025 COMPLETE CBC W/AUTO DIFF WBC: CPT

## 2024-09-20 PROCEDURE — 96374 THER/PROPH/DIAG INJ IV PUSH: CPT

## 2024-09-20 PROCEDURE — 85730 THROMBOPLASTIN TIME PARTIAL: CPT

## 2024-09-20 PROCEDURE — 73552 X-RAY EXAM OF FEMUR 2/>: CPT

## 2024-09-20 PROCEDURE — 73502 X-RAY EXAM HIP UNI 2-3 VIEWS: CPT

## 2024-09-20 PROCEDURE — 6360000002 HC RX W HCPCS

## 2024-09-20 PROCEDURE — 85610 PROTHROMBIN TIME: CPT

## 2024-09-20 PROCEDURE — 6370000000 HC RX 637 (ALT 250 FOR IP): Performed by: STUDENT IN AN ORGANIZED HEALTH CARE EDUCATION/TRAINING PROGRAM

## 2024-09-20 PROCEDURE — 99223 1ST HOSP IP/OBS HIGH 75: CPT | Performed by: ORTHOPAEDIC SURGERY

## 2024-09-20 PROCEDURE — 1200000000 HC SEMI PRIVATE

## 2024-09-20 PROCEDURE — 2580000003 HC RX 258

## 2024-09-20 PROCEDURE — 73562 X-RAY EXAM OF KNEE 3: CPT

## 2024-09-20 PROCEDURE — 99285 EMERGENCY DEPT VISIT HI MDM: CPT

## 2024-09-20 PROCEDURE — 80053 COMPREHEN METABOLIC PANEL: CPT

## 2024-09-20 RX ORDER — SODIUM CHLORIDE 9 MG/ML
INJECTION, SOLUTION INTRAVENOUS PRN
Status: DISCONTINUED | OUTPATIENT
Start: 2024-09-20 | End: 2024-09-24 | Stop reason: HOSPADM

## 2024-09-20 RX ORDER — METHOCARBAMOL 500 MG/1
1000 TABLET, FILM COATED ORAL ONCE
Status: COMPLETED | OUTPATIENT
Start: 2024-09-20 | End: 2024-09-20

## 2024-09-20 RX ORDER — SODIUM CHLORIDE 0.9 % (FLUSH) 0.9 %
5-40 SYRINGE (ML) INJECTION EVERY 12 HOURS SCHEDULED
Status: DISCONTINUED | OUTPATIENT
Start: 2024-09-20 | End: 2024-09-24 | Stop reason: HOSPADM

## 2024-09-20 RX ORDER — ACETAMINOPHEN 650 MG/1
650 SUPPOSITORY RECTAL EVERY 6 HOURS PRN
Status: DISCONTINUED | OUTPATIENT
Start: 2024-09-20 | End: 2024-09-24 | Stop reason: HOSPADM

## 2024-09-20 RX ORDER — FENTANYL CITRATE 50 UG/ML
50 INJECTION, SOLUTION INTRAMUSCULAR; INTRAVENOUS ONCE
Status: COMPLETED | OUTPATIENT
Start: 2024-09-20 | End: 2024-09-20

## 2024-09-20 RX ORDER — 0.9 % SODIUM CHLORIDE 0.9 %
1000 INTRAVENOUS SOLUTION INTRAVENOUS ONCE
Status: COMPLETED | OUTPATIENT
Start: 2024-09-20 | End: 2024-09-20

## 2024-09-20 RX ORDER — FENTANYL CITRATE 50 UG/ML
INJECTION, SOLUTION INTRAMUSCULAR; INTRAVENOUS
Status: DISCONTINUED
Start: 2024-09-20 | End: 2024-09-20 | Stop reason: WASHOUT

## 2024-09-20 RX ORDER — SODIUM CHLORIDE 0.9 % (FLUSH) 0.9 %
5-40 SYRINGE (ML) INJECTION PRN
Status: DISCONTINUED | OUTPATIENT
Start: 2024-09-20 | End: 2024-09-24 | Stop reason: HOSPADM

## 2024-09-20 RX ORDER — ONDANSETRON 2 MG/ML
4 INJECTION INTRAMUSCULAR; INTRAVENOUS EVERY 6 HOURS PRN
Status: DISCONTINUED | OUTPATIENT
Start: 2024-09-20 | End: 2024-09-24 | Stop reason: HOSPADM

## 2024-09-20 RX ORDER — POLYETHYLENE GLYCOL 3350 17 G/17G
17 POWDER, FOR SOLUTION ORAL DAILY PRN
Status: DISCONTINUED | OUTPATIENT
Start: 2024-09-20 | End: 2024-09-24 | Stop reason: HOSPADM

## 2024-09-20 RX ORDER — MAGNESIUM SULFATE IN WATER 40 MG/ML
2000 INJECTION, SOLUTION INTRAVENOUS PRN
Status: DISCONTINUED | OUTPATIENT
Start: 2024-09-20 | End: 2024-09-24 | Stop reason: HOSPADM

## 2024-09-20 RX ORDER — POTASSIUM CHLORIDE 1500 MG/1
40 TABLET, EXTENDED RELEASE ORAL PRN
Status: DISCONTINUED | OUTPATIENT
Start: 2024-09-20 | End: 2024-09-24 | Stop reason: HOSPADM

## 2024-09-20 RX ORDER — ACETAMINOPHEN 325 MG/1
650 TABLET ORAL EVERY 6 HOURS PRN
Status: DISCONTINUED | OUTPATIENT
Start: 2024-09-20 | End: 2024-09-24 | Stop reason: HOSPADM

## 2024-09-20 RX ORDER — FENTANYL CITRATE 50 UG/ML
50 INJECTION, SOLUTION INTRAMUSCULAR; INTRAVENOUS ONCE
Status: DISCONTINUED | OUTPATIENT
Start: 2024-09-20 | End: 2024-09-20

## 2024-09-20 RX ORDER — ONDANSETRON 4 MG/1
4 TABLET, ORALLY DISINTEGRATING ORAL EVERY 8 HOURS PRN
Status: DISCONTINUED | OUTPATIENT
Start: 2024-09-20 | End: 2024-09-24 | Stop reason: HOSPADM

## 2024-09-20 RX ORDER — POTASSIUM CHLORIDE 7.45 MG/ML
10 INJECTION INTRAVENOUS PRN
Status: DISCONTINUED | OUTPATIENT
Start: 2024-09-20 | End: 2024-09-24 | Stop reason: HOSPADM

## 2024-09-20 RX ADMIN — FENTANYL CITRATE 50 MCG: 50 INJECTION, SOLUTION INTRAMUSCULAR; INTRAVENOUS at 23:37

## 2024-09-20 RX ADMIN — FENTANYL CITRATE 50 MCG: 50 INJECTION INTRAMUSCULAR; INTRAVENOUS at 21:03

## 2024-09-20 RX ADMIN — SODIUM CHLORIDE 1000 ML: 9 INJECTION, SOLUTION INTRAVENOUS at 21:52

## 2024-09-20 RX ADMIN — METHOCARBAMOL 1000 MG: 500 TABLET ORAL at 21:02

## 2024-09-20 RX ADMIN — SODIUM CHLORIDE, PRESERVATIVE FREE 10 ML: 5 INJECTION INTRAVENOUS at 23:36

## 2024-09-20 RX ADMIN — FENTANYL CITRATE 50 MCG: 50 INJECTION INTRAMUSCULAR; INTRAVENOUS at 23:37

## 2024-09-20 ASSESSMENT — PAIN SCALES - GENERAL
PAINLEVEL_OUTOF10: 8
PAINLEVEL_OUTOF10: 10
PAINLEVEL_OUTOF10: 7

## 2024-09-20 ASSESSMENT — PAIN DESCRIPTION - LOCATION
LOCATION: LEG;HIP
LOCATION: LEG;HIP

## 2024-09-20 ASSESSMENT — PAIN DESCRIPTION - DESCRIPTORS
DESCRIPTORS: THROBBING;NAGGING
DESCRIPTORS: THROBBING;ACHING

## 2024-09-20 ASSESSMENT — PAIN DESCRIPTION - ORIENTATION
ORIENTATION: RIGHT
ORIENTATION: RIGHT

## 2024-09-20 ASSESSMENT — LIFESTYLE VARIABLES
HOW MANY STANDARD DRINKS CONTAINING ALCOHOL DO YOU HAVE ON A TYPICAL DAY: PATIENT DOES NOT DRINK
HOW OFTEN DO YOU HAVE A DRINK CONTAINING ALCOHOL: NEVER

## 2024-09-20 ASSESSMENT — PAIN - FUNCTIONAL ASSESSMENT: PAIN_FUNCTIONAL_ASSESSMENT: 0-10

## 2024-09-21 ENCOUNTER — APPOINTMENT (OUTPATIENT)
Dept: GENERAL RADIOLOGY | Age: 79
DRG: 481 | End: 2024-09-21
Payer: MEDICARE

## 2024-09-21 ENCOUNTER — ANESTHESIA EVENT (OUTPATIENT)
Dept: OPERATING ROOM | Age: 79
End: 2024-09-21
Payer: MEDICARE

## 2024-09-21 ENCOUNTER — APPOINTMENT (OUTPATIENT)
Dept: CT IMAGING | Age: 79
DRG: 481 | End: 2024-09-21
Payer: MEDICARE

## 2024-09-21 LAB
TROPONIN I SERPL HS-MCNC: 44 NG/L (ref 0–9)
TROPONIN I SERPL HS-MCNC: 47 NG/L (ref 0–9)
TROPONIN I SERPL HS-MCNC: 47 NG/L (ref 0–9)
TROPONIN I SERPL HS-MCNC: 48 NG/L (ref 0–9)

## 2024-09-21 PROCEDURE — 1200000000 HC SEMI PRIVATE

## 2024-09-21 PROCEDURE — 86900 BLOOD TYPING SEROLOGIC ABO: CPT

## 2024-09-21 PROCEDURE — 6370000000 HC RX 637 (ALT 250 FOR IP)

## 2024-09-21 PROCEDURE — 73700 CT LOWER EXTREMITY W/O DYE: CPT

## 2024-09-21 PROCEDURE — 36415 COLL VENOUS BLD VENIPUNCTURE: CPT

## 2024-09-21 PROCEDURE — 6360000002 HC RX W HCPCS

## 2024-09-21 PROCEDURE — 99223 1ST HOSP IP/OBS HIGH 75: CPT

## 2024-09-21 PROCEDURE — 86923 COMPATIBILITY TEST ELECTRIC: CPT

## 2024-09-21 PROCEDURE — 94640 AIRWAY INHALATION TREATMENT: CPT

## 2024-09-21 PROCEDURE — 6370000000 HC RX 637 (ALT 250 FOR IP): Performed by: STUDENT IN AN ORGANIZED HEALTH CARE EDUCATION/TRAINING PROGRAM

## 2024-09-21 PROCEDURE — 2580000003 HC RX 258

## 2024-09-21 PROCEDURE — 93005 ELECTROCARDIOGRAM TRACING: CPT | Performed by: ORTHOPAEDIC SURGERY

## 2024-09-21 PROCEDURE — 86850 RBC ANTIBODY SCREEN: CPT

## 2024-09-21 PROCEDURE — 86901 BLOOD TYPING SEROLOGIC RH(D): CPT

## 2024-09-21 PROCEDURE — 71045 X-RAY EXAM CHEST 1 VIEW: CPT

## 2024-09-21 PROCEDURE — 84484 ASSAY OF TROPONIN QUANT: CPT

## 2024-09-21 RX ORDER — ATORVASTATIN CALCIUM 80 MG/1
80 TABLET, FILM COATED ORAL DAILY
Status: DISCONTINUED | OUTPATIENT
Start: 2024-09-21 | End: 2024-09-24 | Stop reason: HOSPADM

## 2024-09-21 RX ORDER — TRANEXAMIC ACID 10 MG/ML
1000 INJECTION, SOLUTION INTRAVENOUS ONCE
Status: CANCELLED | OUTPATIENT
Start: 2024-09-22

## 2024-09-21 RX ORDER — POLYETHYLENE GLYCOL 3350 17 G/17G
17 POWDER, FOR SOLUTION ORAL DAILY PRN
Status: DISCONTINUED | OUTPATIENT
Start: 2024-09-21 | End: 2024-09-24 | Stop reason: HOSPADM

## 2024-09-21 RX ORDER — FENTANYL CITRATE 50 UG/ML
25 INJECTION, SOLUTION INTRAMUSCULAR; INTRAVENOUS
Status: DISCONTINUED | OUTPATIENT
Start: 2024-09-21 | End: 2024-09-24 | Stop reason: HOSPADM

## 2024-09-21 RX ORDER — LEVOTHYROXINE SODIUM 50 UG/1
50 TABLET ORAL DAILY
Status: DISCONTINUED | OUTPATIENT
Start: 2024-09-21 | End: 2024-09-24 | Stop reason: HOSPADM

## 2024-09-21 RX ORDER — DEXTROSE MONOHYDRATE 100 MG/ML
INJECTION, SOLUTION INTRAVENOUS CONTINUOUS PRN
Status: DISCONTINUED | OUTPATIENT
Start: 2024-09-21 | End: 2024-09-24 | Stop reason: HOSPADM

## 2024-09-21 RX ORDER — HYDROCODONE BITARTRATE AND ACETAMINOPHEN 5; 325 MG/1; MG/1
1 TABLET ORAL EVERY 6 HOURS PRN
Status: DISCONTINUED | OUTPATIENT
Start: 2024-09-21 | End: 2024-09-24 | Stop reason: HOSPADM

## 2024-09-21 RX ORDER — CALCIUM CARBONATE 500 MG/1
1 TABLET, CHEWABLE ORAL 2 TIMES DAILY
Status: DISCONTINUED | OUTPATIENT
Start: 2024-09-21 | End: 2024-09-24 | Stop reason: HOSPADM

## 2024-09-21 RX ORDER — LANOLIN ALCOHOL/MO/W.PET/CERES
3 CREAM (GRAM) TOPICAL NIGHTLY PRN
Status: DISCONTINUED | OUTPATIENT
Start: 2024-09-21 | End: 2024-09-21

## 2024-09-21 RX ORDER — INSULIN LISPRO 100 [IU]/ML
0-4 INJECTION, SOLUTION INTRAVENOUS; SUBCUTANEOUS NIGHTLY
Status: DISCONTINUED | OUTPATIENT
Start: 2024-09-21 | End: 2024-09-24 | Stop reason: HOSPADM

## 2024-09-21 RX ORDER — ARFORMOTEROL TARTRATE 15 UG/2ML
15 SOLUTION RESPIRATORY (INHALATION)
Status: DISCONTINUED | OUTPATIENT
Start: 2024-09-21 | End: 2024-09-24 | Stop reason: HOSPADM

## 2024-09-21 RX ORDER — VITAMIN B COMPLEX
1000 TABLET ORAL DAILY
Status: DISCONTINUED | OUTPATIENT
Start: 2024-09-21 | End: 2024-09-24 | Stop reason: HOSPADM

## 2024-09-21 RX ORDER — TRANEXAMIC ACID 10 MG/ML
1000 INJECTION, SOLUTION INTRAVENOUS
Status: ACTIVE | OUTPATIENT
Start: 2024-09-21 | End: 2024-09-21

## 2024-09-21 RX ORDER — BUPROPION HYDROCHLORIDE 100 MG/1
100 TABLET, EXTENDED RELEASE ORAL DAILY
Status: DISCONTINUED | OUTPATIENT
Start: 2024-09-21 | End: 2024-09-24 | Stop reason: HOSPADM

## 2024-09-21 RX ORDER — VENLAFAXINE HYDROCHLORIDE 150 MG/1
150 CAPSULE, EXTENDED RELEASE ORAL DAILY
Status: DISCONTINUED | OUTPATIENT
Start: 2024-09-21 | End: 2024-09-24 | Stop reason: HOSPADM

## 2024-09-21 RX ORDER — PANTOPRAZOLE SODIUM 20 MG/1
20 TABLET, DELAYED RELEASE ORAL DAILY
Status: DISCONTINUED | OUTPATIENT
Start: 2024-09-21 | End: 2024-09-24 | Stop reason: HOSPADM

## 2024-09-21 RX ORDER — SUCRALFATE 1 G/1
1 TABLET ORAL DAILY
Status: DISCONTINUED | OUTPATIENT
Start: 2024-09-21 | End: 2024-09-24 | Stop reason: HOSPADM

## 2024-09-21 RX ORDER — ONDANSETRON 4 MG/1
4 TABLET, FILM COATED ORAL EVERY 8 HOURS PRN
Status: DISCONTINUED | OUTPATIENT
Start: 2024-09-21 | End: 2024-09-21 | Stop reason: SDUPTHER

## 2024-09-21 RX ORDER — PRAMIPEXOLE DIHYDROCHLORIDE 0.25 MG/1
0.75 TABLET ORAL 2 TIMES DAILY
Status: DISCONTINUED | OUTPATIENT
Start: 2024-09-21 | End: 2024-09-24 | Stop reason: HOSPADM

## 2024-09-21 RX ORDER — GLUCAGON 1 MG/ML
1 KIT INJECTION PRN
Status: DISCONTINUED | OUTPATIENT
Start: 2024-09-21 | End: 2024-09-24 | Stop reason: HOSPADM

## 2024-09-21 RX ORDER — LANOLIN ALCOHOL/MO/W.PET/CERES
6 CREAM (GRAM) TOPICAL
Status: DISCONTINUED | OUTPATIENT
Start: 2024-09-21 | End: 2024-09-24 | Stop reason: HOSPADM

## 2024-09-21 RX ORDER — INSULIN LISPRO 100 [IU]/ML
0-16 INJECTION, SOLUTION INTRAVENOUS; SUBCUTANEOUS
Status: DISCONTINUED | OUTPATIENT
Start: 2024-09-21 | End: 2024-09-24 | Stop reason: HOSPADM

## 2024-09-21 RX ADMIN — SODIUM CHLORIDE, PRESERVATIVE FREE 10 ML: 5 INJECTION INTRAVENOUS at 21:17

## 2024-09-21 RX ADMIN — CALCIUM CARBONATE 500 MG: 500 TABLET, CHEWABLE ORAL at 21:15

## 2024-09-21 RX ADMIN — HYDROCODONE BITARTRATE AND ACETAMINOPHEN 1 TABLET: 5; 325 TABLET ORAL at 21:15

## 2024-09-21 RX ADMIN — SUCRALFATE 1 G: 1 TABLET ORAL at 10:17

## 2024-09-21 RX ADMIN — PANTOPRAZOLE SODIUM 20 MG: 20 TABLET, DELAYED RELEASE ORAL at 10:17

## 2024-09-21 RX ADMIN — BUPROPION HYDROCHLORIDE 100 MG: 100 TABLET, EXTENDED RELEASE ORAL at 10:18

## 2024-09-21 RX ADMIN — ATORVASTATIN CALCIUM 80 MG: 80 TABLET, FILM COATED ORAL at 10:17

## 2024-09-21 RX ADMIN — SODIUM CHLORIDE, PRESERVATIVE FREE 10 ML: 5 INJECTION INTRAVENOUS at 19:23

## 2024-09-21 RX ADMIN — FENTANYL CITRATE 25 MCG: 50 INJECTION INTRAMUSCULAR; INTRAVENOUS at 08:13

## 2024-09-21 RX ADMIN — VENLAFAXINE HYDROCHLORIDE 150 MG: 150 CAPSULE, EXTENDED RELEASE ORAL at 10:18

## 2024-09-21 RX ADMIN — FENTANYL CITRATE 25 MCG: 50 INJECTION INTRAMUSCULAR; INTRAVENOUS at 16:03

## 2024-09-21 RX ADMIN — FENTANYL CITRATE 25 MCG: 50 INJECTION INTRAMUSCULAR; INTRAVENOUS at 10:27

## 2024-09-21 RX ADMIN — FENTANYL CITRATE 25 MCG: 50 INJECTION INTRAMUSCULAR; INTRAVENOUS at 22:51

## 2024-09-21 RX ADMIN — FENTANYL CITRATE 25 MCG: 50 INJECTION INTRAMUSCULAR; INTRAVENOUS at 19:23

## 2024-09-21 RX ADMIN — ARFORMOTEROL TARTRATE 15 MCG: 15 SOLUTION RESPIRATORY (INHALATION) at 08:25

## 2024-09-21 RX ADMIN — HYDROCODONE BITARTRATE AND ACETAMINOPHEN 1 TABLET: 5; 325 TABLET ORAL at 14:20

## 2024-09-21 RX ADMIN — CALCIUM CARBONATE 500 MG: 500 TABLET, CHEWABLE ORAL at 01:02

## 2024-09-21 RX ADMIN — FENTANYL CITRATE 25 MCG: 50 INJECTION INTRAMUSCULAR; INTRAVENOUS at 01:02

## 2024-09-21 RX ADMIN — Medication 1000 UNITS: at 10:17

## 2024-09-21 RX ADMIN — SODIUM CHLORIDE, PRESERVATIVE FREE 10 ML: 5 INJECTION INTRAVENOUS at 10:27

## 2024-09-21 RX ADMIN — CALCIUM CARBONATE 500 MG: 500 TABLET, CHEWABLE ORAL at 10:17

## 2024-09-21 RX ADMIN — PRAMIPEXOLE DIHYDROCHLORIDE 0.75 MG: 0.25 TABLET ORAL at 10:18

## 2024-09-21 RX ADMIN — FENTANYL CITRATE 25 MCG: 50 INJECTION INTRAMUSCULAR; INTRAVENOUS at 03:22

## 2024-09-21 RX ADMIN — PRAMIPEXOLE DIHYDROCHLORIDE 0.75 MG: 0.25 TABLET ORAL at 21:16

## 2024-09-21 RX ADMIN — FENTANYL CITRATE 25 MCG: 50 INJECTION INTRAMUSCULAR; INTRAVENOUS at 05:45

## 2024-09-21 RX ADMIN — SODIUM CHLORIDE, PRESERVATIVE FREE 10 ML: 5 INJECTION INTRAVENOUS at 08:14

## 2024-09-21 RX ADMIN — SODIUM CHLORIDE, PRESERVATIVE FREE 10 ML: 5 INJECTION INTRAVENOUS at 16:04

## 2024-09-21 RX ADMIN — Medication 6 MG: at 21:16

## 2024-09-21 ASSESSMENT — PAIN DESCRIPTION - ONSET
ONSET: ON-GOING

## 2024-09-21 ASSESSMENT — PAIN - FUNCTIONAL ASSESSMENT
PAIN_FUNCTIONAL_ASSESSMENT: PREVENTS OR INTERFERES SOME ACTIVE ACTIVITIES AND ADLS

## 2024-09-21 ASSESSMENT — PAIN SCALES - GENERAL
PAINLEVEL_OUTOF10: 10
PAINLEVEL_OUTOF10: 8
PAINLEVEL_OUTOF10: 0
PAINLEVEL_OUTOF10: 8
PAINLEVEL_OUTOF10: 10
PAINLEVEL_OUTOF10: 10
PAINLEVEL_OUTOF10: 0
PAINLEVEL_OUTOF10: 2
PAINLEVEL_OUTOF10: 10
PAINLEVEL_OUTOF10: 2
PAINLEVEL_OUTOF10: 10
PAINLEVEL_OUTOF10: 2
PAINLEVEL_OUTOF10: 8
PAINLEVEL_OUTOF10: 9
PAINLEVEL_OUTOF10: 8
PAINLEVEL_OUTOF10: 8
PAINLEVEL_OUTOF10: 10
PAINLEVEL_OUTOF10: 0
PAINLEVEL_OUTOF10: 10
PAINLEVEL_OUTOF10: 4
PAINLEVEL_OUTOF10: 10

## 2024-09-21 ASSESSMENT — PAIN DESCRIPTION - DESCRIPTORS
DESCRIPTORS: ACHING;DISCOMFORT;SORE
DESCRIPTORS: ACHING;DISCOMFORT;SHARP;SHOOTING
DESCRIPTORS: ACHING;CRUSHING;CRAMPING
DESCRIPTORS: ACHING;DISCOMFORT;SORE
DESCRIPTORS: ACHING;CRUSHING;CRAMPING
DESCRIPTORS: ACHING;DISCOMFORT;SHARP;SHOOTING
DESCRIPTORS: ACHING;CRUSHING;NAGGING
DESCRIPTORS: ACHING;SHARP;SHOOTING
DESCRIPTORS: ACHING;CRUSHING;NAGGING
DESCRIPTORS: ACHING;PRESSURE;SHARP

## 2024-09-21 ASSESSMENT — PAIN DESCRIPTION - ORIENTATION
ORIENTATION: RIGHT

## 2024-09-21 ASSESSMENT — PAIN DESCRIPTION - FREQUENCY
FREQUENCY: CONTINUOUS

## 2024-09-21 ASSESSMENT — PAIN DESCRIPTION - LOCATION
LOCATION: LEG
LOCATION: KNEE;LEG
LOCATION: HIP
LOCATION: LEG
LOCATION: KNEE
LOCATION: HIP
LOCATION: KNEE
LOCATION: HIP

## 2024-09-21 ASSESSMENT — PAIN DESCRIPTION - PAIN TYPE
TYPE: ACUTE PAIN

## 2024-09-22 ENCOUNTER — APPOINTMENT (OUTPATIENT)
Dept: GENERAL RADIOLOGY | Age: 79
DRG: 481 | End: 2024-09-22
Payer: MEDICARE

## 2024-09-22 ENCOUNTER — ANESTHESIA (OUTPATIENT)
Dept: OPERATING ROOM | Age: 79
End: 2024-09-22
Payer: MEDICARE

## 2024-09-22 LAB
BUN BLD-MCNC: 13 MG/DL (ref 6–23)
BUN BLD-MCNC: 8 MG/DL (ref 6–23)
CA-I BLD-SCNC: 1.15 MMOL/L (ref 1.15–1.33)
CA-I BLD-SCNC: 1.15 MMOL/L (ref 1.15–1.33)
CHLORIDE BLD-SCNC: 99 MMOL/L (ref 100–108)
CHLORIDE BLD-SCNC: 99 MMOL/L (ref 100–108)
CO2 BLD CALC-SCNC: 16 MMOL/L (ref 22–29)
CO2 BLD CALC-SCNC: 30 MMOL/L (ref 22–29)
CREAT BLD-MCNC: 0.5 MG/DL (ref 0.5–1)
CREAT BLD-MCNC: 0.7 MG/DL (ref 0.5–1)
EGFR, POC: 89 ML/MIN/1.73M2
EGFR, POC: >90 ML/MIN/1.73M2
GLUCOSE BLD-MCNC: 129 MG/DL (ref 74–99)
GLUCOSE BLD-MCNC: 190 MG/DL (ref 74–99)
GLUCOSE BLD-MCNC: 63 MG/DL (ref 74–99)
GLUCOSE BLD-MCNC: 99 MG/DL (ref 74–99)
HCO3 VENOUS: 16.2 MMOL/L (ref 22–26)
HCO3 VENOUS: 29.3 MMOL/L (ref 22–26)
HCT VFR BLD AUTO: 15.8 % (ref 34–48)
HCT VFR BLD AUTO: 22 % (ref 37–54)
HCT VFR BLD AUTO: 35 % (ref 37–54)
HCT VFR BLD AUTO: 36.5 % (ref 34–48)
HGB BLD-MCNC: 11.4 G/DL (ref 11.5–15.5)
HGB BLD-MCNC: 4.5 G/DL (ref 11.5–15.5)
MICROORGANISM SPEC CULT: NORMAL
MICROORGANISM/AGENT SPEC: NORMAL
NEGATIVE BASE EXCESS, VEN: 11.2 MMOL/L
O2 SAT, VEN: 19.4 %
O2 SAT, VEN: 37.3 %
PCO2 VENOUS: 43 MM HG
PCO2 VENOUS: 53.1 MM HG
PH VENOUS: 7.18 (ref 7.35–7.45)
PH VENOUS: 7.35 (ref 7.35–7.45)
PO2 VENOUS: 16.1 MM HG
PO2 VENOUS: 27.2 MM HG
POC ANION GAP: 12 MMOL/L (ref 7–16)
POC ANION GAP: 33 MMOL/L (ref 7–16)
POC HEMOGLOBIN (CALC): 11.9 G/DL (ref 12.5–15.5)
POC HEMOGLOBIN (CALC): 7.6 G/DL (ref 12.5–15.5)
POC LACTIC ACID: 1.8 MMOL/L (ref 0.5–2.2)
POC LACTIC ACID: 11.2 MMOL/L (ref 0.5–2.2)
POSITIVE BASE EXCESS, VEN: 2.8 MMOL/L
POTASSIUM BLD-SCNC: 3.4 MMOL/L (ref 3.5–5)
POTASSIUM BLD-SCNC: 3.7 MMOL/L (ref 3.5–5)
SERVICE CMNT-IMP: NORMAL
SODIUM BLD-SCNC: 141 MMOL/L (ref 132–146)
SODIUM BLD-SCNC: 148 MMOL/L (ref 132–146)
SPECIMEN DESCRIPTION: NORMAL
TROPONIN I SERPL HS-MCNC: 166 NG/L (ref 0–9)
TROPONIN I SERPL HS-MCNC: 42 NG/L (ref 0–9)
TROPONIN I SERPL HS-MCNC: 43 NG/L (ref 0–9)
TROPONIN I SERPL HS-MCNC: 44 NG/L (ref 0–9)
TROPONIN I SERPL HS-MCNC: 48 NG/L (ref 0–9)
TROPONIN I SERPL HS-MCNC: 99 NG/L (ref 0–9)

## 2024-09-22 PROCEDURE — 2580000003 HC RX 258

## 2024-09-22 PROCEDURE — 87185 SC STD ENZYME DETCJ PER NZM: CPT

## 2024-09-22 PROCEDURE — 6360000002 HC RX W HCPCS

## 2024-09-22 PROCEDURE — 1200000000 HC SEMI PRIVATE

## 2024-09-22 PROCEDURE — 85018 HEMOGLOBIN: CPT

## 2024-09-22 PROCEDURE — 2500000003 HC RX 250 WO HCPCS

## 2024-09-22 PROCEDURE — 6370000000 HC RX 637 (ALT 250 FOR IP): Performed by: STUDENT IN AN ORGANIZED HEALTH CARE EDUCATION/TRAINING PROGRAM

## 2024-09-22 PROCEDURE — 36415 COLL VENOUS BLD VENIPUNCTURE: CPT

## 2024-09-22 PROCEDURE — 27513 TREATMENT OF THIGH FRACTURE: CPT | Performed by: ORTHOPAEDIC SURGERY

## 2024-09-22 PROCEDURE — 87206 SMEAR FLUORESCENT/ACID STAI: CPT

## 2024-09-22 PROCEDURE — 83605 ASSAY OF LACTIC ACID: CPT

## 2024-09-22 PROCEDURE — 87070 CULTURE OTHR SPECIMN AEROBIC: CPT

## 2024-09-22 PROCEDURE — 80047 BASIC METABLC PNL IONIZED CA: CPT

## 2024-09-22 PROCEDURE — P9016 RBC LEUKOCYTES REDUCED: HCPCS

## 2024-09-22 PROCEDURE — 85014 HEMATOCRIT: CPT

## 2024-09-22 PROCEDURE — 6360000002 HC RX W HCPCS: Performed by: ORTHOPAEDIC SURGERY

## 2024-09-22 PROCEDURE — 0QSB04Z REPOSITION RIGHT LOWER FEMUR WITH INTERNAL FIXATION DEVICE, OPEN APPROACH: ICD-10-PCS | Performed by: ORTHOPAEDIC SURGERY

## 2024-09-22 PROCEDURE — 87102 FUNGUS ISOLATION CULTURE: CPT

## 2024-09-22 PROCEDURE — 2500000003 HC RX 250 WO HCPCS: Performed by: ANESTHESIOLOGY

## 2024-09-22 PROCEDURE — 87077 CULTURE AEROBIC IDENTIFY: CPT

## 2024-09-22 PROCEDURE — 82803 BLOOD GASES ANY COMBINATION: CPT

## 2024-09-22 PROCEDURE — 82962 GLUCOSE BLOOD TEST: CPT

## 2024-09-22 PROCEDURE — 73552 X-RAY EXAM OF FEMUR 2/>: CPT

## 2024-09-22 PROCEDURE — 99232 SBSQ HOSP IP/OBS MODERATE 35: CPT | Performed by: STUDENT IN AN ORGANIZED HEALTH CARE EDUCATION/TRAINING PROGRAM

## 2024-09-22 PROCEDURE — 6370000000 HC RX 637 (ALT 250 FOR IP)

## 2024-09-22 PROCEDURE — 87015 SPECIMEN INFECT AGNT CONCNTJ: CPT

## 2024-09-22 PROCEDURE — 87116 MYCOBACTERIA CULTURE: CPT

## 2024-09-22 PROCEDURE — 94640 AIRWAY INHALATION TREATMENT: CPT

## 2024-09-22 PROCEDURE — 2700000000 HC OXYGEN THERAPY PER DAY

## 2024-09-22 PROCEDURE — 84484 ASSAY OF TROPONIN QUANT: CPT

## 2024-09-22 PROCEDURE — 87205 SMEAR GRAM STAIN: CPT

## 2024-09-22 PROCEDURE — 87075 CULTR BACTERIA EXCEPT BLOOD: CPT

## 2024-09-22 PROCEDURE — 30233N1 TRANSFUSION OF NONAUTOLOGOUS RED BLOOD CELLS INTO PERIPHERAL VEIN, PERCUTANEOUS APPROACH: ICD-10-PCS | Performed by: ORTHOPAEDIC SURGERY

## 2024-09-22 RX ORDER — DEXAMETHASONE SODIUM PHOSPHATE 10 MG/ML
INJECTION INTRAMUSCULAR; INTRAVENOUS
Status: DISCONTINUED | OUTPATIENT
Start: 2024-09-22 | End: 2024-09-22 | Stop reason: SDUPTHER

## 2024-09-22 RX ORDER — SODIUM CHLORIDE 9 MG/ML
INJECTION, SOLUTION INTRAVENOUS PRN
Status: DISCONTINUED | OUTPATIENT
Start: 2024-09-22 | End: 2024-09-22 | Stop reason: HOSPADM

## 2024-09-22 RX ORDER — PROPOFOL 10 MG/ML
INJECTION, EMULSION INTRAVENOUS
Status: DISCONTINUED | OUTPATIENT
Start: 2024-09-22 | End: 2024-09-22 | Stop reason: SDUPTHER

## 2024-09-22 RX ORDER — PROCHLORPERAZINE EDISYLATE 5 MG/ML
5 INJECTION INTRAMUSCULAR; INTRAVENOUS
Status: DISCONTINUED | OUTPATIENT
Start: 2024-09-22 | End: 2024-09-22 | Stop reason: HOSPADM

## 2024-09-22 RX ORDER — HYDROMORPHONE HYDROCHLORIDE 1 MG/ML
0.25 INJECTION, SOLUTION INTRAMUSCULAR; INTRAVENOUS; SUBCUTANEOUS EVERY 5 MIN PRN
Status: DISCONTINUED | OUTPATIENT
Start: 2024-09-22 | End: 2024-09-22 | Stop reason: HOSPADM

## 2024-09-22 RX ORDER — SODIUM CHLORIDE 9 MG/ML
INJECTION, SOLUTION INTRAVENOUS PRN
Status: DISCONTINUED | OUTPATIENT
Start: 2024-09-22 | End: 2024-09-24 | Stop reason: HOSPADM

## 2024-09-22 RX ORDER — PHENYLEPHRINE HCL IN 0.9% NACL 1 MG/10 ML
SYRINGE (ML) INTRAVENOUS
Status: DISCONTINUED | OUTPATIENT
Start: 2024-09-22 | End: 2024-09-22 | Stop reason: SDUPTHER

## 2024-09-22 RX ORDER — OXYCODONE AND ACETAMINOPHEN 5; 325 MG/1; MG/1
1 TABLET ORAL EVERY 6 HOURS PRN
Qty: 28 TABLET | Refills: 0 | Status: SHIPPED | OUTPATIENT
Start: 2024-09-22 | End: 2024-09-23

## 2024-09-22 RX ORDER — SODIUM CHLORIDE 9 MG/ML
INJECTION, SOLUTION INTRAVENOUS
Status: DISCONTINUED | OUTPATIENT
Start: 2024-09-22 | End: 2024-09-22

## 2024-09-22 RX ORDER — TOBRAMYCIN 1.2 G/30ML
INJECTION, POWDER, LYOPHILIZED, FOR SOLUTION INTRAVENOUS PRN
Status: DISCONTINUED | OUTPATIENT
Start: 2024-09-22 | End: 2024-09-22 | Stop reason: ALTCHOICE

## 2024-09-22 RX ORDER — VANCOMYCIN HYDROCHLORIDE 1 G/20ML
INJECTION, POWDER, LYOPHILIZED, FOR SOLUTION INTRAVENOUS PRN
Status: DISCONTINUED | OUTPATIENT
Start: 2024-09-22 | End: 2024-09-22 | Stop reason: ALTCHOICE

## 2024-09-22 RX ORDER — LIDOCAINE HYDROCHLORIDE 20 MG/ML
INJECTION, SOLUTION INTRAVENOUS
Status: DISCONTINUED | OUTPATIENT
Start: 2024-09-22 | End: 2024-09-22 | Stop reason: SDUPTHER

## 2024-09-22 RX ORDER — SODIUM CHLORIDE 0.9 % (FLUSH) 0.9 %
5-40 SYRINGE (ML) INJECTION EVERY 12 HOURS SCHEDULED
Status: DISCONTINUED | OUTPATIENT
Start: 2024-09-22 | End: 2024-09-22 | Stop reason: HOSPADM

## 2024-09-22 RX ORDER — HYDROMORPHONE HYDROCHLORIDE 1 MG/ML
0.5 INJECTION, SOLUTION INTRAMUSCULAR; INTRAVENOUS; SUBCUTANEOUS EVERY 5 MIN PRN
Status: DISCONTINUED | OUTPATIENT
Start: 2024-09-22 | End: 2024-09-22 | Stop reason: HOSPADM

## 2024-09-22 RX ORDER — SODIUM CHLORIDE, SODIUM LACTATE, POTASSIUM CHLORIDE, CALCIUM CHLORIDE 600; 310; 30; 20 MG/100ML; MG/100ML; MG/100ML; MG/100ML
INJECTION, SOLUTION INTRAVENOUS
Status: DISCONTINUED | OUTPATIENT
Start: 2024-09-22 | End: 2024-09-22 | Stop reason: SDUPTHER

## 2024-09-22 RX ORDER — ROCURONIUM BROMIDE 10 MG/ML
INJECTION, SOLUTION INTRAVENOUS
Status: DISCONTINUED | OUTPATIENT
Start: 2024-09-22 | End: 2024-09-22 | Stop reason: SDUPTHER

## 2024-09-22 RX ORDER — SODIUM CHLORIDE 0.9 % (FLUSH) 0.9 %
5-40 SYRINGE (ML) INJECTION PRN
Status: DISCONTINUED | OUTPATIENT
Start: 2024-09-22 | End: 2024-09-22 | Stop reason: HOSPADM

## 2024-09-22 RX ORDER — NALOXONE HYDROCHLORIDE 0.4 MG/ML
INJECTION, SOLUTION INTRAMUSCULAR; INTRAVENOUS; SUBCUTANEOUS PRN
Status: DISCONTINUED | OUTPATIENT
Start: 2024-09-22 | End: 2024-09-22 | Stop reason: HOSPADM

## 2024-09-22 RX ORDER — ONDANSETRON 2 MG/ML
INJECTION INTRAMUSCULAR; INTRAVENOUS
Status: DISCONTINUED | OUTPATIENT
Start: 2024-09-22 | End: 2024-09-22 | Stop reason: SDUPTHER

## 2024-09-22 RX ORDER — FENTANYL CITRATE 50 UG/ML
INJECTION, SOLUTION INTRAMUSCULAR; INTRAVENOUS
Status: DISCONTINUED | OUTPATIENT
Start: 2024-09-22 | End: 2024-09-22 | Stop reason: SDUPTHER

## 2024-09-22 RX ADMIN — SODIUM CHLORIDE, POTASSIUM CHLORIDE, SODIUM LACTATE AND CALCIUM CHLORIDE: 600; 310; 30; 20 INJECTION, SOLUTION INTRAVENOUS at 13:18

## 2024-09-22 RX ADMIN — WATER 2000 MG: 1 INJECTION INTRAMUSCULAR; INTRAVENOUS; SUBCUTANEOUS at 20:55

## 2024-09-22 RX ADMIN — ARFORMOTEROL TARTRATE 15 MCG: 15 SOLUTION RESPIRATORY (INHALATION) at 09:07

## 2024-09-22 RX ADMIN — FENTANYL CITRATE 50 MCG: 0.05 INJECTION, SOLUTION INTRAMUSCULAR; INTRAVENOUS at 10:52

## 2024-09-22 RX ADMIN — FENTANYL CITRATE 50 MCG: 0.05 INJECTION, SOLUTION INTRAMUSCULAR; INTRAVENOUS at 10:37

## 2024-09-22 RX ADMIN — Medication 100 MCG: at 10:45

## 2024-09-22 RX ADMIN — DEXAMETHASONE SODIUM PHOSPHATE 10 MG: 10 INJECTION INTRAMUSCULAR; INTRAVENOUS at 10:45

## 2024-09-22 RX ADMIN — CALCIUM CARBONATE 500 MG: 500 TABLET, CHEWABLE ORAL at 20:56

## 2024-09-22 RX ADMIN — HYDROMORPHONE HYDROCHLORIDE 0.5 MG: 1 INJECTION, SOLUTION INTRAMUSCULAR; INTRAVENOUS; SUBCUTANEOUS at 14:19

## 2024-09-22 RX ADMIN — LIDOCAINE HYDROCHLORIDE 100 MG: 20 INJECTION, SOLUTION INTRAVENOUS at 10:37

## 2024-09-22 RX ADMIN — FENTANYL CITRATE 25 MCG: 50 INJECTION INTRAMUSCULAR; INTRAVENOUS at 01:28

## 2024-09-22 RX ADMIN — PRAMIPEXOLE DIHYDROCHLORIDE 0.75 MG: 0.25 TABLET ORAL at 20:55

## 2024-09-22 RX ADMIN — HYDROCODONE BITARTRATE AND ACETAMINOPHEN 1 TABLET: 5; 325 TABLET ORAL at 03:16

## 2024-09-22 RX ADMIN — Medication 6 MG: at 20:55

## 2024-09-22 RX ADMIN — PROPOFOL 150 MG: 10 INJECTION, EMULSION INTRAVENOUS at 10:37

## 2024-09-22 RX ADMIN — ROCURONIUM BROMIDE 50 MG: 10 INJECTION, SOLUTION INTRAVENOUS at 10:38

## 2024-09-22 RX ADMIN — ROCURONIUM BROMIDE 30 MG: 10 INJECTION, SOLUTION INTRAVENOUS at 11:20

## 2024-09-22 RX ADMIN — FENTANYL CITRATE 25 MCG: 50 INJECTION INTRAMUSCULAR; INTRAVENOUS at 06:13

## 2024-09-22 RX ADMIN — HYDROCODONE BITARTRATE AND ACETAMINOPHEN 1 TABLET: 5; 325 TABLET ORAL at 21:05

## 2024-09-22 RX ADMIN — CEFAZOLIN 2000 MG: 2 INJECTION, POWDER, FOR SOLUTION INTRAMUSCULAR; INTRAVENOUS at 10:44

## 2024-09-22 RX ADMIN — ONDANSETRON 4 MG: 2 INJECTION INTRAMUSCULAR; INTRAVENOUS at 13:17

## 2024-09-22 RX ADMIN — FENTANYL CITRATE 50 MCG: 0.05 INJECTION, SOLUTION INTRAMUSCULAR; INTRAVENOUS at 11:07

## 2024-09-22 RX ADMIN — SODIUM CHLORIDE, POTASSIUM CHLORIDE, SODIUM LACTATE AND CALCIUM CHLORIDE: 600; 310; 30; 20 INJECTION, SOLUTION INTRAVENOUS at 10:34

## 2024-09-22 RX ADMIN — SODIUM CHLORIDE, PRESERVATIVE FREE 10 ML: 5 INJECTION INTRAVENOUS at 20:55

## 2024-09-22 ASSESSMENT — PAIN SCALES - GENERAL
PAINLEVEL_OUTOF10: 10
PAINLEVEL_OUTOF10: 2
PAINLEVEL_OUTOF10: 10
PAINLEVEL_OUTOF10: 0
PAINLEVEL_OUTOF10: 2
PAINLEVEL_OUTOF10: 2
PAINLEVEL_OUTOF10: 0
PAINLEVEL_OUTOF10: 10

## 2024-09-22 ASSESSMENT — PAIN DESCRIPTION - PAIN TYPE
TYPE: ACUTE PAIN
TYPE: SURGICAL PAIN
TYPE: ACUTE PAIN
TYPE: ACUTE PAIN

## 2024-09-22 ASSESSMENT — PAIN DESCRIPTION - ONSET
ONSET: ON-GOING

## 2024-09-22 ASSESSMENT — PAIN DESCRIPTION - DESCRIPTORS
DESCRIPTORS: ACHING;DISCOMFORT;THROBBING
DESCRIPTORS: ACHING;SORE
DESCRIPTORS: ACHING;DISCOMFORT;SORE
DESCRIPTORS: ACHING;DISCOMFORT;SORE

## 2024-09-22 ASSESSMENT — PAIN DESCRIPTION - LOCATION
LOCATION: LEG

## 2024-09-22 ASSESSMENT — PAIN DESCRIPTION - FREQUENCY
FREQUENCY: CONTINUOUS

## 2024-09-22 ASSESSMENT — PAIN - FUNCTIONAL ASSESSMENT
PAIN_FUNCTIONAL_ASSESSMENT: PREVENTS OR INTERFERES SOME ACTIVE ACTIVITIES AND ADLS

## 2024-09-22 ASSESSMENT — PAIN DESCRIPTION - ORIENTATION
ORIENTATION: RIGHT

## 2024-09-23 VITALS
BODY MASS INDEX: 26.99 KG/M2 | TEMPERATURE: 96.7 F | SYSTOLIC BLOOD PRESSURE: 133 MMHG | WEIGHT: 162 LBS | RESPIRATION RATE: 16 BRPM | DIASTOLIC BLOOD PRESSURE: 68 MMHG | OXYGEN SATURATION: 94 % | HEIGHT: 65 IN | HEART RATE: 96 BPM

## 2024-09-23 LAB
ABO/RH: NORMAL
ANION GAP SERPL CALCULATED.3IONS-SCNC: 12 MMOL/L (ref 7–16)
ANTIBODY SCREEN: NEGATIVE
ARM BAND NUMBER: NORMAL
BASOPHILS # BLD: 0.05 K/UL (ref 0–0.2)
BASOPHILS NFR BLD: 0 % (ref 0–2)
BLOOD BANK BLOOD PRODUCT EXPIRATION DATE: NORMAL
BLOOD BANK BLOOD PRODUCT EXPIRATION DATE: NORMAL
BLOOD BANK DISPENSE STATUS: NORMAL
BLOOD BANK DISPENSE STATUS: NORMAL
BLOOD BANK ISBT PRODUCT BLOOD TYPE: 6200
BLOOD BANK ISBT PRODUCT BLOOD TYPE: 6200
BLOOD BANK PRODUCT CODE: NORMAL
BLOOD BANK PRODUCT CODE: NORMAL
BLOOD BANK SAMPLE EXPIRATION: NORMAL
BLOOD BANK UNIT TYPE AND RH: NORMAL
BLOOD BANK UNIT TYPE AND RH: NORMAL
BPU ID: NORMAL
BPU ID: NORMAL
BUN SERPL-MCNC: 16 MG/DL (ref 6–23)
CALCIUM SERPL-MCNC: 8.3 MG/DL (ref 8.6–10.2)
CHLORIDE SERPL-SCNC: 100 MMOL/L (ref 98–107)
CO2 SERPL-SCNC: 26 MMOL/L (ref 22–29)
COMPONENT: NORMAL
COMPONENT: NORMAL
CREAT SERPL-MCNC: 0.8 MG/DL (ref 0.5–1)
CROSSMATCH RESULT: NORMAL
CROSSMATCH RESULT: NORMAL
EOSINOPHIL # BLD: 0.07 K/UL (ref 0.05–0.5)
EOSINOPHILS RELATIVE PERCENT: 0 % (ref 0–6)
ERYTHROCYTE [DISTWIDTH] IN BLOOD BY AUTOMATED COUNT: 18.4 % (ref 11.5–15)
GFR, ESTIMATED: 79 ML/MIN/1.73M2
GLUCOSE BLD-MCNC: 122 MG/DL (ref 74–99)
GLUCOSE SERPL-MCNC: 106 MG/DL (ref 74–99)
HCT VFR BLD AUTO: 30.7 % (ref 34–48)
HGB BLD-MCNC: 9.7 G/DL (ref 11.5–15.5)
IMM GRANULOCYTES # BLD AUTO: 0.1 K/UL (ref 0–0.58)
IMM GRANULOCYTES NFR BLD: 1 % (ref 0–5)
LYMPHOCYTES NFR BLD: 1.83 K/UL (ref 1.5–4)
LYMPHOCYTES RELATIVE PERCENT: 12 % (ref 20–42)
MCH RBC QN AUTO: 27.6 PG (ref 26–35)
MCHC RBC AUTO-ENTMCNC: 31.6 G/DL (ref 32–34.5)
MCV RBC AUTO: 87.5 FL (ref 80–99.9)
MONOCYTES NFR BLD: 1.26 K/UL (ref 0.1–0.95)
MONOCYTES NFR BLD: 8 % (ref 2–12)
NEUTROPHILS NFR BLD: 79 % (ref 43–80)
NEUTS SEG NFR BLD: 12.37 K/UL (ref 1.8–7.3)
PLATELET # BLD AUTO: 201 K/UL (ref 130–450)
PMV BLD AUTO: 11.8 FL (ref 7–12)
POTASSIUM SERPL-SCNC: 4 MMOL/L (ref 3.5–5)
RBC # BLD AUTO: 3.51 M/UL (ref 3.5–5.5)
SODIUM SERPL-SCNC: 138 MMOL/L (ref 132–146)
TRANSFUSION STATUS: NORMAL
TRANSFUSION STATUS: NORMAL
TROPONIN I SERPL HS-MCNC: 104 NG/L (ref 0–9)
TROPONIN I SERPL HS-MCNC: 104 NG/L (ref 0–9)
TROPONIN I SERPL HS-MCNC: 111 NG/L (ref 0–9)
TROPONIN I SERPL HS-MCNC: 114 NG/L (ref 0–9)
TROPONIN I SERPL HS-MCNC: 124 NG/L (ref 0–9)
TROPONIN I SERPL HS-MCNC: 162 NG/L (ref 0–9)
TROPONIN I SERPL HS-MCNC: 96 NG/L (ref 0–9)
UNIT DIVISION: 0
UNIT DIVISION: 0
UNIT ISSUE DATE/TIME: NORMAL
UNIT ISSUE DATE/TIME: NORMAL
WBC OTHER # BLD: 15.7 K/UL (ref 4.5–11.5)

## 2024-09-23 PROCEDURE — 97165 OT EVAL LOW COMPLEX 30 MIN: CPT

## 2024-09-23 PROCEDURE — 1200000000 HC SEMI PRIVATE

## 2024-09-23 PROCEDURE — 99232 SBSQ HOSP IP/OBS MODERATE 35: CPT | Performed by: STUDENT IN AN ORGANIZED HEALTH CARE EDUCATION/TRAINING PROGRAM

## 2024-09-23 PROCEDURE — 97535 SELF CARE MNGMENT TRAINING: CPT

## 2024-09-23 PROCEDURE — 6370000000 HC RX 637 (ALT 250 FOR IP): Performed by: STUDENT IN AN ORGANIZED HEALTH CARE EDUCATION/TRAINING PROGRAM

## 2024-09-23 PROCEDURE — 6360000002 HC RX W HCPCS

## 2024-09-23 PROCEDURE — 2700000000 HC OXYGEN THERAPY PER DAY

## 2024-09-23 PROCEDURE — 6370000000 HC RX 637 (ALT 250 FOR IP)

## 2024-09-23 PROCEDURE — 97161 PT EVAL LOW COMPLEX 20 MIN: CPT

## 2024-09-23 PROCEDURE — 80048 BASIC METABOLIC PNL TOTAL CA: CPT

## 2024-09-23 PROCEDURE — 82962 GLUCOSE BLOOD TEST: CPT

## 2024-09-23 PROCEDURE — 94640 AIRWAY INHALATION TREATMENT: CPT

## 2024-09-23 PROCEDURE — 2580000003 HC RX 258

## 2024-09-23 PROCEDURE — 97530 THERAPEUTIC ACTIVITIES: CPT

## 2024-09-23 PROCEDURE — 85025 COMPLETE CBC W/AUTO DIFF WBC: CPT

## 2024-09-23 PROCEDURE — 36415 COLL VENOUS BLD VENIPUNCTURE: CPT

## 2024-09-23 PROCEDURE — 84484 ASSAY OF TROPONIN QUANT: CPT

## 2024-09-23 RX ORDER — OXYCODONE AND ACETAMINOPHEN 5; 325 MG/1; MG/1
1 TABLET ORAL EVERY 6 HOURS PRN
Qty: 28 TABLET | Refills: 0 | Status: SHIPPED | OUTPATIENT
Start: 2024-09-23 | End: 2024-09-30

## 2024-09-23 RX ORDER — OXYCODONE AND ACETAMINOPHEN 5; 325 MG/1; MG/1
1 TABLET ORAL EVERY 6 HOURS PRN
Qty: 28 TABLET | Refills: 0 | Status: SHIPPED | OUTPATIENT
Start: 2024-09-23 | End: 2024-09-23

## 2024-09-23 RX ADMIN — CALCIUM CARBONATE 500 MG: 500 TABLET, CHEWABLE ORAL at 08:51

## 2024-09-23 RX ADMIN — Medication 1000 UNITS: at 08:51

## 2024-09-23 RX ADMIN — SODIUM CHLORIDE, PRESERVATIVE FREE 10 ML: 5 INJECTION INTRAVENOUS at 08:53

## 2024-09-23 RX ADMIN — PRAMIPEXOLE DIHYDROCHLORIDE 0.75 MG: 0.25 TABLET ORAL at 08:52

## 2024-09-23 RX ADMIN — SUCRALFATE 1 G: 1 TABLET ORAL at 08:52

## 2024-09-23 RX ADMIN — SODIUM CHLORIDE, PRESERVATIVE FREE 10 ML: 5 INJECTION INTRAVENOUS at 12:16

## 2024-09-23 RX ADMIN — HYDROCODONE BITARTRATE AND ACETAMINOPHEN 1 TABLET: 5; 325 TABLET ORAL at 05:37

## 2024-09-23 RX ADMIN — ARFORMOTEROL TARTRATE 15 MCG: 15 SOLUTION RESPIRATORY (INHALATION) at 21:06

## 2024-09-23 RX ADMIN — HYDROCODONE BITARTRATE AND ACETAMINOPHEN 1 TABLET: 5; 325 TABLET ORAL at 21:07

## 2024-09-23 RX ADMIN — LEVOTHYROXINE SODIUM 50 MCG: 0.05 TABLET ORAL at 05:29

## 2024-09-23 RX ADMIN — CALCIUM CARBONATE 500 MG: 500 TABLET, CHEWABLE ORAL at 21:08

## 2024-09-23 RX ADMIN — Medication 6 MG: at 21:07

## 2024-09-23 RX ADMIN — APIXABAN 5 MG: 5 TABLET, FILM COATED ORAL at 08:52

## 2024-09-23 RX ADMIN — ARFORMOTEROL TARTRATE 15 MCG: 15 SOLUTION RESPIRATORY (INHALATION) at 09:54

## 2024-09-23 RX ADMIN — PANTOPRAZOLE SODIUM 20 MG: 20 TABLET, DELAYED RELEASE ORAL at 08:51

## 2024-09-23 RX ADMIN — SODIUM CHLORIDE, PRESERVATIVE FREE 10 ML: 5 INJECTION INTRAVENOUS at 09:35

## 2024-09-23 RX ADMIN — APIXABAN 5 MG: 5 TABLET, FILM COATED ORAL at 21:08

## 2024-09-23 RX ADMIN — WATER 2000 MG: 1 INJECTION INTRAMUSCULAR; INTRAVENOUS; SUBCUTANEOUS at 12:15

## 2024-09-23 RX ADMIN — FENTANYL CITRATE 25 MCG: 50 INJECTION INTRAMUSCULAR; INTRAVENOUS at 09:33

## 2024-09-23 RX ADMIN — PRAMIPEXOLE DIHYDROCHLORIDE 0.75 MG: 0.25 TABLET ORAL at 21:07

## 2024-09-23 RX ADMIN — WATER 2000 MG: 1 INJECTION INTRAMUSCULAR; INTRAVENOUS; SUBCUTANEOUS at 05:29

## 2024-09-23 RX ADMIN — BUPROPION HYDROCHLORIDE 100 MG: 100 TABLET, EXTENDED RELEASE ORAL at 08:53

## 2024-09-23 RX ADMIN — VENLAFAXINE HYDROCHLORIDE 150 MG: 150 CAPSULE, EXTENDED RELEASE ORAL at 08:53

## 2024-09-23 RX ADMIN — ATORVASTATIN CALCIUM 80 MG: 80 TABLET, FILM COATED ORAL at 08:51

## 2024-09-23 ASSESSMENT — ENCOUNTER SYMPTOMS
BACK PAIN: 0
ABDOMINAL DISTENTION: 0
COLOR CHANGE: 0
COUGH: 0
VOMITING: 0
NAUSEA: 0
DIARRHEA: 0
SHORTNESS OF BREATH: 0

## 2024-09-23 ASSESSMENT — PAIN DESCRIPTION - DESCRIPTORS: DESCRIPTORS: DISCOMFORT;ACHING;NAGGING

## 2024-09-23 ASSESSMENT — PAIN DESCRIPTION - FREQUENCY
FREQUENCY: CONTINUOUS

## 2024-09-23 ASSESSMENT — PAIN SCALES - WONG BAKER
WONGBAKER_NUMERICALRESPONSE: HURTS A LITTLE BIT
WONGBAKER_NUMERICALRESPONSE: HURTS A LITTLE BIT

## 2024-09-23 ASSESSMENT — PAIN - FUNCTIONAL ASSESSMENT
PAIN_FUNCTIONAL_ASSESSMENT: PREVENTS OR INTERFERES SOME ACTIVE ACTIVITIES AND ADLS

## 2024-09-23 ASSESSMENT — PAIN DESCRIPTION - LOCATION
LOCATION: HIP
LOCATION: LEG
LOCATION: HIP
LOCATION: LEG

## 2024-09-23 ASSESSMENT — PAIN DESCRIPTION - ORIENTATION
ORIENTATION: RIGHT

## 2024-09-23 ASSESSMENT — PAIN SCALES - GENERAL
PAINLEVEL_OUTOF10: 10
PAINLEVEL_OUTOF10: 8
PAINLEVEL_OUTOF10: 10
PAINLEVEL_OUTOF10: 5
PAINLEVEL_OUTOF10: 0
PAINLEVEL_OUTOF10: 4
PAINLEVEL_OUTOF10: 10

## 2024-09-23 ASSESSMENT — PAIN DESCRIPTION - ONSET
ONSET: ON-GOING

## 2024-09-23 ASSESSMENT — PAIN DESCRIPTION - PAIN TYPE
TYPE: SURGICAL PAIN

## 2024-09-24 LAB
EKG ATRIAL RATE: 84 BPM
EKG P AXIS: 45 DEGREES
EKG P-R INTERVAL: 140 MS
EKG Q-T INTERVAL: 376 MS
EKG QRS DURATION: 96 MS
EKG QTC CALCULATION (BAZETT): 444 MS
EKG R AXIS: -40 DEGREES
EKG T AXIS: 57 DEGREES
EKG VENTRICULAR RATE: 84 BPM

## 2024-09-25 ENCOUNTER — OUTSIDE SERVICES (OUTPATIENT)
Dept: PRIMARY CARE CLINIC | Age: 79
End: 2024-09-25
Payer: MEDICARE

## 2024-09-25 DIAGNOSIS — G89.18 POST-OP PAIN: ICD-10-CM

## 2024-09-25 DIAGNOSIS — R79.89 ELEVATED TROPONIN: ICD-10-CM

## 2024-09-25 DIAGNOSIS — I10 PRIMARY HYPERTENSION: ICD-10-CM

## 2024-09-25 DIAGNOSIS — Z98.890 STATUS POST OPEN REDUCTION AND INTERNAL FIXATION (ORIF) OF FRACTURE: ICD-10-CM

## 2024-09-25 DIAGNOSIS — R53.1 GENERALIZED WEAKNESS: ICD-10-CM

## 2024-09-25 DIAGNOSIS — Z86.718 HISTORY OF DVT IN ADULTHOOD: ICD-10-CM

## 2024-09-25 DIAGNOSIS — R53.81 PHYSICAL DECONDITIONING: ICD-10-CM

## 2024-09-25 DIAGNOSIS — F32.A DEPRESSION, UNSPECIFIED DEPRESSION TYPE: ICD-10-CM
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
## 2024-09-25 DIAGNOSIS — E03.9 HYPOTHYROIDISM, UNSPECIFIED TYPE: ICD-10-CM

## 2024-09-25 DIAGNOSIS — Z91.81 AT MAXIMUM RISK FOR FALL: ICD-10-CM

## 2024-09-25 DIAGNOSIS — Z87.81 STATUS POST OPEN REDUCTION AND INTERNAL FIXATION (ORIF) OF FRACTURE: ICD-10-CM

## 2024-09-25 DIAGNOSIS — S72.001A CLOSED DISPLACED FRACTURE OF RIGHT FEMORAL NECK (HCC): ICD-10-CM

## 2024-09-25 DIAGNOSIS — E11.40 CONTROLLED TYPE 2 DIABETES MELLITUS WITH DIABETIC NEUROPATHY, WITH LONG-TERM CURRENT USE OF INSULIN (HCC): ICD-10-CM

## 2024-09-25 DIAGNOSIS — Z79.4 CONTROLLED TYPE 2 DIABETES MELLITUS WITH DIABETIC NEUROPATHY, WITH LONG-TERM CURRENT USE OF INSULIN (HCC): ICD-10-CM

## 2024-09-25 PROCEDURE — 99305 1ST NF CARE MODERATE MDM 35: CPT | Performed by: STUDENT IN AN ORGANIZED HEALTH CARE EDUCATION/TRAINING PROGRAM

## 2024-10-01 ENCOUNTER — APPOINTMENT (OUTPATIENT)
Dept: GENERAL RADIOLOGY | Age: 79
DRG: 467 | End: 2024-10-01
Payer: COMMERCIAL

## 2024-10-01 ENCOUNTER — TELEPHONE (OUTPATIENT)
Dept: ORTHOPEDIC SURGERY | Age: 79
End: 2024-10-01

## 2024-10-01 ENCOUNTER — HOSPITAL ENCOUNTER (INPATIENT)
Age: 79
LOS: 8 days | Discharge: NURSING, REHAB OR LONG-TERM CARE | DRG: 467 | End: 2024-10-09
Attending: EMERGENCY MEDICINE | Admitting: FAMILY MEDICINE
Payer: COMMERCIAL

## 2024-10-01 ENCOUNTER — APPOINTMENT (OUTPATIENT)
Dept: CT IMAGING | Age: 79
DRG: 467 | End: 2024-10-01
Attending: EMERGENCY MEDICINE
Payer: COMMERCIAL

## 2024-10-01 DIAGNOSIS — T81.49XA POSTOPERATIVE WOUND INFECTION OF RIGHT HIP: ICD-10-CM

## 2024-10-01 DIAGNOSIS — T81.49XA POSTOPERATIVE WOUND INFECTION: Primary | ICD-10-CM

## 2024-10-01 DIAGNOSIS — T84.59XA INFECTION OF PROSTHETIC HIP JOINT, INITIAL ENCOUNTER (HCC): ICD-10-CM

## 2024-10-01 DIAGNOSIS — Z96.649 INFECTION OF PROSTHETIC HIP JOINT, INITIAL ENCOUNTER (HCC): ICD-10-CM

## 2024-10-01 LAB
ALBUMIN SERPL-MCNC: 2.5 G/DL (ref 3.5–5.2)
ALP SERPL-CCNC: 151 U/L (ref 35–104)
ALT SERPL-CCNC: 10 U/L (ref 0–32)
ANION GAP SERPL CALCULATED.3IONS-SCNC: 7 MMOL/L (ref 7–16)
AST SERPL-CCNC: 16 U/L (ref 0–31)
BASOPHILS # BLD: 0.03 K/UL (ref 0–0.2)
BASOPHILS NFR BLD: 0 % (ref 0–2)
BILIRUB SERPL-MCNC: 0.3 MG/DL (ref 0–1.2)
BUN SERPL-MCNC: 15 MG/DL (ref 6–23)
CALCIUM SERPL-MCNC: 8.7 MG/DL (ref 8.6–10.2)
CHLORIDE SERPL-SCNC: 101 MMOL/L (ref 98–107)
CO2 SERPL-SCNC: 28 MMOL/L (ref 22–29)
CREAT SERPL-MCNC: 0.8 MG/DL (ref 0.5–1)
EOSINOPHIL # BLD: 0.48 K/UL (ref 0.05–0.5)
EOSINOPHILS RELATIVE PERCENT: 6 % (ref 0–6)
ERYTHROCYTE [DISTWIDTH] IN BLOOD BY AUTOMATED COUNT: 17.2 % (ref 11.5–15)
GFR, ESTIMATED: 72 ML/MIN/1.73M2
GLUCOSE SERPL-MCNC: 85 MG/DL (ref 74–99)
HCT VFR BLD AUTO: 29 % (ref 34–48)
HGB BLD-MCNC: 8.3 G/DL (ref 11.5–15.5)
IMM GRANULOCYTES # BLD AUTO: 0.05 K/UL (ref 0–0.58)
IMM GRANULOCYTES NFR BLD: 1 % (ref 0–5)
LACTATE BLDV-SCNC: 1 MMOL/L (ref 0.5–1.9)
LYMPHOCYTES NFR BLD: 2.53 K/UL (ref 1.5–4)
LYMPHOCYTES RELATIVE PERCENT: 30 % (ref 20–42)
MCH RBC QN AUTO: 27.2 PG (ref 26–35)
MCHC RBC AUTO-ENTMCNC: 28.6 G/DL (ref 32–34.5)
MCV RBC AUTO: 95.1 FL (ref 80–99.9)
MICROORGANISM SPEC CULT: NORMAL
MICROORGANISM SPEC CULT: NORMAL
MICROORGANISM/AGENT SPEC: NORMAL
MICROORGANISM/AGENT SPEC: NORMAL
MONOCYTES NFR BLD: 0.88 K/UL (ref 0.1–0.95)
MONOCYTES NFR BLD: 10 % (ref 2–12)
NEUTROPHILS NFR BLD: 54 % (ref 43–80)
NEUTS SEG NFR BLD: 4.59 K/UL (ref 1.8–7.3)
PLATELET # BLD AUTO: 280 K/UL (ref 130–450)
PMV BLD AUTO: 11.2 FL (ref 7–12)
POTASSIUM SERPL-SCNC: 5.5 MMOL/L (ref 3.5–5)
PROT SERPL-MCNC: 5.5 G/DL (ref 6.4–8.3)
RBC # BLD AUTO: 3.05 M/UL (ref 3.5–5.5)
RBC # BLD: ABNORMAL 10*6/UL
SERVICE CMNT-IMP: NORMAL
SERVICE CMNT-IMP: NORMAL
SODIUM SERPL-SCNC: 136 MMOL/L (ref 132–146)
SPECIMEN DESCRIPTION: NORMAL
SPECIMEN DESCRIPTION: NORMAL
WBC OTHER # BLD: 8.6 K/UL (ref 4.5–11.5)

## 2024-10-01 PROCEDURE — 2580000003 HC RX 258: Performed by: RADIOLOGY

## 2024-10-01 PROCEDURE — 73701 CT LOWER EXTREMITY W/DYE: CPT

## 2024-10-01 PROCEDURE — 85652 RBC SED RATE AUTOMATED: CPT

## 2024-10-01 PROCEDURE — 73702 CT LWR EXTREMITY W/O&W/DYE: CPT

## 2024-10-01 PROCEDURE — 2580000003 HC RX 258: Performed by: EMERGENCY MEDICINE

## 2024-10-01 PROCEDURE — 6360000004 HC RX CONTRAST MEDICATION: Performed by: RADIOLOGY

## 2024-10-01 PROCEDURE — 96365 THER/PROPH/DIAG IV INF INIT: CPT

## 2024-10-01 PROCEDURE — 80053 COMPREHEN METABOLIC PANEL: CPT

## 2024-10-01 PROCEDURE — 86140 C-REACTIVE PROTEIN: CPT

## 2024-10-01 PROCEDURE — 85025 COMPLETE CBC W/AUTO DIFF WBC: CPT

## 2024-10-01 PROCEDURE — 1200000000 HC SEMI PRIVATE

## 2024-10-01 PROCEDURE — 99285 EMERGENCY DEPT VISIT HI MDM: CPT

## 2024-10-01 PROCEDURE — 6360000002 HC RX W HCPCS: Performed by: EMERGENCY MEDICINE

## 2024-10-01 PROCEDURE — 96367 TX/PROPH/DG ADDL SEQ IV INF: CPT

## 2024-10-01 PROCEDURE — 99222 1ST HOSP IP/OBS MODERATE 55: CPT | Performed by: FAMILY MEDICINE

## 2024-10-01 PROCEDURE — 73552 X-RAY EXAM OF FEMUR 2/>: CPT

## 2024-10-01 PROCEDURE — 73502 X-RAY EXAM HIP UNI 2-3 VIEWS: CPT

## 2024-10-01 PROCEDURE — 83605 ASSAY OF LACTIC ACID: CPT

## 2024-10-01 PROCEDURE — 87040 BLOOD CULTURE FOR BACTERIA: CPT

## 2024-10-01 RX ORDER — POTASSIUM CHLORIDE 1500 MG/1
40 TABLET, EXTENDED RELEASE ORAL DAILY
COMMUNITY

## 2024-10-01 RX ORDER — ENOXAPARIN SODIUM 100 MG/ML
40 INJECTION SUBCUTANEOUS DAILY
Status: DISCONTINUED | OUTPATIENT
Start: 2024-10-02 | End: 2024-10-04

## 2024-10-01 RX ORDER — CYCLOBENZAPRINE HCL 10 MG
10 TABLET ORAL 2 TIMES DAILY PRN
COMMUNITY

## 2024-10-01 RX ORDER — ONDANSETRON 2 MG/ML
4 INJECTION INTRAMUSCULAR; INTRAVENOUS EVERY 6 HOURS PRN
Status: DISCONTINUED | OUTPATIENT
Start: 2024-10-01 | End: 2024-10-09 | Stop reason: HOSPADM

## 2024-10-01 RX ORDER — POTASSIUM CHLORIDE 7.45 MG/ML
10 INJECTION INTRAVENOUS PRN
Status: DISCONTINUED | OUTPATIENT
Start: 2024-10-01 | End: 2024-10-09 | Stop reason: HOSPADM

## 2024-10-01 RX ORDER — HYDROCODONE BITARTRATE AND ACETAMINOPHEN 10; 325 MG/1; MG/1
1 TABLET ORAL EVERY 6 HOURS PRN
Status: DISCONTINUED | OUTPATIENT
Start: 2024-10-01 | End: 2024-10-09 | Stop reason: HOSPADM

## 2024-10-01 RX ORDER — SODIUM CHLORIDE 0.9 % (FLUSH) 0.9 %
5-40 SYRINGE (ML) INJECTION PRN
Status: DISCONTINUED | OUTPATIENT
Start: 2024-10-01 | End: 2024-10-06

## 2024-10-01 RX ORDER — OXYCODONE AND ACETAMINOPHEN 10; 325 MG/1; MG/1
1 TABLET ORAL EVERY 12 HOURS PRN
Status: ON HOLD | COMMUNITY
Start: 2024-09-27 | End: 2024-10-08 | Stop reason: HOSPADM

## 2024-10-01 RX ORDER — OXYCODONE AND ACETAMINOPHEN 10; 325 MG/1; MG/1
1 TABLET ORAL EVERY 6 HOURS PRN
Status: ON HOLD | COMMUNITY
End: 2024-10-08 | Stop reason: HOSPADM

## 2024-10-01 RX ORDER — ALBUTEROL SULFATE 0.83 MG/ML
2.5 SOLUTION RESPIRATORY (INHALATION) EVERY 4 HOURS PRN
COMMUNITY

## 2024-10-01 RX ORDER — OXYCODONE AND ACETAMINOPHEN 10; 325 MG/1; MG/1
1 TABLET ORAL 3 TIMES DAILY
Status: ON HOLD | COMMUNITY
Start: 2024-09-27 | End: 2024-10-08 | Stop reason: HOSPADM

## 2024-10-01 RX ORDER — ONDANSETRON 4 MG/1
4 TABLET, ORALLY DISINTEGRATING ORAL EVERY 8 HOURS PRN
Status: DISCONTINUED | OUTPATIENT
Start: 2024-10-01 | End: 2024-10-09 | Stop reason: HOSPADM

## 2024-10-01 RX ORDER — ACETAMINOPHEN 325 MG/1
650 TABLET ORAL EVERY 6 HOURS PRN
Status: DISCONTINUED | OUTPATIENT
Start: 2024-10-01 | End: 2024-10-09 | Stop reason: HOSPADM

## 2024-10-01 RX ORDER — POTASSIUM CHLORIDE 1500 MG/1
40 TABLET, EXTENDED RELEASE ORAL PRN
Status: DISCONTINUED | OUTPATIENT
Start: 2024-10-01 | End: 2024-10-09 | Stop reason: HOSPADM

## 2024-10-01 RX ORDER — POLYETHYLENE GLYCOL 3350 17 G/17G
17 POWDER, FOR SOLUTION ORAL DAILY PRN
Status: DISCONTINUED | OUTPATIENT
Start: 2024-10-01 | End: 2024-10-09 | Stop reason: HOSPADM

## 2024-10-01 RX ORDER — SODIUM CHLORIDE 0.9 % (FLUSH) 0.9 %
5-40 SYRINGE (ML) INJECTION EVERY 12 HOURS SCHEDULED
Status: DISCONTINUED | OUTPATIENT
Start: 2024-10-01 | End: 2024-10-06 | Stop reason: SDUPTHER

## 2024-10-01 RX ORDER — FUROSEMIDE 20 MG
20 TABLET ORAL SEE ADMIN INSTRUCTIONS
COMMUNITY

## 2024-10-01 RX ORDER — IOPAMIDOL 755 MG/ML
75 INJECTION, SOLUTION INTRAVASCULAR
Status: COMPLETED | OUTPATIENT
Start: 2024-10-01 | End: 2024-10-01

## 2024-10-01 RX ORDER — 0.9 % SODIUM CHLORIDE 0.9 %
1000 INTRAVENOUS SOLUTION INTRAVENOUS ONCE
Status: COMPLETED | OUTPATIENT
Start: 2024-10-01 | End: 2024-10-01

## 2024-10-01 RX ORDER — SODIUM CHLORIDE 9 MG/ML
INJECTION, SOLUTION INTRAVENOUS PRN
Status: DISCONTINUED | OUTPATIENT
Start: 2024-10-01 | End: 2024-10-09 | Stop reason: HOSPADM

## 2024-10-01 RX ORDER — DOXYCYCLINE HYCLATE 100 MG
100 TABLET ORAL 2 TIMES DAILY
Status: ON HOLD | COMMUNITY
Start: 2024-09-28 | End: 2024-10-08 | Stop reason: HOSPADM

## 2024-10-01 RX ORDER — ACETAMINOPHEN 650 MG/1
650 SUPPOSITORY RECTAL EVERY 6 HOURS PRN
Status: DISCONTINUED | OUTPATIENT
Start: 2024-10-01 | End: 2024-10-09 | Stop reason: HOSPADM

## 2024-10-01 RX ORDER — HYDROCODONE BITARTRATE AND ACETAMINOPHEN 5; 325 MG/1; MG/1
1 TABLET ORAL EVERY 6 HOURS PRN
Status: DISCONTINUED | OUTPATIENT
Start: 2024-10-01 | End: 2024-10-09 | Stop reason: HOSPADM

## 2024-10-01 RX ORDER — ACETAMINOPHEN 325 MG/1
650 TABLET ORAL EVERY 4 HOURS PRN
COMMUNITY

## 2024-10-01 RX ORDER — LANOLIN ALCOHOL/MO/W.PET/CERES
3 CREAM (GRAM) TOPICAL NIGHTLY PRN
COMMUNITY

## 2024-10-01 RX ORDER — GABAPENTIN 800 MG/1
400 TABLET ORAL 3 TIMES DAILY
COMMUNITY

## 2024-10-01 RX ORDER — MAGNESIUM SULFATE IN WATER 40 MG/ML
2000 INJECTION, SOLUTION INTRAVENOUS PRN
Status: DISCONTINUED | OUTPATIENT
Start: 2024-10-01 | End: 2024-10-09 | Stop reason: HOSPADM

## 2024-10-01 RX ORDER — SODIUM CHLORIDE 0.9 % (FLUSH) 0.9 %
10 SYRINGE (ML) INJECTION PRN
Status: DISCONTINUED | OUTPATIENT
Start: 2024-10-01 | End: 2024-10-09 | Stop reason: HOSPADM

## 2024-10-01 RX ORDER — SENNOSIDES 8.6 MG
650 CAPSULE ORAL 2 TIMES DAILY
COMMUNITY

## 2024-10-01 RX ADMIN — Medication 10 ML: at 23:03

## 2024-10-01 RX ADMIN — SODIUM CHLORIDE 1000 ML: 9 INJECTION, SOLUTION INTRAVENOUS at 20:35

## 2024-10-01 RX ADMIN — SODIUM CHLORIDE 1500 MG: 9 INJECTION, SOLUTION INTRAVENOUS at 21:49

## 2024-10-01 RX ADMIN — CEFEPIME 2000 MG: 2 INJECTION, POWDER, FOR SOLUTION INTRAVENOUS at 20:37

## 2024-10-01 RX ADMIN — IOPAMIDOL 75 ML: 755 INJECTION, SOLUTION INTRAVENOUS at 22:59

## 2024-10-01 ASSESSMENT — PAIN - FUNCTIONAL ASSESSMENT: PAIN_FUNCTIONAL_ASSESSMENT: NONE - DENIES PAIN

## 2024-10-01 NOTE — TELEPHONE ENCOUNTER
Spoke to Erum. Instructed her to send pt to [facility] [place] to be treated for infection. Verbalized understanding. States she will notify her supervisor and the pt.

## 2024-10-01 NOTE — TELEPHONE ENCOUNTER
Erum from [facility] called. States pt started to have a lot of drainage from her incision on 10/28/24. Drainage is dark red. Area around the incision was a little warm and red.  Per facility she was started on Doxycyline 100mg BID x 7 9/28/24, then a culture was obtained 9/29/24.  She states they have noted that they spoke to the ortho on call. She is still having mod amt of drainage, using ABD's and changing dressing PRN.    Future Appointments   Date Time Provider Department Center   10/7/2024  1:45 PM SCHEDULE, [facility]

## 2024-10-01 NOTE — TELEPHONE ENCOUNTER
I would recommend the patient to go to [facility] ED for further evaluation and labs.     Positive intraoperative cultures. She most likely will need admitted with infectious disease consult

## 2024-10-02 LAB
ABO + RH BLD: NORMAL
ANION GAP SERPL CALCULATED.3IONS-SCNC: 7 MMOL/L (ref 7–16)
ARM BAND NUMBER: NORMAL
BASOPHILS # BLD: 0.02 K/UL (ref 0–0.2)
BASOPHILS NFR BLD: 0 % (ref 0–2)
BLOOD BANK SAMPLE EXPIRATION: NORMAL
BLOOD GROUP ANTIBODIES SERPL: NEGATIVE
BUN SERPL-MCNC: 13 MG/DL (ref 6–23)
CALCIUM SERPL-MCNC: 8.5 MG/DL (ref 8.6–10.2)
CHLORIDE SERPL-SCNC: 102 MMOL/L (ref 98–107)
CO2 SERPL-SCNC: 29 MMOL/L (ref 22–29)
CREAT SERPL-MCNC: 0.7 MG/DL (ref 0.5–1)
CRP SERPL HS-MCNC: 17 MG/L (ref 0–5)
EOSINOPHIL # BLD: 0.34 K/UL (ref 0.05–0.5)
EOSINOPHILS RELATIVE PERCENT: 5 % (ref 0–6)
ERYTHROCYTE [DISTWIDTH] IN BLOOD BY AUTOMATED COUNT: 17.1 % (ref 11.5–15)
ERYTHROCYTE [SEDIMENTATION RATE] IN BLOOD BY WESTERGREN METHOD: 45 MM/HR (ref 0–20)
GFR, ESTIMATED: 89 ML/MIN/1.73M2
GLUCOSE SERPL-MCNC: 76 MG/DL (ref 74–99)
HCT VFR BLD AUTO: 27.8 % (ref 34–48)
HGB BLD-MCNC: 8.1 G/DL (ref 11.5–15.5)
IMM GRANULOCYTES # BLD AUTO: 0.05 K/UL (ref 0–0.58)
IMM GRANULOCYTES NFR BLD: 1 % (ref 0–5)
LACTATE BLDV-SCNC: 0.5 MMOL/L (ref 0.5–1.9)
LYMPHOCYTES NFR BLD: 1.92 K/UL (ref 1.5–4)
LYMPHOCYTES RELATIVE PERCENT: 26 % (ref 20–42)
MCH RBC QN AUTO: 27.3 PG (ref 26–35)
MCHC RBC AUTO-ENTMCNC: 29.1 G/DL (ref 32–34.5)
MCV RBC AUTO: 93.6 FL (ref 80–99.9)
MICROORGANISM SPEC CULT: NORMAL
MICROORGANISM/AGENT SPEC: NORMAL
MONOCYTES NFR BLD: 0.79 K/UL (ref 0.1–0.95)
MONOCYTES NFR BLD: 11 % (ref 2–12)
NEUTROPHILS NFR BLD: 57 % (ref 43–80)
NEUTS SEG NFR BLD: 4.22 K/UL (ref 1.8–7.3)
PLATELET # BLD AUTO: 259 K/UL (ref 130–450)
PMV BLD AUTO: 10.5 FL (ref 7–12)
POTASSIUM SERPL-SCNC: 4.7 MMOL/L (ref 3.5–5)
RBC # BLD AUTO: 2.97 M/UL (ref 3.5–5.5)
SERVICE CMNT-IMP: NORMAL
SODIUM SERPL-SCNC: 138 MMOL/L (ref 132–146)
SPECIMEN DESCRIPTION: NORMAL
WBC OTHER # BLD: 7.3 K/UL (ref 4.5–11.5)

## 2024-10-02 PROCEDURE — 2580000003 HC RX 258: Performed by: FAMILY MEDICINE

## 2024-10-02 PROCEDURE — 36415 COLL VENOUS BLD VENIPUNCTURE: CPT

## 2024-10-02 PROCEDURE — 86900 BLOOD TYPING SEROLOGIC ABO: CPT

## 2024-10-02 PROCEDURE — 80048 BASIC METABOLIC PNL TOTAL CA: CPT

## 2024-10-02 PROCEDURE — 2580000003 HC RX 258: Performed by: INTERNAL MEDICINE

## 2024-10-02 PROCEDURE — 86850 RBC ANTIBODY SCREEN: CPT

## 2024-10-02 PROCEDURE — 6360000002 HC RX W HCPCS: Performed by: INTERNAL MEDICINE

## 2024-10-02 PROCEDURE — 99232 SBSQ HOSP IP/OBS MODERATE 35: CPT | Performed by: INTERNAL MEDICINE

## 2024-10-02 PROCEDURE — 2580000003 HC RX 258

## 2024-10-02 PROCEDURE — 85025 COMPLETE CBC W/AUTO DIFF WBC: CPT

## 2024-10-02 PROCEDURE — 2700000000 HC OXYGEN THERAPY PER DAY

## 2024-10-02 PROCEDURE — 86901 BLOOD TYPING SEROLOGIC RH(D): CPT

## 2024-10-02 PROCEDURE — 1200000000 HC SEMI PRIVATE

## 2024-10-02 PROCEDURE — 6370000000 HC RX 637 (ALT 250 FOR IP): Performed by: FAMILY MEDICINE

## 2024-10-02 PROCEDURE — 6360000002 HC RX W HCPCS

## 2024-10-02 RX ORDER — VENLAFAXINE HYDROCHLORIDE 150 MG/1
150 CAPSULE, EXTENDED RELEASE ORAL DAILY
Status: DISCONTINUED | OUTPATIENT
Start: 2024-10-02 | End: 2024-10-09 | Stop reason: HOSPADM

## 2024-10-02 RX ORDER — ATORVASTATIN CALCIUM 80 MG/1
80 TABLET, FILM COATED ORAL DAILY
Status: DISCONTINUED | OUTPATIENT
Start: 2024-10-02 | End: 2024-10-09 | Stop reason: HOSPADM

## 2024-10-02 RX ORDER — SUCRALFATE 1 G/1
1 TABLET ORAL DAILY
Status: DISCONTINUED | OUTPATIENT
Start: 2024-10-02 | End: 2024-10-09 | Stop reason: HOSPADM

## 2024-10-02 RX ORDER — GABAPENTIN 400 MG/1
400 CAPSULE ORAL 3 TIMES DAILY
Status: DISCONTINUED | OUTPATIENT
Start: 2024-10-02 | End: 2024-10-09 | Stop reason: HOSPADM

## 2024-10-02 RX ORDER — FERROUS SULFATE 325(65) MG
325 TABLET ORAL DAILY
Status: DISCONTINUED | OUTPATIENT
Start: 2024-10-02 | End: 2024-10-09 | Stop reason: HOSPADM

## 2024-10-02 RX ORDER — CYCLOBENZAPRINE HCL 10 MG
10 TABLET ORAL 2 TIMES DAILY PRN
Status: DISCONTINUED | OUTPATIENT
Start: 2024-10-02 | End: 2024-10-09 | Stop reason: HOSPADM

## 2024-10-02 RX ORDER — BUPROPION HYDROCHLORIDE 100 MG/1
100 TABLET, EXTENDED RELEASE ORAL DAILY
Status: DISCONTINUED | OUTPATIENT
Start: 2024-10-02 | End: 2024-10-09 | Stop reason: HOSPADM

## 2024-10-02 RX ORDER — PANTOPRAZOLE SODIUM 20 MG/1
20 TABLET, DELAYED RELEASE ORAL DAILY
Status: DISCONTINUED | OUTPATIENT
Start: 2024-10-02 | End: 2024-10-09 | Stop reason: HOSPADM

## 2024-10-02 RX ORDER — FUROSEMIDE 20 MG
20 TABLET ORAL SEE ADMIN INSTRUCTIONS
Status: DISCONTINUED | OUTPATIENT
Start: 2024-10-02 | End: 2024-10-02 | Stop reason: SDUPTHER

## 2024-10-02 RX ORDER — ALBUTEROL SULFATE 0.83 MG/ML
2.5 SOLUTION RESPIRATORY (INHALATION) EVERY 4 HOURS PRN
Status: DISCONTINUED | OUTPATIENT
Start: 2024-10-02 | End: 2024-10-09 | Stop reason: HOSPADM

## 2024-10-02 RX ORDER — LEVOTHYROXINE SODIUM 50 UG/1
50 TABLET ORAL DAILY
Status: DISCONTINUED | OUTPATIENT
Start: 2024-10-02 | End: 2024-10-09 | Stop reason: HOSPADM

## 2024-10-02 RX ORDER — POTASSIUM CHLORIDE 1500 MG/1
40 TABLET, EXTENDED RELEASE ORAL DAILY
Status: DISCONTINUED | OUTPATIENT
Start: 2024-10-02 | End: 2024-10-02

## 2024-10-02 RX ORDER — PRAMIPEXOLE DIHYDROCHLORIDE 0.25 MG/1
0.75 TABLET ORAL 2 TIMES DAILY
Status: DISCONTINUED | OUTPATIENT
Start: 2024-10-02 | End: 2024-10-09 | Stop reason: HOSPADM

## 2024-10-02 RX ORDER — FUROSEMIDE 20 MG
20 TABLET ORAL
Status: DISCONTINUED | OUTPATIENT
Start: 2024-10-03 | End: 2024-10-09 | Stop reason: HOSPADM

## 2024-10-02 RX ADMIN — HYDROCODONE BITARTRATE AND ACETAMINOPHEN 1 TABLET: 10; 325 TABLET ORAL at 18:17

## 2024-10-02 RX ADMIN — SODIUM CHLORIDE 1250 MG: 9 INJECTION, SOLUTION INTRAVENOUS at 14:56

## 2024-10-02 RX ADMIN — CYCLOBENZAPRINE 10 MG: 10 TABLET, FILM COATED ORAL at 21:20

## 2024-10-02 RX ADMIN — VENLAFAXINE HYDROCHLORIDE 150 MG: 150 CAPSULE, EXTENDED RELEASE ORAL at 10:28

## 2024-10-02 RX ADMIN — HYDROCODONE BITARTRATE AND ACETAMINOPHEN 1 TABLET: 10; 325 TABLET ORAL at 02:52

## 2024-10-02 RX ADMIN — ATORVASTATIN CALCIUM 80 MG: 80 TABLET, FILM COATED ORAL at 10:28

## 2024-10-02 RX ADMIN — LEVOTHYROXINE SODIUM 50 MCG: 0.05 TABLET ORAL at 10:08

## 2024-10-02 RX ADMIN — BUPROPION HYDROCHLORIDE 100 MG: 100 TABLET, EXTENDED RELEASE ORAL at 10:27

## 2024-10-02 RX ADMIN — SODIUM ZIRCONIUM CYCLOSILICATE 10 G: 10 POWDER, FOR SUSPENSION ORAL at 05:21

## 2024-10-02 RX ADMIN — MEROPENEM 1000 MG: 1 INJECTION INTRAVENOUS at 21:17

## 2024-10-02 RX ADMIN — SODIUM CHLORIDE, PRESERVATIVE FREE 10 ML: 5 INJECTION INTRAVENOUS at 21:18

## 2024-10-02 RX ADMIN — CYCLOBENZAPRINE 10 MG: 10 TABLET, FILM COATED ORAL at 11:06

## 2024-10-02 RX ADMIN — HYDROCODONE BITARTRATE AND ACETAMINOPHEN 1 TABLET: 10; 325 TABLET ORAL at 11:05

## 2024-10-02 RX ADMIN — MEROPENEM 1000 MG: 1 INJECTION INTRAVENOUS at 13:45

## 2024-10-02 RX ADMIN — SODIUM CHLORIDE, PRESERVATIVE FREE 10 ML: 5 INJECTION INTRAVENOUS at 10:29

## 2024-10-02 RX ADMIN — PRAMIPEXOLE DIHYDROCHLORIDE 0.75 MG: 0.25 TABLET ORAL at 10:27

## 2024-10-02 RX ADMIN — SUCRALFATE 1 G: 1 TABLET ORAL at 10:28

## 2024-10-02 RX ADMIN — PANTOPRAZOLE SODIUM 20 MG: 20 TABLET, DELAYED RELEASE ORAL at 10:28

## 2024-10-02 RX ADMIN — GABAPENTIN 400 MG: 400 CAPSULE ORAL at 10:28

## 2024-10-02 RX ADMIN — FERROUS SULFATE TAB 325 MG (65 MG ELEMENTAL FE) 325 MG: 325 (65 FE) TAB at 10:28

## 2024-10-02 RX ADMIN — GABAPENTIN 400 MG: 400 CAPSULE ORAL at 14:45

## 2024-10-02 RX ADMIN — GABAPENTIN 400 MG: 400 CAPSULE ORAL at 21:13

## 2024-10-02 ASSESSMENT — PAIN DESCRIPTION - DESCRIPTORS
DESCRIPTORS: ACHING;DISCOMFORT;SHARP
DESCRIPTORS: SHARP;STABBING;ACHING

## 2024-10-02 ASSESSMENT — PAIN DESCRIPTION - ORIENTATION
ORIENTATION: RIGHT
ORIENTATION: RIGHT

## 2024-10-02 ASSESSMENT — PAIN DESCRIPTION - ONSET
ONSET: GRADUAL
ONSET: ON-GOING

## 2024-10-02 ASSESSMENT — PAIN SCALES - GENERAL
PAINLEVEL_OUTOF10: 10
PAINLEVEL_OUTOF10: 10
PAINLEVEL_OUTOF10: 6
PAINLEVEL_OUTOF10: 0
PAINLEVEL_OUTOF10: 7
PAINLEVEL_OUTOF10: 10

## 2024-10-02 ASSESSMENT — PAIN DESCRIPTION - LOCATION
LOCATION: LEG;KNEE
LOCATION: HIP

## 2024-10-02 ASSESSMENT — PAIN DESCRIPTION - FREQUENCY
FREQUENCY: INTERMITTENT
FREQUENCY: CONTINUOUS

## 2024-10-02 ASSESSMENT — PAIN DESCRIPTION - PAIN TYPE
TYPE: ACUTE PAIN
TYPE: ACUTE PAIN

## 2024-10-02 NOTE — ED NOTES
Called lab to make aware that pt lactate lab did not show on results. Lab stated they would try releasing it again

## 2024-10-02 NOTE — PROGRESS NOTES
4 Eyes Skin Assessment     NAME:  Rebeka Renee  YOB: 1945  MEDICAL RECORD NUMBER:  [id number]    The patient is being assessed for  Admission    I agree that at least one RN has performed a thorough Head to Toe Skin Assessment on the patient. ALL assessment sites listed below have been assessed.      Areas assessed by both nurses:    Head, Face, Ears, Shoulders, Back, Chest, Arms, Elbows, Hands, Sacrum. Buttock, Coccyx, Ischium, and Legs. Feet and Heels        Does the Patient have a Wound? No noted wound(s)       Norris Prevention initiated by RN: Yes  Wound Care Orders initiated by RN: No    Pressure Injury (Stage 3,4, Unstageable, DTI, NWPT, and Complex wounds) if present, place Wound referral order by RN under : No    New Ostomies, if present place, Ostomy referral order under : No     Nurse 1 eSignature: Electronically signed by Martha Monroe RN on 10/2/24 at 4:00 AM EDT    **SHARE this note so that the co-signing nurse can place an eSignature**    Nurse 2 eSignature: Electronically signed by Nilsa Pulido RN on 10/2/24 at 4:02 AM EDT

## 2024-10-02 NOTE — PROGRESS NOTES
Patient seen at bedside.    Patient has 2 large postoperative incisions on the lateral aspect of her right lower leg.  She has had a large amount of serosanguineous drainage from the proximal wound.   Plan for incision and drainage with irrigation of proximal wound tomorrow 10/3 in the operating room  Patient okay to resume adult diet today.  Will be n.p.o. at midnight  Will continue IV antibiotics per admitting team and infectious disease  Will continue nonweightbearing status to right lower extremity  Hold anticoagulation  This was discussed with the patient.  She was agreeable and would like to proceed with irrigation and debridement of her previous incision site  Electronically signed by Dominik Barnard DO on 10/2/2024 at 8:51 AM

## 2024-10-02 NOTE — CARE COORDINATION
Care Coordination  Spoke to the patient and  at bedside. She is from [facility] and her plan is to return there upon discharge. She was admitted for post op right hip sugary with wound drainage. Orthopedics was consulted. She is npo after midnight to go to surgery in the am for an incision and drainage with irrigation of proximal wound in am 10/3. Will need pt ot and a new precert for the patient once stable. Will need return envelope done.            Case Management Assessment  Initial Evaluation    Date/Time of Evaluation: 10/2/2024 3:19 PM  Assessment Completed by: Sandra Wilson RN    If patient is discharged prior to next notation, then this note serves as note for discharge by case management.    Patient Name: Rebeka Renee                   YOB: 1945  Diagnosis: Postoperative wound infection of right hip [T81.49XA]                   Date / Time: 10/1/2024  6:00 PM    Patient Admission Status: Inpatient   Readmission Risk (Low < 19, Mod (19-27), High > 27): Readmission Risk Score: 36.9    Current PCP: Navneet Latif MD  PCP verified by CM? Yes    Chart Reviewed: Yes      History Provided by: Patient  Patient Orientation: Alert and Oriented    Patient Cognition: Alert    Hospitalization in the last 30 days (Readmission):  Yes    If yes, Readmission Assessment in CM Navigator will be completed.    Advance Directives:      Code Status: Full Code   Patient's Primary Decision Maker is: Legal Next of Kin    Primary Decision Maker: Nate Renee - Spouse - [phone]    Discharge Planning:    Patient lives with: Other (Comment) Type of Home: Skilled Nursing Facility  Primary Care Giver: Self  Patient Support Systems include: Parent   Current Financial resources:    Current community resources:    Current services prior to admission: Skilled Nursing Facility            Current DME:              Type of Home Care services:  None    ADLS  Prior functional level: Independent in  ADLs/IADLs  Current functional level: Assistance with the following:, Mobility    PT AM-PAC:   /24  OT AM-PAC:   /24    Family can provide assistance at DC: Yes  Would you like Case Management to discuss the discharge plan with any other family members/significant others, and if so, who? Yes  Plans to Return to Present Housing: Yes  Other Identified Issues/Barriers to RETURNING to current housing: yes  Potential Assistance needed at discharge: N/A            Potential DME:    Patient expects to discharge to: Skilled nursing facility  Plan for transportation at discharge:      Financial    Payor: [facility] MEDICARE / Plan: [employer] DUAL COMPLETE / Product Type: *No Product type* /     Does insurance require precert for SNF: Yes    Potential assistance Purchasing Medications: No  Meds-to-Beds request: No      [facility] [address] - P [phone] - F [phone] [address] [facility] [address]  Phone: [phone] Fax: [phone]    [email or web address] [id number] [place], [place] - [address] - P [phone] - F [phone]  [address] [facility] [address]  Phone: [phone] Fax: [phone]      Notes:    Factors facilitating achievement of predicted outcomes: Family support    Barriers to discharge: Wound Care    Additional Case Management Notes: see cm note    The Plan for Transition of Care is related to the following treatment goals of Postoperative wound infection of right hip [T81.49XA]    IF APPLICABLE: The Patient and/or patient representative Rebeka and her family were provided with a choice of provider and agrees with the discharge plan. Freedom of choice list with basic dialogue that supports the patient's individualized plan of care/goals and shares the quality data associated with the providers was provided to:     Patient Representative Name:       The Patient and/or Patient Representative Agree with the Discharge Plan?      Sandra Wilson,

## 2024-10-02 NOTE — PROGRESS NOTES
Pharmacy Consultation Note  (Antibiotic Dosing and Monitoring)    Initial consult date: 10/2/24  Consulting physician/provider: Polly Gilliam MD  Drug: Vancomycin  Indication: Bone and Joint Infection     Age/  Gender Height Weight IBW  Allergy Information   79 y.o./female 165.1 cm (5' 5\") 73.5 kg (162 lb)     Ideal body weight: 57 kg (125 lb 10.6 oz)  Adjusted ideal body weight: 63.6 kg (140 lb 3.2 oz)   Benadryl [diphenhydramine], Other, Sulfa antibiotics, Ciprofloxacin, Oxycodone, and Tape [adhesive tape]      Renal Function:  Recent Labs     10/01/24  1929 10/02/24  0544   BUN 15 13   CREATININE 0.8 0.7       Intake/Output Summary (Last 24 hours) at 10/2/2024 1314  Last data filed at 10/2/2024 0538  Gross per 24 hour   Intake 1286.08 ml   Output 325 ml   Net 961.08 ml       Vancomycin Monitoring:  Trough:  No results for input(s): \"VANCOTROUGH\" in the last 72 hours.  Random:  No results for input(s): \"VANCORANDOM\" in the last 72 hours.    Vancomycin Administration Times:  Recent vancomycin administrations                     vancomycin (VANCOCIN) 1,500 mg in sodium chloride 0.9 % 250 mL IVPB ([id number]) (mg) 1,500 mg New Bag 10/01/24 5723                    Assessment:  Patient is a 79 y.o. female who has been initiated on vancomycin for postoperative wound infection of right hip.    Estimated Creatinine Clearance: 68 mL/min (based on SCr of 0.7 mg/dL).  To dose vancomycin, pharmacy will be utilizing  vancomycin trough levels (goal 15 - 20 mcg/mL).  10/2:  Presenting from nursing home with drainage from right hip surgical wound postoperative.  History of multiple right hip and femur surgeries for periprosthetic fractures and infections.  WBC - 8.6, afebrile, also on meropenem.  Awaiting cultures.  Scr at baseline (0.7).  Received 1500 mg loading dose (20 mg/kg) in ED yesterday at 21:49.       Plan:  Will continue vancomycin 1250 mg IV every 24 hours  Will check vancomycin levels tomorrow with am

## 2024-10-02 NOTE — PLAN OF CARE
Problem: Safety - Adult  Goal: Free from fall injury  Outcome: Completed     Problem: Pain  Goal: Verbalizes/displays adequate comfort level or baseline comfort level  Outcome: Progressing

## 2024-10-02 NOTE — H&P
Hospital Medicine History & Physical      PCP: Navneet Latif MD    Date of Admission: 10/1/2024    Date of Service: Pt seen/examined on 10/1/2024 and Admitted to Inpatient with expected LOS greater than two midnights due to medical therapy.     Chief Complaint: Right hip surgery wound drainage      History Of Present Illness:      79 y.o. female who presented to [facility] with drainage from her right hip surgical wound.  Patient status post multiple right hip and femur surgeries for periprosthetic fractures and infections.  There was concern that surgical wound had been draining for the last 2 days  and she was advised to come to the ER from the nursing home.  Lab work did show white count of 8.6.  Temperature 98.8. She was given vancomycin and cefepime.  Orthopedic was consulted.  X-ray femur showed no acute fracture or dislocation showed stable perioperative changes in the right femur no gas or fluid collection seen in this time.      Past Medical History:          Diagnosis Date    Anemia     S/P chemotherapy.    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    Ascending aortic aneurysm (HCC)     seen on CTA chest w/contrast on 2019    Asthma     Bipolar 1 disorder (HCC)     Cancer (HCC)     lung/ kidney    Cancer of breast, female (HCC) 01/2006    S/P bilaeral mastectomy with left breast lymph node resection and chemotherapy.    CHF (congestive heart failure) (HCC)     Chronic back pain     Depression     Depression with anxiety     Diabetes mellitus (HCC)     Resolved after losing 130 lbs. after gastric bypass surgery.    Dyslipidemia     Fibromyalgia     History of blood transfusion     anemia    Hypertension     Hypothyroidism     Neuropathy     Pericardial effusion 04/02/2010    Subxiphoid pericardial window with drainage of pericardial effusion and pericardial biopsy:  Fluid and  biopsy negative for metastases.     Pleural effusion 05/02/2010    Noted on chest x-ray.    TIA (transient ischemic attack)     Tobacco abuse     Patient smoked 3 ppd x 26 years.  Quit in 1995.    Type II or unspecified type diabetes mellitus without mention of complication, not stated as uncontrolled        Past Surgical History:          Procedure Laterality Date    BREAST SURGERY      CATARACT REMOVAL WITH IMPLANT Bilateral     CHOLECYSTECTOMY      FIBULA FRACTURE SURGERY Right 5/16/2024    RIGHT FEMUR OPEN REDUCTION INTERNAL FIXATION, OPEN TREATMENT OF RIGHT HIP DISLOCATION performed by Nemesio Handy MD at [facility] [place]    GASTRIC BYPASS SURGERY  9/2004    HERNIA REPAIR      HIP SURGERY Right 1/4/2024    RIGHT HIP HEMIARTHROPLASTY performed by Nemesio Handy MD at [facility] [place]    HIP SURGERY Right 4/2/2024    RIGHT HIP CLOSED REDUCTION INTERNAL FIXATION performed by Matthew Peña DO at [facility] [place]    HIP SURGERY Right 4/11/2024    RIGHT HIP OPEN REDUCTION FEMORAL HEAD EXCHANGE WITH IRRIGATION AND DEBRIDEMENT performed by Nemesio Handy MD at [facility] [place]    HYSTERECTOMY (CERVIX STATUS UNKNOWN)      KIDNEY REMOVAL Left 2/2006    Cancer.    KIDNEY REMOVAL      left    KNEE SURGERY Right     arthroscopy    LIVER BIOPSY  2006    Fatty tumor.    LUNG REMOVAL, PARTIAL      For lung CA, thought to be metastatic from breast cancer. left upper lobe     MASTECTOMY Bilateral     NERVE BLOCK Bilateral 06/06/16    transforaminal nerve block, lumbar #1    OTHER SURGICAL HISTORY  03/16/15    stage 1 percutaneous trial lumbar spinal cord stimulator    OTHER SURGICAL HISTORY N/A 4/15/15    surgical implantation of medtronic spinal cord stimulator lumbar    OTHER SURGICAL HISTORY N/A 02/24/2021    surgical removal lumbar SCS    PAIN MANAGEMENT PROCEDURE N/A 2/24/2021    SURGICAL REMOVAL OF LUMBAR MEDTRONIC SPINAL CORD STIMULATOR (STIMULATOR BATTERY LEFT HIP AREA) (CPT 81066) performed by Lizzy Herbert DO at

## 2024-10-02 NOTE — ED NOTES
Attempted to call lab since there was still no result showing of the pt lactate, there was no response

## 2024-10-02 NOTE — CONSULTS
[facility] CONSULT NOTE    Reason for Consult: Right hip infection   Requested by: Jeremy Sanchez MD     Chief complaint: Incision wound drainage    History Obtained From: Patient and EMR    HISTORY OFPRESENT ILLNESS              The patient is a 79 y.o. female with history of ascending aortic aneurysm, DM, hypertension, hyperlipidemia, fibromyalgia, right femoral neck fracture sustained from a fall s/p right hip hemiarthroplasty on 01/04/2024 complicated by right hip dislocation and right femur greater trochanter fracture sustained from a fall s/p right hip prosthetic dislocation open reduction, right hip femur greater trochanter fracture open reduction with internal fixation, right hip hematoma excisional irrigation and debridement on 02/13 (tissue cultures showing no growth), further complicated by recurrent infection and hip hemiarthroplasty dislocation (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) s/p multiple irrigation and debridement with revision of hemiarthroplasty and repeated 6-week courses of antibiotics (cefazolin from 03/21-05/02 empirically for synovial fluid cultures showing no growth after having been on cefadroxil for 2 weeks per Orthopedic Surgery for wound drainage, ertapenem from 04/15-05/27 for tissue culture growing ESBL E coli resistant to co-trimoxazole and fluoroquinolones s/p complex explantation of right hip arthroplasty with implantation of antibiotic spacer on 04/15 but had right hip arthrocentesis on 06/11 yielding synovial fluid Gram stain and culture showing many polymorphonuclear leukocytes, no epithelial cells, rare gram-negative rods, no growth for which ertapenem was restarted for another 6 weeks until 07/27 then underwent surgical explant of her hip spacer and reimplantation of total hip arthroplasty for modified 1 stage revision surgery of her right hip after antibiotic completion with right hip synovial fluid Gram stain and culture on 08/02 showing moderate  implantation of medtronic spinal cord stimulator lumbar    OTHER SURGICAL HISTORY N/A 2021    surgical removal lumbar SCS    PAIN MANAGEMENT PROCEDURE N/A 2021    SURGICAL REMOVAL OF LUMBAR MEDTRONIC SPINAL CORD STIMULATOR (STIMULATOR BATTERY LEFT HIP AREA) (CPT 91372) performed by Lizzy Herbert DO at [facility] OR    REVISION TOTAL HIP ARTHROPLASTY Right 2024    RIGHT HIP OPEN REDUCTION AND HEMATOMA EVACUATION performed by Wood George DO at [facility] [place]    REVISION TOTAL HIP ARTHROPLASTY Right 3/21/2024    RIGHT HIP IRRIGATION AND DEBRIDEMENT OF RIGHT HIP DRAINING WOUND WITH POSSIBLE EXCHANGE OF FEMORAL COMPONENT performed by Nemesio Handy MD at [facility] [place]    REVISION TOTAL HIP ARTHROPLASTY Right 2024    REMOVAL OF ANTIBOITIC SPACER FROM RIGHT HIP, RIGHT HIP ACETABULAR CUP PLACEMENT performed by Nemesio Handy MD at [facility] [place]    THORACENTESIS Left 2010 and 3/2010.     Reportedly transudative.    TOTAL HIP ARTHROPLASTY Right 4/15/2024    RIGHT HIP REVISION ARTHROPLASTY INSERTION OF ANTIBIOTIC SPACER performed by Nemesio Handy MD at [facility] [place]    TRANSTHORACIC ECHOCARDIOGRAM  2010    Small-to-moderate size pericardial effusion with early signs of tamponade with normal left ventricular size, wall thickness, wall motion and systolic function with stage I diastolic dysfunction with normal right ventricular size and function with moderately thickened pericardium and with no significant valvular abnormalities noted.         Social History  Social History     Socioeconomic History    Marital status:    Tobacco Use    Smoking status: Former     Current packs/day: 0.00     Types: Cigarettes     Quit date: 1999     Years since quittin.7    Smokeless tobacco: Never    Tobacco comments:     Quit in .   Vaping Use    Vaping status: Never Used   Substance and Sexual Activity    Alcohol use: Not Currently     Alcohol/week: 1.0 standard drink of

## 2024-10-02 NOTE — CONSULTS
Department of Orthopedic Surgery  Resident Consult Note          Reason for Consult: Surgical site drainage, status post right distal femur periprosthetic ORIF    HISTORY OF PRESENT ILLNESS:       Patient is a 79 y.o. female who is well-known to the orthopedic service.  Patient is status post multiple right hip and femur surgeries for periprosthetic fractures and infections.  Patient presented today from the nursing facility.  Nursing facility called in to clinic stating that patient had drainage from her right hip surgical site, patient was directed to present to the ED to be admitted for antibiotics.  Patient states she has been doing well up into this point.  She has had no drainage from her surgical site until approximately 2 days ago.  She denies any fever or chills.  Patient has been toe-touch weightbearing on her right lower extremity and has been compliant.  Denies numbness/tingling/paresthesias.  Denies any other orthopedic complaints at this time.      Past Medical History:        Diagnosis Date    Anemia     S/P chemotherapy.    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    Ascending aortic aneurysm (HCC)     seen on CTA chest w/contrast on 2019    Asthma     Bipolar 1 disorder (HCC)     Cancer (HCC)     lung/ kidney    Cancer of breast, female (HCC) 01/2006    S/P bilaeral mastectomy with left breast lymph node resection and chemotherapy.    CHF (congestive heart failure) (HCC)     Chronic back pain     Depression     Depression with anxiety     Diabetes mellitus (HCC)     Resolved after losing 130 lbs. after gastric bypass surgery.    Dyslipidemia     Fibromyalgia     History of blood transfusion     anemia    Hypertension     Hypothyroidism     Neuropathy     Pericardial effusion 04/02/2010    Subxiphoid pericardial window with drainage of pericardial effusion and pericardial biopsy:  Fluid and  evidence of hardware failure proximally within the hip.  Significant callus formation appreciated from previous fracture, similar to previous imaging.  No evidence of acute fracture dislocations.    IMPRESSION:  Status post right distal femur periprosthetic ORIF 9/22  Status post right periprosthetic fracture ORIF  Status post right hip revision arthroplasty    PLAN:  Physical exam and imaging reviewed with patient at bedside today.  Patient is well-known to the orthopedic service and has had multiple complications with her right hip.  Patient recently underwent a right distal femur periprosthetic ORIF on 9/22.  Patient currently resides at a nursing home in which nursing facility noticed increased drainage and contacted the clinic.  The clinic directed patient to present to the ED to be admitted for IV antibiotics.  ESR and CRP ordered, pending  X-ray right femur ordered, pending  CT right hip/femur ordered, pending  Continue toe-touch weightbearing to right lower extremity  N.p.o. effective midnight tonight 10/1  Appreciate medical admission for IV antibiotics  Discuss with attending      Electronically signed by Vinicius Murphy DO on 10/1/2024 at 10:02 PM  Note: This report was completed using [employer] voiced recognition software.  Every effort has been made to ensure accuracy; however, inadvertent computerized transcription errors may be present.        I have seen and evaluated the patient and agree with the above assessment on today's visit. I have performed the key components of the history and physical examination and concur completely with the findings and plans as documented.    Agree with ROS, examination, FMH, PMH, PSH, SocHx, and allergies as above.      Patient seen and examined.  Patient known to me.  Patient with seroma after my partner and fixed periprosthetic fracture around her revision total hip.  Plan at this point in time will be for washout of seroma with antibiotic bead placement.  Unless

## 2024-10-02 NOTE — PROGRESS NOTES
[facility] Hospitalist Progress Note    Admitting Date and Time: 10/1/2024  6:00 PM  Admit Dx: Postoperative wound infection of right hip [T81.49XA]    Synopsis:    79 y.o. female who presented to [facility] with drainage from her right hip surgical wound.  Patient status post multiple right hip and femur surgeries for periprosthetic fractures and infections.  There was concern that surgical wound had been draining for the last 2 days  and she was advised to come to the ER from the nursing home.  Lab work did show white count of 8.6.  Temperature 98.8. She was given vancomycin and cefepime.  Orthopedic was consulted.  X-ray femur showed no acute fracture or dislocation showed stable perioperative changes in the right femur no gas or fluid collection seen in this time.     10/2: CT of right hip showed significant interval enlargement of peripherally enhancing periprosthetic fluid collection and erosion of right greater trochanter, neuro periprosthetic lucency.  Scheduled for OR with Ortho tomorrow.    Subjective:  Patient is being followed for Postoperative wound infection of right hip [T81.49XA]     Patient seen and examined at bedside this morning.  Denies any pain.  States the facility noted her drainage from the wound.    ROS: denies fever, chills, cp, sob, n/v, HA unless stated above.      [Held by provider] apixaban  5 mg Oral BID    atorvastatin  80 mg Oral Daily    buPROPion  100 mg Oral Daily    ferrous sulfate  325 mg Oral Daily    gabapentin  400 mg Oral TID    levothyroxine  50 mcg Oral Daily    pantoprazole  20 mg Oral Daily    pramipexole  0.75 mg Oral BID    sucralfate  1 g Oral Daily    venlafaxine  150 mg Oral Daily    [START ON 10/3/2024] furosemide  20 mg Oral Once per day on Monday Thursday    sodium chloride flush  5-40 mL IntraVENous 2 times per day    enoxaparin  40 mg SubCUTAneous Daily     albuterol, 2.5 mg, Q4H PRN  cyclobenzaprine, 10 mg, BID PRN  magnesium hydroxide,  periprosthetic fracture ORIF  Status post right hip revision arthroplasty  Chronic heart failure with preserved ejection fraction  Non-insulin-dependent diabetes  HLD  TIA  Neuropathy  Fibromyalgia  BPD/depression  History of breast cancer  History of left nephrectomy    Plan:  Orthopedic surgery following:  Planning for surgery tomorrow  Hold anticoagulation  N.p.o. at midnight  ID consulted, appreciate recommendations.  Patient received vancomycin and cefepime at the ED.  Continue home meds including Lipitor, Wellbutrin, iron, Lasix twice weekly, Neurontin, Synthroid, PPI, Mirapex, Carafate, Effexor      DC planning: From [facility], pending above    NOTE: This report was transcribed using voice recognition software. Every effort was made to ensure accuracy; however, inadvertent computerized transcription errors may be present.  Electronically signed by Zak Carcamo MD on 10/2/2024 at 10:25 AM

## 2024-10-03 ENCOUNTER — ANESTHESIA (OUTPATIENT)
Dept: OPERATING ROOM | Age: 79
End: 2024-10-03
Payer: COMMERCIAL

## 2024-10-03 ENCOUNTER — ANESTHESIA EVENT (OUTPATIENT)
Dept: OPERATING ROOM | Age: 79
End: 2024-10-03
Payer: COMMERCIAL

## 2024-10-03 LAB
DATE LAST DOSE: NORMAL
TME LAST DOSE: NORMAL H
VANCOMYCIN DOSE: NORMAL MG
VANCOMYCIN SERPL-MCNC: 15.5 UG/ML (ref 5–40)

## 2024-10-03 PROCEDURE — 80202 ASSAY OF VANCOMYCIN: CPT

## 2024-10-03 PROCEDURE — 6360000002 HC RX W HCPCS: Performed by: NURSE ANESTHETIST, CERTIFIED REGISTERED

## 2024-10-03 PROCEDURE — 2700000000 HC OXYGEN THERAPY PER DAY

## 2024-10-03 PROCEDURE — 3600000006 HC SURGERY LEVEL 6 BASE: Performed by: ORTHOPAEDIC SURGERY

## 2024-10-03 PROCEDURE — 99232 SBSQ HOSP IP/OBS MODERATE 35: CPT | Performed by: INTERNAL MEDICINE

## 2024-10-03 PROCEDURE — 6360000002 HC RX W HCPCS: Performed by: INTERNAL MEDICINE

## 2024-10-03 PROCEDURE — 87102 FUNGUS ISOLATION CULTURE: CPT

## 2024-10-03 PROCEDURE — 87077 CULTURE AEROBIC IDENTIFY: CPT

## 2024-10-03 PROCEDURE — 2709999900 HC NON-CHARGEABLE SUPPLY: Performed by: ORTHOPAEDIC SURGERY

## 2024-10-03 PROCEDURE — 6370000000 HC RX 637 (ALT 250 FOR IP): Performed by: FAMILY MEDICINE

## 2024-10-03 PROCEDURE — 3E0U3GC INTRODUCTION OF OTHER THERAPEUTIC SUBSTANCE INTO JOINTS, PERCUTANEOUS APPROACH: ICD-10-PCS | Performed by: ORTHOPAEDIC SURGERY

## 2024-10-03 PROCEDURE — 7100000001 HC PACU RECOVERY - ADDTL 15 MIN: Performed by: ORTHOPAEDIC SURGERY

## 2024-10-03 PROCEDURE — 1200000000 HC SEMI PRIVATE

## 2024-10-03 PROCEDURE — 2580000003 HC RX 258: Performed by: NURSE ANESTHETIST, CERTIFIED REGISTERED

## 2024-10-03 PROCEDURE — 87205 SMEAR GRAM STAIN: CPT

## 2024-10-03 PROCEDURE — 87015 SPECIMEN INFECT AGNT CONCNTJ: CPT

## 2024-10-03 PROCEDURE — 2500000003 HC RX 250 WO HCPCS: Performed by: NURSE ANESTHETIST, CERTIFIED REGISTERED

## 2024-10-03 PROCEDURE — 6360000002 HC RX W HCPCS: Performed by: ORTHOPAEDIC SURGERY

## 2024-10-03 PROCEDURE — 3700000000 HC ANESTHESIA ATTENDED CARE: Performed by: ORTHOPAEDIC SURGERY

## 2024-10-03 PROCEDURE — 87206 SMEAR FLUORESCENT/ACID STAI: CPT

## 2024-10-03 PROCEDURE — 3600000016 HC SURGERY LEVEL 6 ADDTL 15MIN: Performed by: ORTHOPAEDIC SURGERY

## 2024-10-03 PROCEDURE — C1713 ANCHOR/SCREW BN/BN,TIS/BN: HCPCS | Performed by: ORTHOPAEDIC SURGERY

## 2024-10-03 PROCEDURE — 87070 CULTURE OTHR SPECIMN AEROBIC: CPT

## 2024-10-03 PROCEDURE — 3700000001 HC ADD 15 MINUTES (ANESTHESIA): Performed by: ORTHOPAEDIC SURGERY

## 2024-10-03 PROCEDURE — 2580000003 HC RX 258: Performed by: PHYSICAL THERAPY ASSISTANT

## 2024-10-03 PROCEDURE — 2720000010 HC SURG SUPPLY STERILE: Performed by: ORTHOPAEDIC SURGERY

## 2024-10-03 PROCEDURE — 2580000003 HC RX 258: Performed by: FAMILY MEDICINE

## 2024-10-03 PROCEDURE — 7100000000 HC PACU RECOVERY - FIRST 15 MIN: Performed by: ORTHOPAEDIC SURGERY

## 2024-10-03 PROCEDURE — 2580000003 HC RX 258: Performed by: INTERNAL MEDICINE

## 2024-10-03 PROCEDURE — 0SB90ZZ EXCISION OF RIGHT HIP JOINT, OPEN APPROACH: ICD-10-PCS | Performed by: ORTHOPAEDIC SURGERY

## 2024-10-03 PROCEDURE — 87075 CULTR BACTERIA EXCEPT BLOOD: CPT

## 2024-10-03 PROCEDURE — 36415 COLL VENOUS BLD VENIPUNCTURE: CPT

## 2024-10-03 PROCEDURE — 87116 MYCOBACTERIA CULTURE: CPT

## 2024-10-03 PROCEDURE — 6360000002 HC RX W HCPCS: Performed by: PHYSICAL THERAPY ASSISTANT

## 2024-10-03 DEVICE — STIMULAN® RAPID CURE PROVIDED STERILE FOR SINGLE PATIENT USE. STIMULAN® RAPID CURE CONTAINS CALCIUM SULFATE POWDER AND MIXING SOLUTION IN PRE-MEASURED QUANTITIES SO THAT WHEN MIXED TOGETHER IN A STERILE MIXING BOWL, THE RESULTANT PASTE IS TO BE DIGITALLY PACKED INTO OPEN BONE VOID/GAP TO SET INSITU OR PLACED INTO THE MOULD PROVIDED, THE MIXTURE SETS TO FORM BEADS. THE BIODEGRADABLE, RADIOPAQUE BEADS ARE RESORBED IN APPROXIMATELY 30 – 60 DAYS WHEN USED IN ACCORDANCE WITH THE DEVICE LABELLING. STIMULAN® RAPID CURE IS MANUFACTURED FROM SYNTHETIC IMPLANT GRADE CALCIUM SULFATE DIHYDRATE(CASO4.2H2O) THAT RESORBS AND IS REPLACED WITH BONE DURING THE HEALING PROCESS. ALSO, AS THE BONE VOID FILLER BEADS ARE BIODEGRADABLE AND BIOCOMPATIBLE, THEY MAY BE USED AT AN INFECTED SITE.
Type: IMPLANTABLE DEVICE | Status: FUNCTIONAL
Brand: STIMULAN® RAPID CURE

## 2024-10-03 RX ORDER — DEXAMETHASONE SODIUM PHOSPHATE 10 MG/ML
INJECTION INTRAMUSCULAR; INTRAVENOUS
Status: DISCONTINUED | OUTPATIENT
Start: 2024-10-03 | End: 2024-10-03 | Stop reason: SDUPTHER

## 2024-10-03 RX ORDER — FENTANYL CITRATE 50 UG/ML
INJECTION, SOLUTION INTRAMUSCULAR; INTRAVENOUS
Status: DISCONTINUED | OUTPATIENT
Start: 2024-10-03 | End: 2024-10-03 | Stop reason: SDUPTHER

## 2024-10-03 RX ORDER — KETOROLAC TROMETHAMINE 30 MG/ML
INJECTION, SOLUTION INTRAMUSCULAR; INTRAVENOUS
Status: DISCONTINUED | OUTPATIENT
Start: 2024-10-03 | End: 2024-10-03 | Stop reason: SDUPTHER

## 2024-10-03 RX ORDER — MIDAZOLAM HYDROCHLORIDE 1 MG/ML
INJECTION INTRAMUSCULAR; INTRAVENOUS
Status: DISCONTINUED | OUTPATIENT
Start: 2024-10-03 | End: 2024-10-03 | Stop reason: SDUPTHER

## 2024-10-03 RX ORDER — CEFAZOLIN SODIUM 2 G/50ML
SOLUTION INTRAVENOUS
Status: DISCONTINUED | OUTPATIENT
Start: 2024-10-03 | End: 2024-10-03 | Stop reason: SDUPTHER

## 2024-10-03 RX ORDER — PROPOFOL 10 MG/ML
INJECTION, EMULSION INTRAVENOUS
Status: DISCONTINUED | OUTPATIENT
Start: 2024-10-03 | End: 2024-10-03 | Stop reason: SDUPTHER

## 2024-10-03 RX ORDER — SODIUM CHLORIDE 9 MG/ML
INJECTION, SOLUTION INTRAVENOUS
Status: DISCONTINUED | OUTPATIENT
Start: 2024-10-03 | End: 2024-10-03 | Stop reason: SDUPTHER

## 2024-10-03 RX ORDER — PHENYLEPHRINE HCL IN 0.9% NACL 1 MG/10 ML
SYRINGE (ML) INTRAVENOUS
Status: DISCONTINUED | OUTPATIENT
Start: 2024-10-03 | End: 2024-10-03 | Stop reason: SDUPTHER

## 2024-10-03 RX ORDER — VANCOMYCIN HYDROCHLORIDE 1 G/20ML
INJECTION, POWDER, LYOPHILIZED, FOR SOLUTION INTRAVENOUS PRN
Status: DISCONTINUED | OUTPATIENT
Start: 2024-10-03 | End: 2024-10-03 | Stop reason: ALTCHOICE

## 2024-10-03 RX ORDER — ONDANSETRON 2 MG/ML
INJECTION INTRAMUSCULAR; INTRAVENOUS
Status: DISCONTINUED | OUTPATIENT
Start: 2024-10-03 | End: 2024-10-03 | Stop reason: SDUPTHER

## 2024-10-03 RX ORDER — TOBRAMYCIN 1.2 G/30ML
INJECTION, POWDER, LYOPHILIZED, FOR SOLUTION INTRAVENOUS PRN
Status: DISCONTINUED | OUTPATIENT
Start: 2024-10-03 | End: 2024-10-03 | Stop reason: ALTCHOICE

## 2024-10-03 RX ORDER — ROCURONIUM BROMIDE 10 MG/ML
INJECTION, SOLUTION INTRAVENOUS
Status: DISCONTINUED | OUTPATIENT
Start: 2024-10-03 | End: 2024-10-03 | Stop reason: SDUPTHER

## 2024-10-03 RX ORDER — LIDOCAINE HYDROCHLORIDE 20 MG/ML
INJECTION, SOLUTION INTRAVENOUS
Status: DISCONTINUED | OUTPATIENT
Start: 2024-10-03 | End: 2024-10-03 | Stop reason: SDUPTHER

## 2024-10-03 RX ADMIN — DEXAMETHASONE SODIUM PHOSPHATE 10 MG: 10 INJECTION INTRAMUSCULAR; INTRAVENOUS at 12:48

## 2024-10-03 RX ADMIN — MEROPENEM 1000 MG: 1 INJECTION INTRAVENOUS at 20:40

## 2024-10-03 RX ADMIN — FENTANYL CITRATE 100 MCG: 0.05 INJECTION, SOLUTION INTRAMUSCULAR; INTRAVENOUS at 12:48

## 2024-10-03 RX ADMIN — LIDOCAINE HYDROCHLORIDE 100 MG: 20 INJECTION, SOLUTION INTRAVENOUS at 12:48

## 2024-10-03 RX ADMIN — GABAPENTIN 400 MG: 400 CAPSULE ORAL at 08:51

## 2024-10-03 RX ADMIN — PRAMIPEXOLE DIHYDROCHLORIDE 0.75 MG: 0.25 TABLET ORAL at 20:41

## 2024-10-03 RX ADMIN — KETOROLAC TROMETHAMINE 15 MG: 30 INJECTION, SOLUTION INTRAMUSCULAR; INTRAVENOUS at 13:26

## 2024-10-03 RX ADMIN — Medication 25 MG: at 12:48

## 2024-10-03 RX ADMIN — SUGAMMADEX 200 MG: 100 INJECTION, SOLUTION INTRAVENOUS at 13:27

## 2024-10-03 RX ADMIN — PROPOFOL 100 MG: 10 INJECTION, EMULSION INTRAVENOUS at 12:48

## 2024-10-03 RX ADMIN — GABAPENTIN 400 MG: 400 CAPSULE ORAL at 20:39

## 2024-10-03 RX ADMIN — Medication 200 MCG: at 13:02

## 2024-10-03 RX ADMIN — SODIUM CHLORIDE, PRESERVATIVE FREE 10 ML: 5 INJECTION INTRAVENOUS at 08:53

## 2024-10-03 RX ADMIN — Medication 100 MCG: at 12:50

## 2024-10-03 RX ADMIN — CEFAZOLIN SODIUM 2000 MG: 2 SOLUTION INTRAVENOUS at 13:20

## 2024-10-03 RX ADMIN — MIDAZOLAM 2 MG: 1 INJECTION INTRAMUSCULAR; INTRAVENOUS at 12:41

## 2024-10-03 RX ADMIN — MEROPENEM 1000 MG: 1 INJECTION INTRAVENOUS at 05:21

## 2024-10-03 RX ADMIN — SODIUM CHLORIDE 1500 MG: 9 INJECTION, SOLUTION INTRAVENOUS at 16:21

## 2024-10-03 RX ADMIN — HYDROCODONE BITARTRATE AND ACETAMINOPHEN 1 TABLET: 10; 325 TABLET ORAL at 22:27

## 2024-10-03 RX ADMIN — ROCURONIUM BROMIDE 30 MG: 10 INJECTION, SOLUTION INTRAVENOUS at 12:48

## 2024-10-03 RX ADMIN — SODIUM CHLORIDE, PRESERVATIVE FREE 10 ML: 5 INJECTION INTRAVENOUS at 20:39

## 2024-10-03 RX ADMIN — HYDROCODONE BITARTRATE AND ACETAMINOPHEN 1 TABLET: 10; 325 TABLET ORAL at 08:51

## 2024-10-03 RX ADMIN — ONDANSETRON HYDROCHLORIDE 4 MG: 2 SOLUTION INTRAMUSCULAR; INTRAVENOUS at 12:48

## 2024-10-03 RX ADMIN — PANTOPRAZOLE SODIUM 20 MG: 20 TABLET, DELAYED RELEASE ORAL at 08:51

## 2024-10-03 RX ADMIN — Medication 100 MCG: at 13:12

## 2024-10-03 RX ADMIN — FENTANYL CITRATE 50 MCG: 0.05 INJECTION, SOLUTION INTRAMUSCULAR; INTRAVENOUS at 13:42

## 2024-10-03 RX ADMIN — SODIUM CHLORIDE: 0.9 INJECTION, SOLUTION INTRAVENOUS at 12:41

## 2024-10-03 RX ADMIN — HYDROCODONE BITARTRATE AND ACETAMINOPHEN 1 TABLET: 10; 325 TABLET ORAL at 16:19

## 2024-10-03 ASSESSMENT — PAIN SCALES - GENERAL
PAINLEVEL_OUTOF10: 0
PAINLEVEL_OUTOF10: 0
PAINLEVEL_OUTOF10: 10
PAINLEVEL_OUTOF10: 0
PAINLEVEL_OUTOF10: 0
PAINLEVEL_OUTOF10: 10
PAINLEVEL_OUTOF10: 10
PAINLEVEL_OUTOF10: 5

## 2024-10-03 ASSESSMENT — PAIN DESCRIPTION - LOCATION
LOCATION: HIP

## 2024-10-03 ASSESSMENT — PAIN DESCRIPTION - DESCRIPTORS
DESCRIPTORS: ACHING;DISCOMFORT;DULL
DESCRIPTORS: ACHING;DISCOMFORT;SHARP

## 2024-10-03 ASSESSMENT — PAIN DESCRIPTION - FREQUENCY
FREQUENCY: CONTINUOUS
FREQUENCY: CONTINUOUS

## 2024-10-03 ASSESSMENT — ENCOUNTER SYMPTOMS: SHORTNESS OF BREATH: 1

## 2024-10-03 ASSESSMENT — PAIN DESCRIPTION - ONSET
ONSET: ON-GOING
ONSET: ON-GOING

## 2024-10-03 ASSESSMENT — PAIN SCALES - WONG BAKER
WONGBAKER_NUMERICALRESPONSE: NO HURT

## 2024-10-03 ASSESSMENT — PAIN DESCRIPTION - ORIENTATION
ORIENTATION: RIGHT
ORIENTATION: RIGHT

## 2024-10-03 ASSESSMENT — PAIN DESCRIPTION - PAIN TYPE
TYPE: ACUTE PAIN
TYPE: SURGICAL PAIN

## 2024-10-03 ASSESSMENT — LIFESTYLE VARIABLES: SMOKING_STATUS: 1

## 2024-10-03 ASSESSMENT — COPD QUESTIONNAIRES: CAT_SEVERITY: MODERATE

## 2024-10-03 NOTE — PROGRESS NOTES
Pharmacy Consultation Note  (Antibiotic Dosing and Monitoring)    Initial consult date: 10/2/24  Consulting physician/provider: Polly Gilliam MD  Drug: Vancomycin  Indication: Bone and Joint Infection     Age/  Gender Height Weight IBW  Allergy Information   79 y.o./female 165.1 cm (5' 5\") 73.5 kg (162 lb)     Ideal body weight: 57 kg (125 lb 10.6 oz)  Adjusted ideal body weight: 63.6 kg (140 lb 3.2 oz)   Benadryl [diphenhydramine], Other, Sulfa antibiotics, Ciprofloxacin, Oxycodone, and Tape [adhesive tape]      Renal Function:  Recent Labs     10/01/24  1929 10/02/24  0544   BUN 15 13   CREATININE 0.8 0.7       Intake/Output Summary (Last 24 hours) at 10/3/2024 1150  Last data filed at 10/2/2024 2348  Gross per 24 hour   Intake 120 ml   Output 350 ml   Net -230 ml       Vancomycin Monitoring:  Trough:  No results for input(s): \"VANCOTROUGH\" in the last 72 hours.  Random:    Recent Labs     10/03/24  0457   VANCORANDOM 15.5       Vancomycin Administration Times:  Recent vancomycin administrations                     vancomycin (VANCOCIN) 1,250 mg in sodium chloride 0.9 % 250 mL IVPB ([id number]) (mg) 1,250 mg New Bag 10/02/24 1456    vancomycin (VANCOCIN) 1,500 mg in sodium chloride 0.9 % 250 mL IVPB ([id number]) (mg) 1,500 mg New Bag 10/01/24 2149                    Assessment:  Patient is a 79 y.o. female who has been initiated on vancomycin for postoperative wound infection of right hip.    Estimated Creatinine Clearance: 68 mL/min (based on SCr of 0.7 mg/dL).  To dose vancomycin, pharmacy will be utilizing [employer] calculation software for goal AUC/HERBER 400-600 mg/L-hr (predicted AUC/HERBER = 528, Tr =14.6 mcg/mL)  10/2:  Presenting from nursing home with drainage from right hip surgical wound postoperative.  History of multiple right hip and femur surgeries for periprosthetic fractures and infections.  WBC - 8.6, afebrile, also on meropenem.  Awaiting cultures.  Scr at baseline (0.7).  Received 1500 mg

## 2024-10-03 NOTE — CARE COORDINATION
Care Coordination  Plan is for the patient to have right hip irrigation and debridement today by Dr Guerrero. Dr Gilliam from [facility] is also following for a right hip wound infection from previous surgery. The patient is on iv Merrem 1 gm iv q8 and iv Vancomycin dosed by pharmacy. If long term iv atb needed upon discharge will need a picc line placed. Her plan is to return to [facility] upon discharge. Will need pt ot evals done post op to do a precert. Return envelope done.

## 2024-10-03 NOTE — PROGRESS NOTES
[facility] Hospitalist Progress Note    Admitting Date and Time: 10/1/2024  6:00 PM  Admit Dx: Postoperative wound infection of right hip [T81.49XA]    Synopsis:    79 y.o. female who presented to [facility] with drainage from her right hip surgical wound.  Patient status post multiple right hip and femur surgeries for periprosthetic fractures and infections.  There was concern that surgical wound had been draining for the last 2 days  and she was advised to come to the ER from the nursing home.  Lab work did show white count of 8.6.  Temperature 98.8. She was given vancomycin and cefepime.  Orthopedic was consulted.  X-ray femur showed no acute fracture or dislocation showed stable perioperative changes in the right femur no gas or fluid collection seen in this time.     10/2: CT of right hip showed significant interval enlargement of peripherally enhancing periprosthetic fluid collection and erosion of right greater trochanter, neuro periprosthetic lucency.  Scheduled for OR with Ortho tomorrow.    Subjective:  Patient is being followed for Postoperative wound infection of right hip [T81.49XA]     Patient seen and examined at bedside this morning.  Denies any pain.  States the facility noted her drainage from the wound.    ROS: denies fever, chills, cp, sob, n/v, HA unless stated above.      [Held by provider] apixaban  5 mg Oral BID    atorvastatin  80 mg Oral Daily    buPROPion  100 mg Oral Daily    ferrous sulfate  325 mg Oral Daily    gabapentin  400 mg Oral TID    levothyroxine  50 mcg Oral Daily    pantoprazole  20 mg Oral Daily    pramipexole  0.75 mg Oral BID    sucralfate  1 g Oral Daily    venlafaxine  150 mg Oral Daily    furosemide  20 mg Oral Once per day on Monday Thursday    meropenem  1,000 mg IntraVENous Q8H    vancomycin  1,250 mg IntraVENous Q24H    sodium chloride flush  5-40 mL IntraVENous 2 times per day    enoxaparin  40 mg SubCUTAneous Daily     albuterol, 2.5 mg, Q4H  femur periprosthetic ORIF 9/22  Status post right periprosthetic fracture ORIF  Status post right hip revision arthroplasty  Chronic heart failure with preserved ejection fraction  Non-insulin-dependent diabetes  HLD  TIA  Neuropathy  Fibromyalgia  BPD/depression  History of breast cancer  History of left nephrectomy    Plan:  Orthopedic surgery following:  Planning for surgery later today  Hold anticoagulation  Resume diet after OR  ID following:  Continue Merrem and vancomycin for now  Follow surgical cultures  Continue home meds including Lipitor, Wellbutrin, iron, Lasix twice weekly, Neurontin, Synthroid, PPI, Mirapex, Carafate, Effexor      DC planning: From [facility] and will need pre-CERT, pending above    NOTE: This report was transcribed using voice recognition software. Every effort was made to ensure accuracy; however, inadvertent computerized transcription errors may be present.  Electronically signed by Zak Carcamo MD on 10/3/2024 at 9:55 AM

## 2024-10-03 NOTE — PROGRESS NOTES
Patient was off the floor for a procedure at the time of my visit. I will see her again tomorrow.    Thank you for involving me in the care of Rebeka Renee. [facility] will continue to follow. Please do not hesitate to call for any questions or concerns.

## 2024-10-03 NOTE — PROGRESS NOTES
[facility] PROGRESS NOTE      Chief complaint: Follow-up of recurrent right hip prosthetic joint infection    The patient is a 79 y.o. female with history of ascending aortic aneurysm, DM, hypertension, hyperlipidemia, fibromyalgia, right femoral neck fracture sustained from a fall s/p right hip hemiarthroplasty on 01/04/2024 complicated by right hip dislocation and right femur greater trochanter fracture sustained from a fall s/p right hip prosthetic dislocation open reduction, right hip femur greater trochanter fracture open reduction with internal fixation, right hip hematoma excisional irrigation and debridement on 02/13 (tissue cultures showing no growth), further complicated by recurrent infection and hip hemiarthroplasty dislocation (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) s/p multiple irrigation and debridement with revision of hemiarthroplasty and repeated 6-week courses of antibiotics (cefazolin from 03/21-05/02 empirically for synovial fluid cultures showing no growth after having been on cefadroxil for 2 weeks per Orthopedic Surgery for wound drainage, ertapenem from 04/15-05/27 for tissue culture growing ESBL E coli resistant to co-trimoxazole and fluoroquinolones s/p complex explantation of right hip arthroplasty with implantation of antibiotic spacer on 04/15 but had right hip arthrocentesis on 06/11 yielding synovial fluid Gram stain and culture showing many polymorphonuclear leukocytes, no epithelial cells, rare gram-negative rods, no growth for which ertapenem was restarted for another 6 weeks until 07/27 then underwent surgical explant of her hip spacer and reimplantation of total hip arthroplasty for modified 1 stage revision surgery of her right hip after antibiotic completion with right hip synovial fluid Gram stain and culture on 08/02 showing moderate polymorphonuclear leukocytes, no organisms and no growth and frozen section during procedure on 09/09 showing up to 2 PMNs per

## 2024-10-03 NOTE — ANESTHESIA PRE PROCEDURE
blood dyscrasia: anemia and anticoagulation therapy:., malignancy/cancer.          Pt had no PAT visit        ROS comment: Breat and lung ca Abdominal:             Vascular:   + PVD, aortic or cerebral (Ascending aortic aneurysm (HCC)).        ROS comment: Aortic aneurysm. Other Findings:             Anesthesia Plan      general     ASA 4       Induction: intravenous.    MIPS: Postoperative opioids intended and Prophylactic antiemetics administered.  Anesthetic plan and risks discussed with patient.    Use of blood products discussed with patient whom consented to blood products.    Plan discussed with CRNA.                JOSELINE Arguello - CRNA   10/3/2024

## 2024-10-04 LAB
ANION GAP SERPL CALCULATED.3IONS-SCNC: 11 MMOL/L (ref 7–16)
BASOPHILS # BLD: 0.02 K/UL (ref 0–0.2)
BASOPHILS NFR BLD: 0 % (ref 0–2)
BUN SERPL-MCNC: 15 MG/DL (ref 6–23)
CALCIUM SERPL-MCNC: 8.5 MG/DL (ref 8.6–10.2)
CHLORIDE SERPL-SCNC: 103 MMOL/L (ref 98–107)
CO2 SERPL-SCNC: 27 MMOL/L (ref 22–29)
CREAT SERPL-MCNC: 0.7 MG/DL (ref 0.5–1)
EOSINOPHIL # BLD: 0.03 K/UL (ref 0.05–0.5)
EOSINOPHILS RELATIVE PERCENT: 0 % (ref 0–6)
ERYTHROCYTE [DISTWIDTH] IN BLOOD BY AUTOMATED COUNT: 17.5 % (ref 11.5–15)
GFR, ESTIMATED: 88 ML/MIN/1.73M2
GLUCOSE SERPL-MCNC: 95 MG/DL (ref 74–99)
HCT VFR BLD AUTO: 29.5 % (ref 34–48)
HGB BLD-MCNC: 8.2 G/DL (ref 11.5–15.5)
IMM GRANULOCYTES # BLD AUTO: 0.05 K/UL (ref 0–0.58)
IMM GRANULOCYTES NFR BLD: 1 % (ref 0–5)
LYMPHOCYTES NFR BLD: 1.46 K/UL (ref 1.5–4)
LYMPHOCYTES RELATIVE PERCENT: 18 % (ref 20–42)
MCH RBC QN AUTO: 27.6 PG (ref 26–35)
MCHC RBC AUTO-ENTMCNC: 27.8 G/DL (ref 32–34.5)
MCV RBC AUTO: 99.3 FL (ref 80–99.9)
MONOCYTES NFR BLD: 0.75 K/UL (ref 0.1–0.95)
MONOCYTES NFR BLD: 9 % (ref 2–12)
NEUTROPHILS NFR BLD: 71 % (ref 43–80)
NEUTS SEG NFR BLD: 5.69 K/UL (ref 1.8–7.3)
PLATELET # BLD AUTO: 294 K/UL (ref 130–450)
PMV BLD AUTO: 10.3 FL (ref 7–12)
POTASSIUM SERPL-SCNC: 4.5 MMOL/L (ref 3.5–5)
RBC # BLD AUTO: 2.97 M/UL (ref 3.5–5.5)
RBC # BLD: ABNORMAL 10*6/UL
SODIUM SERPL-SCNC: 141 MMOL/L (ref 132–146)
WBC OTHER # BLD: 8 K/UL (ref 4.5–11.5)

## 2024-10-04 PROCEDURE — 2580000003 HC RX 258: Performed by: PHYSICAL THERAPY ASSISTANT

## 2024-10-04 PROCEDURE — 6370000000 HC RX 637 (ALT 250 FOR IP): Performed by: FAMILY MEDICINE

## 2024-10-04 PROCEDURE — 6360000002 HC RX W HCPCS: Performed by: INTERNAL MEDICINE

## 2024-10-04 PROCEDURE — 2580000003 HC RX 258: Performed by: INTERNAL MEDICINE

## 2024-10-04 PROCEDURE — 2580000003 HC RX 258: Performed by: FAMILY MEDICINE

## 2024-10-04 PROCEDURE — 97161 PT EVAL LOW COMPLEX 20 MIN: CPT

## 2024-10-04 PROCEDURE — 6360000002 HC RX W HCPCS: Performed by: FAMILY MEDICINE

## 2024-10-04 PROCEDURE — 80048 BASIC METABOLIC PNL TOTAL CA: CPT

## 2024-10-04 PROCEDURE — 2700000000 HC OXYGEN THERAPY PER DAY

## 2024-10-04 PROCEDURE — 99232 SBSQ HOSP IP/OBS MODERATE 35: CPT | Performed by: INTERNAL MEDICINE

## 2024-10-04 PROCEDURE — 85025 COMPLETE CBC W/AUTO DIFF WBC: CPT

## 2024-10-04 PROCEDURE — 6370000000 HC RX 637 (ALT 250 FOR IP): Performed by: INTERNAL MEDICINE

## 2024-10-04 PROCEDURE — 6360000002 HC RX W HCPCS: Performed by: PHYSICAL THERAPY ASSISTANT

## 2024-10-04 PROCEDURE — 97535 SELF CARE MNGMENT TRAINING: CPT

## 2024-10-04 PROCEDURE — 36415 COLL VENOUS BLD VENIPUNCTURE: CPT

## 2024-10-04 PROCEDURE — 1200000000 HC SEMI PRIVATE

## 2024-10-04 PROCEDURE — 97165 OT EVAL LOW COMPLEX 30 MIN: CPT

## 2024-10-04 PROCEDURE — 97530 THERAPEUTIC ACTIVITIES: CPT

## 2024-10-04 RX ADMIN — HYDROCODONE BITARTRATE AND ACETAMINOPHEN 1 TABLET: 5; 325 TABLET ORAL at 20:49

## 2024-10-04 RX ADMIN — LEVOTHYROXINE SODIUM 50 MCG: 0.05 TABLET ORAL at 06:35

## 2024-10-04 RX ADMIN — SUCRALFATE 1 G: 1 TABLET ORAL at 09:08

## 2024-10-04 RX ADMIN — HYDROCODONE BITARTRATE AND ACETAMINOPHEN 1 TABLET: 10; 325 TABLET ORAL at 06:37

## 2024-10-04 RX ADMIN — SODIUM CHLORIDE, PRESERVATIVE FREE 10 ML: 5 INJECTION INTRAVENOUS at 09:09

## 2024-10-04 RX ADMIN — GABAPENTIN 400 MG: 400 CAPSULE ORAL at 09:08

## 2024-10-04 RX ADMIN — PRAMIPEXOLE DIHYDROCHLORIDE 0.75 MG: 0.25 TABLET ORAL at 09:10

## 2024-10-04 RX ADMIN — ENOXAPARIN SODIUM 40 MG: 100 INJECTION SUBCUTANEOUS at 09:09

## 2024-10-04 RX ADMIN — GABAPENTIN 400 MG: 400 CAPSULE ORAL at 20:43

## 2024-10-04 RX ADMIN — APIXABAN 5 MG: 5 TABLET, FILM COATED ORAL at 20:43

## 2024-10-04 RX ADMIN — FERROUS SULFATE TAB 325 MG (65 MG ELEMENTAL FE) 325 MG: 325 (65 FE) TAB at 09:09

## 2024-10-04 RX ADMIN — CYCLOBENZAPRINE 10 MG: 10 TABLET, FILM COATED ORAL at 11:42

## 2024-10-04 RX ADMIN — MEROPENEM 1000 MG: 1 INJECTION INTRAVENOUS at 03:54

## 2024-10-04 RX ADMIN — GABAPENTIN 400 MG: 400 CAPSULE ORAL at 15:12

## 2024-10-04 RX ADMIN — CYCLOBENZAPRINE 10 MG: 10 TABLET, FILM COATED ORAL at 20:48

## 2024-10-04 RX ADMIN — MEROPENEM 1000 MG: 1 INJECTION INTRAVENOUS at 11:42

## 2024-10-04 RX ADMIN — ATORVASTATIN CALCIUM 80 MG: 80 TABLET, FILM COATED ORAL at 09:08

## 2024-10-04 RX ADMIN — PANTOPRAZOLE SODIUM 20 MG: 20 TABLET, DELAYED RELEASE ORAL at 09:08

## 2024-10-04 RX ADMIN — PRAMIPEXOLE DIHYDROCHLORIDE 0.75 MG: 0.25 TABLET ORAL at 20:42

## 2024-10-04 RX ADMIN — SODIUM CHLORIDE 1500 MG: 9 INJECTION, SOLUTION INTRAVENOUS at 15:19

## 2024-10-04 RX ADMIN — VENLAFAXINE HYDROCHLORIDE 150 MG: 150 CAPSULE, EXTENDED RELEASE ORAL at 09:11

## 2024-10-04 RX ADMIN — BUPROPION HYDROCHLORIDE 100 MG: 100 TABLET, EXTENDED RELEASE ORAL at 09:12

## 2024-10-04 ASSESSMENT — PAIN DESCRIPTION - DESCRIPTORS
DESCRIPTORS: ACHING;PENETRATING;SQUEEZING
DESCRIPTORS: ACHING;GNAWING;SPASM
DESCRIPTORS: ACHING;PENETRATING;SQUEEZING

## 2024-10-04 ASSESSMENT — PAIN SCALES - WONG BAKER
WONGBAKER_NUMERICALRESPONSE: HURTS A LITTLE BIT
WONGBAKER_NUMERICALRESPONSE: NO HURT
WONGBAKER_NUMERICALRESPONSE: NO HURT
WONGBAKER_NUMERICALRESPONSE: HURTS LITTLE MORE
WONGBAKER_NUMERICALRESPONSE: HURTS A LITTLE BIT
WONGBAKER_NUMERICALRESPONSE: HURTS LITTLE MORE
WONGBAKER_NUMERICALRESPONSE: HURTS LITTLE MORE

## 2024-10-04 ASSESSMENT — PAIN SCALES - GENERAL
PAINLEVEL_OUTOF10: 7
PAINLEVEL_OUTOF10: 7
PAINLEVEL_OUTOF10: 4
PAINLEVEL_OUTOF10: 4
PAINLEVEL_OUTOF10: 5
PAINLEVEL_OUTOF10: 7
PAINLEVEL_OUTOF10: 10
PAINLEVEL_OUTOF10: 10
PAINLEVEL_OUTOF10: 5
PAINLEVEL_OUTOF10: 7

## 2024-10-04 ASSESSMENT — PAIN - FUNCTIONAL ASSESSMENT
PAIN_FUNCTIONAL_ASSESSMENT: PREVENTS OR INTERFERES SOME ACTIVE ACTIVITIES AND ADLS

## 2024-10-04 ASSESSMENT — PAIN DESCRIPTION - LOCATION
LOCATION: LEG
LOCATION: HIP
LOCATION: LEG
LOCATION: LEG

## 2024-10-04 ASSESSMENT — ENCOUNTER SYMPTOMS
EYE REDNESS: 0
ABDOMINAL PAIN: 0
VOMITING: 0
SHORTNESS OF BREATH: 0
NAUSEA: 0

## 2024-10-04 ASSESSMENT — PAIN DESCRIPTION - ORIENTATION
ORIENTATION: RIGHT

## 2024-10-04 ASSESSMENT — PAIN DESCRIPTION - PAIN TYPE: TYPE: SURGICAL PAIN

## 2024-10-04 ASSESSMENT — PAIN DESCRIPTION - ONSET: ONSET: ON-GOING

## 2024-10-04 ASSESSMENT — PAIN DESCRIPTION - FREQUENCY: FREQUENCY: CONTINUOUS

## 2024-10-04 NOTE — PROGRESS NOTES
[facility] Hospitalist Progress Note    Admitting Date and Time: 10/1/2024  6:00 PM  Admit Dx: Postoperative wound infection of right hip [T81.49XA]    Synopsis:    79 y.o. female who presented to [facility] with drainage from her right hip surgical wound.  Patient status post multiple right hip and femur surgeries for periprosthetic fractures and infections.  There was concern that surgical wound had been draining for the last 2 days  and she was advised to come to the ER from the nursing home.  Lab work did show white count of 8.6.  Temperature 98.8. She was given vancomycin and cefepime.  Orthopedic was consulted.  X-ray femur showed no acute fracture or dislocation showed stable perioperative changes in the right femur no gas or fluid collection seen in this time.     10/2: CT of right hip showed significant interval enlargement of peripherally enhancing periprosthetic fluid collection and erosion of right greater trochanter, neuro periprosthetic lucency.  Scheduled for OR with Ortho tomorrow.    10/3: S/p arthrotomy right prosthetic hip joint for I&D and synovectomy with insertion of antibiotic delivery device      Subjective:  Patient is being followed for Postoperative wound infection of right hip [T81.49XA]     Patient seen and examined at bedside this morning.  Denies any pain.    ROS: denies fever, chills, cp, sob, n/v, HA unless stated above.      vancomycin  1,500 mg IntraVENous Q24H    [Held by provider] apixaban  5 mg Oral BID    atorvastatin  80 mg Oral Daily    buPROPion  100 mg Oral Daily    ferrous sulfate  325 mg Oral Daily    gabapentin  400 mg Oral TID    levothyroxine  50 mcg Oral Daily    pantoprazole  20 mg Oral Daily    pramipexole  0.75 mg Oral BID    sucralfate  1 g Oral Daily    venlafaxine  150 mg Oral Daily    furosemide  20 mg Oral Once per day on Monday Thursday    meropenem  1,000 mg IntraVENous Q8H    sodium chloride flush  5-40 mL IntraVENous 2 times per day

## 2024-10-04 NOTE — PROGRESS NOTES
Physical Therapy  Physical Therapy Initial Assessment     Name: Rebeka Renee  : 1945  MRN: [id number]      Date of Service: 10/4/2024    Evaluating PT:  Pia Huerta PT, DPT [id number]    Room #:  5413/5413-A  Diagnosis:  Postoperative wound infection of right hip [T81.49XA]  PMHx/PSHx:   has a past medical history of Anemia, Anxiety, Arthritis, Ascending aortic aneurysm (HCC), Asthma, Bipolar 1 disorder (HCC), Cancer (HCC), Cancer of breast, female (HCC), CHF (congestive heart failure) (HCC), Chronic back pain, Depression, Depression with anxiety, Diabetes mellitus (HCC), Dyslipidemia, Fibromyalgia, History of blood transfusion, Hypertension, Hypothyroidism, Neuropathy, Pericardial effusion, Pleural effusion, TIA (transient ischemic attack), Tobacco abuse, and Type II or unspecified type diabetes mellitus without mention of complication, not stated as uncontrolled.  Procedure/Surgery:  Right femur/hip irrigation and debridement (10/3/24)  Precautions:  Fall risk, alarms, WBAT RLE, global hip precautions (2/2 multiple dislocations), R hip wound vac, Purewick, O2 (on 3L/min at baseline), contact isolation (ESBL)  Equipment Needs:  TBD    SUBJECTIVE:    Pt was admitted from [facility] where she was receiving skilled PT services and ambulating with a FWW. She spends the majority of her time in a power WC.    OBJECTIVE:   Initial Evaluation  Date: 10/4/24 Treatment Date:  Short Term/ Long Term   Goals   AM-PAC 6 Clicks 10/24     Was pt agreeable to Eval/treatment? Yes     Does pt have pain? 8/10 R hip pain     Bed Mobility  Rolling: NT  Supine to sit: ModA  Sit to supine: NT  Scooting: ModA  Rolling: Independent  Supine to sit: Independent  Sit to supine: Independent  Scooting: Independent   Transfers Sit to stand: ModA  Stand to sit: ModA  Stand pivot: ModA with FWW  Sit to stand: Independent  Stand to sit: Independent  Stand pivot: SBA with AAD   Ambulation    Few feet to chair with FWW and ModA  >100 feet  preparation for safe discharge home and/or into the community   [x] Positioning to prevent skin breakdown and contractures  [x] Safety and Education Training   [x] Patient/Caregiver Education   [x] HEP  [] Other     PT long term treatment goals are located in above grid    Frequency of treatments: 2-5x/week x 1-2 weeks.    Time in  1505  Time out  1528    Total Treatment Time  8 minutes     Evaluation Time includes thorough review of current medical information, gathering information on past medical history/social history and prior level of function, completion of standardized testing/informal observation of tasks, assessment of data and education on plan of care and goals.    CPT codes:  [x] Low Complexity PT evaluation 45028  [] Moderate Complexity PT evaluation 92501  [] High Complexity PT evaluation 19556  [] PT Re-evaluation 93024  [] Gait training 23504 0 minutes  [] Manual therapy 61555 0 minutes  [x] Therapeutic activities 45015 8 minutes  [] Therapeutic exercises 81152 0 minutes  [] Neuromuscular reeducation 48871 0 minutes     Pia Epp, PT, DPT  [id number]

## 2024-10-04 NOTE — CARE COORDINATION
10/4/2024Social work transition of care planning  Pt plan is to return to [facility],once medically stable. Envelope in soft chart,per previous CM. Will need post op therapy for precert.  Electronically signed by ZA Gonzáles on 10/4/2024 at 7:40 AM

## 2024-10-04 NOTE — FLOWSHEET NOTE
Inpatient Wound Care(5413)    Admit Date: 10/1/2024  6:00 PM    Reason for consult: right hip wound- prevena- ortho following    Wound history:    Procedure: 1/3  1.  Arthrotomy right prosthetic hip joint for irrigation debridement and synovectomy     2.  Insertion of antibiotic delivery device right hip joint and seroma    Findings:     10/04/24 1419   Negative Pressure Wound Therapy Hip Right   Placement Date: 10/03/24   Present on Admission/Arrival: No  Location: Hip  Wound Location Orientation: Right   Dressing Type   (prevena)   Cycle Continuous   Target Pressure (mmHg) 125   Incision 10/03/24 Hip Right   Date First Assessed: 10/03/24   Present on Original Admission: Yes  Location: Hip  Incision Location Orientation: Right   Dressing Status Intact   Dressing/Treatment Negative pressure wound therapy     Plan:  Chart reviewed, orders placed  Ortho following      Selam Medrano RN 10/4/2024 2:20 PM

## 2024-10-04 NOTE — ANESTHESIA POSTPROCEDURE EVALUATION
Department of Anesthesiology  Postprocedure Note    Patient: Rebeka Renee  MRN: [id number]  YOB: 1945  Date of evaluation: 10/4/2024    Procedure Summary       Date: 10/03/24 Room / Location: [address]    Anesthesia Start: 1241 Anesthesia Stop: 1351    Procedure: Right femur/hip irrigation and debridement (Right: Leg Upper) Diagnosis:       Infection of prosthetic hip joint, initial encounter ([facility])      (Infection of prosthetic hip joint, initial encounter ([facility]) [T84.59XA, Z96.649])    Surgeons: Nemesio Handy MD Responsible Provider: Merlin Yeager MD    Anesthesia Type: general ASA Status: 4            Anesthesia Type: No value filed.    Terrie Phase I: Terrie Score: 10    Terrie Phase II:      Anesthesia Post Evaluation    Patient location during evaluation: PACU  Patient participation: complete - patient participated  Level of consciousness: awake and alert  Airway patency: patent  Nausea & Vomiting: no nausea and no vomiting  Cardiovascular status: hemodynamically stable  Respiratory status: acceptable  Hydration status: euvolemic  Multimodal analgesia pain management approach  Pain management: adequate    No notable events documented.

## 2024-10-04 NOTE — PROGRESS NOTES
Pharmacy Consultation Note  (Antibiotic Dosing and Monitoring)    Initial consult date: 10/2/24  Consulting physician/provider: Polly Gilliam MD  Drug: Vancomycin  Indication: Bone and Joint Infection     Age/  Gender Height Weight IBW  Allergy Information   79 y.o./female 165.1 cm (5' 5\") 73.5 kg (162 lb)     Ideal body weight: 57 kg (125 lb 10.6 oz)  Adjusted ideal body weight: 63.6 kg (140 lb 3.2 oz)   Benadryl [diphenhydramine], Other, Sulfa antibiotics, Ciprofloxacin, Oxycodone, and Tape [adhesive tape]      Renal Function:  Recent Labs     10/01/24  1929 10/02/24  0544 10/04/24  0633   BUN 15 13 15   CREATININE 0.8 0.7 0.7       Intake/Output Summary (Last 24 hours) at 10/4/2024 1503  Last data filed at 10/4/2024 0454  Gross per 24 hour   Intake 620 ml   Output 1500 ml   Net -880 ml       Vancomycin Monitoring:  Trough:  No results for input(s): \"VANCOTROUGH\" in the last 72 hours.  Random:    Recent Labs     10/03/24  0457   VANCORANDOM 15.5       Vancomycin Administration Times:  Recent vancomycin administrations                     vancomycin (VANCOCIN) 1,500 mg in sodium chloride 0.9 % 250 mL IVPB (Ekgo7Sxh) (mg) 1,500 mg New Bag 10/03/24 1621    vancomycin (VANCOCIN) injection (mg) 1,000 mg Given 10/03/24 1320    vancomycin (VANCOCIN) 1,250 mg in sodium chloride 0.9 % 250 mL IVPB (Qlrn2Wha) (mg) 1,250 mg New Bag 10/02/24 1456    vancomycin (VANCOCIN) 1,500 mg in sodium chloride 0.9 % 250 mL IVPB (Dpvd7Mvo) (mg) 1,500 mg New Bag 10/01/24 2149                        Assessment:  Patient is a 79 y.o. female who has been initiated on vancomycin for postoperative wound infection of right hip.    Estimated Creatinine Clearance: 68 mL/min (based on SCr of 0.7 mg/dL).  To dose vancomycin, pharmacy will be utilizing [employer] calculation software for goal AUC/HERBER 400-600 mg/L-hr (predicted AUC/HERBER = 528, Tr =14.6 mcg/mL)  10/2:  Presenting from nursing home with drainage from right hip surgical wound  postoperative.  History of multiple right hip and femur surgeries for periprosthetic fractures and infections.  WBC - 8.6, afebrile, also on meropenem.  Awaiting cultures.  Scr at baseline (0.7).  Received 1500 mg loading dose (20 mg/kg) in ED yesterday at 21:49.     10/3:  I&D surgery planned for today.  Awaiting surgical cultures.  Random level 15.5 mcg/mL at 04:56 today.  Will increase vancomycin dose given subtherapeutic prediction level (445/12.2).  Collecting BMP tomorrow.     10/4:  I&D surgery completed yesterday.  Awaiting cultures.  Scr stable (0.7) and UO 0.9 mL/kg/hr.      Plan:  Will continue vancomycin to 1500 mg IV every 24 hours  Will check vancomycin levels as appropriate  Will continue to monitor renal function   Pharmacy to follow    Ian Bravo, PharmD  PGY-1 Pharmacy Practice Resident, [phone]  10/4/2024 3:03 PM

## 2024-10-04 NOTE — ED PROVIDER NOTES
Past Surgical History:   Procedure Laterality Date    BREAST SURGERY      CATARACT REMOVAL WITH IMPLANT Bilateral     CHOLECYSTECTOMY      FEMUR SURGERY Right 9/22/2024    FEMUR OPEN REDUCTION INTERNAL FIXATION RIGHT PERIPROSTHETIC performed by Wood George DO at [facility]    FIBULA FRACTURE SURGERY Right 5/16/2024    RIGHT FEMUR OPEN REDUCTION INTERNAL FIXATION, OPEN TREATMENT OF RIGHT HIP DISLOCATION performed by Nemesio Handy MD at [facility]    GASTRIC BYPASS SURGERY  9/2004    HERNIA REPAIR      HIP SURGERY Right 1/4/2024    RIGHT HIP HEMIARTHROPLASTY performed by Nemesio Handy MD at [facility]    HIP SURGERY Right 4/2/2024    RIGHT HIP CLOSED REDUCTION INTERNAL FIXATION performed by Matthew Peña DO at [facility]    HIP SURGERY Right 4/11/2024    RIGHT HIP OPEN REDUCTION FEMORAL HEAD EXCHANGE WITH IRRIGATION AND DEBRIDEMENT performed by Nemesio Handy MD at [facility]    HIP SURGERY Right 10/3/2024    Right femur/hip irrigation and debridement performed by Nemesio Handy MD at [facility]    HYSTERECTOMY (CERVIX STATUS UNKNOWN)      KIDNEY REMOVAL Left 2/2006    Cancer.    KIDNEY REMOVAL      left    KNEE SURGERY Right     arthroscopy    LIVER BIOPSY  2006    Fatty tumor.    LUNG REMOVAL, PARTIAL      For lung CA, thought to be metastatic from breast cancer. left upper lobe     MASTECTOMY Bilateral     NERVE BLOCK Bilateral 06/06/16    transforaminal nerve block, lumbar #1    OTHER SURGICAL HISTORY  03/16/15    stage 1 percutaneous trial lumbar spinal cord stimulator    OTHER SURGICAL HISTORY N/A 4/15/15    surgical implantation of medtronic spinal cord stimulator lumbar    OTHER SURGICAL HISTORY N/A 02/24/2021    surgical removal lumbar SCS    PAIN MANAGEMENT PROCEDURE N/A 2/24/2021    SURGICAL REMOVAL OF LUMBAR MEDTRONIC SPINAL CORD STIMULATOR (STIMULATOR BATTERY LEFT HIP AREA) (CPT 93549) performed by Lizzy Herbert DO at [facility] OR    REVISION TOTAL HIP  IntraVENous Given 10/4/24 0909)   sodium chloride flush 0.9 % injection 5-40 mL (has no administration in time range)   0.9 % sodium chloride infusion (has no administration in time range)   potassium chloride (KLOR-CON M) extended release tablet 40 mEq (has no administration in time range)     Or   potassium bicarb-citric acid (EFFER-K) effervescent tablet 40 mEq (has no administration in time range)     Or   potassium chloride 10 mEq/100 mL IVPB (Peripheral Line) (has no administration in time range)   magnesium sulfate 2000 mg in 50 mL IVPB premix (has no administration in time range)   enoxaparin (LOVENOX) injection 40 mg (40 mg SubCUTAneous Given 10/4/24 0909)   ondansetron (ZOFRAN-ODT) disintegrating tablet 4 mg (has no administration in time range)     Or   ondansetron (ZOFRAN) injection 4 mg (has no administration in time range)   polyethylene glycol (GLYCOLAX) packet 17 g (has no administration in time range)   acetaminophen (TYLENOL) tablet 650 mg (has no administration in time range)     Or   acetaminophen (TYLENOL) suppository 650 mg (has no administration in time range)   HYDROcodone-acetaminophen (NORCO) 5-325 MG per tablet 1 tablet (has no administration in time range)   HYDROcodone-acetaminophen (NORCO)  MG per tablet 1 tablet (1 tablet Oral Given 10/4/24 0637)   albuterol (PROVENTIL) (2.5 MG/3ML) 0.083% nebulizer solution 2.5 mg (has no administration in time range)   apixaban (ELIQUIS) tablet 5 mg ( Oral Automatically Held 10/8/24 2100)   atorvastatin (LIPITOR) tablet 80 mg (80 mg Oral Given 10/4/24 0908)   buPROPion (WELLBUTRIN SR) extended release tablet 100 mg (100 mg Oral Given 10/4/24 0912)   cyclobenzaprine (FLEXERIL) tablet 10 mg (10 mg Oral Given 10/2/24 2120)   ferrous sulfate (IRON 325) tablet 325 mg (325 mg Oral Given 10/4/24 0909)   gabapentin (NEURONTIN) capsule 400 mg (400 mg Oral Given 10/4/24 0908)   levothyroxine (SYNTHROID) tablet 50 mcg (50 mcg Oral Given 10/4/24 0635)

## 2024-10-04 NOTE — PROGRESS NOTES
Department of Orthopedic Surgery  Resident Progress Note    Patient seen and examined. Pain appropriately controlled. No new complaints.  Denies chest pain, shortness of breath, dizziness/lightheadedness. Denies fever or chills.  All of her questions were answered.    VITALS:  /66   Pulse 88   Temp 97.3 °F (36.3 °C) (Temporal)   Resp 18   Ht 1.651 m (5' 5\")   Wt 73.5 kg (162 lb)   SpO2 95%   BMI 26.96 kg/m²     General: alert and oriented to person, place and time, well-developed and well-nourished, in no acute distress    MUSCULOSKELETAL:   right lower extremity:  Dressing C/D/I  Compartments soft and compressible  +PF/DF/EHL  +2/4 DP & PT pulses, Brisk Cap refill, Toes warm and perfused  Does have paresthesias in right foot, consistent with her baseline.  Distal sensation otherwise grossly intact to Peroneals, Sural, Saphenous, and tibial nrs    CBC:   Lab Results   Component Value Date/Time    WBC 7.3 10/02/2024 05:44 AM    WBC 6,207 08/02/2024 12:00 PM    HGB 8.1 10/02/2024 05:44 AM    HCT 27.8 10/02/2024 05:44 AM     10/02/2024 05:44 AM     PT/INR:    Lab Results   Component Value Date/Time    PROTIME 16.3 09/20/2024 08:55 PM    PROTIME 10.4 12/13/2010 09:00 AM    INR 1.5 09/20/2024 08:55 PM       Intraop Cultures: pending    ASSESSMENT  S/P arthrotomy of right prosthetic hip joint for I&D and synovectomy with insertion of antibiotic delivery device-10/3    PLAN      Continue physical therapy and protocol: WBAT -right LE  Intraoperative cultures pending, will continue to follow.  IV antibiotic management per infectious disease  Deep venous thrombosis prophylaxis -okay to resume home Eliquis today, early mobilization  Current plan for repeat I&D of right prosthetic hip joint with exchange of prosthetic femoral head on Monday 10/6  PT/OT  Pain Control: IV and PO  Monitor H&H  D/C Plan:  Skilled Nursing Facility      Electronically signed by Dominik Barnard DO on 10/4/2024 at 5:28  AM  Note: This report was completed using "[employer] voiced recognition software.  Every effort has been made to ensure accuracy; however, inadvertent computerized transcription errors may be present.

## 2024-10-04 NOTE — PROGRESS NOTES
OCCUPATIONAL THERAPY INITIAL EVALUATION    [facility]  [address], [place]        Date:10/4/2024                                                  Patient Name: Rebeka Renee    MRN: [id number]    : 1945    Room: [address] [place] [address]      Evaluating OT: Sudhir Velezesteban OTR/L; [id number]       Referring Provider: Zak Sultana MD     Specific Provider Orders/Date: OT Eval and Treat 10/04/24 1200       Diagnosis: Postoperative wound infection of right hip      Surgery:   10/3/24  1.  Arthrotomy right prosthetic hip joint for irrigation debridement and synovectomy  2.  Insertion of antibiotic delivery device right hip joint and seroma     Pertinent Medical History:  has a past medical history of Anemia, Anxiety, Arthritis, Ascending aortic aneurysm (HCC), Asthma, Bipolar 1 disorder (HCC), Cancer (HCC), Cancer of breast, female (HCC), CHF (congestive heart failure) (HCC), Chronic back pain, Depression, Depression with anxiety, Diabetes mellitus (HCC), Dyslipidemia, Fibromyalgia, History of blood transfusion, Hypertension, Hypothyroidism, Neuropathy, Pericardial effusion, Pleural effusion, TIA (transient ischemic attack), Tobacco abuse, and Type II or unspecified type diabetes mellitus without mention of complication, not stated as uncontrolled.     Recommended Adaptive Equipment: BSC, AE for LE bathing and dressing PRN     Precautions:  Fall Risk, WBAT RLE, RLE wound vac, global hip precautions (h/o multiple dislocations), h/o L knee buckle, +alarms, O2, purewick, contact isolation ESBL     Assessment of current deficits    [x] Functional mobility  [x]ADLs  [x] Strength               [x]Cognition    [x] Functional transfers   [x] IADLs         [x] Safety Awareness   [x]Endurance    [] Fine Coordination              [x] Balance      [] Vision/perception   []Sensation     []Gross Motor Coordination  [] ROM  [] Delirium                   [] Motor  fair understanding.     Eval Complexity: Low  History: Expanded chart review of medical records and additional review of physical, cognitive, or psychosocial history related to current functional performance  Exam: 3+ performance deficits  Assistance/Modification: Min/mod assistance or modifications required to perform tasks. May have comorbidities that affect occupational performance.    Evaluation time includes thorough review of current medical information, gathering information on past medical & social history & PLOF, completion of standardized testing, informal observation of tasks, consultation with other medical professions/disciplines, assessment of data & development of POC/goals.     Time In: 3:01p  Time Out: 3:24p  Total Treatment Time: 8 minutes    Min Units   OT Eval Low 95830  x     OT Eval Medium 72522      OT Eval High 93908      OT Re-Eval 77492       Therapeutic Ex 98431       Therapeutic Activities 66917       ADL/Self Care 31404  8 1    Orthotic Management 59370       Manual [id number]     Neuro Re-Ed 82715       Non-Billable Time          Evaluation Time additionally includes thorough review of current medical information, gathering information on past medical history/social history and prior level of function, interpretation of standardized testing/informal observation of tasks, assessment of data and development of plan of care and goals.            Sudhir Agarwal OTR/L; [id number]

## 2024-10-05 LAB
MICROORGANISM SPEC CULT: NORMAL
SERVICE CMNT-IMP: NORMAL
SPECIMEN DESCRIPTION: NORMAL

## 2024-10-05 PROCEDURE — 6370000000 HC RX 637 (ALT 250 FOR IP): Performed by: FAMILY MEDICINE

## 2024-10-05 PROCEDURE — 99233 SBSQ HOSP IP/OBS HIGH 50: CPT | Performed by: INTERNAL MEDICINE

## 2024-10-05 PROCEDURE — 2580000003 HC RX 258: Performed by: FAMILY MEDICINE

## 2024-10-05 PROCEDURE — 2580000003 HC RX 258: Performed by: PHYSICAL THERAPY ASSISTANT

## 2024-10-05 PROCEDURE — 2580000003 HC RX 258: Performed by: INTERNAL MEDICINE

## 2024-10-05 PROCEDURE — 6370000000 HC RX 637 (ALT 250 FOR IP): Performed by: INTERNAL MEDICINE

## 2024-10-05 PROCEDURE — 1200000000 HC SEMI PRIVATE

## 2024-10-05 PROCEDURE — 6360000002 HC RX W HCPCS: Performed by: PHYSICAL THERAPY ASSISTANT

## 2024-10-05 PROCEDURE — 2700000000 HC OXYGEN THERAPY PER DAY

## 2024-10-05 PROCEDURE — 6360000002 HC RX W HCPCS: Performed by: INTERNAL MEDICINE

## 2024-10-05 RX ADMIN — PRAMIPEXOLE DIHYDROCHLORIDE 0.75 MG: 0.25 TABLET ORAL at 08:55

## 2024-10-05 RX ADMIN — MEROPENEM 1000 MG: 1 INJECTION INTRAVENOUS at 20:01

## 2024-10-05 RX ADMIN — FERROUS SULFATE TAB 325 MG (65 MG ELEMENTAL FE) 325 MG: 325 (65 FE) TAB at 08:54

## 2024-10-05 RX ADMIN — ATORVASTATIN CALCIUM 80 MG: 80 TABLET, FILM COATED ORAL at 08:55

## 2024-10-05 RX ADMIN — APIXABAN 5 MG: 5 TABLET, FILM COATED ORAL at 08:55

## 2024-10-05 RX ADMIN — GABAPENTIN 400 MG: 400 CAPSULE ORAL at 08:55

## 2024-10-05 RX ADMIN — HYDROCODONE BITARTRATE AND ACETAMINOPHEN 1 TABLET: 10; 325 TABLET ORAL at 05:55

## 2024-10-05 RX ADMIN — MEROPENEM 1000 MG: 1 INJECTION INTRAVENOUS at 12:37

## 2024-10-05 RX ADMIN — SODIUM CHLORIDE, PRESERVATIVE FREE 10 ML: 5 INJECTION INTRAVENOUS at 19:54

## 2024-10-05 RX ADMIN — GABAPENTIN 400 MG: 400 CAPSULE ORAL at 19:52

## 2024-10-05 RX ADMIN — PANTOPRAZOLE SODIUM 20 MG: 20 TABLET, DELAYED RELEASE ORAL at 08:55

## 2024-10-05 RX ADMIN — LEVOTHYROXINE SODIUM 50 MCG: 0.05 TABLET ORAL at 05:49

## 2024-10-05 RX ADMIN — SODIUM CHLORIDE 1500 MG: 9 INJECTION, SOLUTION INTRAVENOUS at 15:43

## 2024-10-05 RX ADMIN — PRAMIPEXOLE DIHYDROCHLORIDE 0.75 MG: 0.25 TABLET ORAL at 19:52

## 2024-10-05 RX ADMIN — GABAPENTIN 400 MG: 400 CAPSULE ORAL at 15:39

## 2024-10-05 RX ADMIN — BUPROPION HYDROCHLORIDE 100 MG: 100 TABLET, EXTENDED RELEASE ORAL at 08:54

## 2024-10-05 RX ADMIN — SUCRALFATE 1 G: 1 TABLET ORAL at 08:54

## 2024-10-05 RX ADMIN — SODIUM CHLORIDE, PRESERVATIVE FREE 10 ML: 5 INJECTION INTRAVENOUS at 08:55

## 2024-10-05 RX ADMIN — VENLAFAXINE HYDROCHLORIDE 150 MG: 150 CAPSULE, EXTENDED RELEASE ORAL at 08:54

## 2024-10-05 RX ADMIN — HYDROCODONE BITARTRATE AND ACETAMINOPHEN 1 TABLET: 10; 325 TABLET ORAL at 15:38

## 2024-10-05 ASSESSMENT — PAIN SCALES - WONG BAKER
WONGBAKER_NUMERICALRESPONSE: NO HURT
WONGBAKER_NUMERICALRESPONSE: HURTS A LITTLE BIT
WONGBAKER_NUMERICALRESPONSE: HURTS A LITTLE BIT
WONGBAKER_NUMERICALRESPONSE: HURTS WHOLE LOT
WONGBAKER_NUMERICALRESPONSE: HURTS A LITTLE BIT
WONGBAKER_NUMERICALRESPONSE: HURTS LITTLE MORE

## 2024-10-05 ASSESSMENT — PAIN SCALES - GENERAL
PAINLEVEL_OUTOF10: 8
PAINLEVEL_OUTOF10: 4
PAINLEVEL_OUTOF10: 8
PAINLEVEL_OUTOF10: 8
PAINLEVEL_OUTOF10: 9
PAINLEVEL_OUTOF10: 7

## 2024-10-05 ASSESSMENT — PAIN DESCRIPTION - FREQUENCY
FREQUENCY: CONTINUOUS
FREQUENCY: CONTINUOUS

## 2024-10-05 ASSESSMENT — PAIN DESCRIPTION - DESCRIPTORS
DESCRIPTORS: ACHING;DISCOMFORT;GNAWING
DESCRIPTORS: ACHING;NAGGING;THROBBING
DESCRIPTORS: ACHING;THROBBING
DESCRIPTORS: ACHING;DISCOMFORT

## 2024-10-05 ASSESSMENT — PAIN DESCRIPTION - LOCATION
LOCATION: LEG
LOCATION: HIP;LEG
LOCATION: LEG
LOCATION: HIP;LEG

## 2024-10-05 ASSESSMENT — PAIN - FUNCTIONAL ASSESSMENT
PAIN_FUNCTIONAL_ASSESSMENT: PREVENTS OR INTERFERES SOME ACTIVE ACTIVITIES AND ADLS

## 2024-10-05 ASSESSMENT — PAIN DESCRIPTION - ORIENTATION
ORIENTATION: RIGHT

## 2024-10-05 ASSESSMENT — PAIN DESCRIPTION - ONSET: ONSET: ON-GOING

## 2024-10-05 ASSESSMENT — PAIN DESCRIPTION - PAIN TYPE
TYPE: SURGICAL PAIN
TYPE: SURGICAL PAIN

## 2024-10-05 NOTE — PROGRESS NOTES
[facility] Hospitalist Progress Note    Admitting Date and Time: 10/1/2024  6:00 PM  Admit Dx: Postoperative wound infection of right hip [T81.49XA]    Synopsis:    79 y.o. female who presented to [facility] with drainage from her right hip surgical wound.  Patient status post multiple right hip and femur surgeries for periprosthetic fractures and infections.  There was concern that surgical wound had been draining for the last 2 days  and she was advised to come to the ER from the nursing home.  Lab work did show white count of 8.6.  Temperature 98.8. She was given vancomycin and cefepime.  Orthopedic was consulted.  X-ray femur showed no acute fracture or dislocation showed stable perioperative changes in the right femur no gas or fluid collection seen in this time.     10/2: CT of right hip showed significant interval enlargement of peripherally enhancing periprosthetic fluid collection and erosion of right greater trochanter, neuro periprosthetic lucency.  Scheduled for OR with Ortho tomorrow.    10/3: S/p arthrotomy right prosthetic hip joint for I&D and synovectomy with insertion of antibiotic delivery device      Subjective:  Patient is being followed for Postoperative wound infection of right hip [T81.49XA]     Patient seen and examined at bedside this morning.  Denies any pain.    ROS: denies fever, chills, cp, sob, n/v, HA unless stated above.      vancomycin  1,500 mg IntraVENous Q24H    apixaban  5 mg Oral BID    atorvastatin  80 mg Oral Daily    buPROPion  100 mg Oral Daily    ferrous sulfate  325 mg Oral Daily    gabapentin  400 mg Oral TID    levothyroxine  50 mcg Oral Daily    pantoprazole  20 mg Oral Daily    pramipexole  0.75 mg Oral BID    sucralfate  1 g Oral Daily    venlafaxine  150 mg Oral Daily    furosemide  20 mg Oral Once per day on Monday Thursday    meropenem  1,000 mg IntraVENous Q8H    sodium chloride flush  5-40 mL IntraVENous 2 times per day     albuterol, 2.5

## 2024-10-05 NOTE — PROGRESS NOTES
Department of Orthopedic Surgery  Resident Progress Note    Patient seen and examined. Pain appropriately controlled.  Has been able to tolerate some weightbearing. No new complaints.  Denies chest pain, shortness of breath, dizziness/lightheadedness. Denies fever or chills.  All of her questions were answered.    VITALS:  /66   Pulse 90   Temp (!) 96.6 °F (35.9 °C) (Temporal)   Resp 18   Ht 1.651 m (5' 5\")   Wt 73.5 kg (162 lb)   SpO2 100%   BMI 26.96 kg/m²     General: Resting bed comfortably upon arrival.  Upon awakening, alert and oriented to person, place and time, well-developed and well-nourished, in no acute distress    MUSCULOSKELETAL:   right lower extremity:  Dressing C/D/I  Compartments soft and compressible  +PF/DF/EHL  +2/4 DP & PT pulses, Brisk Cap refill, Toes warm and perfused  Does have paresthesias in right foot, consistent with her baseline.  Distal sensation otherwise grossly intact to Peroneals, Sural, Saphenous, and tibial nrs    CBC:   Lab Results   Component Value Date/Time    WBC 8.0 10/04/2024 06:33 AM    WBC 6,207 08/02/2024 12:00 PM    HGB 8.2 10/04/2024 06:33 AM    HCT 29.5 10/04/2024 06:33 AM     10/04/2024 06:33 AM     PT/INR:    Lab Results   Component Value Date/Time    PROTIME 16.3 09/20/2024 08:55 PM    PROTIME 10.4 12/13/2010 09:00 AM    INR 1.5 09/20/2024 08:55 PM       Intraop Cultures: pending    ASSESSMENT  S/P arthrotomy of right prosthetic hip joint for I&D and synovectomy with insertion of antibiotic delivery device-10/3    PLAN      Continue physical therapy and protocol: WBAT -right LE  Intraoperative cultures pending, will continue to follow.  IV antibiotic management per infectious disease  Deep venous thrombosis prophylaxis -Home Eliquis, early mobilization  Current plan for repeat I&D of right prosthetic hip joint with exchange of prosthetic femoral head on Monday 10/7  PT/OT  Pain Control: IV and PO  Monitor H&H 8.2  D/C Plan:  Skilled Nursing  Facility      Electronically signed by Dominik Barnard DO on 10/5/2024 at 8:24 AM  Note: This report was completed using computerMob[employer] voiced recognition software.  Every effort has been made to ensure accuracy; however, inadvertent computerized transcription errors may be present.

## 2024-10-05 NOTE — PROGRESS NOTES
Asked to place a piv,patient has a limb restriction due to a mastectomy and lymph node removal. Educated patient that no viable veins in right arm. She is ok with placement in left hand. Nursing to notify

## 2024-10-05 NOTE — PROGRESS NOTES
Pharmacy Consultation Note  (Antibiotic Dosing and Monitoring)    Initial consult date: 10/2/24  Consulting physician/provider: Polly Gilliam MD  Drug: Vancomycin  Indication: Bone and Joint Infection     Age/  Gender Height Weight IBW  Allergy Information   79 y.o./female 165.1 cm (5' 5\") 73.5 kg (162 lb)     Ideal body weight: 57 kg (125 lb 10.6 oz)  Adjusted ideal body weight: 63.6 kg (140 lb 3.2 oz)   Benadryl [diphenhydramine], Other, Sulfa antibiotics, Ciprofloxacin, Oxycodone, and Tape [adhesive tape]      Renal Function:  Recent Labs     10/04/24  0633   BUN 15   CREATININE 0.7       Intake/Output Summary (Last 24 hours) at 10/5/2024 0811  Last data filed at 10/4/2024 1419  Gross per 24 hour   Intake 240 ml   Output 300 ml   Net -60 ml       Vancomycin Monitoring:  Trough:  No results for input(s): \"VANCOTROUGH\" in the last 72 hours.  Random:    Recent Labs     10/03/24  0457   VANCORANDOM 15.5       Vancomycin Administration Times:  Recent vancomycin administrations                     vancomycin (VANCOCIN) 1,500 mg in sodium chloride 0.9 % 250 mL IVPB ([id number]) (mg) 1,500 mg New Bag 10/04/24 1519     1,500 mg New Bag 10/03/24 1621    vancomycin (VANCOCIN) injection (mg) 1,000 mg Given 10/03/24 1320    vancomycin (VANCOCIN) 1,250 mg in sodium chloride 0.9 % 250 mL IVPB ([id number]) (mg) 1,250 mg New Bag 10/02/24 1456                            Assessment:  Patient is a 79 y.o. female who has been initiated on vancomycin for postoperative wound infection of right hip.    Estimated Creatinine Clearance: 68 mL/min (based on SCr of 0.7 mg/dL).  To dose vancomycin, pharmacy will be utilizing [employer] calculation software for goal AUC/HERBER 400-600 mg/L-hr (predicted AUC/HERBER = 528, Tr =14.6 mcg/mL)  10/2:  Presenting from nursing home with drainage from right hip surgical wound postoperative.  History of multiple right hip and femur surgeries for periprosthetic fractures and infections.  WBC - 8.6,

## 2024-10-05 NOTE — PROGRESS NOTES
[facility]  [facility]  Progress Note    SUBJECTIVE:  Chief Complaint   Patient presents with    Wound Check     Pt sent in from NH due to right hip surgery wound draining.      Patient resting in bed. C/o being tired. Patient is tolerating medications. No reported adverse drug reactions.  No nausea, vomiting, diarrhea.    Review of systems:  As stated above in the chief complaint, otherwise negative.    Medications:  Scheduled Meds:   vancomycin  1,500 mg IntraVENous Q24H    [Held by provider] apixaban  5 mg Oral BID    atorvastatin  80 mg Oral Daily    buPROPion  100 mg Oral Daily    ferrous sulfate  325 mg Oral Daily    gabapentin  400 mg Oral TID    levothyroxine  50 mcg Oral Daily    pantoprazole  20 mg Oral Daily    pramipexole  0.75 mg Oral BID    sucralfate  1 g Oral Daily    venlafaxine  150 mg Oral Daily    furosemide  20 mg Oral Once per day on     meropenem  1,000 mg IntraVENous Q8H    sodium chloride flush  5-40 mL IntraVENous 2 times per day     Continuous Infusions:   sodium chloride       PRN Meds:albuterol, cyclobenzaprine, magnesium hydroxide, sodium chloride flush, sodium chloride flush, sodium chloride, potassium chloride **OR** potassium alternative oral replacement **OR** potassium chloride, magnesium sulfate, ondansetron **OR** ondansetron, polyethylene glycol, acetaminophen **OR** acetaminophen, HYDROcodone 5 mg - acetaminophen, HYDROcodone-acetaminophen    OBJECTIVE:  /60   Pulse 80   Temp 97.9 °F (36.6 °C) (Temporal)   Resp 18   Ht 1.651 m (5' 5\")   Wt 73.5 kg (162 lb)   SpO2 100%   BMI 26.96 kg/m²   Temp  Av.5 °F (36.4 °C)  Min: 96.6 °F (35.9 °C)  Max: 97.9 °F (36.6 °C)  Constitutional: NAD  Skin: Warm and dry. No rashes were noted.   Neuro: Alert and oriented  HEENT: Round and reactive pupils.  Moist mucous membranes.  No ulcerations or thrush.  Chest: .Respirations unlabored. Breath sounds clear.   Cardiovascular:

## 2024-10-06 VITALS
HEART RATE: 89 BPM | WEIGHT: 162 LBS | DIASTOLIC BLOOD PRESSURE: 62 MMHG | TEMPERATURE: 97.6 F | OXYGEN SATURATION: 99 % | RESPIRATION RATE: 18 BRPM | SYSTOLIC BLOOD PRESSURE: 130 MMHG | BODY MASS INDEX: 26.99 KG/M2 | HEIGHT: 65 IN

## 2024-10-06 LAB
ANION GAP SERPL CALCULATED.3IONS-SCNC: 10 MMOL/L (ref 7–16)
BASOPHILS # BLD: 0.03 K/UL (ref 0–0.2)
BASOPHILS NFR BLD: 0 % (ref 0–2)
BUN SERPL-MCNC: 12 MG/DL (ref 6–23)
CALCIUM SERPL-MCNC: 8.9 MG/DL (ref 8.6–10.2)
CHLORIDE SERPL-SCNC: 102 MMOL/L (ref 98–107)
CO2 SERPL-SCNC: 28 MMOL/L (ref 22–29)
CREAT SERPL-MCNC: 0.7 MG/DL (ref 0.5–1)
DATE LAST DOSE: NORMAL
EOSINOPHIL # BLD: 0.42 K/UL (ref 0.05–0.5)
EOSINOPHILS RELATIVE PERCENT: 6 % (ref 0–6)
ERYTHROCYTE [DISTWIDTH] IN BLOOD BY AUTOMATED COUNT: 17.9 % (ref 11.5–15)
GFR, ESTIMATED: 89 ML/MIN/1.73M2
GLUCOSE SERPL-MCNC: 67 MG/DL (ref 74–99)
HCT VFR BLD AUTO: 26.9 % (ref 34–48)
HGB BLD-MCNC: 7.8 G/DL (ref 11.5–15.5)
IMM GRANULOCYTES # BLD AUTO: 0.04 K/UL (ref 0–0.58)
IMM GRANULOCYTES NFR BLD: 1 % (ref 0–5)
LYMPHOCYTES NFR BLD: 2.17 K/UL (ref 1.5–4)
LYMPHOCYTES RELATIVE PERCENT: 29 % (ref 20–42)
MCH RBC QN AUTO: 27.5 PG (ref 26–35)
MCHC RBC AUTO-ENTMCNC: 29 G/DL (ref 32–34.5)
MCV RBC AUTO: 94.7 FL (ref 80–99.9)
MICROORGANISM SPEC CULT: ABNORMAL
MICROORGANISM SPEC CULT: NORMAL
MICROORGANISM/AGENT SPEC: ABNORMAL
MICROORGANISM/AGENT SPEC: NORMAL
MONOCYTES NFR BLD: 0.65 K/UL (ref 0.1–0.95)
MONOCYTES NFR BLD: 9 % (ref 2–12)
NEUTROPHILS NFR BLD: 56 % (ref 43–80)
NEUTS SEG NFR BLD: 4.15 K/UL (ref 1.8–7.3)
PLATELET CONFIRMATION: NORMAL
PLATELET ESTIMATE: ABNORMAL
PLATELET, FLUORESCENCE: 232 K/UL (ref 130–450)
PMV BLD AUTO: 11.9 FL (ref 7–12)
POTASSIUM SERPL-SCNC: 4.2 MMOL/L (ref 3.5–5)
RBC # BLD AUTO: 2.84 M/UL (ref 3.5–5.5)
SERVICE CMNT-IMP: ABNORMAL
SERVICE CMNT-IMP: ABNORMAL
SERVICE CMNT-IMP: NORMAL
SODIUM SERPL-SCNC: 140 MMOL/L (ref 132–146)
SPECIMEN DESCRIPTION: ABNORMAL
SPECIMEN DESCRIPTION: ABNORMAL
SPECIMEN DESCRIPTION: NORMAL
TME LAST DOSE: NORMAL H
VANCOMYCIN DOSE: NORMAL MG
VANCOMYCIN SERPL-MCNC: 21.6 UG/ML (ref 5–40)
WBC OTHER # BLD: 7.5 K/UL (ref 4.5–11.5)

## 2024-10-06 PROCEDURE — 36415 COLL VENOUS BLD VENIPUNCTURE: CPT

## 2024-10-06 PROCEDURE — 85025 COMPLETE CBC W/AUTO DIFF WBC: CPT

## 2024-10-06 PROCEDURE — 6360000002 HC RX W HCPCS: Performed by: INTERNAL MEDICINE

## 2024-10-06 PROCEDURE — 2580000003 HC RX 258: Performed by: FAMILY MEDICINE

## 2024-10-06 PROCEDURE — 99232 SBSQ HOSP IP/OBS MODERATE 35: CPT | Performed by: INTERNAL MEDICINE

## 2024-10-06 PROCEDURE — C1751 CATH, INF, PER/CENT/MIDLINE: HCPCS

## 2024-10-06 PROCEDURE — 1200000000 HC SEMI PRIVATE

## 2024-10-06 PROCEDURE — 6370000000 HC RX 637 (ALT 250 FOR IP): Performed by: FAMILY MEDICINE

## 2024-10-06 PROCEDURE — 80048 BASIC METABOLIC PNL TOTAL CA: CPT

## 2024-10-06 PROCEDURE — 80202 ASSAY OF VANCOMYCIN: CPT

## 2024-10-06 PROCEDURE — 2580000003 HC RX 258: Performed by: INTERNAL MEDICINE

## 2024-10-06 RX ORDER — SODIUM CHLORIDE 9 MG/ML
INJECTION, SOLUTION INTRAVENOUS PRN
Status: DISCONTINUED | OUTPATIENT
Start: 2024-10-06 | End: 2024-10-09 | Stop reason: HOSPADM

## 2024-10-06 RX ORDER — SODIUM CHLORIDE 0.9 % (FLUSH) 0.9 %
5-40 SYRINGE (ML) INJECTION EVERY 12 HOURS SCHEDULED
Status: DISCONTINUED | OUTPATIENT
Start: 2024-10-06 | End: 2024-10-09 | Stop reason: HOSPADM

## 2024-10-06 RX ORDER — LIDOCAINE HYDROCHLORIDE 10 MG/ML
50 INJECTION, SOLUTION EPIDURAL; INFILTRATION; INTRACAUDAL; PERINEURAL ONCE
Status: DISCONTINUED | OUTPATIENT
Start: 2024-10-06 | End: 2024-10-09 | Stop reason: HOSPADM

## 2024-10-06 RX ORDER — SODIUM CHLORIDE 0.9 % (FLUSH) 0.9 %
5-40 SYRINGE (ML) INJECTION PRN
Status: DISCONTINUED | OUTPATIENT
Start: 2024-10-06 | End: 2024-10-09 | Stop reason: HOSPADM

## 2024-10-06 RX ADMIN — GABAPENTIN 400 MG: 400 CAPSULE ORAL at 22:17

## 2024-10-06 RX ADMIN — HYDROCODONE BITARTRATE AND ACETAMINOPHEN 1 TABLET: 10; 325 TABLET ORAL at 22:20

## 2024-10-06 RX ADMIN — CYCLOBENZAPRINE 10 MG: 10 TABLET, FILM COATED ORAL at 04:51

## 2024-10-06 RX ADMIN — GABAPENTIN 400 MG: 400 CAPSULE ORAL at 16:12

## 2024-10-06 RX ADMIN — PRAMIPEXOLE DIHYDROCHLORIDE 0.75 MG: 0.25 TABLET ORAL at 22:17

## 2024-10-06 RX ADMIN — PANTOPRAZOLE SODIUM 20 MG: 20 TABLET, DELAYED RELEASE ORAL at 08:37

## 2024-10-06 RX ADMIN — FERROUS SULFATE TAB 325 MG (65 MG ELEMENTAL FE) 325 MG: 325 (65 FE) TAB at 08:37

## 2024-10-06 RX ADMIN — SUCRALFATE 1 G: 1 TABLET ORAL at 08:37

## 2024-10-06 RX ADMIN — SODIUM CHLORIDE, PRESERVATIVE FREE 10 ML: 5 INJECTION INTRAVENOUS at 08:38

## 2024-10-06 RX ADMIN — MEROPENEM 1000 MG: 1 INJECTION INTRAVENOUS at 04:47

## 2024-10-06 RX ADMIN — CYCLOBENZAPRINE 10 MG: 10 TABLET, FILM COATED ORAL at 22:20

## 2024-10-06 RX ADMIN — BUPROPION HYDROCHLORIDE 100 MG: 100 TABLET, EXTENDED RELEASE ORAL at 08:37

## 2024-10-06 RX ADMIN — ATORVASTATIN CALCIUM 80 MG: 80 TABLET, FILM COATED ORAL at 08:37

## 2024-10-06 RX ADMIN — PRAMIPEXOLE DIHYDROCHLORIDE 0.75 MG: 0.25 TABLET ORAL at 08:37

## 2024-10-06 RX ADMIN — VENLAFAXINE HYDROCHLORIDE 150 MG: 150 CAPSULE, EXTENDED RELEASE ORAL at 08:37

## 2024-10-06 RX ADMIN — LEVOTHYROXINE SODIUM 50 MCG: 0.05 TABLET ORAL at 04:53

## 2024-10-06 RX ADMIN — GABAPENTIN 400 MG: 400 CAPSULE ORAL at 08:37

## 2024-10-06 ASSESSMENT — PAIN DESCRIPTION - DESCRIPTORS
DESCRIPTORS: ACHING;SORE
DESCRIPTORS: THROBBING;TENDER;DISCOMFORT
DESCRIPTORS: ACHING;SORE

## 2024-10-06 ASSESSMENT — PAIN DESCRIPTION - ORIENTATION
ORIENTATION: RIGHT

## 2024-10-06 ASSESSMENT — PAIN DESCRIPTION - FREQUENCY: FREQUENCY: CONTINUOUS

## 2024-10-06 ASSESSMENT — PAIN DESCRIPTION - LOCATION
LOCATION: HIP
LOCATION: HIP;LEG
LOCATION: HIP;LEG

## 2024-10-06 ASSESSMENT — PAIN SCALES - GENERAL
PAINLEVEL_OUTOF10: 5
PAINLEVEL_OUTOF10: 9
PAINLEVEL_OUTOF10: 8

## 2024-10-06 ASSESSMENT — PAIN - FUNCTIONAL ASSESSMENT
PAIN_FUNCTIONAL_ASSESSMENT: ACTIVITIES ARE NOT PREVENTED
PAIN_FUNCTIONAL_ASSESSMENT: PREVENTS OR INTERFERES SOME ACTIVE ACTIVITIES AND ADLS
PAIN_FUNCTIONAL_ASSESSMENT: ACTIVITIES ARE NOT PREVENTED

## 2024-10-06 ASSESSMENT — PAIN DESCRIPTION - ONSET: ONSET: ON-GOING

## 2024-10-06 ASSESSMENT — PAIN DESCRIPTION - PAIN TYPE: TYPE: SURGICAL PAIN

## 2024-10-06 ASSESSMENT — PAIN SCALES - WONG BAKER: WONGBAKER_NUMERICALRESPONSE: HURTS LITTLE MORE

## 2024-10-06 NOTE — PROGRESS NOTES
Pharmacy Consultation Note  (Antibiotic Dosing and Monitoring)    Initial consult date: 10/2/24  Consulting physician/provider: Polly Gilliam MD  Drug: Vancomycin  Indication: Bone and Joint Infection     Age/  Gender Height Weight IBW  Allergy Information   79 y.o./female 165.1 cm (5' 5\") 73.5 kg (162 lb)     Ideal body weight: 57 kg (125 lb 10.6 oz)  Adjusted ideal body weight: 63.6 kg (140 lb 3.2 oz)   Benadryl [diphenhydramine], Other, Sulfa antibiotics, Ciprofloxacin, Oxycodone, and Tape [adhesive tape]      Renal Function:  Recent Labs     10/04/24  0633 10/06/24  0547   BUN 15 12   CREATININE 0.7 0.7       Intake/Output Summary (Last 24 hours) at 10/6/2024 1111  Last data filed at 10/6/2024 0530  Gross per 24 hour   Intake 10 ml   Output 1450 ml   Net -1440 ml       Vancomycin Monitoring:  Trough:  No results for input(s): \"VANCOTROUGH\" in the last 72 hours.  Random:    Recent Labs     10/06/24  0547   VANCORANDOM 21.6       Vancomycin Administration Times:  Recent vancomycin administrations                     vancomycin (VANCOCIN) 1,500 mg in sodium chloride 0.9 % 250 mL IVPB ([id number]) (mg) 1,500 mg New Bag 10/05/24 1543     1,500 mg New Bag 10/04/24 1519     1,500 mg New Bag 10/03/24 1621    vancomycin (VANCOCIN) injection (mg) 1,000 mg Given 10/03/24 1320                    Assessment:  Patient is a 79 y.o. female who has been initiated on vancomycin for postoperative wound infection of right hip.    Estimated Creatinine Clearance: 68 mL/min (based on SCr of 0.7 mg/dL).  To dose vancomycin, pharmacy will be utilizing [employer] calculation software for goal AUC/HERBER 400-600 mg/L-hr (predicted AUC/HERBER = 557, Tr =16.3 mcg/mL)  10/2:  Presenting from nursing home with drainage from right hip surgical wound postoperative.  History of multiple right hip and femur surgeries for periprosthetic fractures and infections.  WBC - 8.6, afebrile, also on meropenem.  Awaiting cultures.  Scr at baseline

## 2024-10-06 NOTE — PROGRESS NOTES
[facility] Hospitalist Progress Note    Admitting Date and Time: 10/1/2024  6:00 PM  Admit Dx: Postoperative wound infection of right hip [T81.49XA]    Synopsis:    79 y.o. female who presented to [facility] with drainage from her right hip surgical wound.  Patient status post multiple right hip and femur surgeries for periprosthetic fractures and infections.  There was concern that surgical wound had been draining for the last 2 days  and she was advised to come to the ER from the nursing home.  Lab work did show white count of 8.6.  Temperature 98.8. She was given vancomycin and cefepime.  Orthopedic was consulted.  X-ray femur showed no acute fracture or dislocation showed stable perioperative changes in the right femur no gas or fluid collection seen in this time.     10/2: CT of right hip showed significant interval enlargement of peripherally enhancing periprosthetic fluid collection and erosion of right greater trochanter, neuro periprosthetic lucency.  Scheduled for OR with Ortho tomorrow.    10/3: S/p arthrotomy right prosthetic hip joint for I&D and synovectomy with insertion of antibiotic delivery device      Subjective:  Patient is being followed for Postoperative wound infection of right hip [T81.49XA]     Patient seen and examined at bedside this morning.  Denies any pain.  Discussed with IV team nurse at bedside, unable to obtain PICC line, peripheral line is also nonfunctional.    ROS: denies fever, chills, cp, sob, n/v, HA unless stated above.      vancomycin  1,500 mg IntraVENous Q24H    [Held by provider] apixaban  5 mg Oral BID    atorvastatin  80 mg Oral Daily    buPROPion  100 mg Oral Daily    ferrous sulfate  325 mg Oral Daily    gabapentin  400 mg Oral TID    levothyroxine  50 mcg Oral Daily    pantoprazole  20 mg Oral Daily    pramipexole  0.75 mg Oral BID    sucralfate  1 g Oral Daily    venlafaxine  150 mg Oral Daily    furosemide  20 mg Oral Once per day on Monday

## 2024-10-06 NOTE — PROGRESS NOTES
Attempted a picc line in right arm unable to access a vein, and has a left arm restriction . Recommend IR for picc placement.

## 2024-10-06 NOTE — PROGRESS NOTES
[facility]  [facility]  Progress Note    SUBJECTIVE:  Chief Complaint   Patient presents with    Wound Check     Pt sent in from NH due to right hip surgery wound draining.      Patient resting in bed.  No complaints.  Patient is tolerating medications. No reported adverse drug reactions.  No nausea, vomiting, diarrhea.    Review of systems:  As stated above in the chief complaint, otherwise negative.    Medications:  Scheduled Meds:   sodium chloride flush  5-40 mL IntraVENous 2 times per day    lidocaine 1 % injection  50 mg IntraDERmal Once    vancomycin  1,500 mg IntraVENous Q24H    [Held by provider] apixaban  5 mg Oral BID    atorvastatin  80 mg Oral Daily    buPROPion  100 mg Oral Daily    ferrous sulfate  325 mg Oral Daily    gabapentin  400 mg Oral TID    levothyroxine  50 mcg Oral Daily    pantoprazole  20 mg Oral Daily    pramipexole  0.75 mg Oral BID    sucralfate  1 g Oral Daily    venlafaxine  150 mg Oral Daily    furosemide  20 mg Oral Once per day on     meropenem  1,000 mg IntraVENous Q8H     Continuous Infusions:   sodium chloride      sodium chloride       PRN Meds:sodium chloride flush, sodium chloride, albuterol, cyclobenzaprine, magnesium hydroxide, sodium chloride flush, sodium chloride, potassium chloride **OR** potassium alternative oral replacement **OR** potassium chloride, magnesium sulfate, ondansetron **OR** ondansetron, polyethylene glycol, acetaminophen **OR** acetaminophen, HYDROcodone 5 mg - acetaminophen, HYDROcodone-acetaminophen    OBJECTIVE:  BP (!) 148/58   Pulse (!) 103   Temp 97.3 °F (36.3 °C) (Temporal)   Resp 18   Ht 1.651 m (5' 5\")   Wt 73.5 kg (162 lb)   SpO2 99%   BMI 26.96 kg/m²   Temp  Av.7 °F (36.5 °C)  Min: 97.3 °F (36.3 °C)  Max: 98.4 °F (36.9 °C)  Constitutional: NAD  Skin: Warm and dry. No rashes were noted.   Neuro: Alert and oriented  HEENT: Round and reactive pupils.  Moist mucous membranes.  No

## 2024-10-06 NOTE — FLOWSHEET NOTE
Dr. Tolbert notified that IV team can not PICC or any IV.  Needs IR to place PICC and also that she is going to surgery tomorrow.  Naila from IV team recommended triple lumen in the meantime to get medications.

## 2024-10-07 ENCOUNTER — APPOINTMENT (OUTPATIENT)
Dept: GENERAL RADIOLOGY | Age: 79
DRG: 467 | End: 2024-10-07
Payer: COMMERCIAL

## 2024-10-07 ENCOUNTER — ANESTHESIA (OUTPATIENT)
Dept: OPERATING ROOM | Age: 79
DRG: 467 | End: 2024-10-07
Payer: COMMERCIAL

## 2024-10-07 ENCOUNTER — ANESTHESIA EVENT (OUTPATIENT)
Dept: OPERATING ROOM | Age: 79
DRG: 467 | End: 2024-10-07
Payer: COMMERCIAL

## 2024-10-07 LAB
BUN BLD-MCNC: 11 MG/DL (ref 6–23)
CA-I BLD-SCNC: 1.34 MMOL/L (ref 1.15–1.33)
CHLORIDE BLD-SCNC: 96 MMOL/L (ref 100–108)
CO2 BLD CALC-SCNC: 38 MMOL/L (ref 22–29)
CREAT BLD-MCNC: 0.7 MG/DL (ref 0.5–1)
EGFR, POC: 89 ML/MIN/1.73M2
GLUCOSE BLD-MCNC: 68 MG/DL (ref 74–99)
HCO3 VENOUS: 36.6 MMOL/L (ref 22–26)
HCT VFR BLD AUTO: 28 % (ref 37–54)
INR PPP: 1.3
MICROORGANISM SPEC CULT: ABNORMAL
MICROORGANISM SPEC CULT: ABNORMAL
MICROORGANISM SPEC CULT: NORMAL
MICROORGANISM/AGENT SPEC: NORMAL
O2 SAT, VEN: 17.8 %
PCO2 VENOUS: 73.8 MM HG
PH VENOUS: 7.3 (ref 7.35–7.45)
PO2 VENOUS: 16.5 MM HG
POC ANION GAP: 7 MMOL/L (ref 7–16)
POC HEMOGLOBIN (CALC): 9.4 G/DL (ref 12.5–15.5)
POC LACTIC ACID: 1.2 MMOL/L (ref 0.5–2.2)
POSITIVE BASE EXCESS, VEN: 8.5 MMOL/L
POTASSIUM BLD-SCNC: 3.4 MMOL/L (ref 3.5–5)
PROTHROMBIN TIME: 14.1 SEC (ref 9.3–12.4)
SERVICE CMNT-IMP: ABNORMAL
SERVICE CMNT-IMP: NORMAL
SODIUM BLD-SCNC: 141 MMOL/L (ref 132–146)
SPECIMEN DESCRIPTION: ABNORMAL
SPECIMEN DESCRIPTION: NORMAL

## 2024-10-07 PROCEDURE — 3700000001 HC ADD 15 MINUTES (ANESTHESIA): Performed by: ORTHOPAEDIC SURGERY

## 2024-10-07 PROCEDURE — 7100000000 HC PACU RECOVERY - FIRST 15 MIN: Performed by: ORTHOPAEDIC SURGERY

## 2024-10-07 PROCEDURE — 2580000003 HC RX 258

## 2024-10-07 PROCEDURE — C1776 JOINT DEVICE (IMPLANTABLE): HCPCS | Performed by: ORTHOPAEDIC SURGERY

## 2024-10-07 PROCEDURE — 3700000000 HC ANESTHESIA ATTENDED CARE: Performed by: ORTHOPAEDIC SURGERY

## 2024-10-07 PROCEDURE — 0SRR0JA REPLACEMENT OF RIGHT HIP JOINT, FEMORAL SURFACE WITH SYNTHETIC SUBSTITUTE, UNCEMENTED, OPEN APPROACH: ICD-10-PCS | Performed by: ORTHOPAEDIC SURGERY

## 2024-10-07 PROCEDURE — 97530 THERAPEUTIC ACTIVITIES: CPT

## 2024-10-07 PROCEDURE — 85610 PROTHROMBIN TIME: CPT

## 2024-10-07 PROCEDURE — 2500000003 HC RX 250 WO HCPCS: Performed by: STUDENT IN AN ORGANIZED HEALTH CARE EDUCATION/TRAINING PROGRAM

## 2024-10-07 PROCEDURE — 6360000002 HC RX W HCPCS

## 2024-10-07 PROCEDURE — 0SP909Z REMOVAL OF LINER FROM RIGHT HIP JOINT, OPEN APPROACH: ICD-10-PCS | Performed by: ORTHOPAEDIC SURGERY

## 2024-10-07 PROCEDURE — 0SUA09Z SUPPLEMENT RIGHT HIP JOINT, ACETABULAR SURFACE WITH LINER, OPEN APPROACH: ICD-10-PCS | Performed by: ORTHOPAEDIC SURGERY

## 2024-10-07 PROCEDURE — 36415 COLL VENOUS BLD VENIPUNCTURE: CPT

## 2024-10-07 PROCEDURE — 73502 X-RAY EXAM HIP UNI 2-3 VIEWS: CPT

## 2024-10-07 PROCEDURE — 6370000000 HC RX 637 (ALT 250 FOR IP)

## 2024-10-07 PROCEDURE — 6370000000 HC RX 637 (ALT 250 FOR IP): Performed by: FAMILY MEDICINE

## 2024-10-07 PROCEDURE — 3600000016 HC SURGERY LEVEL 6 ADDTL 15MIN: Performed by: ORTHOPAEDIC SURGERY

## 2024-10-07 PROCEDURE — 99233 SBSQ HOSP IP/OBS HIGH 50: CPT | Performed by: INTERNAL MEDICINE

## 2024-10-07 PROCEDURE — 82803 BLOOD GASES ANY COMBINATION: CPT

## 2024-10-07 PROCEDURE — 3600000006 HC SURGERY LEVEL 6 BASE: Performed by: ORTHOPAEDIC SURGERY

## 2024-10-07 PROCEDURE — 2700000000 HC OXYGEN THERAPY PER DAY

## 2024-10-07 PROCEDURE — 85014 HEMATOCRIT: CPT

## 2024-10-07 PROCEDURE — 2709999900 HC NON-CHARGEABLE SUPPLY: Performed by: ORTHOPAEDIC SURGERY

## 2024-10-07 PROCEDURE — 7100000001 HC PACU RECOVERY - ADDTL 15 MIN: Performed by: ORTHOPAEDIC SURGERY

## 2024-10-07 PROCEDURE — 0SPR0JZ REMOVAL OF SYNTHETIC SUBSTITUTE FROM RIGHT HIP JOINT, FEMORAL SURFACE, OPEN APPROACH: ICD-10-PCS | Performed by: ORTHOPAEDIC SURGERY

## 2024-10-07 PROCEDURE — 83605 ASSAY OF LACTIC ACID: CPT

## 2024-10-07 PROCEDURE — 80047 BASIC METABLC PNL IONIZED CA: CPT

## 2024-10-07 PROCEDURE — 2500000003 HC RX 250 WO HCPCS

## 2024-10-07 PROCEDURE — 1200000000 HC SEMI PRIVATE

## 2024-10-07 PROCEDURE — 2720000010 HC SURG SUPPLY STERILE: Performed by: ORTHOPAEDIC SURGERY

## 2024-10-07 PROCEDURE — 6360000002 HC RX W HCPCS: Performed by: ORTHOPAEDIC SURGERY

## 2024-10-07 DEVICE — IMPLANTABLE DEVICE: Type: IMPLANTABLE DEVICE | Site: HIP | Status: FUNCTIONAL

## 2024-10-07 RX ORDER — HYDROMORPHONE HYDROCHLORIDE 1 MG/ML
0.5 INJECTION, SOLUTION INTRAMUSCULAR; INTRAVENOUS; SUBCUTANEOUS EVERY 5 MIN PRN
Status: COMPLETED | OUTPATIENT
Start: 2024-10-07 | End: 2024-10-07

## 2024-10-07 RX ORDER — LIDOCAINE HYDROCHLORIDE 20 MG/ML
INJECTION, SOLUTION INTRAVENOUS
Status: DISCONTINUED | OUTPATIENT
Start: 2024-10-07 | End: 2024-10-07 | Stop reason: SDUPTHER

## 2024-10-07 RX ORDER — HYDROMORPHONE HYDROCHLORIDE 1 MG/ML
0.25 INJECTION, SOLUTION INTRAMUSCULAR; INTRAVENOUS; SUBCUTANEOUS EVERY 5 MIN PRN
Status: DISCONTINUED | OUTPATIENT
Start: 2024-10-07 | End: 2024-10-07 | Stop reason: HOSPADM

## 2024-10-07 RX ORDER — VANCOMYCIN HYDROCHLORIDE 1 G/20ML
INJECTION, POWDER, LYOPHILIZED, FOR SOLUTION INTRAVENOUS PRN
Status: DISCONTINUED | OUTPATIENT
Start: 2024-10-07 | End: 2024-10-07 | Stop reason: ALTCHOICE

## 2024-10-07 RX ORDER — NALOXONE HYDROCHLORIDE 0.4 MG/ML
INJECTION, SOLUTION INTRAMUSCULAR; INTRAVENOUS; SUBCUTANEOUS PRN
Status: DISCONTINUED | OUTPATIENT
Start: 2024-10-07 | End: 2024-10-07 | Stop reason: HOSPADM

## 2024-10-07 RX ORDER — PROPOFOL 10 MG/ML
INJECTION, EMULSION INTRAVENOUS
Status: DISCONTINUED | OUTPATIENT
Start: 2024-10-07 | End: 2024-10-07 | Stop reason: SDUPTHER

## 2024-10-07 RX ORDER — SODIUM CHLORIDE 9 MG/ML
INJECTION, SOLUTION INTRAVENOUS
Status: DISCONTINUED | OUTPATIENT
Start: 2024-10-07 | End: 2024-10-07 | Stop reason: SDUPTHER

## 2024-10-07 RX ORDER — SODIUM CHLORIDE 0.9 % (FLUSH) 0.9 %
5-40 SYRINGE (ML) INJECTION PRN
Status: DISCONTINUED | OUTPATIENT
Start: 2024-10-07 | End: 2024-10-07 | Stop reason: HOSPADM

## 2024-10-07 RX ORDER — PROCHLORPERAZINE EDISYLATE 5 MG/ML
5 INJECTION INTRAMUSCULAR; INTRAVENOUS
Status: DISCONTINUED | OUTPATIENT
Start: 2024-10-07 | End: 2024-10-07 | Stop reason: HOSPADM

## 2024-10-07 RX ORDER — SODIUM CHLORIDE 0.9 % (FLUSH) 0.9 %
5-40 SYRINGE (ML) INJECTION EVERY 12 HOURS SCHEDULED
Status: DISCONTINUED | OUTPATIENT
Start: 2024-10-07 | End: 2024-10-07 | Stop reason: HOSPADM

## 2024-10-07 RX ORDER — ONDANSETRON 2 MG/ML
INJECTION INTRAMUSCULAR; INTRAVENOUS
Status: DISCONTINUED | OUTPATIENT
Start: 2024-10-07 | End: 2024-10-07 | Stop reason: SDUPTHER

## 2024-10-07 RX ORDER — MEROPENEM 1 G/1
INJECTION, POWDER, FOR SOLUTION INTRAVENOUS
Status: DISCONTINUED | OUTPATIENT
Start: 2024-10-07 | End: 2024-10-07 | Stop reason: SDUPTHER

## 2024-10-07 RX ORDER — TOBRAMYCIN 1.2 G/30ML
INJECTION, POWDER, LYOPHILIZED, FOR SOLUTION INTRAVENOUS PRN
Status: DISCONTINUED | OUTPATIENT
Start: 2024-10-07 | End: 2024-10-07 | Stop reason: ALTCHOICE

## 2024-10-07 RX ORDER — FENTANYL CITRATE 50 UG/ML
INJECTION, SOLUTION INTRAMUSCULAR; INTRAVENOUS
Status: DISCONTINUED | OUTPATIENT
Start: 2024-10-07 | End: 2024-10-07 | Stop reason: SDUPTHER

## 2024-10-07 RX ORDER — PHENYLEPHRINE HCL IN 0.9% NACL 1 MG/10 ML
SYRINGE (ML) INTRAVENOUS
Status: DISCONTINUED | OUTPATIENT
Start: 2024-10-07 | End: 2024-10-07 | Stop reason: SDUPTHER

## 2024-10-07 RX ORDER — ROCURONIUM BROMIDE 10 MG/ML
INJECTION, SOLUTION INTRAVENOUS
Status: DISCONTINUED | OUTPATIENT
Start: 2024-10-07 | End: 2024-10-07 | Stop reason: SDUPTHER

## 2024-10-07 RX ORDER — SODIUM CHLORIDE 9 MG/ML
INJECTION, SOLUTION INTRAVENOUS PRN
Status: DISCONTINUED | OUTPATIENT
Start: 2024-10-07 | End: 2024-10-07 | Stop reason: HOSPADM

## 2024-10-07 RX ORDER — DEXAMETHASONE SODIUM PHOSPHATE 10 MG/ML
INJECTION INTRAMUSCULAR; INTRAVENOUS
Status: DISCONTINUED | OUTPATIENT
Start: 2024-10-07 | End: 2024-10-07 | Stop reason: SDUPTHER

## 2024-10-07 RX ADMIN — HYDROMORPHONE HYDROCHLORIDE 0.5 MG: 1 INJECTION, SOLUTION INTRAMUSCULAR; INTRAVENOUS; SUBCUTANEOUS at 16:37

## 2024-10-07 RX ADMIN — DEXAMETHASONE SODIUM PHOSPHATE 10 MG: 10 INJECTION INTRAMUSCULAR; INTRAVENOUS at 13:50

## 2024-10-07 RX ADMIN — HYDROMORPHONE HYDROCHLORIDE 0.5 MG: 1 INJECTION, SOLUTION INTRAMUSCULAR; INTRAVENOUS; SUBCUTANEOUS at 16:54

## 2024-10-07 RX ADMIN — BUPROPION HYDROCHLORIDE 100 MG: 100 TABLET, EXTENDED RELEASE ORAL at 09:19

## 2024-10-07 RX ADMIN — CYCLOBENZAPRINE 10 MG: 10 TABLET, FILM COATED ORAL at 20:11

## 2024-10-07 RX ADMIN — ATORVASTATIN CALCIUM 80 MG: 80 TABLET, FILM COATED ORAL at 09:15

## 2024-10-07 RX ADMIN — SUGAMMADEX 200 MG: 100 INJECTION, SOLUTION INTRAVENOUS at 14:48

## 2024-10-07 RX ADMIN — MEROPENEM 1000 MG: 1 INJECTION INTRAVENOUS at 20:34

## 2024-10-07 RX ADMIN — ROCURONIUM BROMIDE 50 MG: 10 INJECTION, SOLUTION INTRAVENOUS at 13:50

## 2024-10-07 RX ADMIN — GABAPENTIN 400 MG: 400 CAPSULE ORAL at 09:16

## 2024-10-07 RX ADMIN — Medication 200 MCG: at 14:04

## 2024-10-07 RX ADMIN — Medication 200 MCG: at 14:20

## 2024-10-07 RX ADMIN — Medication 200 MCG: at 14:43

## 2024-10-07 RX ADMIN — HYDROCODONE BITARTRATE AND ACETAMINOPHEN 1 TABLET: 10; 325 TABLET ORAL at 20:13

## 2024-10-07 RX ADMIN — SODIUM CHLORIDE: 9 INJECTION, SOLUTION INTRAVENOUS at 13:40

## 2024-10-07 RX ADMIN — LIDOCAINE HYDROCHLORIDE 80 MG: 20 INJECTION, SOLUTION INTRAVENOUS at 13:50

## 2024-10-07 RX ADMIN — ONDANSETRON 4 MG: 2 INJECTION INTRAMUSCULAR; INTRAVENOUS at 14:48

## 2024-10-07 RX ADMIN — PROPOFOL 150 MG: 10 INJECTION, EMULSION INTRAVENOUS at 13:50

## 2024-10-07 RX ADMIN — PRAMIPEXOLE DIHYDROCHLORIDE 0.75 MG: 0.25 TABLET ORAL at 09:29

## 2024-10-07 RX ADMIN — MEROPENEM 1000 MG: 1 INJECTION, POWDER, FOR SOLUTION INTRAVENOUS at 14:03

## 2024-10-07 RX ADMIN — GABAPENTIN 400 MG: 400 CAPSULE ORAL at 20:12

## 2024-10-07 RX ADMIN — FUROSEMIDE 20 MG: 20 TABLET ORAL at 09:16

## 2024-10-07 RX ADMIN — Medication 200 MCG: at 14:03

## 2024-10-07 RX ADMIN — FENTANYL CITRATE 50 MCG: 50 INJECTION, SOLUTION INTRAMUSCULAR; INTRAVENOUS at 13:49

## 2024-10-07 RX ADMIN — PANTOPRAZOLE SODIUM 20 MG: 20 TABLET, DELAYED RELEASE ORAL at 09:16

## 2024-10-07 RX ADMIN — SODIUM CHLORIDE, PRESERVATIVE FREE 10 ML: 5 INJECTION INTRAVENOUS at 21:00

## 2024-10-07 RX ADMIN — SUCRALFATE 1 G: 1 TABLET ORAL at 09:15

## 2024-10-07 RX ADMIN — SODIUM CHLORIDE 1500 MG: 9 INJECTION, SOLUTION INTRAVENOUS at 14:10

## 2024-10-07 RX ADMIN — LEVOTHYROXINE SODIUM 50 MCG: 0.05 TABLET ORAL at 09:27

## 2024-10-07 RX ADMIN — HYDROCODONE BITARTRATE AND ACETAMINOPHEN 1 TABLET: 10; 325 TABLET ORAL at 09:20

## 2024-10-07 RX ADMIN — Medication 100 MCG: at 14:36

## 2024-10-07 RX ADMIN — Medication 100 MCG: at 14:11

## 2024-10-07 RX ADMIN — PRAMIPEXOLE DIHYDROCHLORIDE 0.75 MG: 0.25 TABLET ORAL at 20:13

## 2024-10-07 RX ADMIN — CYCLOBENZAPRINE 10 MG: 10 TABLET, FILM COATED ORAL at 09:16

## 2024-10-07 RX ADMIN — VENLAFAXINE HYDROCHLORIDE 150 MG: 150 CAPSULE, EXTENDED RELEASE ORAL at 09:19

## 2024-10-07 RX ADMIN — FERROUS SULFATE TAB 325 MG (65 MG ELEMENTAL FE) 325 MG: 325 (65 FE) TAB at 09:15

## 2024-10-07 ASSESSMENT — PAIN DESCRIPTION - LOCATION
LOCATION: LEG
LOCATION: HIP

## 2024-10-07 ASSESSMENT — PAIN DESCRIPTION - DESCRIPTORS
DESCRIPTORS: ACHING;SORE;TENDER
DESCRIPTORS: ACHING;DISCOMFORT;SORE
DESCRIPTORS: ACHING;DISCOMFORT;SORE
DESCRIPTORS: SHOOTING;ACHING;SORE

## 2024-10-07 ASSESSMENT — PAIN DESCRIPTION - PAIN TYPE
TYPE: SURGICAL PAIN
TYPE: SURGICAL PAIN

## 2024-10-07 ASSESSMENT — ENCOUNTER SYMPTOMS: SHORTNESS OF BREATH: 1

## 2024-10-07 ASSESSMENT — PAIN - FUNCTIONAL ASSESSMENT
PAIN_FUNCTIONAL_ASSESSMENT: NONE - DENIES PAIN
PAIN_FUNCTIONAL_ASSESSMENT: PREVENTS OR INTERFERES SOME ACTIVE ACTIVITIES AND ADLS
PAIN_FUNCTIONAL_ASSESSMENT: PREVENTS OR INTERFERES SOME ACTIVE ACTIVITIES AND ADLS

## 2024-10-07 ASSESSMENT — COPD QUESTIONNAIRES: CAT_SEVERITY: MODERATE

## 2024-10-07 ASSESSMENT — PAIN SCALES - GENERAL
PAINLEVEL_OUTOF10: 8
PAINLEVEL_OUTOF10: 0
PAINLEVEL_OUTOF10: 10
PAINLEVEL_OUTOF10: 6

## 2024-10-07 ASSESSMENT — PAIN DESCRIPTION - ORIENTATION
ORIENTATION: RIGHT

## 2024-10-07 ASSESSMENT — PAIN DESCRIPTION - ONSET
ONSET: ON-GOING
ONSET: ON-GOING

## 2024-10-07 ASSESSMENT — PAIN DESCRIPTION - FREQUENCY
FREQUENCY: INTERMITTENT
FREQUENCY: CONTINUOUS

## 2024-10-07 ASSESSMENT — LIFESTYLE VARIABLES: SMOKING_STATUS: 1

## 2024-10-07 ASSESSMENT — PAIN SCALES - WONG BAKER
WONGBAKER_NUMERICALRESPONSE: HURTS A LITTLE BIT
WONGBAKER_NUMERICALRESPONSE: HURTS A LITTLE BIT

## 2024-10-07 NOTE — PROGRESS NOTES
Pharmacy Consultation Note  (Antibiotic Dosing and Monitoring)    Initial consult date: 10/2/24  Consulting physician/provider: Polly Gilliam MD  Drug: Vancomycin  Indication: Bone and Joint Infection     Age/  Gender Height Weight IBW  Allergy Information   79 y.o./female 165.1 cm (5' 5\") 73.5 kg (162 lb)     Ideal body weight: 57 kg (125 lb 10.6 oz)  Adjusted ideal body weight: 63.6 kg (140 lb 3.2 oz)   Benadryl [diphenhydramine], Other, Sulfa antibiotics, Ciprofloxacin, Oxycodone, and Tape [adhesive tape]      Renal Function:  Recent Labs     10/06/24  0547   BUN 12   CREATININE 0.7       Intake/Output Summary (Last 24 hours) at 10/7/2024 0747  Last data filed at 10/6/2024 2223  Gross per 24 hour   Intake --   Output 400 ml   Net -400 ml       Vancomycin Monitoring:  Trough:  No results for input(s): \"VANCOTROUGH\" in the last 72 hours.  Random:    Recent Labs     10/06/24  0547   VANCORANDOM 21.6       Vancomycin Administration Times:  Recent vancomycin administrations                     vancomycin (VANCOCIN) 1,500 mg in sodium chloride 0.9 % 250 mL IVPB ([id number]) (mg) 1,500 mg New Bag 10/05/24 1543     1,500 mg New Bag 10/04/24 1519                  Assessment:  Patient is a 79 y.o. female who has been initiated on vancomycin for postoperative wound infection of right hip.    Estimated Creatinine Clearance: 68 mL/min (based on SCr of 0.7 mg/dL).  To dose vancomycin, pharmacy will be utilizing [employer] calculation software for goal AUC/HERBER 400-600 mg/L-hr (predicted AUC/HERBER = 557, Tr =16.3 mcg/mL)  10/2:  Presenting from nursing home with drainage from right hip surgical wound postoperative.  History of multiple right hip and femur surgeries for periprosthetic fractures and infections.  WBC - 8.6, afebrile, also on meropenem.  Awaiting cultures.  Scr at baseline (0.7).  Received 1500 mg loading dose (20 mg/kg) in ED yesterday at 21:49.     10/3:  I&D surgery planned for today.  Awaiting surgical

## 2024-10-07 NOTE — OP NOTE
Operative Note      Patient: Rebeka Renee  YOB: 1945  MRN: [id number]    Date of Procedure: 10/3/2024    Pre-Op Diagnosis Codes:      * Infection of prosthetic hip joint, initial encounter ([facility]) [T84.59XA, Z96.649]    Post-Op Diagnosis: Same       Procedure:  1.  Arthrotomy right prosthetic hip joint for irrigation debridement and synovectomy    2.  Insertion of antibiotic delivery device right hip joint and seroma    Surgeon(s):  Nemesio Handy MD    Assistant:   Resident: Laurent Mccarthy DO; Mamadou Liz DO    Anesthesia: General    Estimated Blood Loss (mL): Minimal    Complications: None    Specimens:   ID Type Source Tests Collected by Time Destination   1 : right hip #1 Tissue Tissue CULTURE, ANAEROBIC, CULTURE, FUNGUS, GRAM STAIN, CULTURE, SURGICAL, CULTURE WITH SMEAR, ACID FAST BACILLIUS Nemesio Handy MD 10/3/2024 1314    2 : right hip #2 Tissue Tissue CULTURE, ANAEROBIC, CULTURE, FUNGUS, GRAM STAIN, CULTURE, SURGICAL, CULTURE WITH SMEAR, ACID FAST BACILLIUS Nemesio Handy MD 10/3/2024 1317        Implants:  Implant Name Type Inv. Item Serial No.  Lot No. LRB No. Used Action   GRAFT BNE SUB 10CC BEAD 25CC CA SULPHATE RAP SET W/ INDIV - [id number]  GRAFT BNE SUB 10CC BEAD 25CC CA SULPHATE RAP SET W/ INDIV  [employer]-  Right 1 Implanted         Drains:   External Urinary Catheter (Active)   Site Assessment Clean,dry & intact 10/02/24 0538   Placement Replaced 10/02/24 0538   Catheter Care Catheter/Wick replaced 10/02/24 0538   Suction 40 mmgHg continuous 10/02/24 0538   Urine Color Yellow 10/02/24 0538   Urine Appearance Clear 10/02/24 0538   Output (mL) 325 mL 10/02/24 0538       [REMOVED] Urinary Catheter 09/11/24 (Removed)   Catheter Indications Urinary retention (acute or chronic), continuous bladder irrigation or bladder outlet obstruction 09/13/24 0942   Site Assessment No urethral drainage 09/13/24 0942   Urine Color 
trunnion was reduced onto the femoral head.  It was then impacted.  The hip was deemed stable.  The joint was again irrigated copiously with normal saline via pulsatile lavage.  1 g of tobramycin powder was placed in the wound as well as stimulant beads.  The iliotibial band was closed with #1 PDS and strata fix.  A fat stitch was utilized as well with the strata fix.  Subcutaneous tissues were approximated with 2-0 Monocryl suture.  Skin edges were approximated with retention sutures using #1 PDS and then 2-0 nylon in a simple interrupted fashion.  The distal incision was dressed with an Aquacel dressing.  Proximally a Prevena dressing was placed.  At the end of the Prevena placement, it was deemed that the suction was appropriate and that the dressing was working appropriately.  The patient was transferred back to the hospital bed and was awakened from anesthesia and extubated.  She was taken to PACU in stable condition.  Once awake and alert, patient be transferred back to the floor.    Postoperative course:  1.  Weightbearing as tolerated right lower extremity  2.  Keep dressing clean, dry, intact for 7 days.  Okay to peel off and remove after that.  3.  Begin chemical DVT prophylaxis in the form of Eliquis 5 mg starting postop day 1  4.  Continue antibiotics per infectious disease recommendations.  Surgical cultures from 10/03/2024 currently growing ENTEROCOCCUS FAECALIS.  5.  Follow-up outpatient in 1 week for wound check and further management.    It should be noted that Dr. Handy was present and available for all portions of the case.    Electronically signed by Mamadou Liz DO on 10/7/2024 at 3:27 PM      I was present for the entirety of the procedure.  Right hip was dislocated with difficulty due to the fact he had a constrained liner.  We revised the constrained component in the acetabulum as well as the femoral head.  We performed another meticulous irrigation debridement.  We irrigated with

## 2024-10-07 NOTE — DISCHARGE INSTRUCTIONS
physician within 2 weeks of discharge from the hospital or initiation of IV antibiotic therapy.  6.  Continue IV antibiotic therapy until patient is seen in the office or unless specific stop date is noted on the original order or unless otherwise ordered by physician.  Contact the Infectious Disease physician’s office to ensure stop date is correct before stopping antibiotics.

## 2024-10-07 NOTE — PROGRESS NOTES
Discussed patient and IR procedure with bedside RN, all questions answered. Will call when INR is resulted, WNL and IR is able to send for patient.

## 2024-10-07 NOTE — CARE COORDINATION
Social Work/ Case Management Transition of Care Planning (Karlee Irving, [phone]):     Per report and chart review Pt is on room air. Pt on IV Vanc q24, IV Merrem q8. Pt for PICC placement today. Gen Surgery consult. [facility], requested updated PT/OT for precert, will not need to wait on precert once therapy is in she can be accepted if medically clear. Transport form and envelope in soft chart. SW/CM to follow.   ZA Lyons  10/7/2024

## 2024-10-07 NOTE — ANESTHESIA POSTPROCEDURE EVALUATION
Department of Anesthesiology  Postprocedure Note    Patient: Rebeka Renee  MRN: [id number]  YOB: 1945  Date of evaluation: 10/7/2024    Procedure Summary       Date: 10/07/24 Room / Location: [address]    Anesthesia Start: 1340 Anesthesia Stop: 1534    Procedure: Right Hip Irrigation and Debridement with Polyethylene Liner Exchange and Femoral Head Exchange (Right: Hip) Diagnosis:       Hardware complicating wound infection, initial encounter ([facility])      (Hardware complicating wound infection, initial encounter ([facility]) [T84.7XXA])    Surgeons: Nemesio Handy MD Responsible Provider: Lynette Ramírez MD    Anesthesia Type: general ASA Status: 4            Anesthesia Type: No value filed.    Terrie Phase I: Terrie Score: 8    Terrie Phase II:      Anesthesia Post Evaluation    Patient location during evaluation: PACU  Patient participation: complete - patient participated  Level of consciousness: awake  Airway patency: patent  Nausea & Vomiting: no nausea and no vomiting  Cardiovascular status: hemodynamically stable  Respiratory status: acceptable  Hydration status: euvolemic  Pain management: adequate    No notable events documented.

## 2024-10-07 NOTE — PROGRESS NOTES
[facility] Hospitalist Progress Note    Admitting Date and Time: 10/1/2024  6:00 PM  Admit Dx: Postoperative wound infection of right hip [T81.49XA]    Synopsis:    79 y.o. female who presented to [facility] with drainage from her right hip surgical wound.  Patient status post multiple right hip and femur surgeries for periprosthetic fractures and infections.  There was concern that surgical wound had been draining for the last 2 days  and she was advised to come to the ER from the nursing home.  Lab work did show white count of 8.6.  Temperature 98.8. She was given vancomycin and cefepime.  Orthopedic was consulted.  X-ray femur showed no acute fracture or dislocation showed stable perioperative changes in the right femur no gas or fluid collection seen in this time.     10/2: CT of right hip showed significant interval enlargement of peripherally enhancing periprosthetic fluid collection and erosion of right greater trochanter, neuro periprosthetic lucency.  Scheduled for OR with Ortho tomorrow.    10/3: S/p arthrotomy right prosthetic hip joint for I&D and synovectomy with insertion of antibiotic delivery device      Subjective:  Patient is being followed for Postoperative wound infection of right hip [T81.49XA]     Left arm peripheral line seems to be working well.  Seen in OR, she states she just wants infection clean, otherwise denies any other acute complaints.    ROS: denies fever, chills, cp, sob, n/v, HA unless stated above.      sodium chloride flush  5-40 mL IntraVENous 2 times per day    lidocaine 1 % injection  50 mg IntraDERmal Once    vancomycin  1,500 mg IntraVENous Q24H    [Held by provider] apixaban  5 mg Oral BID    atorvastatin  80 mg Oral Daily    buPROPion  100 mg Oral Daily    ferrous sulfate  325 mg Oral Daily    gabapentin  400 mg Oral TID    levothyroxine  50 mcg Oral Daily    pantoprazole  20 mg Oral Daily    pramipexole  0.75 mg Oral BID    sucralfate  1 g Oral

## 2024-10-07 NOTE — PROGRESS NOTES
Patient arrived to OR suite with pair of eye glasses. Glasses removed and will return with patient to post-op recovery and nurse will be notified at that time.     Electronically signed by Aileen Mccarthy RN on 10/7/24 at 2:07 PM EDT

## 2024-10-07 NOTE — PROGRESS NOTES
Physical Therapy  Physical Therapy Treatment     Name: Rebeka Renee  : 1945  MRN: [id number]      Date of Service: 10/7/2024    Evaluating PT:  Pia Huerta PT, DPT [id number]    Room #:  5413/5413-A  Diagnosis:  Postoperative wound infection of right hip [T81.49XA]  PMHx/PSHx:   has a past medical history of Anemia, Anxiety, Arthritis, Ascending aortic aneurysm (HCC), Asthma, Bipolar 1 disorder (HCC), Cancer (HCC), Cancer of breast, female (HCC), CHF (congestive heart failure) (HCC), Chronic back pain, Depression, Depression with anxiety, Diabetes mellitus (HCC), Dyslipidemia, Fibromyalgia, History of blood transfusion, Hypertension, Hypothyroidism, Neuropathy, Pericardial effusion, Pleural effusion, TIA (transient ischemic attack), Tobacco abuse, and Type II or unspecified type diabetes mellitus without mention of complication, not stated as uncontrolled.  Procedure/Surgery:  Right femur/hip irrigation and debridement (10/3/24)  Precautions:  Fall risk, alarms, WBAT RLE, global hip precautions (2/2 multiple dislocations), R hip wound vac, Purewick, O2 (on 3L/min at baseline), contact isolation (ESBL)  Equipment Needs:  TBD    SUBJECTIVE:    Pt was admitted from [facility] where she was receiving skilled PT services and ambulating with a FWW. She spends the majority of her time in a power WC.    OBJECTIVE:   Initial Evaluation  Date: 10/4/24 Treatment Date:  10/7/24  Short Term/ Long Term   Goals   AM-PAC 6 Clicks 10/24 12/24    Was pt agreeable to Eval/treatment? Yes yes    Does pt have pain? 8/10 R hip pain No c/o pain    Bed Mobility  Rolling: NT  Supine to sit: ModA  Sit to supine: NT  Scooting: ModA Rolling: NT  Supine to sit: SBA  Sit to supine: NT  Scooting: SBA Rolling: Independent  Supine to sit: Independent  Sit to supine: Independent  Scooting: Independent   Transfers Sit to stand: ModA  Stand to sit: ModA  Stand pivot: ModA with FWW Sit to stand: ModA  Stand to sit: ModA  Stand pivot: ModA

## 2024-10-07 NOTE — PROCEDURES
1330 spoke to pts rn regarding ordered PICC placement.  D/t department schedule/STAT prodedure in department, pt will be done tomorrow. Notified to verify with pts ordering physician to keep pt npo after mn, continue to hold all blood thinners

## 2024-10-07 NOTE — ANESTHESIA PRE PROCEDURE
05:47 AM    RDW 17.9 10/06/2024 05:47 AM     10/04/2024 06:33 AM       CMP:   Lab Results   Component Value Date/Time     10/06/2024 05:47 AM    K 4.2 10/06/2024 05:47 AM    K 4.3 2022 12:12 PM     10/06/2024 05:47 AM    CO2 28 10/06/2024 05:47 AM    BUN 12 10/06/2024 05:47 AM    CREATININE 0.7 10/07/2024 01:10 PM    CREATININE 0.7 10/06/2024 05:47 AM    GFRAA >60 2022 12:12 PM    LABGLOM 89 10/06/2024 05:47 AM    LABGLOM 90 2024 05:00 AM    GLUCOSE 67 10/06/2024 05:47 AM    GLUCOSE 134 2010 09:00 AM    CALCIUM 8.9 10/06/2024 05:47 AM    BILITOT 0.3 10/01/2024 07:29 PM    ALKPHOS 151 10/01/2024 07:29 PM    AST 16 10/01/2024 07:29 PM    ALT 10 10/01/2024 07:29 PM       POC Tests:   Recent Labs     10/07/24  1310   POCGLU 68*   POCNA 141   POCK 3.4*   POCCL 96*   POCBUN 11   POCHCT 28*         Coags:   Lab Results   Component Value Date/Time    PROTIME 14.1 10/07/2024 08:22 AM    PROTIME 10.4 2010 09:00 AM    INR 1.3 10/07/2024 08:22 AM    APTT 34.3 2024 08:55 PM    APTT 27.7 2022 10:46 AM       HCG (If Applicable): No results found for: \"PREGTESTUR\", \"PREGSERUM\", \"HCG\", \"HCGQUANT\"     ABGs: No results found for: \"PHART\", \"PO2ART\", \"VGG5WEZ\", \"LIK8AXS\", \"BEART\", \"S0RYYHEJ\"     Type & Screen (If Applicable):  No results found for: \"LABABO\"    Drug/Infectious Status (If Applicable):  No results found for: \"HIV\", \"HEPCAB\"    COVID-19 Screening (If Applicable):   Lab Results   Component Value Date/Time    COVID19 DETECTED 2024 02:13 PM    COVID19 Not Detected 2021 11:51 AM     EK24  Ventricular Rate  BPM 78 P 67 96 89 72 79 95   Atrial Rate  BPM 78 P 67 96 89 72 79 95   P-R Interval  ms 138 P 168 202 140 172 156 154   QRS Duration  ms 98 P 102 102 100 104 106 102   Q-T Interval  ms 370 P 416 322 372 420 346 432   QTc Calculation (Bazett)  ms 421 P 439 406 452 459 396 542   P Axis  degrees 70 P 6 92 53 50 78 59   R Axis  degrees -41 P -32 -41

## 2024-10-08 ENCOUNTER — APPOINTMENT (OUTPATIENT)
Dept: INTERVENTIONAL RADIOLOGY/VASCULAR | Age: 79
DRG: 467 | End: 2024-10-08
Payer: COMMERCIAL

## 2024-10-08 LAB
ABO + RH BLD: NORMAL
ANION GAP SERPL CALCULATED.3IONS-SCNC: 9 MMOL/L (ref 7–16)
ARM BAND NUMBER: NORMAL
BASOPHILS # BLD: 0.02 K/UL (ref 0–0.2)
BASOPHILS NFR BLD: 0 % (ref 0–2)
BLOOD BANK SAMPLE EXPIRATION: NORMAL
BLOOD GROUP ANTIBODIES SERPL: NEGATIVE
BUN SERPL-MCNC: 11 MG/DL (ref 6–23)
CALCIUM SERPL-MCNC: 8.5 MG/DL (ref 8.6–10.2)
CHLORIDE SERPL-SCNC: 105 MMOL/L (ref 98–107)
CO2 SERPL-SCNC: 29 MMOL/L (ref 22–29)
CREAT SERPL-MCNC: 0.7 MG/DL (ref 0.5–1)
DATE LAST DOSE: NORMAL
EOSINOPHIL # BLD: 0.13 K/UL (ref 0.05–0.5)
EOSINOPHILS RELATIVE PERCENT: 1 % (ref 0–6)
ERYTHROCYTE [DISTWIDTH] IN BLOOD BY AUTOMATED COUNT: 17.6 % (ref 11.5–15)
GFR, ESTIMATED: >90 ML/MIN/1.73M2
GLUCOSE BLD-MCNC: 104 MG/DL (ref 74–99)
GLUCOSE BLD-MCNC: 69 MG/DL (ref 74–99)
GLUCOSE BLD-MCNC: 91 MG/DL (ref 74–99)
GLUCOSE SERPL-MCNC: 98 MG/DL (ref 74–99)
HCT VFR BLD AUTO: 27.7 % (ref 34–48)
HGB BLD-MCNC: 8 G/DL (ref 11.5–15.5)
IMM GRANULOCYTES # BLD AUTO: 0.07 K/UL (ref 0–0.58)
IMM GRANULOCYTES NFR BLD: 1 % (ref 0–5)
LYMPHOCYTES NFR BLD: 2.56 K/UL (ref 1.5–4)
LYMPHOCYTES RELATIVE PERCENT: 23 % (ref 20–42)
MCH RBC QN AUTO: 27.6 PG (ref 26–35)
MCHC RBC AUTO-ENTMCNC: 28.9 G/DL (ref 32–34.5)
MCV RBC AUTO: 95.5 FL (ref 80–99.9)
MICROORGANISM SPEC CULT: NO GROWTH
MICROORGANISM SPEC CULT: NORMAL
MICROORGANISM/AGENT SPEC: NORMAL
MONOCYTES NFR BLD: 0.74 K/UL (ref 0.1–0.95)
MONOCYTES NFR BLD: 7 % (ref 2–12)
NEUTROPHILS NFR BLD: 68 % (ref 43–80)
NEUTS SEG NFR BLD: 7.45 K/UL (ref 1.8–7.3)
PLATELET # BLD AUTO: 313 K/UL (ref 130–450)
PMV BLD AUTO: 9.5 FL (ref 7–12)
POTASSIUM SERPL-SCNC: 4 MMOL/L (ref 3.5–5)
RBC # BLD AUTO: 2.9 M/UL (ref 3.5–5.5)
RBC # BLD: ABNORMAL 10*6/UL
RBC # BLD: ABNORMAL 10*6/UL
SERVICE CMNT-IMP: NORMAL
SODIUM SERPL-SCNC: 143 MMOL/L (ref 132–146)
SPECIMEN DESCRIPTION: NORMAL
TME LAST DOSE: NORMAL H
VANCOMYCIN DOSE: NORMAL MG
VANCOMYCIN SERPL-MCNC: 22.1 UG/ML (ref 5–40)
WBC OTHER # BLD: 11 K/UL (ref 4.5–11.5)

## 2024-10-08 PROCEDURE — 6370000000 HC RX 637 (ALT 250 FOR IP): Performed by: INTERNAL MEDICINE

## 2024-10-08 PROCEDURE — 2580000003 HC RX 258

## 2024-10-08 PROCEDURE — 86901 BLOOD TYPING SEROLOGIC RH(D): CPT

## 2024-10-08 PROCEDURE — 36600 WITHDRAWAL OF ARTERIAL BLOOD: CPT

## 2024-10-08 PROCEDURE — 2580000003 HC RX 258: Performed by: RADIOLOGY

## 2024-10-08 PROCEDURE — 76937 US GUIDE VASCULAR ACCESS: CPT

## 2024-10-08 PROCEDURE — 97535 SELF CARE MNGMENT TRAINING: CPT

## 2024-10-08 PROCEDURE — 2700000000 HC OXYGEN THERAPY PER DAY

## 2024-10-08 PROCEDURE — 99232 SBSQ HOSP IP/OBS MODERATE 35: CPT | Performed by: INTERNAL MEDICINE

## 2024-10-08 PROCEDURE — 36415 COLL VENOUS BLD VENIPUNCTURE: CPT

## 2024-10-08 PROCEDURE — 97112 NEUROMUSCULAR REEDUCATION: CPT

## 2024-10-08 PROCEDURE — 0JH63XZ INSERTION OF TUNNELED VASCULAR ACCESS DEVICE INTO CHEST SUBCUTANEOUS TISSUE AND FASCIA, PERCUTANEOUS APPROACH: ICD-10-PCS | Performed by: RADIOLOGY

## 2024-10-08 PROCEDURE — 2500000003 HC RX 250 WO HCPCS: Performed by: RADIOLOGY

## 2024-10-08 PROCEDURE — 36558 INSERT TUNNELED CV CATH: CPT

## 2024-10-08 PROCEDURE — 6360000002 HC RX W HCPCS

## 2024-10-08 PROCEDURE — 85025 COMPLETE CBC W/AUTO DIFF WBC: CPT

## 2024-10-08 PROCEDURE — 80048 BASIC METABOLIC PNL TOTAL CA: CPT

## 2024-10-08 PROCEDURE — 6370000000 HC RX 637 (ALT 250 FOR IP)

## 2024-10-08 PROCEDURE — 02HV33Z INSERTION OF INFUSION DEVICE INTO SUPERIOR VENA CAVA, PERCUTANEOUS APPROACH: ICD-10-PCS | Performed by: RADIOLOGY

## 2024-10-08 PROCEDURE — 77001 FLUOROGUIDE FOR VEIN DEVICE: CPT

## 2024-10-08 PROCEDURE — 86900 BLOOD TYPING SEROLOGIC ABO: CPT

## 2024-10-08 PROCEDURE — 82962 GLUCOSE BLOOD TEST: CPT

## 2024-10-08 PROCEDURE — 80202 ASSAY OF VANCOMYCIN: CPT

## 2024-10-08 PROCEDURE — 97530 THERAPEUTIC ACTIVITIES: CPT

## 2024-10-08 PROCEDURE — 86850 RBC ANTIBODY SCREEN: CPT

## 2024-10-08 PROCEDURE — 6360000002 HC RX W HCPCS: Performed by: RADIOLOGY

## 2024-10-08 PROCEDURE — 2709999900 IR TUNNELED CVC PLACE WO SQ PORT/PUMP > 5 YEARS

## 2024-10-08 PROCEDURE — 1200000000 HC SEMI PRIVATE

## 2024-10-08 RX ORDER — FENTANYL CITRATE 50 UG/ML
INJECTION, SOLUTION INTRAMUSCULAR; INTRAVENOUS PRN
Status: COMPLETED | OUTPATIENT
Start: 2024-10-08 | End: 2024-10-08

## 2024-10-08 RX ORDER — MIDAZOLAM HYDROCHLORIDE 2 MG/2ML
INJECTION, SOLUTION INTRAMUSCULAR; INTRAVENOUS PRN
Status: COMPLETED | OUTPATIENT
Start: 2024-10-08 | End: 2024-10-08

## 2024-10-08 RX ORDER — HYDROCODONE BITARTRATE AND ACETAMINOPHEN 5; 325 MG/1; MG/1
1 TABLET ORAL EVERY 8 HOURS PRN
Qty: 9 TABLET | Refills: 0 | Status: SHIPPED | OUTPATIENT
Start: 2024-10-08 | End: 2024-10-11

## 2024-10-08 RX ORDER — DEXTROSE MONOHYDRATE 25 G/50ML
12.5 INJECTION, SOLUTION INTRAVENOUS ONCE
Status: COMPLETED | OUTPATIENT
Start: 2024-10-08 | End: 2024-10-08

## 2024-10-08 RX ADMIN — FENTANYL CITRATE 25 MCG: 50 INJECTION, SOLUTION INTRAMUSCULAR; INTRAVENOUS at 15:16

## 2024-10-08 RX ADMIN — VENLAFAXINE HYDROCHLORIDE 150 MG: 150 CAPSULE, EXTENDED RELEASE ORAL at 09:19

## 2024-10-08 RX ADMIN — BUPROPION HYDROCHLORIDE 100 MG: 100 TABLET, EXTENDED RELEASE ORAL at 09:19

## 2024-10-08 RX ADMIN — MEROPENEM 1000 MG: 1 INJECTION INTRAVENOUS at 12:10

## 2024-10-08 RX ADMIN — LEVOTHYROXINE SODIUM 50 MCG: 0.05 TABLET ORAL at 06:47

## 2024-10-08 RX ADMIN — CYCLOBENZAPRINE 10 MG: 10 TABLET, FILM COATED ORAL at 21:12

## 2024-10-08 RX ADMIN — HYDROCODONE BITARTRATE AND ACETAMINOPHEN 1 TABLET: 10; 325 TABLET ORAL at 06:53

## 2024-10-08 RX ADMIN — PANTOPRAZOLE SODIUM 20 MG: 20 TABLET, DELAYED RELEASE ORAL at 09:19

## 2024-10-08 RX ADMIN — SODIUM CHLORIDE, PRESERVATIVE FREE 10 ML: 5 INJECTION INTRAVENOUS at 21:12

## 2024-10-08 RX ADMIN — APIXABAN 5 MG: 5 TABLET, FILM COATED ORAL at 21:11

## 2024-10-08 RX ADMIN — GABAPENTIN 400 MG: 400 CAPSULE ORAL at 09:18

## 2024-10-08 RX ADMIN — MIDAZOLAM HYDROCHLORIDE 0.5 MG: 1 INJECTION, SOLUTION INTRAMUSCULAR; INTRAVENOUS at 15:16

## 2024-10-08 RX ADMIN — GABAPENTIN 400 MG: 400 CAPSULE ORAL at 21:12

## 2024-10-08 RX ADMIN — FERROUS SULFATE TAB 325 MG (65 MG ELEMENTAL FE) 325 MG: 325 (65 FE) TAB at 09:18

## 2024-10-08 RX ADMIN — SODIUM CHLORIDE 1250 MG: 9 INJECTION, SOLUTION INTRAVENOUS at 16:17

## 2024-10-08 RX ADMIN — MEROPENEM 1000 MG: 1 INJECTION INTRAVENOUS at 21:11

## 2024-10-08 RX ADMIN — HYDROCODONE BITARTRATE AND ACETAMINOPHEN 1 TABLET: 10; 325 TABLET ORAL at 21:11

## 2024-10-08 RX ADMIN — MEROPENEM 1000 MG: 1 INJECTION INTRAVENOUS at 05:02

## 2024-10-08 RX ADMIN — DEXTROSE MONOHYDRATE 12.5 G: 25 INJECTION, SOLUTION INTRAVENOUS at 14:45

## 2024-10-08 RX ADMIN — ATORVASTATIN CALCIUM 80 MG: 80 TABLET, FILM COATED ORAL at 09:18

## 2024-10-08 RX ADMIN — SODIUM CHLORIDE, PRESERVATIVE FREE 10 ML: 5 INJECTION INTRAVENOUS at 09:19

## 2024-10-08 RX ADMIN — PRAMIPEXOLE DIHYDROCHLORIDE 0.75 MG: 0.25 TABLET ORAL at 21:12

## 2024-10-08 RX ADMIN — SUCRALFATE 1 G: 1 TABLET ORAL at 09:18

## 2024-10-08 RX ADMIN — CEFAZOLIN 2000 MG: 1 INJECTION, POWDER, FOR SOLUTION INTRAMUSCULAR; INTRAVENOUS at 15:07

## 2024-10-08 RX ADMIN — PRAMIPEXOLE DIHYDROCHLORIDE 0.75 MG: 0.25 TABLET ORAL at 09:19

## 2024-10-08 ASSESSMENT — PAIN SCALES - GENERAL
PAINLEVEL_OUTOF10: 6
PAINLEVEL_OUTOF10: 10
PAINLEVEL_OUTOF10: 10
PAINLEVEL_OUTOF10: 6
PAINLEVEL_OUTOF10: 3
PAINLEVEL_OUTOF10: 0

## 2024-10-08 ASSESSMENT — PAIN DESCRIPTION - PAIN TYPE
TYPE: SURGICAL PAIN

## 2024-10-08 ASSESSMENT — PAIN DESCRIPTION - ONSET
ONSET: ON-GOING

## 2024-10-08 ASSESSMENT — PAIN DESCRIPTION - LOCATION
LOCATION: HIP

## 2024-10-08 ASSESSMENT — PAIN DESCRIPTION - DESCRIPTORS
DESCRIPTORS: ACHING;STABBING;SORE
DESCRIPTORS: ACHING;DISCOMFORT;SORE
DESCRIPTORS: ACHING;DISCOMFORT;SORE
DESCRIPTORS: ACHING;DISCOMFORT;DULL

## 2024-10-08 ASSESSMENT — PAIN SCALES - WONG BAKER
WONGBAKER_NUMERICALRESPONSE: HURTS A LITTLE BIT
WONGBAKER_NUMERICALRESPONSE: NO HURT

## 2024-10-08 ASSESSMENT — PAIN DESCRIPTION - FREQUENCY: FREQUENCY: INTERMITTENT

## 2024-10-08 ASSESSMENT — PAIN DESCRIPTION - ORIENTATION
ORIENTATION: RIGHT

## 2024-10-08 ASSESSMENT — PAIN - FUNCTIONAL ASSESSMENT
PAIN_FUNCTIONAL_ASSESSMENT: PREVENTS OR INTERFERES SOME ACTIVE ACTIVITIES AND ADLS

## 2024-10-08 NOTE — PROGRESS NOTES
Therapeutic Activities: pt sat EOB for approximately 10 minutes during session with no assist for static and dynamic sitting balance.     PLAN:    Patient is making good progress towards established goals.  Will continue with current POC.      Time in  0936  Time out  1000    Total Treatment Time  24 minutes     CPT codes:  [] Gait training 69376 - minutes  [] Manual therapy 57002 - minutes  [x] Therapeutic activities 60729 24 minutes  [] Therapeutic exercises 87545 - minutes  [] Neuromuscular reeducation 96772 - minutes    Jeanine Crowell PT, DPT  [id number]

## 2024-10-08 NOTE — PROGRESS NOTES
Pharmacy Consultation Note  (Antibiotic Dosing and Monitoring)    Initial consult date: 10/2/24  Consulting physician/provider: Polly Gilliam MD  Drug: Vancomycin  Indication: Bone and Joint Infection     Age/  Gender Height Weight IBW  Allergy Information   79 y.o./female 165.1 cm (5' 5\") 73.5 kg (162 lb)     Ideal body weight: 57 kg (125 lb 10.6 oz)  Adjusted ideal body weight: 63.6 kg (140 lb 3.2 oz)   Benadryl [diphenhydramine], Other, Sulfa antibiotics, Ciprofloxacin, Oxycodone, and Tape [adhesive tape]      Renal Function:  Recent Labs     10/06/24  0547 10/07/24  1310 10/08/24  0429   BUN 12  --  11   CREATININE 0.7 0.7 0.7       Intake/Output Summary (Last 24 hours) at 10/8/2024 0749  Last data filed at 10/8/2024 0613  Gross per 24 hour   Intake 1700 ml   Output 700 ml   Net 1000 ml       Vancomycin Monitoring:  Trough:  No results for input(s): \"VANCOTROUGH\" in the last 72 hours.  Random:    Recent Labs     10/06/24  0547 10/08/24  0429   VANCORANDOM 21.6 22.1       Vancomycin Administration Times:  Recent vancomycin administrations                     vancomycin (VANCOCIN) injection (mg) 1,000 mg Given 10/07/24 1422    vancomycin (VANCOCIN) 1,500 mg in sodium chloride 0.9 % 250 mL IVPB ([id number]) (mg) 1,500 mg Bolus 10/07/24 1410     1,500 mg New Bag 10/05/24 1543                    Assessment:  Patient is a 79 y.o. female who has been initiated on vancomycin for postoperative wound infection of right hip.    Estimated Creatinine Clearance: 68 mL/min (based on SCr of 0.7 mg/dL).  To dose vancomycin, pharmacy will be utilizing [employer] calculation software for goal AUC/HERBER 400-600 mg/L-hr (predicted AUC/HERBER = 557, Tr =16.3 mcg/mL)  10/2:  Presenting from nursing home with drainage from right hip surgical wound postoperative.  History of multiple right hip and femur surgeries for periprosthetic fractures and infections.  WBC - 8.6, afebrile, also on meropenem.  Awaiting cultures.  Scr at

## 2024-10-08 NOTE — PROGRESS NOTES
[facility] Hospitalist Progress Note    Admitting Date and Time: 10/1/2024  6:00 PM  Admit Dx: Postoperative wound infection of right hip [T81.49XA]    Synopsis:    79 y.o. female who presented to [facility] with drainage from her right hip surgical wound.  Patient status post multiple right hip and femur surgeries for periprosthetic fractures and infections.  There was concern that surgical wound had been draining for the last 2 days  and she was advised to come to the ER from the nursing home.  Lab work did show white count of 8.6.  Temperature 98.8. She was given vancomycin and cefepime.  Orthopedic was consulted.  X-ray femur showed no acute fracture or dislocation showed stable perioperative changes in the right femur no gas or fluid collection seen in this time.     10/2: CT of right hip showed significant interval enlargement of peripherally enhancing periprosthetic fluid collection and erosion of right greater trochanter, neuro periprosthetic lucency.  Scheduled for OR with Ortho tomorrow.    10/3: S/p arthrotomy right prosthetic hip joint for I&D and synovectomy with insertion of antibiotic delivery device    10/7-right hip irrigation and debridement with liner exchange and femoral head exchange with orthopedics.    Subjective:  Patient is being followed for Postoperative wound infection of right hip [T81.49XA]     She denies any acute complaints.  Pain is controlled with medication.    ROS: denies fever, chills, cp, sob, n/v, HA unless stated above.      vancomycin  1,250 mg IntraVENous Q24H    sodium chloride flush  5-40 mL IntraVENous 2 times per day    lidocaine 1 % injection  50 mg IntraDERmal Once    [Held by provider] apixaban  5 mg Oral BID    atorvastatin  80 mg Oral Daily    buPROPion  100 mg Oral Daily    ferrous sulfate  325 mg Oral Daily    gabapentin  400 mg Oral TID    levothyroxine  50 mcg Oral Daily    pantoprazole  20 mg Oral Daily    pramipexole  0.75 mg Oral BID

## 2024-10-08 NOTE — PROGRESS NOTES
Occupational Therapy  OT BEDSIDE TREATMENT NOTE   [facility]  [address], [place]       Date:10/8/2024  Patient Name: Rebeka Renee  MRN: [id number]  : 1945  Room: [facility]5419A     Per OT Eval:   Evaluating OT: Sudhir Agarwal OTR/L; [id number]        Referring Provider: Zak Sultana MD     Specific Provider Orders/Date: OT Eval and Treat 10/04/24 1200        Diagnosis: Postoperative wound infection of right hip      Surgery:   10/3/24  1.  Arthrotomy right prosthetic hip joint for irrigation debridement and synovectomy  2.  Insertion of antibiotic delivery device right hip joint and seroma     Pertinent Medical History:  has a past medical history of Anemia, Anxiety, Arthritis, Ascending aortic aneurysm (HCC), Asthma, Bipolar 1 disorder (HCC), Cancer (HCC), Cancer of breast, female (HCC), CHF (congestive heart failure) (HCC), Chronic back pain, Depression, Depression with anxiety, Diabetes mellitus (HCC), Dyslipidemia, Fibromyalgia, History of blood transfusion, Hypertension, Hypothyroidism, Neuropathy, Pericardial effusion, Pleural effusion, TIA (transient ischemic attack), Tobacco abuse, and Type II or unspecified type diabetes mellitus without mention of complication, not stated as uncontrolled.      Recommended Adaptive Equipment: BSC, AE for LE bathing and dressing PRN      Precautions:  Fall Risk, WBAT RLE, RLE wound vac, global hip precautions (h/o multiple dislocations), h/o L knee buckle, +alarms, O2, purewick, contact isolation ESBL      Assessment of current deficits    [x] Functional mobility            [x]ADLs           [x] Strength                  [x]Cognition    [x] Functional transfers          [x] IADLs         [x] Safety Awareness   [x]Endurance    [] Fine Coordination                         [x] Balance      [] Vision/perception   []Sensation      []Gross Motor Coordination             [] ROM           []

## 2024-10-08 NOTE — PROGRESS NOTES
Current BMI (kg/m2): 27  Usual Body Weight: 83.4 kg (183 lb 13.8 oz) (2/19-BS)  % Weight Change (Calculated): -11.9  Weight Adjustment For: No Adjustment                 BMI Categories: Overweight (BMI 25.0-29.9)    Estimated Daily Nutrient Needs:  Energy Requirements Based On: Formula  Weight Used for Energy Requirements: Current  Energy (kcal/day): 1162-3750  Weight Used for Protein Requirements: Ideal  Protein (g/day): 1.8-2.0g/eitVPO=189-776y  Method Used for Fluid Requirements: 1 ml/kcal  Fluid (ml/day): 1582-8045    Nutrition Diagnosis:   Increased nutrient needs related to increase demand for energy/nutrients as evidenced by wounds    Nutrition Interventions:   Food and/or Nutrient Delivery: Continue NPO (will provide nutrition recs as appropriate w/ diet advancement.)  Nutrition Education/Counseling: Education not indicated  Coordination of Nutrition Care: Continue to monitor while inpatient       Goals:     Goals: Initiate PO diet, within 2 days       Nutrition Monitoring and Evaluation:   Behavioral-Environmental Outcomes: None Identified  Food/Nutrient Intake Outcomes: Diet Advancement/Tolerance  Physical Signs/Symptoms Outcomes: Biochemical Data, Nutrition Focused Physical Findings, Skin, Chewing or Swallowing, Weight, GI Status, Fluid Status or Edema    Discharge Planning:    Too soon to determine     Chantale Bello RD, LD  Contact: [phone]

## 2024-10-08 NOTE — PROGRESS NOTES
Department of Orthopedic Surgery  Resident Progress Note    Patient seen and examined. Pain appropriately controlled.  No new complaints.  Denies chest pain, shortness of breath, dizziness/lightheadedness. Denies fever or chills.  All of her questions were answered.    VITALS:  /64   Pulse 95   Temp 97 °F (36.1 °C) (Temporal)   Resp 16   Ht 1.651 m (5' 5\")   Wt 73.5 kg (162 lb)   SpO2 97%   BMI 26.96 kg/m²     General: Resting bed comfortably upon arrival.  Upon awakening, alert and oriented to person, place and time, well-developed and well-nourished, in no acute distress    MUSCULOSKELETAL:   right lower extremity:  Prevena in place, functioning properly  Compartments soft and compressible  +PF/DF/EHL  +2/4 DP & PT pulses, Brisk Cap refill, Toes warm and perfused  Does have paresthesias in right foot, consistent with her baseline.  Distal sensation otherwise grossly intact to Peroneals, Sural, Saphenous, and tibial nrs    CBC:   Lab Results   Component Value Date/Time    WBC 7.5 10/06/2024 05:47 AM    WBC 6,207 08/02/2024 12:00 PM    HGB 7.8 10/06/2024 05:47 AM    HCT 26.9 10/06/2024 05:47 AM     10/04/2024 06:33 AM     PT/INR:    Lab Results   Component Value Date/Time    PROTIME 14.1 10/07/2024 08:22 AM    PROTIME 10.4 12/13/2010 09:00 AM    INR 1.3 10/07/2024 08:22 AM       ASSESSMENT  S/P arthrotomy of right prosthetic hip joint for I&D with polyethylene liner exchange and femoral head exchange -10/7    PLAN      Continue physical therapy and protocol: WBAT -right LE  IV antibiotic management per infectious disease, previous cultures growing Enterococcus Faecalis  Deep venous thrombosis prophylaxis -okay to resume home Eliquis, early mobilization  PT/OT  Pain Control: IV and PO  Monitor H&H   D/C Plan:  Skilled Nursing Facility, okay to discharge from orthopedic standpoint pending recommendations from SWS/CM/therapy and placement of PICC line      Electronically signed by Dominik Jesus

## 2024-10-08 NOTE — CARE COORDINATION
10/8/2024Social work transition of care planning  Pt plan is to return to [facility],once medically stable. Awaiting ID final poc.  Electronically signed by ZA Gonzáles on 10/8/2024 at 7:56 AM

## 2024-10-08 NOTE — PLAN OF CARE
Problem: Discharge Planning  Goal: Discharge to home or other facility with appropriate resources  Outcome: Progressing  Flowsheets (Taken 10/7/2024 1815 by Ayde Sam, RN)  Discharge to home or other facility with appropriate resources: Identify barriers to discharge with patient and caregiver     Problem: ABCDS Injury Assessment  Goal: Absence of physical injury  Outcome: Progressing     Problem: Pain  Goal: Verbalizes/displays adequate comfort level or baseline comfort level  Outcome: Progressing     Problem: Chronic Conditions and Co-morbidities  Goal: Patient's chronic conditions and co-morbidity symptoms are monitored and maintained or improved  Outcome: Progressing  Flowsheets (Taken 10/7/2024 1815 by Ayde Sam, RN)  Care Plan - Patient's Chronic Conditions and Co-Morbidity Symptoms are Monitored and Maintained or Improved: Monitor and assess patient's chronic conditions and comorbid symptoms for stability, deterioration, or improvement     Problem: Skin/Tissue Integrity  Goal: Absence of new skin breakdown  Description: 1.  Monitor for areas of redness and/or skin breakdown  2.  Assess vascular access sites hourly  3.  Every 4-6 hours minimum:  Change oxygen saturation probe site  4.  Every 4-6 hours:  If on nasal continuous positive airway pressure, respiratory therapy assess nares and determine need for appliance change or resting period.  Outcome: Progressing

## 2024-10-08 NOTE — PRE SEDATION
Sedation Pre-Procedure Note    Patient Name: Rebeka Renee   YOB: 1945  Room/Bed: 5419/5419-A  Medical Record Number: [id number]  Date: 10/8/2024   Time: 3:39 PM       Indication:  poor IV access    Consent: I have discussed with the patient and/or the patient representative the indication, alternatives, and the possible risks and/or complications of the planned procedure and the anesthesia methods. The patient and/or patient representative appear to understand and agree to proceed.    Vital Signs:   Vitals:    10/08/24 1535   BP: 125/83   Pulse: 83   Resp: 25   Temp:    SpO2: 100%       Past Medical History:   has a past medical history of Anemia, Anxiety, Arthritis, Ascending aortic aneurysm (HCC), Asthma, Bipolar 1 disorder (HCC), Cancer (HCC), Cancer of breast, female (HCC), CHF (congestive heart failure) (HCC), Chronic back pain, Depression, Depression with anxiety, Diabetes mellitus (HCC), Dyslipidemia, Fibromyalgia, History of blood transfusion, Hypertension, Hypothyroidism, Neuropathy, Pericardial effusion, Pleural effusion, TIA (transient ischemic attack), Tobacco abuse, and Type II or unspecified type diabetes mellitus without mention of complication, not stated as uncontrolled.    Past Surgical History:   has a past surgical history that includes Lung removal, partial; Mastectomy (Bilateral); Kidney removal (Left, 2/2006); Hysterectomy; Gastric bypass surgery (9/2004); hernia repair; knee surgery (Right); transthoracic echocardiogram (4/1/2010); Cataract removal with implant (Bilateral); liver biopsy (2006); thoracentesis (Left, 1/2010 and 3/2010. ); Cholecystectomy; other surgical history (03/16/15); other surgical history (N/A, 4/15/15); Breast surgery; Nerve Block (Bilateral, 06/06/16); Kidney removal; other surgical history (N/A, 02/24/2021); Pain management procedure (N/A, 2/24/2021); hip surgery (Right, 1/4/2024); Revision total hip arthroplasty (Right, 2/12/2024); Revision total hip

## 2024-10-09 VITALS
BODY MASS INDEX: 26.99 KG/M2 | HEIGHT: 65 IN | HEART RATE: 84 BPM | TEMPERATURE: 97.5 F | RESPIRATION RATE: 18 BRPM | SYSTOLIC BLOOD PRESSURE: 120 MMHG | WEIGHT: 162 LBS | OXYGEN SATURATION: 97 % | DIASTOLIC BLOOD PRESSURE: 55 MMHG

## 2024-10-09 LAB
DATE LAST DOSE: NORMAL
MICROORGANISM SPEC CULT: NORMAL
MICROORGANISM/AGENT SPEC: NORMAL
SERVICE CMNT-IMP: NORMAL
SPECIMEN DESCRIPTION: NORMAL
TME LAST DOSE: NORMAL H
VANCOMYCIN DOSE: NORMAL MG
VANCOMYCIN SERPL-MCNC: 25.4 UG/ML (ref 5–40)

## 2024-10-09 PROCEDURE — 80202 ASSAY OF VANCOMYCIN: CPT

## 2024-10-09 PROCEDURE — 6360000002 HC RX W HCPCS

## 2024-10-09 PROCEDURE — 97112 NEUROMUSCULAR REEDUCATION: CPT

## 2024-10-09 PROCEDURE — 2700000000 HC OXYGEN THERAPY PER DAY

## 2024-10-09 PROCEDURE — 2580000003 HC RX 258

## 2024-10-09 PROCEDURE — 6370000000 HC RX 637 (ALT 250 FOR IP): Performed by: INTERNAL MEDICINE

## 2024-10-09 PROCEDURE — 97535 SELF CARE MNGMENT TRAINING: CPT

## 2024-10-09 PROCEDURE — 6370000000 HC RX 637 (ALT 250 FOR IP)

## 2024-10-09 RX ADMIN — SODIUM CHLORIDE, PRESERVATIVE FREE 10 ML: 5 INJECTION INTRAVENOUS at 08:23

## 2024-10-09 RX ADMIN — VENLAFAXINE HYDROCHLORIDE 150 MG: 150 CAPSULE, EXTENDED RELEASE ORAL at 08:24

## 2024-10-09 RX ADMIN — MEROPENEM 1000 MG: 1 INJECTION INTRAVENOUS at 05:32

## 2024-10-09 RX ADMIN — FERROUS SULFATE TAB 325 MG (65 MG ELEMENTAL FE) 325 MG: 325 (65 FE) TAB at 08:23

## 2024-10-09 RX ADMIN — CYCLOBENZAPRINE 10 MG: 10 TABLET, FILM COATED ORAL at 08:23

## 2024-10-09 RX ADMIN — HYDROCODONE BITARTRATE AND ACETAMINOPHEN 1 TABLET: 10; 325 TABLET ORAL at 06:01

## 2024-10-09 RX ADMIN — ATORVASTATIN CALCIUM 80 MG: 80 TABLET, FILM COATED ORAL at 08:23

## 2024-10-09 RX ADMIN — GABAPENTIN 400 MG: 400 CAPSULE ORAL at 08:23

## 2024-10-09 RX ADMIN — LEVOTHYROXINE SODIUM 50 MCG: 0.05 TABLET ORAL at 05:33

## 2024-10-09 RX ADMIN — APIXABAN 5 MG: 5 TABLET, FILM COATED ORAL at 08:23

## 2024-10-09 RX ADMIN — PANTOPRAZOLE SODIUM 20 MG: 20 TABLET, DELAYED RELEASE ORAL at 08:23

## 2024-10-09 RX ADMIN — BUPROPION HYDROCHLORIDE 100 MG: 100 TABLET, EXTENDED RELEASE ORAL at 08:24

## 2024-10-09 RX ADMIN — SUCRALFATE 1 G: 1 TABLET ORAL at 08:23

## 2024-10-09 RX ADMIN — PRAMIPEXOLE DIHYDROCHLORIDE 0.75 MG: 0.25 TABLET ORAL at 08:24

## 2024-10-09 ASSESSMENT — PAIN SCALES - GENERAL
PAINLEVEL_OUTOF10: 10
PAINLEVEL_OUTOF10: 7
PAINLEVEL_OUTOF10: 6

## 2024-10-09 ASSESSMENT — PAIN DESCRIPTION - ORIENTATION: ORIENTATION: RIGHT

## 2024-10-09 ASSESSMENT — PAIN DESCRIPTION - FREQUENCY: FREQUENCY: INTERMITTENT

## 2024-10-09 ASSESSMENT — PAIN DESCRIPTION - DESCRIPTORS: DESCRIPTORS: ACHING;DISCOMFORT;SORE;PRESSURE

## 2024-10-09 ASSESSMENT — PAIN DESCRIPTION - LOCATION: LOCATION: HIP;INCISION

## 2024-10-09 ASSESSMENT — PAIN DESCRIPTION - ONSET: ONSET: GRADUAL

## 2024-10-09 ASSESSMENT — PAIN DESCRIPTION - PAIN TYPE: TYPE: SURGICAL PAIN

## 2024-10-09 ASSESSMENT — PAIN - FUNCTIONAL ASSESSMENT: PAIN_FUNCTIONAL_ASSESSMENT: PREVENTS OR INTERFERES SOME ACTIVE ACTIVITIES AND ADLS

## 2024-10-09 NOTE — PROGRESS NOTES
Pharmacy Consultation Note  (Antibiotic Dosing and Monitoring)    Initial consult date: 10/2/24  Consulting physician/provider: Polly Gilliam MD  Drug: Vancomycin  Indication: Bone and Joint Infection     Age/  Gender Height Weight IBW  Allergy Information   79 y.o./female 165.1 cm (5' 5\") 73.5 kg (162 lb)     Ideal body weight: 57 kg (125 lb 10.6 oz)  Adjusted ideal body weight: 63.6 kg (140 lb 3.2 oz)   Benadryl [diphenhydramine], Other, Sulfa antibiotics, Ciprofloxacin, Oxycodone, and Tape [adhesive tape]      Renal Function:  Recent Labs     10/07/24  1310 10/08/24  0429   BUN  --  11   CREATININE 0.7 0.7       Intake/Output Summary (Last 24 hours) at 10/9/2024 0741  Last data filed at 10/9/2024 0622  Gross per 24 hour   Intake 1780 ml   Output 250 ml   Net 1530 ml       Vancomycin Monitoring:  Trough:  No results for input(s): \"VANCOTROUGH\" in the last 72 hours.  Random:    Recent Labs     10/08/24  0429 10/09/24  0536   VANCORANDOM 22.1 25.4       Recent vancomycin administrations                     vancomycin (VANCOCIN) 1,250 mg in sodium chloride 0.9 % 250 mL IVPB ([id number]) (mg) 1,250 mg New Bag 10/08/24 1617    vancomycin (VANCOCIN) injection (mg) 1,000 mg Given 10/07/24 1422    vancomycin (VANCOCIN) 1,500 mg in sodium chloride 0.9 % 250 mL IVPB ([id number]) (mg) 1,500 mg Bolus 10/07/24 1410                      Assessment:  Patient is a 79 y.o. female who has been initiated on vancomycin for postoperative wound infection of right hip.    Estimated Creatinine Clearance: 68 mL/min (based on SCr of 0.7 mg/dL).  To dose vancomycin, pharmacy will be utilizing [employer] calculation software for goal AUC/HERBER 400-600 mg/L-hr (predicted AUC/HERBER = 557, Tr =16.3 mcg/mL)  10/2:  Presenting from nursing home with drainage from right hip surgical wound postoperative.  History of multiple right hip and femur surgeries for periprosthetic fractures and infections.  WBC - 8.6, afebrile, also on meropenem.

## 2024-10-09 NOTE — PLAN OF CARE
Problem: Discharge Planning  Goal: Discharge to home or other facility with appropriate resources  10/9/2024 0830 by Hubert Velazquez RN  Outcome: Progressing  10/9/2024 0336 by Patt Lennon RN  Outcome: Progressing     Problem: ABCDS Injury Assessment  Goal: Absence of physical injury  10/9/2024 0830 by Hubert Velazquez RN  Outcome: Progressing  10/9/2024 0336 by Patt Lennon RN  Outcome: Progressing     Problem: Pain  Goal: Verbalizes/displays adequate comfort level or baseline comfort level  10/9/2024 0830 by Hubert Velazquez RN  Outcome: Progressing  10/9/2024 0336 by Patt Lennon RN  Outcome: Progressing  Flowsheets (Taken 10/8/2024 1605 by Ayde Sam, RN)  Verbalizes/displays adequate comfort level or baseline comfort level: Encourage patient to monitor pain and request assistance     Problem: Chronic Conditions and Co-morbidities  Goal: Patient's chronic conditions and co-morbidity symptoms are monitored and maintained or improved  10/9/2024 0830 by Hubert Velazquez RN  Outcome: Progressing  10/9/2024 0336 by Patt Lennon RN  Outcome: Progressing     Problem: Skin/Tissue Integrity  Goal: Absence of new skin breakdown  Description: 1.  Monitor for areas of redness and/or skin breakdown  2.  Assess vascular access sites hourly  3.  Every 4-6 hours minimum:  Change oxygen saturation probe site  4.  Every 4-6 hours:  If on nasal continuous positive airway pressure, respiratory therapy assess nares and determine need for appliance change or resting period.  10/9/2024 0830 by Hubert Velazquez RN  Outcome: Progressing  10/9/2024 0336 by Patt Lennon RN  Outcome: Progressing     Problem: Nutrition Deficit:  Goal: Optimize nutritional status  10/9/2024 0830 by Hubert Velazquez RN  Outcome: Progressing  10/9/2024 0336 by Patt Lennon RN  Outcome: Progressing

## 2024-10-09 NOTE — PROGRESS NOTES
reflection, Affirmed coping skills/support systems, and Facilitated life review and/ or legacy  Family/Friends Interventions include: No family/friends present    Patient Plan of Care: Spiritual Care available upon further referral  Family/Friends Plan of Care: Spiritual Care available upon further referral    Electronically signed by COLE Ponce on 10/9/2024 at 10:15 AM

## 2024-10-09 NOTE — DISCHARGE INSTR - COC
Continuity of Care Form    Patient Name: Rebeka Renee   :  1945  MRN:  [id number]    Admit date:  10/1/2024  Discharge date:  10/9/24    Code Status Order: Full Code   Advance Directives:   Advance Care Flowsheet Documentation             Admitting Physician:  Jeremy Sanchez MD  PCP: Navneet Latif MD    Discharging Nurse: Hubert LUDWIG  Discharging Hospital Unit/Room#: 5419/5419-A  Discharging Unit Phone Number: [phone]    Emergency Contact:   Extended Emergency Contact Information  Primary Emergency Contact: Nate Renee  Address: [address] [place] [address]  Home Phone: [phone]  Mobile Phone: [phone]  Relation: Spouse  Secondary Emergency Contact: Ariel Vyas   [facility]  Home Phone: [phone]  Mobile Phone: [phone]  Relation: Brother/Sister    Past Surgical History:  Past Surgical History:   Procedure Laterality Date    BREAST SURGERY      CATARACT REMOVAL WITH IMPLANT Bilateral     CHOLECYSTECTOMY      FEMUR SURGERY Right 2024    FEMUR OPEN REDUCTION INTERNAL FIXATION RIGHT PERIPROSTHETIC performed by Wood George DO at [facility] [place]    FIBULA FRACTURE SURGERY Right 2024    RIGHT FEMUR OPEN REDUCTION INTERNAL FIXATION, OPEN TREATMENT OF RIGHT HIP DISLOCATION performed by Nemesio Handy MD at [facility] [place]    GASTRIC BYPASS SURGERY  2004    HERNIA REPAIR      HIP SURGERY Right 2024    RIGHT HIP HEMIARTHROPLASTY performed by Nemesio Handy MD at [facility] [place]    HIP SURGERY Right 2024    RIGHT HIP CLOSED REDUCTION INTERNAL FIXATION performed by Matthew Peña DO at [facility] [place]    HIP SURGERY Right 2024    RIGHT HIP OPEN REDUCTION FEMORAL HEAD EXCHANGE WITH IRRIGATION AND DEBRIDEMENT performed by Nemesio Handy MD at [facility] [place]    HIP SURGERY Right 10/3/2024    Right femur/hip irrigation and debridement performed by Nemesio Handy MD at [facility] [place]    HIP SURGERY

## 2024-10-09 NOTE — PLAN OF CARE
Problem: Discharge Planning  Goal: Discharge to home or other facility with appropriate resources  10/9/2024 1007 by Hubert Velazquez RN  Outcome: Completed  10/9/2024 0830 by Hubert Velazquez RN  Outcome: Progressing  10/9/2024 0336 by Patt Lennon RN  Outcome: Progressing     Problem: ABCDS Injury Assessment  Goal: Absence of physical injury  10/9/2024 1007 by Hubert Velazquez RN  Outcome: Completed  10/9/2024 0830 by Hubert Velazquez RN  Outcome: Progressing  10/9/2024 0336 by Patt Lennon RN  Outcome: Progressing     Problem: Pain  Goal: Verbalizes/displays adequate comfort level or baseline comfort level  10/9/2024 1007 by Hubert Velazquez RN  Outcome: Completed  10/9/2024 0830 by Hubert Velazquez RN  Outcome: Progressing  10/9/2024 0336 by Patt Lennon RN  Outcome: Progressing  Flowsheets (Taken 10/8/2024 1605 by Ayde Sam, RN)  Verbalizes/displays adequate comfort level or baseline comfort level: Encourage patient to monitor pain and request assistance     Problem: Chronic Conditions and Co-morbidities  Goal: Patient's chronic conditions and co-morbidity symptoms are monitored and maintained or improved  10/9/2024 1007 by Hubert Velazquez RN  Outcome: Completed  10/9/2024 0830 by Hubert Velazquez RN  Outcome: Progressing  10/9/2024 0336 by Patt Lennon RN  Outcome: Progressing     Problem: Skin/Tissue Integrity  Goal: Absence of new skin breakdown  Description: 1.  Monitor for areas of redness and/or skin breakdown  2.  Assess vascular access sites hourly  3.  Every 4-6 hours minimum:  Change oxygen saturation probe site  4.  Every 4-6 hours:  If on nasal continuous positive airway pressure, respiratory therapy assess nares and determine need for appliance change or resting period.  10/9/2024 1007 by Hubert Velazquez RN  Outcome: Completed  10/9/2024 0830 by Hubert Velazquez RN  Outcome: Progressing  10/9/2024 0336 by Patt Lennon RN  Outcome: Progressing

## 2024-10-09 NOTE — PROGRESS NOTES
Judgement/safety:  Fair                Functional Assessment:  -PAC Daily Activity Raw Score: 14/24    Initial Eval Status  Date: 10/4/24 Treatment Status  Date:10/9/24 STGs = LTGs  Time frame: 10-14 days   Feeding Setup  For tray table seated in bedside chair  Set up/seated  Independent    Grooming Stand by Assist seated SBA  Grooming tasks seated at EOB /cues for task initiation  Modified [place]    UB Dressing Minimal Assist   To tie gown posteriorly seated EOB Min A  Gown management standing  Modified [place]    LB Dressing Dependent  LB dressing task to don socks with total  A/education on use of AE for hip precautions  Stand by Assist    Bathing Moderate Assist  Simulated seated EOB Mod A   simulated  Stand by Assist    Toileting Maximal Assist  Max A   Education on use of BSC   Use of purewick Stand by Assist    Bed Mobility  Log Roll: NT   Supine to sit: Moderate Assist   Sit to supine: NT    Min A  Bed mobility tasks with education on proper tech/body mechanics/^ time/maintained hip precautions.  Supine to sit: Supervision   Sit to supine: Supervision    Functional Transfers Sit to stand:Moderate Assist   Stand to sit:Moderate Assist   Stand pivot: Moderate Assist w/ ww (bed to bedside chair).  Commode: NT  STS from EOB  with mod A +1/transfer training from EOB to chair with min A +1/ education on proper tech and body mechanics/ hip precautions maintained/ use of FWW  Sit to stand: Stand by Assist   Stand to sit: Stand by Assist   Stand pivot:  Stand by Assist   Commode:  Stand by Assist    Functional Mobility NT  Min A  Steps from EOB to chair /use of FWW/ no LOB Stand by Assist w/ use of Appropriate AD   Balance Sitting:     Static - Stand by Assist      Dynamic - Stand by Assist   Standing: Moderate Assist w/ ww Sit EOB  SBA  Standing   Min A with FWW  Sitting:     Static:  Independent      Dynamic:  Independent   Standing:  Stand by Assist    Activity Tolerance Fair tolerance w/ light

## 2024-10-09 NOTE — PLAN OF CARE
Problem: Discharge Planning  Goal: Discharge to home or other facility with appropriate resources  Outcome: Progressing     Problem: ABCDS Injury Assessment  Goal: Absence of physical injury  Outcome: Progressing     Problem: Pain  Goal: Verbalizes/displays adequate comfort level or baseline comfort level  Outcome: Progressing  Flowsheets (Taken 10/8/2024 1605 by Ayde Sam, RN)  Verbalizes/displays adequate comfort level or baseline comfort level: Encourage patient to monitor pain and request assistance     Problem: Chronic Conditions and Co-morbidities  Goal: Patient's chronic conditions and co-morbidity symptoms are monitored and maintained or improved  Outcome: Progressing     Problem: Skin/Tissue Integrity  Goal: Absence of new skin breakdown  Description: 1.  Monitor for areas of redness and/or skin breakdown  2.  Assess vascular access sites hourly  3.  Every 4-6 hours minimum:  Change oxygen saturation probe site  4.  Every 4-6 hours:  If on nasal continuous positive airway pressure, respiratory therapy assess nares and determine need for appliance change or resting period.  Outcome: Progressing     Problem: Nutrition Deficit:  Goal: Optimize nutritional status  Outcome: Progressing

## 2024-10-09 NOTE — DISCHARGE SUMMARY
Hospitalist Discharge Summary    Patient ID: Rebeka Renee   Patient : 1945  Patient's PCP: Navneet Latif MD    Admit Date: 10/1/2024   Admitting Physician: Jeremy Sanchez MD    Discharge Date:  10/9/2024   Discharge Physician: Adelaida Hayes MD   Discharge Condition: Stable  Discharge Disposition: Skilled Facility      Hospital course in brief:  (Please refer to daily progress notes for a comprehensive review of the hospitalization by requesting medical records)    79 y.o. female who presented to [facility] with drainage from her right hip surgical wound.  Patient status post multiple right hip and femur surgeries for periprosthetic fractures and infections.  There was concern that surgical wound had been draining  2 days prior to hospital arrival and she was advised to come to the ER from the nursing home.  Lab work did show white count of 8.6.  Temperature 98.8. She was given vancomycin and cefepime.  Orthopedic was consulted.  X-ray femur showed no acute fracture or dislocation showed stable perioperative changes in the right femur no gas or fluid collection seen in this time.   ID service consulted for antibiotic recommendations     10/2: CT of right hip showed significant interval enlargement of peripherally enhancing periprosthetic fluid collection and erosion of right greater trochanter, neuro periprosthetic lucency.      10/3: S/p arthrotomy right prosthetic hip joint for I&D and synovectomy with insertion of antibiotic delivery device     10/7-right hip irrigation and debridement with liner exchange and femoral head exchange with orthopedics.    10/8: PICC line placement, as per IDContinue meropenem 1 g every 8 hours and vancomycin dosed according to level per pharmacy for 6 weeks from 10/07- with plan to transition to oral antibiotic suppression (doxycycline and penicillin) after completion of IV antibiotic course.   Was recommended to follow-up in ID clinic

## 2024-10-09 NOTE — PROGRESS NOTES
inpatient stay. Please call with questions or concerns.     Electronically signed by Dominik Barnard DO on 10/9/2024 at 5:42 AM  Note: This report was completed using [employer] voiced recognition software.  Every effort has been made to ensure accuracy; however, inadvertent computerized transcription errors may be present.

## 2024-10-09 NOTE — CARE COORDINATION
10/9/2024Social work transition of care planning  Sw set up [employer] (facility to cover) for 11 am back to [facility]. Levon notified RN(rn to notify pt) and facility liaison.  Electronically signed by ZA Gonzáles on 10/9/2024 at 9:35 AM

## 2024-10-10 ENCOUNTER — OUTSIDE SERVICES (OUTPATIENT)
Dept: PRIMARY CARE CLINIC | Age: 79
End: 2024-10-10
Payer: MEDICARE

## 2024-10-10 VITALS
BODY MASS INDEX: 27.12 KG/M2 | SYSTOLIC BLOOD PRESSURE: 130 MMHG | RESPIRATION RATE: 18 BRPM | TEMPERATURE: 98.4 F | DIASTOLIC BLOOD PRESSURE: 79 MMHG | HEART RATE: 68 BPM | OXYGEN SATURATION: 97 % | WEIGHT: 163 LBS

## 2024-10-10 DIAGNOSIS — E78.5 HYPERLIPIDEMIA, UNSPECIFIED HYPERLIPIDEMIA TYPE: ICD-10-CM

## 2024-10-10 DIAGNOSIS — I10 PRIMARY HYPERTENSION: ICD-10-CM

## 2024-10-10 DIAGNOSIS — S72.001A CLOSED DISPLACED FRACTURE OF RIGHT FEMORAL NECK (HCC): ICD-10-CM

## 2024-10-10 DIAGNOSIS — Z98.890 STATUS POST OPEN REDUCTION AND INTERNAL FIXATION (ORIF) OF FRACTURE: ICD-10-CM

## 2024-10-10 DIAGNOSIS — F32.A DEPRESSION, UNSPECIFIED DEPRESSION TYPE: ICD-10-CM

## 2024-10-10 DIAGNOSIS — R53.1 GENERALIZED WEAKNESS: ICD-10-CM

## 2024-10-10 DIAGNOSIS — R26.2 AMBULATORY DYSFUNCTION: ICD-10-CM

## 2024-10-10 DIAGNOSIS — J44.9 CHRONIC OBSTRUCTIVE PULMONARY DISEASE, UNSPECIFIED COPD TYPE (HCC): ICD-10-CM

## 2024-10-10 DIAGNOSIS — E03.9 HYPOTHYROIDISM, UNSPECIFIED TYPE: ICD-10-CM

## 2024-10-10 DIAGNOSIS — E11.40 CONTROLLED TYPE 2 DIABETES MELLITUS WITH DIABETIC NEUROPATHY, WITH LONG-TERM CURRENT USE OF INSULIN (HCC): ICD-10-CM

## 2024-10-10 DIAGNOSIS — T81.49XA POSTOPERATIVE WOUND INFECTION OF LEFT HIP: Primary | ICD-10-CM

## 2024-10-10 DIAGNOSIS — D64.9 ANEMIA, UNSPECIFIED TYPE: ICD-10-CM

## 2024-10-10 DIAGNOSIS — Z96.649 HISTORY OF REVISION OF TOTAL HIP ARTHROPLASTY: ICD-10-CM

## 2024-10-10 DIAGNOSIS — Z79.4 CONTROLLED TYPE 2 DIABETES MELLITUS WITH DIABETIC NEUROPATHY, WITH LONG-TERM CURRENT USE OF INSULIN (HCC): ICD-10-CM

## 2024-10-10 DIAGNOSIS — Z86.718 HISTORY OF DVT IN ADULTHOOD: ICD-10-CM

## 2024-10-10 DIAGNOSIS — Z87.81 STATUS POST OPEN REDUCTION AND INTERNAL FIXATION (ORIF) OF FRACTURE: ICD-10-CM

## 2024-10-10 DIAGNOSIS — Z86.73 HISTORY OF CEREBRAL INFARCTION: ICD-10-CM

## 2024-10-10 PROCEDURE — 99305 1ST NF CARE MODERATE MDM 35: CPT | Performed by: INTERNAL MEDICINE

## 2024-10-10 NOTE — PROGRESS NOTES
Visit Date: 10/11/2024  Rebeka Renee (:  1945) is a 79 y.o. female.    History & Physical    Subjective   SUBJECTIVE/OBJECTIVE:  Past Medical History:   Diagnosis Date    Anemia     S/P chemotherapy.    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    Ascending aortic aneurysm (HCC)     seen on CTA chest w/contrast on     Asthma     Bipolar 1 disorder (HCC)     Cancer (HCC)     lung/ kidney    Cancer of breast, female (HCC) 2006    S/P bilaeral mastectomy with left breast lymph node resection and chemotherapy.    CHF (congestive heart failure) (HCC)     Chronic back pain     Depression     Depression with anxiety     Diabetes mellitus (HCC)     Resolved after losing 130 lbs. after gastric bypass surgery.    Dyslipidemia     Fibromyalgia     History of blood transfusion     anemia    Hypertension     Hypothyroidism     Neuropathy     Pericardial effusion 2010    Subxiphoid pericardial window with drainage of pericardial effusion and pericardial biopsy:  Fluid and biopsy negative for metastases.     Pleural effusion 2010    Noted on chest x-ray.    TIA (transient ischemic attack)     Tobacco abuse     Patient smoked 3 ppd x 26 years.  Quit in .    Type II or unspecified type diabetes mellitus without mention of complication, not stated as uncontrolled       Past Surgical History:   Procedure Laterality Date    BREAST SURGERY      CATARACT REMOVAL WITH IMPLANT Bilateral     CHOLECYSTECTOMY      FEMUR SURGERY Right 2024    FEMUR OPEN REDUCTION INTERNAL FIXATION RIGHT PERIPROSTHETIC performed by Wood George DO at [facility] [place]    FIBULA FRACTURE SURGERY Right 2024    RIGHT FEMUR OPEN REDUCTION INTERNAL FIXATION, OPEN TREATMENT OF RIGHT HIP DISLOCATION performed by Nemesio Handy MD at [facility] [place]    GASTRIC BYPASS SURGERY  2004    HERNIA REPAIR      HIP SURGERY

## 2024-10-13 LAB
MICROORGANISM SPEC CULT: NORMAL
MICROORGANISM SPEC CULT: NORMAL
MICROORGANISM/AGENT SPEC: NORMAL
MICROORGANISM/AGENT SPEC: NORMAL
SERVICE CMNT-IMP: NORMAL
SERVICE CMNT-IMP: NORMAL
SPECIMEN DESCRIPTION: NORMAL
SPECIMEN DESCRIPTION: NORMAL

## 2024-10-17 ENCOUNTER — TELEPHONE (OUTPATIENT)
Dept: ORTHOPEDIC SURGERY | Age: 79
End: 2024-10-17

## 2024-10-17 NOTE — TELEPHONE ENCOUNTER
Michaela from [facility] called. She states she changed pt's wound vac today and just wanted to be sure that it should still be left on, or if there were any other orders.    Karen Valdez PA-C  contacted Dr Handy, and wound vac should be changed weekly until pt's follow up appt.  Spoke to Michaela and notified her of this. Verbalized understanding. Pt has f/u appt scheduled for 10/21/24

## 2024-10-21 ENCOUNTER — TELEPHONE (OUTPATIENT)
Dept: ORTHOPEDIC SURGERY | Age: 79
End: 2024-10-21

## 2024-10-21 NOTE — TELEPHONE ENCOUNTER
Michaela from [facility] called. Pt was supposed to have an appt today, but had to reschedule d/t lack of transportation. She is now scheduled for 10/28/24.  Pt has a wound vac in place that was to be removed at today's appt. It is set to automatically shut off.  That will occur in about 4-5 days.  Michaela is asking what they should do about the wound vac.

## 2024-10-22 ENCOUNTER — OUTSIDE SERVICES (OUTPATIENT)
Dept: PRIMARY CARE CLINIC | Age: 79
End: 2024-10-22

## 2024-10-22 DIAGNOSIS — J44.9 CHRONIC OBSTRUCTIVE PULMONARY DISEASE, UNSPECIFIED COPD TYPE (HCC): ICD-10-CM

## 2024-10-22 DIAGNOSIS — E03.9 HYPOTHYROIDISM, UNSPECIFIED TYPE: ICD-10-CM

## 2024-10-22 DIAGNOSIS — S72.001A CLOSED DISPLACED FRACTURE OF RIGHT FEMORAL NECK (HCC): Primary | ICD-10-CM

## 2024-10-22 DIAGNOSIS — E78.5 HYPERLIPIDEMIA, UNSPECIFIED HYPERLIPIDEMIA TYPE: ICD-10-CM

## 2024-10-22 DIAGNOSIS — Z98.890 STATUS POST OPEN REDUCTION AND INTERNAL FIXATION (ORIF) OF FRACTURE: ICD-10-CM

## 2024-10-22 DIAGNOSIS — Z79.4 CONTROLLED TYPE 2 DIABETES MELLITUS WITH DIABETIC NEUROPATHY, WITH LONG-TERM CURRENT USE OF INSULIN (HCC): ICD-10-CM

## 2024-10-22 DIAGNOSIS — T81.49XA POSTOPERATIVE WOUND INFECTION OF LEFT HIP: ICD-10-CM

## 2024-10-22 DIAGNOSIS — E11.40 CONTROLLED TYPE 2 DIABETES MELLITUS WITH DIABETIC NEUROPATHY, WITH LONG-TERM CURRENT USE OF INSULIN (HCC): ICD-10-CM

## 2024-10-22 DIAGNOSIS — Z87.81 STATUS POST OPEN REDUCTION AND INTERNAL FIXATION (ORIF) OF FRACTURE: ICD-10-CM

## 2024-10-22 DIAGNOSIS — I10 PRIMARY HYPERTENSION: ICD-10-CM

## 2024-10-23 ENCOUNTER — OUTSIDE SERVICES (OUTPATIENT)
Dept: PRIMARY CARE CLINIC | Age: 79
End: 2024-10-23

## 2024-10-23 DIAGNOSIS — D64.9 ANEMIA, UNSPECIFIED TYPE: ICD-10-CM

## 2024-10-23 DIAGNOSIS — S72.001A CLOSED DISPLACED FRACTURE OF RIGHT FEMORAL NECK (HCC): Primary | ICD-10-CM

## 2024-10-23 DIAGNOSIS — I10 PRIMARY HYPERTENSION: ICD-10-CM

## 2024-10-23 DIAGNOSIS — E78.5 HYPERLIPIDEMIA, UNSPECIFIED HYPERLIPIDEMIA TYPE: ICD-10-CM

## 2024-10-23 DIAGNOSIS — Z98.890 STATUS POST OPEN REDUCTION AND INTERNAL FIXATION (ORIF) OF FRACTURE: ICD-10-CM

## 2024-10-23 DIAGNOSIS — T81.49XA POSTOPERATIVE WOUND INFECTION OF LEFT HIP: ICD-10-CM

## 2024-10-23 DIAGNOSIS — Z96.649 HISTORY OF REVISION OF TOTAL HIP ARTHROPLASTY: ICD-10-CM

## 2024-10-23 DIAGNOSIS — Z87.81 STATUS POST OPEN REDUCTION AND INTERNAL FIXATION (ORIF) OF FRACTURE: ICD-10-CM

## 2024-10-23 DIAGNOSIS — E11.40 CONTROLLED TYPE 2 DIABETES MELLITUS WITH DIABETIC NEUROPATHY, WITH LONG-TERM CURRENT USE OF INSULIN (HCC): ICD-10-CM

## 2024-10-23 DIAGNOSIS — J44.9 CHRONIC OBSTRUCTIVE PULMONARY DISEASE, UNSPECIFIED COPD TYPE (HCC): ICD-10-CM

## 2024-10-23 DIAGNOSIS — Z86.718 HISTORY OF DVT IN ADULTHOOD: ICD-10-CM

## 2024-10-23 DIAGNOSIS — Z79.4 CONTROLLED TYPE 2 DIABETES MELLITUS WITH DIABETIC NEUROPATHY, WITH LONG-TERM CURRENT USE OF INSULIN (HCC): ICD-10-CM

## 2024-10-23 DIAGNOSIS — F32.A DEPRESSION, UNSPECIFIED DEPRESSION TYPE: ICD-10-CM

## 2024-10-23 DIAGNOSIS — E03.9 HYPOTHYROIDISM, UNSPECIFIED TYPE: ICD-10-CM

## 2024-10-25 ENCOUNTER — OUTSIDE SERVICES (OUTPATIENT)
Dept: PRIMARY CARE CLINIC | Age: 79
End: 2024-10-25

## 2024-10-25 DIAGNOSIS — D64.9 ANEMIA, UNSPECIFIED TYPE: ICD-10-CM

## 2024-10-25 DIAGNOSIS — Z98.890 STATUS POST OPEN REDUCTION AND INTERNAL FIXATION (ORIF) OF FRACTURE: ICD-10-CM

## 2024-10-25 DIAGNOSIS — Z87.81 STATUS POST OPEN REDUCTION AND INTERNAL FIXATION (ORIF) OF FRACTURE: ICD-10-CM

## 2024-10-25 DIAGNOSIS — E11.40 CONTROLLED TYPE 2 DIABETES MELLITUS WITH DIABETIC NEUROPATHY, WITH LONG-TERM CURRENT USE OF INSULIN (HCC): ICD-10-CM

## 2024-10-25 DIAGNOSIS — Z86.718 HISTORY OF DVT IN ADULTHOOD: ICD-10-CM

## 2024-10-25 DIAGNOSIS — Z79.4 CONTROLLED TYPE 2 DIABETES MELLITUS WITH DIABETIC NEUROPATHY, WITH LONG-TERM CURRENT USE OF INSULIN (HCC): ICD-10-CM

## 2024-10-25 DIAGNOSIS — T81.49XA POSTOPERATIVE WOUND INFECTION OF LEFT HIP: ICD-10-CM

## 2024-10-25 DIAGNOSIS — Z96.649 HISTORY OF REVISION OF TOTAL HIP ARTHROPLASTY: ICD-10-CM

## 2024-10-25 DIAGNOSIS — S72.001A CLOSED DISPLACED FRACTURE OF RIGHT FEMORAL NECK (HCC): Primary | ICD-10-CM

## 2024-10-25 DIAGNOSIS — E78.5 HYPERLIPIDEMIA, UNSPECIFIED HYPERLIPIDEMIA TYPE: ICD-10-CM

## 2024-10-25 DIAGNOSIS — F32.A DEPRESSION, UNSPECIFIED DEPRESSION TYPE: ICD-10-CM

## 2024-10-25 DIAGNOSIS — J44.9 CHRONIC OBSTRUCTIVE PULMONARY DISEASE, UNSPECIFIED COPD TYPE (HCC): ICD-10-CM

## 2024-10-25 DIAGNOSIS — I10 PRIMARY HYPERTENSION: ICD-10-CM

## 2024-10-25 DIAGNOSIS — E03.9 HYPOTHYROIDISM, UNSPECIFIED TYPE: ICD-10-CM

## 2024-10-28 ENCOUNTER — OFFICE VISIT (OUTPATIENT)
Dept: ORTHOPEDIC SURGERY | Age: 79
End: 2024-10-28
Payer: COMMERCIAL

## 2024-10-28 ENCOUNTER — OUTSIDE SERVICES (OUTPATIENT)
Dept: PRIMARY CARE CLINIC | Age: 79
End: 2024-10-28

## 2024-10-28 ENCOUNTER — HOSPITAL ENCOUNTER (OUTPATIENT)
Dept: GENERAL RADIOLOGY | Age: 79
Discharge: HOME | End: 2024-10-30
Payer: COMMERCIAL

## 2024-10-28 DIAGNOSIS — J44.9 CHRONIC OBSTRUCTIVE PULMONARY DISEASE, UNSPECIFIED COPD TYPE (HCC): ICD-10-CM

## 2024-10-28 DIAGNOSIS — S72.001A CLOSED DISPLACED FRACTURE OF RIGHT FEMORAL NECK (HCC): Primary | ICD-10-CM

## 2024-10-28 DIAGNOSIS — Z86.19 PERSONAL HISTORY OF INFECTIOUS DISEASE: Primary | ICD-10-CM

## 2024-10-28 DIAGNOSIS — Z96.641 HISTORY OF RIGHT HIP REPLACEMENT: ICD-10-CM

## 2024-10-28 DIAGNOSIS — I50.9 CONGESTIVE HEART FAILURE, UNSPECIFIED HF CHRONICITY, UNSPECIFIED HEART FAILURE TYPE (HCC): ICD-10-CM

## 2024-10-28 DIAGNOSIS — Z87.81 STATUS POST OPEN REDUCTION AND INTERNAL FIXATION (ORIF) OF FRACTURE: ICD-10-CM

## 2024-10-28 DIAGNOSIS — F32.A DEPRESSION, UNSPECIFIED DEPRESSION TYPE: ICD-10-CM

## 2024-10-28 DIAGNOSIS — R53.1 GENERALIZED WEAKNESS: ICD-10-CM

## 2024-10-28 DIAGNOSIS — Z98.890 STATUS POST OPEN REDUCTION AND INTERNAL FIXATION (ORIF) OF FRACTURE: ICD-10-CM

## 2024-10-28 DIAGNOSIS — E11.40 CONTROLLED TYPE 2 DIABETES MELLITUS WITH DIABETIC NEUROPATHY, WITH LONG-TERM CURRENT USE OF INSULIN (HCC): ICD-10-CM

## 2024-10-28 DIAGNOSIS — T79.2XXA POSTTRAUMATIC SEROMA (HCC): ICD-10-CM

## 2024-10-28 DIAGNOSIS — Z96.649 HISTORY OF REVISION OF TOTAL HIP ARTHROPLASTY: ICD-10-CM

## 2024-10-28 DIAGNOSIS — D64.9 ANEMIA, UNSPECIFIED TYPE: ICD-10-CM

## 2024-10-28 DIAGNOSIS — T81.49XA POSTOPERATIVE WOUND INFECTION OF LEFT HIP: ICD-10-CM

## 2024-10-28 DIAGNOSIS — Z79.4 CONTROLLED TYPE 2 DIABETES MELLITUS WITH DIABETIC NEUROPATHY, WITH LONG-TERM CURRENT USE OF INSULIN (HCC): ICD-10-CM

## 2024-10-28 DIAGNOSIS — E03.9 HYPOTHYROIDISM, UNSPECIFIED TYPE: ICD-10-CM

## 2024-10-28 DIAGNOSIS — R26.2 AMBULATORY DYSFUNCTION: ICD-10-CM

## 2024-10-28 DIAGNOSIS — Z86.718 HISTORY OF DVT IN ADULTHOOD: ICD-10-CM

## 2024-10-28 DIAGNOSIS — E78.5 HYPERLIPIDEMIA, UNSPECIFIED HYPERLIPIDEMIA TYPE: ICD-10-CM

## 2024-10-28 DIAGNOSIS — I10 PRIMARY HYPERTENSION: ICD-10-CM

## 2024-10-28 DIAGNOSIS — Z86.73 HISTORY OF CEREBRAL INFARCTION: ICD-10-CM

## 2024-10-28 PROCEDURE — 73502 X-RAY EXAM HIP UNI 2-3 VIEWS: CPT

## 2024-10-28 PROCEDURE — 99213 OFFICE O/P EST LOW 20 MIN: CPT

## 2024-10-28 PROCEDURE — 99024 POSTOP FOLLOW-UP VISIT: CPT | Performed by: PHYSICIAN ASSISTANT

## 2024-10-28 RX ORDER — HYDROCODONE BITARTRATE AND ACETAMINOPHEN 5; 325 MG/1; MG/1
1 TABLET ORAL EVERY 8 HOURS PRN
COMMUNITY

## 2024-10-28 NOTE — PROGRESS NOTES
appropriate for noted age, affect euthymic.    Extremity:  Right Lower Extremity  Skin clean dry and intact, without signs of infection   Incisions healing well.  Sutures were removed and Steri-Strips applied.  After sutures were removed, there was a moderate amount of serosanguineous drainage from the lateral thigh wound.  Mild superficial opening noted to the distal wound.  No purulence, erythema, warmth or malodor.  Dry sterile dressing was applied.  mild edema noted diffusely in the leg  Compartments supple throughout thigh and leg  Calf supple and not tender  negative Homans  Demonstrates active motion with knee flexion/extension, ankle DF/PF  Presents in a wheelchair  States sensation intact to touch in sural, deep peroneal, superficial peroneal, saphenous, posterior tibial  nerve distributions to foot/ankle.  Palpable dorsalis pedis and posterior tibialis pulses, cap refill brisk in toes, foot warm/perfused.    There were no vitals taken for this visit.    XR:   Pelvis and right hip films demonstrate s/p right GE with additional plate and screw fixation of periprosthetic fracture with hardware in stable position alignment.  No evidence of hardware loosening or failure    Assessment:   Diagnosis Orders   1. Personal history of infectious disease        2. History of right hip replacement        3. Status post open reduction and internal fixation (ORIF) of fracture        4. Posttraumatic seroma (HCC)          Plan:  Xrays reviewed with patient. Plan of care discussed in detail, all questions sought and answered to patients satisfaction at this time.     Weightbearing as tolerated right lower extremity.    If Steri-Strips do not fall off right lower extremity incisions in 7 days on their own, okay to remove.    IV antibiotics per ID.    Patient may be getting discharged from rehab center this week.  Recommend home care 2-3 days a week for therapy and wound care to right lower extremity incision.  Right lateral

## 2024-10-28 NOTE — PATIENT INSTRUCTIONS
Weightbearing as tolerated right lower extremity.    If Steri-Strips do not fall off right lower extremity incisions in 7 days on their own, okay to remove.    IV antibiotics per ID.    Patient may be getting discharged from rehab center this week.  Recommend home care 2-3 days a week for therapy and wound care to right lower extremity incision.  Right lateral thigh wound was covered with a dry sterile dressing today. Wound dressing should be changed twice daily. If dressing becomes saturated please change more often as needed.     Follow-up in 2 weeks for wound check.  No x-rays    Call if any questions or concerns

## 2024-10-29 ASSESSMENT — ENCOUNTER SYMPTOMS
PHOTOPHOBIA: 0
COUGH: 0
VOMITING: 0
SORE THROAT: 0
SHORTNESS OF BREATH: 0
BACK PAIN: 0
WHEEZING: 0
RHINORRHEA: 0
DIARRHEA: 0
CONSTIPATION: 0
ABDOMINAL PAIN: 0

## 2024-10-29 NOTE — PROGRESS NOTES
Negative for congestion, hearing loss, rhinorrhea and sore throat.    Eyes:  Negative for photophobia and visual disturbance.   Respiratory:  Negative for cough, shortness of breath and wheezing.    Cardiovascular:  Negative for chest pain and palpitations.   Gastrointestinal:  Negative for abdominal pain, constipation, diarrhea and vomiting.   Musculoskeletal:  Positive for gait problem. Negative for arthralgias, back pain, neck pain and neck stiffness.   Skin:  Negative for rash.   Neurological:  Positive for weakness.   All other systems reviewed and are negative.         Objective   Physical Exam  Vitals and nursing note reviewed.   Constitutional:       General: She is not in acute distress.     Appearance: Normal appearance. She is not ill-appearing.   HENT:      Head: Normocephalic and atraumatic.      Right Ear: External ear normal.      Left Ear: External ear normal.      Nose: Nose normal.   Eyes:      Conjunctiva/sclera: Conjunctivae normal.   Cardiovascular:      Rate and Rhythm: Regular rhythm.      Heart sounds: Normal heart sounds.   Pulmonary:      Effort: Pulmonary effort is normal.      Breath sounds: Normal breath sounds.   Abdominal:      General: Bowel sounds are normal.      Palpations: Abdomen is soft.   Musculoskeletal:      Comments: Nonweight bearing    Skin:     Comments: PICC RUE   Neurological:      Mental Status: She is alert.      Gait: Gait abnormal.      Comments: Wheelchair for ambulation          Recent Labs     10/08/24  1006 10/06/24  0547 10/04/24  0633 10/02/24  0544   WBC 11.0 7.5 8.0 7.3   HGB 8.0* 7.8* 8.2* 8.1*   HCT 27.7* 26.9* 29.5* 27.8*   MCV 95.5 94.7 99.3 93.6     --  294 259      Lab Results   Component Value Date     10/08/2024    K 4.0 10/08/2024     10/08/2024    CO2 29 10/08/2024    BUN 11 10/08/2024    CREATININE 0.7 10/08/2024    GLUCOSE 98 10/08/2024    CALCIUM 8.5 (L) 10/08/2024    BILITOT 0.3 10/01/2024    ALKPHOS 151 (H) 10/01/2024    AST

## 2024-10-30 ENCOUNTER — OUTSIDE SERVICES (OUTPATIENT)
Dept: PRIMARY CARE CLINIC | Age: 79
End: 2024-10-30

## 2024-10-30 DIAGNOSIS — S72.001A CLOSED DISPLACED FRACTURE OF RIGHT FEMORAL NECK (HCC): Primary | ICD-10-CM

## 2024-10-30 DIAGNOSIS — E03.9 HYPOTHYROIDISM, UNSPECIFIED TYPE: ICD-10-CM

## 2024-10-30 DIAGNOSIS — I50.9 CONGESTIVE HEART FAILURE, UNSPECIFIED HF CHRONICITY, UNSPECIFIED HEART FAILURE TYPE (HCC): ICD-10-CM

## 2024-10-30 DIAGNOSIS — F32.A DEPRESSION, UNSPECIFIED DEPRESSION TYPE: ICD-10-CM

## 2024-10-30 DIAGNOSIS — E11.40 CONTROLLED TYPE 2 DIABETES MELLITUS WITH DIABETIC NEUROPATHY, WITH LONG-TERM CURRENT USE OF INSULIN (HCC): ICD-10-CM

## 2024-10-30 DIAGNOSIS — T81.49XA POSTOPERATIVE WOUND INFECTION OF LEFT HIP: ICD-10-CM

## 2024-10-30 DIAGNOSIS — Z79.4 CONTROLLED TYPE 2 DIABETES MELLITUS WITH DIABETIC NEUROPATHY, WITH LONG-TERM CURRENT USE OF INSULIN (HCC): ICD-10-CM

## 2024-10-30 DIAGNOSIS — E78.5 HYPERLIPIDEMIA, UNSPECIFIED HYPERLIPIDEMIA TYPE: ICD-10-CM

## 2024-10-30 DIAGNOSIS — I10 PRIMARY HYPERTENSION: ICD-10-CM

## 2024-10-30 DIAGNOSIS — Z86.718 HISTORY OF DVT IN ADULTHOOD: ICD-10-CM

## 2024-10-30 DIAGNOSIS — D64.9 ANEMIA, UNSPECIFIED TYPE: ICD-10-CM

## 2024-10-30 DIAGNOSIS — J44.9 CHRONIC OBSTRUCTIVE PULMONARY DISEASE, UNSPECIFIED COPD TYPE (HCC): ICD-10-CM

## 2024-10-30 DIAGNOSIS — Z96.649 HISTORY OF REVISION OF TOTAL HIP ARTHROPLASTY: ICD-10-CM

## 2024-11-01 VITALS
RESPIRATION RATE: 18 BRPM | OXYGEN SATURATION: 97 % | TEMPERATURE: 98.3 F | BODY MASS INDEX: 28.96 KG/M2 | DIASTOLIC BLOOD PRESSURE: 72 MMHG | WEIGHT: 174 LBS | HEART RATE: 90 BPM | SYSTOLIC BLOOD PRESSURE: 114 MMHG

## 2024-11-01 ASSESSMENT — ENCOUNTER SYMPTOMS
SHORTNESS OF BREATH: 0
ABDOMINAL PAIN: 0
WHEEZING: 0
NAUSEA: 0
VOMITING: 0
VOICE CHANGE: 0
SORE THROAT: 0
CHEST TIGHTNESS: 0

## 2024-11-01 NOTE — PROGRESS NOTES
every 4 hours as needed for Pain or Fever      Vitamin D3 125 MCG (5000 UT) TABS tablet Take 1 tablet by mouth daily      ondansetron (ZOFRAN) 4 MG tablet Take 1 tablet by mouth every 8 hours as needed for Nausea or Vomiting      LACTOBACILLUS RHAMNOSUS, GG, PO Take 1 capsule by mouth daily      ferrous sulfate (IRON 325) 325 (65 Fe) MG tablet Take 1 tablet by mouth daily      polyethylene glycol (GLYCOLAX) 17 GM/SCOOP powder Take 17 g by mouth daily as needed (constipation)      pantoprazole (PROTONIX) 20 MG tablet Take 1 tablet by mouth daily      calcium carbonate (TUMS) 500 MG chewable tablet Take 1 tablet by mouth 2 times daily      atorvastatin (LIPITOR) 80 MG tablet Take 1 tablet by mouth daily      magnesium hydroxide (MILK OF MAGNESIA) 400 MG/5ML suspension Take 30 mLs by mouth daily as needed for Constipation      buPROPion (WELLBUTRIN SR) 100 MG extended release tablet Take 1 tablet by mouth daily      Semaglutide, 2 MG/DOSE, 8 MG/3ML SOPN Inject 8 mg into the skin once a week Given on Mondays.      sucralfate (CARAFATE) 1 GM tablet Take 1 tablet by mouth daily      pramipexole (MIRAPEX) 0.75 MG tablet Take 1 tablet by mouth 2 times daily      levothyroxine (SYNTHROID) 50 MCG tablet Take 1 tablet by mouth Daily      venlafaxine 150 MG extended release tablet Take 1 tablet by mouth daily       No current facility-administered medications for this visit.      Allergies   Allergen Reactions    Benadryl [Diphenhydramine] Hives     Tongue Swells    Other      benzodine  Tongue Swells    Sulfa Antibiotics Hives     Tongue Swells    Ciprofloxacin Hives    Oxycodone     Tape [Adhesive Tape] Itching        Review of Systems   Constitutional:  Negative for chills, fever and unexpected weight change.   HENT:  Negative for postnasal drip, sore throat and voice change.    Respiratory:  Negative for chest tightness, shortness of breath and wheezing.    Cardiovascular:  Negative for chest pain and leg swelling.

## 2024-11-06 ENCOUNTER — OUTSIDE SERVICES (OUTPATIENT)
Dept: PRIMARY CARE CLINIC | Age: 79
End: 2024-11-06

## 2024-11-06 DIAGNOSIS — Z79.4 CONTROLLED TYPE 2 DIABETES MELLITUS WITH DIABETIC NEUROPATHY, WITH LONG-TERM CURRENT USE OF INSULIN (HCC): ICD-10-CM

## 2024-11-06 DIAGNOSIS — F32.A DEPRESSION, UNSPECIFIED DEPRESSION TYPE: ICD-10-CM

## 2024-11-06 DIAGNOSIS — E11.40 CONTROLLED TYPE 2 DIABETES MELLITUS WITH DIABETIC NEUROPATHY, WITH LONG-TERM CURRENT USE OF INSULIN (HCC): ICD-10-CM

## 2024-11-06 DIAGNOSIS — I50.9 CONGESTIVE HEART FAILURE, UNSPECIFIED HF CHRONICITY, UNSPECIFIED HEART FAILURE TYPE (HCC): ICD-10-CM

## 2024-11-06 DIAGNOSIS — J44.9 CHRONIC OBSTRUCTIVE PULMONARY DISEASE, UNSPECIFIED COPD TYPE (HCC): ICD-10-CM

## 2024-11-06 DIAGNOSIS — Z96.649 HISTORY OF REVISION OF TOTAL HIP ARTHROPLASTY: ICD-10-CM

## 2024-11-06 DIAGNOSIS — Z86.718 HISTORY OF DVT IN ADULTHOOD: ICD-10-CM

## 2024-11-06 DIAGNOSIS — D64.9 ANEMIA, UNSPECIFIED TYPE: ICD-10-CM

## 2024-11-06 DIAGNOSIS — E78.5 HYPERLIPIDEMIA, UNSPECIFIED HYPERLIPIDEMIA TYPE: ICD-10-CM

## 2024-11-06 DIAGNOSIS — S72.001A CLOSED DISPLACED FRACTURE OF RIGHT FEMORAL NECK (HCC): Primary | ICD-10-CM

## 2024-11-06 DIAGNOSIS — T81.49XA POSTOPERATIVE WOUND INFECTION OF LEFT HIP: ICD-10-CM

## 2024-11-06 DIAGNOSIS — I10 PRIMARY HYPERTENSION: ICD-10-CM

## 2024-11-06 DIAGNOSIS — E03.9 HYPOTHYROIDISM, UNSPECIFIED TYPE: ICD-10-CM

## 2024-11-08 ENCOUNTER — TELEPHONE (OUTPATIENT)
Dept: ORTHOPEDIC SURGERY | Age: 79
End: 2024-11-08

## 2024-11-08 ENCOUNTER — OUTSIDE SERVICES (OUTPATIENT)
Dept: PRIMARY CARE CLINIC | Age: 79
End: 2024-11-08

## 2024-11-08 ENCOUNTER — HOSPITAL ENCOUNTER (OUTPATIENT)
Dept: INTERVENTIONAL RADIOLOGY/VASCULAR | Age: 79
Discharge: HOME | End: 2024-11-10
Payer: COMMERCIAL

## 2024-11-08 DIAGNOSIS — E11.40 CONTROLLED TYPE 2 DIABETES MELLITUS WITH DIABETIC NEUROPATHY, WITH LONG-TERM CURRENT USE OF INSULIN (HCC): ICD-10-CM

## 2024-11-08 DIAGNOSIS — D64.9 ANEMIA, UNSPECIFIED TYPE: ICD-10-CM

## 2024-11-08 DIAGNOSIS — T81.49XA SURGICAL SITE INFECTION: ICD-10-CM

## 2024-11-08 DIAGNOSIS — E78.5 HYPERLIPIDEMIA, UNSPECIFIED HYPERLIPIDEMIA TYPE: ICD-10-CM

## 2024-11-08 DIAGNOSIS — E03.9 HYPOTHYROIDISM, UNSPECIFIED TYPE: ICD-10-CM

## 2024-11-08 DIAGNOSIS — S72.001A CLOSED DISPLACED FRACTURE OF RIGHT FEMORAL NECK (HCC): Primary | ICD-10-CM

## 2024-11-08 DIAGNOSIS — Z79.4 CONTROLLED TYPE 2 DIABETES MELLITUS WITH DIABETIC NEUROPATHY, WITH LONG-TERM CURRENT USE OF INSULIN (HCC): ICD-10-CM

## 2024-11-08 DIAGNOSIS — I50.9 CONGESTIVE HEART FAILURE, UNSPECIFIED HF CHRONICITY, UNSPECIFIED HEART FAILURE TYPE (HCC): ICD-10-CM

## 2024-11-08 DIAGNOSIS — F32.A DEPRESSION, UNSPECIFIED DEPRESSION TYPE: ICD-10-CM

## 2024-11-08 DIAGNOSIS — T81.49XA POSTOPERATIVE WOUND INFECTION OF LEFT HIP: ICD-10-CM

## 2024-11-08 DIAGNOSIS — Z86.718 HISTORY OF DVT IN ADULTHOOD: ICD-10-CM

## 2024-11-08 DIAGNOSIS — Z96.649 HISTORY OF REVISION OF TOTAL HIP ARTHROPLASTY: ICD-10-CM

## 2024-11-08 DIAGNOSIS — J44.9 CHRONIC OBSTRUCTIVE PULMONARY DISEASE, UNSPECIFIED COPD TYPE (HCC): ICD-10-CM

## 2024-11-08 DIAGNOSIS — I10 PRIMARY HYPERTENSION: ICD-10-CM

## 2024-11-08 PROCEDURE — 77001 FLUOROGUIDE FOR VEIN DEVICE: CPT

## 2024-11-08 PROCEDURE — 2500000003 HC RX 250 WO HCPCS: Performed by: RADIOLOGY

## 2024-11-08 PROCEDURE — 87071 CULTURE AEROBIC QUANT OTHER: CPT

## 2024-11-08 PROCEDURE — 36589 REMOVAL TUNNELED CV CATH: CPT

## 2024-11-08 RX ORDER — LIDOCAINE HYDROCHLORIDE 20 MG/ML
INJECTION, SOLUTION INFILTRATION; PERINEURAL PRN
Status: COMPLETED | OUTPATIENT
Start: 2024-11-08 | End: 2024-11-08

## 2024-11-08 RX ADMIN — LIDOCAINE HYDROCHLORIDE 4 ML: 20 INJECTION, SOLUTION INFILTRATION; PERINEURAL at 12:53

## 2024-11-08 NOTE — OR NURSING
Sterile, woven gauze and clear transparent dressing applied to right chest.    The dressing application occurred in a well-lit room with strict adherence to aseptic technique. Procedure site revealed no signs of hematoma or complications.    The next dressing change and wound assessment should occur in 24 hours. No submersion in water for 72 hours post procedure.

## 2024-11-08 NOTE — TELEPHONE ENCOUNTER
Multiple calls from Elio Salazar RN to setup scheduling for the patient for wound check. Pt was scheduled for a Tuesday in [place] but being a facility patient wheelchair bound the AT clinic was unable to support the f/u. Pt was then rescheduled to 11/11/24 with [facility] schedule and patient refused appt that day stating she has plans with her  and patient doesn't feel that she need a f/u. Marie again returned to negotiate with patient and transport arrangements from facility and Marie again requested on patients behalf to reschedule patient. Patient scheduled for the following week with [facility] 11/18/24.       Future Appointments   Date Time Provider Department Center   11/18/2024  8:15 AM SCHEDULE, SE [facility]   12/19/2024  1:00 PM Polly Gilliam MD [facility]

## 2024-11-10 LAB
MICROORGANISM SPEC CULT: NO GROWTH
SPECIMEN DESCRIPTION: NORMAL

## 2024-11-11 ENCOUNTER — OUTSIDE SERVICES (OUTPATIENT)
Dept: PRIMARY CARE CLINIC | Age: 79
End: 2024-11-11

## 2024-11-11 DIAGNOSIS — I10 PRIMARY HYPERTENSION: ICD-10-CM

## 2024-11-11 DIAGNOSIS — E03.9 HYPOTHYROIDISM, UNSPECIFIED TYPE: ICD-10-CM

## 2024-11-11 DIAGNOSIS — Z79.4 CONTROLLED TYPE 2 DIABETES MELLITUS WITH DIABETIC NEUROPATHY, WITH LONG-TERM CURRENT USE OF INSULIN (HCC): ICD-10-CM

## 2024-11-11 DIAGNOSIS — D64.9 ANEMIA, UNSPECIFIED TYPE: ICD-10-CM

## 2024-11-11 DIAGNOSIS — T81.49XA POSTOPERATIVE WOUND INFECTION OF LEFT HIP: ICD-10-CM

## 2024-11-11 DIAGNOSIS — Z86.718 HISTORY OF DVT IN ADULTHOOD: ICD-10-CM

## 2024-11-11 DIAGNOSIS — Z96.649 HISTORY OF REVISION OF TOTAL HIP ARTHROPLASTY: ICD-10-CM

## 2024-11-11 DIAGNOSIS — E11.40 CONTROLLED TYPE 2 DIABETES MELLITUS WITH DIABETIC NEUROPATHY, WITH LONG-TERM CURRENT USE OF INSULIN (HCC): ICD-10-CM

## 2024-11-11 DIAGNOSIS — I50.9 CONGESTIVE HEART FAILURE, UNSPECIFIED HF CHRONICITY, UNSPECIFIED HEART FAILURE TYPE (HCC): ICD-10-CM

## 2024-11-11 DIAGNOSIS — J44.9 CHRONIC OBSTRUCTIVE PULMONARY DISEASE, UNSPECIFIED COPD TYPE (HCC): ICD-10-CM

## 2024-11-11 DIAGNOSIS — F32.A DEPRESSION, UNSPECIFIED DEPRESSION TYPE: ICD-10-CM

## 2024-11-11 DIAGNOSIS — E78.5 HYPERLIPIDEMIA, UNSPECIFIED HYPERLIPIDEMIA TYPE: ICD-10-CM

## 2024-11-11 DIAGNOSIS — S72.001A CLOSED DISPLACED FRACTURE OF RIGHT FEMORAL NECK (HCC): Primary | ICD-10-CM

## 2024-11-13 ENCOUNTER — OUTSIDE SERVICES (OUTPATIENT)
Dept: PRIMARY CARE CLINIC | Age: 79
End: 2024-11-13

## 2024-11-13 DIAGNOSIS — Z86.718 HISTORY OF DVT IN ADULTHOOD: ICD-10-CM

## 2024-11-13 DIAGNOSIS — F32.A DEPRESSION, UNSPECIFIED DEPRESSION TYPE: ICD-10-CM

## 2024-11-13 DIAGNOSIS — J44.9 CHRONIC OBSTRUCTIVE PULMONARY DISEASE, UNSPECIFIED COPD TYPE (HCC): ICD-10-CM

## 2024-11-13 DIAGNOSIS — E03.9 HYPOTHYROIDISM, UNSPECIFIED TYPE: ICD-10-CM

## 2024-11-13 DIAGNOSIS — E78.5 HYPERLIPIDEMIA, UNSPECIFIED HYPERLIPIDEMIA TYPE: ICD-10-CM

## 2024-11-13 DIAGNOSIS — D64.9 ANEMIA, UNSPECIFIED TYPE: ICD-10-CM

## 2024-11-13 DIAGNOSIS — Z79.4 CONTROLLED TYPE 2 DIABETES MELLITUS WITH DIABETIC NEUROPATHY, WITH LONG-TERM CURRENT USE OF INSULIN (HCC): ICD-10-CM

## 2024-11-13 DIAGNOSIS — S72.001A CLOSED DISPLACED FRACTURE OF RIGHT FEMORAL NECK (HCC): Primary | ICD-10-CM

## 2024-11-13 DIAGNOSIS — I10 PRIMARY HYPERTENSION: ICD-10-CM

## 2024-11-13 DIAGNOSIS — T81.49XA POSTOPERATIVE WOUND INFECTION OF LEFT HIP: ICD-10-CM

## 2024-11-13 DIAGNOSIS — E11.40 CONTROLLED TYPE 2 DIABETES MELLITUS WITH DIABETIC NEUROPATHY, WITH LONG-TERM CURRENT USE OF INSULIN (HCC): ICD-10-CM

## 2024-11-13 DIAGNOSIS — Z96.649 HISTORY OF REVISION OF TOTAL HIP ARTHROPLASTY: ICD-10-CM

## 2024-11-13 DIAGNOSIS — I50.9 CONGESTIVE HEART FAILURE, UNSPECIFIED HF CHRONICITY, UNSPECIFIED HEART FAILURE TYPE (HCC): ICD-10-CM

## 2024-11-15 ASSESSMENT — ENCOUNTER SYMPTOMS
ABDOMINAL PAIN: 0
CHEST TIGHTNESS: 0
EYE DISCHARGE: 0
SHORTNESS OF BREATH: 0
SHORTNESS OF BREATH: 0
SORE THROAT: 0
EYE PAIN: 0
CHEST TIGHTNESS: 0
EYE PAIN: 0
COUGH: 0
EYE ITCHING: 0
SORE THROAT: 0
WHEEZING: 0
ABDOMINAL PAIN: 0
DIARRHEA: 0
COUGH: 0
SINUS PRESSURE: 0
ABDOMINAL PAIN: 0
RHINORRHEA: 0
ABDOMINAL PAIN: 0
VOMITING: 0
SINUS PRESSURE: 0
SINUS PRESSURE: 0
CONSTIPATION: 0
SHORTNESS OF BREATH: 0
CONSTIPATION: 0
EYE ITCHING: 0
SORE THROAT: 0
RHINORRHEA: 0
EYE DISCHARGE: 0
CONSTIPATION: 0
NAUSEA: 0
EYE ITCHING: 0
EYE REDNESS: 0
RHINORRHEA: 0
VOMITING: 0
WHEEZING: 0
COUGH: 0
VOMITING: 0
EYE ITCHING: 0
COUGH: 0
EYE PAIN: 0
SINUS PRESSURE: 0
ABDOMINAL DISTENTION: 0
RHINORRHEA: 0
SHORTNESS OF BREATH: 0
WHEEZING: 0
CONSTIPATION: 0
VOMITING: 0
COUGH: 0
EYE DISCHARGE: 0
VOMITING: 0
CONSTIPATION: 0
CHEST TIGHTNESS: 0
EYE REDNESS: 0
SHORTNESS OF BREATH: 0
CHEST TIGHTNESS: 0
ABDOMINAL PAIN: 0
EYE ITCHING: 0
SORE THROAT: 0
DIARRHEA: 0
NAUSEA: 0
EYE REDNESS: 0
WHEEZING: 0
SORE THROAT: 0
SINUS PRESSURE: 0
DIARRHEA: 0
CONSTIPATION: 0
ABDOMINAL DISTENTION: 0
WHEEZING: 0
VOMITING: 0
WHEEZING: 0
ABDOMINAL DISTENTION: 0
EYE DISCHARGE: 0
SORE THROAT: 0
DIARRHEA: 0
SHORTNESS OF BREATH: 0
NAUSEA: 0
EYE DISCHARGE: 0
ABDOMINAL DISTENTION: 0
CHEST TIGHTNESS: 0
EYE ITCHING: 0
EYE REDNESS: 0
EYE REDNESS: 0
DIARRHEA: 0
NAUSEA: 0
NAUSEA: 0
EYE ITCHING: 0
NAUSEA: 0
SHORTNESS OF BREATH: 0
EYE REDNESS: 0
EYE PAIN: 0
CONSTIPATION: 0
NAUSEA: 0
WHEEZING: 0
RHINORRHEA: 0
CHEST TIGHTNESS: 0
EYE DISCHARGE: 0
COUGH: 0
COUGH: 0
ABDOMINAL DISTENTION: 0
ABDOMINAL PAIN: 0
ABDOMINAL DISTENTION: 0
EYE PAIN: 0
CHEST TIGHTNESS: 0
EYE REDNESS: 0
ABDOMINAL DISTENTION: 0
RHINORRHEA: 0
VOMITING: 0
DIARRHEA: 0
RHINORRHEA: 0
DIARRHEA: 0
EYE PAIN: 0
EYE PAIN: 0
SINUS PRESSURE: 0
SORE THROAT: 0
EYE DISCHARGE: 0
ABDOMINAL PAIN: 0
SINUS PRESSURE: 0

## 2024-11-16 NOTE — PROGRESS NOTES
skilled assessment  Continue with discharge planning  Acute medical concerns provider on-call     Review all medications and diagnosis    Review and continue atorvastatin 80 mg once a day for hyperlipidemia    Review and continue Zofran 4 mg every 8 hours as needed for nausea vomiting        Follow-up with Ortho  Follow-up with ID     Over 30 min was spent between patient care, chart review, discussing patient management with nursing staff and interpreting diagnostics      An electronic signature was used to authenticate this note.    --JOSELINE Garber - CNP   This office note has been dictated.

## 2024-11-16 NOTE — PROGRESS NOTES
weekly CBC and CMP x3 weeks from admission then monthly.  Continue with therapies as tolerated  Continue with weekly skilled assessment  Continue with discharge planning  Acute medical concerns provider on-call     Review all medications and diagnosis    Review and continue levothyroxine 75 mcg once a day for hypothyroidism    Review and continue Lasix 20 mg once a day for edema        Follow-up with Ortho  Follow-up with ID     Over 30 min was spent between patient care, chart review, discussing patient management with nursing staff and interpreting diagnostics      An electronic signature was used to authenticate this note.    --JOSELINE Garber - CNP   This office note has been dictated.

## 2024-11-16 NOTE — PROGRESS NOTES
benzodine  Tongue Swells    Sulfa Antibiotics Hives     Tongue Swells    Ciprofloxacin Hives    Oxycodone     Tape [Adhesive Tape] Itching      Current Outpatient Medications   Medication Sig Dispense Refill    HYDROcodone-acetaminophen (NORCO) 5-325 MG per tablet Take 1 tablet by mouth every 8 hours as needed for Pain.      vancomycin (VANCOCIN) infusion Infuse 1,000 mg intravenously every 24 hours Compound per protocol. 30 g 1    meropenem (MERREM) infusion Infuse 1,000 mg intravenously in the morning and 1,000 mg at noon and 1,000 mg in the evening. Compound per protocol..      albuterol (PROVENTIL) (2.5 MG/3ML) 0.083% nebulizer solution Take 3 mLs by nebulization every 4 hours as needed for Shortness of Breath      apixaban (ELIQUIS) 5 MG TABS tablet Take 1 tablet by mouth 2 times daily      OXYGEN Inhale 1-5 L into the lungs continuous prn (to maintain SpO2 >/= 90%)      cyclobenzaprine (FLEXERIL) 10 MG tablet Take 1 tablet by mouth 2 times daily as needed (muscle relaxer)      gabapentin (NEURONTIN) 800 MG tablet Take 0.5 tablets by mouth 3 times daily.      furosemide (LASIX) 20 MG tablet Take 1 tablet by mouth See Admin Instructions Given on Mondays & Thursdays.      melatonin 3 MG TABS tablet Take 1 tablet by mouth nightly as needed (insomnia)      potassium chloride (K-TAB) 20 MEQ TBCR extended release tablet Take 2 tablets by mouth daily      acetaminophen (TYLENOL) 650 MG extended release tablet Take 1 tablet by mouth 2 times daily      acetaminophen (TYLENOL) 325 MG tablet Take 2 tablets by mouth every 4 hours as needed for Pain or Fever      Vitamin D3 125 MCG (5000 UT) TABS tablet Take 1 tablet by mouth daily      ondansetron (ZOFRAN) 4 MG tablet Take 1 tablet by mouth every 8 hours as needed for Nausea or Vomiting      LACTOBACILLUS RHAMNOSUS, GG, PO Take 1 capsule by mouth daily      ferrous sulfate (IRON 325) 325 (65 Fe) MG tablet Take 1 tablet by mouth daily      polyethylene glycol

## 2024-11-16 NOTE — PROGRESS NOTES
appearance. She is not ill-appearing.   HENT:      Head: Normocephalic and atraumatic.      Right Ear: External ear normal. There is no impacted cerumen.      Left Ear: External ear normal. There is no impacted cerumen.      Nose: Nose normal. No congestion or rhinorrhea.      Mouth/Throat:      Mouth: Mucous membranes are moist.      Pharynx: Oropharynx is clear. No posterior oropharyngeal erythema.   Eyes:      General:         Right eye: No discharge.         Left eye: No discharge.      Conjunctiva/sclera: Conjunctivae normal.      Pupils: Pupils are equal, round, and reactive to light.   Cardiovascular:      Rate and Rhythm: Normal rate and regular rhythm.      Pulses: Normal pulses.      Heart sounds: Normal heart sounds. No murmur heard.     No friction rub. No gallop.   Pulmonary:      Effort: Pulmonary effort is normal. No respiratory distress.      Breath sounds: Normal breath sounds. No wheezing, rhonchi or rales.   Abdominal:      General: Bowel sounds are normal. There is no distension.      Palpations: Abdomen is soft.      Tenderness: There is no abdominal tenderness. There is no guarding or rebound.   Musculoskeletal:      Cervical back: Normal range of motion. No rigidity or tenderness.      Comments: right lower extremity + edema  Limited range of motion right lower extremity     Skin:     General: Skin is warm and dry.      Coloration: Skin is not jaundiced or pale.      Findings: No rash.      Comments: Incision wound VAC placed   Neurological:      Mental Status: She is alert and oriented to person, place, and time. Mental status is at baseline.      Motor: Weakness present.      Gait: Gait abnormal.   Psychiatric:         Mood and Affect: Mood normal.         Behavior: Behavior normal.         Thought Content: Thought content normal.         Judgment: Judgment normal.     No results for input(s): \"WBC\", \"HGB\", \"HCT\", \"MCV\", \"PLT\" in the last 720 hours.   Lab Results   Component Value Date    NA  Consent (Lip)/Introductory Paragraph: The rationale for Mohs was explained to the patient and consent was obtained. The risks, benefits and alternatives to therapy were discussed in detail. Specifically, the risks of lip deformity, changes in the oral aperture, infection, scarring, bleeding, prolonged wound healing, incomplete removal, allergy to anesthesia, nerve injury and recurrence were addressed. Prior to the procedure, the treatment site was clearly identified and confirmed by the patient. All components of Universal Protocol/PAUSE Rule completed.

## 2024-11-16 NOTE — PROGRESS NOTES
General: She is not in acute distress.     Appearance: Normal appearance. She is not ill-appearing.   HENT:      Head: Normocephalic and atraumatic.      Right Ear: External ear normal. There is no impacted cerumen.      Left Ear: External ear normal. There is no impacted cerumen.      Nose: Nose normal. No congestion or rhinorrhea.      Mouth/Throat:      Mouth: Mucous membranes are moist.      Pharynx: Oropharynx is clear. No posterior oropharyngeal erythema.   Eyes:      General:         Right eye: No discharge.         Left eye: No discharge.      Conjunctiva/sclera: Conjunctivae normal.      Pupils: Pupils are equal, round, and reactive to light.   Cardiovascular:      Rate and Rhythm: Normal rate and regular rhythm.      Pulses: Normal pulses.      Heart sounds: Normal heart sounds. No murmur heard.     No friction rub. No gallop.   Pulmonary:      Effort: Pulmonary effort is normal. No respiratory distress.      Breath sounds: Normal breath sounds. No wheezing, rhonchi or rales.   Abdominal:      General: Bowel sounds are normal. There is no distension.      Palpations: Abdomen is soft.      Tenderness: There is no abdominal tenderness. There is no guarding or rebound.   Musculoskeletal:      Cervical back: Normal range of motion. No rigidity or tenderness.      Comments: right lower extremity + edema  Limited range of motion right lower extremity     Skin:     General: Skin is warm and dry.      Coloration: Skin is not jaundiced or pale.      Findings: No rash.      Comments: Incision covered by dry dressing   Neurological:      Mental Status: She is alert and oriented to person, place, and time. Mental status is at baseline.      Motor: Weakness present.      Gait: Gait abnormal.   Psychiatric:         Mood and Affect: Mood normal.         Behavior: Behavior normal.         Thought Content: Thought content normal.         Judgment: Judgment normal.     No results for input(s): \"WBC\", \"HGB\", \"HCT\", \"MCV\",

## 2024-11-16 NOTE — PROGRESS NOTES
Rebeka Renee (:  1945) is a 79 y.o. female that is seen today for skilled assessment    Subjective     Rebeka is awake alert in no obvious distress. She is sitting up in wheelchair and working with her therapist.  She has no complaints of pain or discomfort at this time. She continues to work in therapies as tolerated.  She remains on IV antibiotics per ID. She is to follow-up with Ortho and ID. Nursing has no concerns and will let Dr. NP or PA know of any changes.      Location: [facility] nursing [facility]  All nursing and progress notes reviewed.    Past Medical History:   Diagnosis Date    Anemia     S/P chemotherapy.    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    Ascending aortic aneurysm (HCC)     seen on CTA chest w/contrast on     Asthma     Bipolar 1 disorder (HCC)     Cancer (HCC)     lung/ kidney    Cancer of breast, female (HCC) 2006    S/P bilaeral mastectomy with left breast lymph node resection and chemotherapy.    CHF (congestive heart failure) (HCC)     Chronic back pain     Depression     Depression with anxiety     Diabetes mellitus (HCC)     Resolved after losing 130 lbs. after gastric bypass surgery.    Dyslipidemia     Fibromyalgia     History of blood transfusion     anemia    Hypertension     Hypothyroidism     Neuropathy     Pericardial effusion 2010    Subxiphoid pericardial window with drainage of pericardial effusion and pericardial biopsy:  Fluid and biopsy negative for metastases.     Pleural effusion 2010    Noted on chest x-ray.    TIA (transient ischemic attack)     Tobacco abuse     Patient smoked 3 ppd x 26 years.  Quit in .    Type II or unspecified type diabetes mellitus without mention of complication, not stated as uncontrolled        Allergies   Allergen Reactions    Benadryl [Diphenhydramine] Hives     Tongue Swells

## 2024-11-16 NOTE — PROGRESS NOTES
Rebeka Renee (:  1945) is a 79 y.o. female that is seen today for skilled assessment    Subjective     Rebeka is sitting in wheelchair working with her therapist.  She is awake alert in no obvious distress. She has an appointment today to have PICC removed.  She denies pain or discomfort at this time.  She remains on pain medication as needed.  She continues to work in therapies as tolerated.  She is requesting to discharge home soon will let  and skilled manager know.  Review of labs today with no new orders will send ID.  She is to follow-up with Ortho and ID.  No concerns from nursing. Nursing will let Dr. NEAL or PA know of any changes.      Location: [facility] nursing [facility]  All nursing and progress notes reviewed.    Past Medical History:   Diagnosis Date    Anemia     S/P chemotherapy.    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    Ascending aortic aneurysm (HCC)     seen on CTA chest w/contrast on     Asthma     Bipolar 1 disorder (HCC)     Cancer (HCC)     lung/ kidney    Cancer of breast, female (HCC) 2006    S/P bilaeral mastectomy with left breast lymph node resection and chemotherapy.    CHF (congestive heart failure) (HCC)     Chronic back pain     Depression     Depression with anxiety     Diabetes mellitus (HCC)     Resolved after losing 130 lbs. after gastric bypass surgery.    Dyslipidemia     Fibromyalgia     History of blood transfusion     anemia    Hypertension     Hypothyroidism     Neuropathy     Pericardial effusion 2010    Subxiphoid pericardial window with drainage of pericardial effusion and pericardial biopsy:  Fluid and biopsy negative for metastases.     Pleural effusion 2010    Noted on chest x-ray.    TIA (transient ischemic attack)     Tobacco abuse     Patient smoked 3 ppd x 26 years.  Quit in .    Type II or

## 2024-11-18 ENCOUNTER — OFFICE VISIT (OUTPATIENT)
Dept: ORTHOPEDIC SURGERY | Age: 79
End: 2024-11-18
Payer: COMMERCIAL

## 2024-11-18 VITALS
OXYGEN SATURATION: 91 % | DIASTOLIC BLOOD PRESSURE: 98 MMHG | SYSTOLIC BLOOD PRESSURE: 157 MMHG | HEART RATE: 97 BPM | TEMPERATURE: 98 F | RESPIRATION RATE: 16 BRPM

## 2024-11-18 DIAGNOSIS — M79.89 PAIN AND SWELLING OF LEFT LOWER EXTREMITY: ICD-10-CM

## 2024-11-18 DIAGNOSIS — M51.26 PROTRUDED LUMBAR DISC: ICD-10-CM

## 2024-11-18 DIAGNOSIS — M21.70 LEG LENGTH DISCREPANCY: Primary | ICD-10-CM

## 2024-11-18 DIAGNOSIS — M79.605 PAIN AND SWELLING OF LEFT LOWER EXTREMITY: ICD-10-CM

## 2024-11-18 DIAGNOSIS — T81.49XA POSTOPERATIVE WOUND INFECTION OF LEFT HIP: ICD-10-CM

## 2024-11-18 DIAGNOSIS — S72.001A CLOSED DISPLACED FRACTURE OF RIGHT FEMORAL NECK (HCC): Primary | ICD-10-CM

## 2024-11-18 PROCEDURE — 99024 POSTOP FOLLOW-UP VISIT: CPT | Performed by: PHYSICIAN ASSISTANT

## 2024-11-18 PROCEDURE — 99212 OFFICE O/P EST SF 10 MIN: CPT | Performed by: PHYSICIAN ASSISTANT

## 2024-11-18 RX ORDER — PENICILLIN V POTASSIUM 500 MG/1
500 TABLET, FILM COATED ORAL 3 TIMES DAILY
COMMUNITY
Start: 2024-11-15

## 2024-11-18 RX ORDER — DOXYCYCLINE HYCLATE 100 MG
100 TABLET ORAL 2 TIMES DAILY
COMMUNITY
Start: 2024-11-15

## 2024-11-18 RX ORDER — TRAZODONE HYDROCHLORIDE 50 MG/1
50 TABLET, FILM COATED ORAL NIGHTLY
COMMUNITY
Start: 2024-11-15

## 2024-11-18 RX ORDER — HYDROCODONE BITARTRATE AND ACETAMINOPHEN 5; 325 MG/1; MG/1
1 TABLET ORAL EVERY 8 HOURS PRN
Qty: 15 TABLET | Refills: 0 | Status: SHIPPED | OUTPATIENT
Start: 2024-11-18 | End: 2024-11-23

## 2024-11-18 NOTE — PROGRESS NOTES
euthymic.    Extremity:  Right Lower Extremity  Skin clean dry and intact, without signs of infection   Incision well-healed  no edema noted  Compartments supple throughout thigh and leg  Calf supple and not tender  negative Homans  Demonstrates active motion with HF, knee flexion/extension and ankle DF/PF  Presents in a wheelchair  States sensation intact to touch in sural, deep peroneal, superficial peroneal, saphenous, posterior tibial  nerve distributions to foot/ankle.  Palpable dorsalis pedis and posterior tibialis pulses, cap refill brisk in toes, foot warm/perfused.    BP (!) 157/98 (Site: Right Upper Arm, Position: Sitting, Cuff Size: Medium Adult)   Pulse 97   Temp 98 °F (36.7 °C) (Temporal)   Resp 16   SpO2 91%     XR:   Not taken today    Assessment:  1. Personal history of infectious disease          2. History of right hip replacement          3. Status post open reduction and internal fixation (ORIF) of fracture          4. Posttraumatic seroma (HCC)              Plan:  Xrays reviewed with patient. Plan of care discussed in detail, all questions sought and answered to patients satisfaction at this time.     Weightbearing as tolerated right lower extremity.    Patient will be getting discharged from rehab center and she is set up to start home therapy.    Referral to [facility] for right shoe lift for leg length discrepancy.    Oral antibiotics per ID    Follow-up in 6-8 weeks for reevaluation and x-rays.    Call if any questions or concerns    Electronically signed by ROSALINE Mccarthy on 11/18/2024 at 8:25 AM  Note: This report was completed using [employer] voiced recognition software.  Every effort has been made to ensure accuracy; however, inadvertent computerized transcription errors may be present.

## 2024-11-18 NOTE — PATIENT INSTRUCTIONS
Weightbearing as tolerated right lower extremity.    Patient will be getting discharged from rehab center and she is set up to start home therapy.    Referral to [facility] for right shoe lift for leg length discrepancy.    Oral antibiotics per ID    Follow-up in 6-8 weeks for reevaluation and x-rays.    Call if any questions or concerns

## 2025-01-03 DIAGNOSIS — Z96.649 PROSTHETIC HIP INFECTION, INITIAL ENCOUNTER (HCC): Primary | ICD-10-CM

## 2025-01-03 DIAGNOSIS — T84.59XA PROSTHETIC HIP INFECTION, INITIAL ENCOUNTER (HCC): Primary | ICD-10-CM

## 2025-01-08 ENCOUNTER — TELEPHONE (OUTPATIENT)
Dept: ORTHOPEDIC SURGERY | Age: 80
End: 2025-01-08

## 2025-01-08 NOTE — TELEPHONE ENCOUNTER
Patient left voicemail stating she needs to cancel her appointment for today due to the weather (she is unable to drive in the snow). She will call our office back to r/s her appointment.

## 2025-03-28 ENCOUNTER — APPOINTMENT (OUTPATIENT)
Dept: GENERAL RADIOLOGY | Age: 80
DRG: 682 | End: 2025-03-28
Payer: MEDICARE

## 2025-03-28 ENCOUNTER — HOSPITAL ENCOUNTER (INPATIENT)
Age: 80
LOS: 4 days | Discharge: HOME | DRG: 682 | End: 2025-04-01
Attending: STUDENT IN AN ORGANIZED HEALTH CARE EDUCATION/TRAINING PROGRAM | Admitting: FAMILY MEDICINE
Payer: MEDICARE

## 2025-03-28 ENCOUNTER — APPOINTMENT (OUTPATIENT)
Dept: CT IMAGING | Age: 80
DRG: 682 | End: 2025-03-28
Payer: MEDICARE

## 2025-03-28 ENCOUNTER — APPOINTMENT (OUTPATIENT)
Dept: CT IMAGING | Age: 80
DRG: 682 | End: 2025-03-28
Attending: STUDENT IN AN ORGANIZED HEALTH CARE EDUCATION/TRAINING PROGRAM
Payer: MEDICARE

## 2025-03-28 DIAGNOSIS — R29.90 STROKE-LIKE SYMPTOM: ICD-10-CM

## 2025-03-28 DIAGNOSIS — N17.9 AKI (ACUTE KIDNEY INJURY): ICD-10-CM

## 2025-03-28 DIAGNOSIS — R47.01 APHASIA: ICD-10-CM

## 2025-03-28 DIAGNOSIS — E87.5 HYPERKALEMIA: ICD-10-CM

## 2025-03-28 DIAGNOSIS — I82.A21 DVT OF RIGHT AXILLARY VEIN, CHRONIC (HCC): ICD-10-CM

## 2025-03-28 DIAGNOSIS — N30.00 ACUTE CYSTITIS WITHOUT HEMATURIA: Primary | ICD-10-CM

## 2025-03-28 DIAGNOSIS — R41.82 ALTERED MENTAL STATUS, UNSPECIFIED ALTERED MENTAL STATUS TYPE: ICD-10-CM

## 2025-03-28 DIAGNOSIS — I63.9 ISCHEMIC STROKE (HCC): ICD-10-CM

## 2025-03-28 DIAGNOSIS — R29.90 STROKE-LIKE SYMPTOMS: ICD-10-CM

## 2025-03-28 LAB
ALBUMIN SERPL-MCNC: 3.1 G/DL (ref 3.5–5.2)
ALP SERPL-CCNC: 136 U/L (ref 35–104)
ALT SERPL-CCNC: 17 U/L (ref 0–32)
ANION GAP SERPL CALCULATED.3IONS-SCNC: 13 MMOL/L (ref 7–16)
AST SERPL-CCNC: 16 U/L (ref 0–31)
BACTERIA URNS QL MICRO: ABNORMAL
BILIRUB SERPL-MCNC: 0.2 MG/DL (ref 0–1.2)
BILIRUB UR QL STRIP: NEGATIVE
BUN SERPL-MCNC: 32 MG/DL (ref 6–23)
CALCIUM SERPL-MCNC: 8.8 MG/DL (ref 8.6–10.2)
CHLORIDE SERPL-SCNC: 108 MMOL/L (ref 98–107)
CLARITY UR: ABNORMAL
CO2 SERPL-SCNC: 18 MMOL/L (ref 22–29)
COLOR UR: YELLOW
CREAT SERPL-MCNC: 1.8 MG/DL (ref 0.5–1)
ERYTHROCYTE [DISTWIDTH] IN BLOOD BY AUTOMATED COUNT: 15.7 % (ref 11.5–15)
GFR, ESTIMATED: 30 ML/MIN/1.73M2
GLUCOSE BLD-MCNC: 123 MG/DL (ref 74–99)
GLUCOSE SERPL-MCNC: 129 MG/DL (ref 74–99)
GLUCOSE UR STRIP-MCNC: NEGATIVE MG/DL
HCT VFR BLD AUTO: 35.8 % (ref 34–48)
HGB BLD-MCNC: 11 G/DL (ref 11.5–15.5)
HGB UR QL STRIP.AUTO: NEGATIVE
INR PPP: 1.3
KETONES UR STRIP-MCNC: NEGATIVE MG/DL
LEUKOCYTE ESTERASE UR QL STRIP: ABNORMAL
MCH RBC QN AUTO: 28.7 PG (ref 26–35)
MCHC RBC AUTO-ENTMCNC: 30.7 G/DL (ref 32–34.5)
MCV RBC AUTO: 93.5 FL (ref 80–99.9)
NITRITE UR QL STRIP: NEGATIVE
PARTIAL THROMBOPLASTIN TIME: 29.7 SEC (ref 24.5–35.1)
PH UR STRIP: 5.5 [PH] (ref 5–8)
PLATELET # BLD AUTO: 199 K/UL (ref 130–450)
PMV BLD AUTO: 10 FL (ref 7–12)
POTASSIUM SERPL-SCNC: 5.4 MMOL/L (ref 3.5–5)
PROT SERPL-MCNC: 5.8 G/DL (ref 6.4–8.3)
PROT UR STRIP-MCNC: NEGATIVE MG/DL
PROTHROMBIN TIME: 14.6 SEC (ref 9.3–12.4)
RBC # BLD AUTO: 3.83 M/UL (ref 3.5–5.5)
RBC #/AREA URNS HPF: ABNORMAL /HPF
SODIUM SERPL-SCNC: 139 MMOL/L (ref 132–146)
SP GR UR STRIP: 1.02 (ref 1–1.03)
UROBILINOGEN UR STRIP-ACNC: 0.2 EU/DL (ref 0–1)
WBC #/AREA URNS HPF: ABNORMAL /HPF
WBC OTHER # BLD: 10 K/UL (ref 4.5–11.5)

## 2025-03-28 PROCEDURE — 96374 THER/PROPH/DIAG INJ IV PUSH: CPT

## 2025-03-28 PROCEDURE — 74176 CT ABD & PELVIS W/O CONTRAST: CPT

## 2025-03-28 PROCEDURE — 87086 URINE CULTURE/COLONY COUNT: CPT

## 2025-03-28 PROCEDURE — 80053 COMPREHEN METABOLIC PANEL: CPT

## 2025-03-28 PROCEDURE — 70450 CT HEAD/BRAIN W/O DYE: CPT

## 2025-03-28 PROCEDURE — 71045 X-RAY EXAM CHEST 1 VIEW: CPT

## 2025-03-28 PROCEDURE — 70498 CT ANGIOGRAPHY NECK: CPT

## 2025-03-28 PROCEDURE — 83036 HEMOGLOBIN GLYCOSYLATED A1C: CPT

## 2025-03-28 PROCEDURE — 2500000003 HC RX 250 WO HCPCS: Performed by: STUDENT IN AN ORGANIZED HEALTH CARE EDUCATION/TRAINING PROGRAM

## 2025-03-28 PROCEDURE — 93005 ELECTROCARDIOGRAM TRACING: CPT | Performed by: STUDENT IN AN ORGANIZED HEALTH CARE EDUCATION/TRAINING PROGRAM

## 2025-03-28 PROCEDURE — 6370000000 HC RX 637 (ALT 250 FOR IP): Performed by: STUDENT IN AN ORGANIZED HEALTH CARE EDUCATION/TRAINING PROGRAM

## 2025-03-28 PROCEDURE — 2060000000 HC ICU INTERMEDIATE R&B

## 2025-03-28 PROCEDURE — 82962 GLUCOSE BLOOD TEST: CPT

## 2025-03-28 PROCEDURE — 87077 CULTURE AEROBIC IDENTIFY: CPT

## 2025-03-28 PROCEDURE — 85027 COMPLETE CBC AUTOMATED: CPT

## 2025-03-28 PROCEDURE — 6360000004 HC RX CONTRAST MEDICATION: Performed by: RADIOLOGY

## 2025-03-28 PROCEDURE — 99223 1ST HOSP IP/OBS HIGH 75: CPT

## 2025-03-28 PROCEDURE — 2580000003 HC RX 258: Performed by: STUDENT IN AN ORGANIZED HEALTH CARE EDUCATION/TRAINING PROGRAM

## 2025-03-28 PROCEDURE — 85610 PROTHROMBIN TIME: CPT

## 2025-03-28 PROCEDURE — 70496 CT ANGIOGRAPHY HEAD: CPT

## 2025-03-28 PROCEDURE — 81001 URINALYSIS AUTO W/SCOPE: CPT

## 2025-03-28 PROCEDURE — 6360000002 HC RX W HCPCS: Performed by: STUDENT IN AN ORGANIZED HEALTH CARE EDUCATION/TRAINING PROGRAM

## 2025-03-28 PROCEDURE — 85730 THROMBOPLASTIN TIME PARTIAL: CPT

## 2025-03-28 PROCEDURE — 99285 EMERGENCY DEPT VISIT HI MDM: CPT

## 2025-03-28 RX ORDER — SODIUM CHLORIDE 0.9 % (FLUSH) 0.9 %
10 SYRINGE (ML) INJECTION
Status: DISCONTINUED | OUTPATIENT
Start: 2025-03-28 | End: 2025-04-01 | Stop reason: HOSPADM

## 2025-03-28 RX ORDER — ASPIRIN 81 MG/1
324 TABLET, CHEWABLE ORAL ONCE
Status: COMPLETED | OUTPATIENT
Start: 2025-03-28 | End: 2025-03-28

## 2025-03-28 RX ORDER — 0.9 % SODIUM CHLORIDE 0.9 %
1000 INTRAVENOUS SOLUTION INTRAVENOUS ONCE
Status: COMPLETED | OUTPATIENT
Start: 2025-03-28 | End: 2025-03-28

## 2025-03-28 RX ORDER — IOPAMIDOL 755 MG/ML
75 INJECTION, SOLUTION INTRAVASCULAR
Status: COMPLETED | OUTPATIENT
Start: 2025-03-28 | End: 2025-03-28

## 2025-03-28 RX ADMIN — WATER 2000 MG: 1 INJECTION INTRAMUSCULAR; INTRAVENOUS; SUBCUTANEOUS at 20:12

## 2025-03-28 RX ADMIN — ASPIRIN 81 MG CHEWABLE TABLET 324 MG: 81 TABLET CHEWABLE at 18:19

## 2025-03-28 RX ADMIN — IOPAMIDOL 75 ML: 755 INJECTION, SOLUTION INTRAVENOUS at 17:46

## 2025-03-28 RX ADMIN — SODIUM CHLORIDE 1000 ML: 9 INJECTION, SOLUTION INTRAVENOUS at 18:22

## 2025-03-28 RX ADMIN — SODIUM ZIRCONIUM CYCLOSILICATE 10 G: 10 POWDER, FOR SUSPENSION ORAL at 18:25

## 2025-03-28 NOTE — ED PROVIDER NOTES
[facility] EMERGENCY DEPARTMENT  EMERGENCY DEPARTMENT ENCOUNTER        Pt Name: Rebeka Renee  MRN: [id number]  Birthdate 1945  Date of evaluation: 3/28/2025  Provider: Selam Love MD  PCP: Navneet Latif MD  Note Started: 5:41 PM EDT 3/28/25    HPI  79 y.o. female presenting for strokelike symptoms.  Last known well 3 PM.  At 4 PM, per EMS, caregiver found patient with altered mental status, trouble speaking.  Glucose was slightly low and was replaced by caregiver..      --------------------------------------------- PAST HISTORY ---------------------------------------------  Past Medical History:  has a past medical history of Anemia, Anxiety, Arthritis, Ascending aortic aneurysm, Asthma, Bipolar 1 disorder (HCC), Cancer (HCC), Cancer of breast, female (HCC), CHF (congestive heart failure) (HCC), Chronic back pain, Depression, Depression with anxiety, Diabetes mellitus (HCC), Dyslipidemia, Fibromyalgia, History of blood transfusion, Hypertension, Hypothyroidism, Neuropathy, Pericardial effusion, Pleural effusion, TIA (transient ischemic attack), Tobacco abuse, and Type II or unspecified type diabetes mellitus without mention of complication, not stated as uncontrolled.    Past Surgical History:  has a past surgical history that includes Lung removal, partial; Mastectomy (Bilateral); Kidney removal (Left, 2/2006); Hysterectomy; Gastric bypass surgery (9/2004); hernia repair; knee surgery (Right); transthoracic echocardiogram (4/1/2010); Cataract removal with implant (Bilateral); liver biopsy (2006); thoracentesis (Left, 1/2010 and 3/2010. ); Cholecystectomy; other surgical history (03/16/15); other surgical history (N/A, 4/15/15); Breast surgery; Nerve Block (Bilateral, 06/06/16); Kidney removal; other surgical history (N/A, 02/24/2021); Pain management procedure (N/A, 2/24/2021); hip surgery (Right, 1/4/2024); Revision total hip arthroplasty (Right, 2/12/2024);

## 2025-03-28 NOTE — ED NOTES
Stroke/ Gayathri Alert Time:    1740  Time Neurologist called:  :  1742  Time CT Notified:   1740    BRAIN alert time: (If applicable)

## 2025-03-29 ENCOUNTER — APPOINTMENT (OUTPATIENT)
Dept: MRI IMAGING | Age: 80
DRG: 682 | End: 2025-03-29
Payer: MEDICARE

## 2025-03-29 PROBLEM — R41.82 ALTERED MENTAL STATUS: Status: ACTIVE | Noted: 2025-03-29

## 2025-03-29 PROBLEM — E87.5 HYPERKALEMIA: Status: ACTIVE | Noted: 2025-03-29

## 2025-03-29 LAB
ANION GAP SERPL CALCULATED.3IONS-SCNC: 11 MMOL/L (ref 7–16)
ANION GAP SERPL CALCULATED.3IONS-SCNC: 13 MMOL/L (ref 7–16)
BUN SERPL-MCNC: 21 MG/DL (ref 6–23)
BUN SERPL-MCNC: 24 MG/DL (ref 6–23)
CALCIUM SERPL-MCNC: 8.8 MG/DL (ref 8.6–10.2)
CALCIUM SERPL-MCNC: 9.2 MG/DL (ref 8.6–10.2)
CHLORIDE SERPL-SCNC: 108 MMOL/L (ref 98–107)
CHLORIDE SERPL-SCNC: 111 MMOL/L (ref 98–107)
CHOLEST SERPL-MCNC: 105 MG/DL
CO2 SERPL-SCNC: 18 MMOL/L (ref 22–29)
CO2 SERPL-SCNC: 20 MMOL/L (ref 22–29)
CREAT SERPL-MCNC: 1.1 MG/DL (ref 0.5–1)
CREAT SERPL-MCNC: 1.3 MG/DL (ref 0.5–1)
CREAT UR-MCNC: 111.4 MG/DL (ref 29–226)
ERYTHROCYTE [DISTWIDTH] IN BLOOD BY AUTOMATED COUNT: 15.8 % (ref 11.5–15)
GFR, ESTIMATED: 42 ML/MIN/1.73M2
GFR, ESTIMATED: 50 ML/MIN/1.73M2
GLUCOSE BLD-MCNC: 112 MG/DL (ref 74–99)
GLUCOSE BLD-MCNC: 131 MG/DL (ref 74–99)
GLUCOSE BLD-MCNC: 138 MG/DL (ref 74–99)
GLUCOSE BLD-MCNC: 221 MG/DL (ref 74–99)
GLUCOSE BLD-MCNC: 247 MG/DL (ref 74–99)
GLUCOSE BLD-MCNC: 41 MG/DL (ref 74–99)
GLUCOSE BLD-MCNC: 54 MG/DL (ref 74–99)
GLUCOSE BLD-MCNC: 59 MG/DL (ref 74–99)
GLUCOSE BLD-MCNC: 65 MG/DL (ref 74–99)
GLUCOSE BLD-MCNC: 67 MG/DL (ref 74–99)
GLUCOSE BLD-MCNC: 71 MG/DL (ref 74–99)
GLUCOSE BLD-MCNC: 73 MG/DL (ref 74–99)
GLUCOSE BLD-MCNC: 75 MG/DL (ref 74–99)
GLUCOSE BLD-MCNC: 75 MG/DL (ref 74–99)
GLUCOSE BLD-MCNC: 77 MG/DL (ref 74–99)
GLUCOSE BLD-MCNC: 84 MG/DL (ref 74–99)
GLUCOSE SERPL-MCNC: 63 MG/DL (ref 74–99)
GLUCOSE SERPL-MCNC: 79 MG/DL (ref 74–99)
HBA1C MFR BLD: 4.5 % (ref 4–5.6)
HBA1C MFR BLD: 4.7 % (ref 4–5.6)
HCT VFR BLD AUTO: 41.6 % (ref 34–48)
HDLC SERPL-MCNC: 45 MG/DL
HGB BLD-MCNC: 12.3 G/DL (ref 11.5–15.5)
LDLC SERPL CALC-MCNC: 49 MG/DL
MCH RBC QN AUTO: 28.3 PG (ref 26–35)
MCHC RBC AUTO-ENTMCNC: 29.6 G/DL (ref 32–34.5)
MCV RBC AUTO: 95.6 FL (ref 80–99.9)
PLATELET # BLD AUTO: 174 K/UL (ref 130–450)
PMV BLD AUTO: 11.2 FL (ref 7–12)
POTASSIUM SERPL-SCNC: 5.2 MMOL/L (ref 3.5–5)
POTASSIUM SERPL-SCNC: 6.1 MMOL/L (ref 3.5–5)
RBC # BLD AUTO: 4.35 M/UL (ref 3.5–5.5)
SODIUM SERPL-SCNC: 139 MMOL/L (ref 132–146)
SODIUM SERPL-SCNC: 142 MMOL/L (ref 132–146)
SODIUM UR-SCNC: 44 MMOL/L
TRIGL SERPL-MCNC: 55 MG/DL
TSH SERPL DL<=0.05 MIU/L-ACNC: 1.48 UIU/ML (ref 0.27–4.2)
UUN UR-MCNC: 315 MG/DL (ref 800–1666)
VLDLC SERPL CALC-MCNC: 11 MG/DL
WBC OTHER # BLD: 8.6 K/UL (ref 4.5–11.5)

## 2025-03-29 PROCEDURE — 70551 MRI BRAIN STEM W/O DYE: CPT

## 2025-03-29 PROCEDURE — 6360000002 HC RX W HCPCS: Performed by: FAMILY MEDICINE

## 2025-03-29 PROCEDURE — 83036 HEMOGLOBIN GLYCOSYLATED A1C: CPT

## 2025-03-29 PROCEDURE — 82962 GLUCOSE BLOOD TEST: CPT

## 2025-03-29 PROCEDURE — 2580000003 HC RX 258

## 2025-03-29 PROCEDURE — 6370000000 HC RX 637 (ALT 250 FOR IP)

## 2025-03-29 PROCEDURE — 92523 SPEECH SOUND LANG COMPREHEN: CPT

## 2025-03-29 PROCEDURE — 85027 COMPLETE CBC AUTOMATED: CPT

## 2025-03-29 PROCEDURE — 80061 LIPID PANEL: CPT

## 2025-03-29 PROCEDURE — 36415 COLL VENOUS BLD VENIPUNCTURE: CPT

## 2025-03-29 PROCEDURE — 80048 BASIC METABOLIC PNL TOTAL CA: CPT

## 2025-03-29 PROCEDURE — 84540 ASSAY OF URINE/UREA-N: CPT

## 2025-03-29 PROCEDURE — 6360000002 HC RX W HCPCS

## 2025-03-29 PROCEDURE — 99232 SBSQ HOSP IP/OBS MODERATE 35: CPT

## 2025-03-29 PROCEDURE — 82570 ASSAY OF URINE CREATININE: CPT

## 2025-03-29 PROCEDURE — 84443 ASSAY THYROID STIM HORMONE: CPT

## 2025-03-29 PROCEDURE — 2500000003 HC RX 250 WO HCPCS

## 2025-03-29 PROCEDURE — 84300 ASSAY OF URINE SODIUM: CPT

## 2025-03-29 PROCEDURE — 2060000000 HC ICU INTERMEDIATE R&B

## 2025-03-29 PROCEDURE — 2580000003 HC RX 258: Performed by: STUDENT IN AN ORGANIZED HEALTH CARE EDUCATION/TRAINING PROGRAM

## 2025-03-29 RX ORDER — ONDANSETRON 4 MG/1
4 TABLET, ORALLY DISINTEGRATING ORAL EVERY 8 HOURS PRN
Status: DISCONTINUED | OUTPATIENT
Start: 2025-03-29 | End: 2025-04-01 | Stop reason: HOSPADM

## 2025-03-29 RX ORDER — LOSARTAN POTASSIUM 50 MG/1
50 TABLET ORAL DAILY
Status: ON HOLD | COMMUNITY
End: 2025-04-01 | Stop reason: HOSPADM

## 2025-03-29 RX ORDER — ATORVASTATIN CALCIUM 40 MG/1
80 TABLET, FILM COATED ORAL DAILY
Status: DISCONTINUED | OUTPATIENT
Start: 2025-03-29 | End: 2025-04-01 | Stop reason: HOSPADM

## 2025-03-29 RX ORDER — POLYETHYLENE GLYCOL 3350 17 G/17G
17 POWDER, FOR SOLUTION ORAL DAILY PRN
Status: DISCONTINUED | OUTPATIENT
Start: 2025-03-29 | End: 2025-04-01 | Stop reason: HOSPADM

## 2025-03-29 RX ORDER — LORAZEPAM 2 MG/ML
2 INJECTION INTRAMUSCULAR ONCE
Status: COMPLETED | OUTPATIENT
Start: 2025-03-29 | End: 2025-03-29

## 2025-03-29 RX ORDER — SODIUM CHLORIDE 9 MG/ML
INJECTION, SOLUTION INTRAVENOUS PRN
Status: DISCONTINUED | OUTPATIENT
Start: 2025-03-29 | End: 2025-04-01 | Stop reason: HOSPADM

## 2025-03-29 RX ORDER — VERAPAMIL HYDROCHLORIDE 120 MG/1
120 CAPSULE, EXTENDED RELEASE ORAL NIGHTLY
Status: ON HOLD | COMMUNITY

## 2025-03-29 RX ORDER — ONDANSETRON 2 MG/ML
4 INJECTION INTRAMUSCULAR; INTRAVENOUS EVERY 6 HOURS PRN
Status: DISCONTINUED | OUTPATIENT
Start: 2025-03-29 | End: 2025-04-01 | Stop reason: HOSPADM

## 2025-03-29 RX ORDER — ASPIRIN 81 MG/1
81 TABLET, CHEWABLE ORAL DAILY
Status: DISCONTINUED | OUTPATIENT
Start: 2025-03-29 | End: 2025-04-01 | Stop reason: HOSPADM

## 2025-03-29 RX ORDER — CALCIUM GLUCONATE 20 MG/ML
1000 INJECTION, SOLUTION INTRAVENOUS ONCE
Status: COMPLETED | OUTPATIENT
Start: 2025-03-29 | End: 2025-03-29

## 2025-03-29 RX ORDER — DEXTROSE MONOHYDRATE 100 MG/ML
INJECTION, SOLUTION INTRAVENOUS CONTINUOUS PRN
Status: DISCONTINUED | OUTPATIENT
Start: 2025-03-29 | End: 2025-04-01 | Stop reason: HOSPADM

## 2025-03-29 RX ORDER — DEXTROSE MONOHYDRATE 100 MG/ML
INJECTION, SOLUTION INTRAVENOUS CONTINUOUS PRN
Status: DISCONTINUED | OUTPATIENT
Start: 2025-03-29 | End: 2025-03-29 | Stop reason: SDUPTHER

## 2025-03-29 RX ORDER — BACLOFEN 10 MG/1
10 TABLET ORAL 2 TIMES DAILY
Status: ON HOLD | COMMUNITY

## 2025-03-29 RX ORDER — SODIUM CHLORIDE 0.9 % (FLUSH) 0.9 %
5-40 SYRINGE (ML) INJECTION EVERY 12 HOURS SCHEDULED
Status: DISCONTINUED | OUTPATIENT
Start: 2025-03-29 | End: 2025-04-01 | Stop reason: HOSPADM

## 2025-03-29 RX ORDER — ENOXAPARIN SODIUM 100 MG/ML
30 INJECTION SUBCUTANEOUS DAILY
Status: DISCONTINUED | OUTPATIENT
Start: 2025-03-29 | End: 2025-03-30

## 2025-03-29 RX ORDER — ENOXAPARIN SODIUM 100 MG/ML
40 INJECTION SUBCUTANEOUS DAILY
Status: DISCONTINUED | OUTPATIENT
Start: 2025-03-29 | End: 2025-03-29 | Stop reason: DRUGHIGH

## 2025-03-29 RX ORDER — OMEPRAZOLE 20 MG/1
20 CAPSULE, DELAYED RELEASE ORAL DAILY
Status: ON HOLD | COMMUNITY

## 2025-03-29 RX ORDER — LOSARTAN POTASSIUM 50 MG/1
50 TABLET ORAL DAILY
Status: ON HOLD | COMMUNITY

## 2025-03-29 RX ORDER — TRAZODONE HYDROCHLORIDE 100 MG/1
100 TABLET ORAL NIGHTLY
Status: ON HOLD | COMMUNITY

## 2025-03-29 RX ORDER — SODIUM CHLORIDE 0.9 % (FLUSH) 0.9 %
5-40 SYRINGE (ML) INJECTION PRN
Status: DISCONTINUED | OUTPATIENT
Start: 2025-03-29 | End: 2025-04-01 | Stop reason: HOSPADM

## 2025-03-29 RX ORDER — BUMETANIDE 2 MG/1
2 TABLET ORAL 2 TIMES DAILY
Status: ON HOLD | COMMUNITY

## 2025-03-29 RX ORDER — INSULIN LISPRO 100 [IU]/ML
0-4 INJECTION, SOLUTION INTRAVENOUS; SUBCUTANEOUS
Status: DISCONTINUED | OUTPATIENT
Start: 2025-03-29 | End: 2025-04-01 | Stop reason: HOSPADM

## 2025-03-29 RX ORDER — ASPIRIN 300 MG/1
300 SUPPOSITORY RECTAL DAILY
Status: DISCONTINUED | OUTPATIENT
Start: 2025-03-29 | End: 2025-04-01 | Stop reason: HOSPADM

## 2025-03-29 RX ORDER — SODIUM CHLORIDE 9 MG/ML
INJECTION, SOLUTION INTRAVENOUS CONTINUOUS
Status: DISCONTINUED | OUTPATIENT
Start: 2025-03-29 | End: 2025-03-29

## 2025-03-29 RX ORDER — OXYBUTYNIN CHLORIDE 10 MG/1
10 TABLET, EXTENDED RELEASE ORAL 2 TIMES DAILY
Status: ON HOLD | COMMUNITY

## 2025-03-29 RX ORDER — 0.9 % SODIUM CHLORIDE 0.9 %
1000 INTRAVENOUS SOLUTION INTRAVENOUS ONCE
Status: COMPLETED | OUTPATIENT
Start: 2025-03-29 | End: 2025-03-29

## 2025-03-29 RX ORDER — SODIUM CHLORIDE 9 MG/ML
INJECTION, SOLUTION INTRAVENOUS CONTINUOUS
Status: DISCONTINUED | OUTPATIENT
Start: 2025-03-29 | End: 2025-03-30

## 2025-03-29 RX ORDER — GLUCAGON 1 MG/ML
1 KIT INJECTION PRN
Status: DISCONTINUED | OUTPATIENT
Start: 2025-03-29 | End: 2025-04-01 | Stop reason: HOSPADM

## 2025-03-29 RX ORDER — DEXTROSE MONOHYDRATE 100 MG/ML
INJECTION, SOLUTION INTRAVENOUS
Status: COMPLETED
Start: 2025-03-29 | End: 2025-03-29

## 2025-03-29 RX ORDER — GLUCAGON 1 MG/ML
1 KIT INJECTION PRN
Status: DISCONTINUED | OUTPATIENT
Start: 2025-03-29 | End: 2025-03-29 | Stop reason: SDUPTHER

## 2025-03-29 RX ORDER — CLOPIDOGREL BISULFATE 75 MG/1
75 TABLET ORAL DAILY
Status: DISCONTINUED | OUTPATIENT
Start: 2025-03-29 | End: 2025-04-01 | Stop reason: HOSPADM

## 2025-03-29 RX ADMIN — INSULIN HUMAN 10 UNITS: 100 INJECTION, SOLUTION PARENTERAL at 13:59

## 2025-03-29 RX ADMIN — ONDANSETRON 4 MG: 2 INJECTION, SOLUTION INTRAMUSCULAR; INTRAVENOUS at 15:01

## 2025-03-29 RX ADMIN — ENOXAPARIN SODIUM 30 MG: 100 INJECTION SUBCUTANEOUS at 07:31

## 2025-03-29 RX ADMIN — DEXTROSE MONOHYDRATE 125 ML: 10 INJECTION, SOLUTION INTRAVENOUS at 15:33

## 2025-03-29 RX ADMIN — SODIUM CHLORIDE: 9 INJECTION, SOLUTION INTRAVENOUS at 14:18

## 2025-03-29 RX ADMIN — WATER 1000 MG: 1 INJECTION INTRAMUSCULAR; INTRAVENOUS; SUBCUTANEOUS at 20:50

## 2025-03-29 RX ADMIN — INSULIN LISPRO 1 UNITS: 100 INJECTION, SOLUTION INTRAVENOUS; SUBCUTANEOUS at 11:01

## 2025-03-29 RX ADMIN — DEXTROSE MONOHYDRATE 125 ML: 10 INJECTION, SOLUTION INTRAVENOUS at 15:00

## 2025-03-29 RX ADMIN — DEXTROSE: 10 SOLUTION INTRAVENOUS at 16:34

## 2025-03-29 RX ADMIN — APIXABAN 5 MG: 5 TABLET, FILM COATED ORAL at 01:10

## 2025-03-29 RX ADMIN — CLOPIDOGREL BISULFATE 75 MG: 75 TABLET, FILM COATED ORAL at 07:31

## 2025-03-29 RX ADMIN — LORAZEPAM 2 MG: 2 INJECTION INTRAMUSCULAR; INTRAVENOUS at 08:35

## 2025-03-29 RX ADMIN — SODIUM CHLORIDE, PRESERVATIVE FREE 10 ML: 5 INJECTION INTRAVENOUS at 20:50

## 2025-03-29 RX ADMIN — SODIUM CHLORIDE 1000 ML: 9 INJECTION, SOLUTION INTRAVENOUS at 01:01

## 2025-03-29 RX ADMIN — SODIUM ZIRCONIUM CYCLOSILICATE 10 G: 10 POWDER, FOR SUSPENSION ORAL at 13:49

## 2025-03-29 RX ADMIN — CALCIUM GLUCONATE 1000 MG: 20 INJECTION, SOLUTION INTRAVENOUS at 13:47

## 2025-03-29 RX ADMIN — DEXTROSE MONOHYDRATE 250 ML: 10 INJECTION, SOLUTION INTRAVENOUS at 13:57

## 2025-03-29 RX ADMIN — ATORVASTATIN CALCIUM 80 MG: 40 TABLET, FILM COATED ORAL at 07:31

## 2025-03-29 RX ADMIN — INSULIN LISPRO 1 UNITS: 100 INJECTION, SOLUTION INTRAVENOUS; SUBCUTANEOUS at 07:03

## 2025-03-29 RX ADMIN — SODIUM CHLORIDE: 9 INJECTION, SOLUTION INTRAVENOUS at 01:04

## 2025-03-29 RX ADMIN — ASPIRIN 81 MG CHEWABLE TABLET 81 MG: 81 TABLET CHEWABLE at 07:31

## 2025-03-29 RX ADMIN — DEXTROSE MONOHYDRATE 125 ML: 10 INJECTION, SOLUTION INTRAVENOUS at 01:00

## 2025-03-29 NOTE — PLAN OF CARE
Problem: Safety - Adult  Goal: Free from fall injury  Outcome: Progressing     Problem: Chronic Conditions and Co-morbidities  Goal: Patient's chronic conditions and co-morbidity symptoms are monitored and maintained or improved  Outcome: Progressing     Problem: Discharge Planning  Goal: Discharge to home or other facility with appropriate resources  Outcome: Progressing  Flowsheets (Taken 3/29/2025 1820 by Sue Lee, RN)  Discharge to home or other facility with appropriate resources: Identify barriers to discharge with patient and caregiver     Problem: Skin/Tissue Integrity  Goal: Skin integrity remains intact  Description: 1.  Monitor for areas of redness and/or skin breakdown  2.  Assess vascular access sites hourly  3.  Every 4-6 hours minimum:  Change oxygen saturation probe site  4.  Every 4-6 hours:  If on nasal continuous positive airway pressure, respiratory therapy assess nares and determine need for appliance change or resting period  Outcome: Progressing

## 2025-03-29 NOTE — H&P
Hospitalist History & Physical      PCP: Navneet Latif MD    Date of Service: Pt seen/examined on 3/28/2025     Chief Complaint:  had concerns including Altered Mental Status (LKW 1500 per . Home health nurse visited at 1600, pt was dysarthric, ataxic, and disoriented. Typically is ambulatory and oriented x4. ).    History of Present Illness:    Ms. Rebeka Renee, a 79 y.o. year old female  who  has a past medical history of Anemia, Anxiety, Arthritis, Ascending aortic aneurysm, Asthma, Bipolar 1 disorder (HCC), Cancer (HCC), Cancer of breast, female (HCC), CHF (congestive heart failure) (HCC), Chronic back pain, Depression, Depression with anxiety, Diabetes mellitus (HCC), Dyslipidemia, Fibromyalgia, History of blood transfusion, Hypertension, Hypothyroidism, Neuropathy, Pericardial effusion, Pleural effusion, TIA (transient ischemic attack), Tobacco abuse, and Type II or unspecified type diabetes mellitus without mention of complication, not stated as uncontrolled.     Patient presents to the ED after being found by caregiver with altered mental status and trouble speaking.  Glucose was also low, and was replaced by caregiver.  Upon arrival to the ED, stroke alert called.   No LVO or intracranial hemorrhage on imaging Patient not a TNK candidate as she is on Eliquis. Patient not thrombectomy candidate.  Full dose aspirin given.  On my exam patient continues to have slurred speech, she is alert and is gets frustrated as she has some expressive aphasia.  She denies any chest pain shortness of breath or lightheadedness.  Lung sounds are clear      ER COURSE:  UA concerning for UTI and  VANESA, creatinine 1.8   CT abdomen pelvis ordered, no obstructing stone. Patient given IV Rocephin.   Labs showed mild hyperkalemia, potassium 5.4 patient given Lokelma  EKG-sinus rhythm QTc 425    PREVIOUS HOSPITALIZATION:  Admission from 10/1-9/2024 reviewed.   Hospital course in brief:  (Please refer to daily

## 2025-03-30 ENCOUNTER — APPOINTMENT (OUTPATIENT)
Dept: ULTRASOUND IMAGING | Age: 80
DRG: 682 | End: 2025-03-30
Payer: MEDICARE

## 2025-03-30 LAB
ANION GAP SERPL CALCULATED.3IONS-SCNC: 12 MMOL/L (ref 7–16)
BUN SERPL-MCNC: 17 MG/DL (ref 6–23)
CALCIUM SERPL-MCNC: 8.5 MG/DL (ref 8.6–10.2)
CHLORIDE SERPL-SCNC: 105 MMOL/L (ref 98–107)
CO2 SERPL-SCNC: 17 MMOL/L (ref 22–29)
CREAT SERPL-MCNC: 1 MG/DL (ref 0.5–1)
ERYTHROCYTE [DISTWIDTH] IN BLOOD BY AUTOMATED COUNT: 15.4 % (ref 11.5–15)
GFR, ESTIMATED: 59 ML/MIN/1.73M2
GLUCOSE BLD-MCNC: 106 MG/DL (ref 74–99)
GLUCOSE BLD-MCNC: 109 MG/DL (ref 74–99)
GLUCOSE BLD-MCNC: 111 MG/DL (ref 74–99)
GLUCOSE BLD-MCNC: 93 MG/DL (ref 74–99)
GLUCOSE SERPL-MCNC: 84 MG/DL (ref 74–99)
HCT VFR BLD AUTO: 36.7 % (ref 34–48)
HGB BLD-MCNC: 11.4 G/DL (ref 11.5–15.5)
MCH RBC QN AUTO: 29.1 PG (ref 26–35)
MCHC RBC AUTO-ENTMCNC: 31.1 G/DL (ref 32–34.5)
MCV RBC AUTO: 93.6 FL (ref 80–99.9)
PLATELET # BLD AUTO: 176 K/UL (ref 130–450)
PMV BLD AUTO: 11.7 FL (ref 7–12)
POTASSIUM SERPL-SCNC: 5.3 MMOL/L (ref 3.5–5)
RBC # BLD AUTO: 3.92 M/UL (ref 3.5–5.5)
SODIUM SERPL-SCNC: 134 MMOL/L (ref 132–146)
WBC OTHER # BLD: 12.8 K/UL (ref 4.5–11.5)

## 2025-03-30 PROCEDURE — 99232 SBSQ HOSP IP/OBS MODERATE 35: CPT

## 2025-03-30 PROCEDURE — 6360000002 HC RX W HCPCS: Performed by: FAMILY MEDICINE

## 2025-03-30 PROCEDURE — 82962 GLUCOSE BLOOD TEST: CPT

## 2025-03-30 PROCEDURE — 97166 OT EVAL MOD COMPLEX 45 MIN: CPT

## 2025-03-30 PROCEDURE — 93971 EXTREMITY STUDY: CPT

## 2025-03-30 PROCEDURE — 97161 PT EVAL LOW COMPLEX 20 MIN: CPT

## 2025-03-30 PROCEDURE — 2060000000 HC ICU INTERMEDIATE R&B

## 2025-03-30 PROCEDURE — 85027 COMPLETE CBC AUTOMATED: CPT

## 2025-03-30 PROCEDURE — 6360000002 HC RX W HCPCS

## 2025-03-30 PROCEDURE — 2500000003 HC RX 250 WO HCPCS

## 2025-03-30 PROCEDURE — 6370000000 HC RX 637 (ALT 250 FOR IP)

## 2025-03-30 PROCEDURE — 80048 BASIC METABOLIC PNL TOTAL CA: CPT

## 2025-03-30 PROCEDURE — 97535 SELF CARE MNGMENT TRAINING: CPT

## 2025-03-30 PROCEDURE — 36415 COLL VENOUS BLD VENIPUNCTURE: CPT

## 2025-03-30 PROCEDURE — 97530 THERAPEUTIC ACTIVITIES: CPT

## 2025-03-30 RX ORDER — LOSARTAN POTASSIUM 50 MG/1
50 TABLET ORAL DAILY
Status: DISCONTINUED | OUTPATIENT
Start: 2025-03-30 | End: 2025-04-01 | Stop reason: HOSPADM

## 2025-03-30 RX ORDER — ENOXAPARIN SODIUM 100 MG/ML
40 INJECTION SUBCUTANEOUS DAILY
Status: DISCONTINUED | OUTPATIENT
Start: 2025-03-31 | End: 2025-04-01 | Stop reason: HOSPADM

## 2025-03-30 RX ORDER — LEVOTHYROXINE SODIUM 25 UG/1
50 TABLET ORAL DAILY
Status: DISCONTINUED | OUTPATIENT
Start: 2025-03-30 | End: 2025-04-01 | Stop reason: HOSPADM

## 2025-03-30 RX ORDER — PRAMIPEXOLE DIHYDROCHLORIDE 0.25 MG/1
0.75 TABLET ORAL 2 TIMES DAILY
Status: DISCONTINUED | OUTPATIENT
Start: 2025-03-30 | End: 2025-04-01 | Stop reason: HOSPADM

## 2025-03-30 RX ORDER — GABAPENTIN 400 MG/1
800 CAPSULE ORAL 3 TIMES DAILY
Status: DISCONTINUED | OUTPATIENT
Start: 2025-03-30 | End: 2025-04-01 | Stop reason: HOSPADM

## 2025-03-30 RX ORDER — PANTOPRAZOLE SODIUM 40 MG/1
40 TABLET, DELAYED RELEASE ORAL
Status: DISCONTINUED | OUTPATIENT
Start: 2025-03-30 | End: 2025-04-01 | Stop reason: HOSPADM

## 2025-03-30 RX ADMIN — SODIUM ZIRCONIUM CYCLOSILICATE 10 G: 10 POWDER, FOR SUSPENSION ORAL at 12:09

## 2025-03-30 RX ADMIN — WATER 1000 MG: 1 INJECTION INTRAMUSCULAR; INTRAVENOUS; SUBCUTANEOUS at 22:14

## 2025-03-30 RX ADMIN — ATORVASTATIN CALCIUM 80 MG: 40 TABLET, FILM COATED ORAL at 10:04

## 2025-03-30 RX ADMIN — PRAMIPEXOLE DIHYDROCHLORIDE 0.75 MG: 0.25 TABLET ORAL at 10:04

## 2025-03-30 RX ADMIN — PRAMIPEXOLE DIHYDROCHLORIDE 0.75 MG: 0.25 TABLET ORAL at 22:14

## 2025-03-30 RX ADMIN — CLOPIDOGREL BISULFATE 75 MG: 75 TABLET, FILM COATED ORAL at 10:04

## 2025-03-30 RX ADMIN — ENOXAPARIN SODIUM 30 MG: 100 INJECTION SUBCUTANEOUS at 10:04

## 2025-03-30 RX ADMIN — ASPIRIN 81 MG CHEWABLE TABLET 81 MG: 81 TABLET CHEWABLE at 10:04

## 2025-03-30 RX ADMIN — SODIUM CHLORIDE, PRESERVATIVE FREE 10 ML: 5 INJECTION INTRAVENOUS at 22:15

## 2025-03-30 RX ADMIN — GABAPENTIN 800 MG: 400 CAPSULE ORAL at 22:14

## 2025-03-30 NOTE — CONSULTS
Patient remarks intermittent episodes normal difficulty with word finding and producing nonsensical speech lasting about 40 minutes. These episodes started about 4 months ago and noted increasing frequency. She denies any specific triggering event or factors. There is no vision change, sensory changes, weakness, numbness, tingling, alteration in sensorium or behavior. Patient denies any significant postevent sequelae when these events occurred. She reports there has been no specific trigger for onset of symptoms. He has been no recent illness or infection reported.  Patient had incidental note of right leg weakness particularly in proximal areas where this has been limited by pain. Right leg is shorter than the left with history right hip fracture with recurrent infections requiring multiple surgeries. This has resulted in leg length discrepancy with chronic pain.      Examination:  Physical exam: Normal    Neurologic exam:  Cranial nerves: 2 through 12 are grossly intact.  Motor: Normal strength in both upper and lower extremities with exception of weakness and proximal right leg weakness secondary to pain.  Sensory exam showed preserved sensation on modalities in both upper and lower extremities with exception of decreased vibratory sensation in distal left leg.  Cerebellar function was normal  Gait was not tested  Reflexes were absent Achilles 1+ in upper extremities.    Impression:  1. Intermittent transient aphasia: Symptom onset about a month ago with a progressive increase in frequency of these events. They are all short in duration lasting less than 5 minutes with random onset. No significant vascular disease noted on CTA or MRI and suspect must consider the possibility of transient ischemia. History of recurrent residual deficits. Patient on limited therapies at this point and would recommend treatment with antiplatelet therapy and interim pending further workup.  2. Ataxia: Multifactorial origin patient 
car
mL, Once PRN         Physical Exam:  Patient Vitals for the past 24 hrs:   BP Temp Temp src Pulse Resp SpO2 Height Weight   03/30/25 0815 (!) 148/86 97.8 °F (36.6 °C) Temporal 80 16 -- -- --   03/30/25 0430 (!) 155/82 98.4 °F (36.9 °C) Oral 78 18 96 % -- --   03/30/25 0000 (!) 158/82 98.4 °F (36.9 °C) Oral 88 20 97 % -- --   03/29/25 1945 (!) 164/88 97.9 °F (36.6 °C) Oral 90 20 95 % -- --   03/29/25 1830 -- -- -- -- -- -- 1.651 m (5' 5\") 78.5 kg (173 lb)   03/29/25 1736 (!) 129/99 97.3 °F (36.3 °C) Temporal 75 19 94 % -- --   03/29/25 1700 -- -- -- 93 26 93 % -- --   03/29/25 1634 139/87 -- -- 81 30 91 % -- --       Body mass index is 28.79 kg/m².     HEENT: Normocephalic, atraumatic, no lesions or abnormalities noted.    Neck:  supple with full ROM; no masses, nodes or bruits; no cervical tenderness on palpation.     Lungs:  clear to auscultation  bilaterally     CV: RRR without gallops or murmurs     Extremities: no c/c/e      Back: no tenderness with palpation / Tenderness per diagram ; no scoliosis; no kyphosis negative straight leg raising normal ROM in low back, pelvis, hips         Neurologic Exam     Mental Status:  {Mental Status:22871::\"Patient was alert, responsive, oriented, appropriate, answering questions, and following commands. Speech was fluent with normal sensorium and cognition. \"}   Cranial Nerves:  {My CNx:15196::\"Pupils were equal round and reactive to light and accommodation;   Visual fields were full on confrontation;  Funduscopic exam was normal; no pappilledema  Extraocular movements were intact; no nystagmus;    Intact facial sensation to temp, pinprick, and light touch;   Symmetric facial movements with good lip and eye closure bilaterally;   Hearing was intact;  Paredes was midline;   Normal palatal elevation with midline uvula Sternocleidomastoid  / Trapezius strength of 5/5.    Tongue was midline with no atrophy or fasciculations \"}  Motor Exam:  {My Motor exam multi:06222::\"Strength was

## 2025-03-31 ENCOUNTER — APPOINTMENT (OUTPATIENT)
Age: 80
DRG: 682 | End: 2025-03-31
Payer: MEDICARE

## 2025-03-31 LAB
ANION GAP SERPL CALCULATED.3IONS-SCNC: 10 MMOL/L (ref 7–16)
BUN SERPL-MCNC: 16 MG/DL (ref 6–23)
CALCIUM SERPL-MCNC: 8.6 MG/DL (ref 8.6–10.2)
CHLORIDE SERPL-SCNC: 106 MMOL/L (ref 98–107)
CO2 SERPL-SCNC: 21 MMOL/L (ref 22–29)
CREAT SERPL-MCNC: 1 MG/DL (ref 0.5–1)
ECHO AO ASC DIAM: 3.9 CM
ECHO AO ASCENDING AORTA INDEX: 2.1 CM/M2
ECHO AO SINUS VALSALVA DIAM: 3.6 CM
ECHO AO SINUS VALSALVA INDEX: 1.94 CM/M2
ECHO AR MAX VEL PISA: 2.8 M/S
ECHO AV AREA PEAK VELOCITY: 3.2 CM2
ECHO AV AREA VTI: 3.7 CM2
ECHO AV AREA/BSA PEAK VELOCITY: 1.7 CM2/M2
ECHO AV AREA/BSA VTI: 2 CM2/M2
ECHO AV CUSP MM: 1.2 CM
ECHO AV MEAN GRADIENT: 8 MMHG
ECHO AV MEAN VELOCITY: 1.3 M/S
ECHO AV PEAK GRADIENT: 12 MMHG
ECHO AV PEAK VELOCITY: 1.7 M/S
ECHO AV REGURGITANT PHT: 423.2 MS
ECHO AV VELOCITY RATIO: 1.06
ECHO AV VTI: 26.7 CM
ECHO BSA: 1.9 M2
ECHO EST RA PRESSURE: 3 MMHG
ECHO LA DIAMETER INDEX: 2.42 CM/M2
ECHO LA DIAMETER: 4.5 CM
ECHO LA VOL A-L A2C: 31 ML (ref 22–52)
ECHO LA VOL A-L A4C: 21 ML (ref 22–52)
ECHO LA VOL BP: 27 ML (ref 22–52)
ECHO LA VOL MOD A2C: 30 ML (ref 22–52)
ECHO LA VOL MOD A4C: 20 ML (ref 22–52)
ECHO LA VOL/BSA BIPLANE: 15 ML/M2 (ref 16–34)
ECHO LA VOLUME AREA LENGTH: 28 ML
ECHO LA VOLUME INDEX A-L A2C: 17 ML/M2 (ref 16–34)
ECHO LA VOLUME INDEX A-L A4C: 11 ML/M2 (ref 16–34)
ECHO LA VOLUME INDEX AREA LENGTH: 15 ML/M2 (ref 16–34)
ECHO LA VOLUME INDEX MOD A2C: 16 ML/M2 (ref 16–34)
ECHO LA VOLUME INDEX MOD A4C: 11 ML/M2 (ref 16–34)
ECHO LV EDV A2C: 27 ML
ECHO LV EDV A4C: 28 ML
ECHO LV EDV BP: 28 ML (ref 56–104)
ECHO LV EDV INDEX A4C: 15 ML/M2
ECHO LV EDV INDEX BP: 15 ML/M2
ECHO LV EDV NDEX A2C: 15 ML/M2
ECHO LV EF PHYSICIAN: 68 %
ECHO LV EJECTION FRACTION A2C: 72 %
ECHO LV EJECTION FRACTION A4C: 66 %
ECHO LV EJECTION FRACTION BIPLANE: 69 % (ref 55–100)
ECHO LV ESV A2C: 8 ML
ECHO LV ESV A4C: 9 ML
ECHO LV ESV BP: 9 ML (ref 19–49)
ECHO LV ESV INDEX A2C: 4 ML/M2
ECHO LV ESV INDEX A4C: 5 ML/M2
ECHO LV ESV INDEX BP: 5 ML/M2
ECHO LV FRACTIONAL SHORTENING: 26 % (ref 28–44)
ECHO LV INTERNAL DIMENSION DIASTOLE INDEX: 1.88 CM/M2
ECHO LV INTERNAL DIMENSION DIASTOLIC: 3.5 CM (ref 3.9–5.3)
ECHO LV INTERNAL DIMENSION SYSTOLIC INDEX: 1.4 CM/M2
ECHO LV INTERNAL DIMENSION SYSTOLIC: 2.6 CM
ECHO LV IVSD: 1.2 CM (ref 0.6–0.9)
ECHO LV IVSS: 1.4 CM
ECHO LV MASS 2D: 127.3 G (ref 67–162)
ECHO LV MASS INDEX 2D: 68.4 G/M2 (ref 43–95)
ECHO LV POSTERIOR WALL DIASTOLIC: 1.1 CM (ref 0.6–0.9)
ECHO LV POSTERIOR WALL SYSTOLIC: 1.2 CM
ECHO LV RELATIVE WALL THICKNESS RATIO: 0.63
ECHO LVOT AREA: 3.1 CM2
ECHO LVOT AV VTI INDEX: 1.18
ECHO LVOT DIAM: 2 CM
ECHO LVOT MEAN GRADIENT: 8 MMHG
ECHO LVOT PEAK GRADIENT: 12 MMHG
ECHO LVOT PEAK VELOCITY: 1.8 M/S
ECHO LVOT STROKE VOLUME INDEX: 53 ML/M2
ECHO LVOT SV: 98.6 ML
ECHO LVOT VTI: 31.4 CM
ECHO MV "A" WAVE DURATION: 128.5 MSEC
ECHO MV A VELOCITY: 1.29 M/S
ECHO MV AREA PHT: 3.3 CM2
ECHO MV AREA VTI: 3.7 CM2
ECHO MV E DECELERATION TIME (DT): 131.2 MS
ECHO MV E VELOCITY: 0.54 M/S
ECHO MV E/A RATIO: 0.42
ECHO MV LVOT VTI INDEX: 0.84
ECHO MV MAX VELOCITY: 1.6 M/S
ECHO MV MEAN GRADIENT: 3 MMHG
ECHO MV MEAN VELOCITY: 0.9 M/S
ECHO MV PEAK GRADIENT: 10 MMHG
ECHO MV PRESSURE HALF TIME (PHT): 66.5 MS
ECHO MV VTI: 26.4 CM
ECHO PV MAX VELOCITY: 1.2 M/S
ECHO PV MEAN GRADIENT: 3 MMHG
ECHO PV MEAN VELOCITY: 0.8 M/S
ECHO PV PEAK GRADIENT: 5 MMHG
ECHO PV VTI: 18.6 CM
ECHO PVEIN A DURATION: 97.1 MS
ECHO PVEIN A VELOCITY: 0.4 M/S
ECHO PVEIN PEAK D VELOCITY: 0.3 M/S
ECHO PVEIN PEAK S VELOCITY: 0.5 M/S
ECHO PVEIN S/D RATIO: 1.7
ECHO RIGHT VENTRICULAR SYSTOLIC PRESSURE (RVSP): 39 MMHG
ECHO RV INTERNAL DIMENSION: 3.7 CM
ECHO TV REGURGITANT MAX VELOCITY: 3.02 M/S
ECHO TV REGURGITANT PEAK GRADIENT: 37 MMHG
EKG ATRIAL RATE: 76 BPM
EKG P AXIS: 75 DEGREES
EKG P-R INTERVAL: 170 MS
EKG Q-T INTERVAL: 378 MS
EKG QRS DURATION: 100 MS
EKG QTC CALCULATION (BAZETT): 425 MS
EKG R AXIS: -44 DEGREES
EKG T AXIS: 42 DEGREES
EKG VENTRICULAR RATE: 76 BPM
ERYTHROCYTE [DISTWIDTH] IN BLOOD BY AUTOMATED COUNT: 15 % (ref 11.5–15)
GFR, ESTIMATED: 55 ML/MIN/1.73M2
GLUCOSE BLD-MCNC: 107 MG/DL (ref 74–99)
GLUCOSE BLD-MCNC: 108 MG/DL (ref 74–99)
GLUCOSE BLD-MCNC: 77 MG/DL (ref 74–99)
GLUCOSE BLD-MCNC: 91 MG/DL (ref 74–99)
GLUCOSE SERPL-MCNC: 77 MG/DL (ref 74–99)
HCT VFR BLD AUTO: 33 % (ref 34–48)
HGB BLD-MCNC: 10.4 G/DL (ref 11.5–15.5)
MCH RBC QN AUTO: 28.3 PG (ref 26–35)
MCHC RBC AUTO-ENTMCNC: 31.5 G/DL (ref 32–34.5)
MCV RBC AUTO: 89.9 FL (ref 80–99.9)
MICROORGANISM SPEC CULT: ABNORMAL
PLATELET # BLD AUTO: 169 K/UL (ref 130–450)
PMV BLD AUTO: 11 FL (ref 7–12)
POTASSIUM SERPL-SCNC: 4.3 MMOL/L (ref 3.5–5)
RBC # BLD AUTO: 3.67 M/UL (ref 3.5–5.5)
SERVICE CMNT-IMP: ABNORMAL
SODIUM SERPL-SCNC: 137 MMOL/L (ref 132–146)
SPECIMEN DESCRIPTION: ABNORMAL
WBC OTHER # BLD: 7.5 K/UL (ref 4.5–11.5)

## 2025-03-31 PROCEDURE — 2060000000 HC ICU INTERMEDIATE R&B

## 2025-03-31 PROCEDURE — 6360000002 HC RX W HCPCS: Performed by: FAMILY MEDICINE

## 2025-03-31 PROCEDURE — 97129 THER IVNTJ 1ST 15 MIN: CPT

## 2025-03-31 PROCEDURE — 92507 TX SP LANG VOICE COMM INDIV: CPT

## 2025-03-31 PROCEDURE — 97530 THERAPEUTIC ACTIVITIES: CPT

## 2025-03-31 PROCEDURE — 99232 SBSQ HOSP IP/OBS MODERATE 35: CPT | Performed by: PHYSICIAN ASSISTANT

## 2025-03-31 PROCEDURE — 2700000000 HC OXYGEN THERAPY PER DAY

## 2025-03-31 PROCEDURE — 99232 SBSQ HOSP IP/OBS MODERATE 35: CPT

## 2025-03-31 PROCEDURE — 97535 SELF CARE MNGMENT TRAINING: CPT

## 2025-03-31 PROCEDURE — 82962 GLUCOSE BLOOD TEST: CPT

## 2025-03-31 PROCEDURE — 36415 COLL VENOUS BLD VENIPUNCTURE: CPT

## 2025-03-31 PROCEDURE — 6360000002 HC RX W HCPCS

## 2025-03-31 PROCEDURE — 85027 COMPLETE CBC AUTOMATED: CPT

## 2025-03-31 PROCEDURE — 80048 BASIC METABOLIC PNL TOTAL CA: CPT

## 2025-03-31 PROCEDURE — 93306 TTE W/DOPPLER COMPLETE: CPT

## 2025-03-31 PROCEDURE — 2500000003 HC RX 250 WO HCPCS

## 2025-03-31 PROCEDURE — 93010 ELECTROCARDIOGRAM REPORT: CPT | Performed by: INTERNAL MEDICINE

## 2025-03-31 PROCEDURE — 6370000000 HC RX 637 (ALT 250 FOR IP)

## 2025-03-31 RX ADMIN — LEVOTHYROXINE SODIUM 50 MCG: 0.03 TABLET ORAL at 06:39

## 2025-03-31 RX ADMIN — PRAMIPEXOLE DIHYDROCHLORIDE 0.75 MG: 0.25 TABLET ORAL at 21:01

## 2025-03-31 RX ADMIN — SODIUM CHLORIDE, PRESERVATIVE FREE 10 ML: 5 INJECTION INTRAVENOUS at 11:17

## 2025-03-31 RX ADMIN — ASPIRIN 81 MG CHEWABLE TABLET 81 MG: 81 TABLET CHEWABLE at 11:15

## 2025-03-31 RX ADMIN — GABAPENTIN 800 MG: 400 CAPSULE ORAL at 11:14

## 2025-03-31 RX ADMIN — LOSARTAN POTASSIUM 50 MG: 50 TABLET, FILM COATED ORAL at 11:15

## 2025-03-31 RX ADMIN — PRAMIPEXOLE DIHYDROCHLORIDE 0.75 MG: 0.25 TABLET ORAL at 11:16

## 2025-03-31 RX ADMIN — ENOXAPARIN SODIUM 40 MG: 100 INJECTION SUBCUTANEOUS at 11:14

## 2025-03-31 RX ADMIN — PANTOPRAZOLE SODIUM 40 MG: 40 TABLET, DELAYED RELEASE ORAL at 06:39

## 2025-03-31 RX ADMIN — GABAPENTIN 800 MG: 400 CAPSULE ORAL at 21:00

## 2025-03-31 RX ADMIN — GABAPENTIN 800 MG: 400 CAPSULE ORAL at 17:00

## 2025-03-31 RX ADMIN — WATER 1000 MG: 1 INJECTION INTRAMUSCULAR; INTRAVENOUS; SUBCUTANEOUS at 21:01

## 2025-03-31 RX ADMIN — SODIUM CHLORIDE, PRESERVATIVE FREE 10 ML: 5 INJECTION INTRAVENOUS at 21:01

## 2025-03-31 RX ADMIN — CLOPIDOGREL BISULFATE 75 MG: 75 TABLET, FILM COATED ORAL at 11:15

## 2025-03-31 RX ADMIN — ATORVASTATIN CALCIUM 80 MG: 40 TABLET, FILM COATED ORAL at 11:15

## 2025-03-31 NOTE — CARE COORDINATION
Chart reviewed and case reviewed in IDR.  Patient admitted Hyperkalemia.  MRI of the brain negative.  IV Rocephin Q 24 hrs.  Met with the patient at the bedside to discuss transition of care planning.  Patient lives with her  in a cap cod style home with two steps to enter with hand rails.  Patient has a WC, FWW, BSC, Rollator, SPC, Quad cane, and walk-in shower.  Patient has a history of rehab at [facility] and [facility] with [facility], whom she is active with.  Patient's PCP is Dr Latif and she uses [email or web address] on [address] for her medications.  Spoke with Dr Singh re: case and patient is medically stable to discharge if her echo can get completed today.  Call placed to Tete, assistant with non-invasive cardiology and notified of above via detailed VM.  Therapy updates received and patient is appropriate for home with [facility].  Call placed to Fanny, liaison with [facility] and they will need MELITON orders.  MELITON orders received for transition of care planning.  Will continue to follow for further transition of care planning needs.         Tamica Benitez RN.  P:  [phone]          Case Management Assessment  Initial Evaluation    Date/Time of Evaluation: 3/31/2025 10:54 AM  Assessment Completed by: Tamica Benitez RN    If patient is discharged prior to next notation, then this note serves as note for discharge by case management.    Patient Name: Rebeka Renee                   YOB: 1945  Diagnosis: Hyperkalemia [E87.5]  VANESA (acute kidney injury) [N17.9]  Acute cystitis without hematuria [N30.00]  Stroke-like symptoms [R29.90]  Stroke-like symptom [R29.90]  DVT of right axillary vein, chronic (HCC) [I82.A21]  Altered mental status, unspecified altered mental status type [R41.82]                   Date / Time: 3/28/2025  5:56 PM    Patient Admission Status: Inpatient   Readmission Risk (Low < 19, Mod (19-27), High > 27): Readmission Risk Score: 25    Current PCP: Salomon

## 2025-03-31 NOTE — ACP (ADVANCE CARE PLANNING)
Advance Care Planning   Healthcare Decision Maker:    Primary Decision Maker: Nate Renee - [facility] - [phone]    Click here to complete Healthcare Decision Makers including selection of the Healthcare Decision Maker Relationship (ie \"Primary\").                 Tamica Benitez RN.

## 2025-04-01 ENCOUNTER — APPOINTMENT (OUTPATIENT)
Dept: NEUROLOGY | Age: 80
DRG: 682 | End: 2025-04-01
Payer: MEDICARE

## 2025-04-01 VITALS
OXYGEN SATURATION: 93 % | TEMPERATURE: 97.6 F | SYSTOLIC BLOOD PRESSURE: 169 MMHG | RESPIRATION RATE: 18 BRPM | DIASTOLIC BLOOD PRESSURE: 90 MMHG | BODY MASS INDEX: 28.82 KG/M2 | WEIGHT: 173 LBS | HEART RATE: 88 BPM | HEIGHT: 65 IN

## 2025-04-01 PROBLEM — G45.9 TIA (TRANSIENT ISCHEMIC ATTACK): Status: ACTIVE | Noted: 2025-04-01

## 2025-04-01 LAB
GLUCOSE BLD-MCNC: 76 MG/DL (ref 74–99)
GLUCOSE BLD-MCNC: 76 MG/DL (ref 74–99)

## 2025-04-01 PROCEDURE — 6370000000 HC RX 637 (ALT 250 FOR IP)

## 2025-04-01 PROCEDURE — 2500000003 HC RX 250 WO HCPCS

## 2025-04-01 PROCEDURE — 95819 EEG AWAKE AND ASLEEP: CPT

## 2025-04-01 PROCEDURE — 4A10X4Z MONITORING OF CENTRAL NERVOUS ELECTRICAL ACTIVITY, EXTERNAL APPROACH: ICD-10-PCS | Performed by: PSYCHIATRY & NEUROLOGY

## 2025-04-01 PROCEDURE — 82962 GLUCOSE BLOOD TEST: CPT

## 2025-04-01 PROCEDURE — 99239 HOSP IP/OBS DSCHRG MGMT >30: CPT

## 2025-04-01 PROCEDURE — 6360000002 HC RX W HCPCS: Performed by: FAMILY MEDICINE

## 2025-04-01 PROCEDURE — 95816 EEG AWAKE AND DROWSY: CPT | Performed by: PSYCHIATRY & NEUROLOGY

## 2025-04-01 PROCEDURE — 95816 EEG AWAKE AND DROWSY: CPT

## 2025-04-01 RX ORDER — CEFDINIR 300 MG/1
300 CAPSULE ORAL 2 TIMES DAILY
Qty: 4 CAPSULE | Refills: 0 | Status: SHIPPED | OUTPATIENT
Start: 2025-04-01 | End: 2025-04-03

## 2025-04-01 RX ORDER — ASPIRIN 81 MG/1
81 TABLET, CHEWABLE ORAL DAILY
Qty: 30 TABLET | Refills: 3 | Status: ON HOLD | OUTPATIENT
Start: 2025-04-02

## 2025-04-01 RX ORDER — CLOPIDOGREL BISULFATE 75 MG/1
75 TABLET ORAL DAILY
Qty: 30 TABLET | Refills: 0 | Status: ON HOLD | OUTPATIENT
Start: 2025-04-02

## 2025-04-01 RX ADMIN — SODIUM CHLORIDE, PRESERVATIVE FREE 10 ML: 5 INJECTION INTRAVENOUS at 09:33

## 2025-04-01 RX ADMIN — GABAPENTIN 800 MG: 400 CAPSULE ORAL at 14:00

## 2025-04-01 RX ADMIN — ENOXAPARIN SODIUM 40 MG: 100 INJECTION SUBCUTANEOUS at 09:33

## 2025-04-01 RX ADMIN — GABAPENTIN 800 MG: 400 CAPSULE ORAL at 09:33

## 2025-04-01 RX ADMIN — LOSARTAN POTASSIUM 50 MG: 50 TABLET, FILM COATED ORAL at 09:33

## 2025-04-01 RX ADMIN — LEVOTHYROXINE SODIUM 50 MCG: 0.03 TABLET ORAL at 05:42

## 2025-04-01 RX ADMIN — CLOPIDOGREL BISULFATE 75 MG: 75 TABLET, FILM COATED ORAL at 09:33

## 2025-04-01 RX ADMIN — PANTOPRAZOLE SODIUM 40 MG: 40 TABLET, DELAYED RELEASE ORAL at 05:42

## 2025-04-01 RX ADMIN — ASPIRIN 81 MG CHEWABLE TABLET 81 MG: 81 TABLET CHEWABLE at 09:32

## 2025-04-01 RX ADMIN — PRAMIPEXOLE DIHYDROCHLORIDE 0.75 MG: 0.25 TABLET ORAL at 09:32

## 2025-04-01 RX ADMIN — ATORVASTATIN CALCIUM 80 MG: 40 TABLET, FILM COATED ORAL at 09:33

## 2025-04-01 NOTE — PROGRESS NOTES
[facility] Hospitalist Progress Note    Admitting Date and Time: 3/28/2025  5:56 PM  Admit Dx: Hyperkalemia [E87.5]  VANESA (acute kidney injury) [N17.9]  Acute cystitis without hematuria [N30.00]  Stroke-like symptoms [R29.90]  Stroke-like symptom [R29.90]  DVT of right axillary vein, chronic (HCC) [I82.A21]  Altered mental status, unspecified altered mental status type [R41.82]    Synopsis:  79 Y female with a past medical history of congestive heart failure, hypertension, TIA, dyslipidemia, diabetes mellitus, bipolar disorder came to the ED after found by caregiver with altered mental status and trouble speaking.  Glucose was low and was replaced by caregiver.  CT showed no acute finding.  UA shows sign of infection and her creatinine was 1.8 during the admission.  CT abdomen/pelvis no acute finding.  Patient was treated with IV Rocephin and IV fluids.  MRI of brain showed no acute finding.  Neurology consulted  Echo ordered    Subjective:  Patient is being followed for Hyperkalemia [E87.5]  VANESA (acute kidney injury) [N17.9]  Acute cystitis without hematuria [N30.00]  Stroke-like symptoms [R29.90]  Stroke-like symptom [R29.90]  DVT of right axillary vein, chronic (HCC) [I82.A21]  Altered mental status, unspecified altered mental status type [R41.82]     Patient was seen at bedside.  She is alert oriented answering all my question.  Denies any complaint    ROS: denies fever, chills, cp, sob, n/v, HA unless stated above.      pramipexole  0.75 mg Oral BID    levothyroxine  50 mcg Oral Daily    losartan  50 mg Oral Daily    pantoprazole  40 mg Oral QAM AC    enoxaparin  40 mg SubCUTAneous Daily    gabapentin  800 mg Oral TID    sodium chloride flush  5-40 mL IntraVENous 2 times per day    insulin lispro  0-4 Units SubCUTAneous 4x Daily AC & HS    aspirin  81 mg Oral Daily    Or    aspirin  300 mg Rectal Daily    atorvastatin  80 mg Oral Daily    cefTRIAXone (ROCEPHIN) IV  1,000 mg IntraVENous Q24H    
       [facility] Hospitalist Progress Note    Admitting Date and Time: 3/28/2025  5:56 PM  Admit Dx: Stroke-like symptoms [R29.90]    Synopsis:  79 Y female with a past medical history of congestive heart failure, hypertension, TIA, dyslipidemia, diabetes mellitus, bipolar disorder came to the ED after found by caregiver with altered mental status and trouble speaking.  Glucose was low and was replaced by caregiver.  CT showed no acute finding.  UA shows sign of infection and her creatinine was 1.8 during the admission.  CT abdomen/pelvis no acute finding.  Patient was treated with IV Rocephin and IV fluids.  MRI of brain showed no acute finding.  Neurology consulted  Echo ordered      Subjective:  Patient is being followed for Stroke-like symptoms [R29.90]     Patient was seen at bedside.  She is alert oriented but poor historian and hard of hearing  Denies any blurry vision, headache, chest pain, shortness of breath, fever or chills    ROS: denies fever, chills, cp, sob, n/v, HA unless stated above.      [Held by provider] apixaban  5 mg Oral BID    sodium chloride flush  5-40 mL IntraVENous 2 times per day    insulin lispro  0-4 Units SubCUTAneous 4x Daily AC & HS    aspirin  81 mg Oral Daily    Or    aspirin  300 mg Rectal Daily    atorvastatin  80 mg Oral Daily    cefTRIAXone (ROCEPHIN) IV  1,000 mg IntraVENous Q24H    clopidogrel  75 mg Oral Daily    enoxaparin  30 mg SubCUTAneous Daily    sodium zirconium cyclosilicate  10 g Oral Once    calcium gluconate  1,000 mg IntraVENous Once    insulin regular  10 Units IntraVENous Once    And    dextrose bolus  250 mL IntraVENous Once     sodium chloride flush, 5-40 mL, PRN  sodium chloride, , PRN  ondansetron, 4 mg, Q8H PRN   Or  ondansetron, 4 mg, Q6H PRN  polyethylene glycol, 17 g, Daily PRN  glucose, 4 tablet, PRN  dextrose bolus, 125 mL, PRN   Or  dextrose bolus, 250 mL, PRN  glucagon (rDNA), 1 mg, PRN  dextrose, , Continuous PRN  sodium chloride flush, 10 mL, 
  Physician Progress Note      PATIENT:               JEVON LOZOYA  CSN #:                  [id number]  :                       1945  ADMIT DATE:       3/28/2025 5:56 PM  DISCH DATE:  RESPONDING  PROVIDER #:        Garrison Singh MD          QUERY TEXT:    Pt admitted with altred mental status. Pt noted to have UTI and VANESA . If   possible, please document in the progress notes and discharge summary if you   are evaluating and / or treating any of the following:    The medical record reflects the following:  Risk Factors: DMII, HTN, Hx of TIA, Tobacco abuse, CHF.  Clinical Indicators: Stroke like symptoms. UTI, VANESA, Hyperkalemia.  Treatment: CT head, MRI brain, Neuro checks. PT/OT ALP Neurology consult    Thank you  Magda Hackett RN, BSN, [facility]  [phone]  Options provided:  -- Drug-induced encephalopathy due to, Please specify substance.  -- Drug-induced encephalopathy, substance(s) unknown  -- Metabolic encephalopathy  -- Delirium due to, Please specify cause.  -- Delirium  -- Other - I will add my own diagnosis  -- Disagree - Not applicable / Not valid  -- Disagree - Clinically unable to determine / Unknown  -- Refer to Clinical Documentation Reviewer    PROVIDER RESPONSE TEXT:    This patient has metabolic encephalopathy.    Query created by: Magda Hackett on 3/31/2025 7:46 AM      Electronically signed by:  Garrison Singh MD 3/31/2025 11:56 AM          
CLINICAL PHARMACY NOTE: MEDS TO BEDS    Total # of Prescriptions Filled: 3   The following medications were delivered to the patient:  ASPIRIN LOW 81 MG  CEFDINIR 300 MG  CLOPIDOGREL 75 MG    Additional Documentation:    PICKED UP  
DVT Prophylaxis Adjustment Policy (DVT Prophylaxis)     This patient is on DVT Prophylaxis medication that requires a dose adjustment      Date Body Weight IBW  Adjusted BW SCr  CrCl Dialysis status   3/29/2025 78.9 kg (173 lb 15.1 oz) Ideal body weight: 57 kg (125 lb 10.6 oz)  Adjusted ideal body weight: 65.8 kg (144 lb 15.6 oz) Serum creatinine: 1.8 mg/dL (H) 03/28/25 1730  Estimated creatinine clearance: 27 mL/min (A) N/a       Pharmacy has dose-adjusted the DVT Prophylaxis regimen to match   the recommendations from the following table        Ordered Medication:Lovenox 40mg daily    Order Changed/converted to: Lovenox 30mg daily      These changes were made per protocol according to the [facility] Pharmacist   Review for Appropriate Use and Automatic Dose Adjustments of   Subcutaneous Anticoagulants Policy     *Please note this dose may need readjusted if patient's condition changes.    Please contact pharmacy with any questions regarding these changes.    Haylie Grayson [facility]  3/29/2025  6:38 AM    
DVT Prophylaxis Adjustment Policy (DVT Prophylaxis)     This patient is on DVT Prophylaxis medication that requires a dose adjustment      Date Body Weight IBW  Adjusted BW SCr  CrCl Dialysis status   3/30/2025 78.5 kg (173 lb) Ideal body weight: 57 kg (125 lb 10.6 oz)  Adjusted ideal body weight: 65.6 kg (144 lb 9.6 oz) Serum creatinine: 1 mg/dL 03/30/25 0429  Estimated creatinine clearance: 49 mL/min N/a       Pharmacy has dose-adjusted the DVT Prophylaxis regimen to match   the recommendations from the following table        Lovenox 40 mg daily changed to 30 mg daily on 3/29/25. Renal function improved. Changed back to 40 mg daily starting tomorrow.       These changes were made per protocol according to the [facility] Pharmacist   Review for Appropriate Use and Automatic Dose Adjustments of   Subcutaneous Anticoagulants Policy     *Please note this dose may need readjusted if patient's condition changes.    Please contact pharmacy with any questions regarding these changes.    Emma Garner RPH  3/30/2025  10:20 AM    
EEG completed.  Report to follow.  Lor Bass    
Occupational Therapy  OT BEDSIDE TREATMENT NOTE   [facility]  [address], [place]       Date:3/31/2025  Patient Name: Rebeka Renee  MRN: [id number]  : 1945  Room: 85/85-A     Per OT Eval:    Evaluating OT: Osmar Khan OTR/L [id number]     Referring Provider: Coral Donovan APRN - CNP                            Specific Provider Orders/Date: OT evaluation and treatment 3/29/25 0100     Diagnosis:  Hyperkalemia [E87.5]  VANESA (acute kidney injury) [N17.9]  Acute cystitis without hematuria [N30.00]  Stroke-like symptoms [R29.90]  Stroke-like symptom [R29.90]  DVT of right axillary vein, chronic (HCC) [I82.A21]  Altered mental status, unspecified altered mental status type [R41.82]       Pertinent Medical History:  has a past medical history of Anemia, Anxiety, Arthritis, Ascending aortic aneurysm, Asthma, Bipolar 1 disorder (HCC), Cancer (HCC), Cancer of breast, female (HCC), CHF (congestive heart failure) (HCC), Chronic back pain, Depression, Depression with anxiety, Diabetes mellitus (HCC), Dyslipidemia, Fibromyalgia, History of blood transfusion, Hypertension, Hypothyroidism, Neuropathy, Pericardial effusion, Pleural effusion, TIA (transient ischemic attack), Tobacco abuse, and Type II or unspecified type diabetes mellitus without mention of complication, not stated as uncontrolled.   Past Surgical History         Past Surgical History:   Procedure Laterality Date    BREAST SURGERY        CATARACT REMOVAL WITH IMPLANT Bilateral      CHOLECYSTECTOMY        FEMUR SURGERY Right 2024     FEMUR OPEN REDUCTION INTERNAL FIXATION RIGHT PERIPROSTHETIC performed by Wood George DO at [facility] [place]    FIBULA FRACTURE SURGERY Right 2024     RIGHT FEMUR OPEN REDUCTION INTERNAL FIXATION, OPEN TREATMENT OF RIGHT HIP DISLOCATION performed by Nemesio Handy MD at [facility] [place]    GASTRIC BYPASS SURGERY   2004    HERNIA 
Patient's jewelry removed and placed in an envelope. Envelope given to patient. Patient left CT department with jewelry.  
Physical Therapy  Physical Therapy Initial Assessment     Name: Rebeka Renee  : 1945  MRN: [id number]      Date of Service: 3/30/2025    Evaluating PT:  Sudhir Parra PT, DPT    Room #:  8523/8523-A  Diagnosis:  Hyperkalemia [E87.5]  VANESA (acute kidney injury) [N17.9]  Acute cystitis without hematuria [N30.00]  Stroke-like symptoms [R29.90]  Stroke-like symptom [R29.90]  DVT of right axillary vein, chronic (HCC) [I82.A21]  Altered mental status, unspecified altered mental status type [R41.82]  PMHx/PSHx:  anemia, anxiety, bipolar, breast CA, CHF, DM, fibromyalgia  Procedure/Surgery:  N/A  Precautions:  fall risk, bed/chair alarm, contact  Equipment Owned: rollator, w/c  Equipment Needs:  TBD    SUBJECTIVE:    Pt lives with spouse in a 1.5 story home with 2 steps to enter and B handrail.  Bed/bath is on 1st floor.  Pt ambulated with rollator PTA.    OBJECTIVE:   Initial Evaluation  Date: 3/30/25 Treatment Short Term/ Long Term   Goals   AM-PAC 6 Clicks      Was pt agreeable to Eval/treatment? yes     Does pt have pain? 7/10 abdomen while coughing     Bed Mobility  Rolling: NT  Supine to sit: SBA  Sit to supine: NT  Scooting: SBA  Rolling: IND  Supine to sit: IND  Sit to supine: IND  Scooting: IND   Transfers Sit to stand: min A  Stand to sit: min A  Stand pivot: min A with ww  Sit to stand: mod I  Stand to sit: mod I  Stand pivot: mod I with AAD   Ambulation    3' with ww min A  50'+ with AAD mod I   Stair negotiation: ascended and descended  NT  2 steps with B handrail mod I     Strength/ROM:   BLE grossly 4/5  BLE AROM WFL    Balance:   Static Sitting: SBA  Dynamic Sitting: SBA  Static Standing: CGA with ww  Dynamic Standing: min A with ww    Pt is A & O x 3  Sensation:  Pt denies numbness and tingling to extremities  Edema:  RUE swelling    Vitals:  SpO2 and HR were stable during session    Therapeutic Exercises:    Bed mobility: supine>sit, cued for EOB positioning  Transfers: STS x2, stand pivot 
Physical Therapy  Physical Therapy Treatment     Name: Rebeka Renee  : 1945  MRN: [id number]      Date of Service: 3/31/2025    Evaluating PT:  Sudhir Parra PT, DPT    Room #:  8523/8523-A  Diagnosis:  Hyperkalemia [E87.5]  VANESA (acute kidney injury) [N17.9]  Acute cystitis without hematuria [N30.00]  Stroke-like symptoms [R29.90]  Stroke-like symptom [R29.90]  DVT of right axillary vein, chronic (HCC) [I82.A21]  Altered mental status, unspecified altered mental status type [R41.82]  PMHx/PSHx:  anemia, anxiety, bipolar, breast CA, CHF, DM, fibromyalgia  Procedure/Surgery:  N/A  Precautions:  fall risk, bed/chair alarm, contact  Equipment Owned: rollator, w/c  Equipment Needs:  TBD    SUBJECTIVE:    Pt lives with spouse in a 1.5 story home with 2 steps to enter and B handrail.  Bed/bath is on 1st floor.  Pt ambulated with rollator PTA.    OBJECTIVE:   Initial Evaluation  Date: 3/30/25 Treatment Date: 3/31/25 Short Term/ Long Term   Goals   AM-PAC 6 Clicks     Was pt agreeable to Eval/treatment? yes yes    Does pt have pain? 7/10 abdomen while coughing No c/o pain     Bed Mobility  Rolling: NT  Supine to sit: SBA  Sit to supine: NT  Scooting: SBA Rolling: NT  Supine to sit: SBA  Sit to supine: NT  Scooting: SBA Rolling: IND  Supine to sit: IND  Sit to supine: IND  Scooting: IND   Transfers Sit to stand: min A  Stand to sit: min A  Stand pivot: min A with ww Sit to stand: SBA  Stand to sit: SBA  Stand pivot: SBA with ww Sit to stand: mod I  Stand to sit: mod I  Stand pivot: mod I with AAD   Ambulation    3' with ww min A 25'x2 with WW SBA 50'+ with AAD mod I   Stair negotiation: ascended and descended  NT NT- pt declined reporting feeling comfortable completing 2 steps to enter home upon d/c 2 steps with B handrail mod I     Strength/ROM:   BLE grossly 4/5  BLE AROM WFL    Balance:   Static Sitting: SBA  Dynamic Sitting: SBA  Static Standing: SBA with ww  Dynamic Standing: SBA with ww    Pt is A & 
SPEECH LANGUAGE PATHOLOGY  DAILY PROGRESS NOTE        PATIENT NAME:  Rebeka Renee      :  1945          TODAY'S DATE:  3/31/2025 ROOM:  8523/85-A    Current diet: ADULT DIET; Regular    Pt seen for speech and cognitive-linguisitic tx.  Pleasant and cooperative.  Pt reports she has had intermittent difficulty with word finding \"off-and on\" for approx. 4 months.  Pt completed word finding tasks with 80% accuracy with min cues.  Pt completed temporal orientation task with 80% accuracy with min cues.  Pt completed STM task with 85% accuracy with min cues.  Pt is anxious to go home per her report.  Continue ST per POC.      CPT code(s) 66527  speech/language tx  97183  therapeutic interventions that focus on cognitive function , initial  15 min  Total minutes :  28 minutes  (SP=15 min, COG=15 min)      Breanne Polk MA, CCC-SLP  Speech-Language Pathologist  #[phone]   
SPEECH/LANGUAGE PATHOLOGY  SPEECH/LANGUAGE/COGNITIVE EVALUATION   and PLAN OF CARE      PATIENT NAME:  Rebeka Renee  (female)     MRN:  [id number]    :  1945  (79 y.o.)  STATUS:  Inpatient: Room 10/10    TODAY'S DATE:  3/29/2025  ORDER DATE, DESCRIPTION AND REFERRING PROVIDER : 3/29/25, Speech Language Pathology Eval and treat 1 time, SHANELLE Covarrubias  REASON FOR REFERRAL:  Stroke/TIA  EVALUATING THERAPIST: ALONDRA Paris    ADMITTING DIAGNOSIS: Stroke-like symptoms [R29.90]    VISIT DIAGNOSIS:   Visit Diagnoses         Codes      VANESA (acute kidney injury)     N17.9      Altered mental status, unspecified altered mental status type     R41.82      Hyperkalemia     E87.5               SPEECH THERAPY  PLAN OF CARE   The speech therapy  POC is established based on physician order, speech pathology diagnosis and results of clinical assessment     SPEECH PATHOLOGY DIAGNOSIS:      Moderate aphasia  Moderate cognitive impairment    Speech Pathology intervention is recommended 1-3 times per week for LOS or when goals are met with emphasis on the following:      Conditions Requiring Skilled Therapeutic Intervention for speech, language and/or cognition    Receptive Aphasia  Expressive Aphasia   Anomia  Cognitive linguistic impairment  Decreased short term memory  Decreased problem solving skills   Decreased thought organization    Specific Speech Therapy Interventions to Include:   Receptive language training   Expressive language training   Therapeutic tasks for Cognition    Specific instructions for next treatment:     To initiate POC    SHORT/LONG TERM GOALS  Pt will improve orientation to spatial and temporal surroundings with use of external memory aides.  Pt will improve immediate, short term, recent memory during structured and unstructured tasks with 75% accuracy   Pt will improve receptive and expressive language skills with adequate thought content, organization, and processing time to 
Sent for patient for US exam. Told that the patient is being admitted and needs to go to the floor before coming to US.  Will try again when patient is ready/schedule permits.  
resolved  Hyperkalemia  History of right axillary DVT, on Eliquis  Hyperlipidemia  Hypothyroidism  Hypertension  Diabetes mellitus    -CT head, CTA neck and head reviewed  -MRI brain reviewed  -On aspirin plus Plavix, Lipitor  -Holding Eliquis.  Patient had right upper extremity DVT on April 2024.  Duplex ultrasound pending if negative can discontinue Eliquis  -Neurology consulted  -Echo ordered  -Speech consulted  -UA shows sign of infection and patient started with ceftriaxone.  Urine culture pending  -Creatinine back to normal.  Stop IV fluids  -This morning his potassium was 5.3.  Will give 1 dose of Lokelma.  Holding losartan  -Status post hyperkalemic cocktail    NOTE: This report was transcribed using voice recognition software. Every effort was made to ensure accuracy; however, inadvertent computerized transcription errors may be present.  Electronically signed by LORRAINE TOURE MD on 3/30/2025 at 11:49 AM     
cleared patient for OT.  Upon arrival, patient was semi-supine in bed  and agreeable to OT session. no visitors present throughout session . At end of session, patient sitting in chair with arms  and alarm activated ; with call light and phone within reach; all lines and tubes intact.   Patient presents with decreased safety awareness, activity tolerance , balance , and strength . Pt demonstrated decreased independence during ADLs, bed mobility , functional transfers, and functional mobility. Pt would benefit from continued skilled OT to increase safety and independence with completion of ADL tasks and functional mobility for improved quality of life and return to OF.       Treatment: OT treatment provided this date includes:  OT edu pt/family on role of OT in the acute care setting. Pt  verbalized understanding   Therapist facilitated and instructed pt on adapted techniques & compensatory strategies to improve safety and independence with ADLs, bed mobility , functional transfers, and functional mobility as noted above to allow pt to achieve highest level of independence and safely. Pt provided  min cuing , verbal instructions , extended time , and encouragement  in order to promote maximum level of independence.   Pt edu on use of call light and waiting until staff present to attempt any functional mobility. Pt verbalized understanding and demonstrated ability to locate and press call button.     Rehab Potential: Good  for established goals     Patient / Family Goal: to get better     Patient and/or family were instructed on functional diagnosis, prognosis/goals and OT plan of care. Pt/family demonstrated understanding.      Eval Complexity:      Description  Performance deficits  Clinical decision making  Co-morbidities affecting occupational performance  Modification or assistance to complete eval    Low Complexity   1 to 3 []  Low []  None []  None []   Moderate Complexity   3 to 5 [x]  Mod []  Maybe []  Min 
reasonably achievable.; CTA of the neck was performed with the administration of intravenous contrast. Multiplanar reformatted images are provided for review.  MIP images are provided for review. Stenosis of the internal carotid arteries measured using NASCET criteria. Automated exposure control, iterative reconstruction, and/or weight based adjustment of the mA/kV was utilized to reduce the radiation dose to as low as reasonably achievable.; CT of the head was performed without the administration of intravenous contrast. Automated exposure control, iterative reconstruction, and/or weight based adjustment of the mA/kV was utilized to reduce the radiation dose to as low as reasonably achievable. Noncontrast CT of the head with reconstructed 2-D images are also provided for review. COMPARISON: None. HISTORY: ORDERING SYSTEM PROVIDED HISTORY: stroke TECHNOLOGIST PROVIDED HISTORY: Reason for exam:->stroke Has a \"code stroke\" or \"stroke alert\" been called?-> What reading provider will be dictating this exam?->CRC; ORDERING SYSTEM PROVIDED HISTORY: stroke TECHNOLOGIST PROVIDED HISTORY: Reason for exam:->stroke Has a \"code stroke\" or \"stroke alert\" been called?->Yes What reading provider will be dictating this exam?->CRC; ORDERING SYSTEM PROVIDED HISTORY: stroke TECHNOLOGIST PROVIDED HISTORY: Reason for exam:->stroke Has a \"code stroke\" or \"stroke alert\" been called?->Yes Decision Support Exception - unselect if not a suspected or confirmed emergency medical condition->Emergency Medical Condition (MA) What reading provider will be dictating this exam?->CRC FINDINGS: CT HEAD: BRAIN/VENTRICLES:  No mass effect, edema or hemorrhage is seen.  Mild volume loss is seen in the cerebrum with mild chronic microvascular ischemic changes.  No hydrocephalus or extra-axial fluid. ORBITS: The visualized portion of the orbits demonstrate no acute abnormality. SINUSES:  The visualized paranasal sinuses and mastoid air cells demonstrate no

## 2025-04-01 NOTE — DISCHARGE SUMMARY
[facility] Hospitalist Physician Discharge Summary       Susanna Ingram PA-C  [address] [facility] [address]  [phone]    Follow up in 1 month(s)  Follow-up in the office in 1-2 months    Navneet Latif MD  [address] [facility] [address]  [phone]    Schedule an appointment as soon as possible for a visit in 1 week(s)      [facility]  [address] [place] [address] [address]  [facility] [address]  [phone]          Activity level: As tolerated     Dispo: Home    Condition on discharge: Stable     Patient ID:  Rebeka Renee  [id number]  79 y.o.  1945    Admit date: 3/28/2025    Discharge date and time:  4/1/2025  12:57 PM    Admission Diagnoses: Principal Problem:    Stroke-like symptoms  Active Problems:    VANESA (acute kidney injury)    UTI (urinary tract infection)    Altered mental status    Hyperkalemia  Resolved Problems:    * No resolved hospital problems. *      Discharge Diagnoses: Principal Problem:    Stroke-like symptoms  Active Problems:    VANESA (acute kidney injury)    UTI (urinary tract infection)    Altered mental status    Hyperkalemia  Resolved Problems:    * No resolved hospital problems. *      Consults:  IP CONSULT TO INTERNAL MEDICINE  IP CONSULT TO NEUROLOGY    Procedures: None    Hospital Course:   Patient Rebeka Renee is a 79 y.o. presented with Hyperkalemia [E87.5]  VANESA (acute kidney injury) [N17.9]  Acute cystitis without hematuria [N30.00]  Stroke-like symptoms [R29.90]  Stroke-like symptom [R29.90]  DVT of right axillary vein, chronic (HCC) [I82.A21]  Altered mental status, unspecified altered mental status type [R41.82]    79 Y female with a past medical history of congestive heart failure, hypertension, TIA, dyslipidemia, diabetes mellitus, bipolar disorder came to the ED after found by caregiver with altered mental status and trouble speaking.  Glucose was low and was replaced by caregiver.  CT head showed no acute

## 2025-04-01 NOTE — PLAN OF CARE
Neurology Plan of Care    Rebeka Renee is a 79-year-old female who neurology is following for altered mental status and strokelike symptoms.    Recommend ZIO at discharge, order placed  Continue dual antiplatelet therapy x 21 days, ASA monotherapy  EEG completed  Patient can be discharged before EEG report as stuttering is unlikely to have neurologic etiology  Continue to reduce modifiable risk factors including LDL less than 70, A1c less than 7.0 and blood pressure less than 140/90  LDL 49, continue high intensity statin  A1c 4.7  PT, OT, ST  Discussed no driving with patient, she verbalized understanding.  Seizure precautions  Follow with neurology outpatient in 1-2 months  Plan of care and all questions and concerns of patient /family were discussed the bedside.      SEIZURE PRECAUTIONS    It is important to continue to try and achieve seizure control because of the potential for injury and illness due to seizures. In a very small minority of patients with generalized tonic clonic seizures (\"grand mal\"), breathing or heart function can stop during a seizure and result in demise (sudden unexpected death in epilepsy or SUDEP). [place] from seizures prevents this kind of outcome.     Please follow seizure precautions:  Please do not engage in any high risk activities such as, but not limited to, driving, bathing in tubs, swimming alone, climbing ladders, working at heights, near open flames or machinery with moving parts etc.  For patients with epilepsy it is recommended to stay seizure free for 6 months on medication before resume driving. Your neurology provider will advise when it is ok for you to drive.    Susanna Ingram PA-C  1:27 PM  4/1/25

## 2025-04-01 NOTE — DISCHARGE INSTRUCTIONS
Hyperkalemia: Care Instructions  Your Care Instructions     Hyperkalemia is too much potassium in the blood. Potassium helps keep the right mix of fluids in your body. It also helps your nerves and muscles work as they should. And it keeps your heartbeat in a normal rhythm. Some things can raise potassium levels. These include some health problems, medicines, and kidney problems. (Normally, your kidneys remove extra potassium.)  Too much potassium can cause nausea. It also can cause a heartbeat that isn't normal. But you may not have any symptoms. Too much potassium can be dangerous. That's why it's important to treat it. If you are taking any of the medicines that can raise your levels, your doctor will ask you to stop. You may get medicines to lower your levels. And you may have to limit or not eat foods that have a lot of potassium.  Follow-up care is a key part of your treatment and safety. Be sure to make and go to all appointments, and call your doctor if you are having problems. It's also a good idea to know your test results and keep a list of the medicines you take.  How can you care for yourself at home?  Take your medicines exactly as prescribed. Call your doctor if you think you are having a problem with your medicine.  Stop taking certain medicines if your doctor asks you to. They may be causing your high potassium levels. If you have concerns about stopping medicine, talk with your doctor.  If you have kidney, heart, or liver disease and have to limit fluids, talk with your doctor before you increase the amount of fluids you drink. If the doctor says it's okay, drink plenty of fluids.  Avoid strenuous exercise until your doctor tells you it is okay.  Be aware of potassium in your diet. Potassium is in many foods, including vegetables, fruits, and milk products.  Foods high in potassium include bananas, cantaloupe, broccoli, milk, potatoes, and tomatoes.  Low-potassium foods include blueberries,

## 2025-04-01 NOTE — PLAN OF CARE
Problem: Safety - Adult  Goal: Free from fall injury  Outcome: Progressing     Problem: Metabolic/Fluid and Electrolytes - Adult  Goal: Electrolytes maintained within normal limits  Outcome: Progressing  Goal: Hemodynamic stability and optimal renal function maintained  Outcome: Progressing  Goal: Glucose maintained within prescribed range  Outcome: Progressing

## 2025-04-01 NOTE — PROCEDURES
[facility] Neurodiagnostic Report    MRN: [id number]  PATIENT NAME: Rebeka Renee  DATE OF REPORT: 2025   DATE OF SERVICE: 2025  PHYSICIAN NAME: Ferdinand Kasper DO  STUDY ORDERED BY: Susanna Ingram      Patient's : 1945  Patient's Age: 79 y.o.  Gender: female    PROCEDURE: Routine EEG with video      Clinical Interpretation:   This was a normal study during waking and drowsiness.    No seizures or epileptiform discharges were noted during this study.     __________________________  Electronically signed by: Ferdinand Kasper DO, 2025 4:58 PM      Patient Clinical Information   Reason for Study: The patient is undergoing evaluation for an episode of transient neurologic symptoms  Patient State: Awake  Primary neurological diagnosis: Spell of uncertain etiology  Primary indication for monitoring: Characterization of spell    Pertinent Medications and Treatments    pramipexole     gabapentin       Sedatives administered: No  Intubated: No  Pharmacological paralytic: No    Reporting Period  Start of Study: , 2025   End of Study:  112, 2025       EEG Description  Digital video and scalp EEG monitoring was performed using the standard protocol for this laboratory. Scalp electrodes were applied in the international 10/20 system. Multiple digital montage arrangements were utilized for evaluation. EKG and video were recorded.     Background:      Occipital rhythm (posterior dominant rhythm or PDR): Present  Frequency: 10 Hz  Voltage: Medium  Organization: good   Reactivity to eye opening/closure: Good    Drowsiness: Present - normal  Sleep: Absent    Technical and Activation Procedures:  Hyperventilation: Not done  Photic stimulation: Done - physiologic driving noted  Reactivity to stimulation: Present    Abnormalities:    I. Seizures?  None    II. Rhythmic or Periodic Patterns?  None    III. Other Abnormalities?  None

## 2025-04-01 NOTE — CARE COORDINATION
Chart reviewed and case reviewed in IDR.  EEG just completed.  OK for the patient to discharge home per Neurology and follow-up in the office.  Perfect Serve message sent to Dr Singh and he will discharge the patient home today.  Spoke with the patient at the bedside this morning.  Patient hopeful for discharge to home today and agreeable to resume HHC with [facility].  MELITON orders in placed for transition of care and Fanny, liaison with [facility] notified of discharge to home today.  Patient stated that her  will provide transportation.  Will continue to follow for further transition of care planning needs.         Tamica Benitez RN.  P:  [phone]

## 2025-04-05 ENCOUNTER — APPOINTMENT (OUTPATIENT)
Dept: CT IMAGING | Age: 80
DRG: 189 | End: 2025-04-05
Attending: STUDENT IN AN ORGANIZED HEALTH CARE EDUCATION/TRAINING PROGRAM
Payer: MEDICARE

## 2025-04-05 ENCOUNTER — APPOINTMENT (OUTPATIENT)
Dept: GENERAL RADIOLOGY | Age: 80
DRG: 189 | End: 2025-04-05
Payer: MEDICARE

## 2025-04-05 ENCOUNTER — HOSPITAL ENCOUNTER (INPATIENT)
Age: 80
LOS: 4 days | Discharge: NURSING, REHAB OR LONG-TERM CARE | DRG: 189 | End: 2025-04-09
Attending: STUDENT IN AN ORGANIZED HEALTH CARE EDUCATION/TRAINING PROGRAM | Admitting: INTERNAL MEDICINE
Payer: MEDICARE

## 2025-04-05 DIAGNOSIS — J44.1 COPD EXACERBATION (HCC): Primary | ICD-10-CM

## 2025-04-05 PROBLEM — J96.01 ACUTE RESPIRATORY FAILURE WITH HYPOXIA AND HYPERCAPNIA: Status: ACTIVE | Noted: 2019-03-09

## 2025-04-05 LAB
ALBUMIN SERPL-MCNC: 2.7 G/DL (ref 3.5–5.2)
ALP SERPL-CCNC: 121 U/L (ref 35–104)
ALT SERPL-CCNC: 35 U/L (ref 0–32)
AMPHET UR QL SCN: NEGATIVE
ANION GAP SERPL CALCULATED.3IONS-SCNC: 14 MMOL/L (ref 7–16)
APAP SERPL-MCNC: <5 UG/ML (ref 10–30)
APAP SERPL-MCNC: <5 UG/ML (ref 10–30)
AST SERPL-CCNC: 45 U/L (ref 0–31)
B.E.: -2 MMOL/L (ref -3–3)
B.E.: -4.3 MMOL/L (ref -3–3)
B.E.: -5.5 MMOL/L (ref -3–3)
BARBITURATES UR QL SCN: NEGATIVE
BASOPHILS # BLD: 0.04 K/UL (ref 0–0.2)
BASOPHILS NFR BLD: 0 % (ref 0–2)
BENZODIAZ UR QL: NEGATIVE
BILIRUB SERPL-MCNC: 0.2 MG/DL (ref 0–1.2)
BILIRUB UR QL STRIP: NEGATIVE
BNP SERPL-MCNC: 292 PG/ML (ref 0–450)
BUN SERPL-MCNC: 24 MG/DL (ref 6–23)
BUPRENORPHINE UR QL: NEGATIVE
CALCIUM SERPL-MCNC: 8.9 MG/DL (ref 8.6–10.2)
CANNABINOIDS UR QL SCN: NEGATIVE
CHLORIDE SERPL-SCNC: 105 MMOL/L (ref 98–107)
CK SERPL-CCNC: 243 U/L (ref 20–180)
CLARITY UR: CLEAR
CO2 SERPL-SCNC: 18 MMOL/L (ref 22–29)
COCAINE UR QL SCN: NEGATIVE
COHB: 0.7 % (ref 0–1.5)
COHB: 0.9 % (ref 0–1.5)
COHB: 0.9 % (ref 0–1.5)
COLOR UR: YELLOW
CREAT SERPL-MCNC: 1.2 MG/DL (ref 0.5–1)
CRITICAL: ABNORMAL
D-DIMER QUANTITATIVE: 754 NG/ML DDU (ref 0–230)
DATE ANALYZED: ABNORMAL
DATE OF COLLECTION: ABNORMAL
EOSINOPHIL # BLD: 0.19 K/UL (ref 0.05–0.5)
EOSINOPHILS RELATIVE PERCENT: 2 % (ref 0–6)
ERYTHROCYTE [DISTWIDTH] IN BLOOD BY AUTOMATED COUNT: 14.8 % (ref 11.5–15)
ETHANOLAMINE SERPL-MCNC: <10 MG/DL (ref 0–0.08)
ETHANOLAMINE SERPL-MCNC: <10 MG/DL (ref 0–0.08)
FENTANYL UR QL: NEGATIVE
FLUAV RNA RESP QL NAA+PROBE: NOT DETECTED
FLUBV RNA RESP QL NAA+PROBE: NOT DETECTED
GFR, ESTIMATED: 46 ML/MIN/1.73M2
GLUCOSE SERPL-MCNC: 124 MG/DL (ref 74–99)
GLUCOSE UR STRIP-MCNC: NEGATIVE MG/DL
HCO3: 20.5 MMOL/L (ref 22–26)
HCO3: 23.7 MMOL/L (ref 22–26)
HCO3: 25.1 MMOL/L (ref 22–26)
HCT VFR BLD AUTO: 36.3 % (ref 34–48)
HGB BLD-MCNC: 11.1 G/DL (ref 11.5–15.5)
HGB UR QL STRIP.AUTO: NEGATIVE
HHB: 6.6 % (ref 0–5)
HHB: 7.3 % (ref 0–5)
HHB: 9 % (ref 0–5)
IMM GRANULOCYTES # BLD AUTO: 0.06 K/UL (ref 0–0.58)
IMM GRANULOCYTES NFR BLD: 1 % (ref 0–5)
KETONES UR STRIP-MCNC: NEGATIVE MG/DL
LAB: ABNORMAL
LEUKOCYTE ESTERASE UR QL STRIP: NEGATIVE
LYMPHOCYTES NFR BLD: 2.98 K/UL (ref 1.5–4)
LYMPHOCYTES RELATIVE PERCENT: 25 % (ref 20–42)
Lab: 1410
Lab: 205
Lab: 410
MCH RBC QN AUTO: 28.2 PG (ref 26–35)
MCHC RBC AUTO-ENTMCNC: 30.6 G/DL (ref 32–34.5)
MCV RBC AUTO: 92.1 FL (ref 80–99.9)
METHADONE UR QL: NEGATIVE
METHB: 0.1 % (ref 0–1.5)
METHB: 0.3 % (ref 0–1.5)
METHB: 0.3 % (ref 0–1.5)
MODE: ABNORMAL
MONOCYTES NFR BLD: 0.64 K/UL (ref 0.1–0.95)
MONOCYTES NFR BLD: 5 % (ref 2–12)
NEUTROPHILS NFR BLD: 67 % (ref 43–80)
NEUTS SEG NFR BLD: 8.07 K/UL (ref 1.8–7.3)
NITRITE UR QL STRIP: NEGATIVE
O2 SATURATION: 90.9 % (ref 92–98.5)
O2 SATURATION: 92.6 % (ref 92–98.5)
O2 SATURATION: 93.3 % (ref 92–98.5)
O2HB: 89.8 % (ref 94–97)
O2HB: 91.5 % (ref 94–97)
O2HB: 92.6 % (ref 94–97)
OPERATOR ID: 1893
OPERATOR ID: 9072
OPERATOR ID: ABNORMAL
OPIATES UR QL SCN: NEGATIVE
OXYCODONE UR QL SCN: NEGATIVE
PATIENT TEMP: 37 C
PCO2: 41.8 MMHG (ref 35–45)
PCO2: 56.2 MMHG (ref 35–45)
PCO2: 58.2 MMHG (ref 35–45)
PCP UR QL SCN: NEGATIVE
PH BLOOD GAS: 7.24 (ref 7.35–7.45)
PH BLOOD GAS: 7.25 (ref 7.35–7.45)
PH BLOOD GAS: 7.31 (ref 7.35–7.45)
PH UR STRIP: 6 [PH] (ref 5–8)
PLATELET # BLD AUTO: 207 K/UL (ref 130–450)
PMV BLD AUTO: 11.1 FL (ref 7–12)
PO2: 69.6 MMHG (ref 75–100)
PO2: 70 MMHG (ref 75–100)
PO2: 76.5 MMHG (ref 75–100)
POTASSIUM SERPL-SCNC: 5.2 MMOL/L (ref 3.5–5)
PROT SERPL-MCNC: 5.9 G/DL (ref 6.4–8.3)
PROT UR STRIP-MCNC: NEGATIVE MG/DL
RBC # BLD AUTO: 3.94 M/UL (ref 3.5–5.5)
RBC #/AREA URNS HPF: NORMAL /HPF
SALICYLATES SERPL-MCNC: <0.3 MG/DL (ref 0–30)
SALICYLATES SERPL-MCNC: <0.3 MG/DL (ref 0–30)
SARS-COV-2 RNA RESP QL NAA+PROBE: NOT DETECTED
SODIUM SERPL-SCNC: 137 MMOL/L (ref 132–146)
SOURCE, BLOOD GAS: ABNORMAL
SOURCE: NORMAL
SP GR UR STRIP: 1.01 (ref 1–1.03)
SPECIMEN DESCRIPTION: NORMAL
TEST INFORMATION: NORMAL
THB: 12.1 G/DL (ref 11.5–16.5)
THB: 12.5 G/DL (ref 11.5–16.5)
THB: 6 G/DL (ref 11.5–16.5)
TIME ANALYZED: 1414
TIME ANALYZED: 209
TIME ANALYZED: 413
TOXIC TRICYCLIC SC,BLOOD: NEGATIVE
TOXIC TRICYCLIC SC,BLOOD: NEGATIVE
TROPONIN I SERPL HS-MCNC: 31 NG/L (ref 0–9)
TROPONIN I SERPL HS-MCNC: 31 NG/L (ref 0–9)
UROBILINOGEN UR STRIP-ACNC: 0.2 EU/DL (ref 0–1)
WBC #/AREA URNS HPF: NORMAL /HPF
WBC OTHER # BLD: 12 K/UL (ref 4.5–11.5)

## 2025-04-05 PROCEDURE — 85025 COMPLETE CBC W/AUTO DIFF WBC: CPT

## 2025-04-05 PROCEDURE — 2580000003 HC RX 258: Performed by: STUDENT IN AN ORGANIZED HEALTH CARE EDUCATION/TRAINING PROGRAM

## 2025-04-05 PROCEDURE — 2500000003 HC RX 250 WO HCPCS: Performed by: INTERNAL MEDICINE

## 2025-04-05 PROCEDURE — 71045 X-RAY EXAM CHEST 1 VIEW: CPT

## 2025-04-05 PROCEDURE — 36415 COLL VENOUS BLD VENIPUNCTURE: CPT

## 2025-04-05 PROCEDURE — 6360000002 HC RX W HCPCS: Performed by: INTERNAL MEDICINE

## 2025-04-05 PROCEDURE — 71275 CT ANGIOGRAPHY CHEST: CPT

## 2025-04-05 PROCEDURE — 80179 DRUG ASSAY SALICYLATE: CPT

## 2025-04-05 PROCEDURE — 80307 DRUG TEST PRSMV CHEM ANLYZR: CPT

## 2025-04-05 PROCEDURE — 72170 X-RAY EXAM OF PELVIS: CPT

## 2025-04-05 PROCEDURE — 82550 ASSAY OF CK (CPK): CPT

## 2025-04-05 PROCEDURE — 94664 DEMO&/EVAL PT USE INHALER: CPT

## 2025-04-05 PROCEDURE — 70450 CT HEAD/BRAIN W/O DYE: CPT

## 2025-04-05 PROCEDURE — 94640 AIRWAY INHALATION TREATMENT: CPT

## 2025-04-05 PROCEDURE — 6370000000 HC RX 637 (ALT 250 FOR IP): Performed by: INTERNAL MEDICINE

## 2025-04-05 PROCEDURE — 82805 BLOOD GASES W/O2 SATURATION: CPT

## 2025-04-05 PROCEDURE — 87636 SARSCOV2 & INF A&B AMP PRB: CPT

## 2025-04-05 PROCEDURE — 36600 WITHDRAWAL OF ARTERIAL BLOOD: CPT

## 2025-04-05 PROCEDURE — 72125 CT NECK SPINE W/O DYE: CPT

## 2025-04-05 PROCEDURE — 81001 URINALYSIS AUTO W/SCOPE: CPT

## 2025-04-05 PROCEDURE — 6360000004 HC RX CONTRAST MEDICATION: Performed by: RADIOLOGY

## 2025-04-05 PROCEDURE — 80053 COMPREHEN METABOLIC PANEL: CPT

## 2025-04-05 PROCEDURE — 2500000003 HC RX 250 WO HCPCS: Performed by: NURSE PRACTITIONER

## 2025-04-05 PROCEDURE — 93005 ELECTROCARDIOGRAM TRACING: CPT | Performed by: STUDENT IN AN ORGANIZED HEALTH CARE EDUCATION/TRAINING PROGRAM

## 2025-04-05 PROCEDURE — 99223 1ST HOSP IP/OBS HIGH 75: CPT | Performed by: INTERNAL MEDICINE

## 2025-04-05 PROCEDURE — 80143 DRUG ASSAY ACETAMINOPHEN: CPT

## 2025-04-05 PROCEDURE — 85379 FIBRIN DEGRADATION QUANT: CPT

## 2025-04-05 PROCEDURE — 6360000002 HC RX W HCPCS: Performed by: STUDENT IN AN ORGANIZED HEALTH CARE EDUCATION/TRAINING PROGRAM

## 2025-04-05 PROCEDURE — 84484 ASSAY OF TROPONIN QUANT: CPT

## 2025-04-05 PROCEDURE — 83880 ASSAY OF NATRIURETIC PEPTIDE: CPT

## 2025-04-05 PROCEDURE — 6360000002 HC RX W HCPCS: Performed by: NURSE PRACTITIONER

## 2025-04-05 PROCEDURE — 99285 EMERGENCY DEPT VISIT HI MDM: CPT

## 2025-04-05 PROCEDURE — 2060000000 HC ICU INTERMEDIATE R&B

## 2025-04-05 PROCEDURE — G0480 DRUG TEST DEF 1-7 CLASSES: HCPCS

## 2025-04-05 PROCEDURE — 6370000000 HC RX 637 (ALT 250 FOR IP): Performed by: NURSE PRACTITIONER

## 2025-04-05 RX ORDER — ACETAMINOPHEN 650 MG/1
650 SUPPOSITORY RECTAL EVERY 6 HOURS PRN
Status: DISCONTINUED | OUTPATIENT
Start: 2025-04-05 | End: 2025-04-09 | Stop reason: HOSPADM

## 2025-04-05 RX ORDER — SODIUM CHLORIDE 9 MG/ML
INJECTION, SOLUTION INTRAVENOUS PRN
Status: DISCONTINUED | OUTPATIENT
Start: 2025-04-05 | End: 2025-04-09 | Stop reason: HOSPADM

## 2025-04-05 RX ORDER — ARFORMOTEROL TARTRATE 15 UG/2ML
15 SOLUTION RESPIRATORY (INHALATION)
Status: DISCONTINUED | OUTPATIENT
Start: 2025-04-05 | End: 2025-04-09 | Stop reason: HOSPADM

## 2025-04-05 RX ORDER — VENLAFAXINE HYDROCHLORIDE 150 MG/1
150 CAPSULE, EXTENDED RELEASE ORAL
Status: DISCONTINUED | OUTPATIENT
Start: 2025-04-06 | End: 2025-04-09 | Stop reason: HOSPADM

## 2025-04-05 RX ORDER — VERAPAMIL HYDROCHLORIDE 120 MG/1
120 TABLET, FILM COATED, EXTENDED RELEASE ORAL NIGHTLY
Status: DISCONTINUED | OUTPATIENT
Start: 2025-04-06 | End: 2025-04-09 | Stop reason: HOSPADM

## 2025-04-05 RX ORDER — PREDNISONE 20 MG/1
40 TABLET ORAL DAILY
Status: COMPLETED | OUTPATIENT
Start: 2025-04-06 | End: 2025-04-08

## 2025-04-05 RX ORDER — OXYBUTYNIN CHLORIDE 10 MG/1
10 TABLET, EXTENDED RELEASE ORAL 2 TIMES DAILY
Status: DISCONTINUED | OUTPATIENT
Start: 2025-04-05 | End: 2025-04-09 | Stop reason: HOSPADM

## 2025-04-05 RX ORDER — LOSARTAN POTASSIUM 50 MG/1
50 TABLET ORAL DAILY
Status: DISCONTINUED | OUTPATIENT
Start: 2025-04-06 | End: 2025-04-09 | Stop reason: HOSPADM

## 2025-04-05 RX ORDER — BUPROPION HYDROCHLORIDE 100 MG/1
100 TABLET, EXTENDED RELEASE ORAL DAILY
Status: DISCONTINUED | OUTPATIENT
Start: 2025-04-06 | End: 2025-04-09 | Stop reason: HOSPADM

## 2025-04-05 RX ORDER — ENOXAPARIN SODIUM 100 MG/ML
40 INJECTION SUBCUTANEOUS DAILY
Status: DISCONTINUED | OUTPATIENT
Start: 2025-04-05 | End: 2025-04-09 | Stop reason: HOSPADM

## 2025-04-05 RX ORDER — PANTOPRAZOLE SODIUM 40 MG/1
40 TABLET, DELAYED RELEASE ORAL
Status: DISCONTINUED | OUTPATIENT
Start: 2025-04-06 | End: 2025-04-09 | Stop reason: HOSPADM

## 2025-04-05 RX ORDER — CLOPIDOGREL BISULFATE 75 MG/1
75 TABLET ORAL DAILY
Status: DISCONTINUED | OUTPATIENT
Start: 2025-04-06 | End: 2025-04-09 | Stop reason: HOSPADM

## 2025-04-05 RX ORDER — 0.9 % SODIUM CHLORIDE 0.9 %
1000 INTRAVENOUS SOLUTION INTRAVENOUS ONCE
Status: DISCONTINUED | OUTPATIENT
Start: 2025-04-05 | End: 2025-04-09 | Stop reason: HOSPADM

## 2025-04-05 RX ORDER — POLYETHYLENE GLYCOL 3350 17 G/17G
17 POWDER, FOR SOLUTION ORAL DAILY PRN
Status: DISCONTINUED | OUTPATIENT
Start: 2025-04-05 | End: 2025-04-09 | Stop reason: HOSPADM

## 2025-04-05 RX ORDER — SODIUM CHLORIDE 0.9 % (FLUSH) 0.9 %
5-40 SYRINGE (ML) INJECTION PRN
Status: DISCONTINUED | OUTPATIENT
Start: 2025-04-05 | End: 2025-04-09 | Stop reason: HOSPADM

## 2025-04-05 RX ORDER — ONDANSETRON 2 MG/ML
4 INJECTION INTRAMUSCULAR; INTRAVENOUS EVERY 6 HOURS PRN
Status: DISCONTINUED | OUTPATIENT
Start: 2025-04-05 | End: 2025-04-09 | Stop reason: HOSPADM

## 2025-04-05 RX ORDER — ASPIRIN 81 MG/1
81 TABLET, CHEWABLE ORAL DAILY
Status: DISCONTINUED | OUTPATIENT
Start: 2025-04-06 | End: 2025-04-09 | Stop reason: HOSPADM

## 2025-04-05 RX ORDER — ATORVASTATIN CALCIUM 40 MG/1
80 TABLET, FILM COATED ORAL DAILY
Status: DISCONTINUED | OUTPATIENT
Start: 2025-04-06 | End: 2025-04-09 | Stop reason: HOSPADM

## 2025-04-05 RX ORDER — BUMETANIDE 1 MG/1
2 TABLET ORAL 2 TIMES DAILY
Status: DISCONTINUED | OUTPATIENT
Start: 2025-04-05 | End: 2025-04-08

## 2025-04-05 RX ORDER — ONDANSETRON 4 MG/1
4 TABLET, ORALLY DISINTEGRATING ORAL EVERY 8 HOURS PRN
Status: DISCONTINUED | OUTPATIENT
Start: 2025-04-05 | End: 2025-04-09 | Stop reason: HOSPADM

## 2025-04-05 RX ORDER — IPRATROPIUM BROMIDE AND ALBUTEROL SULFATE 2.5; .5 MG/3ML; MG/3ML
1 SOLUTION RESPIRATORY (INHALATION)
Status: DISCONTINUED | OUTPATIENT
Start: 2025-04-05 | End: 2025-04-09 | Stop reason: HOSPADM

## 2025-04-05 RX ORDER — TRAZODONE HYDROCHLORIDE 50 MG/1
100 TABLET ORAL NIGHTLY
Status: DISCONTINUED | OUTPATIENT
Start: 2025-04-06 | End: 2025-04-09 | Stop reason: HOSPADM

## 2025-04-05 RX ORDER — BUDESONIDE 0.5 MG/2ML
0.5 INHALANT ORAL
Status: DISCONTINUED | OUTPATIENT
Start: 2025-04-05 | End: 2025-04-09 | Stop reason: HOSPADM

## 2025-04-05 RX ORDER — POTASSIUM CHLORIDE 750 MG/1
10 TABLET, EXTENDED RELEASE ORAL DAILY
Status: DISCONTINUED | OUTPATIENT
Start: 2025-04-06 | End: 2025-04-09 | Stop reason: HOSPADM

## 2025-04-05 RX ORDER — BACLOFEN 10 MG/1
10 TABLET ORAL 2 TIMES DAILY
Status: DISCONTINUED | OUTPATIENT
Start: 2025-04-05 | End: 2025-04-09 | Stop reason: HOSPADM

## 2025-04-05 RX ORDER — PRAMIPEXOLE DIHYDROCHLORIDE 0.25 MG/1
0.75 TABLET ORAL 2 TIMES DAILY
Status: DISCONTINUED | OUTPATIENT
Start: 2025-04-05 | End: 2025-04-09 | Stop reason: HOSPADM

## 2025-04-05 RX ORDER — PREDNISONE 20 MG/1
40 TABLET ORAL DAILY
Status: DISCONTINUED | OUTPATIENT
Start: 2025-04-07 | End: 2025-04-05

## 2025-04-05 RX ORDER — IOPAMIDOL 755 MG/ML
75 INJECTION, SOLUTION INTRAVASCULAR
Status: COMPLETED | OUTPATIENT
Start: 2025-04-05 | End: 2025-04-05

## 2025-04-05 RX ORDER — SODIUM CHLORIDE 0.9 % (FLUSH) 0.9 %
5-40 SYRINGE (ML) INJECTION EVERY 12 HOURS SCHEDULED
Status: DISCONTINUED | OUTPATIENT
Start: 2025-04-05 | End: 2025-04-09 | Stop reason: HOSPADM

## 2025-04-05 RX ORDER — GABAPENTIN 800 MG/1
800 TABLET ORAL 3 TIMES DAILY
Status: DISCONTINUED | OUTPATIENT
Start: 2025-04-05 | End: 2025-04-05

## 2025-04-05 RX ORDER — BENZONATATE 100 MG/1
100 CAPSULE ORAL 3 TIMES DAILY PRN
Status: DISCONTINUED | OUTPATIENT
Start: 2025-04-05 | End: 2025-04-09 | Stop reason: HOSPADM

## 2025-04-05 RX ORDER — ACETAMINOPHEN 325 MG/1
650 TABLET ORAL EVERY 6 HOURS PRN
Status: DISCONTINUED | OUTPATIENT
Start: 2025-04-05 | End: 2025-04-09 | Stop reason: HOSPADM

## 2025-04-05 RX ORDER — GABAPENTIN 600 MG/1
300 TABLET ORAL 3 TIMES DAILY
Status: DISCONTINUED | OUTPATIENT
Start: 2025-04-05 | End: 2025-04-09 | Stop reason: HOSPADM

## 2025-04-05 RX ORDER — LEVOTHYROXINE SODIUM 25 UG/1
50 TABLET ORAL DAILY
Status: DISCONTINUED | OUTPATIENT
Start: 2025-04-06 | End: 2025-04-09 | Stop reason: HOSPADM

## 2025-04-05 RX ADMIN — BACLOFEN 10 MG: 10 TABLET ORAL at 20:20

## 2025-04-05 RX ADMIN — SODIUM CHLORIDE, PRESERVATIVE FREE 10 ML: 5 INJECTION INTRAVENOUS at 17:36

## 2025-04-05 RX ADMIN — IPRATROPIUM BROMIDE AND ALBUTEROL SULFATE 1 DOSE: 2.5; .5 SOLUTION RESPIRATORY (INHALATION) at 08:00

## 2025-04-05 RX ADMIN — BUDESONIDE 500 MCG: 0.5 SUSPENSION RESPIRATORY (INHALATION) at 19:48

## 2025-04-05 RX ADMIN — GABAPENTIN 300 MG: 600 TABLET, FILM COATED ORAL at 13:34

## 2025-04-05 RX ADMIN — DOXYCYCLINE 100 MG: 100 INJECTION, POWDER, LYOPHILIZED, FOR SOLUTION INTRAVENOUS at 06:35

## 2025-04-05 RX ADMIN — ARFORMOTEROL TARTRATE 15 MCG: 15 SOLUTION RESPIRATORY (INHALATION) at 19:48

## 2025-04-05 RX ADMIN — GABAPENTIN 300 MG: 600 TABLET, FILM COATED ORAL at 20:19

## 2025-04-05 RX ADMIN — SODIUM CHLORIDE, PRESERVATIVE FREE 10 ML: 5 INJECTION INTRAVENOUS at 09:26

## 2025-04-05 RX ADMIN — ARFORMOTEROL TARTRATE 15 MCG: 15 SOLUTION RESPIRATORY (INHALATION) at 11:48

## 2025-04-05 RX ADMIN — OXYBUTYNIN CHLORIDE 10 MG: 10 TABLET, EXTENDED RELEASE ORAL at 20:19

## 2025-04-05 RX ADMIN — ENOXAPARIN SODIUM 40 MG: 100 INJECTION SUBCUTANEOUS at 09:26

## 2025-04-05 RX ADMIN — IPRATROPIUM BROMIDE AND ALBUTEROL SULFATE 1 DOSE: 2.5; .5 SOLUTION RESPIRATORY (INHALATION) at 19:48

## 2025-04-05 RX ADMIN — WATER 40 MG: 1 INJECTION INTRAMUSCULAR; INTRAVENOUS; SUBCUTANEOUS at 17:36

## 2025-04-05 RX ADMIN — BUMETANIDE 2 MG: 1 TABLET ORAL at 20:20

## 2025-04-05 RX ADMIN — PRAMIPEXOLE DIHYDROCHLORIDE 0.75 MG: 0.25 TABLET ORAL at 20:19

## 2025-04-05 RX ADMIN — SODIUM CHLORIDE, PRESERVATIVE FREE 10 ML: 5 INJECTION INTRAVENOUS at 20:53

## 2025-04-05 RX ADMIN — IOPAMIDOL 75 ML: 755 INJECTION, SOLUTION INTRAVENOUS at 05:53

## 2025-04-05 RX ADMIN — BUDESONIDE 500 MCG: 0.5 SUSPENSION RESPIRATORY (INHALATION) at 11:47

## 2025-04-05 RX ADMIN — IPRATROPIUM BROMIDE AND ALBUTEROL SULFATE 1 DOSE: 2.5; .5 SOLUTION RESPIRATORY (INHALATION) at 16:01

## 2025-04-05 RX ADMIN — IPRATROPIUM BROMIDE AND ALBUTEROL SULFATE 1 DOSE: 2.5; .5 SOLUTION RESPIRATORY (INHALATION) at 11:48

## 2025-04-05 ASSESSMENT — PAIN DESCRIPTION - ORIENTATION: ORIENTATION: MID

## 2025-04-05 ASSESSMENT — PAIN DESCRIPTION - DESCRIPTORS: DESCRIPTORS: ACHING

## 2025-04-05 ASSESSMENT — PAIN SCALES - GENERAL
PAINLEVEL_OUTOF10: 10
PAINLEVEL_OUTOF10: 7

## 2025-04-05 ASSESSMENT — PAIN DESCRIPTION - FREQUENCY: FREQUENCY: INTERMITTENT

## 2025-04-05 ASSESSMENT — PAIN DESCRIPTION - LOCATION
LOCATION: THROAT
LOCATION: THROAT

## 2025-04-05 ASSESSMENT — PAIN DESCRIPTION - PAIN TYPE: TYPE: CHRONIC PAIN

## 2025-04-05 ASSESSMENT — PAIN - FUNCTIONAL ASSESSMENT
PAIN_FUNCTIONAL_ASSESSMENT: NONE - DENIES PAIN
PAIN_FUNCTIONAL_ASSESSMENT: ACTIVITIES ARE NOT PREVENTED

## 2025-04-05 NOTE — ED PROVIDER NOTES
[facility] 8SE IMCU/NEURO  EMERGENCY DEPARTMENT ENCOUNTER        Pt Name: Rebeka Renee  MRN: [id number]  Birthdate 1945  Date of evaluation: 4/5/2025  Provider: Johnna Denis DO  PCP: Navneet Latif MD  Note Started: 1:59 AM EDT 4/5/25    CHIEF COMPLAINT       Chief Complaint   Patient presents with    Shortness of Breath     Originally called for lift assist, pt GCS less than 13, confused, hypoxic on cpap. EMS gave 125 of solumedrol and douneb.       HISTORY OF PRESENT ILLNESS: 1 or more Elements   History From: patient    Limitations to history : None    Rebeka Renee is a 79 y.o. female with a history of COPD, hypertension, hyperlipidemia, depression presenting to the emergency department via EMS from home for shortness of breath.  Initially the call was for a lift a 6.  The paramedics state that she was confused and hypoxic at 72%.  They placed her on CPAP.  Patient was given 125 Solu-Medrol along with DuoNebs.  Apparently the patient was walking in the hallway and felt weak and her legs gave out and she sat down on the ground.  She has some bruising on her legs that they noted but really no other complaints at this time.  Patient has had a cough that is nonproductive.  Patient denies any fevers, chills, lightheadedness, dizziness, syncope, nausea, vomiting, diarrhea, abdominal pain, hemoptysis, history of blood clots, numbness, tingling, unilateral weakness, recent hospitalization, recent illness, or other acute symptoms or concerns.     Nursing Notes were all reviewed and agreed with or any disagreements were addressed in the HPI.    REVIEW OF SYSTEMS :      Review of Systems    Positives and Pertinent negatives as per HPI.     SURGICAL HISTORY     Past Surgical History:   Procedure Laterality Date    BREAST SURGERY      CATARACT REMOVAL WITH IMPLANT Bilateral     CHOLECYSTECTOMY      FEMUR SURGERY Right 9/22/2024    FEMUR OPEN REDUCTION INTERNAL FIXATION RIGHT PERIPROSTHETIC

## 2025-04-05 NOTE — H&P
abnormality.         CT CERVICAL SPINE WO CONTRAST   Final Result   Minimal multilevel degenerative changes seen throughout the cervical spine   with no evidence of acute fracture or traumatic malalignment.         CTA PULMONARY W CONTRAST   Final Result   1. No evidence of pulmonary embolism or acute pulmonary abnormality.   2. Postsurgical changes suggesting partial left upper lobe pneumonectomy.   3. Shelby gastric postsurgical changes suggesting possible Manas-en-Y gastric   bypass with desiccated distension of the gastric remnant. No pneumatosis or   overt acute feature.   4. Cholecystectomy.   5. Probable left nephrectomy.         XR CHEST PORTABLE   Final Result   1. No acute cardiopulmonary process.   2. Mild pulmonary fibrosis.         XR PELVIS (1-2 VIEWS)   Final Result   No fracture or dislocation.             ASSESSMENT:      Principal Problem:    COPD exacerbation (HCC)  Active Problems:    HTN (hypertension)    Anxiety    Acute respiratory failure with hypoxia and hypercapnia  Resolved Problems:    * No resolved hospital problems. *      PLAN:    Admit  IV steroids  Nebulizers  IV ABX  proCalcitonin   Medications for other co morbidities cont as appropriate w dosage adjustments as necessary  PT/OT  DVT PPx  DC planning     Discussed with ED physician  Electronically signed by Anshu Willis MD on 4/5/2025 at 9:05 AM    NOTE: This report was transcribed using voice recognition software. Every effort was made to ensure accuracy; however, inadvertent computerized transcription errors may be present.

## 2025-04-06 LAB
ANION GAP SERPL CALCULATED.3IONS-SCNC: 15 MMOL/L (ref 7–16)
BASOPHILS # BLD: 0.02 K/UL (ref 0–0.2)
BASOPHILS NFR BLD: 0 % (ref 0–2)
BUN SERPL-MCNC: 32 MG/DL (ref 6–23)
CALCIUM SERPL-MCNC: 8.9 MG/DL (ref 8.6–10.2)
CHLORIDE SERPL-SCNC: 99 MMOL/L (ref 98–107)
CO2 SERPL-SCNC: 20 MMOL/L (ref 22–29)
CREAT SERPL-MCNC: 1.3 MG/DL (ref 0.5–1)
EOSINOPHIL # BLD: 0.02 K/UL (ref 0.05–0.5)
EOSINOPHILS RELATIVE PERCENT: 0 % (ref 0–6)
ERYTHROCYTE [DISTWIDTH] IN BLOOD BY AUTOMATED COUNT: 14.6 % (ref 11.5–15)
GFR, ESTIMATED: 44 ML/MIN/1.73M2
GLUCOSE BLD-MCNC: 121 MG/DL (ref 74–99)
GLUCOSE BLD-MCNC: 152 MG/DL (ref 74–99)
GLUCOSE SERPL-MCNC: 75 MG/DL (ref 74–99)
HCT VFR BLD AUTO: 32.7 % (ref 34–48)
HGB BLD-MCNC: 10.1 G/DL (ref 11.5–15.5)
IMM GRANULOCYTES # BLD AUTO: 0.17 K/UL (ref 0–0.58)
IMM GRANULOCYTES NFR BLD: 1 % (ref 0–5)
LYMPHOCYTES NFR BLD: 1.81 K/UL (ref 1.5–4)
LYMPHOCYTES RELATIVE PERCENT: 13 % (ref 20–42)
MCH RBC QN AUTO: 28.4 PG (ref 26–35)
MCHC RBC AUTO-ENTMCNC: 30.9 G/DL (ref 32–34.5)
MCV RBC AUTO: 91.9 FL (ref 80–99.9)
MONOCYTES NFR BLD: 0.84 K/UL (ref 0.1–0.95)
MONOCYTES NFR BLD: 6 % (ref 2–12)
NEUTROPHILS NFR BLD: 80 % (ref 43–80)
NEUTS SEG NFR BLD: 11.01 K/UL (ref 1.8–7.3)
PLATELET # BLD AUTO: 250 K/UL (ref 130–450)
PMV BLD AUTO: 10.6 FL (ref 7–12)
POTASSIUM SERPL-SCNC: 5 MMOL/L (ref 3.5–5)
RBC # BLD AUTO: 3.56 M/UL (ref 3.5–5.5)
SODIUM SERPL-SCNC: 134 MMOL/L (ref 132–146)
WBC OTHER # BLD: 13.9 K/UL (ref 4.5–11.5)

## 2025-04-06 PROCEDURE — 2500000003 HC RX 250 WO HCPCS: Performed by: NURSE PRACTITIONER

## 2025-04-06 PROCEDURE — 94640 AIRWAY INHALATION TREATMENT: CPT

## 2025-04-06 PROCEDURE — 2060000000 HC ICU INTERMEDIATE R&B

## 2025-04-06 PROCEDURE — 6370000000 HC RX 637 (ALT 250 FOR IP): Performed by: NURSE PRACTITIONER

## 2025-04-06 PROCEDURE — 6360000002 HC RX W HCPCS: Performed by: NURSE PRACTITIONER

## 2025-04-06 PROCEDURE — 6370000000 HC RX 637 (ALT 250 FOR IP): Performed by: INTERNAL MEDICINE

## 2025-04-06 PROCEDURE — 82962 GLUCOSE BLOOD TEST: CPT

## 2025-04-06 PROCEDURE — 6360000002 HC RX W HCPCS: Performed by: INTERNAL MEDICINE

## 2025-04-06 PROCEDURE — 36415 COLL VENOUS BLD VENIPUNCTURE: CPT

## 2025-04-06 PROCEDURE — 99233 SBSQ HOSP IP/OBS HIGH 50: CPT | Performed by: INTERNAL MEDICINE

## 2025-04-06 PROCEDURE — 85025 COMPLETE CBC W/AUTO DIFF WBC: CPT

## 2025-04-06 PROCEDURE — 80048 BASIC METABOLIC PNL TOTAL CA: CPT

## 2025-04-06 RX ADMIN — BACLOFEN 10 MG: 10 TABLET ORAL at 09:19

## 2025-04-06 RX ADMIN — PANTOPRAZOLE SODIUM 40 MG: 40 TABLET, DELAYED RELEASE ORAL at 05:21

## 2025-04-06 RX ADMIN — ARFORMOTEROL TARTRATE 15 MCG: 15 SOLUTION RESPIRATORY (INHALATION) at 07:43

## 2025-04-06 RX ADMIN — VENLAFAXINE HYDROCHLORIDE 150 MG: 150 CAPSULE, EXTENDED RELEASE ORAL at 09:18

## 2025-04-06 RX ADMIN — GABAPENTIN 300 MG: 600 TABLET, FILM COATED ORAL at 14:45

## 2025-04-06 RX ADMIN — CLOPIDOGREL BISULFATE 75 MG: 75 TABLET, FILM COATED ORAL at 09:16

## 2025-04-06 RX ADMIN — BUDESONIDE 500 MCG: 0.5 SUSPENSION RESPIRATORY (INHALATION) at 20:39

## 2025-04-06 RX ADMIN — ASPIRIN 81 MG CHEWABLE TABLET 81 MG: 81 TABLET CHEWABLE at 09:16

## 2025-04-06 RX ADMIN — POLYETHYLENE GLYCOL 3350 17 G: 17 POWDER, FOR SOLUTION ORAL at 11:48

## 2025-04-06 RX ADMIN — OXYBUTYNIN CHLORIDE 10 MG: 10 TABLET, EXTENDED RELEASE ORAL at 20:34

## 2025-04-06 RX ADMIN — GABAPENTIN 300 MG: 600 TABLET, FILM COATED ORAL at 20:34

## 2025-04-06 RX ADMIN — IPRATROPIUM BROMIDE AND ALBUTEROL SULFATE 1 DOSE: 2.5; .5 SOLUTION RESPIRATORY (INHALATION) at 15:46

## 2025-04-06 RX ADMIN — TRAZODONE HYDROCHLORIDE 100 MG: 50 TABLET ORAL at 20:34

## 2025-04-06 RX ADMIN — OXYBUTYNIN CHLORIDE 10 MG: 10 TABLET, EXTENDED RELEASE ORAL at 09:18

## 2025-04-06 RX ADMIN — PRAMIPEXOLE DIHYDROCHLORIDE 0.75 MG: 0.25 TABLET ORAL at 20:34

## 2025-04-06 RX ADMIN — SODIUM CHLORIDE, PRESERVATIVE FREE 10 ML: 5 INJECTION INTRAVENOUS at 20:35

## 2025-04-06 RX ADMIN — PREDNISONE 40 MG: 20 TABLET ORAL at 09:16

## 2025-04-06 RX ADMIN — PRAMIPEXOLE DIHYDROCHLORIDE 0.75 MG: 0.25 TABLET ORAL at 09:18

## 2025-04-06 RX ADMIN — ENOXAPARIN SODIUM 40 MG: 100 INJECTION SUBCUTANEOUS at 09:16

## 2025-04-06 RX ADMIN — GABAPENTIN 300 MG: 600 TABLET, FILM COATED ORAL at 09:16

## 2025-04-06 RX ADMIN — BUDESONIDE 500 MCG: 0.5 SUSPENSION RESPIRATORY (INHALATION) at 07:43

## 2025-04-06 RX ADMIN — IPRATROPIUM BROMIDE AND ALBUTEROL SULFATE 1 DOSE: 2.5; .5 SOLUTION RESPIRATORY (INHALATION) at 20:39

## 2025-04-06 RX ADMIN — LEVOTHYROXINE SODIUM 50 MCG: 0.03 TABLET ORAL at 05:21

## 2025-04-06 RX ADMIN — IPRATROPIUM BROMIDE AND ALBUTEROL SULFATE 1 DOSE: 2.5; .5 SOLUTION RESPIRATORY (INHALATION) at 12:01

## 2025-04-06 RX ADMIN — IPRATROPIUM BROMIDE AND ALBUTEROL SULFATE 1 DOSE: 2.5; .5 SOLUTION RESPIRATORY (INHALATION) at 07:43

## 2025-04-06 RX ADMIN — SODIUM CHLORIDE, PRESERVATIVE FREE 10 ML: 5 INJECTION INTRAVENOUS at 09:17

## 2025-04-06 RX ADMIN — ARFORMOTEROL TARTRATE 15 MCG: 15 SOLUTION RESPIRATORY (INHALATION) at 20:39

## 2025-04-06 RX ADMIN — BACLOFEN 10 MG: 10 TABLET ORAL at 20:34

## 2025-04-06 RX ADMIN — BUPROPION HYDROCHLORIDE 100 MG: 100 TABLET, FILM COATED, EXTENDED RELEASE ORAL at 09:18

## 2025-04-06 RX ADMIN — ATORVASTATIN CALCIUM 80 MG: 40 TABLET, FILM COATED ORAL at 09:16

## 2025-04-07 LAB
ANION GAP SERPL CALCULATED.3IONS-SCNC: 9 MMOL/L (ref 7–16)
BASOPHILS # BLD: 0.01 K/UL (ref 0–0.2)
BASOPHILS NFR BLD: 0 % (ref 0–2)
BUN SERPL-MCNC: 28 MG/DL (ref 6–23)
CALCIUM SERPL-MCNC: 9.2 MG/DL (ref 8.6–10.2)
CHLORIDE SERPL-SCNC: 101 MMOL/L (ref 98–107)
CO2 SERPL-SCNC: 25 MMOL/L (ref 22–29)
CREAT SERPL-MCNC: 1.1 MG/DL (ref 0.5–1)
EKG ATRIAL RATE: 78 BPM
EKG P AXIS: 53 DEGREES
EKG P-R INTERVAL: 178 MS
EKG Q-T INTERVAL: 394 MS
EKG QRS DURATION: 96 MS
EKG QTC CALCULATION (BAZETT): 449 MS
EKG R AXIS: -43 DEGREES
EKG T AXIS: 71 DEGREES
EKG VENTRICULAR RATE: 78 BPM
EOSINOPHIL # BLD: 0.09 K/UL (ref 0.05–0.5)
EOSINOPHILS RELATIVE PERCENT: 1 % (ref 0–6)
ERYTHROCYTE [DISTWIDTH] IN BLOOD BY AUTOMATED COUNT: 14.8 % (ref 11.5–15)
GFR, ESTIMATED: 52 ML/MIN/1.73M2
GLUCOSE SERPL-MCNC: 126 MG/DL (ref 74–99)
HCT VFR BLD AUTO: 35.3 % (ref 34–48)
HGB BLD-MCNC: 10.7 G/DL (ref 11.5–15.5)
IMM GRANULOCYTES # BLD AUTO: 0.05 K/UL (ref 0–0.58)
IMM GRANULOCYTES NFR BLD: 1 % (ref 0–5)
LYMPHOCYTES NFR BLD: 1.4 K/UL (ref 1.5–4)
LYMPHOCYTES RELATIVE PERCENT: 13 % (ref 20–42)
MCH RBC QN AUTO: 27.9 PG (ref 26–35)
MCHC RBC AUTO-ENTMCNC: 30.3 G/DL (ref 32–34.5)
MCV RBC AUTO: 92.2 FL (ref 80–99.9)
MONOCYTES NFR BLD: 0.7 K/UL (ref 0.1–0.95)
MONOCYTES NFR BLD: 7 % (ref 2–12)
NEUTROPHILS NFR BLD: 78 % (ref 43–80)
NEUTS SEG NFR BLD: 8.16 K/UL (ref 1.8–7.3)
PLATELET # BLD AUTO: 228 K/UL (ref 130–450)
PMV BLD AUTO: 10.5 FL (ref 7–12)
POTASSIUM SERPL-SCNC: 4.4 MMOL/L (ref 3.5–5)
RBC # BLD AUTO: 3.83 M/UL (ref 3.5–5.5)
SODIUM SERPL-SCNC: 135 MMOL/L (ref 132–146)
WBC OTHER # BLD: 10.4 K/UL (ref 4.5–11.5)

## 2025-04-07 PROCEDURE — 97161 PT EVAL LOW COMPLEX 20 MIN: CPT

## 2025-04-07 PROCEDURE — 6360000002 HC RX W HCPCS: Performed by: INTERNAL MEDICINE

## 2025-04-07 PROCEDURE — 36415 COLL VENOUS BLD VENIPUNCTURE: CPT

## 2025-04-07 PROCEDURE — 85025 COMPLETE CBC W/AUTO DIFF WBC: CPT

## 2025-04-07 PROCEDURE — 93010 ELECTROCARDIOGRAM REPORT: CPT | Performed by: INTERNAL MEDICINE

## 2025-04-07 PROCEDURE — 99232 SBSQ HOSP IP/OBS MODERATE 35: CPT | Performed by: INTERNAL MEDICINE

## 2025-04-07 PROCEDURE — 2500000003 HC RX 250 WO HCPCS: Performed by: NURSE PRACTITIONER

## 2025-04-07 PROCEDURE — 97535 SELF CARE MNGMENT TRAINING: CPT

## 2025-04-07 PROCEDURE — 6370000000 HC RX 637 (ALT 250 FOR IP): Performed by: NURSE PRACTITIONER

## 2025-04-07 PROCEDURE — 97166 OT EVAL MOD COMPLEX 45 MIN: CPT

## 2025-04-07 PROCEDURE — 6370000000 HC RX 637 (ALT 250 FOR IP): Performed by: INTERNAL MEDICINE

## 2025-04-07 PROCEDURE — 80048 BASIC METABOLIC PNL TOTAL CA: CPT

## 2025-04-07 PROCEDURE — 6360000002 HC RX W HCPCS: Performed by: NURSE PRACTITIONER

## 2025-04-07 PROCEDURE — 2700000000 HC OXYGEN THERAPY PER DAY

## 2025-04-07 PROCEDURE — 97530 THERAPEUTIC ACTIVITIES: CPT

## 2025-04-07 PROCEDURE — 2060000000 HC ICU INTERMEDIATE R&B

## 2025-04-07 PROCEDURE — 94640 AIRWAY INHALATION TREATMENT: CPT

## 2025-04-07 RX ADMIN — ARFORMOTEROL TARTRATE 15 MCG: 15 SOLUTION RESPIRATORY (INHALATION) at 08:00

## 2025-04-07 RX ADMIN — ASPIRIN 81 MG CHEWABLE TABLET 81 MG: 81 TABLET CHEWABLE at 09:30

## 2025-04-07 RX ADMIN — ENOXAPARIN SODIUM 40 MG: 100 INJECTION SUBCUTANEOUS at 09:32

## 2025-04-07 RX ADMIN — GABAPENTIN 300 MG: 600 TABLET, FILM COATED ORAL at 13:51

## 2025-04-07 RX ADMIN — OXYBUTYNIN CHLORIDE 10 MG: 10 TABLET, EXTENDED RELEASE ORAL at 09:30

## 2025-04-07 RX ADMIN — VERAPAMIL HYDROCHLORIDE 120 MG: 120 TABLET, FILM COATED, EXTENDED RELEASE ORAL at 20:52

## 2025-04-07 RX ADMIN — IPRATROPIUM BROMIDE AND ALBUTEROL SULFATE 1 DOSE: 2.5; .5 SOLUTION RESPIRATORY (INHALATION) at 19:54

## 2025-04-07 RX ADMIN — PRAMIPEXOLE DIHYDROCHLORIDE 0.75 MG: 0.25 TABLET ORAL at 09:29

## 2025-04-07 RX ADMIN — BUDESONIDE 500 MCG: 0.5 SUSPENSION RESPIRATORY (INHALATION) at 08:00

## 2025-04-07 RX ADMIN — PANTOPRAZOLE SODIUM 40 MG: 40 TABLET, DELAYED RELEASE ORAL at 06:43

## 2025-04-07 RX ADMIN — GABAPENTIN 300 MG: 600 TABLET, FILM COATED ORAL at 09:30

## 2025-04-07 RX ADMIN — BACLOFEN 10 MG: 10 TABLET ORAL at 20:52

## 2025-04-07 RX ADMIN — BENZOCAINE AND MENTHOL 1 LOZENGE: 15; 3.6 LOZENGE ORAL at 16:56

## 2025-04-07 RX ADMIN — IPRATROPIUM BROMIDE AND ALBUTEROL SULFATE 1 DOSE: 2.5; .5 SOLUTION RESPIRATORY (INHALATION) at 11:02

## 2025-04-07 RX ADMIN — TRAZODONE HYDROCHLORIDE 100 MG: 50 TABLET ORAL at 20:52

## 2025-04-07 RX ADMIN — SODIUM CHLORIDE, PRESERVATIVE FREE 10 ML: 5 INJECTION INTRAVENOUS at 20:53

## 2025-04-07 RX ADMIN — IPRATROPIUM BROMIDE AND ALBUTEROL SULFATE 1 DOSE: 2.5; .5 SOLUTION RESPIRATORY (INHALATION) at 08:00

## 2025-04-07 RX ADMIN — ATORVASTATIN CALCIUM 80 MG: 40 TABLET, FILM COATED ORAL at 09:30

## 2025-04-07 RX ADMIN — BUDESONIDE 500 MCG: 0.5 SUSPENSION RESPIRATORY (INHALATION) at 19:54

## 2025-04-07 RX ADMIN — BUPROPION HYDROCHLORIDE 100 MG: 100 TABLET, FILM COATED, EXTENDED RELEASE ORAL at 09:30

## 2025-04-07 RX ADMIN — GABAPENTIN 300 MG: 600 TABLET, FILM COATED ORAL at 20:51

## 2025-04-07 RX ADMIN — ARFORMOTEROL TARTRATE 15 MCG: 15 SOLUTION RESPIRATORY (INHALATION) at 19:54

## 2025-04-07 RX ADMIN — CLOPIDOGREL BISULFATE 75 MG: 75 TABLET, FILM COATED ORAL at 09:30

## 2025-04-07 RX ADMIN — SODIUM CHLORIDE, PRESERVATIVE FREE 10 ML: 5 INJECTION INTRAVENOUS at 09:32

## 2025-04-07 RX ADMIN — VENLAFAXINE HYDROCHLORIDE 150 MG: 150 CAPSULE, EXTENDED RELEASE ORAL at 09:30

## 2025-04-07 RX ADMIN — OXYBUTYNIN CHLORIDE 10 MG: 10 TABLET, EXTENDED RELEASE ORAL at 20:52

## 2025-04-07 RX ADMIN — PRAMIPEXOLE DIHYDROCHLORIDE 0.75 MG: 0.25 TABLET ORAL at 20:52

## 2025-04-07 RX ADMIN — BACLOFEN 10 MG: 10 TABLET ORAL at 09:30

## 2025-04-07 RX ADMIN — LEVOTHYROXINE SODIUM 50 MCG: 0.03 TABLET ORAL at 06:43

## 2025-04-07 RX ADMIN — IPRATROPIUM BROMIDE AND ALBUTEROL SULFATE 1 DOSE: 2.5; .5 SOLUTION RESPIRATORY (INHALATION) at 15:50

## 2025-04-07 RX ADMIN — PREDNISONE 40 MG: 20 TABLET ORAL at 09:30

## 2025-04-07 ASSESSMENT — PAIN SCALES - GENERAL: PAINLEVEL_OUTOF10: 0

## 2025-04-07 NOTE — CARE COORDINATION
Readmit here for AMS and hypoxia. Currently A&O x4 and using 3 liters oxygen. CT head negative PT OT  Met with pt to discuss role/transition of care.  She lives  in [facility] with her . Her pcp is Dr Latif and she uses  [facility]  [address]. She has wc, rollator bsc walk in shower  and shower chair and grab bars.  She is active with [facility] spoke with Fanny was active with them for SN & PT. She has oxygen thru [facility] @ 2 liters - verified that with Justin --will need transport tank home. She has also has aids 5 x week for 4 hrs day. Thru [facility]. SN[facility]. Would like a referral to [facility].- referral to [facility] await determination  await PT OT.  Electronically signed by Manuela Buenrostro RN on 4/7/2025 at 11:38 AM      Call back from [facility] - they are not in network with her insurance - pt updated and states that then she is going home.Electronically signed by Manuela Buenrostro RN on 4/7/2025 at 12:31 PM    Per Dr Willis - pt now agreeable to [facility].- referral to Fransisca await determination.Electronically signed by Manuela Buenrostro RN on 4/7/2025 at 12:41 PM    Per Fransisca- can accept - Message to Mandy to start precert.Electronically signed by Manuela Buenrostro RN on 4/7/2025 at 3:16 PM          Case Management Assessment  Initial Evaluation    Date/Time of Evaluation: 4/7/2025 11:41 AM  Assessment Completed by: Manuela Buenrostro RN    If patient is discharged prior to next notation, then this note serves as note for discharge by case management.    Patient Name: Rebeka Renee                   YOB: 1945  Diagnosis: COPD exacerbation (HCC) [J44.1]                   Date / Time: 4/5/2025  2:15 AM    Patient Admission Status: Inpatient   Readmission Risk (Low < 19, Mod (19-27), High > 27): Readmission Risk Score: 30.1    Current PCP: aNvneet Latif MD  PCP verified by CM? Yes    Chart Reviewed:

## 2025-04-08 LAB — BNP SERPL-MCNC: 1620 PG/ML (ref 0–450)

## 2025-04-08 PROCEDURE — 6370000000 HC RX 637 (ALT 250 FOR IP): Performed by: INTERNAL MEDICINE

## 2025-04-08 PROCEDURE — 6360000002 HC RX W HCPCS: Performed by: INTERNAL MEDICINE

## 2025-04-08 PROCEDURE — 2700000000 HC OXYGEN THERAPY PER DAY

## 2025-04-08 PROCEDURE — 36415 COLL VENOUS BLD VENIPUNCTURE: CPT

## 2025-04-08 PROCEDURE — 6370000000 HC RX 637 (ALT 250 FOR IP): Performed by: NURSE PRACTITIONER

## 2025-04-08 PROCEDURE — 2500000003 HC RX 250 WO HCPCS: Performed by: NURSE PRACTITIONER

## 2025-04-08 PROCEDURE — 83880 ASSAY OF NATRIURETIC PEPTIDE: CPT

## 2025-04-08 PROCEDURE — 2060000000 HC ICU INTERMEDIATE R&B

## 2025-04-08 PROCEDURE — 6360000002 HC RX W HCPCS: Performed by: NURSE PRACTITIONER

## 2025-04-08 PROCEDURE — 99232 SBSQ HOSP IP/OBS MODERATE 35: CPT | Performed by: INTERNAL MEDICINE

## 2025-04-08 PROCEDURE — 94640 AIRWAY INHALATION TREATMENT: CPT

## 2025-04-08 PROCEDURE — 97530 THERAPEUTIC ACTIVITIES: CPT

## 2025-04-08 PROCEDURE — 84145 PROCALCITONIN (PCT): CPT

## 2025-04-08 RX ORDER — AZITHROMYCIN 250 MG/1
500 TABLET, FILM COATED ORAL DAILY
Status: DISCONTINUED | OUTPATIENT
Start: 2025-04-08 | End: 2025-04-09 | Stop reason: HOSPADM

## 2025-04-08 RX ORDER — BISACODYL 10 MG
10 SUPPOSITORY, RECTAL RECTAL ONCE
Status: COMPLETED | OUTPATIENT
Start: 2025-04-08 | End: 2025-04-08

## 2025-04-08 RX ORDER — BUMETANIDE 1 MG/1
2 TABLET ORAL DAILY
Status: DISCONTINUED | OUTPATIENT
Start: 2025-04-08 | End: 2025-04-09 | Stop reason: HOSPADM

## 2025-04-08 RX ORDER — BUMETANIDE 1 MG/1
2 TABLET ORAL DAILY
Status: DISCONTINUED | OUTPATIENT
Start: 2025-04-09 | End: 2025-04-08

## 2025-04-08 RX ORDER — PREDNISONE 20 MG/1
20 TABLET ORAL 2 TIMES DAILY
Status: DISCONTINUED | OUTPATIENT
Start: 2025-04-08 | End: 2025-04-08

## 2025-04-08 RX ORDER — PREDNISONE 20 MG/1
20 TABLET ORAL 2 TIMES DAILY
Status: DISCONTINUED | OUTPATIENT
Start: 2025-04-08 | End: 2025-04-09 | Stop reason: HOSPADM

## 2025-04-08 RX ADMIN — ARFORMOTEROL TARTRATE 15 MCG: 15 SOLUTION RESPIRATORY (INHALATION) at 19:30

## 2025-04-08 RX ADMIN — BUMETANIDE 2 MG: 1 TABLET ORAL at 12:08

## 2025-04-08 RX ADMIN — PRAMIPEXOLE DIHYDROCHLORIDE 0.75 MG: 0.25 TABLET ORAL at 08:00

## 2025-04-08 RX ADMIN — GABAPENTIN 300 MG: 600 TABLET, FILM COATED ORAL at 07:58

## 2025-04-08 RX ADMIN — BUDESONIDE 500 MCG: 0.5 SUSPENSION RESPIRATORY (INHALATION) at 19:30

## 2025-04-08 RX ADMIN — PREDNISONE 20 MG: 20 TABLET ORAL at 21:13

## 2025-04-08 RX ADMIN — VERAPAMIL HYDROCHLORIDE 120 MG: 120 TABLET, FILM COATED, EXTENDED RELEASE ORAL at 21:13

## 2025-04-08 RX ADMIN — ARFORMOTEROL TARTRATE 15 MCG: 15 SOLUTION RESPIRATORY (INHALATION) at 08:13

## 2025-04-08 RX ADMIN — GABAPENTIN 300 MG: 600 TABLET, FILM COATED ORAL at 21:13

## 2025-04-08 RX ADMIN — BENZOCAINE AND MENTHOL 1 LOZENGE: 15; 3.6 LOZENGE ORAL at 07:59

## 2025-04-08 RX ADMIN — IPRATROPIUM BROMIDE AND ALBUTEROL SULFATE 1 DOSE: 2.5; .5 SOLUTION RESPIRATORY (INHALATION) at 13:03

## 2025-04-08 RX ADMIN — VENLAFAXINE HYDROCHLORIDE 150 MG: 150 CAPSULE, EXTENDED RELEASE ORAL at 08:01

## 2025-04-08 RX ADMIN — BUDESONIDE 500 MCG: 0.5 SUSPENSION RESPIRATORY (INHALATION) at 08:13

## 2025-04-08 RX ADMIN — CLOPIDOGREL BISULFATE 75 MG: 75 TABLET, FILM COATED ORAL at 07:59

## 2025-04-08 RX ADMIN — IPRATROPIUM BROMIDE AND ALBUTEROL SULFATE 1 DOSE: 2.5; .5 SOLUTION RESPIRATORY (INHALATION) at 19:30

## 2025-04-08 RX ADMIN — ENOXAPARIN SODIUM 40 MG: 100 INJECTION SUBCUTANEOUS at 07:58

## 2025-04-08 RX ADMIN — BACLOFEN 10 MG: 10 TABLET ORAL at 21:12

## 2025-04-08 RX ADMIN — TRAZODONE HYDROCHLORIDE 100 MG: 50 TABLET ORAL at 21:13

## 2025-04-08 RX ADMIN — IPRATROPIUM BROMIDE AND ALBUTEROL SULFATE 1 DOSE: 2.5; .5 SOLUTION RESPIRATORY (INHALATION) at 15:49

## 2025-04-08 RX ADMIN — IPRATROPIUM BROMIDE AND ALBUTEROL SULFATE 1 DOSE: 2.5; .5 SOLUTION RESPIRATORY (INHALATION) at 08:13

## 2025-04-08 RX ADMIN — AZITHROMYCIN DIHYDRATE 500 MG: 250 TABLET ORAL at 12:08

## 2025-04-08 RX ADMIN — ATORVASTATIN CALCIUM 80 MG: 40 TABLET, FILM COATED ORAL at 07:59

## 2025-04-08 RX ADMIN — PRAMIPEXOLE DIHYDROCHLORIDE 0.75 MG: 0.25 TABLET ORAL at 21:12

## 2025-04-08 RX ADMIN — BUPROPION HYDROCHLORIDE 100 MG: 100 TABLET, FILM COATED, EXTENDED RELEASE ORAL at 08:00

## 2025-04-08 RX ADMIN — BISACODYL 10 MG: 10 SUPPOSITORY RECTAL at 14:35

## 2025-04-08 RX ADMIN — SODIUM CHLORIDE, PRESERVATIVE FREE 10 ML: 5 INJECTION INTRAVENOUS at 08:01

## 2025-04-08 RX ADMIN — SODIUM CHLORIDE, PRESERVATIVE FREE 10 ML: 5 INJECTION INTRAVENOUS at 21:13

## 2025-04-08 RX ADMIN — OXYBUTYNIN CHLORIDE 10 MG: 10 TABLET, EXTENDED RELEASE ORAL at 21:13

## 2025-04-08 RX ADMIN — PREDNISONE 40 MG: 20 TABLET ORAL at 07:58

## 2025-04-08 RX ADMIN — PANTOPRAZOLE SODIUM 40 MG: 40 TABLET, DELAYED RELEASE ORAL at 05:26

## 2025-04-08 RX ADMIN — ASPIRIN 81 MG CHEWABLE TABLET 81 MG: 81 TABLET CHEWABLE at 07:59

## 2025-04-08 RX ADMIN — BACLOFEN 10 MG: 10 TABLET ORAL at 08:00

## 2025-04-08 RX ADMIN — LEVOTHYROXINE SODIUM 50 MCG: 0.03 TABLET ORAL at 05:26

## 2025-04-08 RX ADMIN — ACETAMINOPHEN 650 MG: 325 TABLET ORAL at 07:58

## 2025-04-08 RX ADMIN — OXYBUTYNIN CHLORIDE 10 MG: 10 TABLET, EXTENDED RELEASE ORAL at 08:00

## 2025-04-08 ASSESSMENT — PAIN SCALES - GENERAL: PAINLEVEL_OUTOF10: 0

## 2025-04-08 NOTE — CARE COORDINATION
Currently on 3 liters oxygen. Discharge plan- [facility] and precert was started. 7000 hospital exemption in envelope and ambulette form in soft chart. Patient updated with above.Electronically signed by Manuela Buenrostro RN on 4/8/2025 at 9:27 AM

## 2025-04-09 VITALS
WEIGHT: 173 LBS | HEIGHT: 65 IN | DIASTOLIC BLOOD PRESSURE: 91 MMHG | SYSTOLIC BLOOD PRESSURE: 145 MMHG | TEMPERATURE: 98.4 F | OXYGEN SATURATION: 95 % | HEART RATE: 85 BPM | BODY MASS INDEX: 28.82 KG/M2 | RESPIRATION RATE: 27 BRPM

## 2025-04-09 LAB — PROCALCITONIN SERPL-MCNC: 0.12 NG/ML (ref 0–0.08)

## 2025-04-09 PROCEDURE — 6370000000 HC RX 637 (ALT 250 FOR IP): Performed by: INTERNAL MEDICINE

## 2025-04-09 PROCEDURE — 6370000000 HC RX 637 (ALT 250 FOR IP): Performed by: NURSE PRACTITIONER

## 2025-04-09 PROCEDURE — 2500000003 HC RX 250 WO HCPCS: Performed by: NURSE PRACTITIONER

## 2025-04-09 PROCEDURE — 6360000002 HC RX W HCPCS: Performed by: INTERNAL MEDICINE

## 2025-04-09 PROCEDURE — 2700000000 HC OXYGEN THERAPY PER DAY

## 2025-04-09 PROCEDURE — 94640 AIRWAY INHALATION TREATMENT: CPT

## 2025-04-09 PROCEDURE — 99239 HOSP IP/OBS DSCHRG MGMT >30: CPT

## 2025-04-09 PROCEDURE — 6360000002 HC RX W HCPCS: Performed by: NURSE PRACTITIONER

## 2025-04-09 RX ORDER — BUDESONIDE AND FORMOTEROL FUMARATE DIHYDRATE 160; 4.5 UG/1; UG/1
2 AEROSOL RESPIRATORY (INHALATION) 2 TIMES DAILY
DISCHARGE
Start: 2025-04-09

## 2025-04-09 RX ORDER — PREDNISONE 20 MG/1
20 TABLET ORAL 2 TIMES DAILY
DISCHARGE
Start: 2025-04-09 | End: 2025-04-11

## 2025-04-09 RX ORDER — BENZONATATE 100 MG/1
100 CAPSULE ORAL 3 TIMES DAILY PRN
DISCHARGE
Start: 2025-04-09 | End: 2025-04-16

## 2025-04-09 RX ORDER — AZITHROMYCIN 500 MG/1
500 TABLET, FILM COATED ORAL DAILY
Qty: 1 TABLET | Refills: 0 | Status: SHIPPED | OUTPATIENT
Start: 2025-04-10 | End: 2025-04-11

## 2025-04-09 RX ORDER — ALBUTEROL SULFATE 90 UG/1
2 INHALANT RESPIRATORY (INHALATION) EVERY 6 HOURS PRN
DISCHARGE
Start: 2025-04-09

## 2025-04-09 RX ADMIN — LEVOTHYROXINE SODIUM 50 MCG: 0.03 TABLET ORAL at 06:06

## 2025-04-09 RX ADMIN — IPRATROPIUM BROMIDE AND ALBUTEROL SULFATE 1 DOSE: 2.5; .5 SOLUTION RESPIRATORY (INHALATION) at 16:12

## 2025-04-09 RX ADMIN — ATORVASTATIN CALCIUM 80 MG: 40 TABLET, FILM COATED ORAL at 08:52

## 2025-04-09 RX ADMIN — BUDESONIDE 500 MCG: 0.5 SUSPENSION RESPIRATORY (INHALATION) at 07:53

## 2025-04-09 RX ADMIN — PRAMIPEXOLE DIHYDROCHLORIDE 0.75 MG: 0.25 TABLET ORAL at 08:52

## 2025-04-09 RX ADMIN — VENLAFAXINE HYDROCHLORIDE 150 MG: 150 CAPSULE, EXTENDED RELEASE ORAL at 08:52

## 2025-04-09 RX ADMIN — ASPIRIN 81 MG CHEWABLE TABLET 81 MG: 81 TABLET CHEWABLE at 08:52

## 2025-04-09 RX ADMIN — BUPROPION HYDROCHLORIDE 100 MG: 100 TABLET, FILM COATED, EXTENDED RELEASE ORAL at 08:51

## 2025-04-09 RX ADMIN — BUMETANIDE 2 MG: 1 TABLET ORAL at 08:52

## 2025-04-09 RX ADMIN — PREDNISONE 20 MG: 20 TABLET ORAL at 08:52

## 2025-04-09 RX ADMIN — ACETAMINOPHEN 650 MG: 325 TABLET ORAL at 11:34

## 2025-04-09 RX ADMIN — ARFORMOTEROL TARTRATE 15 MCG: 15 SOLUTION RESPIRATORY (INHALATION) at 07:53

## 2025-04-09 RX ADMIN — GABAPENTIN 300 MG: 600 TABLET, FILM COATED ORAL at 08:52

## 2025-04-09 RX ADMIN — CLOPIDOGREL BISULFATE 75 MG: 75 TABLET, FILM COATED ORAL at 08:52

## 2025-04-09 RX ADMIN — PANTOPRAZOLE SODIUM 40 MG: 40 TABLET, DELAYED RELEASE ORAL at 06:06

## 2025-04-09 RX ADMIN — SODIUM CHLORIDE, PRESERVATIVE FREE 10 ML: 5 INJECTION INTRAVENOUS at 08:52

## 2025-04-09 RX ADMIN — IPRATROPIUM BROMIDE AND ALBUTEROL SULFATE 1 DOSE: 2.5; .5 SOLUTION RESPIRATORY (INHALATION) at 07:53

## 2025-04-09 RX ADMIN — IPRATROPIUM BROMIDE AND ALBUTEROL SULFATE 1 DOSE: 2.5; .5 SOLUTION RESPIRATORY (INHALATION) at 11:57

## 2025-04-09 RX ADMIN — ENOXAPARIN SODIUM 40 MG: 100 INJECTION SUBCUTANEOUS at 08:52

## 2025-04-09 RX ADMIN — BACLOFEN 10 MG: 10 TABLET ORAL at 08:52

## 2025-04-09 RX ADMIN — AZITHROMYCIN DIHYDRATE 500 MG: 250 TABLET ORAL at 08:51

## 2025-04-09 RX ADMIN — OXYBUTYNIN CHLORIDE 10 MG: 10 TABLET, EXTENDED RELEASE ORAL at 08:51

## 2025-04-09 ASSESSMENT — PAIN SCALES - GENERAL
PAINLEVEL_OUTOF10: 0
PAINLEVEL_OUTOF10: 0

## 2025-04-09 NOTE — CARE COORDINATION
On 3 liters oxygen [facility] per Idania .Message to Fransisca with above and asked if she is able to transport later afternoon  Message to attending  with above and to check discharge. Electronically signed by Manuela Buenrostro RN on 4/9/2025 at 8:51 AM    [facility] unable to transport. [facility] unable to transport. Per Fransisca with facility - to use [facility] ( contracted with them) transport set up for 130 pm. Fransisca with facility, bedside RN, patient, and  Nate ntfd of transport time and also attending.Electronically signed by Manuela Buenrostro RN on 4/9/2025 at 10:48 AM

## 2025-04-09 NOTE — PLAN OF CARE
Problem: Safety - Adult  Goal: Free from fall injury  4/9/2025 0457 by Della Ireland, RN  Outcome: Progressing  4/8/2025 1718 by Marie Avendano, RN  Outcome: Progressing     Problem: Respiratory - Adult  Goal: Achieves optimal ventilation and oxygenation  Outcome: Progressing

## 2025-04-09 NOTE — DISCHARGE INSTR - COC
support    Rehab Therapies: Physical Therapy and Occupational Therapy  Weight Bearing Status/Restrictions: No weight bearing restrictions  Other Medical Equipment (for information only, NOT a DME order):  {EQUIPMENT:[id number]}  Other Treatments: ***    Patient's personal belongings (please select all that are sent with patient):  None    RN SIGNATURE:  Electronically signed by Haylie Jarquin RN on 4/9/25 at 11:21 AM EDT    CASE MANAGEMENT/SOCIAL WORK SECTION    Inpatient Status Date: ***    Readmission Risk Assessment Score:  [facility] RISK OF UNPLANNED READMISSION 2.0             29.2 Total Score        Discharging to Facility/ Agency   Name:   Address:  Phone:  Fax:    Dialysis Facility (if applicable)   Name:  Address:  Dialysis Schedule:  Phone:  Fax:    / signature: {Esignature:[id number]}    PHYSICIAN SECTION    Prognosis: Good    Condition at Discharge: Stable    Rehab Potential (if transferring to Rehab): Good    Recommended Labs or Other Treatments After Discharge: Need 1 more days of azithromycin.  Check BMP in 1 week    Physician Certification: I certify the above information and transfer of Rebeka Renee  is necessary for the continuing treatment of the diagnosis listed and that she requires Skilled Nursing Facility for less 30 days.     Update Admission H&P: No change in H&P    PHYSICIAN SIGNATURE:  Electronically signed by LORRAINE TOURE MD on 4/9/25 at 11:00 AM EDT

## 2025-04-09 NOTE — DISCHARGE SUMMARY
[facility] Hospitalist Physician Discharge Summary       Navneet Latif MD  [address]  [place] [place] [facility] [address]  [phone]    Follow up in 1 week(s)        Activity level: As tolerated     Dispo: SNF    Condition on discharge: Stable     Patient ID:  Rebeka Renee  [id number]  79 y.o.  1945    Admit date: 4/5/2025    Discharge date and time:  4/9/2025  11:40 AM    Admission Diagnoses: Principal Problem:    COPD exacerbation (HCC)  Active Problems:    HTN (hypertension)    Anxiety    Acute respiratory failure with hypoxia and hypercapnia  Resolved Problems:    * No resolved hospital problems. *      Discharge Diagnoses: Principal Problem:    COPD exacerbation (HCC)  Active Problems:    HTN (hypertension)    Anxiety    Acute respiratory failure with hypoxia and hypercapnia  Resolved Problems:    * No resolved hospital problems. *      Consults:  IP CONSULT TO HOSPITALIST  IP CONSULT TO SOCIAL WORK    Procedures: None    Hospital Course:   Patient Rebeka Renee is a 79 y.o. presented with COPD exacerbation (HCC) [J44.1]    79 y.o. female patient of Navneet Latif MD history of anemia, anxiety, asthma/COPD with 2 L O2 by nasal cannula at home., bipolar, lung, kidney, breast, CHF, chronic back pain, diabetes, fibromyalgia, hypertension, hypothyroidism, pericardial effusion status post pericardial window, TIA, cancer who presents with dyspnea. Patient states that she has had shortness of breath for the last year but for the last 5 days it has been worse. This been associated with cough productive of clear sputum.     Patient admitted to the hospital due to COPD exacerbation with acute hypoxic and hypercapnic respiratory failure.  Patient was treated with breathing treatment, IV steroids transitioned to prednisone and azithromycin.    Discharge the patient is clinically and hemodynamically stable.  Advised to follow-up with PCP within 1 week    Discharge Exam:    General

## 2025-04-10 ENCOUNTER — OUTSIDE SERVICES (OUTPATIENT)
Dept: PRIMARY CARE CLINIC | Age: 80
End: 2025-04-10

## 2025-04-10 DIAGNOSIS — F33.0 MILD EPISODE OF RECURRENT MAJOR DEPRESSIVE DISORDER: ICD-10-CM

## 2025-04-10 DIAGNOSIS — E11.40 CONTROLLED TYPE 2 DIABETES MELLITUS WITH DIABETIC NEUROPATHY, WITH LONG-TERM CURRENT USE OF INSULIN (HCC): ICD-10-CM

## 2025-04-10 DIAGNOSIS — I10 PRIMARY HYPERTENSION: ICD-10-CM

## 2025-04-10 DIAGNOSIS — J96.02 ACUTE RESPIRATORY FAILURE WITH HYPOXIA AND HYPERCAPNIA: ICD-10-CM

## 2025-04-10 DIAGNOSIS — E03.9 HYPOTHYROIDISM, UNSPECIFIED TYPE: ICD-10-CM

## 2025-04-10 DIAGNOSIS — J96.01 ACUTE RESPIRATORY FAILURE WITH HYPOXIA AND HYPERCAPNIA: ICD-10-CM

## 2025-04-10 DIAGNOSIS — Z79.4 CONTROLLED TYPE 2 DIABETES MELLITUS WITH DIABETIC NEUROPATHY, WITH LONG-TERM CURRENT USE OF INSULIN (HCC): ICD-10-CM

## 2025-04-10 DIAGNOSIS — Z86.73 HISTORY OF CVA (CEREBROVASCULAR ACCIDENT): ICD-10-CM

## 2025-04-10 DIAGNOSIS — J44.1 COPD EXACERBATION (HCC): Primary | ICD-10-CM

## 2025-04-10 NOTE — PROGRESS NOTES
Rebeka Renee (:  1945) is a 79 y.o. female.    Subjective   SUBJECTIVE/OBJECTIVE:  Past Medical History:   Diagnosis Date    Anemia     S/P chemotherapy.    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    Ascending aortic aneurysm     seen on CTA chest w/contrast on     Asthma     Bipolar 1 disorder (HCC)     Cancer (HCC)     lung/ kidney    Cancer of breast, female (HCC) 2006    S/P bilaeral mastectomy with left breast lymph node resection and chemotherapy.    CHF (congestive heart failure) (HCC)     Chronic back pain     Depression     Depression with anxiety     Diabetes mellitus (HCC)     Resolved after losing 130 lbs. after gastric bypass surgery.    Dyslipidemia     Fibromyalgia     History of blood transfusion     anemia    Hypertension     Hypothyroidism     Neuropathy     Pericardial effusion 2010    Subxiphoid pericardial window with drainage of pericardial effusion and pericardial biopsy:  Fluid and biopsy negative for metastases.     Pleural effusion 2010    Noted on chest x-ray.    TIA (transient ischemic attack)     Tobacco abuse     Patient smoked 3 ppd x 26 years.  Quit in .    Type II or unspecified type diabetes mellitus without mention of complication, not stated as uncontrolled       Past Surgical History:   Procedure Laterality Date    BREAST SURGERY      CATARACT REMOVAL WITH IMPLANT Bilateral     CHOLECYSTECTOMY      FEMUR SURGERY Right 2024    FEMUR OPEN REDUCTION INTERNAL FIXATION RIGHT PERIPROSTHETIC performed by Wood George DO at [facility]    FIBULA FRACTURE SURGERY Right 2024    RIGHT FEMUR OPEN REDUCTION INTERNAL FIXATION, OPEN TREATMENT OF RIGHT HIP DISLOCATION performed by Nemesio Handy MD at [facility]    GASTRIC BYPASS SURGERY  2004    HERNIA REPAIR      HIP SURGERY Right 2024    RIGHT HIP HEMIARTHROPLASTY

## 2025-04-14 ENCOUNTER — HOSPITAL ENCOUNTER (INPATIENT)
Age: 80
LOS: 2 days | Discharge: NURSING, REHAB OR LONG-TERM CARE | DRG: 682 | End: 2025-04-17
Attending: EMERGENCY MEDICINE | Admitting: FAMILY MEDICINE
Payer: MEDICARE

## 2025-04-14 DIAGNOSIS — G93.41 METABOLIC ENCEPHALOPATHY: ICD-10-CM

## 2025-04-14 DIAGNOSIS — N17.9 ACUTE KIDNEY INJURY: ICD-10-CM

## 2025-04-14 DIAGNOSIS — R41.82 ALTERED MENTAL STATUS, UNSPECIFIED ALTERED MENTAL STATUS TYPE: Primary | ICD-10-CM

## 2025-04-14 DIAGNOSIS — A49.9 UTI (URINARY TRACT INFECTION), BACTERIAL: ICD-10-CM

## 2025-04-14 DIAGNOSIS — N39.0 UTI (URINARY TRACT INFECTION), BACTERIAL: ICD-10-CM

## 2025-04-14 LAB
B.E.: 11.1 MMOL/L (ref -3–3)
BASOPHILS # BLD: 0.02 K/UL (ref 0–0.2)
BASOPHILS NFR BLD: 0 % (ref 0–2)
COHB: 0.3 % (ref 0–1.5)
CRITICAL: ABNORMAL
DATE ANALYZED: ABNORMAL
DATE OF COLLECTION: ABNORMAL
EOSINOPHIL # BLD: 0.21 K/UL (ref 0.05–0.5)
EOSINOPHILS RELATIVE PERCENT: 2 % (ref 0–6)
ERYTHROCYTE [DISTWIDTH] IN BLOOD BY AUTOMATED COUNT: 14.3 % (ref 11.5–15)
GLUCOSE BLD-MCNC: 74 MG/DL
GLUCOSE BLD-MCNC: 74 MG/DL (ref 74–99)
HCO3: 36.7 MMOL/L (ref 22–26)
HCT VFR BLD AUTO: 31.2 % (ref 34–48)
HGB BLD-MCNC: 9.8 G/DL (ref 11.5–15.5)
HHB: 6.4 % (ref 0–5)
IMM GRANULOCYTES # BLD AUTO: 0.1 K/UL (ref 0–0.58)
IMM GRANULOCYTES NFR BLD: 1 % (ref 0–5)
LAB: ABNORMAL
LYMPHOCYTES NFR BLD: 1.75 K/UL (ref 1.5–4)
LYMPHOCYTES RELATIVE PERCENT: 15 % (ref 20–42)
Lab: 2335
MCH RBC QN AUTO: 28.5 PG (ref 26–35)
MCHC RBC AUTO-ENTMCNC: 31.4 G/DL (ref 32–34.5)
MCV RBC AUTO: 90.7 FL (ref 80–99.9)
METHB: 0.2 % (ref 0–1.5)
MODE: ABNORMAL
MONOCYTES NFR BLD: 0.52 K/UL (ref 0.1–0.95)
MONOCYTES NFR BLD: 5 % (ref 2–12)
NEUTROPHILS NFR BLD: 78 % (ref 43–80)
NEUTS SEG NFR BLD: 9 K/UL (ref 1.8–7.3)
O2 SATURATION: 93.6 % (ref 92–98.5)
O2HB: 93.1 % (ref 94–97)
OPERATOR ID: 8219
PATIENT TEMP: 37 C
PCO2: 54.3 MMHG (ref 35–45)
PH BLOOD GAS: 7.45 (ref 7.35–7.45)
PLATELET # BLD AUTO: 242 K/UL (ref 130–450)
PMV BLD AUTO: 10.5 FL (ref 7–12)
PO2: 73.6 MMHG (ref 75–100)
RBC # BLD AUTO: 3.44 M/UL (ref 3.5–5.5)
SOURCE, BLOOD GAS: ABNORMAL
THB: 10.9 G/DL (ref 11.5–16.5)
TIME ANALYZED: 2344
WBC OTHER # BLD: 11.6 K/UL (ref 4.5–11.5)

## 2025-04-14 PROCEDURE — 51701 INSERT BLADDER CATHETER: CPT

## 2025-04-14 PROCEDURE — 82805 BLOOD GASES W/O2 SATURATION: CPT

## 2025-04-14 PROCEDURE — 81001 URINALYSIS AUTO W/SCOPE: CPT

## 2025-04-14 PROCEDURE — 84484 ASSAY OF TROPONIN QUANT: CPT

## 2025-04-14 PROCEDURE — 85025 COMPLETE CBC W/AUTO DIFF WBC: CPT

## 2025-04-14 PROCEDURE — 80053 COMPREHEN METABOLIC PANEL: CPT

## 2025-04-14 PROCEDURE — 2580000003 HC RX 258

## 2025-04-14 PROCEDURE — 83690 ASSAY OF LIPASE: CPT

## 2025-04-14 PROCEDURE — 82010 KETONE BODYS QUAN: CPT

## 2025-04-14 PROCEDURE — 99285 EMERGENCY DEPT VISIT HI MDM: CPT

## 2025-04-14 PROCEDURE — 83605 ASSAY OF LACTIC ACID: CPT

## 2025-04-14 PROCEDURE — 0202U NFCT DS 22 TRGT SARS-COV-2: CPT

## 2025-04-14 PROCEDURE — 82248 BILIRUBIN DIRECT: CPT

## 2025-04-14 PROCEDURE — 87040 BLOOD CULTURE FOR BACTERIA: CPT

## 2025-04-14 PROCEDURE — 83735 ASSAY OF MAGNESIUM: CPT

## 2025-04-14 PROCEDURE — 82962 GLUCOSE BLOOD TEST: CPT

## 2025-04-14 RX ORDER — 0.9 % SODIUM CHLORIDE 0.9 %
1000 INTRAVENOUS SOLUTION INTRAVENOUS ONCE
Status: COMPLETED | OUTPATIENT
Start: 2025-04-14 | End: 2025-04-15

## 2025-04-14 RX ADMIN — SODIUM CHLORIDE 1000 ML: 0.9 INJECTION, SOLUTION INTRAVENOUS at 23:34

## 2025-04-15 ENCOUNTER — APPOINTMENT (OUTPATIENT)
Dept: GENERAL RADIOLOGY | Age: 80
DRG: 682 | End: 2025-04-15
Payer: MEDICARE

## 2025-04-15 ENCOUNTER — APPOINTMENT (OUTPATIENT)
Dept: CT IMAGING | Age: 80
DRG: 682 | End: 2025-04-15
Payer: MEDICARE

## 2025-04-15 PROBLEM — R41.82 AMS (ALTERED MENTAL STATUS): Status: ACTIVE | Noted: 2025-04-15

## 2025-04-15 LAB
ALBUMIN SERPL-MCNC: 2.9 G/DL (ref 3.5–5.2)
ALBUMIN SERPL-MCNC: 3.1 G/DL (ref 3.5–5.2)
ALP SERPL-CCNC: 91 U/L (ref 35–104)
ALP SERPL-CCNC: 92 U/L (ref 35–104)
ALT SERPL-CCNC: 100 U/L (ref 0–32)
ALT SERPL-CCNC: 111 U/L (ref 0–32)
ANION GAP SERPL CALCULATED.3IONS-SCNC: 13 MMOL/L (ref 7–16)
ANION GAP SERPL CALCULATED.3IONS-SCNC: 14 MMOL/L (ref 7–16)
AST SERPL-CCNC: 44 U/L (ref 0–31)
AST SERPL-CCNC: 52 U/L (ref 0–31)
B PARAP IS1001 DNA NPH QL NAA+NON-PROBE: NOT DETECTED
B PERT DNA SPEC QL NAA+PROBE: NOT DETECTED
B-OH-BUTYR SERPL-MCNC: 0.33 MMOL/L (ref 0.02–0.27)
BACTERIA URNS QL MICRO: ABNORMAL
BASOPHILS # BLD: 0.01 K/UL (ref 0–0.2)
BASOPHILS NFR BLD: 0 % (ref 0–2)
BILIRUB DIRECT SERPL-MCNC: <0.2 MG/DL (ref 0–0.3)
BILIRUB INDIRECT SERPL-MCNC: ABNORMAL MG/DL (ref 0–1)
BILIRUB SERPL-MCNC: 0.3 MG/DL (ref 0–1.2)
BILIRUB SERPL-MCNC: 0.4 MG/DL (ref 0–1.2)
BILIRUB UR QL STRIP: NEGATIVE
BUN SERPL-MCNC: 60 MG/DL (ref 6–23)
BUN SERPL-MCNC: 72 MG/DL (ref 6–23)
C PNEUM DNA NPH QL NAA+NON-PROBE: NOT DETECTED
CALCIUM SERPL-MCNC: 8.3 MG/DL (ref 8.6–10.2)
CALCIUM SERPL-MCNC: 8.6 MG/DL (ref 8.6–10.2)
CHLORIDE SERPL-SCNC: 86 MMOL/L (ref 98–107)
CHLORIDE SERPL-SCNC: 97 MMOL/L (ref 98–107)
CHLORIDE UR-SCNC: 23 MMOL/L
CLARITY UR: ABNORMAL
CO2 SERPL-SCNC: 29 MMOL/L (ref 22–29)
CO2 SERPL-SCNC: 32 MMOL/L (ref 22–29)
COLOR UR: YELLOW
CREAT SERPL-MCNC: 1.7 MG/DL (ref 0.5–1)
CREAT SERPL-MCNC: 2.7 MG/DL (ref 0.5–1)
EOSINOPHIL # BLD: 0.27 K/UL (ref 0.05–0.5)
EOSINOPHILS RELATIVE PERCENT: 3 % (ref 0–6)
ERYTHROCYTE [DISTWIDTH] IN BLOOD BY AUTOMATED COUNT: 14.3 % (ref 11.5–15)
FLUAV RNA NPH QL NAA+NON-PROBE: NOT DETECTED
FLUBV RNA NPH QL NAA+NON-PROBE: NOT DETECTED
GFR, ESTIMATED: 17 ML/MIN/1.73M2
GFR, ESTIMATED: 32 ML/MIN/1.73M2
GLUCOSE BLD-MCNC: 67 MG/DL (ref 74–99)
GLUCOSE BLD-MCNC: 70 MG/DL (ref 74–99)
GLUCOSE SERPL-MCNC: 73 MG/DL (ref 74–99)
GLUCOSE SERPL-MCNC: 87 MG/DL (ref 74–99)
GLUCOSE UR STRIP-MCNC: NEGATIVE MG/DL
HADV DNA NPH QL NAA+NON-PROBE: NOT DETECTED
HCOV 229E RNA NPH QL NAA+NON-PROBE: NOT DETECTED
HCOV HKU1 RNA NPH QL NAA+NON-PROBE: NOT DETECTED
HCOV NL63 RNA NPH QL NAA+NON-PROBE: NOT DETECTED
HCOV OC43 RNA NPH QL NAA+NON-PROBE: NOT DETECTED
HCT VFR BLD AUTO: 32.8 % (ref 34–48)
HGB BLD-MCNC: 10.1 G/DL (ref 11.5–15.5)
HGB UR QL STRIP.AUTO: ABNORMAL
HMPV RNA NPH QL NAA+NON-PROBE: NOT DETECTED
HPIV1 RNA NPH QL NAA+NON-PROBE: NOT DETECTED
HPIV2 RNA NPH QL NAA+NON-PROBE: NOT DETECTED
HPIV3 RNA NPH QL NAA+NON-PROBE: NOT DETECTED
HPIV4 RNA NPH QL NAA+NON-PROBE: NOT DETECTED
IMM GRANULOCYTES # BLD AUTO: 0.07 K/UL (ref 0–0.58)
IMM GRANULOCYTES NFR BLD: 1 % (ref 0–5)
KETONES UR STRIP-MCNC: NEGATIVE MG/DL
LACTATE BLDV-SCNC: 1.3 MMOL/L (ref 0.5–1.9)
LACTATE BLDV-SCNC: 1.6 MMOL/L (ref 0.5–1.9)
LEUKOCYTE ESTERASE UR QL STRIP: ABNORMAL
LIPASE SERPL-CCNC: 51 U/L (ref 13–60)
LYMPHOCYTES NFR BLD: 1.26 K/UL (ref 1.5–4)
LYMPHOCYTES RELATIVE PERCENT: 13 % (ref 20–42)
M PNEUMO DNA NPH QL NAA+NON-PROBE: NOT DETECTED
MAGNESIUM SERPL-MCNC: 2.6 MG/DL (ref 1.6–2.6)
MAGNESIUM SERPL-MCNC: 3 MG/DL (ref 1.6–2.6)
MCH RBC QN AUTO: 28.1 PG (ref 26–35)
MCHC RBC AUTO-ENTMCNC: 30.8 G/DL (ref 32–34.5)
MCV RBC AUTO: 91.1 FL (ref 80–99.9)
MONOCYTES NFR BLD: 0.48 K/UL (ref 0.1–0.95)
MONOCYTES NFR BLD: 5 % (ref 2–12)
NEUTROPHILS NFR BLD: 79 % (ref 43–80)
NEUTS SEG NFR BLD: 7.92 K/UL (ref 1.8–7.3)
NITRITE UR QL STRIP: NEGATIVE
PH UR STRIP: 6 [PH] (ref 5–8)
PLATELET # BLD AUTO: 238 K/UL (ref 130–450)
PMV BLD AUTO: 10.6 FL (ref 7–12)
POTASSIUM SERPL-SCNC: 3.1 MMOL/L (ref 3.5–5)
POTASSIUM SERPL-SCNC: 3.6 MMOL/L (ref 3.5–5)
PROT SERPL-MCNC: 5.3 G/DL (ref 6.4–8.3)
PROT SERPL-MCNC: 5.4 G/DL (ref 6.4–8.3)
PROT UR STRIP-MCNC: NEGATIVE MG/DL
RBC # BLD AUTO: 3.6 M/UL (ref 3.5–5.5)
RBC #/AREA URNS HPF: ABNORMAL /HPF
RSV RNA NPH QL NAA+NON-PROBE: NOT DETECTED
RV+EV RNA NPH QL NAA+NON-PROBE: NOT DETECTED
SARS-COV-2 RNA NPH QL NAA+NON-PROBE: NOT DETECTED
SODIUM SERPL-SCNC: 131 MMOL/L (ref 132–146)
SODIUM SERPL-SCNC: 140 MMOL/L (ref 132–146)
SODIUM UR-SCNC: 28 MMOL/L
SP GR UR STRIP: 1.02 (ref 1–1.03)
SPECIMEN DESCRIPTION: NORMAL
TROPONIN I SERPL HS-MCNC: 52 NG/L (ref 0–14)
TROPONIN I SERPL HS-MCNC: 70 NG/L (ref 0–9)
UROBILINOGEN UR STRIP-ACNC: 0.2 EU/DL (ref 0–1)
WBC #/AREA URNS HPF: ABNORMAL /HPF
WBC OTHER # BLD: 10 K/UL (ref 4.5–11.5)

## 2025-04-15 PROCEDURE — 6360000002 HC RX W HCPCS: Performed by: FAMILY MEDICINE

## 2025-04-15 PROCEDURE — 84300 ASSAY OF URINE SODIUM: CPT

## 2025-04-15 PROCEDURE — 84484 ASSAY OF TROPONIN QUANT: CPT

## 2025-04-15 PROCEDURE — 96374 THER/PROPH/DIAG INJ IV PUSH: CPT

## 2025-04-15 PROCEDURE — 2060000000 HC ICU INTERMEDIATE R&B

## 2025-04-15 PROCEDURE — 80053 COMPREHEN METABOLIC PANEL: CPT

## 2025-04-15 PROCEDURE — 72125 CT NECK SPINE W/O DYE: CPT

## 2025-04-15 PROCEDURE — 6360000002 HC RX W HCPCS: Performed by: INTERNAL MEDICINE

## 2025-04-15 PROCEDURE — 85025 COMPLETE CBC W/AUTO DIFF WBC: CPT

## 2025-04-15 PROCEDURE — 87040 BLOOD CULTURE FOR BACTERIA: CPT

## 2025-04-15 PROCEDURE — 82436 ASSAY OF URINE CHLORIDE: CPT

## 2025-04-15 PROCEDURE — 6370000000 HC RX 637 (ALT 250 FOR IP): Performed by: INTERNAL MEDICINE

## 2025-04-15 PROCEDURE — 74176 CT ABD & PELVIS W/O CONTRAST: CPT

## 2025-04-15 PROCEDURE — 2580000003 HC RX 258

## 2025-04-15 PROCEDURE — 83605 ASSAY OF LACTIC ACID: CPT

## 2025-04-15 PROCEDURE — 71045 X-RAY EXAM CHEST 1 VIEW: CPT

## 2025-04-15 PROCEDURE — 94640 AIRWAY INHALATION TREATMENT: CPT

## 2025-04-15 PROCEDURE — 83735 ASSAY OF MAGNESIUM: CPT

## 2025-04-15 PROCEDURE — 2500000003 HC RX 250 WO HCPCS: Performed by: FAMILY MEDICINE

## 2025-04-15 PROCEDURE — 84156 ASSAY OF PROTEIN URINE: CPT

## 2025-04-15 PROCEDURE — 82962 GLUCOSE BLOOD TEST: CPT

## 2025-04-15 PROCEDURE — 70450 CT HEAD/BRAIN W/O DYE: CPT

## 2025-04-15 PROCEDURE — 2500000003 HC RX 250 WO HCPCS

## 2025-04-15 PROCEDURE — 6360000002 HC RX W HCPCS

## 2025-04-15 PROCEDURE — 87077 CULTURE AEROBIC IDENTIFY: CPT

## 2025-04-15 PROCEDURE — 2580000003 HC RX 258: Performed by: FAMILY MEDICINE

## 2025-04-15 PROCEDURE — 87086 URINE CULTURE/COLONY COUNT: CPT

## 2025-04-15 PROCEDURE — 82570 ASSAY OF URINE CREATININE: CPT

## 2025-04-15 RX ORDER — POLYETHYLENE GLYCOL 3350 17 G/17G
17 POWDER, FOR SOLUTION ORAL DAILY PRN
COMMUNITY

## 2025-04-15 RX ORDER — SODIUM CHLORIDE 0.9 % (FLUSH) 0.9 %
5-40 SYRINGE (ML) INJECTION EVERY 12 HOURS SCHEDULED
Status: DISCONTINUED | OUTPATIENT
Start: 2025-04-15 | End: 2025-04-17 | Stop reason: HOSPADM

## 2025-04-15 RX ORDER — PANTOPRAZOLE SODIUM 20 MG/1
20 TABLET, DELAYED RELEASE ORAL DAILY
Status: DISCONTINUED | OUTPATIENT
Start: 2025-04-15 | End: 2025-04-17 | Stop reason: HOSPADM

## 2025-04-15 RX ORDER — SODIUM CHLORIDE 9 MG/ML
INJECTION, SOLUTION INTRAVENOUS CONTINUOUS
Status: DISCONTINUED | OUTPATIENT
Start: 2025-04-15 | End: 2025-04-15

## 2025-04-15 RX ORDER — POTASSIUM CHLORIDE 1500 MG/1
40 TABLET, EXTENDED RELEASE ORAL DAILY
Status: DISCONTINUED | OUTPATIENT
Start: 2025-04-15 | End: 2025-04-15

## 2025-04-15 RX ORDER — SENNA AND DOCUSATE SODIUM 50; 8.6 MG/1; MG/1
1 TABLET, FILM COATED ORAL DAILY
COMMUNITY

## 2025-04-15 RX ORDER — POLYETHYLENE GLYCOL 3350 17 G/17G
17 POWDER, FOR SOLUTION ORAL DAILY PRN
Status: DISCONTINUED | OUTPATIENT
Start: 2025-04-15 | End: 2025-04-17 | Stop reason: HOSPADM

## 2025-04-15 RX ORDER — TRAZODONE HYDROCHLORIDE 50 MG/1
100 TABLET ORAL NIGHTLY
Status: DISCONTINUED | OUTPATIENT
Start: 2025-04-15 | End: 2025-04-17 | Stop reason: HOSPADM

## 2025-04-15 RX ORDER — ACETAMINOPHEN 650 MG/1
650 SUPPOSITORY RECTAL EVERY 6 HOURS PRN
Status: DISCONTINUED | OUTPATIENT
Start: 2025-04-15 | End: 2025-04-17 | Stop reason: HOSPADM

## 2025-04-15 RX ORDER — AZITHROMYCIN 500 MG/1
500 TABLET, FILM COATED ORAL DAILY
Status: ON HOLD | COMMUNITY
End: 2025-04-17 | Stop reason: HOSPADM

## 2025-04-15 RX ORDER — GABAPENTIN 300 MG/1
600 CAPSULE ORAL 3 TIMES DAILY
Status: DISCONTINUED | OUTPATIENT
Start: 2025-04-15 | End: 2025-04-15

## 2025-04-15 RX ORDER — BUPROPION HYDROCHLORIDE 100 MG/1
100 TABLET, EXTENDED RELEASE ORAL DAILY
Status: DISCONTINUED | OUTPATIENT
Start: 2025-04-15 | End: 2025-04-17 | Stop reason: HOSPADM

## 2025-04-15 RX ORDER — BUMETANIDE 1 MG/1
2 TABLET ORAL 2 TIMES DAILY
Status: DISCONTINUED | OUTPATIENT
Start: 2025-04-15 | End: 2025-04-17 | Stop reason: HOSPADM

## 2025-04-15 RX ORDER — ACETAMINOPHEN 325 MG/1
650 TABLET ORAL EVERY 6 HOURS PRN
Status: DISCONTINUED | OUTPATIENT
Start: 2025-04-15 | End: 2025-04-17 | Stop reason: HOSPADM

## 2025-04-15 RX ORDER — 0.9 % SODIUM CHLORIDE 0.9 %
1000 INTRAVENOUS SOLUTION INTRAVENOUS ONCE
Status: COMPLETED | OUTPATIENT
Start: 2025-04-15 | End: 2025-04-15

## 2025-04-15 RX ORDER — CLOPIDOGREL BISULFATE 75 MG/1
75 TABLET ORAL DAILY
Status: DISCONTINUED | OUTPATIENT
Start: 2025-04-15 | End: 2025-04-17 | Stop reason: HOSPADM

## 2025-04-15 RX ORDER — ARFORMOTEROL TARTRATE 15 UG/2ML
15 SOLUTION RESPIRATORY (INHALATION)
Status: DISCONTINUED | OUTPATIENT
Start: 2025-04-15 | End: 2025-04-17 | Stop reason: HOSPADM

## 2025-04-15 RX ORDER — BUDESONIDE 0.5 MG/2ML
0.5 INHALANT ORAL
Status: DISCONTINUED | OUTPATIENT
Start: 2025-04-15 | End: 2025-04-17 | Stop reason: HOSPADM

## 2025-04-15 RX ORDER — OXYBUTYNIN CHLORIDE 10 MG/1
10 TABLET, EXTENDED RELEASE ORAL 2 TIMES DAILY
Status: DISCONTINUED | OUTPATIENT
Start: 2025-04-15 | End: 2025-04-17 | Stop reason: HOSPADM

## 2025-04-15 RX ORDER — ENOXAPARIN SODIUM 100 MG/ML
30 INJECTION SUBCUTANEOUS DAILY
Status: DISCONTINUED | OUTPATIENT
Start: 2025-04-15 | End: 2025-04-17 | Stop reason: DRUGHIGH

## 2025-04-15 RX ORDER — NICOTINE POLACRILEX 4 MG
15 LOZENGE BUCCAL PRN
COMMUNITY

## 2025-04-15 RX ORDER — SODIUM CHLORIDE 0.9 % (FLUSH) 0.9 %
5-40 SYRINGE (ML) INJECTION PRN
Status: DISCONTINUED | OUTPATIENT
Start: 2025-04-15 | End: 2025-04-17 | Stop reason: HOSPADM

## 2025-04-15 RX ORDER — SODIUM CHLORIDE 9 MG/ML
INJECTION, SOLUTION INTRAVENOUS PRN
Status: DISCONTINUED | OUTPATIENT
Start: 2025-04-15 | End: 2025-04-17 | Stop reason: HOSPADM

## 2025-04-15 RX ORDER — DEXTROSE MONOHYDRATE 25 G/50ML
25 INJECTION, SOLUTION INTRAVENOUS ONCE
Status: COMPLETED | OUTPATIENT
Start: 2025-04-15 | End: 2025-04-15

## 2025-04-15 RX ORDER — SODIUM CHLORIDE 9 MG/ML
INJECTION, SOLUTION INTRAVENOUS CONTINUOUS
Status: DISCONTINUED | OUTPATIENT
Start: 2025-04-15 | End: 2025-04-17

## 2025-04-15 RX ORDER — POTASSIUM CHLORIDE 1500 MG/1
40 TABLET, EXTENDED RELEASE ORAL DAILY
Status: DISCONTINUED | OUTPATIENT
Start: 2025-04-16 | End: 2025-04-17 | Stop reason: HOSPADM

## 2025-04-15 RX ORDER — ACETAMINOPHEN 325 MG/1
650 TABLET ORAL EVERY 4 HOURS PRN
COMMUNITY

## 2025-04-15 RX ORDER — ALBUTEROL SULFATE 0.83 MG/ML
2.5 SOLUTION RESPIRATORY (INHALATION) EVERY 6 HOURS PRN
Status: DISCONTINUED | OUTPATIENT
Start: 2025-04-15 | End: 2025-04-17 | Stop reason: HOSPADM

## 2025-04-15 RX ORDER — ATORVASTATIN CALCIUM 40 MG/1
80 TABLET, FILM COATED ORAL DAILY
Status: DISCONTINUED | OUTPATIENT
Start: 2025-04-15 | End: 2025-04-17 | Stop reason: HOSPADM

## 2025-04-15 RX ORDER — ONDANSETRON 4 MG/1
4 TABLET, ORALLY DISINTEGRATING ORAL EVERY 8 HOURS PRN
Status: DISCONTINUED | OUTPATIENT
Start: 2025-04-15 | End: 2025-04-17 | Stop reason: HOSPADM

## 2025-04-15 RX ORDER — GABAPENTIN 100 MG/1
200 CAPSULE ORAL 3 TIMES DAILY
Status: DISCONTINUED | OUTPATIENT
Start: 2025-04-15 | End: 2025-04-17 | Stop reason: HOSPADM

## 2025-04-15 RX ORDER — PANTOPRAZOLE SODIUM 20 MG/1
20 TABLET, DELAYED RELEASE ORAL DAILY
COMMUNITY

## 2025-04-15 RX ORDER — BACLOFEN 10 MG/1
10 TABLET ORAL 2 TIMES DAILY
Status: DISCONTINUED | OUTPATIENT
Start: 2025-04-15 | End: 2025-04-17 | Stop reason: HOSPADM

## 2025-04-15 RX ORDER — PRAMIPEXOLE DIHYDROCHLORIDE 0.25 MG/1
0.75 TABLET ORAL 2 TIMES DAILY
Status: DISCONTINUED | OUTPATIENT
Start: 2025-04-15 | End: 2025-04-17 | Stop reason: HOSPADM

## 2025-04-15 RX ORDER — VERAPAMIL HYDROCHLORIDE 120 MG/1
120 TABLET, FILM COATED, EXTENDED RELEASE ORAL NIGHTLY
Status: DISCONTINUED | OUTPATIENT
Start: 2025-04-15 | End: 2025-04-17 | Stop reason: HOSPADM

## 2025-04-15 RX ORDER — ONDANSETRON 2 MG/ML
4 INJECTION INTRAMUSCULAR; INTRAVENOUS EVERY 6 HOURS PRN
Status: DISCONTINUED | OUTPATIENT
Start: 2025-04-15 | End: 2025-04-17 | Stop reason: HOSPADM

## 2025-04-15 RX ORDER — LOSARTAN POTASSIUM 50 MG/1
50 TABLET ORAL DAILY
Status: DISCONTINUED | OUTPATIENT
Start: 2025-04-15 | End: 2025-04-17 | Stop reason: HOSPADM

## 2025-04-15 RX ORDER — GLUCAGON 1 MG/ML
1 KIT INJECTION PRN
Status: DISCONTINUED | OUTPATIENT
Start: 2025-04-15 | End: 2025-04-17 | Stop reason: HOSPADM

## 2025-04-15 RX ORDER — BENZONATATE 100 MG/1
100 CAPSULE ORAL 3 TIMES DAILY PRN
Status: DISCONTINUED | OUTPATIENT
Start: 2025-04-15 | End: 2025-04-17 | Stop reason: HOSPADM

## 2025-04-15 RX ORDER — LEVOTHYROXINE SODIUM 50 UG/1
50 TABLET ORAL DAILY
Status: DISCONTINUED | OUTPATIENT
Start: 2025-04-15 | End: 2025-04-17 | Stop reason: HOSPADM

## 2025-04-15 RX ORDER — VENLAFAXINE HYDROCHLORIDE 150 MG/1
150 CAPSULE, EXTENDED RELEASE ORAL
Status: DISCONTINUED | OUTPATIENT
Start: 2025-04-16 | End: 2025-04-17 | Stop reason: HOSPADM

## 2025-04-15 RX ORDER — ASPIRIN 81 MG/1
81 TABLET, CHEWABLE ORAL DAILY
Status: DISCONTINUED | OUTPATIENT
Start: 2025-04-15 | End: 2025-04-17 | Stop reason: HOSPADM

## 2025-04-15 RX ORDER — DEXTROSE MONOHYDRATE 100 MG/ML
INJECTION, SOLUTION INTRAVENOUS CONTINUOUS PRN
Status: DISCONTINUED | OUTPATIENT
Start: 2025-04-15 | End: 2025-04-17 | Stop reason: HOSPADM

## 2025-04-15 RX ADMIN — BUDESONIDE 500 MCG: 0.5 SUSPENSION RESPIRATORY (INHALATION) at 19:19

## 2025-04-15 RX ADMIN — SODIUM CHLORIDE, PRESERVATIVE FREE 10 ML: 5 INJECTION INTRAVENOUS at 21:10

## 2025-04-15 RX ADMIN — SODIUM CHLORIDE: 0.9 INJECTION, SOLUTION INTRAVENOUS at 03:03

## 2025-04-15 RX ADMIN — PRAMIPEXOLE DIHYDROCHLORIDE 0.75 MG: 0.25 TABLET ORAL at 12:06

## 2025-04-15 RX ADMIN — TRAZODONE HYDROCHLORIDE 100 MG: 50 TABLET ORAL at 21:09

## 2025-04-15 RX ADMIN — BACLOFEN 10 MG: 10 TABLET ORAL at 21:10

## 2025-04-15 RX ADMIN — SODIUM CHLORIDE: 0.9 INJECTION, SOLUTION INTRAVENOUS at 12:12

## 2025-04-15 RX ADMIN — ENOXAPARIN SODIUM 30 MG: 100 INJECTION SUBCUTANEOUS at 08:12

## 2025-04-15 RX ADMIN — BACLOFEN 10 MG: 10 TABLET ORAL at 12:06

## 2025-04-15 RX ADMIN — VERAPAMIL HYDROCHLORIDE 120 MG: 120 TABLET, FILM COATED, EXTENDED RELEASE ORAL at 21:09

## 2025-04-15 RX ADMIN — GABAPENTIN 200 MG: 100 CAPSULE ORAL at 21:09

## 2025-04-15 RX ADMIN — OXYBUTYNIN CHLORIDE 10 MG: 10 TABLET, EXTENDED RELEASE ORAL at 21:10

## 2025-04-15 RX ADMIN — CLOPIDOGREL BISULFATE 75 MG: 75 TABLET, FILM COATED ORAL at 12:06

## 2025-04-15 RX ADMIN — CEFTRIAXONE SODIUM 1000 MG: 1 INJECTION, POWDER, FOR SOLUTION INTRAMUSCULAR; INTRAVENOUS at 00:36

## 2025-04-15 RX ADMIN — GABAPENTIN 200 MG: 100 CAPSULE ORAL at 13:51

## 2025-04-15 RX ADMIN — SODIUM CHLORIDE: 9 INJECTION, SOLUTION INTRAVENOUS at 14:59

## 2025-04-15 RX ADMIN — OXYBUTYNIN CHLORIDE 10 MG: 10 TABLET, EXTENDED RELEASE ORAL at 12:07

## 2025-04-15 RX ADMIN — POTASSIUM BICARBONATE 40 MEQ: 782 TABLET, EFFERVESCENT ORAL at 10:43

## 2025-04-15 RX ADMIN — ASPIRIN 81 MG CHEWABLE TABLET 81 MG: 81 TABLET CHEWABLE at 12:05

## 2025-04-15 RX ADMIN — SODIUM CHLORIDE 1000 ML: 0.9 INJECTION, SOLUTION INTRAVENOUS at 00:54

## 2025-04-15 RX ADMIN — PANTOPRAZOLE SODIUM 20 MG: 20 TABLET, DELAYED RELEASE ORAL at 12:06

## 2025-04-15 RX ADMIN — BUPROPION HYDROCHLORIDE 100 MG: 100 TABLET, FILM COATED, EXTENDED RELEASE ORAL at 12:06

## 2025-04-15 RX ADMIN — PRAMIPEXOLE DIHYDROCHLORIDE 0.75 MG: 0.25 TABLET ORAL at 21:09

## 2025-04-15 RX ADMIN — WATER 1000 MG: 1 INJECTION INTRAMUSCULAR; INTRAVENOUS; SUBCUTANEOUS at 17:29

## 2025-04-15 RX ADMIN — ARFORMOTEROL TARTRATE 15 MCG: 15 SOLUTION RESPIRATORY (INHALATION) at 19:19

## 2025-04-15 RX ADMIN — DEXTROSE MONOHYDRATE 25 G: 25 INJECTION, SOLUTION INTRAVENOUS at 00:55

## 2025-04-15 RX ADMIN — LEVOTHYROXINE SODIUM 50 MCG: 0.05 TABLET ORAL at 12:06

## 2025-04-15 ASSESSMENT — PAIN SCALES - GENERAL: PAINLEVEL_OUTOF10: 0

## 2025-04-15 NOTE — ED PROVIDER NOTES
positive.  Patient was given IV normal saline bolus.  Patient viral panel was negative white count was 11.6 hemolyze 9.8 platelet count was 242 patient troponin is 70 urine was leukocyte esterase positive lactic acid is 1.3 magnesium is 3 alk phos is 92 ALT was 111 AST is 52 patient's 7 is 131 potassium 3.6 BUN is 72 creatinine is 2.7 patient's ABG pH was 7.44PCO2 was 54P O2 was 73 O2 sat is 93.  Patient micro urinalysis showed  WBCs 3+ bacteria patient CT head showed no intracranial findings CT cervical spine showed no fracture CT ab pelvis showed mild pelviectasis no radiopaque renal or ureteral stone liquid stool throughout the colon nodule tree-in-bud opacities lung base which could be seen in setting of infectious or inflammatory bronchiolitis.  Patient was given IV Rocephin after reviewing urinalysis.  Patient blood pressure did improve here in the emergency department patient's mentation also improved.  Patient oriented to person and place now but not to time.  Patient moving all extremities.  Patient will be admitted to intermediate bed we did speak to [facility] internal medicine.    Please note that the withdrawal or failure to initiate urgent interventions for this patient would likely result in a life threatening deterioration or permanent disability.      Accordingly this patient received 35 minutes of critical care time, excluding separately billable procedures.         Satish Olivia MD  04/15/25 [id number]

## 2025-04-15 NOTE — H&P
ambulation  Disposition: []Med/Surg [] Intermediate [] ICU/CCU  Admit status: [] Observation [] Inpatient     +++++++++++++++++++++++++++++++++++++++++++++++++  Zak Carr, DO  +++++++++++++++++++++++++++++++++++++++++++++++++  NOTE: This report was transcribed using voice recognition software. Every effort was made to ensure accuracy; however, inadvertent computerized transcription errors may be present.

## 2025-04-15 NOTE — CARE COORDINATION
04/15/25 Case Management Note: Chart reviewed as patient is a repeated return to the ER within 30 days of being discharged back to the Long term care facility. Electronically signed by Amanda Wright RN CM on 4/15/2025 at 3:13 PM

## 2025-04-15 NOTE — CONSULTS
[facility]  MD Andre Interiano MD Ali Hassan, MD Lisa Kniska, MADALYN Collins CNP  Consultation  Patient's Name: Rebeka Renee  1:41 PM  4/15/2025    Nephrologist: Ángel Corley MD    Reason for Consult:  VANESA  Requesting Physician:  Navneet Latif MD    Chief Complaint:  Confusion    History Obtained From: Patient, chart    History of Present Ilness:         Ms. Renee is a pleasant 79-year-old woman who presented to the hospital with altered mental status.  She tells me that she has been having issues with confusion more recently.  She also tells me that she did have a fall, though reports that she \"went down gently.\"  Workup in the ED included a chest x-ray that was negative for any acute process, CT of the abdomen that showed mild right pelviectasis, liquid stool throughout the colon, nodular opacities in the lung bases, and trace bilateral pleural effusions.  Labs on arrival to the ED were significant for sodium of 131, BUN 72, creatinine 2.7, WBC 11.6, and a hemoglobin of 9.8.  Her urinalysis was suggestive of UTI.  She was given fluid boluses, started on intravenous antibiotics, and admitted for further evaluation.  Her past medical history is significant for anemia, aortic aneurysm, asthma, bipolar 1 disorder, lung and kidney cancer, CHF, diabetes mellitus, dyslipidemia, hypertension, hypothyroidism, pericardial effusion, and TIA.         We were consulted for acute kidney injury.  Her creatinine level on arrival to the hospital was 2.7 mg/dL and has improved to 1.7 mg/dL as of this morning.  She is known to our service from previous hospitalizations.  She has a history of kidney cancer and is status post left nephrectomy 20 years ago.  We did see her during a hospitalization in September for an acute kidney injury that did resolve at the time of discharge.  Her creatinine level at the time was 0.8 mg/dL. More recently, her

## 2025-04-15 NOTE — ED NOTES
ED TO INPATIENT SBAR HANDOFF    Patient Name: Rebeka Renee   Preferred Name: Rebeka  : 1945  79 y.o.   Code Status Order: Full Code  Telemetry Order:   C-SSRS:  Negative  Sitter no     Restraints:       Situation  Chief Complaint   Patient presents with    Altered Mental Status     (Patient from [facility], increasing altered mental status since yesterday, per facility patient \"slid off bed\" unwitnessed)     Brief Description of Patient's Condition: Pt arrived as a fall from nursing facility. Patient is alert and oriented with intermittent occurences of confusion. Patient's result found patient to have a UTI, with antibiotics given in ED. Next round is due at 1800. Patient has not gotten up for this nurse, but has been clean and dry using the external catheter with no signs of leakage. Patient swallows pills whole and eats well. Patient currently has fluids running at 75ml/hr.     Mental Status: oriented and alert/Disoriented at times     Background  Allergies:   Allergies   Allergen Reactions    Benadryl [Diphenhydramine] Hives     Tongue Swells    Other      benzodine  Tongue Swells    Sulfa Antibiotics Hives     Tongue Swells    Ciprofloxacin Hives    Oxycodone     Tape [Adhesive Tape] Itching       Assessment  Vitals/MEWS:    Level of Consciousness: Alert (0)   Vitals:    04/15/25 1305 04/15/25 1330 04/15/25 1430 04/15/25 1500   BP: 115/61 103/63 94/82 (!) 117/50   Pulse: 68 72 66 66   Resp: 16 18 19 13   Temp:       TempSrc:       SpO2: 100% 100% 100% 100%   Weight:       Height:         Cardiac Rhythm:    Deterioration Index (DI): Deterioration Index: 41.89  Deterioration Index (DI) Interventions Performed: Deterioration Index RN Interventions Performed : Vitals Frequency Increased  O2 Flow Rate: O2 Flow Rate (L/min): 3 L/min  O2 Device: O2 Device: Nasal cannula    Active Central Lines:                          Active Wounds:    Active Smith's:      Recommendation  Patient Belongings:

## 2025-04-16 LAB
ANION GAP SERPL CALCULATED.3IONS-SCNC: 11 MMOL/L (ref 7–16)
BASOPHILS # BLD: 0.02 K/UL (ref 0–0.2)
BASOPHILS NFR BLD: 0 % (ref 0–2)
BUN SERPL-MCNC: 35 MG/DL (ref 8–23)
CALCIUM SERPL-MCNC: 8.8 MG/DL (ref 8.8–10.2)
CHLORIDE SERPL-SCNC: 100 MMOL/L (ref 98–107)
CO2 SERPL-SCNC: 31 MMOL/L (ref 22–29)
CREAT SERPL-MCNC: 1 MG/DL (ref 0.5–1)
EOSINOPHIL # BLD: 0.28 K/UL (ref 0.05–0.5)
EOSINOPHILS RELATIVE PERCENT: 3 % (ref 0–6)
ERYTHROCYTE [DISTWIDTH] IN BLOOD BY AUTOMATED COUNT: 14.2 % (ref 11.5–15)
FERRITIN SERPL-MCNC: 267 NG/ML
GFR, ESTIMATED: 55 ML/MIN/1.73M2
GLUCOSE BLD-MCNC: 121 MG/DL (ref 74–99)
GLUCOSE BLD-MCNC: 99 MG/DL (ref 74–99)
GLUCOSE SERPL-MCNC: 61 MG/DL (ref 74–99)
HCT VFR BLD AUTO: 33.9 % (ref 34–48)
HGB BLD-MCNC: 10.4 G/DL (ref 11.5–15.5)
IMM GRANULOCYTES # BLD AUTO: 0.06 K/UL (ref 0–0.58)
IMM GRANULOCYTES NFR BLD: 1 % (ref 0–5)
IRON SATN MFR SERPL: 14 % (ref 15–50)
IRON SERPL-MCNC: 31 UG/DL (ref 37–145)
LYMPHOCYTES NFR BLD: 1.09 K/UL (ref 1.5–4)
LYMPHOCYTES RELATIVE PERCENT: 11 % (ref 20–42)
MCH RBC QN AUTO: 28.6 PG (ref 26–35)
MCHC RBC AUTO-ENTMCNC: 30.7 G/DL (ref 32–34.5)
MCV RBC AUTO: 93.1 FL (ref 80–99.9)
MONOCYTES NFR BLD: 0.56 K/UL (ref 0.1–0.95)
MONOCYTES NFR BLD: 6 % (ref 2–12)
NEUTROPHILS NFR BLD: 80 % (ref 43–80)
NEUTS SEG NFR BLD: 7.79 K/UL (ref 1.8–7.3)
PHOSPHATE SERPL-MCNC: 2.3 MG/DL (ref 2.5–4.5)
PLATELET # BLD AUTO: 246 K/UL (ref 130–450)
PMV BLD AUTO: 10.5 FL (ref 7–12)
POTASSIUM SERPL-SCNC: 3.1 MMOL/L (ref 3.5–5.1)
RBC # BLD AUTO: 3.64 M/UL (ref 3.5–5.5)
SODIUM SERPL-SCNC: 141 MMOL/L (ref 136–145)
TIBC SERPL-MCNC: 215 UG/DL (ref 250–450)
TOTAL PROTEIN, URINE: 8 MG/DL (ref 0–12)
URINE TOTAL PROTEIN CREATININE RATIO: 0.09 (ref 0–0.2)
WBC OTHER # BLD: 9.8 K/UL (ref 4.5–11.5)

## 2025-04-16 PROCEDURE — 2580000003 HC RX 258: Performed by: INTERNAL MEDICINE

## 2025-04-16 PROCEDURE — 99232 SBSQ HOSP IP/OBS MODERATE 35: CPT | Performed by: INTERNAL MEDICINE

## 2025-04-16 PROCEDURE — 82728 ASSAY OF FERRITIN: CPT

## 2025-04-16 PROCEDURE — 2060000000 HC ICU INTERMEDIATE R&B

## 2025-04-16 PROCEDURE — 80048 BASIC METABOLIC PNL TOTAL CA: CPT

## 2025-04-16 PROCEDURE — 2500000003 HC RX 250 WO HCPCS: Performed by: FAMILY MEDICINE

## 2025-04-16 PROCEDURE — 6370000000 HC RX 637 (ALT 250 FOR IP): Performed by: INTERNAL MEDICINE

## 2025-04-16 PROCEDURE — 36415 COLL VENOUS BLD VENIPUNCTURE: CPT

## 2025-04-16 PROCEDURE — 6360000002 HC RX W HCPCS: Performed by: FAMILY MEDICINE

## 2025-04-16 PROCEDURE — 83550 IRON BINDING TEST: CPT

## 2025-04-16 PROCEDURE — 84100 ASSAY OF PHOSPHORUS: CPT

## 2025-04-16 PROCEDURE — 6360000002 HC RX W HCPCS: Performed by: INTERNAL MEDICINE

## 2025-04-16 PROCEDURE — 82962 GLUCOSE BLOOD TEST: CPT

## 2025-04-16 PROCEDURE — 94640 AIRWAY INHALATION TREATMENT: CPT

## 2025-04-16 PROCEDURE — 85025 COMPLETE CBC W/AUTO DIFF WBC: CPT

## 2025-04-16 PROCEDURE — 83540 ASSAY OF IRON: CPT

## 2025-04-16 RX ORDER — 0.9 % SODIUM CHLORIDE 0.9 %
500 INTRAVENOUS SOLUTION INTRAVENOUS ONCE
Status: COMPLETED | OUTPATIENT
Start: 2025-04-16 | End: 2025-04-16

## 2025-04-16 RX ORDER — FERROUS SULFATE 325(65) MG
325 TABLET ORAL
Status: DISCONTINUED | OUTPATIENT
Start: 2025-04-17 | End: 2025-04-17 | Stop reason: HOSPADM

## 2025-04-16 RX ADMIN — ARFORMOTEROL TARTRATE 15 MCG: 15 SOLUTION RESPIRATORY (INHALATION) at 09:28

## 2025-04-16 RX ADMIN — VERAPAMIL HYDROCHLORIDE 120 MG: 120 TABLET, FILM COATED, EXTENDED RELEASE ORAL at 20:43

## 2025-04-16 RX ADMIN — ARFORMOTEROL TARTRATE 15 MCG: 15 SOLUTION RESPIRATORY (INHALATION) at 21:24

## 2025-04-16 RX ADMIN — OXYBUTYNIN CHLORIDE 10 MG: 10 TABLET, EXTENDED RELEASE ORAL at 20:44

## 2025-04-16 RX ADMIN — OXYBUTYNIN CHLORIDE 10 MG: 10 TABLET, EXTENDED RELEASE ORAL at 08:45

## 2025-04-16 RX ADMIN — GABAPENTIN 200 MG: 100 CAPSULE ORAL at 13:53

## 2025-04-16 RX ADMIN — WATER 1000 MG: 1 INJECTION INTRAMUSCULAR; INTRAVENOUS; SUBCUTANEOUS at 18:06

## 2025-04-16 RX ADMIN — BUPROPION HYDROCHLORIDE 100 MG: 100 TABLET, FILM COATED, EXTENDED RELEASE ORAL at 08:33

## 2025-04-16 RX ADMIN — GABAPENTIN 200 MG: 100 CAPSULE ORAL at 08:33

## 2025-04-16 RX ADMIN — LOSARTAN POTASSIUM 50 MG: 50 TABLET, FILM COATED ORAL at 08:44

## 2025-04-16 RX ADMIN — ASPIRIN 81 MG CHEWABLE TABLET 81 MG: 81 TABLET CHEWABLE at 08:34

## 2025-04-16 RX ADMIN — BUDESONIDE 500 MCG: 0.5 SUSPENSION RESPIRATORY (INHALATION) at 09:28

## 2025-04-16 RX ADMIN — TRAZODONE HYDROCHLORIDE 100 MG: 50 TABLET ORAL at 20:44

## 2025-04-16 RX ADMIN — PANTOPRAZOLE SODIUM 20 MG: 20 TABLET, DELAYED RELEASE ORAL at 08:33

## 2025-04-16 RX ADMIN — LEVOTHYROXINE SODIUM 50 MCG: 0.05 TABLET ORAL at 06:13

## 2025-04-16 RX ADMIN — BACLOFEN 10 MG: 10 TABLET ORAL at 08:33

## 2025-04-16 RX ADMIN — GABAPENTIN 200 MG: 100 CAPSULE ORAL at 20:43

## 2025-04-16 RX ADMIN — BACLOFEN 10 MG: 10 TABLET ORAL at 20:43

## 2025-04-16 RX ADMIN — ENOXAPARIN SODIUM 30 MG: 100 INJECTION SUBCUTANEOUS at 08:39

## 2025-04-16 RX ADMIN — ATORVASTATIN CALCIUM 80 MG: 40 TABLET, FILM COATED ORAL at 08:34

## 2025-04-16 RX ADMIN — VENLAFAXINE HYDROCHLORIDE 150 MG: 150 CAPSULE, EXTENDED RELEASE ORAL at 08:33

## 2025-04-16 RX ADMIN — SODIUM CHLORIDE 500 ML: 0.9 INJECTION, SOLUTION INTRAVENOUS at 11:26

## 2025-04-16 RX ADMIN — PRAMIPEXOLE DIHYDROCHLORIDE 0.75 MG: 0.25 TABLET ORAL at 20:43

## 2025-04-16 RX ADMIN — PRAMIPEXOLE DIHYDROCHLORIDE 0.75 MG: 0.25 TABLET ORAL at 08:44

## 2025-04-16 RX ADMIN — SODIUM CHLORIDE: 9 INJECTION, SOLUTION INTRAVENOUS at 18:12

## 2025-04-16 RX ADMIN — BUDESONIDE 500 MCG: 0.5 SUSPENSION RESPIRATORY (INHALATION) at 21:24

## 2025-04-16 RX ADMIN — POTASSIUM CHLORIDE 40 MEQ: 1500 TABLET, EXTENDED RELEASE ORAL at 08:39

## 2025-04-16 RX ADMIN — CLOPIDOGREL BISULFATE 75 MG: 75 TABLET, FILM COATED ORAL at 08:33

## 2025-04-16 RX ADMIN — SODIUM CHLORIDE, PRESERVATIVE FREE 10 ML: 5 INJECTION INTRAVENOUS at 08:34

## 2025-04-16 ASSESSMENT — PAIN SCALES - GENERAL
PAINLEVEL_OUTOF10: 0

## 2025-04-16 ASSESSMENT — PAIN DESCRIPTION - ONSET: ONSET: OTHER (COMMENT)

## 2025-04-16 ASSESSMENT — PAIN DESCRIPTION - PAIN TYPE: TYPE: OTHER (COMMENT)

## 2025-04-16 ASSESSMENT — PAIN DESCRIPTION - DESCRIPTORS: DESCRIPTORS: OTHER (COMMENT)

## 2025-04-16 ASSESSMENT — PAIN - FUNCTIONAL ASSESSMENT: PAIN_FUNCTIONAL_ASSESSMENT: PREVENTS OR INTERFERES SOME ACTIVE ACTIVITIES AND ADLS

## 2025-04-16 ASSESSMENT — PAIN DESCRIPTION - ORIENTATION: ORIENTATION: OTHER (COMMENT)

## 2025-04-16 ASSESSMENT — PAIN DESCRIPTION - FREQUENCY: FREQUENCY: INTERMITTENT

## 2025-04-16 NOTE — PLAN OF CARE
Problem: Safety - Adult  Goal: Free from fall injury  Outcome: Progressing     Problem: Chronic Conditions and Co-morbidities  Goal: Patient's chronic conditions and co-morbidity symptoms are monitored and maintained or improved  Outcome: Progressing     Problem: Skin/Tissue Integrity  Goal: Skin integrity remains intact  Description: 1.  Monitor for areas of redness and/or skin breakdown2.  Assess vascular access sites hourly3.  Every 4-6 hours minimum:  Change oxygen saturation probe site4.  Every 4-6 hours:  If on nasal continuous positive airway pressure, respiratory therapy assess nares and determine need for appliance change or resting period  Outcome: Progressing

## 2025-04-16 NOTE — ACP (ADVANCE CARE PLANNING)
Advance Care Planning   Healthcare Decision Maker:    Primary Decision Maker: Nate Renee - [facility] - [phone]    Click here to complete Healthcare Decision Makers including selection of the Healthcare Decision Maker Relationship (ie \"Primary\").

## 2025-04-16 NOTE — CARE COORDINATION
04/16/2025 CM EVAL: Patient admitted 4/15/2025 DX: AMS.  Eval done with pt at bedside. Pt was oriented during conversation.  Pt states she was at [facility]. Per record last DC was to [facility].  Pt on IV Rocephin for UTI. Pt states she in dependent and only able to get into a WC but not able to propel herself. Per ED note pt fell from chair to floor at facility. Pt currently on 2 L Oxygen.  [facility] called back to report that they had the referral but they are not in net work for insurance so they could not accept.  Call to facility and liaison for [facility] to confirm pt was at their facility. CM to follow. Rhina Carrizales RN, [phone]    Case Management Assessment  Initial Evaluation    Date/Time of Evaluation: 4/16/2025 12:34 PM  Assessment Completed by: Rhina Carrizales RN    If patient is discharged prior to next notation, then this note serves as note for discharge by case management.    Patient Name: Rebeka Renee                   YOB: 1945  Diagnosis: Metabolic encephalopathy [G93.41]  Acute kidney injury [N17.9]  UTI (urinary tract infection), bacterial [N39.0, A49.9]  Altered mental status, unspecified altered mental status type [R41.82]  AMS (altered mental status) [R41.82]                   Date / Time: 4/14/2025 11:02 PM    Patient Admission Status: Inpatient   Readmission Risk (Low < 19, Mod (19-27), High > 27): Readmission Risk Score: 33.5    Current PCP: Navneet Latif MD  PCP verified by ?      Chart Reviewed: Yes      History Provided by:  patient  Patient Orientation:    AOx3  Patient Cognition:   slight confusion    Hospitalization in the last 30 days (Readmission):  Yes    If yes, Readmission Assessment in  Navigator will be completed.    Advance Directives:      Code Status: Full Code   Patient's Primary Decision Maker is:      Primary Decision Maker: Nate Renee - Spouse - [phone]    Discharge Planning:    Patient lives with:   Type of Home:    Primary Care

## 2025-04-17 VITALS
HEART RATE: 67 BPM | SYSTOLIC BLOOD PRESSURE: 99 MMHG | WEIGHT: 173 LBS | OXYGEN SATURATION: 100 % | TEMPERATURE: 97.3 F | HEIGHT: 65 IN | BODY MASS INDEX: 28.82 KG/M2 | RESPIRATION RATE: 19 BRPM | DIASTOLIC BLOOD PRESSURE: 67 MMHG

## 2025-04-17 LAB
ANION GAP SERPL CALCULATED.3IONS-SCNC: 9 MMOL/L (ref 7–16)
BASOPHILS # BLD: 0.01 K/UL (ref 0–0.2)
BASOPHILS NFR BLD: 0 % (ref 0–2)
BUN SERPL-MCNC: 25 MG/DL (ref 8–23)
CALCIUM SERPL-MCNC: 8.8 MG/DL (ref 8.8–10.2)
CHLORIDE SERPL-SCNC: 103 MMOL/L (ref 98–107)
CO2 SERPL-SCNC: 27 MMOL/L (ref 22–29)
CREAT SERPL-MCNC: 0.9 MG/DL (ref 0.5–1)
EOSINOPHIL # BLD: 0.35 K/UL (ref 0.05–0.5)
EOSINOPHILS RELATIVE PERCENT: 5 % (ref 0–6)
ERYTHROCYTE [DISTWIDTH] IN BLOOD BY AUTOMATED COUNT: 14.5 % (ref 11.5–15)
GFR, ESTIMATED: 68 ML/MIN/1.73M2
GLUCOSE BLD-MCNC: 79 MG/DL (ref 74–99)
GLUCOSE BLD-MCNC: 96 MG/DL (ref 74–99)
GLUCOSE SERPL-MCNC: 66 MG/DL (ref 74–99)
HCT VFR BLD AUTO: 33.6 % (ref 34–48)
HGB BLD-MCNC: 10.1 G/DL (ref 11.5–15.5)
IMM GRANULOCYTES # BLD AUTO: 0.05 K/UL (ref 0–0.58)
IMM GRANULOCYTES NFR BLD: 1 % (ref 0–5)
LYMPHOCYTES NFR BLD: 1.7 K/UL (ref 1.5–4)
LYMPHOCYTES RELATIVE PERCENT: 22 % (ref 20–42)
MCH RBC QN AUTO: 28.5 PG (ref 26–35)
MCHC RBC AUTO-ENTMCNC: 30.1 G/DL (ref 32–34.5)
MCV RBC AUTO: 94.6 FL (ref 80–99.9)
MICROORGANISM SPEC CULT: ABNORMAL
MONOCYTES NFR BLD: 0.55 K/UL (ref 0.1–0.95)
MONOCYTES NFR BLD: 7 % (ref 2–12)
NEUTROPHILS NFR BLD: 66 % (ref 43–80)
NEUTS SEG NFR BLD: 5.04 K/UL (ref 1.8–7.3)
PLATELET # BLD AUTO: 232 K/UL (ref 130–450)
PMV BLD AUTO: 11 FL (ref 7–12)
POTASSIUM SERPL-SCNC: 3.5 MMOL/L (ref 3.4–4.5)
RBC # BLD AUTO: 3.55 M/UL (ref 3.5–5.5)
SERVICE CMNT-IMP: ABNORMAL
SODIUM SERPL-SCNC: 139 MMOL/L (ref 136–145)
SPECIMEN DESCRIPTION: ABNORMAL
WBC OTHER # BLD: 7.7 K/UL (ref 4.5–11.5)

## 2025-04-17 PROCEDURE — 97530 THERAPEUTIC ACTIVITIES: CPT

## 2025-04-17 PROCEDURE — 6360000002 HC RX W HCPCS: Performed by: FAMILY MEDICINE

## 2025-04-17 PROCEDURE — 97161 PT EVAL LOW COMPLEX 20 MIN: CPT

## 2025-04-17 PROCEDURE — 97535 SELF CARE MNGMENT TRAINING: CPT

## 2025-04-17 PROCEDURE — 84100 ASSAY OF PHOSPHORUS: CPT

## 2025-04-17 PROCEDURE — 2500000003 HC RX 250 WO HCPCS: Performed by: FAMILY MEDICINE

## 2025-04-17 PROCEDURE — 94640 AIRWAY INHALATION TREATMENT: CPT

## 2025-04-17 PROCEDURE — 97165 OT EVAL LOW COMPLEX 30 MIN: CPT

## 2025-04-17 PROCEDURE — 82962 GLUCOSE BLOOD TEST: CPT

## 2025-04-17 PROCEDURE — 85025 COMPLETE CBC W/AUTO DIFF WBC: CPT

## 2025-04-17 PROCEDURE — 36415 COLL VENOUS BLD VENIPUNCTURE: CPT

## 2025-04-17 PROCEDURE — 6360000002 HC RX W HCPCS: Performed by: INTERNAL MEDICINE

## 2025-04-17 PROCEDURE — 99239 HOSP IP/OBS DSCHRG MGMT >30: CPT | Performed by: INTERNAL MEDICINE

## 2025-04-17 PROCEDURE — 2700000000 HC OXYGEN THERAPY PER DAY

## 2025-04-17 PROCEDURE — 6370000000 HC RX 637 (ALT 250 FOR IP): Performed by: INTERNAL MEDICINE

## 2025-04-17 PROCEDURE — 6370000000 HC RX 637 (ALT 250 FOR IP): Performed by: FAMILY MEDICINE

## 2025-04-17 PROCEDURE — 80048 BASIC METABOLIC PNL TOTAL CA: CPT

## 2025-04-17 RX ORDER — LINEZOLID 600 MG/1
600 TABLET, FILM COATED ORAL EVERY 12 HOURS SCHEDULED
Status: DISCONTINUED | OUTPATIENT
Start: 2025-04-17 | End: 2025-04-17 | Stop reason: HOSPADM

## 2025-04-17 RX ORDER — ENOXAPARIN SODIUM 100 MG/ML
40 INJECTION SUBCUTANEOUS DAILY
Status: DISCONTINUED | OUTPATIENT
Start: 2025-04-18 | End: 2025-04-17 | Stop reason: HOSPADM

## 2025-04-17 RX ORDER — FERROUS SULFATE 325(65) MG
325 TABLET ORAL
Qty: 90 TABLET | Refills: 0 | Status: SHIPPED | OUTPATIENT
Start: 2025-04-18

## 2025-04-17 RX ORDER — LINEZOLID 600 MG/1
600 TABLET, FILM COATED ORAL EVERY 12 HOURS SCHEDULED
Qty: 7 TABLET | Refills: 0 | Status: SHIPPED | OUTPATIENT
Start: 2025-04-17 | End: 2025-04-21

## 2025-04-17 RX ADMIN — GABAPENTIN 200 MG: 100 CAPSULE ORAL at 08:52

## 2025-04-17 RX ADMIN — LEVOTHYROXINE SODIUM 50 MCG: 0.05 TABLET ORAL at 06:12

## 2025-04-17 RX ADMIN — PRAMIPEXOLE DIHYDROCHLORIDE 0.75 MG: 0.25 TABLET ORAL at 08:54

## 2025-04-17 RX ADMIN — PANTOPRAZOLE SODIUM 20 MG: 20 TABLET, DELAYED RELEASE ORAL at 08:54

## 2025-04-17 RX ADMIN — SODIUM CHLORIDE, PRESERVATIVE FREE 10 ML: 5 INJECTION INTRAVENOUS at 08:55

## 2025-04-17 RX ADMIN — ACETAMINOPHEN 650 MG: 325 TABLET ORAL at 01:42

## 2025-04-17 RX ADMIN — ENOXAPARIN SODIUM 30 MG: 100 INJECTION SUBCUTANEOUS at 08:53

## 2025-04-17 RX ADMIN — CLOPIDOGREL BISULFATE 75 MG: 75 TABLET, FILM COATED ORAL at 08:52

## 2025-04-17 RX ADMIN — BACLOFEN 10 MG: 10 TABLET ORAL at 08:52

## 2025-04-17 RX ADMIN — POTASSIUM CHLORIDE 40 MEQ: 1500 TABLET, EXTENDED RELEASE ORAL at 08:52

## 2025-04-17 RX ADMIN — ASPIRIN 81 MG CHEWABLE TABLET 81 MG: 81 TABLET CHEWABLE at 08:52

## 2025-04-17 RX ADMIN — GABAPENTIN 200 MG: 100 CAPSULE ORAL at 14:39

## 2025-04-17 RX ADMIN — LINEZOLID 600 MG: 600 TABLET, FILM COATED ORAL at 09:48

## 2025-04-17 RX ADMIN — VENLAFAXINE HYDROCHLORIDE 150 MG: 150 CAPSULE, EXTENDED RELEASE ORAL at 08:54

## 2025-04-17 RX ADMIN — OXYBUTYNIN CHLORIDE 10 MG: 10 TABLET, EXTENDED RELEASE ORAL at 08:53

## 2025-04-17 RX ADMIN — BUDESONIDE 500 MCG: 0.5 SUSPENSION RESPIRATORY (INHALATION) at 10:11

## 2025-04-17 RX ADMIN — FERROUS SULFATE TAB 325 MG (65 MG ELEMENTAL FE) 325 MG: 325 (65 FE) TAB at 08:52

## 2025-04-17 RX ADMIN — ARFORMOTEROL TARTRATE 15 MCG: 15 SOLUTION RESPIRATORY (INHALATION) at 10:11

## 2025-04-17 RX ADMIN — ATORVASTATIN CALCIUM 80 MG: 40 TABLET, FILM COATED ORAL at 08:52

## 2025-04-17 RX ADMIN — BUPROPION HYDROCHLORIDE 100 MG: 100 TABLET, FILM COATED, EXTENDED RELEASE ORAL at 08:54

## 2025-04-17 ASSESSMENT — PAIN SCALES - GENERAL
PAINLEVEL_OUTOF10: 2
PAINLEVEL_OUTOF10: 3

## 2025-04-17 ASSESSMENT — PAIN DESCRIPTION - ORIENTATION: ORIENTATION: RIGHT;LEFT

## 2025-04-17 ASSESSMENT — PAIN - FUNCTIONAL ASSESSMENT: PAIN_FUNCTIONAL_ASSESSMENT: PREVENTS OR INTERFERES SOME ACTIVE ACTIVITIES AND ADLS

## 2025-04-17 ASSESSMENT — PAIN DESCRIPTION - LOCATION: LOCATION: GENERALIZED

## 2025-04-17 ASSESSMENT — PAIN DESCRIPTION - DESCRIPTORS: DESCRIPTORS: ACHING;DISCOMFORT;DULL

## 2025-04-17 ASSESSMENT — PAIN DESCRIPTION - ONSET: ONSET: ON-GOING

## 2025-04-17 ASSESSMENT — PAIN DESCRIPTION - FREQUENCY: FREQUENCY: INTERMITTENT

## 2025-04-17 ASSESSMENT — PAIN DESCRIPTION - PAIN TYPE: TYPE: CHRONIC PAIN

## 2025-04-17 NOTE — CARE COORDINATION
04/17/2025 RANDI NOTE: Patient admitted 4/15/2025 DX: AMS. DC order has been noted. Pt can return to [facility] precert pending as long as it is started. Awaiting PT OT evals to be used for precert. WILMA and ambulette forms in envelope. Rhina Carrizales RN, [phone]    Update:  Auth was started and facility can accept back precert pending.  Transport set up for 4:00 PM with PAS. LVM ofr , relayed info to pt and also bedside RN. SEEMA CM

## 2025-04-17 NOTE — PLAN OF CARE
Problem: Safety - Adult  Goal: Free from fall injury  Outcome: Progressing     Problem: Chronic Conditions and Co-morbidities  Goal: Patient's chronic conditions and co-morbidity symptoms are monitored and maintained or improved  Outcome: Progressing     Problem: Discharge Planning  Goal: Discharge to home or other facility with appropriate resources  Outcome: Progressing     Problem: Skin/Tissue Integrity  Goal: Skin integrity remains intact  Description: 1.  Monitor for areas of redness and/or skin breakdown2.  Assess vascular access sites hourly3.  Every 4-6 hours minimum:  Change oxygen saturation probe site4.  Every 4-6 hours:  If on nasal continuous positive airway pressure, respiratory therapy assess nares and determine need for appliance change or resting period  Outcome: Progressing     Problem: Musculoskeletal - Adult  Goal: Return mobility to safest level of function  Outcome: Progressing     Problem: Infection - Adult  Goal: Absence of infection at discharge  Outcome: Progressing  Goal: Absence of fever/infection during anticipated neutropenic period  Outcome: Progressing     Problem: Hematologic - Adult  Goal: Maintains hematologic stability  Outcome: Progressing     Problem: Pain  Goal: Verbalizes/displays adequate comfort level or baseline comfort level  Outcome: Progressing

## 2025-04-17 NOTE — PROGRESS NOTES
[facility] Hospitalist Progress Note    Admitting Date and Time: 4/14/2025 11:02 PM  Admit Dx: Metabolic encephalopathy [G93.41]  Acute kidney injury [N17.9]  UTI (urinary tract infection), bacterial [N39.0, A49.9]  Altered mental status, unspecified altered mental status type [R41.82]  AMS (altered mental status) [R41.82]    Synopsis:    Ms. Rebeka Renee, a 79 y.o. year old female  who  has a past medical history of Anemia, Anxiety, Arthritis, Ascending aortic aneurysm, Asthma, Bipolar 1 disorder (HCC), Cancer (HCC), Cancer of breast, female, CHF (congestive heart failure) (HCC), Chronic back pain, Depression, Depression with anxiety, Diabetes mellitus (HCC), Dyslipidemia, Fibromyalgia, History of blood transfusion, Hypertension, Hypothyroidism, Neuropathy, Pericardial effusion, Pleural effusion, TIA (transient ischemic attack), Tobacco abuse, and Type II or unspecified type diabetes mellitus without mention of complication, not stated as uncontrolled.      Patient presented to the emergency department from a local nursing facility with altered mental status.  Patient apparently slid off her bed earlier with that was not witnessed.  Patient appeared disoriented and lethargic.  Vital signs within normal limits and stable.  The patient is afebrile.  Laboratory studies demonstrate sodium 131, BUN 72, creatinine 2.7, troponin 70 with repeat of 52, WBC 11.6 and hemoglobin 9.8.  Urinalysis positive for infection.  Patient was given ceftriaxone in the emergency department.  Imaging of the chest and abdomen as well as head did not show any acute features. Medicine consulted for admission.    4/16: Creatinine normalized blood pressure soft BP.  Urine culture growing E faecalis, sensitivities pending.    Subjective:  Patient is being followed for Metabolic encephalopathy [G93.41]  Acute kidney injury [N17.9]  UTI (urinary tract infection), bacterial [N39.0, A49.9]  Altered mental status, unspecified altered 
3704 Nurse to nurse called to Elio. Spoke with Oumou.  
4 Eyes Skin Assessment     NAME:  Rebeka Renee  YOB: 1945  MEDICAL RECORD NUMBER:  [id number]    The patient is being assessed for  Admission    I agree that at least one RN has performed a thorough Head to Toe Skin Assessment on the patient. ALL assessment sites listed below have been assessed.      Areas assessed by both nurses:    Head, Face, Ears, Shoulders, Back, Chest, Arms, Elbows, Hands, Sacrum. Buttock, Coccyx, Ischium, Legs. Feet and Heels, and Under Medical Devices         Does the Patient have a Wound? {Action Wound:96945}  Healing wounds to BUE, BLE Bilateral buttocks all scabbed over          Norris Prevention initiated by RN: Yes  Wound Care Orders initiated by RN: No    Pressure Injury (Stage 3,4, Unstageable, DTI, NWPT, and Complex wounds) if present, place Wound referral order by RN under : No    New Ostomies, if present place, Ostomy referral order under : No     Nurse 1 eSignature: Electronically signed by Karen Hernandez RN on 4/15/25 at 5:53 PM EDT    **SHARE this note so that the co-signing nurse can place an eSignature**    Nurse 2 eSignature: {Esignature:[id number]}    
[facility]  MD Andre Interiano MD Ali Hassan, MD Lisa Kniska, VIRGIL Titus, MADALYN Ryan, CNP  Consultation  Patient's Name: Rebeka Renee  1:28 PM  4/17/2025    Comfortable euvolemic VANESA resolving with volume replacement    4/17: Seen while sitting up in bed eating her lunch.  She is in no acute distress.  She denies shortness of breath, chest pain, or palpitations.  She is being discharged later this afternoon.    History of Present Ilness:         Ms. Renee is a pleasant 79-year-old woman who presented to the hospital with altered mental status.  She tells me that she has been having issues with confusion more recently.  She also tells me that she did have a fall, though reports that she \"went down gently.\"  Workup in the ED included a chest x-ray that was negative for any acute process, CT of the abdomen that showed mild right pelviectasis, liquid stool throughout the colon, nodular opacities in the lung bases, and trace bilateral pleural effusions.  Labs on arrival to the ED were significant for sodium of 131, BUN 72, creatinine 2.7, WBC 11.6, and a hemoglobin of 9.8.  Her urinalysis was suggestive of UTI.  She was given fluid boluses, started on intravenous antibiotics, and admitted for further evaluation.  Her past medical history is significant for anemia, aortic aneurysm, asthma, bipolar 1 disorder, lung and kidney cancer, CHF, diabetes mellitus, dyslipidemia, hypertension, hypothyroidism, pericardial effusion, and TIA.         We were consulted for acute kidney injury.  Her creatinine level on arrival to the hospital was 2.7 mg/dL and has improved to 1.7 mg/dL as of this morning.  She is known to our service from previous hospitalizations.  She has a history of kidney cancer and is status post left nephrectomy 20 years ago.  We did see her during a hospitalization in September for an acute kidney injury that did resolve at the time of 
DVT Prophylaxis Adjustment Policy (DVT Prophylaxis)     This patient is on DVT Prophylaxis medication that requires a dose adjustment      Date Body Weight IBW  Adjusted BW SCr  CrCl Dialysis status   4/17/2025 78.5 kg (173 lb) Ideal body weight: 57 kg (125 lb 10.6 oz)  Adjusted ideal body weight: 65.6 kg (144 lb 9.6 oz) Serum creatinine: 0.9 mg/dL 04/17/25 0710  Estimated creatinine clearance: 54 mL/min N/a       Pharmacy has dose-adjusted the DVT Prophylaxis regimen to match   the recommendations from the following table        Ordered Medication:Lovenox 30mg daily    Order Changed/converted to: Lovenox 40mg daily      These changes were made per protocol according to the [facility] Pharmacist   Review for Appropriate Use and Automatic Dose Adjustments of   Subcutaneous Anticoagulants Policy     *Please note this dose may need readjusted if patient's condition changes.    Please contact pharmacy with any questions regarding these changes.    Ferdinand Workman, PharmD 4/17/2025 12:09 PM    
PCA reported patient had low blood pressure can into patient room and took pressure in left and right arms SBP below 90,  manual blood pressure taken and 89/54. Patient remains alert able to answer questions and follow commands, states she does fell tired, message sent to Attending with new order for normal saline 0.9% 500ml bolous and lorsatan 50 mg placed on hold.  
Patient is known to Dr. Du. Please notify his service of consult. Thank you!  
Spiritual Health History and Assessment/Progress Note  [facility]    (P) Initial Encounter,  ,  ,      Name: Rebeka Renee MRN: [id number]    Age: 79 y.o.     Sex: female   Language: English   Holiness: Nisa   AMS (altered mental status)     Date: 4/16/2025                           Spiritual Assessment began in [facility] 4S INT        Referral/Consult From: (P) Rounding   Encounter Overview/Reason: (P) Initial Encounter  Service Provided For: (P) Patient    Ayde, Belief, Meaning:   Patient is connected with a ayde tradition or spiritual practice  Family/Friends No family/friends present      Importance and Influence:  Patient unable to assess at this time  Family/Friends No family/friends present    Community:  Patient is connected with a spiritual community and feels well-supported. Support system includes: Spouse/Partner, Children, and Extended family  Family/Friends No family/friends present    Assessment and Plan of Care:     Patient Interventions include: Facilitated expression of thoughts and feelings and Affirmed coping skills/support systems  Family/Friends Interventions include: No family/friends present    Patient Plan of Care: Spiritual Care available upon further referral and No future visits per patient/family request  Family/Friends Plan of Care: No family/friends present    Electronically signed by Chaplain Kris on 4/16/2025 at 10:48 AM   
PRN  polyethylene glycol, 17 g, Daily PRN  acetaminophen, 650 mg, Q6H PRN   Or  acetaminophen, 650 mg, Q6H PRN         Objective:    BP (!) 114/59   Pulse 74   Temp 98 °F (36.7 °C) (Oral)   Resp 23   Ht 1.651 m (5' 5\")   Wt 78.5 kg (173 lb)   SpO2 98%   BMI 28.79 kg/m²     General Appearance: alert and oriented to person, place and time and in no acute distress  Skin: warm and dry  Head: normocephalic and atraumatic  Eyes: pupils equal, round, and reactive to light, extraocular eye movements intact, conjunctivae normal  Neck: neck supple and non tender without mass   Pulmonary/Chest: clear to auscultation bilaterally- no wheezes, rales or rhonchi, normal air movement, no respiratory distress  Cardiovascular: normal rate, normal S1 and S2 and no carotid bruits  Abdomen: soft, non-tender, non-distended, normal bowel sounds, no masses or organomegaly  Extremities: no cyanosis, no clubbing and no edema  Neurologic: no cranial nerve deficit and speech normal        Recent Labs     04/14/25  2313 04/14/25  2346 04/15/25  0811   *  --  140   K 3.6  --  3.1*   CL 86*  --  97*   CO2 32*  --  29   BUN 72*  --  60*   CREATININE 2.7*  --  1.7*   GLUCOSE 73* 74 87   CALCIUM 8.6  --  8.3*       Recent Labs     04/14/25  2313 04/15/25  0811   WBC 11.6* 10.0   RBC 3.44* 3.60   HGB 9.8* 10.1*   HCT 31.2* 32.8*   MCV 90.7 91.1   MCH 28.5 28.1   MCHC 31.4* 30.8*   RDW 14.3 14.3    238   MPV 10.5 10.6         Assessment:    Altered mental status likely 2/2 UTI  VANESA 2/2 above  COPD, on 3 L chronically  HTN  HLD  GERD   CHF  T2DM  Anxiety/depression      Plan:  Continue rocephin for UTI, follow urine cultures  Continue NS 75 cc/hr  Replace K today  Hold home Bumex and Cozaar given VANESA  Continue other medications including breathing treatments, aspirin/Plavix, statin, baclofen, Wellbutrin, Neurontin, Synthroid, Ditropan, PPI, trazodone, Effexor, verapamil      DC planning: Pending urine culture and improvement in 
exercises at EOB.  Pt was left in bed with all needs met and call light in reach.  All lines remained intact and bed alarm on.      Treatment:  Patient practiced and was instructed in the following treatment:    Bed mobility training - pt given verbal and tactile cues to facilitate proper sequencing and safety during supine>sit as well as provided with physical assistance.  Sitting EOB for >10 minutes for upright tolerance, postural awareness and BLE ROM  Transfer training - pt was given verbal and tactile cues to facilitate proper hand placement, technique and safety during sit to stand, stand to sit transfers as well as provided with physical assistance.   Gait training- pt was given verbal and tactile cues to facilitate safety and balance during ambulation as well as provided with physical assistance.    Pt's/ family goals   1. Return to rehab    Prognosis is good for reaching above PT goals.    Patient and or family understand(s) diagnosis, prognosis, and plan of care:   [x] Yes [] No      PHYSICAL THERAPY PLAN OF CARE:    PT POC is established based on physician order and patient diagnosis     Referring provider/PT Order:  Zak Sultana MD  /04/17/25 1045  PT eval and treat  Diagnosis:  Metabolic encephalopathy [G93.41]  Acute kidney injury [N17.9]  UTI (urinary tract infection), bacterial [N39.0, A49.9]  Altered mental status, unspecified altered mental status type [R41.82]  AMS (altered mental status) [R41.82]  Specific instructions for next treatment:  Progress activity     Current Treatment Recommendations:     [x] Strengthening to improve independence with functional mobility   [] ROM to improve independence with functional mobility   [x] Balance Training to improve static/dynamic balance and to reduce fall risk  [x] Endurance Training to improve activity tolerance during functional mobility   [x] Transfer Training to improve safety and independence with all functional transfers   [x] Gait Training to 
Functional transfer/mobility training/DME recommendations for increased independence, safety, and fall prevention  * Patient/Family education to increase follow through with safety techniques and functional independence  * Recommendation of environmental modifications for increased safety with functional transfers/mobility and ADLs  * Cognitive retraining/development of therapeutic activities to improve problem solving, judgement, memory, and attention for increased safety/participation in ADL/IADL tasks  * Therapeutic exercise to improve motor endurance, ROM, and functional strength for ADLs/functional transfers  * Therapeutic activities to facilitate/challenge dynamic balance, stand tolerance for increased safety and independence with ADLs  * Positioning to improve skin integrity, interaction with environment and functional independence  * Delirium prevention/treatment      Recommended Adaptive Equipment:  TBD     Home Living: Pt is from a SNF where she was receiving therapy services.Prior to SNF pt lived with  and needed some assistance with ADL's      Prior Level of Function: pt needs assist  with ADLs , assist  with IADLs; ambulated very short distance.Pt reports completing stand pivot transfers from bed to wheelchair at the SNF      Pain Level: none reported   Cognition: A&O: 3/4; Follows multi  step directions with cues    Memory:  fair    Sequencing:  fair    Problem solving:  fair    Judgement/safety:  fair      Functional Assessment:  AM-PAC Daily Activity Raw Score: 13/24   Initial Eval Status  Date: 4/17/25 Treatment Status  Date: STGs = LTGs  Time frame: 10-14 days   Feeding Setup      Grooming Minimal Assist   Supervision    UB Dressing Minimal Assist   Supervision    LB Dressing Maximal Assist   Minimal Assist    Bathing Maximal Assist  Minimal Assist    Toileting Maximal Assist -pure wick   Minimal Assist    Bed Mobility  Supine to sit: Minimal Assist   Sit to supine: Minimal Assist   Supine 
PRN  cefTRIAXone (ROCEPHIN) 1,000 mg in sterile water 10 mL IV syringe, Q24H  glucose chewable tablet 16 g, PRN  dextrose bolus 10% 125 mL, PRN   Or  dextrose bolus 10% 250 mL, PRN  glucagon injection 1 mg, PRN  dextrose 10 % infusion, Continuous PRN  aspirin chewable tablet 81 mg, Daily  atorvastatin (LIPITOR) tablet 80 mg, Daily  baclofen (LIORESAL) tablet 10 mg, BID  benzonatate (TESSALON) capsule 100 mg, TID PRN  [Held by provider] bumetanide (BUMEX) tablet 2 mg, BID  buPROPion (WELLBUTRIN SR) extended release tablet 100 mg, Daily  clopidogrel (PLAVIX) tablet 75 mg, Daily  levothyroxine (SYNTHROID) tablet 50 mcg, Daily  [Held by provider] losartan (COZAAR) tablet 50 mg, Daily  magnesium hydroxide (MILK OF MAGNESIA) 400 MG/5ML suspension 30 mL, Daily PRN  oxyBUTYnin (DITROPAN-XL) extended release tablet 10 mg, BID  pantoprazole (PROTONIX) tablet 20 mg, Daily  pramipexole (MIRAPEX) tablet 0.75 mg, BID  traZODone (DESYREL) tablet 100 mg, Nightly  venlafaxine (EFFEXOR XR) extended release capsule 150 mg, Daily with breakfast  verapamil (CALAN SR) extended release tablet 120 mg, Nightly  arformoterol tartrate (BROVANA) nebulizer solution 15 mcg, BID RT  budesonide (PULMICORT) nebulizer suspension 500 mcg, BID RT  albuterol (PROVENTIL) (2.5 MG/3ML) 0.083% nebulizer solution 2.5 mg, Q6H PRN  potassium chloride (KLOR-CON M) extended release tablet 40 mEq, Daily  gabapentin (NEURONTIN) capsule 200 mg, TID  0.9 % sodium chloride infusion, Continuous        Review of Systems:   Constitutional: negative for chills, fatigue, fevers, and malaise  Eyes: negative for icterus, irritation, redness, and visual disturbance  Ears, nose, mouth, throat, and face: negative for ear drainage, earaches, hearing loss, nasal congestion, sore mouth, sore throat, and tinnitus  Respiratory: negative for cough, dyspnea on exertion, hemoptysis, shortness of breath, and wheezing  Cardiovascular: negative for chest pain, chest pressure/discomfort,

## 2025-04-17 NOTE — DISCHARGE SUMMARY
[facility] Hospitalist Physician Discharge Summary     Patient ID:  Rebeka Renee  [id number]  79 y.o.  1945    Admit date: 4/14/2025    Discharge date and time:  4/17/2025  10:34 AM    Hospital Course:   Patient Rebeka Renee is a 79 y.o. presented with Metabolic encephalopathy [G93.41]  Acute kidney injury [N17.9]  UTI (urinary tract infection), bacterial [N39.0, A49.9]  Altered mental status, unspecified altered mental status type [R41.82]  AMS (altered mental status) [R41.82]    Ms. Rebeka Renee, a 79 y.o. year old female  who  has a past medical history of Anemia, Anxiety, Arthritis, Ascending aortic aneurysm, Asthma, Bipolar 1 disorder (HCC), Cancer (HCC), Cancer of breast, female, CHF (congestive heart failure) ([facility]), Chronic back pain, Depression, Depression with anxiety, Diabetes mellitus ([facility]), Dyslipidemia, Fibromyalgia, History of blood transfusion, Hypertension, Hypothyroidism, Neuropathy, Pericardial effusion, Pleural effusion, TIA (transient ischemic attack), Tobacco abuse, and Type II or unspecified type diabetes mellitus without mention of complication, not stated as uncontrolled.      Patient presented to the emergency department from a local nursing facility with altered mental status.  Patient apparently slid off her bed earlier with that was not witnessed.  Patient appeared disoriented and lethargic.  Vital signs within normal limits and stable.  The patient is afebrile.  Laboratory studies demonstrate sodium 131, BUN 72, creatinine 2.7, troponin 70 with repeat of 52, WBC 11.6 and hemoglobin 9.8.  Urinalysis positive for infection.  Patient was given ceftriaxone in the emergency department.  Imaging of the chest and abdomen as well as head did not show any acute features. Medicine consulted for admission.     4/16: Creatinine normalized blood pressure soft BP.  Urine culture growing E faecalis, sensitivities pending.    4/17: Creatinine remains normal.  BP stable while

## 2025-04-17 NOTE — DISCHARGE INSTR - COC
Continuity of Care Form    Patient Name: Rebeka Renee   :  1945  MRN:  [id number]    Admit date:  2025  Discharge date:  2025      Code Status Order: Full Code   Advance Directives:     Admitting Physician:  No admitting provider for patient encounter.  PCP: Navneet Latif MD    Discharging Nurse: SC RN  Discharging Hospital Unit/Room#: 4204/4204-A  Discharging Unit Phone Number: [phone]    Emergency Contact:   Extended Emergency Contact Information  Primary Emergency Contact: Nate Renee  Address: [address] [place] [address]  Home Phone: [phone]  Mobile Phone: [phone]  Relation: Spouse  Secondary Emergency Contact: RoshanlouisAriel   [facility]  Home Phone: [phone]  Mobile Phone: [phone]  Relation: Brother/Sister    Past Surgical History:  Past Surgical History:   Procedure Laterality Date    BREAST SURGERY      CATARACT REMOVAL WITH IMPLANT Bilateral     CHOLECYSTECTOMY      FEMUR SURGERY Right 2024    FEMUR OPEN REDUCTION INTERNAL FIXATION RIGHT PERIPROSTHETIC performed by Wood George DO at [facility] [place]    FIBULA FRACTURE SURGERY Right 2024    RIGHT FEMUR OPEN REDUCTION INTERNAL FIXATION, OPEN TREATMENT OF RIGHT HIP DISLOCATION performed by Nemesio Handy MD at [facility] [place]    GASTRIC BYPASS SURGERY  2004    HERNIA REPAIR      HIP SURGERY Right 2024    RIGHT HIP HEMIARTHROPLASTY performed by Nemesio Handy MD at [facility] [place]    HIP SURGERY Right 2024    RIGHT HIP CLOSED REDUCTION INTERNAL FIXATION performed by Matthew Peña DO at [facility] [place]    HIP SURGERY Right 2024    RIGHT HIP OPEN REDUCTION FEMORAL HEAD EXCHANGE WITH IRRIGATION AND DEBRIDEMENT performed by Nemesio Handy MD at [facility] [place]    HIP SURGERY Right 10/3/2024    Right femur/hip irrigation and debridement performed by Nemesio Handy MD at [facility] [place]    HIP SURGERY Right 10/7/2024    Right Hip

## 2025-04-18 ENCOUNTER — OUTSIDE SERVICES (OUTPATIENT)
Dept: PRIMARY CARE CLINIC | Age: 80
End: 2025-04-18

## 2025-04-18 DIAGNOSIS — E03.9 HYPOTHYROIDISM, UNSPECIFIED TYPE: ICD-10-CM

## 2025-04-18 DIAGNOSIS — N30.00 ACUTE CYSTITIS WITHOUT HEMATURIA: Primary | ICD-10-CM

## 2025-04-18 DIAGNOSIS — E11.40 CONTROLLED TYPE 2 DIABETES MELLITUS WITH DIABETIC NEUROPATHY, WITH LONG-TERM CURRENT USE OF INSULIN (HCC): ICD-10-CM

## 2025-04-18 DIAGNOSIS — R41.82 ALTERED MENTAL STATUS, UNSPECIFIED ALTERED MENTAL STATUS TYPE: ICD-10-CM

## 2025-04-18 DIAGNOSIS — I10 PRIMARY HYPERTENSION: ICD-10-CM

## 2025-04-18 DIAGNOSIS — F33.0 MILD EPISODE OF RECURRENT MAJOR DEPRESSIVE DISORDER: ICD-10-CM

## 2025-04-18 DIAGNOSIS — Z86.73 HISTORY OF CVA (CEREBROVASCULAR ACCIDENT): ICD-10-CM

## 2025-04-18 DIAGNOSIS — Z79.4 CONTROLLED TYPE 2 DIABETES MELLITUS WITH DIABETIC NEUROPATHY, WITH LONG-TERM CURRENT USE OF INSULIN (HCC): ICD-10-CM

## 2025-04-18 LAB — PHOSPHATE SERPL-MCNC: 2.2 MG/DL (ref 2.5–4.5)

## 2025-04-18 NOTE — PROGRESS NOTES
Rebeka Renee (:  1945) is a 79 y.o. female.    Subjective   SUBJECTIVE/OBJECTIVE:  Past Medical History:   Diagnosis Date    Anemia     S/P chemotherapy.    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    Ascending aortic aneurysm     seen on CTA chest w/contrast on     Asthma     Bipolar 1 disorder (HCC)     Cancer (HCC)     lung/ kidney    Cancer of breast, female 2006    S/P bilaeral mastectomy with left breast lymph node resection and chemotherapy.    CHF (congestive heart failure) (HCC)     Chronic back pain     Depression     Depression with anxiety     Diabetes mellitus (HCC)     Resolved after losing 130 lbs. after gastric bypass surgery.    Dyslipidemia     Fibromyalgia     History of blood transfusion     anemia    Hypertension     Hypothyroidism     Neuropathy     Pericardial effusion 2010    Subxiphoid pericardial window with drainage of pericardial effusion and pericardial biopsy:  Fluid and biopsy negative for metastases.     Pleural effusion 2010    Noted on chest x-ray.    TIA (transient ischemic attack)     Tobacco abuse     Patient smoked 3 ppd x 26 years.  Quit in .    Type II or unspecified type diabetes mellitus without mention of complication, not stated as uncontrolled       Past Surgical History:   Procedure Laterality Date    BREAST SURGERY      CATARACT REMOVAL WITH IMPLANT Bilateral     CHOLECYSTECTOMY      FEMUR SURGERY Right 2024    FEMUR OPEN REDUCTION INTERNAL FIXATION RIGHT PERIPROSTHETIC performed [place] Wood George [place] [facility] [place]    FIBULA FRACTURE SURGERY Right 2024    RIGHT FEMUR OPEN REDUCTION INTERNAL FIXATION, OPEN TREATMENT OF RIGHT HIP DISLOCATION performed [place] Nemesio Handy [place] [facility] [place]    GASTRIC BYPASS SURGERY  2004    HERNIA REPAIR      HIP SURGERY Right 2024    RIGHT HIP HEMIARTHROPLASTY performed by

## 2025-04-19 LAB
EKG ATRIAL RATE: 61 BPM
EKG P AXIS: 43 DEGREES
EKG P-R INTERVAL: 154 MS
EKG Q-T INTERVAL: 464 MS
EKG QRS DURATION: 104 MS
EKG QTC CALCULATION (BAZETT): 467 MS
EKG R AXIS: -34 DEGREES
EKG T AXIS: 63 DEGREES
EKG VENTRICULAR RATE: 61 BPM

## 2025-04-22 ENCOUNTER — OUTSIDE SERVICES (OUTPATIENT)
Dept: PRIMARY CARE CLINIC | Age: 80
End: 2025-04-22

## 2025-04-22 DIAGNOSIS — J44.9 CHRONIC OBSTRUCTIVE PULMONARY DISEASE, UNSPECIFIED COPD TYPE (HCC): Primary | ICD-10-CM

## 2025-04-23 ENCOUNTER — APPOINTMENT (OUTPATIENT)
Dept: GENERAL RADIOLOGY | Age: 80
DRG: 177 | End: 2025-04-23
Payer: MEDICARE

## 2025-04-23 ENCOUNTER — APPOINTMENT (OUTPATIENT)
Dept: CT IMAGING | Age: 80
DRG: 177 | End: 2025-04-23
Payer: MEDICARE

## 2025-04-23 ENCOUNTER — HOSPITAL ENCOUNTER (INPATIENT)
Age: 80
LOS: 4 days | Discharge: NURSING, REHAB OR LONG-TERM CARE | DRG: 177 | End: 2025-04-28
Attending: EMERGENCY MEDICINE | Admitting: STUDENT IN AN ORGANIZED HEALTH CARE EDUCATION/TRAINING PROGRAM
Payer: MEDICARE

## 2025-04-23 ENCOUNTER — OUTSIDE SERVICES (OUTPATIENT)
Dept: PRIMARY CARE CLINIC | Age: 80
End: 2025-04-23

## 2025-04-23 ENCOUNTER — APPOINTMENT (OUTPATIENT)
Dept: CT IMAGING | Age: 80
DRG: 177 | End: 2025-04-23
Attending: EMERGENCY MEDICINE
Payer: MEDICARE

## 2025-04-23 DIAGNOSIS — Z86.73 HISTORY OF CVA (CEREBROVASCULAR ACCIDENT): ICD-10-CM

## 2025-04-23 DIAGNOSIS — J18.9 PNEUMONIA OF BOTH UPPER LOBES DUE TO INFECTIOUS ORGANISM: ICD-10-CM

## 2025-04-23 DIAGNOSIS — J44.9 CHRONIC OBSTRUCTIVE PULMONARY DISEASE, UNSPECIFIED COPD TYPE (HCC): Primary | ICD-10-CM

## 2025-04-23 DIAGNOSIS — F33.0 MILD EPISODE OF RECURRENT MAJOR DEPRESSIVE DISORDER: ICD-10-CM

## 2025-04-23 DIAGNOSIS — N30.00 ACUTE CYSTITIS WITHOUT HEMATURIA: ICD-10-CM

## 2025-04-23 DIAGNOSIS — A41.9 SEPSIS SECONDARY TO UTI (HCC): ICD-10-CM

## 2025-04-23 DIAGNOSIS — D64.9 ANEMIA, UNSPECIFIED TYPE: ICD-10-CM

## 2025-04-23 DIAGNOSIS — R41.82 ALTERED MENTAL STATUS, UNSPECIFIED ALTERED MENTAL STATUS TYPE: ICD-10-CM

## 2025-04-23 DIAGNOSIS — E03.9 HYPOTHYROIDISM, UNSPECIFIED TYPE: ICD-10-CM

## 2025-04-23 DIAGNOSIS — I10 PRIMARY HYPERTENSION: ICD-10-CM

## 2025-04-23 DIAGNOSIS — R53.1 GENERALIZED WEAKNESS: ICD-10-CM

## 2025-04-23 DIAGNOSIS — Z86.718 HISTORY OF DVT IN ADULTHOOD: ICD-10-CM

## 2025-04-23 DIAGNOSIS — Z79.4 CONTROLLED TYPE 2 DIABETES MELLITUS WITH DIABETIC NEUROPATHY, WITH LONG-TERM CURRENT USE OF INSULIN (HCC): ICD-10-CM

## 2025-04-23 DIAGNOSIS — G93.41 SEPSIS WITH ENCEPHALOPATHY WITHOUT SEPTIC SHOCK, DUE TO UNSPECIFIED ORGANISM (HCC): Primary | ICD-10-CM

## 2025-04-23 DIAGNOSIS — E78.5 HYPERLIPIDEMIA, UNSPECIFIED HYPERLIPIDEMIA TYPE: ICD-10-CM

## 2025-04-23 DIAGNOSIS — E11.40 CONTROLLED TYPE 2 DIABETES MELLITUS WITH DIABETIC NEUROPATHY, WITH LONG-TERM CURRENT USE OF INSULIN (HCC): ICD-10-CM

## 2025-04-23 DIAGNOSIS — J96.01 ACUTE RESPIRATORY FAILURE WITH HYPOXIA (HCC): ICD-10-CM

## 2025-04-23 DIAGNOSIS — A41.9 SEPSIS WITH ENCEPHALOPATHY WITHOUT SEPTIC SHOCK, DUE TO UNSPECIFIED ORGANISM (HCC): Primary | ICD-10-CM

## 2025-04-23 DIAGNOSIS — R65.20 SEPSIS WITH ENCEPHALOPATHY WITHOUT SEPTIC SHOCK, DUE TO UNSPECIFIED ORGANISM (HCC): Primary | ICD-10-CM

## 2025-04-23 DIAGNOSIS — N39.0 SEPSIS SECONDARY TO UTI (HCC): ICD-10-CM

## 2025-04-23 LAB
ALBUMIN SERPL-MCNC: 2.6 G/DL (ref 3.5–5.2)
ALP SERPL-CCNC: 87 U/L (ref 35–104)
ALT SERPL-CCNC: 51 U/L (ref 0–35)
AMMONIA PLAS-SCNC: <10 UMOL/L (ref 11–51)
ANION GAP SERPL CALCULATED.3IONS-SCNC: 9 MMOL/L (ref 7–16)
AST SERPL-CCNC: 60 U/L (ref 0–35)
B.E.: 0.3 MMOL/L (ref -3–3)
BACTERIA URNS QL MICRO: ABNORMAL
BASOPHILS # BLD: 0.01 K/UL (ref 0–0.2)
BASOPHILS NFR BLD: 0 % (ref 0–2)
BILIRUB SERPL-MCNC: 0.4 MG/DL (ref 0–1.2)
BILIRUB UR QL STRIP: NEGATIVE
BUN SERPL-MCNC: 36 MG/DL (ref 8–23)
CALCIUM SERPL-MCNC: 8.6 MG/DL (ref 8.8–10.2)
CHLORIDE SERPL-SCNC: 103 MMOL/L (ref 98–107)
CHP ED QC CHECK: NORMAL
CLARITY UR: CLEAR
CO2 SERPL-SCNC: 29 MMOL/L (ref 22–29)
COHB: 0.7 % (ref 0–1.5)
COLOR UR: YELLOW
CREAT SERPL-MCNC: 2.2 MG/DL (ref 0.5–1)
CRITICAL: ABNORMAL
DATE ANALYZED: ABNORMAL
DATE OF COLLECTION: ABNORMAL
EOSINOPHIL # BLD: 0.27 K/UL (ref 0.05–0.5)
EOSINOPHILS RELATIVE PERCENT: 4 % (ref 0–6)
EPI CELLS #/AREA URNS HPF: ABNORMAL /HPF
ERYTHROCYTE [DISTWIDTH] IN BLOOD BY AUTOMATED COUNT: 15.7 % (ref 11.5–15)
GFR, ESTIMATED: 22 ML/MIN/1.73M2
GLUCOSE BLD-MCNC: 75 MG/DL
GLUCOSE BLD-MCNC: 75 MG/DL (ref 74–99)
GLUCOSE SERPL-MCNC: 72 MG/DL (ref 74–99)
GLUCOSE UR STRIP-MCNC: NEGATIVE MG/DL
HCO3: 25.4 MMOL/L (ref 22–26)
HCT VFR BLD AUTO: 28.7 % (ref 34–48)
HGB BLD-MCNC: 8.6 G/DL (ref 11.5–15.5)
HGB UR QL STRIP.AUTO: NEGATIVE
HHB: 10.6 % (ref 0–5)
IMM GRANULOCYTES # BLD AUTO: 0.03 K/UL (ref 0–0.58)
IMM GRANULOCYTES NFR BLD: 0 % (ref 0–5)
INR PPP: 1.3
KETONES UR STRIP-MCNC: NEGATIVE MG/DL
LAB: ABNORMAL
LACTATE BLDV-SCNC: 1.2 MMOL/L (ref 0.5–1.9)
LEUKOCYTE ESTERASE UR QL STRIP: ABNORMAL
LYMPHOCYTES NFR BLD: 1.3 K/UL (ref 1.5–4)
LYMPHOCYTES RELATIVE PERCENT: 18 % (ref 20–42)
Lab: 1418
MCH RBC QN AUTO: 28.2 PG (ref 26–35)
MCHC RBC AUTO-ENTMCNC: 30 G/DL (ref 32–34.5)
MCV RBC AUTO: 94.1 FL (ref 80–99.9)
METHB: 0.1 % (ref 0–1.5)
MONOCYTES NFR BLD: 0.46 K/UL (ref 0.1–0.95)
MONOCYTES NFR BLD: 6 % (ref 2–12)
NEUTROPHILS NFR BLD: 71 % (ref 43–80)
NEUTS SEG NFR BLD: 5.12 K/UL (ref 1.8–7.3)
NITRITE UR QL STRIP: NEGATIVE
O2 SATURATION: 89.3 % (ref 92–98.5)
O2HB: 88.6 % (ref 94–97)
OPERATOR ID: 420
PARTIAL THROMBOPLASTIN TIME: 28.5 SEC (ref 24.5–35.1)
PATIENT TEMP: 37 C
PCO2: 43 MMHG (ref 35–45)
PH BLOOD GAS: 7.39 (ref 7.35–7.45)
PH UR STRIP: 6 [PH] (ref 5–8)
PLATELET # BLD AUTO: 132 K/UL (ref 130–450)
PMV BLD AUTO: 10.3 FL (ref 7–12)
PO2: 62.2 MMHG (ref 75–100)
POTASSIUM SERPL-SCNC: 4.3 MMOL/L (ref 3.5–5.1)
PROT SERPL-MCNC: 5 G/DL (ref 6.4–8.3)
PROT UR STRIP-MCNC: NEGATIVE MG/DL
PROTHROMBIN TIME: 14.2 SEC (ref 9.3–12.4)
RBC # BLD AUTO: 3.05 M/UL (ref 3.5–5.5)
RBC #/AREA URNS HPF: ABNORMAL /HPF
SODIUM SERPL-SCNC: 141 MMOL/L (ref 136–145)
SOURCE, BLOOD GAS: ABNORMAL
SP GR UR STRIP: 1.01 (ref 1–1.03)
THB: 9.5 G/DL (ref 11.5–16.5)
TIME ANALYZED: 1425
TROPONIN I SERPL HS-MCNC: 45 NG/L (ref 0–14)
TROPONIN I SERPL HS-MCNC: 49 NG/L (ref 0–14)
UROBILINOGEN UR STRIP-ACNC: 0.2 EU/DL (ref 0–1)
WBC #/AREA URNS HPF: ABNORMAL /HPF
WBC OTHER # BLD: 7.2 K/UL (ref 4.5–11.5)

## 2025-04-23 PROCEDURE — 71045 X-RAY EXAM CHEST 1 VIEW: CPT

## 2025-04-23 PROCEDURE — 36415 COLL VENOUS BLD VENIPUNCTURE: CPT

## 2025-04-23 PROCEDURE — 71250 CT THORAX DX C-: CPT

## 2025-04-23 PROCEDURE — 96361 HYDRATE IV INFUSION ADD-ON: CPT

## 2025-04-23 PROCEDURE — 87086 URINE CULTURE/COLONY COUNT: CPT

## 2025-04-23 PROCEDURE — 99223 1ST HOSP IP/OBS HIGH 75: CPT | Performed by: INTERNAL MEDICINE

## 2025-04-23 PROCEDURE — 83605 ASSAY OF LACTIC ACID: CPT

## 2025-04-23 PROCEDURE — 87040 BLOOD CULTURE FOR BACTERIA: CPT

## 2025-04-23 PROCEDURE — G0378 HOSPITAL OBSERVATION PER HR: HCPCS

## 2025-04-23 PROCEDURE — 6360000002 HC RX W HCPCS: Performed by: INTERNAL MEDICINE

## 2025-04-23 PROCEDURE — 85025 COMPLETE CBC W/AUTO DIFF WBC: CPT

## 2025-04-23 PROCEDURE — 94640 AIRWAY INHALATION TREATMENT: CPT

## 2025-04-23 PROCEDURE — 6360000004 HC RX CONTRAST MEDICATION: Performed by: RADIOLOGY

## 2025-04-23 PROCEDURE — 0042T CT BRAIN PERFUSION: CPT

## 2025-04-23 PROCEDURE — 99285 EMERGENCY DEPT VISIT HI MDM: CPT

## 2025-04-23 PROCEDURE — 2580000003 HC RX 258: Performed by: INTERNAL MEDICINE

## 2025-04-23 PROCEDURE — 82140 ASSAY OF AMMONIA: CPT

## 2025-04-23 PROCEDURE — 81001 URINALYSIS AUTO W/SCOPE: CPT

## 2025-04-23 PROCEDURE — 74176 CT ABD & PELVIS W/O CONTRAST: CPT

## 2025-04-23 PROCEDURE — 70450 CT HEAD/BRAIN W/O DYE: CPT

## 2025-04-23 PROCEDURE — 85610 PROTHROMBIN TIME: CPT

## 2025-04-23 PROCEDURE — 82962 GLUCOSE BLOOD TEST: CPT

## 2025-04-23 PROCEDURE — 96367 TX/PROPH/DG ADDL SEQ IV INF: CPT

## 2025-04-23 PROCEDURE — 6360000002 HC RX W HCPCS: Performed by: EMERGENCY MEDICINE

## 2025-04-23 PROCEDURE — 80053 COMPREHEN METABOLIC PANEL: CPT

## 2025-04-23 PROCEDURE — 85730 THROMBOPLASTIN TIME PARTIAL: CPT

## 2025-04-23 PROCEDURE — 70496 CT ANGIOGRAPHY HEAD: CPT

## 2025-04-23 PROCEDURE — 84484 ASSAY OF TROPONIN QUANT: CPT

## 2025-04-23 PROCEDURE — 2580000003 HC RX 258: Performed by: EMERGENCY MEDICINE

## 2025-04-23 PROCEDURE — 96375 TX/PRO/DX INJ NEW DRUG ADDON: CPT

## 2025-04-23 PROCEDURE — 87077 CULTURE AEROBIC IDENTIFY: CPT

## 2025-04-23 PROCEDURE — 70498 CT ANGIOGRAPHY NECK: CPT

## 2025-04-23 PROCEDURE — 96365 THER/PROPH/DIAG IV INF INIT: CPT

## 2025-04-23 PROCEDURE — 82805 BLOOD GASES W/O2 SATURATION: CPT

## 2025-04-23 RX ORDER — BUMETANIDE 1 MG/1
2 TABLET ORAL 2 TIMES DAILY
Status: DISCONTINUED | OUTPATIENT
Start: 2025-04-23 | End: 2025-04-28 | Stop reason: HOSPADM

## 2025-04-23 RX ORDER — VENLAFAXINE HYDROCHLORIDE 150 MG/1
150 CAPSULE, EXTENDED RELEASE ORAL
Status: DISCONTINUED | OUTPATIENT
Start: 2025-04-24 | End: 2025-04-28 | Stop reason: HOSPADM

## 2025-04-23 RX ORDER — BENZONATATE 100 MG/1
100 CAPSULE ORAL 3 TIMES DAILY PRN
Status: DISCONTINUED | OUTPATIENT
Start: 2025-04-23 | End: 2025-04-28 | Stop reason: HOSPADM

## 2025-04-23 RX ORDER — SODIUM CHLORIDE 0.9 % (FLUSH) 0.9 %
5-40 SYRINGE (ML) INJECTION EVERY 12 HOURS SCHEDULED
Status: DISCONTINUED | OUTPATIENT
Start: 2025-04-23 | End: 2025-04-28 | Stop reason: HOSPADM

## 2025-04-23 RX ORDER — OXYBUTYNIN CHLORIDE 10 MG/1
10 TABLET, EXTENDED RELEASE ORAL 2 TIMES DAILY
Status: DISCONTINUED | OUTPATIENT
Start: 2025-04-23 | End: 2025-04-28 | Stop reason: HOSPADM

## 2025-04-23 RX ORDER — SODIUM CHLORIDE 9 MG/ML
INJECTION, SOLUTION INTRAVENOUS CONTINUOUS
Status: DISCONTINUED | OUTPATIENT
Start: 2025-04-23 | End: 2025-04-28 | Stop reason: HOSPADM

## 2025-04-23 RX ORDER — ACETAMINOPHEN 325 MG/1
650 TABLET ORAL EVERY 6 HOURS PRN
Status: DISCONTINUED | OUTPATIENT
Start: 2025-04-23 | End: 2025-04-28 | Stop reason: HOSPADM

## 2025-04-23 RX ORDER — LEVOTHYROXINE SODIUM 50 UG/1
50 TABLET ORAL DAILY
Status: DISCONTINUED | OUTPATIENT
Start: 2025-04-24 | End: 2025-04-28 | Stop reason: HOSPADM

## 2025-04-23 RX ORDER — POTASSIUM CHLORIDE 1500 MG/1
40 TABLET, EXTENDED RELEASE ORAL DAILY
Status: DISCONTINUED | OUTPATIENT
Start: 2025-04-23 | End: 2025-04-28 | Stop reason: HOSPADM

## 2025-04-23 RX ORDER — ONDANSETRON 2 MG/ML
4 INJECTION INTRAMUSCULAR; INTRAVENOUS EVERY 6 HOURS PRN
Status: DISCONTINUED | OUTPATIENT
Start: 2025-04-23 | End: 2025-04-28 | Stop reason: HOSPADM

## 2025-04-23 RX ORDER — IOPAMIDOL 755 MG/ML
75 INJECTION, SOLUTION INTRAVASCULAR
Status: COMPLETED | OUTPATIENT
Start: 2025-04-23 | End: 2025-04-23

## 2025-04-23 RX ORDER — CALCIUM CARBONATE 500 MG/1
500 TABLET, CHEWABLE ORAL 3 TIMES DAILY PRN
Status: DISCONTINUED | OUTPATIENT
Start: 2025-04-23 | End: 2025-04-28 | Stop reason: HOSPADM

## 2025-04-23 RX ORDER — HYDRALAZINE HYDROCHLORIDE 20 MG/ML
10 INJECTION INTRAMUSCULAR; INTRAVENOUS EVERY 6 HOURS PRN
Status: DISCONTINUED | OUTPATIENT
Start: 2025-04-23 | End: 2025-04-28 | Stop reason: HOSPADM

## 2025-04-23 RX ORDER — SODIUM CHLORIDE 9 MG/ML
INJECTION, SOLUTION INTRAVENOUS PRN
Status: DISCONTINUED | OUTPATIENT
Start: 2025-04-23 | End: 2025-04-27

## 2025-04-23 RX ORDER — PRAMIPEXOLE DIHYDROCHLORIDE 0.25 MG/1
0.75 TABLET ORAL 2 TIMES DAILY
Status: DISCONTINUED | OUTPATIENT
Start: 2025-04-23 | End: 2025-04-28 | Stop reason: HOSPADM

## 2025-04-23 RX ORDER — ONDANSETRON 4 MG/1
4 TABLET, ORALLY DISINTEGRATING ORAL EVERY 8 HOURS PRN
Status: DISCONTINUED | OUTPATIENT
Start: 2025-04-23 | End: 2025-04-28 | Stop reason: HOSPADM

## 2025-04-23 RX ORDER — ENOXAPARIN SODIUM 100 MG/ML
30 INJECTION SUBCUTANEOUS DAILY
Status: DISCONTINUED | OUTPATIENT
Start: 2025-04-24 | End: 2025-04-26 | Stop reason: SDUPTHER

## 2025-04-23 RX ORDER — FERROUS SULFATE 325(65) MG
325 TABLET ORAL
Status: DISCONTINUED | OUTPATIENT
Start: 2025-04-24 | End: 2025-04-28 | Stop reason: HOSPADM

## 2025-04-23 RX ORDER — GABAPENTIN 300 MG/1
300 CAPSULE ORAL 3 TIMES DAILY
COMMUNITY

## 2025-04-23 RX ORDER — VERAPAMIL HYDROCHLORIDE 120 MG/1
120 TABLET, FILM COATED, EXTENDED RELEASE ORAL NIGHTLY
Status: DISCONTINUED | OUTPATIENT
Start: 2025-04-23 | End: 2025-04-28 | Stop reason: HOSPADM

## 2025-04-23 RX ORDER — POLYETHYLENE GLYCOL 3350 17 G/17G
17 POWDER, FOR SOLUTION ORAL DAILY PRN
Status: DISCONTINUED | OUTPATIENT
Start: 2025-04-23 | End: 2025-04-26

## 2025-04-23 RX ORDER — SENNA AND DOCUSATE SODIUM 50; 8.6 MG/1; MG/1
1 TABLET, FILM COATED ORAL DAILY
Status: DISCONTINUED | OUTPATIENT
Start: 2025-04-24 | End: 2025-04-26

## 2025-04-23 RX ORDER — GABAPENTIN 300 MG/1
300 CAPSULE ORAL 3 TIMES DAILY
Status: DISCONTINUED | OUTPATIENT
Start: 2025-04-23 | End: 2025-04-28 | Stop reason: HOSPADM

## 2025-04-23 RX ORDER — ASPIRIN 81 MG/1
81 TABLET, CHEWABLE ORAL DAILY
Status: DISCONTINUED | OUTPATIENT
Start: 2025-04-24 | End: 2025-04-28 | Stop reason: HOSPADM

## 2025-04-23 RX ORDER — 0.9 % SODIUM CHLORIDE 0.9 %
1000 INTRAVENOUS SOLUTION INTRAVENOUS ONCE
Status: COMPLETED | OUTPATIENT
Start: 2025-04-23 | End: 2025-04-23

## 2025-04-23 RX ORDER — SODIUM CHLORIDE 0.9 % (FLUSH) 0.9 %
5-40 SYRINGE (ML) INJECTION PRN
Status: DISCONTINUED | OUTPATIENT
Start: 2025-04-23 | End: 2025-04-28 | Stop reason: HOSPADM

## 2025-04-23 RX ORDER — TRAZODONE HYDROCHLORIDE 50 MG/1
100 TABLET ORAL NIGHTLY
Status: DISCONTINUED | OUTPATIENT
Start: 2025-04-23 | End: 2025-04-28 | Stop reason: HOSPADM

## 2025-04-23 RX ORDER — ACETAMINOPHEN 650 MG/1
650 SUPPOSITORY RECTAL EVERY 6 HOURS PRN
Status: DISCONTINUED | OUTPATIENT
Start: 2025-04-23 | End: 2025-04-28 | Stop reason: HOSPADM

## 2025-04-23 RX ORDER — BUPROPION HYDROCHLORIDE 100 MG/1
100 TABLET, EXTENDED RELEASE ORAL DAILY
Status: DISCONTINUED | OUTPATIENT
Start: 2025-04-24 | End: 2025-04-28 | Stop reason: HOSPADM

## 2025-04-23 RX ORDER — ATORVASTATIN CALCIUM 80 MG/1
80 TABLET, FILM COATED ORAL DAILY
Status: DISCONTINUED | OUTPATIENT
Start: 2025-04-23 | End: 2025-04-28 | Stop reason: HOSPADM

## 2025-04-23 RX ORDER — CLOPIDOGREL BISULFATE 75 MG/1
75 TABLET ORAL DAILY
Status: DISCONTINUED | OUTPATIENT
Start: 2025-04-24 | End: 2025-04-28 | Stop reason: HOSPADM

## 2025-04-23 RX ORDER — ALBUTEROL SULFATE 0.83 MG/ML
2.5 SOLUTION RESPIRATORY (INHALATION) EVERY 6 HOURS PRN
Status: DISCONTINUED | OUTPATIENT
Start: 2025-04-23 | End: 2025-04-28 | Stop reason: HOSPADM

## 2025-04-23 RX ADMIN — SODIUM CHLORIDE 1500 MG: 0.9 INJECTION, SOLUTION INTRAVENOUS at 17:10

## 2025-04-23 RX ADMIN — SODIUM CHLORIDE 1000 ML: 9 INJECTION, SOLUTION INTRAVENOUS at 14:06

## 2025-04-23 RX ADMIN — ARFORMOTEROL TARTRATE: 15 SOLUTION RESPIRATORY (INHALATION) at 20:45

## 2025-04-23 RX ADMIN — PIPERACILLIN AND TAZOBACTAM 4500 MG: 4; .5 INJECTION, POWDER, LYOPHILIZED, FOR SOLUTION INTRAVENOUS at 16:18

## 2025-04-23 RX ADMIN — SODIUM CHLORIDE: 0.9 INJECTION, SOLUTION INTRAVENOUS at 19:37

## 2025-04-23 RX ADMIN — CEFEPIME 1000 MG: 1 INJECTION, POWDER, FOR SOLUTION INTRAMUSCULAR; INTRAVENOUS at 19:32

## 2025-04-23 RX ADMIN — IOPAMIDOL 105 ML: 755 INJECTION, SOLUTION INTRAVENOUS at 13:46

## 2025-04-23 NOTE — PROGRESS NOTES
Pharmacy Consultation Note  (Antibiotic Dosing and Monitoring)    Initial consult date: 4/23/2025  Consulting physician/provider: Oumou Schaefer DO.   Drug: Vancomycin  Indication: Pneumonia (Nosocomial)    Age/  Gender Height Weight IBW  Allergy Information   79 y.o./female  165.1 cm 78.5 kg (173 lb 1 oz)     Ideal body weight: 57 kg (125 lb 10.6 oz)  Adjusted ideal body weight: 65.6 kg (144 lb 9.9 oz)   Benadryl [diphenhydramine], Other, Sulfa antibiotics, Ciprofloxacin, Oxycodone, and Tape [adhesive tape]      Renal Function:  Recent Labs     04/23/25  1353   BUN 36*   CREATININE 2.2*     No intake or output data in the 24 hours ending 04/23/25 1823    Vancomycin Monitoring:  Trough:  No results for input(s): \"VANCOTROUGH\" in the last 72 hours.  Random:  No results for input(s): \"VANCORANDOM\" in the last 72 hours.    Vancomycin Administration Times:  Recent vancomycin administrations                     vancomycin (VANCOCIN) 1,500 mg in sodium chloride 0.9 % 250 mL IVPB ([id number]) (mg) 1,500 mg New Bag 04/23/25 1710                    Assessment:  Patient is a 79 y.o. female who has been initiated on vancomycin  Estimated Creatinine Clearance: 22 mL/min (A) (based on SCr of 2.2 mg/dL (H)).  To dose vancomycin, pharmacy will be dosing based off of levels because of patient's renal impairment/insufficiency.   Vanco 1500mg x1 given today (4/23) at 17:10.     Plan:  Vanco random level tomorrow (4/24) at 16:00. Will re-dose based on level/renal function.       Kourtney Hoover, PharmD, BCIDP, BCPS 4/23/2025 7:02 PM  [phone]

## 2025-04-23 NOTE — H&P
Inpatient H&P      PCP:  Navneet Latif MD  Admitting Physician:  Oumou Schaefer DO  Consultants:  None at this time   Chief Complaint:    Chief Complaint   Patient presents with    Altered Mental Status     Altered right sided weakness and neglect per EMS- minimally responsive on arrival [id number]       History of Present Illness  Rebeka Renee is a 79 y.o. female who presents to [facility] ER complaining of AMS.    Rebeka Renee has a past medical history that includes  hypertension, hyperlipidemia, diabetes, hypothyroidism, AAA, lung/kidney/breast cancer status post nephrectomy and lobectomy, CHF, CVA, fibromyalgia, anxiety and depression     Rebeka presents to the ER for AMS.  Acute onset shortly prior to arrival.  She was apparently normal at her nursing facility at 9:30 AM.  Shortly after she was found to be more confused.  Patient was unable to provide history in the ER due to altered mental status.  She was hypotensive in the ER. Stroke alert was called which showed no LVO.  She was found to have pneumonia on chest CT. Will be admitted for AMS along with VANESA and UTI. She is currently awake, but not following commands. She is altered  Discussed patient's case with ED physician.    ER Course  Upon presentation to the ER, routine labwork was performed which revealed BUN 36, creatinine 2.2, glucose 72, troponin 49, ALT 51, AST 60, hemoglobin 8.6.  Imaging results are as outlined below in the Imaging section of this note.  Upon arrival to the ER, patient was 90/62 I personally reviewed previous echocardiogram results from 3/31/2025I personally reviewed previous echocardiogram results from 3/31/399387/62.  The patient received vanc, zosyn, IVF in the emergency room and was admitted to [facility].    Last Hospital Admission -I personally reviewed previous admission from 4/14/2025  Acute kidney injury [N17.9]  UTI (urinary tract infection), bacterial [N39.0, A49.9]  Altered mental status,

## 2025-04-23 NOTE — ED PROVIDER NOTES
Additional Contrast? None   Final Result   1. Resolution of right renal pelvicaliectasis.   2. Urinary bladder is distended and contains contrast material. Ill-defined   elongated filling defects are noted in the contrast column of the bladder   which could represent urinary sediment or thrombus. Please correlate with   urinalysis.   3. Again noted is moderate gaseous distension of the colon, liquid stool is   noted in the rectosigmoid colon which is significantly dilated, somewhat   improved compared to 04/15/2025.   4. Postop changes of a gastric bypass procedure.   5. Trace pleural effusions, similar to the prior study.   6. Trace pericardial effusion.         CT CHEST WO CONTRAST   Final Result   1. Patchy right middle and right lower lobe opacities worrisome for pneumonia.   2. Right lower lobe atelectasis.   3. Cardiomegaly.   4. Dilated ascending aorta up to 4.4 cm.         XR CHEST PORTABLE   Final Result   No acute process.         CT HEAD WO CONTRAST   Final Result   Addendum (preliminary) 1 of 1   ADDENDUM:   Please note: Study results were communicated to Dr. Smith at 2:10 p.m. on   04/23/2025.         Final   1. Stable noncontrast CT scan of the brain with compared to the prior study   of 03/28/2025.  There is no evidence of an acute or subacute infarct or intra   or extra-axial hemorrhage.            CTA HEAD W CONTRAST   Final Result   1. CTA of the neck shows no change in mild calcification of the origins of   both internal carotid arteries without stenosis.   2. Normal appearance of the vertebral arteries.   3.  CTA of the head shows no stenosis, occlusion, or aneurysm of the proximal   intracranial arteries.   4. Normal CT perfusion.   5. New mild right greater than left upper lobe tree-in-bud infiltrate   consistent with mild bilateral upper lobe pneumonia.   6. Continued prominent gaseous distension of the esophagus without esophageal   wall thickening, suggesting presbyesophagus.         CTA NECK  procedures.     Independent interpretation of tests:  CXR - no focal consolidation, pneumothorax, or pleural effusion     The patient presents with a new acute problem or complaint.      Differential Diagnoses:  CVA, ICH, metabolic encephalopathy, among others        Counseling:   The emergency provider has spoken with the patient and discussed today’s results, in addition to providing specific details for the plan of care and counseling regarding the diagnosis and prognosis.  Questions are answered at this time and they are agreeable with the plan.    ED Course/Medical Decision Makin y.o. female here with altered mental status.  On arrival patient is somnolent, intermittently follows commands.  Non lateralizing neuro exam. Stroke imaging negative for ICH or LVO.  Similar presentations in the past 2/2 delirium from infection.  Workup notable for pneumonia and UTI.  Treated w/ broad spectrum IV abx and admitted to internal medicine for further management.       --------------------------------- IMPRESSION AND DISPOSITION ---------------------------------    IMPRESSION  1. Sepsis with encephalopathy without septic shock, due to unspecified organism (HCC)    2. Pneumonia of both upper lobes due to infectious organism    3. Acute respiratory failure with hypoxia (HCC)    4. Sepsis secondary to UTI (HCC)        DISPOSITION  Disposition: Admit to telemetry  Patient condition is stable        NOTE: This report was transcribed using voice recognition software. Every effort was made to ensure accuracy; however, inadvertent computerized transcription errors may be present    Garrett Smith MD  Attending Emergency Physician        Garrett Smith MD  [phone]

## 2025-04-23 NOTE — ED NOTES
Stroke/ Gayathri Alert Time:    1315  Time Neurologist called:  :  1315  Time CT Notified:     1315  BRAIN alert time: (If applicable)

## 2025-04-24 PROBLEM — E16.2 HYPOGLYCEMIA: Status: ACTIVE | Noted: 2025-04-24

## 2025-04-24 LAB
AMMONIA PLAS-SCNC: 16 UMOL/L (ref 11–51)
ANION GAP SERPL CALCULATED.3IONS-SCNC: 11 MMOL/L (ref 7–16)
B PARAP IS1001 DNA NPH QL NAA+NON-PROBE: NOT DETECTED
B PERT DNA SPEC QL NAA+PROBE: NOT DETECTED
B.E.: 0.6 MMOL/L (ref -3–3)
BASOPHILS # BLD: 0.03 K/UL (ref 0–0.2)
BASOPHILS NFR BLD: 0 % (ref 0–2)
BUN SERPL-MCNC: 27 MG/DL (ref 8–23)
C PNEUM DNA NPH QL NAA+NON-PROBE: NOT DETECTED
CALCIUM SERPL-MCNC: 8.9 MG/DL (ref 8.8–10.2)
CHLORIDE SERPL-SCNC: 109 MMOL/L (ref 98–107)
CO2 SERPL-SCNC: 24 MMOL/L (ref 22–29)
COHB: 0.9 % (ref 0–1.5)
CREAT SERPL-MCNC: 1.7 MG/DL (ref 0.5–1)
CRITICAL: ABNORMAL
DATE ANALYZED: ABNORMAL
DATE LAST DOSE: NORMAL
DATE OF COLLECTION: ABNORMAL
EKG ATRIAL RATE: 69 BPM
EKG P AXIS: 38 DEGREES
EKG P-R INTERVAL: 152 MS
EKG Q-T INTERVAL: 434 MS
EKG QRS DURATION: 100 MS
EKG QTC CALCULATION (BAZETT): 465 MS
EKG R AXIS: -33 DEGREES
EKG T AXIS: 68 DEGREES
EKG VENTRICULAR RATE: 69 BPM
EOSINOPHIL # BLD: 0.44 K/UL (ref 0.05–0.5)
EOSINOPHILS RELATIVE PERCENT: 6 % (ref 0–6)
ERYTHROCYTE [DISTWIDTH] IN BLOOD BY AUTOMATED COUNT: 15.7 % (ref 11.5–15)
FLUAV RNA NPH QL NAA+NON-PROBE: NOT DETECTED
FLUBV RNA NPH QL NAA+NON-PROBE: NOT DETECTED
GFR, ESTIMATED: 32 ML/MIN/1.73M2
GLUCOSE BLD-MCNC: 101 MG/DL (ref 74–99)
GLUCOSE BLD-MCNC: 102 MG/DL (ref 74–99)
GLUCOSE BLD-MCNC: 106 MG/DL (ref 74–99)
GLUCOSE BLD-MCNC: 309 MG/DL (ref 74–99)
GLUCOSE BLD-MCNC: 63 MG/DL (ref 74–99)
GLUCOSE BLD-MCNC: 69 MG/DL (ref 74–99)
GLUCOSE BLD-MCNC: 80 MG/DL (ref 74–99)
GLUCOSE BLD-MCNC: 99 MG/DL (ref 74–99)
GLUCOSE BLD-MCNC: <40 MG/DL (ref 74–99)
GLUCOSE SERPL-MCNC: 104 MG/DL (ref 74–99)
HADV DNA NPH QL NAA+NON-PROBE: NOT DETECTED
HCO3: 23.8 MMOL/L (ref 22–26)
HCOV 229E RNA NPH QL NAA+NON-PROBE: NOT DETECTED
HCOV HKU1 RNA NPH QL NAA+NON-PROBE: NOT DETECTED
HCOV NL63 RNA NPH QL NAA+NON-PROBE: NOT DETECTED
HCOV OC43 RNA NPH QL NAA+NON-PROBE: NOT DETECTED
HCT VFR BLD AUTO: 32.7 % (ref 34–48)
HGB BLD-MCNC: 10 G/DL (ref 11.5–15.5)
HHB: 3.6 % (ref 0–5)
HMPV RNA NPH QL NAA+NON-PROBE: NOT DETECTED
HPIV1 RNA NPH QL NAA+NON-PROBE: NOT DETECTED
HPIV2 RNA NPH QL NAA+NON-PROBE: NOT DETECTED
HPIV3 RNA NPH QL NAA+NON-PROBE: NOT DETECTED
HPIV4 RNA NPH QL NAA+NON-PROBE: NOT DETECTED
IMM GRANULOCYTES # BLD AUTO: 0.03 K/UL (ref 0–0.58)
IMM GRANULOCYTES NFR BLD: 0 % (ref 0–5)
LAB: ABNORMAL
LACTATE BLDV-SCNC: 1.4 MMOL/L (ref 0.5–2.2)
LYMPHOCYTES NFR BLD: 1.14 K/UL (ref 1.5–4)
LYMPHOCYTES RELATIVE PERCENT: 15 % (ref 20–42)
Lab: 141
M PNEUMO DNA NPH QL NAA+NON-PROBE: NOT DETECTED
MCH RBC QN AUTO: 28.4 PG (ref 26–35)
MCHC RBC AUTO-ENTMCNC: 30.6 G/DL (ref 32–34.5)
MCV RBC AUTO: 92.9 FL (ref 80–99.9)
METHB: 0.3 % (ref 0–1.5)
MODE: ABNORMAL
MONOCYTES NFR BLD: 0.54 K/UL (ref 0.1–0.95)
MONOCYTES NFR BLD: 7 % (ref 2–12)
NEUTROPHILS NFR BLD: 72 % (ref 43–80)
NEUTS SEG NFR BLD: 5.67 K/UL (ref 1.8–7.3)
O2 SATURATION: 96.4 % (ref 92–98.5)
O2HB: 95.2 % (ref 94–97)
OPERATOR ID: 3342
PATIENT TEMP: 37 C
PCO2: 32.7 MMHG (ref 35–45)
PH BLOOD GAS: 7.48 (ref 7.35–7.45)
PHOSPHATE SERPL-MCNC: 3.4 MG/DL (ref 2.5–4.5)
PLATELET # BLD AUTO: 154 K/UL (ref 130–450)
PMV BLD AUTO: 10.5 FL (ref 7–12)
PO2: 89.7 MMHG (ref 75–100)
POTASSIUM SERPL-SCNC: 3.9 MMOL/L (ref 3.5–5.1)
PROCALCITONIN SERPL-MCNC: 0.09 NG/ML (ref 0–0.08)
RBC # BLD AUTO: 3.52 M/UL (ref 3.5–5.5)
RSV RNA NPH QL NAA+NON-PROBE: NOT DETECTED
RV+EV RNA NPH QL NAA+NON-PROBE: NOT DETECTED
SARS-COV-2 RNA NPH QL NAA+NON-PROBE: NOT DETECTED
SODIUM SERPL-SCNC: 144 MMOL/L (ref 136–145)
SOURCE, BLOOD GAS: ABNORMAL
SPECIMEN DESCRIPTION: NORMAL
THB: 10.3 G/DL (ref 11.5–16.5)
TIME ANALYZED: 144
TME LAST DOSE: NORMAL H
VANCOMYCIN DOSE: NORMAL MG
VANCOMYCIN SERPL-MCNC: 12 UG/ML (ref 5–40)
WBC OTHER # BLD: 7.9 K/UL (ref 4.5–11.5)

## 2025-04-24 PROCEDURE — 84100 ASSAY OF PHOSPHORUS: CPT

## 2025-04-24 PROCEDURE — 2500000003 HC RX 250 WO HCPCS: Performed by: INTERNAL MEDICINE

## 2025-04-24 PROCEDURE — 36415 COLL VENOUS BLD VENIPUNCTURE: CPT

## 2025-04-24 PROCEDURE — 85025 COMPLETE CBC W/AUTO DIFF WBC: CPT

## 2025-04-24 PROCEDURE — 94640 AIRWAY INHALATION TREATMENT: CPT

## 2025-04-24 PROCEDURE — 97530 THERAPEUTIC ACTIVITIES: CPT

## 2025-04-24 PROCEDURE — 82140 ASSAY OF AMMONIA: CPT

## 2025-04-24 PROCEDURE — 6360000002 HC RX W HCPCS: Performed by: INTERNAL MEDICINE

## 2025-04-24 PROCEDURE — 82962 GLUCOSE BLOOD TEST: CPT

## 2025-04-24 PROCEDURE — 96361 HYDRATE IV INFUSION ADD-ON: CPT

## 2025-04-24 PROCEDURE — 97166 OT EVAL MOD COMPLEX 45 MIN: CPT

## 2025-04-24 PROCEDURE — 2140000000 HC CCU INTERMEDIATE R&B

## 2025-04-24 PROCEDURE — 0202U NFCT DS 22 TRGT SARS-COV-2: CPT

## 2025-04-24 PROCEDURE — 96366 THER/PROPH/DIAG IV INF ADDON: CPT

## 2025-04-24 PROCEDURE — 2580000003 HC RX 258

## 2025-04-24 PROCEDURE — 2700000000 HC OXYGEN THERAPY PER DAY

## 2025-04-24 PROCEDURE — 97161 PT EVAL LOW COMPLEX 20 MIN: CPT

## 2025-04-24 PROCEDURE — 82805 BLOOD GASES W/O2 SATURATION: CPT

## 2025-04-24 PROCEDURE — 80202 ASSAY OF VANCOMYCIN: CPT

## 2025-04-24 PROCEDURE — 6370000000 HC RX 637 (ALT 250 FOR IP): Performed by: INTERNAL MEDICINE

## 2025-04-24 PROCEDURE — 2500000003 HC RX 250 WO HCPCS

## 2025-04-24 PROCEDURE — 93010 ELECTROCARDIOGRAM REPORT: CPT | Performed by: INTERNAL MEDICINE

## 2025-04-24 PROCEDURE — 2580000003 HC RX 258: Performed by: INTERNAL MEDICINE

## 2025-04-24 PROCEDURE — 93005 ELECTROCARDIOGRAM TRACING: CPT | Performed by: EMERGENCY MEDICINE

## 2025-04-24 PROCEDURE — 83605 ASSAY OF LACTIC ACID: CPT

## 2025-04-24 PROCEDURE — 84145 PROCALCITONIN (PCT): CPT

## 2025-04-24 PROCEDURE — 80048 BASIC METABOLIC PNL TOTAL CA: CPT

## 2025-04-24 PROCEDURE — 97535 SELF CARE MNGMENT TRAINING: CPT

## 2025-04-24 PROCEDURE — 99232 SBSQ HOSP IP/OBS MODERATE 35: CPT | Performed by: STUDENT IN AN ORGANIZED HEALTH CARE EDUCATION/TRAINING PROGRAM

## 2025-04-24 PROCEDURE — 2580000003 HC RX 258: Performed by: STUDENT IN AN ORGANIZED HEALTH CARE EDUCATION/TRAINING PROGRAM

## 2025-04-24 RX ORDER — DEXTROSE MONOHYDRATE 25 G/50ML
25 INJECTION, SOLUTION INTRAVENOUS ONCE
Status: COMPLETED | OUTPATIENT
Start: 2025-04-24 | End: 2025-04-24

## 2025-04-24 RX ORDER — DEXTROSE MONOHYDRATE AND SODIUM CHLORIDE 5; .9 G/100ML; G/100ML
INJECTION, SOLUTION INTRAVENOUS CONTINUOUS
Status: DISCONTINUED | OUTPATIENT
Start: 2025-04-24 | End: 2025-04-27

## 2025-04-24 RX ORDER — GLUCAGON 1 MG/ML
1 KIT INJECTION PRN
Status: DISCONTINUED | OUTPATIENT
Start: 2025-04-24 | End: 2025-04-28 | Stop reason: HOSPADM

## 2025-04-24 RX ORDER — DEXTROSE MONOHYDRATE 25 G/50ML
INJECTION, SOLUTION INTRAVENOUS
Status: COMPLETED
Start: 2025-04-24 | End: 2025-04-24

## 2025-04-24 RX ORDER — DEXTROSE MONOHYDRATE 100 MG/ML
INJECTION, SOLUTION INTRAVENOUS CONTINUOUS PRN
Status: DISCONTINUED | OUTPATIENT
Start: 2025-04-24 | End: 2025-04-28 | Stop reason: HOSPADM

## 2025-04-24 RX ADMIN — SODIUM CHLORIDE, PRESERVATIVE FREE 10 ML: 5 INJECTION INTRAVENOUS at 21:11

## 2025-04-24 RX ADMIN — OXYBUTYNIN CHLORIDE 10 MG: 10 TABLET, EXTENDED RELEASE ORAL at 21:11

## 2025-04-24 RX ADMIN — DEXTROSE AND SODIUM CHLORIDE: 5; .9 INJECTION, SOLUTION INTRAVENOUS at 09:38

## 2025-04-24 RX ADMIN — GABAPENTIN 300 MG: 300 CAPSULE ORAL at 21:11

## 2025-04-24 RX ADMIN — DEXTROSE MONOHYDRATE: 25 INJECTION, SOLUTION INTRAVENOUS at 01:35

## 2025-04-24 RX ADMIN — GABAPENTIN 300 MG: 300 CAPSULE ORAL at 09:39

## 2025-04-24 RX ADMIN — DEXTROSE AND SODIUM CHLORIDE: 5; .9 INJECTION, SOLUTION INTRAVENOUS at 15:08

## 2025-04-24 RX ADMIN — PRAMIPEXOLE DIHYDROCHLORIDE 0.75 MG: 0.25 TABLET ORAL at 21:11

## 2025-04-24 RX ADMIN — ASPIRIN 81 MG CHEWABLE TABLET 81 MG: 81 TABLET CHEWABLE at 09:39

## 2025-04-24 RX ADMIN — OXYBUTYNIN CHLORIDE 10 MG: 10 TABLET, EXTENDED RELEASE ORAL at 09:43

## 2025-04-24 RX ADMIN — DEXTROSE MONOHYDRATE 125 ML: 10 INJECTION, SOLUTION INTRAVENOUS at 07:09

## 2025-04-24 RX ADMIN — POTASSIUM CHLORIDE 40 MEQ: 1500 TABLET, EXTENDED RELEASE ORAL at 09:39

## 2025-04-24 RX ADMIN — FERROUS SULFATE TAB 325 MG (65 MG ELEMENTAL FE) 325 MG: 325 (65 FE) TAB at 09:39

## 2025-04-24 RX ADMIN — SENNOSIDES AND DOCUSATE SODIUM 1 TABLET: 50; 8.6 TABLET ORAL at 09:39

## 2025-04-24 RX ADMIN — CEFEPIME 1000 MG: 1 INJECTION, POWDER, FOR SOLUTION INTRAMUSCULAR; INTRAVENOUS at 21:30

## 2025-04-24 RX ADMIN — LEVOTHYROXINE SODIUM 50 MCG: 0.05 TABLET ORAL at 06:48

## 2025-04-24 RX ADMIN — ARFORMOTEROL TARTRATE: 15 SOLUTION RESPIRATORY (INHALATION) at 08:03

## 2025-04-24 RX ADMIN — DEXTROSE MONOHYDRATE 25 G: 25 INJECTION, SOLUTION INTRAVENOUS at 01:35

## 2025-04-24 RX ADMIN — VANCOMYCIN HYDROCHLORIDE 1250 MG: 1.25 INJECTION, POWDER, LYOPHILIZED, FOR SOLUTION INTRAVENOUS at 23:08

## 2025-04-24 RX ADMIN — CEFEPIME 1000 MG: 1 INJECTION, POWDER, FOR SOLUTION INTRAMUSCULAR; INTRAVENOUS at 06:56

## 2025-04-24 RX ADMIN — CLOPIDOGREL BISULFATE 75 MG: 75 TABLET, FILM COATED ORAL at 09:39

## 2025-04-24 RX ADMIN — ENOXAPARIN SODIUM 30 MG: 100 INJECTION SUBCUTANEOUS at 09:39

## 2025-04-24 RX ADMIN — GABAPENTIN 300 MG: 300 CAPSULE ORAL at 15:09

## 2025-04-24 RX ADMIN — PRAMIPEXOLE DIHYDROCHLORIDE 0.75 MG: 0.25 TABLET ORAL at 09:43

## 2025-04-24 RX ADMIN — BUPROPION HYDROCHLORIDE 100 MG: 100 TABLET, FILM COATED, EXTENDED RELEASE ORAL at 09:44

## 2025-04-24 RX ADMIN — VENLAFAXINE HYDROCHLORIDE 150 MG: 150 CAPSULE, EXTENDED RELEASE ORAL at 09:43

## 2025-04-24 RX ADMIN — ARFORMOTEROL TARTRATE: 15 SOLUTION RESPIRATORY (INHALATION) at 19:24

## 2025-04-24 ASSESSMENT — PAIN SCALES - GENERAL
PAINLEVEL_OUTOF10: 0
PAINLEVEL_OUTOF10: 0

## 2025-04-24 NOTE — PROGRESS NOTES
REPAIR      HIP SURGERY Right 1/4/2024    RIGHT HIP HEMIARTHROPLASTY performed by Nemesio Handy [place] [facility] [place]    HIP SURGERY Right 4/2/2024    RIGHT HIP CLOSED REDUCTION INTERNAL FIXATION performed by Matthew Peña DO at [facility] [place]    HIP SURGERY Right 4/11/2024    RIGHT HIP OPEN REDUCTION FEMORAL HEAD EXCHANGE WITH IRRIGATION AND DEBRIDEMENT performed by Nemesio Handy MD at [facility] [place]    HIP SURGERY Right 10/3/2024    Right femur/hip irrigation and debridement performed by Nemesio Handy MD at [facility] [place]    HIP SURGERY Right 10/7/2024    Right Hip Irrigation and Debridement with Polyethylene Liner Exchange and Femoral Head Exchange performed by Nemesio Handy MD at [facility] [place]    HYSTERECTOMY (CERVIX STATUS UNKNOWN)      IR TUNNELED CVC PLACE WO SQ PORT/PUMP > 5 YEARS  10/8/2024    IR TUNNELED CATHETER PLACEMENT GREATER THAN 5 YEARS 10/8/2024 AdalPiter MD [facility] SPECIAL PROCEDURES    KIDNEY REMOVAL Left 2/2006    Cancer.    KIDNEY REMOVAL      left    KNEE SURGERY Right     arthroscopy    LIVER BIOPSY  2006    Fatty tumor.    LUNG REMOVAL, PARTIAL      For lung CA, thought to be metastatic from breast cancer. left upper lobe     MASTECTOMY Bilateral     NERVE BLOCK Bilateral 06/06/16    transforaminal nerve block, lumbar #1    OTHER SURGICAL HISTORY  03/16/15    stage 1 percutaneous trial lumbar spinal cord stimulator    OTHER SURGICAL HISTORY N/A 4/15/15    surgical implantation of medtronic spinal cord stimulator lumbar    OTHER SURGICAL HISTORY N/A 02/24/2021    surgical removal lumbar SCS    PAIN MANAGEMENT PROCEDURE N/A 2/24/2021    SURGICAL REMOVAL OF LUMBAR MEDTRONIC SPINAL CORD STIMULATOR (STIMULATOR BATTERY LEFT HIP AREA) (CPT 83699) performed by Lizzy Herbert DO at [facility] OR    REVISION TOTAL HIP ARTHROPLASTY Right 2/12/2024    RIGHT HIP OPEN REDUCTION AND HEMATOMA EVACUATION performed by Wood George DO at [facility] [place]    REVISION TOTAL HIP  WFL      Coordination:  WFL     LUE: ROM WFL      Strength: grossly functional      Strength: WFL      Coordination: WFL      Safety   Poor +  Fair + during functional activity      Hearing: needs increased volume   Sensation: no c/o numbness or tingling   Tone: WFL   Edema: unremarkable     Comments:   RN cleared patient for OT.  Upon arrival, patient was semi-supine in bed  and agreeable to OT session. no visitors present throughout session. At end of session, patient supported long sitting in bed with alarm activated; with call light and phone within reach; all lines and tubes intact.   Patient presents with decreased safety awareness, activity tolerance , balance , coordination, strength , sensation, and cognition. Pt demonstrated decreased independence during ADLs, bed mobility , functional transfers, and functional mobility. Pt would benefit from continued skilled OT to increase safety and independence with completion of ADL tasks and functional mobility for improved quality of life and return to PLOF.       Treatment: OT treatment provided this date includes:  OT edu pt/family on role of OT in the acute care setting. Pt  verbalized understanding   Therapist facilitated and instructed pt on adapted techniques & compensatory strategies to improve safety and independence with ADLs, bed mobility , functional transfers, and functional mobility as noted above to allow pt to achieve highest level of independence and safely. Pt provided  mod cuing , verbal instructions , extended time , and encouragement  in order to promote maximum level of independence.   Pt edu on use of call light and waiting until staff present to attempt any functional mobility. Pt verbalized understanding and demonstrated ability to locate and press call button.     Rehab Potential: Good  for established goals     Patient / Family Goal: to get better     Patient and/or family were instructed on functional diagnosis, prognosis/goals and OT

## 2025-04-24 NOTE — PROGRESS NOTES
Pharmacy Consultation Note  (Antibiotic Dosing and Monitoring)    Initial consult date: 4/23/2025  Consulting physician/provider: Oumou Schaefer DO.   Drug: Vancomycin  Indication: Pneumonia (Nosocomial)    Age/  Gender Height Weight IBW  Allergy Information   79 y.o./female 165.1 cm (5' 5\")165.1 cm 78.5 kg (173 lb 1 oz)     Ideal body weight: 57 kg (125 lb 10.6 oz)  Adjusted ideal body weight: 62.2 kg (137 lb 0.6 oz)   Benadryl [diphenhydramine], Other, Sulfa antibiotics, Ciprofloxacin, Oxycodone, and Tape [adhesive tape]      Renal Function:  Recent Labs     04/23/25  1353 04/24/25  0300   BUN 36* 27*   CREATININE 2.2* 1.7*       Intake/Output Summary (Last 24 hours) at 4/24/2025 0821  Last data filed at 4/24/2025 0600  Gross per 24 hour   Intake 1023.58 ml   Output --   Net 1023.58 ml       Vancomycin Monitoring:  Trough:  No results for input(s): \"VANCOTROUGH\" in the last 72 hours.  Random:  No results for input(s): \"VANCORANDOM\" in the last 72 hours.    Vancomycin Administration Times:  Recent vancomycin administrations                     vancomycin (VANCOCIN) 1,500 mg in sodium chloride 0.9 % 250 mL IVPB ([id number]) (mg) 1,500 mg New Bag 04/23/25 1710                        Assessment:  Patient is a 79 y.o. female who has been initiated on vancomycin  Estimated Creatinine Clearance: 27 mL/min (A) (based on SCr of 1.7 mg/dL (H)).  To dose vancomycin, pharmacy will be dosing based off of levels because of patient's renal impairment/insufficiency.   Vancomycin random level 12 mcg/mL ~ 22 hours after the last dose, scr 1.7    Plan:  Vancomycin 1250 mg IV x 1 dose  Random level 4/25 AM      Amanda Bryan, PharmD  PGY1 Pharmacy Practice Resident [phone]  4/24/2025 8:21 AM

## 2025-04-24 NOTE — SIGNIFICANT EVENT
Rapid Response Team Note  Date of event: 4/24/2025   Time of event: 0127 am  Rebeka Renee 79 y.o. year old female   YOB: 1945   Admit date:  4/23/2025   [facility]   Witnessed? : [x]Yes  [] No  Monitored? : [x]Yes  [] No  Code status: [x] Full  [] DNR-CCA  []DNR-CC  ______________________________________________________________________  Reason for RRT:    [] RR < 8     [] RR > 28   [] SpO2 <90%   [] HR < 40 bpm   [] HR > 130 bpm  [] SBP < 90 mmHg    [] SpO2 <90%   [] LOC   [] Seizures    [] Significant Bleeding Event    [x] Other: Hypoglycemia    Subjective:    RRT was called due to unresponsive, altered mentation, patient was just brought to the floor from ED. BG at that moment was <40, patient's extremity was cold, she responded to voice, and pain stimuli, and AAOx1, was following commands.     Objective:   Vital signs: Temp: 98 F, BP: 116/68, RR: 18, HR: 67  Initial Condition:  Conscious   [] Yes  [x] No     Breathing [x] Yes  [] No     Pulse  [x] Yes  [] No    Airway:   [x] Open/ Clear     Intervention: [x] None  [] Pooled secretions     [] Suctioned  [] Stridor      [] Intubation    Lungs:   [x] Symmetrical chest rise/ CTABL Intervention: [x] None  [] Use of accessory muscles    [] NIV (CPAP/BiPAP)  [] Cyanosis      [] Nasal Oxygen/Mask  [] Wheezing       [] ABG             [] CXR      Circulation:   Rhythm:  [x] Sinus [] Other:    Intervention: [] None            [] IV Access  [] Peripheral              [] Central            [x] EKG            [] Cardioversion            [] Defibrillation     Capillary Refill:  [] > 2 seconds [] < 2 seconds    Neurologic:   [] NIHSS      [] Pupillary Response:  PERRL   Response to pain:   [x] Yes  [] No  Follow commands:  [x] Yes  [] No  Facial asymmetry:  [x] Yes  [] No  Motor strength:  [x] Equal  [] Focal deficit  Diagnostic Test:  Blood Sugar:  <40 mg/dL  EKG:    Sinus rhythm  ABG:      Lab Results   Component Value Date/Time    PH 7.479

## 2025-04-24 NOTE — ACP (ADVANCE CARE PLANNING)
Advance Care Planning   Healthcare Decision Maker:    Primary Decision Maker: Nate Renee - [facility] - [phone]    Click here to complete Healthcare Decision Makers including selection of the Healthcare Decision Maker Relationship (ie \"Primary\").

## 2025-04-24 NOTE — PROGRESS NOTES
SPEECH LANGUAGE PATHOLOGY  DAILY PROGRESS NOTE        PATIENT NAME:  Rebeka Renee      ROOM:  8204/8204-A :  1945         TODAY'S DATE:  2025      Consult received for presbyesophagus.   An esophagram is recommended .  Nursing notified.    Will discontinue order.   Please reconsult speech pathology as needed.

## 2025-04-24 NOTE — PROGRESS NOTES
[facility] Hospitalist Progress Note    SYNOPSIS: Patient admitted on 2025 for VANESA (acute kidney injury)  79 y.o. female who presents to [facility] ER complaining of AMS.     Rebeka Renee has a past medical history that includes  hypertension, hyperlipidemia, diabetes, hypothyroidism, AAA, lung/kidney/breast cancer status post nephrectomy and lobectomy, CHF, CVA, fibromyalgia, anxiety and depression      Rebeka presents to the ER for AMS.  Acute onset shortly prior to arrival.  She was apparently normal at her nursing facility at 9:30 AM.  Shortly after she was found to be more confused.  Patient was unable to provide history in the ER due to altered mental status.  She was hypotensive in the ER. Stroke alert was called which showed no LVO.  She was found to have pneumonia on chest CT. Will be admitted for AMS along with VANESA and UTI. She is currently awake, but not following commands. She is altered; on my evaluation she is confused and is telling things which does not make sense    SUBJECTIVE:  Had rrt overnight ams 2/2 hypoglycemia ; improved with dextrose bolus overnight. No other overnight issues reported.   Patient seen and examined- blood sugar low in am started D5/NS; advance diet if tolerated      Records reviewed.           Temp (24hrs), Av.3 °F (36.3 °C), Min:96.7 °F (35.9 °C), Max:98.2 °F (36.8 °C)    DIET: ADULT DIET; Dysphagia - Soft and Bite Sized  CODE: Full Code    Intake/Output Summary (Last 24 hours) at 2025 1708  Last data filed at 2025 1029  Gross per 24 hour   Intake 1023.58 ml   Output 900 ml   Net 123.58 ml       Review of Systems  Could not be obtained      OBJECTIVE:    /64   Pulse 73   Temp 96.9 °F (36.1 °C) (Temporal)   Resp 18   Ht 1.651 m (5' 5\")   Wt 69.9 kg (154 lb 1.6 oz)   SpO2 98%   BMI 25.64 kg/m²     General appearance:  awake, alert,confused  HEENT:  Conjunctivae/corneas clear.   Neck: Supple. No jugular venous distention.   Respiratory:  dextrose      dextrose 5 % and 0.9 % NaCl 100 mL/hr at 04/24/25 1508    sodium chloride      sodium chloride Stopped (04/24/25 0932)     Scheduled Medications    sodium chloride flush  5-40 mL IntraVENous 2 times per day    enoxaparin  30 mg SubCUTAneous Daily    cefepime  1,000 mg IntraVENous Q12H    aspirin  81 mg Oral Daily    [Held by provider] atorvastatin  80 mg Oral Daily    arformoterol 15 mcg-budesonide 0.5 mg neb solution   Nebulization BID RT    [Held by provider] bumetanide  2 mg Oral BID    buPROPion  100 mg Oral Daily    clopidogrel  75 mg Oral Daily    ferrous sulfate  325 mg Oral Daily with breakfast    gabapentin  300 mg Oral TID    levothyroxine  50 mcg Oral Daily    oxyBUTYnin  10 mg Oral BID    potassium chloride  40 mEq Oral Daily    pramipexole  0.75 mg Oral BID    sennosides-docusate sodium  1 tablet Oral Daily    venlafaxine  150 mg Oral Daily with breakfast    [Held by provider] verapamil  120 mg Oral Nightly    [Held by provider] traZODone  100 mg Oral Nightly    vancomycin (VANCOCIN) intermittent dosing (placeholder)   Other RX Placeholder     PRN Meds: glucose, dextrose bolus **OR** dextrose bolus, glucagon (rDNA), dextrose, benzocaine-menthol, benzonatate, calcium carbonate, hydrALAZINE, melatonin, Polyvinyl Alcohol-Povidone PF, sodium chloride, sodium chloride flush, sodium chloride, ondansetron **OR** ondansetron, polyethylene glycol, acetaminophen **OR** acetaminophen, albuterol    Labs:     Recent Labs     04/23/25  1353 04/24/25  0300   WBC 7.2 7.9   HGB 8.6* 10.0*   HCT 28.7* 32.7*    154       Recent Labs     04/23/25  1353 04/24/25  0300    144   K 4.3 3.9    109*   CO2 29 24   BUN 36* 27*   CREATININE 2.2* 1.7*   CALCIUM 8.6* 8.9   PHOS  --  3.4       Recent Labs     04/23/25  1353   ALKPHOS 87   ALT 51*   AST 60*   BILITOT 0.4       Recent Labs     04/23/25  1353   INR 1.3       No results for input(s): \"CKTOTAL\", \"TROPONINI\" in the last 72 hours.    Chronic

## 2025-04-24 NOTE — ED NOTES
ED TO INPATIENT SBAR HANDOFF    Patient Name: Rebeka Renee   Preferred Name: Rebeka  : 1945  79 y.o.   Code Status Order: Full Code  Telemetry Order: Yes  C-SSRS:    Sitter no     Restraints:       Situation  Chief Complaint   Patient presents with    Altered Mental Status     Altered right sided weakness and neglect per EMS- minimally responsive on arrival LKQ 0930     Brief Description of Patient's Condition: altered mental status, NIH of 7, responds to voice. LKW  0930  Bruising over upper extremities. Not trying to get out of bed. Normal saline running. Large soft bowel movement earlier tonight. Unable to answer questions properly  Mental Status: disoriented    Background  Allergies:   Allergies   Allergen Reactions    Benadryl [Diphenhydramine] Hives     Tongue Swells    Other      benzodine  Tongue Swells    Sulfa Antibiotics Hives     Tongue Swells    Ciprofloxacin Hives    Oxycodone     Tape [Adhesive Tape] Itching       Assessment  Vitals/MEWS:    Level of Consciousness: Responds to voice (1)   Vitals:    25 2045 25 2046 25 2130 25 2300   BP:   122/60 112/62   Pulse: 67 75 54    Resp: 14 15 19    Temp:    98 °F (36.7 °C)   TempSrc:       SpO2:   90% 98%   Weight:         Cardiac Rhythm:    Deterioration Index (DI): Deterioration Index: (!) 49.02  Deterioration Index (DI) Interventions Performed:    O2 Flow Rate:    O2 Device: O2 Device: None (Room air)    Active Central Lines:                          Active Wounds:    Active Smith's:      Recommendation  Patient Belongings:    Additional Comments:   If any further questions, please call Sending RN at [phone]  Opportunity for questions and clarification were provided to (name of person notified and time):        Electronically signed by: Electronically signed by Madelin Glasgow RN on 2025 at 12:27 AM

## 2025-04-24 NOTE — PROGRESS NOTES
4 Eyes Skin Assessment     NAME:  Rebeka Renee  YOB: 1945  MEDICAL RECORD NUMBER:  [id number]    The patient is being assessed for  Admission    I agree that at least one RN has performed a thorough Head to Toe Skin Assessment on the patient. ALL assessment sites listed below have been assessed.      Areas assessed by both nurses:    Head, Face, Ears, Shoulders, Back, Chest, Arms, Elbows, Hands, Sacrum. Buttock, Coccyx, Ischium, Legs. Feet and Heels, and Under Medical Devices         Does the Patient have a Wound? Yes wound(s) were present on assessment. LDA wound assessment was Initiated and completed by RN       Norris Prevention initiated by RN: Yes  Wound Care Orders initiated by RN: No    Pressure Injury (Stage 3,4, Unstageable, DTI, NWPT, and Complex wounds) if present, place Wound referral order by RN under : No    New Ostomies, if present place, Ostomy referral order under : No     Nurse 1 eSignature: Electronically signed by Karen Daniel RN on 4/24/25 at 7:34 AM EDT    **SHARE this note so that the co-signing nurse can place an eSignature**    Nurse 2 eSignature: Electronically signed by RAQUEL WORRELL RN on 4/24/25 at 11:16 AM EDT

## 2025-04-24 NOTE — DISCHARGE INSTR - COC
Continuity of Care Form    Patient Name: Rebeka Renee   :  1945  MRN:  [id number]    Admit date:  2025  Discharge date:  2025    Code Status Order: Full Code   Advance Directives:     Admitting Physician:  Twila Arana MD  PCP: Navneet Latif MD    Discharging Nurse: Monique Addison RN    Discharging Hospital Unit/Room#: 8204/8204-A  Discharging Unit Phone Number: [phone]    Emergency Contact:   Extended Emergency Contact Information  Primary Emergency Contact: Nate Renee  Address: [address] [place] [address]  Home Phone: [phone]  Mobile Phone: [phone]  Relation: Spouse  Secondary Emergency Contact: Ariel Vyas   [facility]  Home Phone: [phone]  Mobile Phone: [phone]  Relation: Brother/Sister    Past Surgical History:  Past Surgical History:   Procedure Laterality Date    BREAST SURGERY      CATARACT REMOVAL WITH IMPLANT Bilateral     CHOLECYSTECTOMY      FEMUR SURGERY Right 2024    FEMUR OPEN REDUCTION INTERNAL FIXATION RIGHT PERIPROSTHETIC performed [place] Wood Goerge [place] [facility] [place]    FIBULA FRACTURE SURGERY Right 2024    RIGHT FEMUR OPEN REDUCTION INTERNAL FIXATION, OPEN TREATMENT OF RIGHT HIP DISLOCATION performed [place] Nemesio Handy [place] [facility] [place]    GASTRIC BYPASS SURGERY  2004    HERNIA REPAIR      HIP SURGERY Right 2024    RIGHT HIP HEMIARTHROPLASTY performed by Nem[place]io Handy [place] [facility]    HIP SURGERY Right 2024    RIGHT HIP CLOSED REDUCTION INTERNAL FIXATION performed by Matthew Peña DO [place] [facility]    HIP SURGERY Right 2024    RIGHT HIP OPEN REDUCTION FEMORAL HEAD EXCHANGE WITH IRRIGATION AND DEBRIDEMENT performed by Nemesio Handy [place] [facility] [place]    HIP SURGERY Right 10/3/2024    Right femur/hip irrigation and debridement performed [place] Nemesio Handy [place] [facility] [place]    HIP SURGERY Right 10/7/2024    Right Hip Irrigation  history    MDRO (multi-drug resistant organism)  25 Shavon Tidwell, RN      Klebsiella pneumoniae urine 3/28/2025    ESBL (Extended Spectrum Beta Lactamase) 24 Culture, Body Fluid      E coli synovial fluid 2024               Resolved       Infection Onset Added Last Indicated Last Indicated By Resolved Resolved By    COVID-19 24 COVID-19 Rapid 24 Infection                          Nurse Assessment:  Last Vital Signs: /65   Pulse 78   Temp 97.1 °F (36.2 °C) (Temporal)   Resp 18   Ht 1.651 m (5' 5\")   Wt 69.9 kg (154 lb 1.6 oz)   SpO2 99%   BMI 25.64 kg/m²     Last documented pain score (0-10 scale): Pain Level: 0  Last Weight:   Wt Readings from Last 1 Encounters:   25 69.9 kg (154 lb 1.6 oz)     Mental Status:  oriented, alert, and intermittent confusion    IV Access:  - None    Nursing Mobility/ADLs:  Walking   Dependent  Transfer  Dependent  Bathing  Dependent  Dressing  Dependent  Toileting  Dependent  Feeding  Independent  Med Admin  Assisted  Med Delivery   whole    Wound Care Documentation and Therapy:        Elimination:  Continence:   Bowel: No  Bladder: No  Urinary Catheter: None   Colostomy/Ileostomy/Ileal Conduit: No       Date of Last BM: 2025    Intake/Output Summary (Last 24 hours) at 2025 1423  Last data filed at 2025 1029  Gross per 24 hour   Intake 1023.58 ml   Output 900 ml   Net 123.58 ml     I/O last 3 completed shifts:  In: 1023.6 [P.O.:30; I.V.:608.5; IV Piggyback:385.1]  Out: -     Safety Concerns:     At Risk for Falls    Impairments/Disabilities:      Vision and Hearing    Nutrition Therapy:  Current Nutrition Therapy:   - Oral Diet:  General    Routes of Feeding: Oral  Liquids: Thin Liquids  Daily Fluid Restriction: no  Last Modified Barium Swallow with Video (Video Swallowing Test): not done    Treatments at the Time of Hospital Discharge:   Respiratory Treatments:

## 2025-04-24 NOTE — FLOWSHEET NOTE
04/24/25 1518   Swallow Screening   Is the patient unable to remain alert for testing? No   Is the patient on a modified diet (thickened liquids) due to pre-existing dysphagia? No   Is there presence of existing enteral tube feeding via the stomach or nose? No   Is there presence of head-of-bed restrictions (less than 30 degrees)? No   Is there presence of tracheotomy tube? No   Is the patient ordered nothing-by-mouth status? No   3 oz Water Swallow Screen Pass

## 2025-04-24 NOTE — PROGRESS NOTES
Iv placed for now.. Blood cultures pending. Once blood cultures finalize and antibiotics are determined then will place either midline or picc depending. RN notified of.

## 2025-04-24 NOTE — CARE COORDINATION
04/24/2025  Eval: Pt admitted  4/24/25 DX: VANESA. Pt was brought to ED after found to have stroke like symptoms at SNF.  Pt presents AOx3.  Pt is from [facility].  Per Nahomy @ [facility] this pt can return precert pending. Pt is on Vancomycin. PCP Dr. Hil[facility].  Pt will need ambulette for transportation-form in soft chart/envelope. WILMA done. Rhina Carrizales RN  [phone]    Case Management Assessment  Initial Evaluation    Date/Time of Evaluation: 4/24/2025 2:26 PM  Assessment Completed by: Rhina Carrizales RN    If patient is discharged prior to next notation, then this note serves as note for discharge by case management.    Patient Name: Rebeka Renee                   YOB: 1945  Diagnosis: VANESA (acute kidney injury) [N17.9]  Sepsis secondary to UTI (HCC) [A41.9, N39.0]  Acute respiratory failure with hypoxia (HCC) [J96.01]  Pneumonia of both upper lobes due to infectious organism [J18.9]  Sepsis with encephalopathy without septic shock, due to unspecified organism (HCC) [A41.9, R65.20, G93.41]  Hypoglycemia [E16.2]                   Date / Time: 4/23/2025  1:22 PM    Patient Admission Status: Inpatient   Readmission Risk (Low < 19, Mod (19-27), High > 27): Readmission Risk Score: 33.3    Current PCP: Navneet Latif MD  PCP verified by ?      Chart Reviewed: Yes      History Provided by:  medical record  Patient Orientation:    AOx3  Patient Cognition:  confusion    Hospitalization in the last 30 days (Readmission):  Yes    If yes, Readmission Assessment in  Navigator will be completed.    Advance Directives:      Code Status: Full Code   Patient's Primary Decision Maker is:      Primary Decision Maker: Nate Renee - Spouse - [phone]    Discharge Planning:    Patient lives with: Alone Type of Home: Skilled Nursing Facility  Primary Care Giver:    Patient Support Systems include: Spouse/Significant Other, Family Members   Current

## 2025-04-24 NOTE — PROGRESS NOTES
Physical Therapy  Physical Therapy Initial Assessment     Name: Rebeka Renee  : 1945  MRN: [id number]      Date of Service: 2025    Evaluating PT:  Patt Goodson, PT, DPT, [id number]    Room #:  8204/8204-A  Diagnosis:  VANESA (acute kidney injury) [N17.9]  Sepsis secondary to UTI (HCC) [A41.9, N39.0]  Acute respiratory failure with hypoxia (HCC) [J96.01]  Pneumonia of both upper lobes due to infectious organism [J18.9]  Sepsis with encephalopathy without septic shock, due to unspecified organism (HCC) [A41.9, R65.20, G93.41]  Hypoglycemia [E16.2]  PMHx/PSHx:    Past Medical History:   Diagnosis Date    Anemia     S/P chemotherapy.    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    Ascending aortic aneurysm     seen on CTA chest w/contrast on     Asthma     Bipolar 1 disorder (HCC)     Cancer (HCC)     lung/ kidney    Cancer of breast, female (HCC) 2006    S/P bilaeral mastectomy with left breast lymph node resection and chemotherapy.    CHF (congestive heart failure) (HCC)     Chronic back pain     Depression     Depression with anxiety     Diabetes mellitus (HCC)     Resolved after losing 130 lbs. after gastric bypass surgery.    Dyslipidemia     Fibromyalgia     History of blood transfusion     anemia    Hypertension     Hypothyroidism     Neuropathy     Pericardial effusion 2010    Subxiphoid pericardial window with drainage of pericardial effusion and pericardial biopsy:  Fluid and biopsy negative for metastases.     Pleural effusion 2010    Noted on chest x-ray.    TIA (transient ischemic attack)     Tobacco abuse     Patient smoked 3 ppd x 26 years.  Quit in .    Type II or unspecified type diabetes mellitus without mention of complication, not stated as uncontrolled       Past Surgical History:   Procedure Laterality Date    BREAST SURGERY      CATARACT REMOVAL WITH  surgical removal lumbar SCS    PAIN MANAGEMENT PROCEDURE N/A 2/24/2021    SURGICAL REMOVAL OF LUMBAR MEDTRONIC SPINAL CORD STIMULATOR (STIMULATOR BATTERY LEFT HIP AREA) (CPT 35476) performed by Lizzy Herbert DO at [facility]    REVISION TOTAL HIP ARTHROPLASTY Right 2/12/2024    RIGHT HIP OPEN REDUCTION AND HEMATOMA EVACUATION performed [place] Wood George DO [place] [facility]    REVISION TOTAL HIP ARTHROPLASTY Right 3/21/2024    RIGHT HIP IRRIGATION AND DEBRIDEMENT OF RIGHT HIP DRAINING WOUND WITH POSSIBLE EXCHANGE OF FEMORAL COMPONENT performed by Nemesio Handy [place] [facility]    REVISION TOTAL HIP ARTHROPLASTY Right 9/9/2024    REMOVAL OF ANTIBOITIC SPACER FROM RIGHT HIP, RIGHT HIP ACETABULAR CUP PLACEMENT performed by Nemesio Handy MD at [facility]    THORACENTESIS Left 1/2010 and 3/2010.     Reportedly transudative.    TOTAL HIP ARTHROPLASTY Right 4/15/2024    RIGHT HIP REVISION ARTHROPLASTY INSERTION OF ANTIBIOTIC SPACER performed by Nemesio Handy MD [place] [facility]    TRANSTHORACIC ECHOCARDIOGRAM  4/1/2010    Small-to-moderate size pericardial effusion with early signs of tamponade with normal left ventricular size, wall thickness, wall motion and systolic function with stage I diastolic dysfunction with normal right ventricular size and function with moderately thickened pericardium and with no significant valvular abnormalities noted.       Procedure/Surgery:  RRT 4/24 - hypoglycemia  Precautions:  Fall risk, cognition, + alarms, incontinence, C-diff R/O- Contact isolation  Equipment Needs:  TBD    SUBJECTIVE:    Pt is a questionable historian. Pt was admitted from [facility]. Reported completing stand pivot transfer <>w/c with heavy assist x2.    OBJECTIVE:   Initial Evaluation  Date: 4/24/25 Treatment Short Term/ Long Term   Goals   AM-PAC 6 Clicks 11/24     Was pt agreeable to Eval/treatment? yes     Does pt have pain? No c/o pain      Bed Mobility  Rolling:

## 2025-04-25 ENCOUNTER — APPOINTMENT (OUTPATIENT)
Dept: GENERAL RADIOLOGY | Age: 80
DRG: 177 | End: 2025-04-25
Payer: MEDICARE

## 2025-04-25 LAB
ANION GAP SERPL CALCULATED.3IONS-SCNC: 9 MMOL/L (ref 7–16)
BASOPHILS # BLD: 0.03 K/UL (ref 0–0.2)
BASOPHILS NFR BLD: 1 % (ref 0–2)
BUN SERPL-MCNC: 14 MG/DL (ref 8–23)
CALCIUM SERPL-MCNC: 8.5 MG/DL (ref 8.8–10.2)
CHLORIDE SERPL-SCNC: 111 MMOL/L (ref 98–107)
CO2 SERPL-SCNC: 21 MMOL/L (ref 22–29)
CREAT SERPL-MCNC: 1 MG/DL (ref 0.5–1)
DATE LAST DOSE: NORMAL
EOSINOPHIL # BLD: 0.62 K/UL (ref 0.05–0.5)
EOSINOPHILS RELATIVE PERCENT: 10 % (ref 0–6)
ERYTHROCYTE [DISTWIDTH] IN BLOOD BY AUTOMATED COUNT: 15.8 % (ref 11.5–15)
FERRITIN SERPL-MCNC: 221 NG/ML
GFR, ESTIMATED: 58 ML/MIN/1.73M2
GLUCOSE BLD-MCNC: 102 MG/DL (ref 74–99)
GLUCOSE BLD-MCNC: 103 MG/DL (ref 74–99)
GLUCOSE BLD-MCNC: 105 MG/DL (ref 74–99)
GLUCOSE BLD-MCNC: 115 MG/DL (ref 74–99)
GLUCOSE BLD-MCNC: 124 MG/DL (ref 74–99)
GLUCOSE BLD-MCNC: 68 MG/DL (ref 74–99)
GLUCOSE BLD-MCNC: 78 MG/DL (ref 74–99)
GLUCOSE BLD-MCNC: 79 MG/DL (ref 74–99)
GLUCOSE BLD-MCNC: 80 MG/DL (ref 74–99)
GLUCOSE SERPL-MCNC: 96 MG/DL (ref 74–99)
HCT VFR BLD AUTO: 31.4 % (ref 34–48)
HGB BLD-MCNC: 9.5 G/DL (ref 11.5–15.5)
IMM GRANULOCYTES # BLD AUTO: 0.03 K/UL (ref 0–0.58)
IMM GRANULOCYTES NFR BLD: 1 % (ref 0–5)
IRON SATN MFR SERPL: 21 % (ref 15–50)
IRON SERPL-MCNC: 39 UG/DL (ref 37–145)
LYMPHOCYTES NFR BLD: 1.71 K/UL (ref 1.5–4)
LYMPHOCYTES RELATIVE PERCENT: 27 % (ref 20–42)
MCH RBC QN AUTO: 28.8 PG (ref 26–35)
MCHC RBC AUTO-ENTMCNC: 30.3 G/DL (ref 32–34.5)
MCV RBC AUTO: 95.2 FL (ref 80–99.9)
MONOCYTES NFR BLD: 0.51 K/UL (ref 0.1–0.95)
MONOCYTES NFR BLD: 8 % (ref 2–12)
NEUTROPHILS NFR BLD: 53 % (ref 43–80)
NEUTS SEG NFR BLD: 3.33 K/UL (ref 1.8–7.3)
PLATELET # BLD AUTO: 166 K/UL (ref 130–450)
PMV BLD AUTO: 10.1 FL (ref 7–12)
POTASSIUM SERPL-SCNC: 4.1 MMOL/L (ref 3.5–5.1)
RBC # BLD AUTO: 3.3 M/UL (ref 3.5–5.5)
SODIUM SERPL-SCNC: 141 MMOL/L (ref 136–145)
TIBC SERPL-MCNC: 185 UG/DL (ref 250–450)
TME LAST DOSE: NORMAL H
VANCOMYCIN DOSE: NORMAL MG
VANCOMYCIN SERPL-MCNC: 21.8 UG/ML (ref 5–40)
WBC OTHER # BLD: 6.2 K/UL (ref 4.5–11.5)

## 2025-04-25 PROCEDURE — 92526 ORAL FUNCTION THERAPY: CPT

## 2025-04-25 PROCEDURE — 82728 ASSAY OF FERRITIN: CPT

## 2025-04-25 PROCEDURE — 85025 COMPLETE CBC W/AUTO DIFF WBC: CPT

## 2025-04-25 PROCEDURE — 82962 GLUCOSE BLOOD TEST: CPT

## 2025-04-25 PROCEDURE — 80202 ASSAY OF VANCOMYCIN: CPT

## 2025-04-25 PROCEDURE — 6360000002 HC RX W HCPCS: Performed by: INTERNAL MEDICINE

## 2025-04-25 PROCEDURE — 6370000000 HC RX 637 (ALT 250 FOR IP): Performed by: INTERNAL MEDICINE

## 2025-04-25 PROCEDURE — 83540 ASSAY OF IRON: CPT

## 2025-04-25 PROCEDURE — 2500000003 HC RX 250 WO HCPCS: Performed by: INTERNAL MEDICINE

## 2025-04-25 PROCEDURE — 92611 MOTION FLUOROSCOPY/SWALLOW: CPT

## 2025-04-25 PROCEDURE — 2500000003 HC RX 250 WO HCPCS: Performed by: PHYSICIAN ASSISTANT

## 2025-04-25 PROCEDURE — 99232 SBSQ HOSP IP/OBS MODERATE 35: CPT | Performed by: STUDENT IN AN ORGANIZED HEALTH CARE EDUCATION/TRAINING PROGRAM

## 2025-04-25 PROCEDURE — 2700000000 HC OXYGEN THERAPY PER DAY

## 2025-04-25 PROCEDURE — 83550 IRON BINDING TEST: CPT

## 2025-04-25 PROCEDURE — 94640 AIRWAY INHALATION TREATMENT: CPT

## 2025-04-25 PROCEDURE — 80048 BASIC METABOLIC PNL TOTAL CA: CPT

## 2025-04-25 PROCEDURE — 36415 COLL VENOUS BLD VENIPUNCTURE: CPT

## 2025-04-25 PROCEDURE — 74230 X-RAY XM SWLNG FUNCJ C+: CPT

## 2025-04-25 PROCEDURE — 2580000003 HC RX 258

## 2025-04-25 PROCEDURE — 74018 RADEX ABDOMEN 1 VIEW: CPT

## 2025-04-25 PROCEDURE — 2140000000 HC CCU INTERMEDIATE R&B

## 2025-04-25 PROCEDURE — 2580000003 HC RX 258: Performed by: INTERNAL MEDICINE

## 2025-04-25 RX ORDER — MECOBALAMIN 5000 MCG
10 TABLET,DISINTEGRATING ORAL NIGHTLY PRN
Status: DISCONTINUED | OUTPATIENT
Start: 2025-04-25 | End: 2025-04-28 | Stop reason: HOSPADM

## 2025-04-25 RX ADMIN — CEFEPIME 1000 MG: 1 INJECTION, POWDER, FOR SOLUTION INTRAMUSCULAR; INTRAVENOUS at 06:28

## 2025-04-25 RX ADMIN — SODIUM CHLORIDE, PRESERVATIVE FREE 10 ML: 5 INJECTION INTRAVENOUS at 08:55

## 2025-04-25 RX ADMIN — GABAPENTIN 300 MG: 300 CAPSULE ORAL at 08:56

## 2025-04-25 RX ADMIN — BUPROPION HYDROCHLORIDE 100 MG: 100 TABLET, FILM COATED, EXTENDED RELEASE ORAL at 08:56

## 2025-04-25 RX ADMIN — CLOPIDOGREL BISULFATE 75 MG: 75 TABLET, FILM COATED ORAL at 08:55

## 2025-04-25 RX ADMIN — FERROUS SULFATE TAB 325 MG (65 MG ELEMENTAL FE) 325 MG: 325 (65 FE) TAB at 08:57

## 2025-04-25 RX ADMIN — CEFEPIME 1000 MG: 1 INJECTION, POWDER, FOR SOLUTION INTRAMUSCULAR; INTRAVENOUS at 19:01

## 2025-04-25 RX ADMIN — ENOXAPARIN SODIUM 30 MG: 100 INJECTION SUBCUTANEOUS at 08:55

## 2025-04-25 RX ADMIN — BARIUM SULFATE 15 ML: 400 PASTE ORAL at 14:42

## 2025-04-25 RX ADMIN — VENLAFAXINE HYDROCHLORIDE 150 MG: 150 CAPSULE, EXTENDED RELEASE ORAL at 08:57

## 2025-04-25 RX ADMIN — GABAPENTIN 300 MG: 300 CAPSULE ORAL at 15:05

## 2025-04-25 RX ADMIN — DEXTROSE MONOHYDRATE 125 ML: 10 INJECTION, SOLUTION INTRAVENOUS at 03:39

## 2025-04-25 RX ADMIN — SENNOSIDES AND DOCUSATE SODIUM 1 TABLET: 50; 8.6 TABLET ORAL at 08:56

## 2025-04-25 RX ADMIN — ARFORMOTEROL TARTRATE: 15 SOLUTION RESPIRATORY (INHALATION) at 09:11

## 2025-04-25 RX ADMIN — ASPIRIN 81 MG CHEWABLE TABLET 81 MG: 81 TABLET CHEWABLE at 08:55

## 2025-04-25 RX ADMIN — ARFORMOTEROL TARTRATE: 15 SOLUTION RESPIRATORY (INHALATION) at 20:57

## 2025-04-25 RX ADMIN — LEVOTHYROXINE SODIUM 50 MCG: 0.05 TABLET ORAL at 06:26

## 2025-04-25 RX ADMIN — SODIUM CHLORIDE, PRESERVATIVE FREE 10 ML: 5 INJECTION INTRAVENOUS at 20:09

## 2025-04-25 RX ADMIN — OXYBUTYNIN CHLORIDE 10 MG: 10 TABLET, EXTENDED RELEASE ORAL at 08:56

## 2025-04-25 RX ADMIN — VANCOMYCIN HYDROCHLORIDE 1000 MG: 1 INJECTION, POWDER, LYOPHILIZED, FOR SOLUTION INTRAVENOUS at 20:12

## 2025-04-25 RX ADMIN — PRAMIPEXOLE DIHYDROCHLORIDE 0.75 MG: 0.25 TABLET ORAL at 20:09

## 2025-04-25 RX ADMIN — BARIUM SULFATE 15 ML: 0.81 POWDER, FOR SUSPENSION ORAL at 14:42

## 2025-04-25 RX ADMIN — POTASSIUM CHLORIDE 40 MEQ: 1500 TABLET, EXTENDED RELEASE ORAL at 08:55

## 2025-04-25 RX ADMIN — BARIUM SULFATE 15 ML: 400 SUSPENSION ORAL at 14:42

## 2025-04-25 RX ADMIN — PRAMIPEXOLE DIHYDROCHLORIDE 0.75 MG: 0.25 TABLET ORAL at 08:56

## 2025-04-25 NOTE — PROGRESS NOTES
Physical Therapy    Date: 2025       Patient Name: Rebeka Renee  : 1945      MRN: [id number]    Physical therapy order received and chart reviewed. PT session attempted; pt was off unit for other medical care.  Will follow up as appropriate.     Patt Goodson PT, DPT  [id number]

## 2025-04-25 NOTE — PROGRESS NOTES
Occupational Therapy  Date:2025  Patient Name: Rebeka Renee  MRN: [id number]  : 1945  [facility]    Pt's chart reviewed and treatment attempted however pt off unit for testing.  Will attempt treatment at a later date/time.    Maye PARKER/ODELL [id number]

## 2025-04-25 NOTE — PROGRESS NOTES
SPEECH/LANGUAGE PATHOLOGY  VIDEOFLUOROSCOPIC STUDY OF SWALLOWING (MBS)   and PLAN OF CARE    PATIENT NAME:  Rebeka Renee  (female)     MRN:  [id number]    :  1945  (79 y.o.)  STATUS:  Inpatient: Room 8204/8204-A    TODAY'S DATE:  2025  ORDER DATE, DESCRIPTION AND REFERRING PROVIDER: Twila Aarna MD   : FL MODIFIED BARIUM SWALLOW W VIDEO :  25  REASON FOR REFERRAL: swallow eval to rule out microaspiration   EVALUATING THERAPIST: Jody Silva, SLP      RESULTS:      DYSPHAGIA DIAGNOSIS:  Clinical indicators of minimal pharyngeal phase dysphagia     DIET RECOMMENDATIONS:  Regular consistency solids (IDDSI level 7) with  thin liquids (IDDSI level 0)    MEDICATION ADMINISTRATION, and Per patient choice/nursing judgement    A GI consult is recommended per physician discretion    FEEDING RECOMMENDATIONS:    Assistance level:  Supervision is needed during all oral intake     Compensatory strategies recommended: Fully alert for all PO, Thorough oral care to prevent colonization of oral bacteria, Upright in bed/ chair as tolerated  SINGLE cup sips  SINGLE straw sips   SMALL bites  Throat clear and re-swallow immediately     Discussed recommendations with:  Patient  and patient nurse via phone    Laryngeal Penetration and Aspiration:  Penetration WITHOUT aspiration was observed in today's study with  thin liquid    SPEECH THERAPY  PLAN OF CARE   The dysphagia POC is established based on physician order and dysphagia diagnosis    Meal time assessment for 1-2 sessions to provide diet modification and compensatory strategy implementation due to need to ensure proper implementation of compensatory strategies during PO intake  and to determine if fatigue/decreased endurance during meal compromises swallow function        Conditions Requiring Skilled Therapeutic Intervention for dysphagia:    Patient is performing below functional baseline d/t  current acute condition, Multiple diagnoses, multiple

## 2025-04-25 NOTE — PROGRESS NOTES
Consulted for  midline or picc line for needed iv access and possible longterm iv antibiotics. Pt has blood cultures that have been negative for one day. Bill placed peripheral iv yesterday that is still working and sufficient for antibiotic administration. We will re-evaluate after  three days of negative blood cultures have been obtained and clarification of antibiotic duration.

## 2025-04-25 NOTE — PROGRESS NOTES
Spiritual Health History and Assessment/Progress Note  [facility]    Spiritual/Emotional Needs,  ,  ,      Name: Rebeka Renee MRN: [id number]    Age: 79 y.o.     Sex: female   Language: English   Amish: Voodoo   VANESA (acute kidney injury)     Date: 4/25/2025                           Spiritual Assessment began in [facility]        Referral/Consult From: Rounding   Encounter Overview/Reason: Spiritual/Emotional Needs  Service Provided For: Patient    Ayde, Belief, Meaning:   Patient identifies as spiritual  Family/Friends No family/friends present      Importance and Influence:  Patient has no beliefs influential to healthcare decision-making identified during this visit  Family/Friends No family/friends present    Community:  Patient is connected with a spiritual community  Family/Friends are connected with a spiritual community:    Assessment and Plan of Care:     Patient Interventions include: Facilitated expression of thoughts and feelings  Family/Friends Interventions include: No family/friends present    Patient Plan of Care: Spiritual Care available upon further referral  Family/Friends Plan of Care: No family/friends present    Electronically signed by Chaplain Mary on 4/25/2025 at 12:30 PM

## 2025-04-25 NOTE — PROGRESS NOTES
Pharmacy Consultation Note  (Antibiotic Dosing and Monitoring)    Initial consult date: 4/23/2025  Consulting physician/provider: Oumou Schaefer DO.   Drug: Vancomycin  Indication: Pneumonia (Nosocomial)    Age/  Gender Height Weight IBW  Allergy Information   79 y.o./female 165.1 cm (5' 5\")165.1 cm 78.5 kg (173 lb 1 oz)     Ideal body weight: 57 kg (125 lb 10.6 oz)  Adjusted ideal body weight: 62.3 kg (137 lb 4.8 oz)   Benadryl [diphenhydramine], Other, Sulfa antibiotics, Ciprofloxacin, Oxycodone, and Tape [adhesive tape]      Renal Function:  Recent Labs     04/23/25  1353 04/24/25  0300   BUN 36* 27*   CREATININE 2.2* 1.7*       Intake/Output Summary (Last 24 hours) at 4/25/2025 0745  Last data filed at 4/25/2025 0044  Gross per 24 hour   Intake --   Output 1100 ml   Net -1100 ml       Vancomycin Monitoring:  Trough:  No results for input(s): \"VANCOTROUGH\" in the last 72 hours.  Random:    Recent Labs     04/24/25  1506 04/25/25  0618   VANCORANDOM 12.0 PENDING       Vancomycin Administration Times:  Recent vancomycin administrations                     vancomycin (VANCOCIN) 1,250 mg in sodium chloride 0.9 % 250 mL IVPB (Qxos2Mea) (mg) 1,250 mg New Bag 04/24/25 2308    vancomycin (VANCOCIN) 1,500 mg in sodium chloride 0.9 % 250 mL IVPB (Leho3Iwo) (mg) 1,500 mg New Bag 04/23/25 1710                      Assessment:  Patient is a 79 y.o. female who has been initiated on vancomycin  Estimated Creatinine Clearance: 27 mL/min (A) (based on SCr of 1.7 mg/dL (H)).  To dose vancomycin, pharmacy will be [employer] calculation software for goal AUC/HERBER 400-600 mg/L-hr (predicted AUC/HERBER = 508, Tr =15 mcg/mL).   4/24: Vancomycin random level 12 mcg/mL ~ 22 hours after the last dose, scr 1.7  4/25:  Vancomycin level 21.8.  Kidney function recovered - Scr now 1 (~baseline).      Plan:  Vancomycin 1000 mg IV q24h  Vancomycin level as needed      Ian Bravo, PharmD  PGY-1 Pharmacy Practice Resident, [phone]  4/25/2025 8:56

## 2025-04-25 NOTE — PROGRESS NOTES
[facility] Hospitalist Progress Note    SYNOPSIS: Patient admitted on 2025 for VANESA (acute kidney injury)  79 y.o. female who presents to [facility] ER complaining of AMS.     Rebeka Renee has a past medical history that includes  hypertension, hyperlipidemia, diabetes, hypothyroidism, AAA, lung/kidney/breast cancer status post nephrectomy and lobectomy, CHF, CVA, fibromyalgia, anxiety and depression      Rebeka presents to the ER for AMS.  Acute onset shortly prior to arrival.  She was apparently normal at her nursing facility at 9:30 AM.  Shortly after she was found to be more confused.  Patient was unable to provide history in the ER due to altered mental status.  She was hypotensive in the ER. Stroke alert was called which showed no LVO.  She was found to have pneumonia on chest CT. Will be admitted for AMS along with VANESA and UTI. She is currently awake, but not following commands. She is alert;  on my evaluation she is confused and is telling things which does not make sense; she can answer the orientation questions though    Urine cx grew e coli ; final cx pending  Still no improvement in mentation after 2 days of broad spectrum antibiotics coverage ; will check mri brain  Ammonia tsh  wnl; b12 pending    SUBJECTIVE:  Had rrt overnight ams 2/2 hypoglycemia ; improved with dextrose bolus overnight. No other overnight issues reported.   Patient seen and examined- blood sugar low in am started D5/NS; advance diet if tolerated      Records reviewed.           Temp (24hrs), Av.4 °F (36.3 °C), Min:96.9 °F (36.1 °C), Max:97.6 °F (36.4 °C)    DIET: ADULT DIET; Dysphagia - Soft and Bite Sized  CODE: Full Code    Intake/Output Summary (Last 24 hours) at 2025 1336  Last data filed at 2025 1051  Gross per 24 hour   Intake 3044.59 ml   Output 200 ml   Net 2844.59 ml       Review of Systems  Could not be obtained      OBJECTIVE:    BP (!) 147/80   Pulse 74   Temp 97.6 °F (36.4 °C) (Temporal)

## 2025-04-25 NOTE — CARE COORDINATION
04/25/2025 Pt admitted 4/24/25 DX: VANESA. Pt is from [facility]. Per Nahomy @ [facility], this pt can return precert pending. Per Dr. Arana, the BS remain liable. She feels pt may stabilize over weekend and be able to DC on Monday. Precert will be initiated by facility. Pt is on 2 L O2. Pt will need ambulette for transportation-form in soft chart/envelope. WILMA done. Rhina Carrizales RN [phone]

## 2025-04-26 ENCOUNTER — APPOINTMENT (OUTPATIENT)
Dept: MRI IMAGING | Age: 80
DRG: 177 | End: 2025-04-26
Payer: MEDICARE

## 2025-04-26 ENCOUNTER — APPOINTMENT (OUTPATIENT)
Dept: GENERAL RADIOLOGY | Age: 80
DRG: 177 | End: 2025-04-26
Payer: MEDICARE

## 2025-04-26 PROBLEM — K56.0 PARALYTIC ILEUS OF SMALL INTESTINE AND COLON (HCC): Status: ACTIVE | Noted: 2025-04-26

## 2025-04-26 LAB
ANION GAP SERPL CALCULATED.3IONS-SCNC: 11 MMOL/L (ref 7–16)
BASOPHILS # BLD: 0.04 K/UL (ref 0–0.2)
BASOPHILS NFR BLD: 1 % (ref 0–2)
BUN SERPL-MCNC: 11 MG/DL (ref 8–23)
CALCIUM SERPL-MCNC: 9.2 MG/DL (ref 8.8–10.2)
CHLORIDE SERPL-SCNC: 106 MMOL/L (ref 98–107)
CO2 SERPL-SCNC: 19 MMOL/L (ref 22–29)
CREAT SERPL-MCNC: 0.8 MG/DL (ref 0.5–1)
EOSINOPHIL # BLD: 0.57 K/UL (ref 0.05–0.5)
EOSINOPHILS RELATIVE PERCENT: 7 % (ref 0–6)
ERYTHROCYTE [DISTWIDTH] IN BLOOD BY AUTOMATED COUNT: 15.3 % (ref 11.5–15)
GFR, ESTIMATED: 73 ML/MIN/1.73M2
GLUCOSE BLD-MCNC: 104 MG/DL (ref 74–99)
GLUCOSE BLD-MCNC: 81 MG/DL (ref 74–99)
GLUCOSE BLD-MCNC: 84 MG/DL (ref 74–99)
GLUCOSE BLD-MCNC: 89 MG/DL (ref 74–99)
GLUCOSE SERPL-MCNC: 95 MG/DL (ref 74–99)
HCT VFR BLD AUTO: 36.5 % (ref 34–48)
HGB BLD-MCNC: 11.1 G/DL (ref 11.5–15.5)
IMM GRANULOCYTES # BLD AUTO: 0.05 K/UL (ref 0–0.58)
IMM GRANULOCYTES NFR BLD: 1 % (ref 0–5)
LYMPHOCYTES NFR BLD: 1.96 K/UL (ref 1.5–4)
LYMPHOCYTES RELATIVE PERCENT: 25 % (ref 20–42)
MCH RBC QN AUTO: 28.2 PG (ref 26–35)
MCHC RBC AUTO-ENTMCNC: 30.4 G/DL (ref 32–34.5)
MCV RBC AUTO: 92.6 FL (ref 80–99.9)
MONOCYTES NFR BLD: 0.45 K/UL (ref 0.1–0.95)
MONOCYTES NFR BLD: 6 % (ref 2–12)
NEUTROPHILS NFR BLD: 61 % (ref 43–80)
NEUTS SEG NFR BLD: 4.84 K/UL (ref 1.8–7.3)
PLATELET # BLD AUTO: 79 K/UL (ref 130–450)
PLATELET CONFIRMATION: NORMAL
PMV BLD AUTO: 11 FL (ref 7–12)
POTASSIUM SERPL-SCNC: 4.2 MMOL/L (ref 3.5–5.1)
RBC # BLD AUTO: 3.94 M/UL (ref 3.5–5.5)
SODIUM SERPL-SCNC: 136 MMOL/L (ref 136–145)
WBC OTHER # BLD: 7.9 K/UL (ref 4.5–11.5)

## 2025-04-26 PROCEDURE — 6360000002 HC RX W HCPCS: Performed by: STUDENT IN AN ORGANIZED HEALTH CARE EDUCATION/TRAINING PROGRAM

## 2025-04-26 PROCEDURE — 6370000000 HC RX 637 (ALT 250 FOR IP)

## 2025-04-26 PROCEDURE — 2580000003 HC RX 258: Performed by: INTERNAL MEDICINE

## 2025-04-26 PROCEDURE — 74018 RADEX ABDOMEN 1 VIEW: CPT

## 2025-04-26 PROCEDURE — 2060000000 HC ICU INTERMEDIATE R&B

## 2025-04-26 PROCEDURE — 6370000000 HC RX 637 (ALT 250 FOR IP): Performed by: INTERNAL MEDICINE

## 2025-04-26 PROCEDURE — 2580000003 HC RX 258: Performed by: STUDENT IN AN ORGANIZED HEALTH CARE EDUCATION/TRAINING PROGRAM

## 2025-04-26 PROCEDURE — 99221 1ST HOSP IP/OBS SF/LOW 40: CPT | Performed by: SURGERY

## 2025-04-26 PROCEDURE — 80048 BASIC METABOLIC PNL TOTAL CA: CPT

## 2025-04-26 PROCEDURE — 2700000000 HC OXYGEN THERAPY PER DAY

## 2025-04-26 PROCEDURE — 70551 MRI BRAIN STEM W/O DYE: CPT

## 2025-04-26 PROCEDURE — 6360000002 HC RX W HCPCS: Performed by: INTERNAL MEDICINE

## 2025-04-26 PROCEDURE — 82962 GLUCOSE BLOOD TEST: CPT

## 2025-04-26 PROCEDURE — 2500000003 HC RX 250 WO HCPCS: Performed by: INTERNAL MEDICINE

## 2025-04-26 PROCEDURE — 36415 COLL VENOUS BLD VENIPUNCTURE: CPT

## 2025-04-26 PROCEDURE — 99232 SBSQ HOSP IP/OBS MODERATE 35: CPT | Performed by: STUDENT IN AN ORGANIZED HEALTH CARE EDUCATION/TRAINING PROGRAM

## 2025-04-26 PROCEDURE — 94640 AIRWAY INHALATION TREATMENT: CPT

## 2025-04-26 PROCEDURE — 85025 COMPLETE CBC W/AUTO DIFF WBC: CPT

## 2025-04-26 RX ORDER — BISACODYL 10 MG
10 SUPPOSITORY, RECTAL RECTAL DAILY
Status: DISCONTINUED | OUTPATIENT
Start: 2025-04-26 | End: 2025-04-28 | Stop reason: HOSPADM

## 2025-04-26 RX ORDER — DICYCLOMINE HYDROCHLORIDE 10 MG/ML
20 INJECTION INTRAMUSCULAR 4 TIMES DAILY PRN
Status: DISCONTINUED | OUTPATIENT
Start: 2025-04-26 | End: 2025-04-28 | Stop reason: HOSPADM

## 2025-04-26 RX ORDER — POLYETHYLENE GLYCOL 3350 17 G/17G
17 POWDER, FOR SOLUTION ORAL DAILY
Status: DISCONTINUED | OUTPATIENT
Start: 2025-04-26 | End: 2025-04-28 | Stop reason: HOSPADM

## 2025-04-26 RX ORDER — ENOXAPARIN SODIUM 100 MG/ML
40 INJECTION SUBCUTANEOUS DAILY
Status: DISCONTINUED | OUTPATIENT
Start: 2025-04-27 | End: 2025-04-28 | Stop reason: HOSPADM

## 2025-04-26 RX ORDER — LORAZEPAM 2 MG/ML
0.5 INJECTION INTRAMUSCULAR ONCE
Status: COMPLETED | OUTPATIENT
Start: 2025-04-26 | End: 2025-04-26

## 2025-04-26 RX ORDER — SENNA AND DOCUSATE SODIUM 50; 8.6 MG/1; MG/1
1 TABLET, FILM COATED ORAL 2 TIMES DAILY
Status: DISCONTINUED | OUTPATIENT
Start: 2025-04-26 | End: 2025-04-27

## 2025-04-26 RX ADMIN — BUPROPION HYDROCHLORIDE 100 MG: 100 TABLET, FILM COATED, EXTENDED RELEASE ORAL at 11:11

## 2025-04-26 RX ADMIN — HYDRALAZINE HYDROCHLORIDE 10 MG: 20 INJECTION INTRAMUSCULAR; INTRAVENOUS at 20:51

## 2025-04-26 RX ADMIN — FERROUS SULFATE TAB 325 MG (65 MG ELEMENTAL FE) 325 MG: 325 (65 FE) TAB at 09:17

## 2025-04-26 RX ADMIN — LEVOTHYROXINE SODIUM 50 MCG: 0.05 TABLET ORAL at 06:55

## 2025-04-26 RX ADMIN — POLYETHYLENE GLYCOL 3350 17 G: 17 POWDER, FOR SOLUTION ORAL at 18:27

## 2025-04-26 RX ADMIN — BISACODYL 10 MG: 10 SUPPOSITORY RECTAL at 18:27

## 2025-04-26 RX ADMIN — CEFEPIME 1000 MG: 1 INJECTION, POWDER, FOR SOLUTION INTRAMUSCULAR; INTRAVENOUS at 06:55

## 2025-04-26 RX ADMIN — ARFORMOTEROL TARTRATE: 15 SOLUTION RESPIRATORY (INHALATION) at 07:55

## 2025-04-26 RX ADMIN — SENNOSIDES AND DOCUSATE SODIUM 1 TABLET: 50; 8.6 TABLET ORAL at 20:22

## 2025-04-26 RX ADMIN — PIPERACILLIN AND TAZOBACTAM 3375 MG: 3; .375 INJECTION, POWDER, LYOPHILIZED, FOR SOLUTION INTRAVENOUS at 14:23

## 2025-04-26 RX ADMIN — DICYCLOMINE HYDROCHLORIDE 20 MG: 10 INJECTION, SOLUTION INTRAMUSCULAR at 14:23

## 2025-04-26 RX ADMIN — SODIUM CHLORIDE, PRESERVATIVE FREE 10 ML: 5 INJECTION INTRAVENOUS at 09:17

## 2025-04-26 RX ADMIN — POTASSIUM CHLORIDE 40 MEQ: 1500 TABLET, EXTENDED RELEASE ORAL at 09:17

## 2025-04-26 RX ADMIN — SENNOSIDES AND DOCUSATE SODIUM 1 TABLET: 50; 8.6 TABLET ORAL at 09:17

## 2025-04-26 RX ADMIN — ASPIRIN 81 MG CHEWABLE TABLET 81 MG: 81 TABLET CHEWABLE at 09:17

## 2025-04-26 RX ADMIN — ENOXAPARIN SODIUM 30 MG: 100 INJECTION SUBCUTANEOUS at 09:17

## 2025-04-26 RX ADMIN — PRAMIPEXOLE DIHYDROCHLORIDE 0.75 MG: 0.25 TABLET ORAL at 11:11

## 2025-04-26 RX ADMIN — ARFORMOTEROL TARTRATE: 15 SOLUTION RESPIRATORY (INHALATION) at 18:13

## 2025-04-26 RX ADMIN — CLOPIDOGREL BISULFATE 75 MG: 75 TABLET, FILM COATED ORAL at 09:17

## 2025-04-26 RX ADMIN — LORAZEPAM 0.5 MG: 2 INJECTION INTRAMUSCULAR; INTRAVENOUS at 10:15

## 2025-04-26 RX ADMIN — MEROPENEM 1000 MG: 1 INJECTION INTRAVENOUS at 12:12

## 2025-04-26 RX ADMIN — VENLAFAXINE HYDROCHLORIDE 150 MG: 150 CAPSULE, EXTENDED RELEASE ORAL at 09:20

## 2025-04-26 RX ADMIN — SODIUM CHLORIDE, PRESERVATIVE FREE 20 ML: 5 INJECTION INTRAVENOUS at 20:21

## 2025-04-26 NOTE — CONSULTS
Department of Internal Medicine  Infectious Diseases   Consult Note      Reason for Consult:  MDR E coli UTI       Requesting Physician:  Dr Arana         HISTORY OF PRESENT ILLNESS:       This is a 79 yrs old female with hx of CHF, DM, cancer ( lung , breast, kidney) , depression , right hip PJI infection ( on doxy and PCN  suppressive therapy ) from nursing home sent to the ER due to \" UTI \" - confusion   She denied fever or chills, denied abdomen pain and dysuria   WBC was 7.2 K   She was given cefepime -   Urine cx grew E coli ( MDR )- abx switched to meropenem       Past Medical History:      Past Medical History:   Diagnosis Date    Anemia     S/P chemotherapy.    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    Ascending aortic aneurysm     seen on CTA chest w/contrast on 2019    Asthma     Bipolar 1 disorder (HCC)     Cancer (HCC)     lung/ kidney    Cancer of breast, female (HCC) 01/2006    S/P bilaeral mastectomy with left breast lymph node resection and chemotherapy.    CHF (congestive heart failure) (HCC)     Chronic back pain     Depression     Depression with anxiety     Diabetes mellitus (HCC)     Resolved after losing 130 lbs. after gastric bypass surgery.    Dyslipidemia     Fibromyalgia     History of blood transfusion     anemia    Hypertension     Hypothyroidism     Neuropathy     Pericardial effusion 04/02/2010    Subxiphoid pericardial window with drainage of pericardial effusion and pericardial biopsy:  Fluid and biopsy negative for metastases.     Pleural effusion 05/02/2010    Noted on chest x-ray.    TIA (transient ischemic attack)     Tobacco abuse     Patient smoked 3 ppd x 26 years.  Quit in 1995.    Type II or unspecified type diabetes mellitus without mention of complication, not stated as uncontrolled          Past Surgical History:      Past Surgical History:   Procedure

## 2025-04-26 NOTE — PROGRESS NOTES
DVT Prophylaxis Adjustment Policy (DVT Prophylaxis)     This patient is on DVT Prophylaxis medication that requires a dose adjustment      Date Body Weight IBW  Adjusted BW SCr  CrCl Dialysis status   4/26/2025 70.2 kg (154 lb 12.2 oz) Ideal body weight: 57 kg (125 lb 10.6 oz)  Adjusted ideal body weight: 62.3 kg (137 lb 4.8 oz) Serum creatinine: 0.8 mg/dL 04/26/25 0940  Estimated creatinine clearance: 58 mL/min N/a       Pharmacy has dose-adjusted the DVT Prophylaxis regimen to match   the recommendations from the following table        Ordered Medication:Lovenox 30mg daily    Order Changed/converted to: Lovenox 40mg daily      These changes were made per protocol according to the [facility] Pharmacist   Review for Appropriate Use and Automatic Dose Adjustments of   Subcutaneous Anticoagulants Policy     *Please note this dose may need readjusted if patient's condition changes.    Please contact pharmacy with any questions regarding these changes.    Mejia Feldman RPH  4/26/2025  12:17 PM

## 2025-04-26 NOTE — PLAN OF CARE
Progressing     Problem: Skin/Tissue Integrity  Goal: Skin integrity remains intact  Description: 1.  Monitor for areas of redness and/or skin breakdown2.  Assess vascular access sites hourly3.  Every 4-6 hours minimum:  Change oxygen saturation probe site4.  Every 4-6 hours:  If on nasal continuous positive airway pressure, respiratory therapy assess nares and determine need for appliance change or resting period  Outcome: Progressing     Problem: Discharge Planning  Goal: Discharge to home or other facility with appropriate resources  Outcome: Progressing

## 2025-04-26 NOTE — PROGRESS NOTES
Consulted for picc line. Spoke with Milagros. Please clarify with pcp if pt will be discharging with antibiotics and duration. Pt has a peripheral iv for now.

## 2025-04-26 NOTE — CONSULTS
capsule by mouth nightly   Yes Malou Brunson MD   bumetanide (BUMEX) 2 MG tablet Take 1 tablet by mouth 2 times daily   Yes Malou Brunson MD   oxyBUTYnin (DITROPAN-XL) 10 MG extended release tablet Take 1 tablet by mouth 2 times daily   Yes Malou Brunson MD   traZODone (DESYREL) 100 MG tablet Take 1 tablet by mouth nightly   Yes Malou Brunson MD   OXYGEN Inhale 1-5 L into the lungs continuous prn for Shortness of Breath   Yes Malou Brunson MD   potassium chloride (K-TAB) 20 MEQ TBCR extended release tablet Take 2 tablets by mouth daily   Yes Malou Brunson MD   ondansetron (ZOFRAN) 4 MG tablet Take 1 tablet by mouth every 8 hours as needed for Nausea or Vomiting   Yes Malou Brunson MD   atorvastatin (LIPITOR) 80 MG tablet Take 1 tablet by mouth daily   Yes Malou Brunson MD   buPROPion (WELLBUTRIN SR) 100 MG extended release tablet Take 1 tablet by mouth daily   Yes Malou Brunson MD   Semaglutide, 2 MG/DOSE, 8 MG/3ML SOPN Inject 2 mg into the skin once a week Given Mondays.   Yes Malou Brunson MD   pramipexole (MIRAPEX) 0.75 MG tablet Take 1 tablet by mouth 2 times daily   Yes Malou Brunson MD   levothyroxine (SYNTHROID) 50 MCG tablet Take 1 tablet by mouth Daily   Yes Malou Brunson MD   venlafaxine (EFFEXOR XR) 150 MG extended release capsule Take 1 capsule by mouth daily (with breakfast)   Yes Malou Brunson MD   SODIUM PHOSPHATES RE Place 118 mLs rectally every 48 hours as needed (constipation)  Patient not taking: Reported on 4/23/2025    Malou Brunson MD   pantoprazole (PROTONIX) 20 MG tablet Take 1 tablet by mouth daily  Patient not taking: Reported on 4/23/2025    Malou Brunson MD   magnesium hydroxide (MILK OF MAGNESIA) 400 MG/5ML suspension Take 30 mLs by mouth daily as needed for Constipation    Malou Brunson MD   glucagon, rDNA, 1 MG injection Inject 1 mg into the muscle as needed  for Low blood sugar    Malou Brunson MD   glucose (GLUTOSE) 40 % GEL Take 37.5 mLs by mouth as needed (hypoglycemia)    Malou Brunson MD   budesonide-formoterol (SYMBICORT) 160-4.5 MCG/ACT AERO Inhale 2 puffs into the lungs 2 times daily 4/9/25   Garrison Singh MD   losartan (COZAAR) 50 MG tablet Take 1 tablet by mouth daily  Patient not taking: Reported on 4/23/2025    Malou Brunson MD   gabapentin (NEURONTIN) 800 MG tablet Take 1 tablet by mouth 3 times daily.    Malou Brunson MD       Allergies   Allergen Reactions    Benadryl [Diphenhydramine] Hives     Tongue Swells    Other      benzodine  Tongue Swells    Sulfa Antibiotics Hives     Tongue Swells    Ciprofloxacin Hives    Oxycodone     Tape [Adhesive Tape] Itching       History reviewed. No pertinent family history.          Review of Systems   Gastrointestinal:  Positive for constipation and diarrhea. Negative for abdominal distention, abdominal pain, nausea and rectal pain.         PHYSICAL EXAM:    Vitals:    04/26/25 1453   BP: (!) 147/84   Pulse: 90   Resp: 17   Temp: 97.5 °F (36.4 °C)   SpO2: 100%       General Appearance:  awake, alert, oriented, in no acute distress, playing on phone  Skin:  Skin color, texture, turgor normal. No rashes or lesions.  Head/face:  NCAT  Eyes:  No gross abnormalities. Sclera nonicteric  Lungs/Chest:  Normal expansion. No respiratory distress. On RA. No chest wall tenderness  Heart: Warm throughout. Regular rate   Abdomen:  Soft, non- tenderness, minimal distention    Extremities: Extremities warm to touch, pink, with no edema.   Rectal:  patient is refusing rectal exam       LABS:    CBC  Recent Labs     04/26/25  0940   WBC 7.9   HGB 11.1*   HCT 36.5   PLT 79*     BMP  Recent Labs     04/26/25  0940      K 4.2      CO2 19*   BUN 11   CREATININE 0.8   CALCIUM 9.2     Liver Function  No results for input(s): \"AMYLASE\", \"LIPASE\", \"BILITOT\", \"BILIDIR\", \"AST\", \"ALT\", \"ALKPHOS\",

## 2025-04-26 NOTE — PLAN OF CARE
Problem: Safety - Adult  Goal: Free from fall injury  4/26/2025 1008 by Aileen Concepcion RN  Outcome: Progressing  4/26/2025 0457 by Orin Burgess RN  Outcome: Progressing     Problem: Chronic Conditions and Co-morbidities  Goal: Patient's chronic conditions and co-morbidity symptoms are monitored and maintained or improved  4/26/2025 1008 by Aileen Concepcion RN  Outcome: Progressing  Flowsheets (Taken 4/26/2025 0826)  Care Plan - Patient's Chronic Conditions and Co-Morbidity Symptoms are Monitored and Maintained or Improved: Monitor and assess patient's chronic conditions and comorbid symptoms for stability, deterioration, or improvement  4/26/2025 0457 by Orin Burgess RN  Outcome: Progressing     Problem: Pain  Goal: Verbalizes/displays adequate comfort level or baseline comfort level  4/26/2025 1008 by Aileen Concepcion RN  Outcome: Progressing  4/26/2025 0457 by Orin Burgess RN  Outcome: Progressing     Problem: Confusion  Goal: Confusion, delirium, dementia, or psychosis is improved or at baseline  Description: INTERVENTIONS:1. Assess for possible contributors to thought disturbance, including medications, impaired vision or hearing, underlying metabolic abnormalities, dehydration, psychiatric diagnoses, and notify attending LIP2. [place] high risk fall precautions, as indicated3. Provide frequent short contacts to provide reality reorientation, refocusing and direction4. Decrease environmental stimuli, including noise as appropriate5. Monitor and intervene to maintain adequate nutrition, hydration, elimination, sleep and activity6. If unable to ensure safety without constant attention obtain sitter and review sitter guidelines with assigned personnel7. Initiate Psychosocial CNS and Spiritual Care consult, as indicated  INTERVENTIONS:1. Assess for possible contributors to thought disturbance, including medications, impaired vision or hearing, underlying metabolic abnormalities, dehydration, psychiatric

## 2025-04-26 NOTE — PROGRESS NOTES
4 Eyes Skin Assessment     NAME:  Rebeka Renee  YOB: 1945  MEDICAL RECORD NUMBER:  [id number]    The patient is being assessed for  Admission    I agree that at least one RN has performed a thorough Head to Toe Skin Assessment on the patient. ALL assessment sites listed below have been assessed.      Areas assessed by both nurses:    Head, Face, Ears, Shoulders, Back, Chest, Arms, Elbows, Hands, Sacrum. Buttock, Coccyx, Ischium, Legs. Feet and Heels, and Under Medical Devices         Does the Patient have a Wound? No noted wound(s)       Norris Prevention initiated by RN: Yes  Wound Care Orders initiated by RN: No    Pressure Injury (Stage 3,4, Unstageable, DTI, NWPT, and Complex wounds) if present, place Wound referral order by RN under : No    New Ostomies, if present place, Ostomy referral order under : No     Nurse 1 eSignature: Electronically signed by Orin Burgess RN on 4/26/25 at 5:07 AM EDT    **SHARE this note so that the co-signing nurse can place an eSignature**    Nurse 2 eSignature: Electronically signed by Jennifer Abebe RN on 4/26/25 at 5:09 AM EDT

## 2025-04-26 NOTE — PROGRESS NOTES
[facility] Hospitalist Progress Note    SYNOPSIS: Patient admitted on 2025 for VANESA (acute kidney injury)  79 y.o. female who presents to [facility] ER complaining of AMS.     Rebeka Renee has a past medical history that includes  hypertension, hyperlipidemia, diabetes, hypothyroidism, AAA, lung/kidney/breast cancer status post nephrectomy and lobectomy, CHF, CVA, fibromyalgia, anxiety and depression      Rebeka presents to the ER for AMS.  Acute onset shortly prior to arrival.  She was apparently normal at her nursing facility at 9:30 AM.  Shortly after she was found to be more confused.  Patient was unable to provide history in the ER due to altered mental status.  She was hypotensive in the ER. Stroke alert was called which showed no LVO.  She was found to have pneumonia on chest CT. Will be admitted for AMS along with VANESA and UTI. She is currently awake, but not following commands. She is alert;  on my evaluation she is confused and is telling things which does not make sense-she tells that the mills took her teeth and wants her tooth removed before the mills is buried; she can answer the orientation questions though    Urine cx grew e coli -multidrug resistant ; ID consulted   pending mri brain  Ammonia tsh  wnl; b12 pending        SUBJECTIVE:   No other overnight issues reported.   Patient seen and examined- pt is AAOx3; intake is optimal  Complains abdominal cramps; xr kub s/o gaseous bowel distention    Hold oxybutynin; gen sx consult    Records reviewed.           Temp (24hrs), Av.8 °F (36.6 °C), Min:97.3 °F (36.3 °C), Max:98.6 °F (37 °C)    DIET: ADULT ORAL NUTRITION SUPPLEMENT; Breakfast, AM Snack, Lunch, PM Snack, Dinner, HS Snack; Standard High Calorie/High Protein Oral Supplement  ADULT DIET; Regular  CODE: Full Code    Intake/Output Summary (Last 24 hours) at 2025 1253  Last data filed at 2025 1854  Gross per 24 hour   Intake 626.22 ml   Output 200 ml   Net 426.22 ml

## 2025-04-26 NOTE — PROGRESS NOTES
Pharmacy Consultation Note  (Antibiotic Dosing and Monitoring)    Initial consult date: 4/23/2025  Consulting physician/provider: Oumou Schaefer DO.   Drug: Vancomycin  Indication: Pneumonia (Nosocomial)    Age/  Gender Height Weight IBW  Allergy Information   79 y.o./female 165.1 cm (5' 5\")165.1 cm 78.5 kg (173 lb 1 oz)     Ideal body weight: 57 kg (125 lb 10.6 oz)  Adjusted ideal body weight: 62.3 kg (137 lb 4.8 oz)   Benadryl [diphenhydramine], Other, Sulfa antibiotics, Ciprofloxacin, Oxycodone, and Tape [adhesive tape]      Renal Function:  Recent Labs     04/23/25  1353 04/24/25  0300 04/25/25  0822   BUN 36* 27* 14   CREATININE 2.2* 1.7* 1.0       Intake/Output Summary (Last 24 hours) at 4/26/2025 0848  Last data filed at 4/25/2025 1854  Gross per 24 hour   Intake 3670.81 ml   Output 200 ml   Net 3470.81 ml       Vancomycin Monitoring:  Trough:  No results for input(s): \"VANCOTROUGH\" in the last 72 hours.  Random:    Recent Labs     04/24/25  1506 04/25/25  0618   VANCORANDOM 12.0 21.8       Vancomycin Administration Times:  Recent vancomycin administrations                     vancomycin (VANCOCIN) 1,000 mg in sodium chloride 0.9 % 250 mL IVPB ([id number]) (mg) 1,000 mg New Bag 04/25/25 2012    vancomycin (VANCOCIN) 1,250 mg in sodium chloride 0.9 % 250 mL IVPB ([id number]) (mg) 1,250 mg New Bag 04/24/25 2308    vancomycin (VANCOCIN) 1,500 mg in sodium chloride 0.9 % 250 mL IVPB ([id number]) (mg) 1,500 mg New Bag 04/23/25 1710                   Assessment:  Patient is a 79 y.o. female who has been initiated on vancomycin  Estimated Creatinine Clearance: 46 mL/min (based on SCr of 1 mg/dL).  To dose vancomycin, pharmacy will be [employer] calculation software for goal AUC/HERBER 400-600 mg/L-hr (predicted AUC/HERBER = 508, Tr =15 mcg/mL).   4/24: Vancomycin random level 12 mcg/mL ~ 22 hours after the last dose, scr 1.7  4/25:  Vancomycin level 21.8.  Kidney function recovered - Scr now 1 (~baseline).    4/26: Day #4

## 2025-04-26 NOTE — PROGRESS NOTES
Pharmacy Consultation Note  (Antibiotic Dosing and Monitoring)    Initial consult date: 4/23/2025  Consulting physician/provider: Oumou Schaefer DO.   Drug: Vancomycin  Indication: Pneumonia (Nosocomial)    Vancomycin has been discontinued   Clinical Pharmacy to sign-off  Physician to re-consult pharmacy if future dosing is needed    Thank you for the consult,     Smith Saldivar, PharmD  PGY-1 Pharmacy Practice Resident   4/26/2025 11:39 AM

## 2025-04-27 ENCOUNTER — APPOINTMENT (OUTPATIENT)
Dept: GENERAL RADIOLOGY | Age: 80
DRG: 177 | End: 2025-04-27
Payer: MEDICARE

## 2025-04-27 LAB
ANION GAP SERPL CALCULATED.3IONS-SCNC: 12 MMOL/L (ref 7–16)
BASOPHILS # BLD: 0.05 K/UL (ref 0–0.2)
BASOPHILS NFR BLD: 1 % (ref 0–2)
BUN SERPL-MCNC: 13 MG/DL (ref 8–23)
CALCIUM SERPL-MCNC: 9.5 MG/DL (ref 8.8–10.2)
CHLORIDE SERPL-SCNC: 106 MMOL/L (ref 98–107)
CO2 SERPL-SCNC: 18 MMOL/L (ref 22–29)
CREAT SERPL-MCNC: 0.8 MG/DL (ref 0.5–1)
EOSINOPHIL # BLD: 0.4 K/UL (ref 0.05–0.5)
EOSINOPHILS RELATIVE PERCENT: 5 % (ref 0–6)
ERYTHROCYTE [DISTWIDTH] IN BLOOD BY AUTOMATED COUNT: 15.9 % (ref 11.5–15)
GFR, ESTIMATED: 73 ML/MIN/1.73M2
GLUCOSE BLD-MCNC: 109 MG/DL (ref 74–99)
GLUCOSE BLD-MCNC: 131 MG/DL (ref 74–99)
GLUCOSE BLD-MCNC: 136 MG/DL (ref 74–99)
GLUCOSE BLD-MCNC: 155 MG/DL (ref 74–99)
GLUCOSE BLD-MCNC: 98 MG/DL (ref 74–99)
GLUCOSE BLD-MCNC: 99 MG/DL (ref 74–99)
GLUCOSE SERPL-MCNC: 102 MG/DL (ref 74–99)
HCT VFR BLD AUTO: 38.5 % (ref 34–48)
HGB BLD-MCNC: 12.2 G/DL (ref 11.5–15.5)
IMM GRANULOCYTES # BLD AUTO: 0.05 K/UL (ref 0–0.58)
IMM GRANULOCYTES NFR BLD: 1 % (ref 0–5)
LYMPHOCYTES NFR BLD: 1.76 K/UL (ref 1.5–4)
LYMPHOCYTES RELATIVE PERCENT: 20 % (ref 20–42)
MCH RBC QN AUTO: 28.6 PG (ref 26–35)
MCHC RBC AUTO-ENTMCNC: 31.7 G/DL (ref 32–34.5)
MCV RBC AUTO: 90.4 FL (ref 80–99.9)
MICROORGANISM SPEC CULT: ABNORMAL
MONOCYTES NFR BLD: 0.43 K/UL (ref 0.1–0.95)
MONOCYTES NFR BLD: 5 % (ref 2–12)
NEUTROPHILS NFR BLD: 70 % (ref 43–80)
NEUTS SEG NFR BLD: 6.25 K/UL (ref 1.8–7.3)
PLATELET CONFIRMATION: NORMAL
PLATELET, FLUORESCENCE: 108 K/UL (ref 130–450)
PMV BLD AUTO: 11.8 FL (ref 7–12)
POTASSIUM SERPL-SCNC: 4.3 MMOL/L (ref 3.5–5.1)
RBC # BLD AUTO: 4.26 M/UL (ref 3.5–5.5)
SERVICE CMNT-IMP: ABNORMAL
SODIUM SERPL-SCNC: 136 MMOL/L (ref 136–145)
SPECIMEN DESCRIPTION: ABNORMAL
WBC OTHER # BLD: 8.9 K/UL (ref 4.5–11.5)

## 2025-04-27 PROCEDURE — 6360000002 HC RX W HCPCS: Performed by: INTERNAL MEDICINE

## 2025-04-27 PROCEDURE — 2500000003 HC RX 250 WO HCPCS: Performed by: INTERNAL MEDICINE

## 2025-04-27 PROCEDURE — 97530 THERAPEUTIC ACTIVITIES: CPT

## 2025-04-27 PROCEDURE — 99232 SBSQ HOSP IP/OBS MODERATE 35: CPT | Performed by: SURGERY

## 2025-04-27 PROCEDURE — 74018 RADEX ABDOMEN 1 VIEW: CPT

## 2025-04-27 PROCEDURE — 6370000000 HC RX 637 (ALT 250 FOR IP): Performed by: INTERNAL MEDICINE

## 2025-04-27 PROCEDURE — 2700000000 HC OXYGEN THERAPY PER DAY

## 2025-04-27 PROCEDURE — 82962 GLUCOSE BLOOD TEST: CPT

## 2025-04-27 PROCEDURE — 36415 COLL VENOUS BLD VENIPUNCTURE: CPT

## 2025-04-27 PROCEDURE — 94640 AIRWAY INHALATION TREATMENT: CPT

## 2025-04-27 PROCEDURE — 76937 US GUIDE VASCULAR ACCESS: CPT

## 2025-04-27 PROCEDURE — 2580000003 HC RX 258: Performed by: INTERNAL MEDICINE

## 2025-04-27 PROCEDURE — 02HV33Z INSERTION OF INFUSION DEVICE INTO SUPERIOR VENA CAVA, PERCUTANEOUS APPROACH: ICD-10-PCS | Performed by: STUDENT IN AN ORGANIZED HEALTH CARE EDUCATION/TRAINING PROGRAM

## 2025-04-27 PROCEDURE — 97535 SELF CARE MNGMENT TRAINING: CPT

## 2025-04-27 PROCEDURE — 85025 COMPLETE CBC W/AUTO DIFF WBC: CPT

## 2025-04-27 PROCEDURE — 80048 BASIC METABOLIC PNL TOTAL CA: CPT

## 2025-04-27 PROCEDURE — 6370000000 HC RX 637 (ALT 250 FOR IP)

## 2025-04-27 PROCEDURE — 2060000000 HC ICU INTERMEDIATE R&B

## 2025-04-27 RX ADMIN — VENLAFAXINE HYDROCHLORIDE 150 MG: 150 CAPSULE, EXTENDED RELEASE ORAL at 08:03

## 2025-04-27 RX ADMIN — BUPROPION HYDROCHLORIDE 100 MG: 100 TABLET, FILM COATED, EXTENDED RELEASE ORAL at 08:04

## 2025-04-27 RX ADMIN — ASPIRIN 81 MG CHEWABLE TABLET 81 MG: 81 TABLET CHEWABLE at 08:04

## 2025-04-27 RX ADMIN — POLYETHYLENE GLYCOL 3350 17 G: 17 POWDER, FOR SOLUTION ORAL at 08:04

## 2025-04-27 RX ADMIN — HYDRALAZINE HYDROCHLORIDE 10 MG: 20 INJECTION INTRAMUSCULAR; INTRAVENOUS at 14:19

## 2025-04-27 RX ADMIN — PRAMIPEXOLE DIHYDROCHLORIDE 0.75 MG: 0.25 TABLET ORAL at 20:27

## 2025-04-27 RX ADMIN — POTASSIUM CHLORIDE 40 MEQ: 1500 TABLET, EXTENDED RELEASE ORAL at 08:04

## 2025-04-27 RX ADMIN — PRAMIPEXOLE DIHYDROCHLORIDE 0.75 MG: 0.25 TABLET ORAL at 08:03

## 2025-04-27 RX ADMIN — SENNOSIDES AND DOCUSATE SODIUM 1 TABLET: 50; 8.6 TABLET ORAL at 08:04

## 2025-04-27 RX ADMIN — ARFORMOTEROL TARTRATE: 15 SOLUTION RESPIRATORY (INHALATION) at 19:11

## 2025-04-27 RX ADMIN — SODIUM CHLORIDE, PRESERVATIVE FREE 10 ML: 5 INJECTION INTRAVENOUS at 20:27

## 2025-04-27 RX ADMIN — BISACODYL 10 MG: 10 SUPPOSITORY RECTAL at 08:04

## 2025-04-27 RX ADMIN — FERROUS SULFATE TAB 325 MG (65 MG ELEMENTAL FE) 325 MG: 325 (65 FE) TAB at 08:04

## 2025-04-27 RX ADMIN — CLOPIDOGREL BISULFATE 75 MG: 75 TABLET, FILM COATED ORAL at 08:04

## 2025-04-27 RX ADMIN — ENOXAPARIN SODIUM 40 MG: 100 INJECTION SUBCUTANEOUS at 08:03

## 2025-04-27 RX ADMIN — LEVOTHYROXINE SODIUM 50 MCG: 0.05 TABLET ORAL at 05:45

## 2025-04-27 RX ADMIN — PIPERACILLIN AND TAZOBACTAM 3375 MG: 3; .375 INJECTION, POWDER, LYOPHILIZED, FOR SOLUTION INTRAVENOUS at 10:45

## 2025-04-27 NOTE — PROGRESS NOTES
Department of Internal Medicine  Infectious Diseases   Progress Note      C/C :   MDR E coli bacteriuria , asp pneumonia       Denies fever or chills  Denies dysuria   Afebrile       Current Facility-Administered Medications   Medication Dose Route Frequency Provider Last Rate Last Admin    dicyclomine (BENTYL) injection 20 mg  20 mg IntraMUSCular 4x Daily PRN Twila Arana MD   20 mg at 04/26/25 1423    enoxaparin (LOVENOX) injection 40 mg  40 mg SubCUTAneous Daily Oumou Schaefer DO   40 mg at 04/27/25 0803    bisacodyl (DULCOLAX) suppository 10 mg  10 mg Rectal Daily Med Ty DO   10 mg at 04/27/25 0804    polyethylene glycol (GLYCOLAX) packet 17 g  17 g Oral Daily Med Ty DO   17 g at 04/27/25 0804    piperacillin-tazobactam (ZOSYN) 3,375 mg in sodium chloride 0.9 % 50 mL IVPB ([id number])  3,375 mg IntraVENous Q8H Malcolm Corrales MD   Stopped at 04/27/25 1417    melatonin disintegrating tablet 10 mg  10 mg Oral Nightly PRN Twila Arana MD        glucose chewable tablet 16 g  4 tablet Oral PRN Shaniqua Segura MD        dextrose bolus 10% 125 mL  125 mL IntraVENous PRN Shaniqua Segura MD   Stopped at 04/25/25 0348    Or    dextrose bolus 10% 250 mL  250 mL IntraVENous PRN Shaniqua Segura MD        glucagon injection 1 mg  1 mg SubCUTAneous PRN Shaniqua Segura MD        dextrose 10 % infusion   IntraVENous Continuous PRN Shaniqua Segura MD        dextrose 5 % and 0.9 % sodium chloride infusion   IntraVENous Continuous Twila Arana MD   Stopped at 04/25/25 1307    benzocaine-menthol (CEPACOL SORE THROAT) lozenge 1 lozenge  1 lozenge Oral Q2H PRN Oumou Schaefer DO        benzonatate (TESSALON) capsule 100 mg  100 mg Oral TID PRN Oumou Schaefer DO        calcium carbonate (TUMS) chewable tablet 500 mg  500 mg Oral TID PRN Oumou Schaefer DO        hydrALAZINE (APRESOLINE) injection 10 mg  10 mg IntraVENous Q6H PRN Oumou Schaefer DO   10 mg at 04/27/25 1417

## 2025-04-27 NOTE — PROGRESS NOTES
Discharge order noted, voicemail left for  on call to clarify if we have precert to see if patient is able to return to [facility] today. Awaiting return phone call at this time.

## 2025-04-27 NOTE — PROGRESS NOTES
Occupational Therapy  OT BEDSIDE TREATMENT NOTE   [facility]  [address], [place]       Date:2025  Patient Name: Rebeka Renee  MRN: [id number]  : 1945  Room: [facility]7406-A     Per OT Eval:  Evaluating OT: Osmar Khan OTR/L [id number]     Referring Provider: Oumou Schaefer DO                                       Specific Provider Orders/Date: OT evaluation and treatment 25 1800     Diagnosis:  VANESA (acute kidney injury) [N17.9]  Sepsis secondary to UTI (HCC) [A41.9, N39.0]  Acute respiratory failure with hypoxia (HCC) [J96.01]  Pneumonia of both upper lobes due to infectious organism [J18.9]  Sepsis with encephalopathy without septic shock, due to unspecified organism (HCC) [A41.9, R65.20, G93.41]  Hypoglycemia [E16.2]       Pertinent Medical History:  has a past medical history of Anemia, Anxiety, Arthritis, Ascending aortic aneurysm, Asthma, Bipolar 1 disorder (HCC), Cancer (HCC), Cancer of breast, female (HCC), CHF (congestive heart failure) (HCC), Chronic back pain, Depression, Depression with anxiety, Diabetes mellitus (HCC), Dyslipidemia, Fibromyalgia, History of blood transfusion, Hypertension, Hypothyroidism, Neuropathy, Pericardial effusion, Pleural effusion, TIA (transient ischemic attack), Tobacco abuse, and Type II or unspecified type diabetes mellitus without mention of complication, not stated as uncontrolled.   Past Surgical History         Past Surgical History:   Procedure Laterality Date    BREAST SURGERY        CATARACT REMOVAL WITH IMPLANT Bilateral      CHOLECYSTECTOMY        FEMUR SURGERY Right 2024     FEMUR OPEN REDUCTION INTERNAL FIXATION RIGHT PERIPROSTHETIC performed by Wood George DO [place] [facility] [place]    FIBULA FRACTURE SURGERY Right 2024     RIGHT FEMUR OPEN REDUCTION INTERNAL FIXATION, OPEN TREATMENT OF RIGHT HIP DISLOCATION performed by Nemesio Handy MD [place] [facility] [place]

## 2025-04-27 NOTE — PROGRESS NOTES
[facility] Hospitalist Progress Note    SYNOPSIS: Patient admitted on 2025 for VANESA (acute kidney injury)  79 y.o. female who presents to [facility] ER complaining of AMS.     Rebeka Renee has a past medical history that includes  hypertension, hyperlipidemia, diabetes, hypothyroidism, AAA, lung/kidney/breast cancer status post nephrectomy and lobectomy, CHF, CVA, fibromyalgia, anxiety and depression      Rebeka presents to the ER for AMS.  Acute onset shortly prior to arrival.  She was apparently normal at her nursing facility at 9:30 AM.  Shortly after she was found to be more confused.  Patient was unable to provide history in the ER due to altered mental status.  She was hypotensive in the ER. Stroke alert was called which showed no LVO.  She was found to have pneumonia on chest CT. Will be admitted for AMS along with VANESA and UTI. She is currently awake, but not following commands. She is alert;  on my evaluation she is confused and is telling things which does not make sense-she tells that the mills took her teeth and wants her tooth removed before the mills is buried; she can answer the orientation questions though    Urine cx grew e coli -multidrug resistant ; ID consulted   pending mri brain  Ammonia tsh  wnl; b12 pending        SUBJECTIVE:   No other overnight issues reported.   Patient seen and examined- pt is AAOx3; intake is optimal   at bedside        Records reviewed.           Temp (24hrs), Av.7 °F (36.5 °C), Min:97 °F (36.1 °C), Max:98.7 °F (37.1 °C)    DIET: ADULT ORAL NUTRITION SUPPLEMENT; Breakfast, AM Snack, Lunch, PM Snack, Dinner, HS Snack; Standard High Calorie/High Protein Oral Supplement  ADULT DIET; Regular  CODE: Full Code    Intake/Output Summary (Last 24 hours) at 2025 1217  Last data filed at 2025 1358  Gross per 24 hour   Intake 0 ml   Output --   Net 0 ml       Review of Systems  Could not be obtained      OBJECTIVE:    BP (!) 156/88   Pulse (!) 104   11.1* 12.2   HCT 31.4* 36.5 38.5    79*  --        Recent Labs     04/25/25  0822 04/26/25  0940 04/27/25  0530    136 136   K 4.1 4.2 4.3   * 106 106   CO2 21* 19* 18*   BUN 14 11 13   CREATININE 1.0 0.8 0.8   CALCIUM 8.5* 9.2 9.5       No results for input(s): \"ALKPHOS\", \"ALT\", \"AST\", \"BILITOT\", \"AMYLASE\", \"LIPASE\" in the last 72 hours.    Invalid input(s): \"PROT\", \"ALB\"      No results for input(s): \"INR\" in the last 72 hours.      No results for input(s): \"CKTOTAL\", \"TROPONINI\" in the last 72 hours.    Chronic labs:    Lab Results   Component Value Date    CHOL 105 03/29/2025    TRIG 55 03/29/2025    HDL 45 03/29/2025    TSH 1.48 03/29/2025    INR 1.3 04/23/2025    LABA1C 4.7 03/29/2025       Radiology: REVIEWED DAILY    +++++++++++++++++++++++++++++++++++++++++++++++++  Twila Arana MD  [facility]- [place], [place]  +++++++++++++++++++++++++++++++++++++++++++++++++  NOTE: This report was transcribed using voice recognition software. Every effort was made to ensure accuracy; however, inadvertent computerized transcription errors may be present.

## 2025-04-27 NOTE — PROGRESS NOTES
Physical Therapy  Treatment Note    Name: Rebeka Renee  : 1945  MRN: [id number]      Date of Service: 2025    Evaluating PT:  Patt Goodson, PT, DPT, [id number]    Room #:  7406/7406-A  Diagnosis:  VANESA (acute kidney injury) [N17.9]  Sepsis secondary to UTI (HCC) [A41.9, N39.0]  Acute respiratory failure with hypoxia (HCC) [J96.01]  Pneumonia of both upper lobes due to infectious organism [J18.9]  Sepsis with encephalopathy without septic shock, due to unspecified organism (HCC) [A41.9, R65.20, G93.41]  Hypoglycemia [E16.2]  PMHx/PSHx:    Past Medical History:   Diagnosis Date    Anemia     S/P chemotherapy.    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    Ascending aortic aneurysm     seen on CTA chest w/contrast on     Asthma     Bipolar 1 disorder (HCC)     Cancer (HCC)     lung/ kidney    Cancer of breast, female (HCC) 2006    S/P bilaeral mastectomy with left breast lymph node resection and chemotherapy.    CHF (congestive heart failure) (HCC)     Chronic back pain     Depression     Depression with anxiety     Diabetes mellitus (HCC)     Resolved after losing 130 lbs. after gastric bypass surgery.    Dyslipidemia     Fibromyalgia     History of blood transfusion     anemia    Hypertension     Hypothyroidism     Neuropathy     Pericardial effusion 2010    Subxiphoid pericardial window with drainage of pericardial effusion and pericardial biopsy:  Fluid and biopsy negative for metastases.     Pleural effusion 2010    Noted on chest x-ray.    TIA (transient ischemic attack)     Tobacco abuse     Patient smoked 3 ppd x 26 years.  Quit in .    Type II or unspecified type diabetes mellitus without mention of complication, not stated as uncontrolled       Past Surgical History:   Procedure Laterality Date    BREAST SURGERY      CATARACT REMOVAL WITH IMPLANT Bilateral

## 2025-04-27 NOTE — PLAN OF CARE
Problem: Safety - Adult  Goal: Free from fall injury  Outcome: Progressing     Problem: Chronic Conditions and Co-morbidities  Goal: Patient's chronic conditions and co-morbidity symptoms are monitored and maintained or improved  Outcome: Progressing     Problem: Pain  Goal: Verbalizes/displays adequate comfort level or baseline comfort level  Outcome: Progressing     Problem: Confusion  Goal: Confusion, delirium, dementia, or psychosis is improved or at baseline  Description: INTERVENTIONS:1. Assess for possible contributors to thought disturbance, including medications, impaired vision or hearing, underlying metabolic abnormalities, dehydration, psychiatric diagnoses, and notify attending LIP2. [place] high risk fall precautions, as indicated3. Provide frequent short contacts to provide reality reorientation, refocusing and direction4. Decrease environmental stimuli, including noise as appropriate5. Monitor and intervene to maintain adequate nutrition, hydration, elimination, sleep and activity6. If unable to ensure safety without constant attention obtain sitter and review sitter guidelines with assigned personnel7. Initiate Psychosocial CNS and Spiritual Care consult, as indicated  INTERVENTIONS:1. Assess for possible contributors to thought disturbance, including medications, impaired vision or hearing, underlying metabolic abnormalities, dehydration, psychiatric diagnoses, and notify attending LIP2. [place] high risk fall precautions, as indicated3. Provide frequent short contacts to provide reality reorientation, refocusing and direction4. Decrease environmental stimuli, including noise as appropriate5. Monitor and intervene to maintain adequate nutrition, hydration, elimination, sleep and activity6. If unable to ensure safety without constant attention obtain sitter and review sitter guidelines with assigned personnel7. Initiate Psychosocial CNS and Spiritual Care consult, as indicated  Outcome:  Progressing     Problem: Skin/Tissue Integrity  Goal: Skin integrity remains intact  Description: 1.  Monitor for areas of redness and/or skin breakdown2.  Assess vascular access sites hourly3.  Every 4-6 hours minimum:  Change oxygen saturation probe site4.  Every 4-6 hours:  If on nasal continuous positive airway pressure, respiratory therapy assess nares and determine need for appliance change or resting period  Outcome: Progressing     Problem: Discharge Planning  Goal: Discharge to home or other facility with appropriate resources  Outcome: Progressing

## 2025-04-27 NOTE — PROGRESS NOTES
Comprehensive Nutrition Assessment    Type and Reason for Visit:  Initial, Consult (poor intake/appetite, ONS)    Nutrition Recommendations/Plan:   Continue diet as tolerated. Continue ONS: Ensure + TID to promote nutrient/PO intake.     Malnutrition Assessment:  Malnutrition Status:  At risk for malnutrition (04/27/25 1538)    Context:  Acute Illness     Findings of the 6 clinical characteristics of malnutrition:  Energy Intake:  Mild decrease in energy intake  Weight Loss:  Unable to assess (2/2 possible fluid shifts; limited wt hx on file)     Body Fat Loss:  Unable to assess     Muscle Mass Loss:  Unable to assess    Fluid Accumulation:  Unable to assess     Strength:  Not Performed    Nutrition Assessment:    Pt. admitted d/t AMS. Admit w/ metabolic encephalopathy 2/2 UTI, VANESA, Asp PNA, hypoglycemia. Noted gaseous distention in the sigmoid colon and rectum on KUB-likely due to underlying motility disorder, functional disorder and no evidence of obstruction or volvulus per GS. PMhx of CA (lung/kidney,breast s/p nephrectomy/lobectomy), gastric bypass, bipolar disorder, DM, CHF, HTN, TIA. Pt. tolerating diet w/ reduced intakes; good intake of ONS. Will continue ONS to promote nutrient/PO intake and monitor.    Nutrition Related Findings:    A&Ox4, +I/O(3.3L), obese/rounded abd, active BS, +2 edema, hyperglycemia, Wound Type: None       Current Nutrition Intake & Therapies:    Average Meal Intake: 1-25%  Average Supplements Intake: %  ADULT ORAL NUTRITION SUPPLEMENT; Breakfast, AM Snack, Lunch, PM Snack, Dinner, HS Snack; Standard High Calorie/High Protein Oral Supplement  ADULT DIET; Regular    Anthropometric Measures:  Height: 165.1 cm (5' 5\")  Ideal Body Weight (IBW): 125 lbs (57 kg)    Admission Body Weight: 69.9 kg (154 lb 1.6 oz) (4/24 actual)  Current Body Weight: 69.9 kg (154 lb 1.6 oz) (4/24 actual), 123.3 % IBW. Weight Source: Other  Current BMI (kg/m2): 25.6  Usual Body Weight:  (UTO d/t

## 2025-04-27 NOTE — PROGRESS NOTES
Vascular team consulted for IV access. Attempted access with no success. Patient is edematous in BUE. Will have pharmacy retime atb. Primary notified. Gen surg notified about patient refusing FMS placement. Attempted to place, patient stated \" I do not want it in\".

## 2025-04-27 NOTE — PROGRESS NOTES
GENERAL SURGERY  DAILY PROGRESS NOTE    Patient's Name/Date of Birth: Rebeka Renee / 1945    Date: 2025     Chief Complaint   Patient presents with    Altered Mental Status     Altered right sided weakness and neglect per EMS- minimally responsive on arrival LKQ 0930        Subjective:    Resting comfortably in bed.  Reports that she had a bowel movement overnight and is tolerating diet.  Denies any nausea or emesis      Objective:  Last 24Hrs  Temp  Av.5 °F (36.4 °C)  Min: 97.3 °F (36.3 °C)  Max: 98 °F (36.7 °C)  Resp  Av.3  Min: 17  Max: 20  Pulse  Av  Min: 85  Max: 109  Systolic (24hrs), Av , Min:124 , Max:180     Diastolic (24hrs), Av, Min:84, Max:109    SpO2  Av.8 %  Min: 93 %  Max: 100 %    I/O last 3 completed shifts:  In: 3670.8 [P.O.:600; I.V.:2739.8; IV Piggyback:331]  Out: 200 [Urine:200]      General: In no acute distress, alert and oriented x4  Cardiovascular: Warm throughout, no edema  Respiratory: no respiratory distress, equal chest rise  Abdomen: soft, midline incision, mild distention   Skin: no obvious rashes or lesions appreciated, no jaundice  Extremities: atraumatic, no focal motor deficits, no open wounds      CBC  Recent Labs     25  0822 25  0940   WBC 6.2 7.9   RBC 3.30* 3.94   HGB 9.5* 11.1*   HCT 31.4* 36.5   MCV 95.2 92.6   MCH 28.8 28.2   MCHC 30.3* 30.4*   RDW 15.8* 15.3*    79*   MPV 10.1 11.0       CMP  Recent Labs     25  0822 25  0940    136   K 4.1 4.2   * 106   CO2 21* 19*   BUN 14 11   CREATININE 1.0 0.8   GLUCOSE 96 95   CALCIUM 8.5* 9.2         Assessment/Plan:    Patient Active Problem List   Diagnosis    Chronic pain    Neural foraminal stenosis of lumbar spine    Fibromyalgia    Lumbago    Peripheral neuropathy    Chest pain    SOB (shortness of breath) on exertion    HTN (hypertension)    DM (diabetes mellitus) (HCC)    Dyslipidemia    Obesity    History of breast cancer    H/O  Postoperative wound infection of right hip    Stroke-like symptoms    Altered mental status    Hyperkalemia    TIA (transient ischemic attack)    COPD exacerbation (HCC)    AMS (altered mental status)    Hypoglycemia    Paralytic ileus of small intestine and colon (HCC)       79 y.o. female w/ distended and redundant sigmoid colon    -AM KUB  - Patient adamantly refusing FMS or rectal exam  - Continue to monitor abdominal exam  - Ambulate      Med Ty DO  General Surgery Resident    Electronically signed on 4/27/2025 at 3:41 AM

## 2025-04-27 NOTE — PLAN OF CARE
Problem: Safety - Adult  Goal: Free from fall injury  4/27/2025 1206 by Aileen Concepcion RN  Outcome: Progressing  4/27/2025 0108 by Jennifer Abebe RN  Outcome: Progressing     Problem: Chronic Conditions and Co-morbidities  Goal: Patient's chronic conditions and co-morbidity symptoms are monitored and maintained or improved  4/27/2025 1206 by Aileen Concepcion RN  Outcome: Progressing  4/27/2025 0108 by Jennifer Abebe RN  Outcome: Progressing     Problem: Pain  Goal: Verbalizes/displays adequate comfort level or baseline comfort level  4/27/2025 1206 by Aileen Concepcion RN  Outcome: Progressing  4/27/2025 0108 by Jennifer Abebe RN  Outcome: Progressing     Problem: Confusion  Goal: Confusion, delirium, dementia, or psychosis is improved or at baseline  Description: INTERVENTIONS:1. Assess for possible contributors to thought disturbance, including medications, impaired vision or hearing, underlying metabolic abnormalities, dehydration, psychiatric diagnoses, and notify attending LIP2. [place] high risk fall precautions, as indicated3. Provide frequent short contacts to provide reality reorientation, refocusing and direction4. Decrease environmental stimuli, including noise as appropriate5. Monitor and intervene to maintain adequate nutrition, hydration, elimination, sleep and activity6. If unable to ensure safety without constant attention obtain sitter and review sitter guidelines with assigned personnel7. Initiate Psychosocial CNS and Spiritual Care consult, as indicated  INTERVENTIONS:1. Assess for possible contributors to thought disturbance, including medications, impaired vision or hearing, underlying metabolic abnormalities, dehydration, psychiatric diagnoses, and notify attending LIP2. [place] high risk fall precautions, as indicated3. Provide frequent short contacts to provide reality reorientation, refocusing and direction4. Decrease environmental stimuli, including noise as appropriate5. Monitor and

## 2025-04-28 VITALS
SYSTOLIC BLOOD PRESSURE: 163 MMHG | WEIGHT: 170.64 LBS | RESPIRATION RATE: 18 BRPM | OXYGEN SATURATION: 95 % | HEART RATE: 113 BPM | TEMPERATURE: 97.4 F | HEIGHT: 65 IN | BODY MASS INDEX: 28.43 KG/M2 | DIASTOLIC BLOOD PRESSURE: 89 MMHG

## 2025-04-28 PROBLEM — N39.0 UTI (URINARY TRACT INFECTION): Status: RESOLVED | Noted: 2024-02-14 | Resolved: 2025-04-28

## 2025-04-28 LAB
ANION GAP SERPL CALCULATED.3IONS-SCNC: 13 MMOL/L (ref 7–16)
BASOPHILS # BLD: 0.06 K/UL (ref 0–0.2)
BASOPHILS NFR BLD: 1 % (ref 0–2)
BUN SERPL-MCNC: 16 MG/DL (ref 8–23)
CALCIUM SERPL-MCNC: 9.6 MG/DL (ref 8.8–10.2)
CHLORIDE SERPL-SCNC: 108 MMOL/L (ref 98–107)
CO2 SERPL-SCNC: 16 MMOL/L (ref 22–29)
CREAT SERPL-MCNC: 0.9 MG/DL (ref 0.5–1)
EOSINOPHIL # BLD: 0.4 K/UL (ref 0.05–0.5)
EOSINOPHILS RELATIVE PERCENT: 4 % (ref 0–6)
ERYTHROCYTE [DISTWIDTH] IN BLOOD BY AUTOMATED COUNT: 16.6 % (ref 11.5–15)
GFR, ESTIMATED: 66 ML/MIN/1.73M2
GLUCOSE BLD-MCNC: 132 MG/DL (ref 74–99)
GLUCOSE BLD-MCNC: 140 MG/DL (ref 74–99)
GLUCOSE SERPL-MCNC: 106 MG/DL (ref 74–99)
HCT VFR BLD AUTO: 36.8 % (ref 34–48)
HGB BLD-MCNC: 11.4 G/DL (ref 11.5–15.5)
IMM GRANULOCYTES # BLD AUTO: 0.05 K/UL (ref 0–0.58)
IMM GRANULOCYTES NFR BLD: 1 % (ref 0–5)
LYMPHOCYTES NFR BLD: 2.18 K/UL (ref 1.5–4)
LYMPHOCYTES RELATIVE PERCENT: 21 % (ref 20–42)
MCH RBC QN AUTO: 28.4 PG (ref 26–35)
MCHC RBC AUTO-ENTMCNC: 31 G/DL (ref 32–34.5)
MCV RBC AUTO: 91.5 FL (ref 80–99.9)
MICROORGANISM SPEC CULT: NORMAL
MICROORGANISM SPEC CULT: NORMAL
MONOCYTES NFR BLD: 0.7 K/UL (ref 0.1–0.95)
MONOCYTES NFR BLD: 7 % (ref 2–12)
NEUTROPHILS NFR BLD: 67 % (ref 43–80)
NEUTS SEG NFR BLD: 6.8 K/UL (ref 1.8–7.3)
PLATELET # BLD AUTO: 192 K/UL (ref 130–450)
PMV BLD AUTO: 10.9 FL (ref 7–12)
POTASSIUM SERPL-SCNC: 3.9 MMOL/L (ref 3.5–5.1)
RBC # BLD AUTO: 4.02 M/UL (ref 3.5–5.5)
SERVICE CMNT-IMP: NORMAL
SERVICE CMNT-IMP: NORMAL
SODIUM SERPL-SCNC: 137 MMOL/L (ref 136–145)
SPECIMEN DESCRIPTION: NORMAL
SPECIMEN DESCRIPTION: NORMAL
WBC OTHER # BLD: 10.2 K/UL (ref 4.5–11.5)

## 2025-04-28 PROCEDURE — 99232 SBSQ HOSP IP/OBS MODERATE 35: CPT | Performed by: SURGERY

## 2025-04-28 PROCEDURE — 94640 AIRWAY INHALATION TREATMENT: CPT

## 2025-04-28 PROCEDURE — 85025 COMPLETE CBC W/AUTO DIFF WBC: CPT

## 2025-04-28 PROCEDURE — 6360000002 HC RX W HCPCS: Performed by: INTERNAL MEDICINE

## 2025-04-28 PROCEDURE — 36415 COLL VENOUS BLD VENIPUNCTURE: CPT

## 2025-04-28 PROCEDURE — 92526 ORAL FUNCTION THERAPY: CPT

## 2025-04-28 PROCEDURE — 6370000000 HC RX 637 (ALT 250 FOR IP): Performed by: INTERNAL MEDICINE

## 2025-04-28 PROCEDURE — 82962 GLUCOSE BLOOD TEST: CPT

## 2025-04-28 PROCEDURE — 2500000003 HC RX 250 WO HCPCS: Performed by: INTERNAL MEDICINE

## 2025-04-28 PROCEDURE — 80048 BASIC METABOLIC PNL TOTAL CA: CPT

## 2025-04-28 RX ADMIN — CLOPIDOGREL BISULFATE 75 MG: 75 TABLET, FILM COATED ORAL at 09:55

## 2025-04-28 RX ADMIN — ARFORMOTEROL TARTRATE: 15 SOLUTION RESPIRATORY (INHALATION) at 07:30

## 2025-04-28 RX ADMIN — FERROUS SULFATE TAB 325 MG (65 MG ELEMENTAL FE) 325 MG: 325 (65 FE) TAB at 09:55

## 2025-04-28 RX ADMIN — SODIUM CHLORIDE, PRESERVATIVE FREE 10 ML: 5 INJECTION INTRAVENOUS at 09:57

## 2025-04-28 RX ADMIN — VENLAFAXINE HYDROCHLORIDE 150 MG: 150 CAPSULE, EXTENDED RELEASE ORAL at 09:55

## 2025-04-28 RX ADMIN — HYDRALAZINE HYDROCHLORIDE 10 MG: 20 INJECTION INTRAMUSCULAR; INTRAVENOUS at 09:55

## 2025-04-28 RX ADMIN — POTASSIUM CHLORIDE 40 MEQ: 1500 TABLET, EXTENDED RELEASE ORAL at 09:54

## 2025-04-28 RX ADMIN — LEVOTHYROXINE SODIUM 50 MCG: 0.05 TABLET ORAL at 06:24

## 2025-04-28 RX ADMIN — ASPIRIN 81 MG CHEWABLE TABLET 81 MG: 81 TABLET CHEWABLE at 09:55

## 2025-04-28 RX ADMIN — BUPROPION HYDROCHLORIDE 100 MG: 100 TABLET, FILM COATED, EXTENDED RELEASE ORAL at 09:56

## 2025-04-28 RX ADMIN — ENOXAPARIN SODIUM 40 MG: 100 INJECTION SUBCUTANEOUS at 09:55

## 2025-04-28 RX ADMIN — PRAMIPEXOLE DIHYDROCHLORIDE 0.75 MG: 0.25 TABLET ORAL at 09:56

## 2025-04-28 NOTE — CARE COORDINATION
4/28/25 Update CM Note.  Discharge order noted. Plan to return to [facility]. PAS ambulette scheduled for 1230 pickup. Charge RN & facility liaison notified, message left for .   Moon JOYN RN-BC  [phone]

## 2025-04-28 NOTE — PROGRESS NOTES
GENERAL SURGERY  DAILY PROGRESS NOTE    Patient's Name/Date of Birth: Rebeka Renee / 1945    Date: 2025     Chief Complaint   Patient presents with    Altered Mental Status     Altered right sided weakness and neglect per EMS- minimally responsive on arrival LKQ 0930        Subjective:  Patient resting comfortably this morning. Tolerating regular diet. Is passing gas and had 3 recorded BM. Discharge order noted.     Objective:  Last 24Hrs  Temp  Av.5 °F (36.4 °C)  Min: 97 °F (36.1 °C)  Max: 98 °F (36.7 °C)  Resp  Av.8  Min: 17  Max: 18  Pulse  Av.3  Min: 104  Max: 111  Systolic (24hrs), Avg:160 , Min:150 , Max:186     Diastolic (24hrs), Av, Min:88, Max:112    SpO2  Av.5 %  Min: 94 %  Max: 99 %    No intake/output data recorded.      General: In no acute distress, alert and oriented x4  Cardiovascular: Warm throughout, no edema  Respiratory: no respiratory distress, equal chest rise  Abdomen: soft, midline incision, mild distention   Skin: no obvious rashes or lesions appreciated, no jaundice  Extremities: atraumatic, no focal motor deficits, no open wounds      CBC  Recent Labs     25  0822 25  0940 25  0530   WBC 6.2 7.9 8.9   RBC 3.30* 3.94 4.26   HGB 9.5* 11.1* 12.2   HCT 31.4* 36.5 38.5   MCV 95.2 92.6 90.4   MCH 28.8 28.2 28.6   MCHC 30.3* 30.4* 31.7*   RDW 15.8* 15.3* 15.9*    79*  --    MPV 10.1 11.0 11.8       CMP  Recent Labs     25  0822 25  0940 25  0530    136 136   K 4.1 4.2 4.3   * 106 106   CO2 21* 19* 18*   BUN 14 11 13   CREATININE 1.0 0.8 0.8   GLUCOSE 96 95 102*   CALCIUM 8.5* 9.2 9.5         Assessment/Plan:    Patient Active Problem List   Diagnosis    Chronic pain    Neural foraminal stenosis of lumbar spine    Fibromyalgia    Lumbago    Peripheral neuropathy    Chest pain    SOB (shortness of breath) on exertion    HTN (hypertension)    DM (diabetes mellitus) (HCC)    Dyslipidemia    Obesity

## 2025-04-28 NOTE — DISCHARGE SUMMARY
Hospitalist Discharge Summary    Patient ID: Rebeka Renee   Patient : 1945  Patient's PCP: Navneet Latif MD    Admit Date: 2025   Admitting Physician: Twila Arana MD    Discharge Date:  2025   Discharge Physician: Twila Arana MD   Discharge Condition: Stable  Discharge Disposition: snf      Hospital course in brief:  (Please refer to daily progress notes for a comprehensive review of the hospitalization by requesting medical records)    79 y.o. female who presents to [facility] ER complaining of AMS.     Rebeka Renee has a past medical history that includes  hypertension, hyperlipidemia, diabetes, hypothyroidism, AAA, lung/kidney/breast cancer status post nephrectomy and lobectomy, CHF, CVA, fibromyalgia, anxiety and depression      Rebeka presents to the ER for AMS.  Acute onset shortly prior to arrival.  She was apparently normal at her nursing facility at 9:30 AM.  Shortly after she was found to be more confused.  Patient was unable to provide history in the ER due to altered mental status.  She was hypotensive in the ER. Stroke alert was called which showed no LVO.  She was found to have pneumonia on chest CT. Will be admitted for AMS along with VANESA and UTI. She is currently awake, but not following commands. She is alert;  on my evaluation she is confused and is telling things which does not make sense-she tells that the mills took her teeth and wants her tooth removed before the mills is buried; she can answer the orientation questions though  Modified barium swallow done ok for dysphagia pureed diet  -vanc zosyn started for concern of UTI; ammonia tsh wnl; mRI head no acute findings; patient was requiring 2 Lo2 by NC; later on o2 requirement improved ; saturating well on room air     Urine cx grew e coli -multidrug resistant ; ID consulted; per ID urine sample was likely contaminated; recommended zosyn for concern of aspiration pneumonia  Xr abdomen done

## 2025-04-28 NOTE — PLAN OF CARE
Problem: Safety - Adult  Goal: Free from fall injury  Outcome: Progressing     Problem: Chronic Conditions and Co-morbidities  Goal: Patient's chronic conditions and co-morbidity symptoms are monitored and maintained or improved  Outcome: Progressing     Problem: Pain  Goal: Verbalizes/displays adequate comfort level or baseline comfort level  Outcome: Progressing     Problem: Confusion  Goal: Confusion, delirium, dementia, or psychosis is improved or at baseline  Description: INTERVENTIONS:1. Assess for possible contributors to thought disturbance, including medications, impaired vision or hearing, underlying metabolic abnormalities, dehydration, psychiatric diagnoses, and notify attending LIP2. [place] high risk fall precautions, as indicated3. Provide frequent short contacts to provide reality reorientation, refocusing and direction4. Decrease environmental stimuli, including noise as appropriate5. Monitor and intervene to maintain adequate nutrition, hydration, elimination, sleep and activity6. If unable to ensure safety without constant attention obtain sitter and review sitter guidelines with assigned personnel7. Initiate Psychosocial CNS and Spiritual Care consult, as indicated  INTERVENTIONS:1. Assess for possible contributors to thought disturbance, including medications, impaired vision or hearing, underlying metabolic abnormalities, dehydration, psychiatric diagnoses, and notify attending LIP2. [place] high risk fall precautions, as indicated3. Provide frequent short contacts to provide reality reorientation, refocusing and direction4. Decrease environmental stimuli, including noise as appropriate5. Monitor and intervene to maintain adequate nutrition, hydration, elimination, sleep and activity6. If unable to ensure safety without constant attention obtain sitter and review sitter guidelines with assigned personnel7. Initiate Psychosocial CNS and Spiritual Care consult, as indicated  Outcome:

## 2025-04-28 NOTE — PROGRESS NOTES
Physician Progress Note      PATIENT:               JEVON LOZOYA  [facility] #:                  [id number]  :                       1945  ADMIT DATE:       2025 1:22 PM  DISCH DATE:        2025 1:09 PM  RESPONDING  PROVIDER #:        Twila Arana MD          QUERY TEXT:    UTI is documented in the medical record 25 Dr. Olive FUCHS. Please provide   additional clinical indicators supportive of your documentation. Or please   document if the diagnosis of UTI has been ruled out after study.    The clinical indicators include:  --79 year old female past medical history HTN, CHF, CVA presents with AMS.   Stroke alert showed no LVO. She is currently awake but not following commands.   Assessment and plan: Altered mental status likely due to combination of UTI,   VANESA and pneumonia. (25 Dr. Olive FUCHS)  --urine cx grew e coli-multidrug resistant; ID consulted (25 Dr. Arana   internal medicine progress note)  --MDR E coli bacteriuria- urine was obtained from [facility]- contaminated   sample. If pt shows s/s of infection, will obtain urine cx after straight   cath. Stop meropenem, IV Zosyn for asp pneumonia (25 Dr. Corrales ID   consult)    --urine culture: E. Coli >100,000, multi-drug resistant (25)  --IV fluids, IV Meropenem (stopped per ID), IV Zosyn (-), ID Cefepime   (-), IV Vancomycin (-) (Per MAR)  Options provided:  -- UTI present as evidenced by, Please document evidence.  -- UTI was ruled out  -- Other - I will add my own diagnosis  -- Disagree - Not applicable / Not valid  -- Disagree - Clinically unable to determine / Unknown  -- Refer to Clinical Documentation Reviewer    PROVIDER RESPONSE TEXT:    UTI was ruled out after study.    Query created by: Mee Floyd on 2025 2:00 PM      Electronically signed by:  Twila Arana MD 2025 5:13 PM

## 2025-04-28 NOTE — PLAN OF CARE
Problem: Safety - Adult  Goal: Free from fall injury  4/28/2025 1221 by Monique Addison RN  Outcome: Progressing  4/28/2025 0421 by Orin Burgess RN  Outcome: Progressing     Problem: Chronic Conditions and Co-morbidities  Goal: Patient's chronic conditions and co-morbidity symptoms are monitored and maintained or improved  4/28/2025 1221 by Monique Addison RN  Outcome: Progressing  4/28/2025 0421 by Orin Burgess RN  Outcome: Progressing     Problem: Pain  Goal: Verbalizes/displays adequate comfort level or baseline comfort level  4/28/2025 1221 by Monique Addison RN  Outcome: Progressing  4/28/2025 0421 by Orin Burgess RN  Outcome: Progressing     Problem: Confusion  Goal: Confusion, delirium, dementia, or psychosis is improved or at baseline  Description: INTERVENTIONS:1. Assess for possible contributors to thought disturbance, including medications, impaired vision or hearing, underlying metabolic abnormalities, dehydration, psychiatric diagnoses, and notify attending LIP2. [place] high risk fall precautions, as indicated3. Provide frequent short contacts to provide reality reorientation, refocusing and direction4. Decrease environmental stimuli, including noise as appropriate5. Monitor and intervene to maintain adequate nutrition, hydration, elimination, sleep and activity6. If unable to ensure safety without constant attention obtain sitter and review sitter guidelines with assigned personnel7. Initiate Psychosocial CNS and Spiritual Care consult, as indicated  INTERVENTIONS:1. Assess for possible contributors to thought disturbance, including medications, impaired vision or hearing, underlying metabolic abnormalities, dehydration, psychiatric diagnoses, and notify attending LIP2. [place] high risk fall precautions, as indicated3. Provide frequent short contacts to provide reality reorientation, refocusing and direction4. Decrease environmental stimuli, including noise as appropriate5. Monitor and

## 2025-04-28 NOTE — PLAN OF CARE
Problem: Safety - Adult  Goal: Free from fall injury  4/28/2025 1222 by Monique Addison RN  Outcome: Completed  4/28/2025 1221 by Monique Addison RN  Outcome: Progressing  4/28/2025 0421 by Orin Burgess RN  Outcome: Progressing     Problem: Chronic Conditions and Co-morbidities  Goal: Patient's chronic conditions and co-morbidity symptoms are monitored and maintained or improved  4/28/2025 1222 by Monique Addison RN  Outcome: Completed  4/28/2025 1221 by Monique Addison RN  Outcome: Progressing  4/28/2025 0421 by Orin uBrgess RN  Outcome: Progressing     Problem: Pain  Goal: Verbalizes/displays adequate comfort level or baseline comfort level  4/28/2025 1222 by Monique Addison RN  Outcome: Completed  4/28/2025 1221 by Monique Addison RN  Outcome: Progressing  4/28/2025 0421 by Orin Burgess RN  Outcome: Progressing     Problem: Confusion  Goal: Confusion, delirium, dementia, or psychosis is improved or at baseline  Description: INTERVENTIONS:1. Assess for possible contributors to thought disturbance, including medications, impaired vision or hearing, underlying metabolic abnormalities, dehydration, psychiatric diagnoses, and notify attending LIP2. [place] high risk fall precautions, as indicated3. Provide frequent short contacts to provide reality reorientation, refocusing and direction4. Decrease environmental stimuli, including noise as appropriate5. Monitor and intervene to maintain adequate nutrition, hydration, elimination, sleep and activity6. If unable to ensure safety without constant attention obtain sitter and review sitter guidelines with assigned personnel7. Initiate Psychosocial CNS and Spiritual Care consult, as indicated  INTERVENTIONS:1. Assess for possible contributors to thought disturbance, including medications, impaired vision or hearing, underlying metabolic abnormalities, dehydration, psychiatric diagnoses, and notify attending LIP2. [place] high risk fall precautions, as

## 2025-04-29 ENCOUNTER — OUTSIDE SERVICES (OUTPATIENT)
Dept: PRIMARY CARE CLINIC | Age: 80
End: 2025-04-29

## 2025-04-29 DIAGNOSIS — F32.A ANXIETY AND DEPRESSION: ICD-10-CM

## 2025-04-29 DIAGNOSIS — E16.2 HYPOGLYCEMIA: ICD-10-CM

## 2025-04-29 DIAGNOSIS — J44.9 CHRONIC OBSTRUCTIVE PULMONARY DISEASE, UNSPECIFIED COPD TYPE (HCC): ICD-10-CM

## 2025-04-29 DIAGNOSIS — I50.9 CHRONIC CONGESTIVE HEART FAILURE, UNSPECIFIED HEART FAILURE TYPE (HCC): ICD-10-CM

## 2025-04-29 DIAGNOSIS — F41.9 ANXIETY AND DEPRESSION: ICD-10-CM

## 2025-04-29 DIAGNOSIS — I10 PRIMARY HYPERTENSION: ICD-10-CM

## 2025-04-29 DIAGNOSIS — Z79.4 CONTROLLED TYPE 2 DIABETES MELLITUS WITH DIABETIC NEUROPATHY, WITH LONG-TERM CURRENT USE OF INSULIN (HCC): ICD-10-CM

## 2025-04-29 DIAGNOSIS — E11.40 CONTROLLED TYPE 2 DIABETES MELLITUS WITH DIABETIC NEUROPATHY, WITH LONG-TERM CURRENT USE OF INSULIN (HCC): ICD-10-CM

## 2025-04-29 DIAGNOSIS — J69.0 ASPIRATION PNEUMONIA, UNSPECIFIED ASPIRATION PNEUMONIA TYPE, UNSPECIFIED LATERALITY, UNSPECIFIED PART OF LUNG (HCC): ICD-10-CM

## 2025-04-29 DIAGNOSIS — R41.82 ALTERED MENTAL STATUS, UNSPECIFIED ALTERED MENTAL STATUS TYPE: ICD-10-CM

## 2025-04-29 DIAGNOSIS — E78.5 HYPERLIPIDEMIA, UNSPECIFIED HYPERLIPIDEMIA TYPE: ICD-10-CM

## 2025-04-29 DIAGNOSIS — E03.9 HYPOTHYROIDISM, UNSPECIFIED TYPE: ICD-10-CM

## 2025-04-29 DIAGNOSIS — Z86.73 HISTORY OF CVA (CEREBROVASCULAR ACCIDENT): ICD-10-CM

## 2025-04-29 DIAGNOSIS — N30.00 ACUTE CYSTITIS WITHOUT HEMATURIA: Primary | ICD-10-CM

## 2025-04-29 NOTE — PROGRESS NOTES
Rebeka Renee (:  1945) is a 79 y.o. female.  2025                            History and physical  Subjective   SUBJECTIVE/OBJECTIVE:  Past Medical History:   Diagnosis Date    Anemia     S/P chemotherapy.    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    Ascending aortic aneurysm     seen on CTA chest w/contrast on     Asthma     Bipolar 1 disorder (HCC)     Cancer (HCC)     lung/ kidney    Cancer of breast, female (HCC) 2006    S/P bilaeral mastectomy with left breast lymph node resection and chemotherapy.    CHF (congestive heart failure) (HCC)     Chronic back pain     Depression     Depression with anxiety     Diabetes mellitus (HCC)     Resolved after losing 130 lbs. after gastric bypass surgery.    Dyslipidemia     Fibromyalgia     History of blood transfusion     anemia    Hypertension     Hypothyroidism     Neuropathy     Pericardial effusion 2010    Subxiphoid pericardial window with drainage of pericardial effusion and pericardial biopsy:  Fluid and biopsy negative for metastases.     Pleural effusion 2010    Noted on chest x-ray.    TIA (transient ischemic attack)     Tobacco abuse     Patient smoked 3 ppd x 26 years.  Quit in .    Type II or unspecified type diabetes mellitus without mention of complication, not stated as uncontrolled       Past Surgical History:   Procedure Laterality Date    BREAST SURGERY      CATARACT REMOVAL WITH IMPLANT Bilateral     CHOLECYSTECTOMY      FEMUR SURGERY Right 2024    FEMUR OPEN REDUCTION INTERNAL FIXATION RIGHT PERIPROSTHETIC performed by Wood George DO at [facility] [place]    FIBULA FRACTURE SURGERY Right 2024    RIGHT FEMUR OPEN REDUCTION INTERNAL FIXATION, OPEN TREATMENT OF RIGHT HIP DISLOCATION performed by Nemesio Handy MD at [facility] OR    GASTRIC BYPASS SURGERY  2004    HERNIA REPAIR

## 2025-04-30 ENCOUNTER — OUTSIDE SERVICES (OUTPATIENT)
Dept: PRIMARY CARE CLINIC | Age: 80
End: 2025-04-30

## 2025-04-30 DIAGNOSIS — Z79.4 CONTROLLED TYPE 2 DIABETES MELLITUS WITH DIABETIC NEUROPATHY, WITH LONG-TERM CURRENT USE OF INSULIN (HCC): ICD-10-CM

## 2025-04-30 DIAGNOSIS — N30.00 ACUTE CYSTITIS WITHOUT HEMATURIA: Primary | ICD-10-CM

## 2025-04-30 DIAGNOSIS — E11.40 CONTROLLED TYPE 2 DIABETES MELLITUS WITH DIABETIC NEUROPATHY, WITH LONG-TERM CURRENT USE OF INSULIN (HCC): ICD-10-CM

## 2025-04-30 DIAGNOSIS — F41.9 ANXIETY AND DEPRESSION: ICD-10-CM

## 2025-04-30 DIAGNOSIS — Z86.73 HISTORY OF CVA (CEREBROVASCULAR ACCIDENT): ICD-10-CM

## 2025-04-30 DIAGNOSIS — J44.9 CHRONIC OBSTRUCTIVE PULMONARY DISEASE, UNSPECIFIED COPD TYPE (HCC): ICD-10-CM

## 2025-04-30 DIAGNOSIS — I10 PRIMARY HYPERTENSION: ICD-10-CM

## 2025-04-30 DIAGNOSIS — R53.1 GENERALIZED WEAKNESS: ICD-10-CM

## 2025-04-30 DIAGNOSIS — F32.A ANXIETY AND DEPRESSION: ICD-10-CM

## 2025-04-30 DIAGNOSIS — R41.82 ALTERED MENTAL STATUS, UNSPECIFIED ALTERED MENTAL STATUS TYPE: ICD-10-CM

## 2025-04-30 DIAGNOSIS — E03.9 HYPOTHYROIDISM, UNSPECIFIED TYPE: ICD-10-CM

## 2025-04-30 DIAGNOSIS — E78.5 HYPERLIPIDEMIA, UNSPECIFIED HYPERLIPIDEMIA TYPE: ICD-10-CM

## 2025-04-30 DIAGNOSIS — I50.9 CHRONIC CONGESTIVE HEART FAILURE, UNSPECIFIED HEART FAILURE TYPE (HCC): ICD-10-CM

## 2025-04-30 DIAGNOSIS — J69.0 ASPIRATION PNEUMONIA, UNSPECIFIED ASPIRATION PNEUMONIA TYPE, UNSPECIFIED LATERALITY, UNSPECIFIED PART OF LUNG (HCC): ICD-10-CM

## 2025-04-30 ASSESSMENT — ENCOUNTER SYMPTOMS
SHORTNESS OF BREATH: 0
RHINORRHEA: 0
DIARRHEA: 0
BACK PAIN: 0
COUGH: 0
ABDOMINAL PAIN: 0
PHOTOPHOBIA: 0
WHEEZING: 0
CONSTIPATION: 0
VOMITING: 0
SORE THROAT: 0

## 2025-04-30 NOTE — PROGRESS NOTES
Rebeka Renee (:  1945) is a 79 y.o. female.    Subjective     Location: [facility]  All nursing and progress notes reviewed.    Rebeka is seen today for medication management.  Pharmacy recommending to discontinue Symbicort and start Trelegy.  Discussed with patient.  She is agreeable.  States her breathing has been about at baseline.    Past Medical History:   Diagnosis Date    Anemia     S/P chemotherapy.    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    Ascending aortic aneurysm     seen on CTA chest w/contrast on     Asthma     Bipolar 1 disorder (HCC)     Cancer (HCC)     lung/ kidney    Cancer of breast, female (HCC) 2006    S/P bilaeral mastectomy with left breast lymph node resection and chemotherapy.    CHF (congestive heart failure) (HCC)     Chronic back pain     Depression     Depression with anxiety     Diabetes mellitus (HCC)     Resolved after losing 130 lbs. after gastric bypass surgery.    Dyslipidemia     Fibromyalgia     History of blood transfusion     anemia    Hypertension     Hypothyroidism     Neuropathy     Pericardial effusion 2010    Subxiphoid pericardial window with drainage of pericardial effusion and pericardial biopsy:  Fluid and biopsy negative for metastases.     Pleural effusion 2010    Noted on chest x-ray.    TIA (transient ischemic attack)     Tobacco abuse     Patient smoked 3 ppd x 26 years.  Quit in .    Type II or unspecified type diabetes mellitus without mention of complication, not stated as uncontrolled        Allergies   Allergen Reactions    Benadryl [Diphenhydramine] Hives     Tongue Swells    Other      benzodine  Tongue Swells    Sulfa Antibiotics Hives     Tongue Swells    Ciprofloxacin Hives    Oxycodone     Tape [Adhesive Tape] Itching         Review of Systems   Constitutional:  Negative for appetite change, chills,

## 2025-04-30 NOTE — PROGRESS NOTES
Patient's chart accessed as email received to fix order for transfer from Dr. Schaefer to Dr. Arana for inpatient order

## 2025-05-02 ENCOUNTER — OUTSIDE SERVICES (OUTPATIENT)
Dept: PRIMARY CARE CLINIC | Age: 80
End: 2025-05-02

## 2025-05-02 DIAGNOSIS — N30.00 ACUTE CYSTITIS WITHOUT HEMATURIA: Primary | ICD-10-CM

## 2025-05-02 DIAGNOSIS — I10 PRIMARY HYPERTENSION: ICD-10-CM

## 2025-05-02 DIAGNOSIS — F41.9 ANXIETY AND DEPRESSION: ICD-10-CM

## 2025-05-02 DIAGNOSIS — R53.1 GENERALIZED WEAKNESS: ICD-10-CM

## 2025-05-02 DIAGNOSIS — R41.82 ALTERED MENTAL STATUS, UNSPECIFIED ALTERED MENTAL STATUS TYPE: ICD-10-CM

## 2025-05-02 DIAGNOSIS — Z79.4 CONTROLLED TYPE 2 DIABETES MELLITUS WITH DIABETIC NEUROPATHY, WITH LONG-TERM CURRENT USE OF INSULIN (HCC): ICD-10-CM

## 2025-05-02 DIAGNOSIS — E11.40 CONTROLLED TYPE 2 DIABETES MELLITUS WITH DIABETIC NEUROPATHY, WITH LONG-TERM CURRENT USE OF INSULIN (HCC): ICD-10-CM

## 2025-05-02 DIAGNOSIS — F32.A ANXIETY AND DEPRESSION: ICD-10-CM

## 2025-05-02 DIAGNOSIS — Z86.73 HISTORY OF CVA (CEREBROVASCULAR ACCIDENT): ICD-10-CM

## 2025-05-02 DIAGNOSIS — J44.9 CHRONIC OBSTRUCTIVE PULMONARY DISEASE, UNSPECIFIED COPD TYPE (HCC): ICD-10-CM

## 2025-05-02 DIAGNOSIS — I50.9 CHRONIC CONGESTIVE HEART FAILURE, UNSPECIFIED HEART FAILURE TYPE (HCC): ICD-10-CM

## 2025-05-02 DIAGNOSIS — E78.5 HYPERLIPIDEMIA, UNSPECIFIED HYPERLIPIDEMIA TYPE: ICD-10-CM

## 2025-05-02 DIAGNOSIS — J69.0 ASPIRATION PNEUMONIA, UNSPECIFIED ASPIRATION PNEUMONIA TYPE, UNSPECIFIED LATERALITY, UNSPECIFIED PART OF LUNG (HCC): ICD-10-CM

## 2025-05-02 DIAGNOSIS — E03.9 HYPOTHYROIDISM, UNSPECIFIED TYPE: ICD-10-CM

## 2025-05-05 ENCOUNTER — OUTSIDE SERVICES (OUTPATIENT)
Dept: PRIMARY CARE CLINIC | Age: 80
End: 2025-05-05

## 2025-05-05 DIAGNOSIS — J44.9 CHRONIC OBSTRUCTIVE PULMONARY DISEASE, UNSPECIFIED COPD TYPE (HCC): ICD-10-CM

## 2025-05-05 DIAGNOSIS — E11.40 CONTROLLED TYPE 2 DIABETES MELLITUS WITH DIABETIC NEUROPATHY, WITH LONG-TERM CURRENT USE OF INSULIN (HCC): ICD-10-CM

## 2025-05-05 DIAGNOSIS — Z86.73 HISTORY OF CVA (CEREBROVASCULAR ACCIDENT): ICD-10-CM

## 2025-05-05 DIAGNOSIS — I50.9 CHRONIC CONGESTIVE HEART FAILURE, UNSPECIFIED HEART FAILURE TYPE (HCC): ICD-10-CM

## 2025-05-05 DIAGNOSIS — I10 PRIMARY HYPERTENSION: ICD-10-CM

## 2025-05-05 DIAGNOSIS — R53.1 GENERALIZED WEAKNESS: ICD-10-CM

## 2025-05-05 DIAGNOSIS — F32.A ANXIETY AND DEPRESSION: ICD-10-CM

## 2025-05-05 DIAGNOSIS — F41.9 ANXIETY AND DEPRESSION: ICD-10-CM

## 2025-05-05 DIAGNOSIS — Z79.4 CONTROLLED TYPE 2 DIABETES MELLITUS WITH DIABETIC NEUROPATHY, WITH LONG-TERM CURRENT USE OF INSULIN (HCC): ICD-10-CM

## 2025-05-05 DIAGNOSIS — E03.9 HYPOTHYROIDISM, UNSPECIFIED TYPE: ICD-10-CM

## 2025-05-05 DIAGNOSIS — N30.00 ACUTE CYSTITIS WITHOUT HEMATURIA: Primary | ICD-10-CM

## 2025-05-05 DIAGNOSIS — R41.82 ALTERED MENTAL STATUS, UNSPECIFIED ALTERED MENTAL STATUS TYPE: ICD-10-CM

## 2025-05-05 DIAGNOSIS — J69.0 ASPIRATION PNEUMONIA, UNSPECIFIED ASPIRATION PNEUMONIA TYPE, UNSPECIFIED LATERALITY, UNSPECIFIED PART OF LUNG (HCC): ICD-10-CM

## 2025-05-05 DIAGNOSIS — E78.5 HYPERLIPIDEMIA, UNSPECIFIED HYPERLIPIDEMIA TYPE: ICD-10-CM

## 2025-05-06 NOTE — PROGRESS NOTES
Physician Progress Note      PATIENT:               JEVON LOZOYA  [facility] #:                  [id number]  :                       1945  ADMIT DATE:       2025 1:22 PM  DISCH DATE:        2025 1:09 PM  RESPONDING  PROVIDER #:        Oumou SCHAEFER DO          QUERY TEXT:    Acute respiratory failure is documented in the medical record 25 Dr. Schaefer H&P. Please provide additional clinical indicators supportive of your   documentation. Or please document if the diagnosis of acute respiratory   failure has been ruled out after study.    The clinical indicators include:  --79 year old female past medical history HTN, CHF, CVA presents with AMS.   Stroke alert showed no LVO. She is currently awake but not following commands.   Assessment and plan: Acute respiratory failure. Altered mental status likely   due to combination of UTI, VANESA and pneumonia. Sepsis. COPD. General exam: no   use of accessory muscles. ROS: no SOB, cough, sputum (25 Dr. Schaefer H&P)  --Pulmonary effort is normal. No respiratory distress (25 Dr. Smith ED   provider note)  --per vs: 90-98% on RA; RR 13-20 (25)  --per vs: 92% on RA, % on 2L, 99% on 3L; RR 18 (25)  --per nursing: respiratory pattern regular, depth normal, quality/effort   unlabored (25)  --continuous pulse ox (ordered )  --Nebs (per MAR)  Options provided:  -- Acute Respiratory Failure as evidenced by, Please document evidence.  -- Acute Respiratory Failure ruled out after study  -- Other - I will add my own diagnosis  -- Disagree - Not applicable / Not valid  -- Disagree - Clinically unable to determine / Unknown  -- Refer to Clinical Documentation Reviewer    PROVIDER RESPONSE TEXT:    This patient is in acute respiratory failure as evidenced by oxygen dropped   and she required oxygen    Query created by: Mee Floyd on 2025 3:54 PM      QUERY TEXT:    Sepsis is documented in the medical record 25 H&P. Please

## 2025-05-07 ENCOUNTER — OUTSIDE SERVICES (OUTPATIENT)
Dept: PRIMARY CARE CLINIC | Age: 80
End: 2025-05-07

## 2025-05-07 DIAGNOSIS — I10 PRIMARY HYPERTENSION: ICD-10-CM

## 2025-05-07 DIAGNOSIS — F41.9 ANXIETY AND DEPRESSION: ICD-10-CM

## 2025-05-07 DIAGNOSIS — J44.9 CHRONIC OBSTRUCTIVE PULMONARY DISEASE, UNSPECIFIED COPD TYPE (HCC): ICD-10-CM

## 2025-05-07 DIAGNOSIS — R53.1 GENERALIZED WEAKNESS: ICD-10-CM

## 2025-05-07 DIAGNOSIS — N30.00 ACUTE CYSTITIS WITHOUT HEMATURIA: Primary | ICD-10-CM

## 2025-05-07 DIAGNOSIS — E03.9 HYPOTHYROIDISM, UNSPECIFIED TYPE: ICD-10-CM

## 2025-05-07 DIAGNOSIS — R41.82 ALTERED MENTAL STATUS, UNSPECIFIED ALTERED MENTAL STATUS TYPE: ICD-10-CM

## 2025-05-07 DIAGNOSIS — J69.0 ASPIRATION PNEUMONIA, UNSPECIFIED ASPIRATION PNEUMONIA TYPE, UNSPECIFIED LATERALITY, UNSPECIFIED PART OF LUNG (HCC): ICD-10-CM

## 2025-05-07 DIAGNOSIS — I50.9 CHRONIC CONGESTIVE HEART FAILURE, UNSPECIFIED HEART FAILURE TYPE (HCC): ICD-10-CM

## 2025-05-07 DIAGNOSIS — E11.40 CONTROLLED TYPE 2 DIABETES MELLITUS WITH DIABETIC NEUROPATHY, WITH LONG-TERM CURRENT USE OF INSULIN (HCC): ICD-10-CM

## 2025-05-07 DIAGNOSIS — F32.A ANXIETY AND DEPRESSION: ICD-10-CM

## 2025-05-07 DIAGNOSIS — Z86.73 HISTORY OF CVA (CEREBROVASCULAR ACCIDENT): ICD-10-CM

## 2025-05-07 DIAGNOSIS — Z79.4 CONTROLLED TYPE 2 DIABETES MELLITUS WITH DIABETIC NEUROPATHY, WITH LONG-TERM CURRENT USE OF INSULIN (HCC): ICD-10-CM

## 2025-05-07 DIAGNOSIS — E78.5 HYPERLIPIDEMIA, UNSPECIFIED HYPERLIPIDEMIA TYPE: ICD-10-CM

## 2025-05-09 ENCOUNTER — OUTSIDE SERVICES (OUTPATIENT)
Dept: PRIMARY CARE CLINIC | Age: 80
End: 2025-05-09

## 2025-05-09 DIAGNOSIS — J69.0 ASPIRATION PNEUMONIA, UNSPECIFIED ASPIRATION PNEUMONIA TYPE, UNSPECIFIED LATERALITY, UNSPECIFIED PART OF LUNG (HCC): ICD-10-CM

## 2025-05-09 DIAGNOSIS — J44.9 CHRONIC OBSTRUCTIVE PULMONARY DISEASE, UNSPECIFIED COPD TYPE (HCC): ICD-10-CM

## 2025-05-09 DIAGNOSIS — R53.1 GENERALIZED WEAKNESS: ICD-10-CM

## 2025-05-09 DIAGNOSIS — F41.9 ANXIETY AND DEPRESSION: ICD-10-CM

## 2025-05-09 DIAGNOSIS — E11.40 CONTROLLED TYPE 2 DIABETES MELLITUS WITH DIABETIC NEUROPATHY, WITH LONG-TERM CURRENT USE OF INSULIN (HCC): ICD-10-CM

## 2025-05-09 DIAGNOSIS — N30.00 ACUTE CYSTITIS WITHOUT HEMATURIA: Primary | ICD-10-CM

## 2025-05-09 DIAGNOSIS — E78.5 HYPERLIPIDEMIA, UNSPECIFIED HYPERLIPIDEMIA TYPE: ICD-10-CM

## 2025-05-09 DIAGNOSIS — Z86.73 HISTORY OF CVA (CEREBROVASCULAR ACCIDENT): ICD-10-CM

## 2025-05-09 DIAGNOSIS — E03.9 HYPOTHYROIDISM, UNSPECIFIED TYPE: ICD-10-CM

## 2025-05-09 DIAGNOSIS — F32.A ANXIETY AND DEPRESSION: ICD-10-CM

## 2025-05-09 DIAGNOSIS — Z79.4 CONTROLLED TYPE 2 DIABETES MELLITUS WITH DIABETIC NEUROPATHY, WITH LONG-TERM CURRENT USE OF INSULIN (HCC): ICD-10-CM

## 2025-05-09 DIAGNOSIS — I10 PRIMARY HYPERTENSION: ICD-10-CM

## 2025-05-09 DIAGNOSIS — R41.82 ALTERED MENTAL STATUS, UNSPECIFIED ALTERED MENTAL STATUS TYPE: ICD-10-CM

## 2025-05-09 DIAGNOSIS — I50.9 CHRONIC CONGESTIVE HEART FAILURE, UNSPECIFIED HEART FAILURE TYPE (HCC): ICD-10-CM

## 2025-05-18 ENCOUNTER — OUTSIDE SERVICES (OUTPATIENT)
Dept: PRIMARY CARE CLINIC | Age: 80
End: 2025-05-18

## 2025-06-02 NOTE — BRIEF OP NOTE
Brief Postoperative Note      Patient: Rebeka Renee  YOB: 1945  MRN: [id number]    Date of Procedure: 8/2/2024    RIght hip  aspiration    Post-Op Diagnosis: Same       CT guided right hip aspiration    KIM Galeas    Assistant:  * No surgical staff found *    Anesthesia: * Generous local    Estimated Blood Loss (mL): Minimal    Complications: None    Specimens:   * No specimens in log *    Implants:  * No implants in log *      Drains: * No LDAs found *    Findings:  Infection Present At Time Of Surgery (PATOS) (choose all levels that have infection present):  No infection present  Other Findings: small volume of fluid aspirated from the periarticular fluid     Electronically signed by Piter Galeas MD on 8/2/2024 at 12:11 PM   2-3x/week

## (undated) DEVICE — SOLUTION IRRIG 1000ML STRL H2O USP PLAS POUR BTL

## (undated) DEVICE — TOWEL,OR,DSP,ST,BLUE,DLX,10/PK,8PK/CS: Brand: MEDLINE

## (undated) DEVICE — GOWN,BREATHABLE SLV,AURORA,XLG,STRL: Brand: MEDLINE

## (undated) DEVICE — ELECTRODE PT RET AD L9FT HI MOIST COND ADH HYDRGEL CORDED

## (undated) DEVICE — SYRINGE MED 50ML LUERLOCK TIP

## (undated) DEVICE — STRYKER PERFORMANCE SERIES SAGITTAL BLADE: Brand: STRYKER PERFORMANCE SERIES

## (undated) DEVICE — HANDLE CVR PATENTED RETENTION DISC STRL LIGHT SHLD

## (undated) DEVICE — Device

## (undated) DEVICE — NEEDLE HYPO 21GA L1.5IN GRN POLYPR HUB S STL REG BVL STR

## (undated) DEVICE — TRAP,MUCUS SPECIMEN,40CC: Brand: MEDLINE

## (undated) DEVICE — COVER BOOT L REG BLU SMS POLYPR KNEE HI E FLD RESIST

## (undated) DEVICE — HEWSON SUTURE RETRIEVER: Brand: HEWSON SUTURE RETRIEVER

## (undated) DEVICE — BIT DRL L5IN DIA2.8MM STD ST S STL TWST BUSA

## (undated) DEVICE — DRESSING HYDROFIBER AQUACEL AG ADVANTAGE 3.5X12 IN

## (undated) DEVICE — SOLUTION IRRIG 3000ML 0.9% SOD CHL USP UROMATIC PLAS CONT

## (undated) DEVICE — GLOVE ORANGE PI 8 1/2   MSG9085

## (undated) DEVICE — 3M™ IOBAN™ 2 ANTIMICROBIAL INCISE DRAPE 6651EZ: Brand: IOBAN™ 2

## (undated) DEVICE — PEN: MARKING STD 100/CS: Brand: MEDICAL ACTION INDUSTRIES

## (undated) DEVICE — TUBING SUCT 12FR MAL ALUM SHFT FN CAP VENT UNIV CONN W/ OBT

## (undated) DEVICE — INTENDED FOR TISSUE SEPARATION, AND OTHER PROCEDURES THAT REQUIRE A SHARP SURGICAL BLADE TO PUNCTURE OR CUT.: Brand: BARD-PARKER ® STAINLESS STEEL BLADES

## (undated) DEVICE — TUBING, SUCTION, 3/16" X 12', STRAIGHT: Brand: MEDLINE

## (undated) DEVICE — GOWN SURG XL SMS FAB NONREINFORCED RAGLAN SLV HK LOOP CLSR

## (undated) DEVICE — APPLICATOR MEDICATED 26 CC SOLUTION HI LT ORNG CHLORAPREP

## (undated) DEVICE — STERILE POLYISOPRENE POWDER-FREE SURGICAL GLOVES: Brand: PROTEXIS

## (undated) DEVICE — HOOD WITH PEEL AWAY FACE SHIELD: Brand: T7PLUS

## (undated) DEVICE — DRESSING HYDROFIBER AQUACEL AG ADVANTAGE 3.5X10 IN

## (undated) DEVICE — GLOVE ORTHO 8   MSG9480

## (undated) DEVICE — STRAP POS MP 30X3 IN HK LOOP CLOSURE FOAM DISP

## (undated) DEVICE — GOWN,SIRUS,POLYRNF,BRTHSLV,2XL,18/CS: Brand: MEDLINE

## (undated) DEVICE — SPECIMEN CUP W/LID: Brand: DEROYAL

## (undated) DEVICE — 450 ML BOTTLE OF 0.05% CHLORHEXIDINE GLUCONATE IN 99.95% STERILE WATER FOR IRRIGATION, USP AND APPLICATOR.: Brand: IRRISEPT ANTIMICROBIAL WOUND LAVAGE

## (undated) DEVICE — PREVENA PLUS INCISION MANAGEMENT SYSTEM: Brand: PREVENA PLUS™

## (undated) DEVICE — SPONGE LAP W18XL18IN WHT COT 4 PLY FLD STRUNG RADPQ DISP ST 2 PER PACK

## (undated) DEVICE — 1530S-1, 1 IN X 1.5 YD (2,5 CM X 1,37 M), UNPACKAGED ROLLS, 100 RL/CARTON, 5 CARTONS/CASE, 500 RL/CA: Brand: 3M™ MICROPORE™

## (undated) DEVICE — GLOVE SURG SZ 65 THK91MIL LTX FREE SYN POLYISOPRENE

## (undated) DEVICE — BINDER ABD M/L H9IN FOR 46-62IN WHT 3 SLD PNL DSGN HOOP AND

## (undated) DEVICE — SYRINGE MED 20ML STD CLR PLAS LUERLOCK TIP N CTRL DISP

## (undated) DEVICE — PENCIL SMK EVAC L10FT TBNG NONSTICK ESU BLDE PLUMEPEN ELITE

## (undated) DEVICE — GOWN,AURORA,BRTHSLV,2XL,18/CS: Brand: MEDLINE

## (undated) DEVICE — SYRINGE BLB 50CC IRRIG PLIABLE FNGR FLNG GRAD FLSK DISP

## (undated) DEVICE — SOLUTION IV IRRIG POUR BRL 0.9% SODIUM CHL 2F7124

## (undated) DEVICE — FRACTURE TABLE: Brand: MEDLINE INDUSTRIES, INC.

## (undated) DEVICE — MEDI-VAC YANKAUER SUCTION HANDLE W/BULBOUS TIP: Brand: CARDINAL HEALTH

## (undated) DEVICE — TOWEL OR BLUEE 16X26IN ST 8 PACK ORB08 16X26ORTWL

## (undated) DEVICE — MASTISOL ADHESIVE LIQ 2/3ML

## (undated) DEVICE — E-Z CLEAN, NON-STICK, PTFE COATED, ELECTROSURGICAL BLADE ELECTRODE, 2.75 INCH (7 CM): Brand: MEGADYNE

## (undated) DEVICE — DRAPE,REIN 53X77,STERILE: Brand: MEDLINE

## (undated) DEVICE — GLOVE SURG SZ 8 L12IN FNGR THK79MIL GRN LTX FREE

## (undated) DEVICE — LOWER EXT HIP DRAPE: Brand: MEDLINE INDUSTRIES, INC.

## (undated) DEVICE — 3M™ IOBAN™ 2 ANTIMICROBIAL INCISE DRAPE 6640EZ: Brand: IOBAN™ 2

## (undated) DEVICE — GAUZE,SPONGE,4"X4",16PLY,XRAY,STRL,LF: Brand: MEDLINE

## (undated) DEVICE — SPONGE,LAP,12"X12",XR,ST,5/PK,40PK/CS: Brand: MEDLINE

## (undated) DEVICE — SUTURE PDS II SZ 1 L36IN ABSRB VLT CT L40MM 1/2 CIR TAPR Z359T

## (undated) DEVICE — 3M™ STERI-STRIP™ REINFORCED ADHESIVE SKIN CLOSURES, R1547, 1/2 IN X 4 IN (12 MM X 100 MM), 6 STRIPS/ENVELOPE: Brand: 3M™ STERI-STRIP™

## (undated) DEVICE — CANISTER NEG PRSS 1000ML W/ GEL INFOVAC

## (undated) DEVICE — DRESSING HYDROFIBER AQUACEL AG ADVANTAGE 3.5X14 IN

## (undated) DEVICE — HANDPIECE IRR FOR INTERPULSE BTTRY PWR SUCT BONE CLN TIP

## (undated) DEVICE — BLADE CLIPPER GEN PURP NS

## (undated) DEVICE — PAD ABD CURITY TENDERSORB 5X9IN

## (undated) DEVICE — SYRINGE 20ML LL S/C 50

## (undated) DEVICE — PEEL-AWAY HOOD: Brand: FLYTE, SURGICOOL

## (undated) DEVICE — SYRINGE MED 10ML TRNSLUC BRL PLUNG BLK MRK POLYPR CTRL

## (undated) DEVICE — MEDI-VAC NON-CONDUCTIVE SUCTION TUBING: Brand: CARDINAL HEALTH

## (undated) DEVICE — DRESSING FOAM W3.5XL6IN POSTOP STRP GENTLE OPTIFOAM AG

## (undated) DEVICE — DRAPE SHEET, X-LARGE: Brand: CONVERTORS

## (undated) DEVICE — 3M™ MEDIPORE™ SOFT CLOTH TAPE, 4 INCH X 10 YARDS, 12 ROLLS/CASE, 2964: Brand: 3M™ MEDIPORE™

## (undated) DEVICE — DRAPE EQUIP CARM 72X42 IN RUBBER BND CLP

## (undated) DEVICE — SOLUTION IRRIG 1000ML 0.9% SOD CHL USP POUR PLAS BTL

## (undated) DEVICE — SOLUTION WND IRRIGATION 450 ML 0.5 PVP-I 0.9 NACL

## (undated) DEVICE — DRESSING,GAUZE,XEROFORM,CURAD,5"X9",ST: Brand: CURAD

## (undated) DEVICE — 1810 FOAM BLOCK NEEDLE COUNTER: Brand: DEVON

## (undated) DEVICE — BRUSH CLN L12.75IN DIA12.5MM FEM BNE PLAS

## (undated) DEVICE — GAUZE,SPONGE,4"X4",16PLY,STRL,LF,10/TRAY: Brand: MEDLINE

## (undated) DEVICE — NEEDLE HYPO 25GA L1.5IN BLU POLYPR HUB S STL REG BVL STR